# Patient Record
Sex: MALE | Race: WHITE | Employment: FULL TIME | URBAN - METROPOLITAN AREA
[De-identification: names, ages, dates, MRNs, and addresses within clinical notes are randomized per-mention and may not be internally consistent; named-entity substitution may affect disease eponyms.]

---

## 2017-04-28 ENCOUNTER — OFFICE VISIT (OUTPATIENT)
Dept: URGENT CARE | Facility: URGENT CARE | Age: 57
End: 2017-04-28
Payer: COMMERCIAL

## 2017-04-28 VITALS
SYSTOLIC BLOOD PRESSURE: 123 MMHG | DIASTOLIC BLOOD PRESSURE: 77 MMHG | WEIGHT: 159.8 LBS | TEMPERATURE: 97.2 F | HEART RATE: 77 BPM | OXYGEN SATURATION: 98 %

## 2017-04-28 DIAGNOSIS — R10.9 RIGHT FLANK PAIN: Primary | ICD-10-CM

## 2017-04-28 DIAGNOSIS — S39.012A STRAIN OF LUMBAR REGION, INITIAL ENCOUNTER: ICD-10-CM

## 2017-04-28 LAB
ALBUMIN UR-MCNC: NEGATIVE MG/DL
APPEARANCE UR: CLEAR
BILIRUB UR QL STRIP: ABNORMAL
COLOR UR AUTO: YELLOW
GLUCOSE UR STRIP-MCNC: NEGATIVE MG/DL
HGB UR QL STRIP: NEGATIVE
KETONES UR STRIP-MCNC: NEGATIVE MG/DL
LEUKOCYTE ESTERASE UR QL STRIP: NEGATIVE
NITRATE UR QL: NEGATIVE
PH UR STRIP: 5.5 PH (ref 5–7)
SP GR UR STRIP: 1.02 (ref 1–1.03)
URN SPEC COLLECT METH UR: ABNORMAL
UROBILINOGEN UR STRIP-ACNC: 1 EU/DL (ref 0.2–1)

## 2017-04-28 PROCEDURE — 81003 URINALYSIS AUTO W/O SCOPE: CPT | Performed by: FAMILY MEDICINE

## 2017-04-28 PROCEDURE — 99213 OFFICE O/P EST LOW 20 MIN: CPT | Performed by: FAMILY MEDICINE

## 2017-04-28 RX ORDER — CYCLOBENZAPRINE HCL 10 MG
10 TABLET ORAL AT BEDTIME
Qty: 10 TABLET | Refills: 1 | Status: SHIPPED | OUTPATIENT
Start: 2017-04-28 | End: 2017-05-09

## 2017-04-28 ASSESSMENT — PAIN SCALES - GENERAL: PAINLEVEL: WORST PAIN (10)

## 2017-04-28 NOTE — NURSING NOTE
RN Triage:     Chief Complaint   Patient presents with     Abdominal Pain     Patient report lower abdominal pain that has been going on for about a month.  Patient states that cramping happens almost every Thursday or Friday.  Patient reports intermittent pain similar to cramping four about 4-5 hours.  Patient reports diarrhea today.  No nausea or weakness.  Patient has Hepatitis C.  Also family history of Kidney stones.  Patient has taken Gas pills improved symptoms.  Tums worsened symptoms.  Patient also took epsom salt       Initial /77 (BP Location: Left arm, Patient Position: Chair, Cuff Size: Adult Regular)  Pulse 77  Temp 97.2  F (36.2  C)  Wt 159 lb 12.8 oz (72.5 kg)  SpO2 98% There is no height or weight on file to calculate BMI.  BP completed using cuff size: regular        Triage Dispo: Non-Urgent: Patient advised to wait in room. Advised patient to notify staff for any worsening or new concerning symptoms.    Intervention: CORINNE Maguire RN

## 2017-04-28 NOTE — PROGRESS NOTES
SUBJECTIVE:                                                    Rian Malik is a 56 year old male who presents to clinic today for the following health issues:      Abdominal Pain      Duration: x 1 month    Description (location/character/radiation): lower abdomen cramping and lower midline       Associated flank pain: None    Intensity:  10/10    Accompanying signs and symptoms:        Fever/Chills: no        Gas/Bloating: YES       Nausea/vomitting: no        Diarrhea: YES, today x 1. Denies fever.   Appetite has been good.        Dysuria or Hematuria: no     History (previous similar pain/trauma/previous testing): none    Precipitating or alleviating factors:       Pain worse with eating/BM/urination: eating       Pain relieved by BM: YES    Therapies tried and outcome: Advil, tums gas x    LMP:  not applicable     Left Hernia repair in 80s      Problem list and histories reviewed & adjusted, as indicated.  Additional history: as documented    There is no problem list on file for this patient.    No past surgical history on file.    Social History   Substance Use Topics     Smoking status: Current Every Day Smoker     Smokeless tobacco: Never Used     Alcohol use Yes     No family history on file.      No current outpatient prescriptions on file.     No Known Allergies    Reviewed and updated as needed this visit by clinical staff  Tobacco  Allergies  Meds       Reviewed and updated as needed this visit by Provider         ROS:  Constitutional, HEENT, cardiovascular, pulmonary, gi and gu systems are negative, except as otherwise noted.    OBJECTIVE:                                                    /77 (BP Location: Left arm, Patient Position: Chair, Cuff Size: Adult Regular)  Pulse 77  Temp 97.2  F (36.2  C)  Wt 159 lb 12.8 oz (72.5 kg)  SpO2 98%  There is no height or weight on file to calculate BMI.  GENERAL: healthy, alert and no distress  NECK: no adenopathy, no asymmetry, masses, or scars  and thyroid normal to palpation  RESP: lungs clear to auscultation - no rales, rhonchi or wheezes  CV: regular rate and rhythm, normal S1 S2, no S3 or S4, no murmur, click or rub, no peripheral edema and peripheral pulses strong  ABDOMEN: soft, nontender, no hepatosplenomegaly, no masses and bowel sounds normal  MS: no gross musculoskeletal defects noted, no edema  Paraspinal lumbar area tenderness.     Results for orders placed or performed in visit on 04/28/17   *UA reflex to Microscopic   Result Value Ref Range    Color Urine Yellow     Appearance Urine Clear     Glucose Urine Negative NEG mg/dL    Bilirubin Urine (A) NEG     Small  This is an unconfirmed screening test result. A positive result may be false.      Ketones Urine Negative NEG mg/dL    Specific Gravity Urine 1.025 1.003 - 1.035    Blood Urine Negative NEG    pH Urine 5.5 5.0 - 7.0 pH    Protein Albumin Urine Negative NEG mg/dL    Urobilinogen Urine 1.0 0.2 - 1.0 EU/dL    Nitrite Urine Negative NEG    Leukocyte Esterase Urine Negative NEG    Source Midstream Urine          ASSESSMENT/PLAN:                                                    .    ICD-10-CM    1. Right flank pain R10.9 *UA reflex to Microscopic   2. Strain of lumbar region, initial encounter S39.012A cyclobenzaprine (FLEXERIL) 10 MG tablet      PLAN  Patient educational/instructional material provided including reasons for follow-up   The patient indicates understanding of these issues and agrees with the plan.  Brain Zurita MD        [facility]

## 2017-04-28 NOTE — LETTER
[facility]  [address]  [facility] [address]  Phone: [phone]    April 28, 2017        Rian Malik  [address] [address] [facility] [address] [place] [address]          To whom it may concern:    RE: Rian Malik    Patient was seen and treated today at our clinic and missed work. He was evaluated in th clinic today and noted to have a left lumbar area strain. He may return to work at his next scheduled shift, however, he should avoid all deep bending from the waist and heavy lifting of more than 10 lbs over the next 7-10 day and until pain improves. Follow-up if symptoms persist or worsen.        Sincerely,        Brain Zurita MD

## 2017-04-28 NOTE — MR AVS SNAPSHOT
"              After Visit Summary   4/28/2017    Rian Malik    MRN: [id number]           Patient Information     Date Of Birth          1960        Visit Information        Provider Department      4/28/2017 2:10 PM Brain Zurita MD [facility]        Today's Diagnoses     Right flank pain    -  1       Follow-ups after your visit        Your next 10 appointments already scheduled     May 01, 2017  7:40 AM CDT   [facility] Visit with Moreno Harris MD   [facility]    [address] [place] [id number] [phone]           Bring a current list of meds and any records pertaining to this visit.  For Physicals, please bring immunization records and any forms needing to be filled out.  Please arrive 10 minutes early to complete paperwork.              Who to contact     If you have questions or need follow up information about today's clinic visit or your schedule please contact [facility] directly at [phone].  Normal or non-critical lab and imaging results will be communicated to you by MyChart, letter or phone within 4 business days after the clinic has received the results. If you do not hear from us within 7 days, please contact the clinic through [employer] or phone. If you have a critical or abnormal lab result, we will notify you by phone as soon as possible.  Submit refill requests through [employer] or call your pharmacy and they will forward the refill request to us. Please allow 3 business days for your refill to be completed.          Additional Information About Your Visit        [employer] Information     [employer] lets you send messages to your doctor, view your test results, renew your prescriptions, schedule appointments and more. To sign up, go to [email or web address] . Click on \"Log in\" on the left side of the screen, which will take you to the Welcome page. Then click on " "\"Sign up Now\" on the right side of the page.     You will be asked to enter the access code listed below, as well as some personal information. Please follow the directions to create your username and password.     Your access code is: [email or web address]  Expires: 2017  4:02 PM     Your access code will  in 90 days. If you need help or a new code, please call your [facility] or [phone].        Care EveryWhere ID     This is your Care EveryWhere ID. This could be used by other organizations to access your [place] medical records  [id number]        Your Vitals Were     Pulse Temperature Pulse Oximetry             77 97.2  F (36.2  C) 98%          Blood Pressure from Last 3 Encounters:   17 123/77    Weight from Last 3 Encounters:   17 159 lb 12.8 oz (72.5 kg)              We Performed the Following     *UA reflex to Microscopic        Primary Care Provider    None Specified       No primary provider on file.        Thank you!     Thank you for choosing [facility]  for your care. Our goal is always to provide you with excellent care. Hearing back from our patients is one way we can continue to improve our services. Please take a few minutes to complete the written survey that you may receive in the mail after your visit with us. Thank you!             Your Updated Medication List - Protect others around you: Learn how to safely use, store and throw away your medicines at www.disposemymeds.org.      Notice  As of 2017  4:02 PM    You have not been prescribed any medications.      "

## 2017-05-01 ENCOUNTER — OFFICE VISIT (OUTPATIENT)
Dept: FAMILY MEDICINE | Facility: CLINIC | Age: 57
End: 2017-05-01
Payer: COMMERCIAL

## 2017-05-01 VITALS
HEART RATE: 80 BPM | SYSTOLIC BLOOD PRESSURE: 117 MMHG | TEMPERATURE: 97.5 F | HEIGHT: 68 IN | BODY MASS INDEX: 23.95 KG/M2 | WEIGHT: 158 LBS | DIASTOLIC BLOOD PRESSURE: 74 MMHG | OXYGEN SATURATION: 97 %

## 2017-05-01 DIAGNOSIS — Z71.89 ADVANCED DIRECTIVES, COUNSELING/DISCUSSION: ICD-10-CM

## 2017-05-01 DIAGNOSIS — R10.32 LEFT INGUINAL PAIN: Primary | ICD-10-CM

## 2017-05-01 DIAGNOSIS — B18.2 CHRONIC HEPATITIS C WITHOUT HEPATIC COMA (H): ICD-10-CM

## 2017-05-01 DIAGNOSIS — Z13.6 CARDIOVASCULAR SCREENING; LDL GOAL LESS THAN 160: ICD-10-CM

## 2017-05-01 PROBLEM — D69.6 THROMBOCYTOPENIA (H): Chronic | Status: RESOLVED | Noted: 2017-05-01 | Resolved: 2017-05-01

## 2017-05-01 PROBLEM — D69.6 THROMBOCYTOPENIA (H): Chronic | Status: ACTIVE | Noted: 2017-05-01

## 2017-05-01 LAB
ALBUMIN SERPL-MCNC: 3.6 G/DL (ref 3.4–5)
ALP SERPL-CCNC: 75 U/L (ref 40–150)
ALT SERPL W P-5'-P-CCNC: 70 U/L (ref 0–70)
ANION GAP SERPL CALCULATED.3IONS-SCNC: 10 MMOL/L (ref 3–14)
AST SERPL W P-5'-P-CCNC: 44 U/L (ref 0–45)
BASOPHILS # BLD AUTO: 0.1 10E9/L (ref 0–0.2)
BASOPHILS NFR BLD AUTO: 1.2 %
BILIRUB SERPL-MCNC: 0.7 MG/DL (ref 0.2–1.3)
BUN SERPL-MCNC: 18 MG/DL (ref 7–30)
CALCIUM SERPL-MCNC: 9.3 MG/DL (ref 8.5–10.1)
CHLORIDE SERPL-SCNC: 103 MMOL/L (ref 94–109)
CHOLEST SERPL-MCNC: 103 MG/DL
CO2 SERPL-SCNC: 28 MMOL/L (ref 20–32)
CREAT SERPL-MCNC: 0.95 MG/DL (ref 0.66–1.25)
DIFFERENTIAL METHOD BLD: NORMAL
EOSINOPHIL # BLD AUTO: 0.1 10E9/L (ref 0–0.7)
EOSINOPHIL NFR BLD AUTO: 1.8 %
ERYTHROCYTE [DISTWIDTH] IN BLOOD BY AUTOMATED COUNT: 12.7 % (ref 10–15)
GFR SERPL CREATININE-BSD FRML MDRD: 81 ML/MIN/1.7M2
GLUCOSE SERPL-MCNC: 109 MG/DL (ref 70–99)
HCT VFR BLD AUTO: 45.7 % (ref 40–53)
HDLC SERPL-MCNC: 52 MG/DL
HGB BLD-MCNC: 16.2 G/DL (ref 13.3–17.7)
LDLC SERPL CALC-MCNC: 43 MG/DL
LYMPHOCYTES # BLD AUTO: 2.2 10E9/L (ref 0.8–5.3)
LYMPHOCYTES NFR BLD AUTO: 33.5 %
MCH RBC QN AUTO: 32.7 PG (ref 26.5–33)
MCHC RBC AUTO-ENTMCNC: 35.4 G/DL (ref 31.5–36.5)
MCV RBC AUTO: 92 FL (ref 78–100)
MONOCYTES # BLD AUTO: 0.7 10E9/L (ref 0–1.3)
MONOCYTES NFR BLD AUTO: 10.3 %
NEUTROPHILS # BLD AUTO: 3.5 10E9/L (ref 1.6–8.3)
NEUTROPHILS NFR BLD AUTO: 53.2 %
NONHDLC SERPL-MCNC: 51 MG/DL
PLATELET # BLD AUTO: 164 10E9/L (ref 150–450)
POTASSIUM SERPL-SCNC: 4 MMOL/L (ref 3.4–5.3)
PROT SERPL-MCNC: 7 G/DL (ref 6.8–8.8)
RBC # BLD AUTO: 4.95 10E12/L (ref 4.4–5.9)
SODIUM SERPL-SCNC: 141 MMOL/L (ref 133–144)
TRIGL SERPL-MCNC: 41 MG/DL
WBC # BLD AUTO: 6.5 10E9/L (ref 4–11)

## 2017-05-01 PROCEDURE — 80053 COMPREHEN METABOLIC PANEL: CPT | Performed by: INTERNAL MEDICINE

## 2017-05-01 PROCEDURE — 85025 COMPLETE CBC W/AUTO DIFF WBC: CPT | Performed by: INTERNAL MEDICINE

## 2017-05-01 PROCEDURE — 99214 OFFICE O/P EST MOD 30 MIN: CPT | Performed by: INTERNAL MEDICINE

## 2017-05-01 PROCEDURE — 87522 HEPATITIS C REVRS TRNSCRPJ: CPT | Performed by: INTERNAL MEDICINE

## 2017-05-01 PROCEDURE — 36415 COLL VENOUS BLD VENIPUNCTURE: CPT | Performed by: INTERNAL MEDICINE

## 2017-05-01 PROCEDURE — 80061 LIPID PANEL: CPT | Performed by: INTERNAL MEDICINE

## 2017-05-01 NOTE — PROGRESS NOTES
SUBJECTIVE:                                                    Rian Malik is a 56 year old male who presents to clinic today for the following health issues:    ABDOMINAL PAIN     Onset: 1 month    Description:   Character: Cramping. He first heard a ripping sound 1 month ago after lifting a heavy object.  Location: left lower quadrant  Radiation: None    Intensity: severe    Progression of Symptoms:  Intermittent.    Accompanying Signs & Symptoms:  Fever/Chills?: no   Gas/Bloating: YES  Nausea: YES  Vomitting: no   Diarrhea?: YES  Constipation:no   Dysuria or Hematuria: no    History: YES, left inguinal hernia in his 20s.  Trauma: no   Previous similar pain: no    Previous tests done: none    Precipitating factors:   Does the pain change with:     Food: no      BM: no     Urination: no     Alleviating factors:  none    Therapies Tried and outcome: flexeril, no improvement             Problem list and histories reviewed & adjusted, as indicated.  Additional history: as documented    Patient Active Problem List   Diagnosis     Hepatitis C virus infection     Advanced directives, counseling/discussion     CARDIOVASCULAR SCREENING; LDL GOAL LESS THAN 160     History reviewed. No pertinent surgical history.    Social History   Substance Use Topics     Smoking status: Current Every Day Smoker     Smokeless tobacco: Never Used     Alcohol use Yes     History reviewed. No pertinent family history.      No Known Allergies  No lab results found.   BP Readings from Last 3 Encounters:   05/01/17 117/74   04/28/17 123/77    Wt Readings from Last 3 Encounters:   05/01/17 158 lb (71.7 kg)   04/28/17 159 lb 12.8 oz (72.5 kg)                      ROS:  C: NEGATIVE for fever, chills, change in weight  I: NEGATIVE for worrisome rashes, moles or lesions  E: NEGATIVE for vision changes or irritation  E/M: NEGATIVE for ear, mouth and throat problems  R: NEGATIVE for significant cough or SOB  B: NEGATIVE for masses, tenderness or  "discharge  CV: NEGATIVE for chest pain, palpitations or peripheral edema  GI:   : NEGATIVE for frequency, dysuria, or hematuria  M: NEGATIVE for significant arthralgias or myalgia  N: NEGATIVE for weakness, dizziness or paresthesias  E: NEGATIVE for temperature intolerance, skin/hair changes  HEME/ALLERGY/IMMUNE:   P: NEGATIVE for changes in mood or affect    OBJECTIVE:                                                    /74 (BP Location: Left arm, Patient Position: Chair, Cuff Size: Adult Regular)  Pulse 80  Temp 97.5  F (36.4  C) (Oral)  Ht 5' 8\" (1.727 m)  Wt 158 lb (71.7 kg)  SpO2 97%  BMI 24.02 kg/m2  Body mass index is 24.02 kg/(m^2).  GENERAL: healthy, alert and no distress  EYES: Eyes grossly normal to inspection  RESP: lungs clear to auscultation - no rales, rhonchi or wheezes  CV: regular rate and rhythm, normal S1 S2, no S3 or S4, no murmur, click or rub, no peripheral edema and peripheral pulses strong  ABDOMEN: soft, nontender, no hepatosplenomegaly, no masses and bowel sounds normal. Bulge noted at left inguinal hernia when coughing.  MS: no gross musculoskeletal defects noted, no edema  SKIN: no suspicious lesions or rashes  NEURO: Normal strength and tone, mentation intact and speech normal  PSYCH: mentation appears normal, affect normal/bright    Diagnostic Test Results:  Pending.     ASSESSMENT/PLAN:                                                            (R10.30) Left inguinal pain  (primary encounter diagnosis)  Comment: Suspect indirect inguinal hernia that requires additional imaging test.  Plan: CT Abdomen Pelvis w Contrast, Comprehensive         metabolic panel (BMP + Alb, Alk Phos, ALT, AST,        Total. Bili, TP), CBC with platelets and         differential            (B18.2) Chronic hepatitis C without hepatic coma (H)  Comment: Previously diagnosed at an outside clinic. No previous treatment due to treatment cost, but patient does not have any manifestation of chronic " liver disease (ascites, jaundice).  Plan: Hepatitis C RNA, quantitative            (Z13.6) CARDIOVASCULAR SCREENING; LDL GOAL LESS THAN 160  Comment: Treat accordingly if ACC risk calculator is more than 7.5%.  Plan: Lipid Profile with reflex to direct LDL            (Z71.89) Advanced directives, counseling/discussion  Comment: Necessary due to age and chronic disease (hepatitis C).      Follow-up visit if condition worsens.    Moreno Harris MD  [facility]

## 2017-05-01 NOTE — PATIENT INSTRUCTIONS
This summary includes the important diagnoses, test, medications and other important parts of your medical history.  Below are a few good we sites you can use to learn more about these.     Www.[email or web address].org : Up to date and easily searchable information on multiple topics.  Www.[email or web address] : [facility] $4.99 medications  Www.medlineplus.gov : medication info, interactive tutorials, watch real surgeries online  Www.familydoctor.org : good info from the Academy of Family Physicians  Www.[employer] : good info from the [facility]  Www.cdc.gov : public health info, travel advisories, epidemics (H1N1)  Www.aap.org : children's health info, normal development, vaccinations  Www.health.[facility].[place].us : [place] dept of heat, public health issues in [place], N1N1    Based on your medical history and these are the current health maintenance or preventive care services that you are due for (some may have been done at this visit:)  Health Maintenance Due   Topic Date Due     TETANUS IMMUNIZATION (SYSTEM ASSIGNED)  08/19/1978     ADVANCE DIRECTIVE PLANNING Q5 YRS (NO INBASKET)  08/19/1978     HEPATITIS C SCREENING  08/19/1978     LIPID SCREEN Q5 YR MALE (SYSTEM ASSIGNED)  08/19/1995     COLON CANCER SCREEN (SYSTEM ASSIGNED)  08/19/2010     =================================================================================  Normal Values   Blood pressure  <140/90 for most adults    <130/80 for some chronic diseases (ask your care team about yours)    BMI (body mass index)  18.5-25 kg/m2 (based on height and weight)     Thank you for visiting [facility]    Normal or non-critical lab and imaging results will be communicated to you by MyChart, letter or phone within 7 days.  If you do not hear from us within 10 days, please call the clinic. If you have a critical or abnormal lab result, we will notify you by phone as soon as possible.     If you have any questions regarding your visit  please contact:     Team Comfort:   Clinic Hours Telephone Number   Dr. Darrell Huang   7am-5pm  Monday - Friday [phone]  Jonathon RN   Pharmacy 8am-8pm Monday-Thursday      8am-6pm Friday  9am-5pm Saturday-Sunday [phone]   Urgent Care 11am-8pm Monday-Friday        9am-5pm Saturday-Sunday [phone]     After hours, weekend or if you need to make an appointment with your primary provider please call [phone].   After Hours nurse advise: call [place] [facility]: [phone]    Medication Refills:  Call your pharmacy and they will forward the refill to us. Please allow 3 business days for your refills to be completed.    Use Guestmob (secure email communication and access to your chart) to send your primary care provider a message or make an appointment. Ask someone on your Team how to sign up for Guestmob. To log on to InflowControl or for more information in [employer] please visit the website at [email or web address].  As of October 8, 2013, all password changes, disabled accounts, or ID changes in Guestmob/MyHealth will be done by our Access Services Department.   If you need help with your account or password, call: [phone]. Clinic staff no longer has the ability to change passwords.     Abdominal Pain  Abdominal pain is pain in the stomach or intestinal area. Everyone has this pain from time to time. In many cases it goes away on its own. But abdominal pain can sometimes be due to a serious problem, such as appendicitis. So it s important to know when to seek help.  Causes of abdominal pain  There are many possible causes of abdominal pain. Common causes in adults include:    Constipation, diarrhea, or gas    GERD (gastroesophageal reflux disease) movement of stomach acid into the esophagus, also known as acid reflux or heartburn    Peptic ulcer (a sore in the lining of the stomach or small  intestine)    Inflammation of the gallbladder, liver, or pancreas    Gallstones or kidney stones    Appendicitis     Obstruction of the intestines     Hernia (bulging of an internal organ through a muscle or other tissue)    Urinary tract infections    In women, menstrual cramps, fibroids, or endometriosis of the uterus    Inflammation or infection of the intestines  Diagnosing the cause of abdominal pain  Your health care provider will examine you to help find the cause of your pain. If needed, tests will be ordered. Because abdominal pain has so many possible causes, it can be hard to discover the reason for the pain. Giving details about your pain can help. Be ready to tell your health care provider where and when you feel the pain and what makes it better or worse. Also mention whether you have other symptoms such as fever, tiredness, nausea, vomiting, or changes in bathroom habits.  Treating abdominal pain  Certain causes of pain, such as appendicitis or a bowel obstruction, need emergency treatment. Other problems can be treated with rest, fluids, or medications. Your health care provider can give you specific instructions for treatment or self-care based on the cause of your pain.  If you have vomiting or diarrhea, sip water or other clear fluids. When you are ready to eat solid foods again, start with small amounts of easy-to-digest, low-fat foods, such as applesauce, toast, or crackers.   When to call the doctor  Call 911 or go to the hospital right away if you:    Can t pass stool and are vomiting    Are vomiting blood or have black, tarry diarrhea    Also have chest, neck, or shoulder pain    Feel like you are about to pass out    Have pain in your shoulder blades with nausea    Have sudden, excruciating abdominal pain    Have new, severe pain unlike any you have felt before    Have a belly that is rigid, hard, and tender to touch  Call your doctor if you have:    Pain for more than 5 days    Bloating for  more than 2 days    Diarrhea for more than 5 days    Fever of 101 F (38.3 C) or higher    Pain that continues to worsen    Unexplained weight loss    Continued lack of appetite    Blood in the stool  How to prevent abdominal pain  Here are some tips to help prevent abdominal pain:    Eat smaller amounts of food at one time.    Avoid greasy, fried, or other high-fat foods.    Avoid foods that give you gas.    Exercise regularly.    Drink plenty of fluids.  To help prevent symptoms of gastroesophageal reflux disease (GERD):    Quit smoking.    Reduce alcohol and certain foods that increase stomach acid.     Lose excess weight.    Finish eating at least 2 hours before you go to bed or lie down.    Elevate the head of your bed.    8332-6633 [employer]. [address] [place] [address], [place] [address]. All rights reserved. This information is not intended as a substitute for professional medical care. Always follow your healthcare professional's instructions.        Diverticulitis    Some people get pouches along the wall of the colon as they get older. The pouches, called diverticuli, usually cause no symptoms. If the pouches become blocked, you can get an infection. This infection is called diverticulitis. It causes pain in your lower abdomen and fever. If not treated, it can become a serious condition, causing an abscess to form inside the pouch. The abscess may block the intestinal tract even or rupture, spreading infection throughout the abdomen.  When treatment is started early, oral antibiotics alone may be enough to cure diverticulitis. This method is tried first. But, if you don't improve or if your condition gets worse while you are trying oral antibiotics, you may need to be admitted to the hospital for IV antibiotics. Severe cases may require surgery.  Home care  The following guidelines will help you care for yourself at home:    During the acute illness, rest and follow a low-fiber diet. Include  foods like:    Flake cereal, mashed potatoes, pancakes, waffles, pasta, white bread, rice, applesauce, bananas, eggs, meat, fish, poultry, tofu, and cooked vegetables    Take antibiotics exactly as the doctor says. Don't miss any doses or stop taking the medication, even if you feel better.    Monitor your temperature and report any rising temperatures to your doctor.  Preventing future attacks  Once you have had an episode of diverticulitis, you are at risk for having it again. After you have recovered from this episode, you may be able to reduce your risk by eating a high-fiber diet (20-35 gm/day of fiber). This cleans out the colon pouches that already exist and prevents new ones from forming. Foods high in fiber include fresh fruits and edible peelings, raw or lightly cooked vegetables, whole grain cereals and breads, dried beans and peas, and bran.  Other steps that can help prevent future attacks include:    Take your medications, such as antibiotics, as the doctor says.    Drink 6 to 8 glasses of water every day, unless directed otherwise.    Use a heating pad or hot water bottle to reduce abdominal cramping or pain.    Begin an exercise program. Ask your doctor how to get started. You can benefit from simple activities such as walking or gardening.    Treat diarrhea with a bland diet. Start with liquids only; then slowly add fiber over time.    Watch for changes in your bowel movements (constipation to diarrhea).    Get plenty of rest and sleep.  Follow-up care  Follow up with your doctor as advised or sooner if you are not getting better in the next 2 days.  When to seek medical care  Get prompt medical attention if any of the following occur:    Fever of 100.4 F (38 C) or higher, or as directed by your health care provider    Repeated vomiting or swelling of the abdomen    Weakness, dizziness, light-headedness    Pain in your abdomen that gets worse, severe, or spreads to your back    Pain that moves to  the right lower abdomen    Rectal bleeding (stools that are red, black or maroon color)    Unexpected vaginal bleeding    2498-9766 [employer]. [address] [place] [address], [place] [address]. All rights reserved. This information is not intended as a substitute for professional medical care. Always follow your healthcare professional's instructions.

## 2017-05-01 NOTE — NURSING NOTE
"Chief Complaint   Patient presents with     Abdominal Pain     middle lower abdomen       Initial /74 (BP Location: Left arm, Patient Position: Chair, Cuff Size: Adult Regular)  Pulse 80  Temp 97.5  F (36.4  C) (Oral)  Ht 5' 8\" (1.727 m)  Wt 158 lb (71.7 kg)  SpO2 97%  BMI 24.02 kg/m2 Estimated body mass index is 24.02 kg/(m^2) as calculated from the following:    Height as of this encounter: 5' 8\" (1.727 m).    Weight as of this encounter: 158 lb (71.7 kg).  Medication Reconciliation: complete     Jay Cuadra MA      "

## 2017-05-01 NOTE — MR AVS SNAPSHOT
After Visit Summary   5/1/2017    Rian Malik    MRN: [id number]           Patient Information     Date Of Birth          1960        Visit Information        Provider Department      5/1/2017 7:40 AM Kimberly, Moreno Palomares MD [facility]        Today's Diagnoses     Left inguinal pain    -  1      Care Instructions    This summary includes the important diagnoses, test, medications and other important parts of your medical history.  Below are a few good we sites you can use to learn more about these.     Www.AquaMobile.org : Up to date and easily searchable information on multiple topics.  Www.AquaMobile.org/Pharmacy/c_[email or web address].asp : [facility] $4.99 medications  Www.[employer].gov : medication info, interactive tutorials, watch real surgeries online  Www.familydoctor.org : good info from the Academy of Family Physicians  Www.[employer].Audiotoniq : good info from the [facility]  Www.cdc.gov : public health info, travel advisories, epidemics (H1N1)  Www.aap.org : children's health info, normal development, vaccinations  Www.health.[facility].[place].us : [place] dept of heatlh, public health issues in [place], N1N1    Based on your medical history and these are the current health maintenance or preventive care services that you are due for (some may have been done at this visit:)  Health Maintenance Due   Topic Date Due     TETANUS IMMUNIZATION (SYSTEM ASSIGNED)  08/19/1978     ADVANCE DIRECTIVE PLANNING Q5 YRS (NO INBASKET)  08/19/1978     HEPATITIS C SCREENING  08/19/1978     LIPID SCREEN Q5 YR MALE (SYSTEM ASSIGNED)  08/19/1995     COLON CANCER SCREEN (SYSTEM ASSIGNED)  08/19/2010     =================================================================================  Normal Values   Blood pressure  <140/90 for most adults    <130/80 for some chronic diseases (ask your care team about yours)    BMI (body mass index)  18.5-25 kg/m2 (based on height and weight)     Thank you for visiting [facility]  [facility]    Normal or non-critical lab and imaging results will be communicated to you by MyChart, letter or phone within 7 days.  If you do not hear from us within 10 days, please call the clinic. If you have a critical or abnormal lab result, we will notify you by phone as soon as possible.     If you have any questions regarding your visit please contact:     Team Comfort:   Clinic Hours Telephone Number   Dr. Darrell Huang   7am-5pm  Monday - Friday [phone]  [facility]   Pharmacy 8am-8pm Monday-Thursday      8am-6pm Friday  9am-5pm Saturday-Sunday [phone]   Urgent Care 11am-8pm Monday-Friday        9am-5pm Saturday-Sunday [phone]     After hours, weekend or if you need to make an appointment with your primary provider please call [phone].   After Hours nurse advise: call [facility]: [phone]    Medication Refills:  Call your pharmacy and they will forward the refill to us. Please allow 3 business days for your refills to be completed.    Use Matterportt (secure email communication and access to your chart) to send your primary care provider a message or make an appointment. Ask someone on your Team how to sign up for GruvIt. To log on to [employer] or for more information in [employer] please visit the website at [email or web address].  As of October 8, 2013, all password changes, disabled accounts, or ID changes in GruvIt/MyHealth will be done by our Access Services Department.   If you need help with your account or password, call: [phone]. Clinic staff no longer has the ability to change passwords.     Abdominal Pain  Abdominal pain is pain in the stomach or intestinal area. Everyone has this pain from time to time. In many cases it goes away on its own. But abdominal pain can sometimes be due to a serious problem, such as appendicitis. So it s important to know when to seek  help.  Causes of abdominal pain  There are many possible causes of abdominal pain. Common causes in adults include:    Constipation, diarrhea, or gas    GERD (gastroesophageal reflux disease) movement of stomach acid into the esophagus, also known as acid reflux or heartburn    Peptic ulcer (a sore in the lining of the stomach or small intestine)    Inflammation of the gallbladder, liver, or pancreas    Gallstones or kidney stones    Appendicitis     Obstruction of the intestines     Hernia (bulging of an internal organ through a muscle or other tissue)    Urinary tract infections    In women, menstrual cramps, fibroids, or endometriosis of the uterus    Inflammation or infection of the intestines  Diagnosing the cause of abdominal pain  Your health care provider will examine you to help find the cause of your pain. If needed, tests will be ordered. Because abdominal pain has so many possible causes, it can be hard to discover the reason for the pain. Giving details about your pain can help. Be ready to tell your health care provider where and when you feel the pain and what makes it better or worse. Also mention whether you have other symptoms such as fever, tiredness, nausea, vomiting, or changes in bathroom habits.  Treating abdominal pain  Certain causes of pain, such as appendicitis or a bowel obstruction, need emergency treatment. Other problems can be treated with rest, fluids, or medications. Your health care provider can give you specific instructions for treatment or self-care based on the cause of your pain.  If you have vomiting or diarrhea, sip water or other clear fluids. When you are ready to eat solid foods again, start with small amounts of easy-to-digest, low-fat foods, such as applesauce, toast, or crackers.   When to call the doctor  Call 911 or go to the hospital right away if you:    Can t pass stool and are vomiting    Are vomiting blood or have black, tarry diarrhea    Also have chest, neck,  or shoulder pain    Feel like you are about to pass out    Have pain in your shoulder blades with nausea    Have sudden, excruciating abdominal pain    Have new, severe pain unlike any you have felt before    Have a belly that is rigid, hard, and tender to touch  Call your doctor if you have:    Pain for more than 5 days    Bloating for more than 2 days    Diarrhea for more than 5 days    Fever of 101 F (38.3 C) or higher    Pain that continues to worsen    Unexplained weight loss    Continued lack of appetite    Blood in the stool  How to prevent abdominal pain  Here are some tips to help prevent abdominal pain:    Eat smaller amounts of food at one time.    Avoid greasy, fried, or other high-fat foods.    Avoid foods that give you gas.    Exercise regularly.    Drink plenty of fluids.  To help prevent symptoms of gastroesophageal reflux disease (GERD):    Quit smoking.    Reduce alcohol and certain foods that increase stomach acid.     Lose excess weight.    Finish eating at least 2 hours before you go to bed or lie down.    Elevate the head of your bed.    8801-4655 The [employer]. [address] [place] [address]. All rights reserved. This information is not intended as a substitute for professional medical care. Always follow your healthcare professional's instructions.        Diverticulitis    Some people get pouches along the wall of the colon as they get older. The pouches, called diverticuli, usually cause no symptoms. If the pouches become blocked, you can get an infection. This infection is called diverticulitis. It causes pain in your lower abdomen and fever. If not treated, it can become a serious condition, causing an abscess to form inside the pouch. The abscess may block the intestinal tract even or rupture, spreading infection throughout the abdomen.  When treatment is started early, oral antibiotics alone may be enough to cure diverticulitis. This method is tried first. But, if  you don't improve or if your condition gets worse while you are trying oral antibiotics, you may need to be admitted to the hospital for IV antibiotics. Severe cases may require surgery.  Home care  The following guidelines will help you care for yourself at home:    During the acute illness, rest and follow a low-fiber diet. Include foods like:    Flake cereal, mashed potatoes, pancakes, waffles, pasta, white bread, rice, applesauce, bananas, eggs, meat, fish, poultry, tofu, and cooked vegetables    Take antibiotics exactly as the doctor says. Don't miss any doses or stop taking the medication, even if you feel better.    Monitor your temperature and report any rising temperatures to your doctor.  Preventing future attacks  Once you have had an episode of diverticulitis, you are at risk for having it again. After you have recovered from this episode, you may be able to reduce your risk by eating a high-fiber diet (20-35 gm/day of fiber). This cleans out the colon pouches that already exist and prevents new ones from forming. Foods high in fiber include fresh fruits and edible peelings, raw or lightly cooked vegetables, whole grain cereals and breads, dried beans and peas, and bran.  Other steps that can help prevent future attacks include:    Take your medications, such as antibiotics, as the doctor says.    Drink 6 to 8 glasses of water every day, unless directed otherwise.    Use a heating pad or hot water bottle to reduce abdominal cramping or pain.    Begin an exercise program. Ask your doctor how to get started. You can benefit from simple activities such as walking or gardening.    Treat diarrhea with a bland diet. Start with liquids only; then slowly add fiber over time.    Watch for changes in your bowel movements (constipation to diarrhea).    Get plenty of rest and sleep.  Follow-up care  Follow up with your doctor as advised or sooner if you are not getting better in the next 2 days.  When to seek  "medical care  Get prompt medical attention if any of the following occur:    Fever of 100.4 F (38 C) or higher, or as directed by your health care provider    Repeated vomiting or swelling of the abdomen    Weakness, dizziness, light-headedness    Pain in your abdomen that gets worse, severe, or spreads to your back    Pain that moves to the right lower abdomen    Rectal bleeding (stools that are red, black or maroon color)    Unexpected vaginal bleeding    6404-9199 [employer]. [address] [place] [address]. All rights reserved. This information is not intended as a substitute for professional medical care. Always follow your healthcare professional's instructions.              Follow-ups after your visit        Future tests that were ordered for you today     Open Future Orders        Priority Expected Expires Ordered    CT Abdomen Pelvis w Contrast Routine  5/1/2018 5/1/2017            Who to contact     If you have questions or need follow up information about today's clinic visit or your schedule please contact [facility] directly at [phone].  Normal or non-critical lab and imaging results will be communicated to you by Liquid Gridshart, letter or phone within 4 business days after the clinic has received the results. If you do not hear from us within 7 days, please contact the clinic through [employer] or phone. If you have a critical or abnormal lab result, we will notify you by phone as soon as possible.  Submit refill requests through [employer] or call your pharmacy and they will forward the refill request to us. Please allow 3 business days for your refill to be completed.          Additional Information About Your Visit        Liquid GridsharTradesparq Information     [employer] lets you send messages to your doctor, view your test results, renew your prescriptions, schedule appointments and more. To sign up, go to [email or web address] . Click on \"Log in\" on the left side of the " "screen, which will take you to the Welcome page. Then click on \"Sign up Now\" on the right side of the page.     You will be asked to enter the access code listed below, as well as some personal information. Please follow the directions to create your username and password.     Your access code is: [email or web address]  Expires: 2017  4:02 PM     Your access code will  in 90 days. If you need help or a new code, please call your [facility] or [phone].        Care EveryWhere ID     This is your Care EveryWhere ID. This could be used by other organizations to access your [place] medical records  [id number]        Your Vitals Were     Pulse Temperature Height Pulse Oximetry BMI (Body Mass Index)       80 97.5  F (36.4  C) (Oral) 5' 8\" (1.727 m) 97% 24.02 kg/m2        Blood Pressure from Last 3 Encounters:   17 117/74   17 123/77    Weight from Last 3 Encounters:   17 158 lb (71.7 kg)   17 159 lb 12.8 oz (72.5 kg)              We Performed the Following     CBC with platelets and differential     Comprehensive metabolic panel (BMP + Alb, Alk Phos, ALT, AST, Total. Bili, TP)        Primary Care Provider    None Specified       No primary provider on file.        Thank you!     Thank you for choosing [facility]  for your care. Our goal is always to provide you with excellent care. Hearing back from our patients is one way we can continue to improve our services. Please take a few minutes to complete the written survey that you may receive in the mail after your visit with us. Thank you!             Your Updated Medication List - Protect others around you: Learn how to safely use, store and throw away your medicines at www.disposemymeds.org.          This list is accurate as of: 17  8:10 AM.  Always use your most recent med list.                   Brand Name Dispense Instructions for use    cyclobenzaprine 10 MG tablet    FLEXERIL    10 tablet    Take 1 tablet " (10 mg) by mouth At Bedtime

## 2017-05-02 LAB
HCV RNA SERPL NAA+PROBE-ACNC: ABNORMAL [IU]/ML
HCV RNA SERPL NAA+PROBE-LOG IU: 6.8 LOG IU/ML

## 2017-05-03 PROBLEM — B18.2 CHRONIC HEPATITIS C WITHOUT HEPATIC COMA (H): Status: ACTIVE | Noted: 2017-05-03

## 2017-05-03 PROBLEM — R10.32 LEFT INGUINAL PAIN: Status: ACTIVE | Noted: 2017-05-03

## 2017-05-05 ENCOUNTER — RADIANT APPOINTMENT (OUTPATIENT)
Dept: CT IMAGING | Facility: CLINIC | Age: 57
End: 2017-05-05
Attending: INTERNAL MEDICINE
Payer: COMMERCIAL

## 2017-05-05 ENCOUNTER — RESULT FOLLOW UP (OUTPATIENT)
Dept: FAMILY MEDICINE | Facility: CLINIC | Age: 57
End: 2017-05-05

## 2017-05-05 ENCOUNTER — TELEPHONE (OUTPATIENT)
Dept: FAMILY MEDICINE | Facility: CLINIC | Age: 57
End: 2017-05-05

## 2017-05-05 DIAGNOSIS — K63.0 ABSCESS OF SIGMOID COLON: Primary | ICD-10-CM

## 2017-05-05 DIAGNOSIS — R10.32 LEFT INGUINAL PAIN: ICD-10-CM

## 2017-05-05 LAB — RADIOLOGIST FLAGS: NORMAL

## 2017-05-05 PROCEDURE — 74177 CT ABD & PELVIS W/CONTRAST: CPT | Performed by: RADIOLOGY

## 2017-05-05 RX ORDER — CIPROFLOXACIN 500 MG/1
500 TABLET, FILM COATED ORAL 2 TIMES DAILY
Qty: 28 TABLET | Refills: 1 | Status: SHIPPED | OUTPATIENT
Start: 2017-05-05 | End: 2017-05-09

## 2017-05-05 RX ORDER — METRONIDAZOLE 500 MG/1
500 TABLET ORAL 2 TIMES DAILY
Qty: 28 TABLET | Refills: 1 | Status: SHIPPED | OUTPATIENT
Start: 2017-05-05 | End: 2017-05-09

## 2017-05-05 RX ORDER — METRONIDAZOLE 500 MG/1
500 TABLET ORAL 2 TIMES DAILY
Qty: 28 TABLET | Refills: 1 | Status: SHIPPED | OUTPATIENT
Start: 2017-05-05 | End: 2017-05-05

## 2017-05-05 RX ORDER — CIPROFLOXACIN 500 MG/1
500 TABLET, FILM COATED ORAL 2 TIMES DAILY
Qty: 28 TABLET | Refills: 1 | Status: SHIPPED | OUTPATIENT
Start: 2017-05-05 | End: 2017-05-05

## 2017-05-05 RX ORDER — IOPAMIDOL 755 MG/ML
97 INJECTION, SOLUTION INTRAVASCULAR ONCE
Status: COMPLETED | OUTPATIENT
Start: 2017-05-05 | End: 2017-05-05

## 2017-05-05 RX ADMIN — IOPAMIDOL 97 ML: 755 INJECTION, SOLUTION INTRAVASCULAR at 15:05

## 2017-05-05 NOTE — TELEPHONE ENCOUNTER
"This writer attempted to contact Rian on 05/05/17    Reason for call To give test results and recommendations below and left detailed message to return call and ask to speak with after hours nurse as clinic is closing for the weekend.    When patient calls back, RN to Relay message Below.      Jonathon Hickman RN    Notes Recorded by Vocal, Moreno Palomares MD on 5/5/2017 at 4:45 PM  CT of abdomen/pelvis shows thickening of the sigmoid colon that is probably causing an intramural abscess, leading to his abdominal pain.  Start antibiotics such as Ciprofloxacin and Metronidazole. Rx sent.  Recommend General Surgery consultation. Call [phone] to schedule appointment with Dr. Mcgee.  Colonoscopy is necessary but too risky to perform if there is still intramural abscess.    (Call patient. Not \"active\" user of MyChart despite \"active status\").  "

## 2017-05-08 NOTE — TELEPHONE ENCOUNTER
This writer attempted to contact Rian on 05/08/17    Reason for call results and left message to return call.    When patient calls back, please contact clinic RN team. If no one available, document that pt called and route to care team. routine priority.        Polly Maguire, AKIRA

## 2017-05-09 ENCOUNTER — OFFICE VISIT (OUTPATIENT)
Dept: FAMILY MEDICINE | Facility: CLINIC | Age: 57
End: 2017-05-09
Payer: COMMERCIAL

## 2017-05-09 VITALS
BODY MASS INDEX: 24.55 KG/M2 | DIASTOLIC BLOOD PRESSURE: 72 MMHG | TEMPERATURE: 98.4 F | SYSTOLIC BLOOD PRESSURE: 135 MMHG | WEIGHT: 162 LBS | OXYGEN SATURATION: 99 % | HEART RATE: 69 BPM | HEIGHT: 68 IN

## 2017-05-09 DIAGNOSIS — B18.2 CHRONIC HEPATITIS C WITHOUT HEPATIC COMA (H): ICD-10-CM

## 2017-05-09 DIAGNOSIS — K63.0 ABSCESS OF SIGMOID COLON: Primary | ICD-10-CM

## 2017-05-09 PROCEDURE — 99214 OFFICE O/P EST MOD 30 MIN: CPT | Performed by: INTERNAL MEDICINE

## 2017-05-09 PROCEDURE — 86255 FLUORESCENT ANTIBODY SCREEN: CPT | Mod: 90 | Performed by: INTERNAL MEDICINE

## 2017-05-09 PROCEDURE — 99000 SPECIMEN HANDLING OFFICE-LAB: CPT | Performed by: INTERNAL MEDICINE

## 2017-05-09 PROCEDURE — 36415 COLL VENOUS BLD VENIPUNCTURE: CPT | Performed by: INTERNAL MEDICINE

## 2017-05-09 PROCEDURE — 86038 ANTINUCLEAR ANTIBODIES: CPT | Performed by: INTERNAL MEDICINE

## 2017-05-09 PROCEDURE — 86140 C-REACTIVE PROTEIN: CPT | Performed by: INTERNAL MEDICINE

## 2017-05-09 ASSESSMENT — PAIN SCALES - GENERAL: PAINLEVEL: NO PAIN (0)

## 2017-05-09 NOTE — PROGRESS NOTES
SUBJECTIVE:                                                    Rian Malik is a 56 year old male who presents to clinic today for the following health issues:    Abscess of sigmoid colon      Mr. Malik was first seen by this provider on 5/1/2017 with complaints of left lower quadrant pain for one month. He thought it was an inguinal hernia. No associated constitutional symptoms or change in bowel habits. CT of abdomen/pelvis shows redundant sigmoid colon and focal area of narrowing, wall thickening and enhancement with upstream colonic air distension, focal pericolonic fat stranding and hyperemia. No diverticular disease. Ciprofloxacin and Metronidazole were sent, but he was not able to start either medications. He currently denies any melena, hematochezia or abdominal distention.    Chronic Hepatitis C     He has history of untreated Hepatitis C, by choice, due to lack of insurance coverage for treatment. HCV RNA is 6,806,253 but liver enzymes remain normal. The patient denies any jaundice, right upper quadrant pain or ascites.      Patient Active Problem List   Diagnosis     Hepatitis C virus infection     Advanced directives, counseling/discussion     CARDIOVASCULAR SCREENING; LDL GOAL LESS THAN 160     Chronic hepatitis C without hepatic coma (H)     Left inguinal pain     Abscess of sigmoid colon     History reviewed. No pertinent surgical history.    Social History   Substance Use Topics     Smoking status: Current Every Day Smoker     Smokeless tobacco: Never Used     Alcohol use Yes     History reviewed. No pertinent family history.      No Known Allergies  Recent Labs   Lab Test  05/01/17   0831   LDL  43   HDL  52   TRIG  41   ALT  70   CR  0.95   GFRESTIMATED  81   GFRESTBLACK  >90   GFR Calc     POTASSIUM  4.0      BP Readings from Last 3 Encounters:   05/09/17 135/72   05/01/17 117/74   04/28/17 123/77    Wt Readings from Last 3 Encounters:   05/09/17 162 lb (73.5 kg)   05/01/17  "158 lb (71.7 kg)   04/28/17 159 lb 12.8 oz (72.5 kg)                    ROS:  C: NEGATIVE for fever, chills, change in weight  I: NEGATIVE for worrisome rashes, moles or lesions  E: NEGATIVE for vision changes or irritation  E/M: NEGATIVE for ear, mouth and throat problems  R: NEGATIVE for significant cough or SOB  CV: NEGATIVE for chest pain, palpitations or peripheral edema  GI: NEGATIVE for hematemesis, hematochezia and jaundice  : NEGATIVE for frequency, dysuria, or hematuria  M: NEGATIVE for significant arthralgias or myalgia  N: NEGATIVE for weakness, dizziness or paresthesias  E: NEGATIVE for temperature intolerance, skin/hair changes  H: NEGATIVE for bleeding problems  P: NEGATIVE for changes in mood or affect    OBJECTIVE:                                                    /72 (BP Location: Left arm, Patient Position: Chair, Cuff Size: Adult Regular)  Pulse 69  Temp 98.4  F (36.9  C) (Oral)  Ht 5' 8\" (1.727 m)  Wt 162 lb (73.5 kg)  SpO2 99%  BMI 24.63 kg/m2  Body mass index is 24.63 kg/(m^2).  GENERAL: healthy, alert and no distress  EYES: Eyes grossly normal to inspection  NECK: no adenopathy  RESP: lungs clear to auscultation - no rales, rhonchi or wheezes  CV: regular rate and rhythm, normal S1 S2, no S3 or S4, no murmur, click or rub, no peripheral edema and peripheral pulses strong  ABDOMEN: Flat, soft, tender on palpation of right lower quadrant on deep palpation.  MS: no gross musculoskeletal defects noted, no edema  SKIN: no suspicious lesions or rashes  NEURO: Normal strength and tone, mentation intact and speech normal  PSYCH: mentation appears normal, affect normal/bright    Diagnostic Test Results:  Results for orders placed or performed in visit on 05/05/17   CT Abdomen Pelvis w Contrast   Result Value Ref Range    Radiologist flags Sigmoid colon wall thickening     Narrative    EXAMINATION: CT ABDOMEN PELVIS W CONTRAST, 5/5/2017 3:08 PM    TECHNIQUE:  Helical CT images from the " lung bases through the  symphysis pubis were obtained with IV contrast. Contrast dose: 97 ml  isovue 370    COMPARISON: None    HISTORY: Lower abdominal pain, unspecified    FINDINGS:    Abdomen and pelvis: Redundant sigmoid colon. No significant  diverticular disease. There is a focal area of narrowing, wall  thickening and enhancement with upstream colonic air distention (axial  image 68 and 165 of series 2 and 3, coronal image 42, sagittal image  79). Focal pericolonic fat stranding and hyperemia. There is a  possible intramural fluid collection (series 4 image 53, series 2  image 70). This measures approximately 2.5 x 0.6 x 1.2 cm. Large  colonic stool volume. Small bowel is normal in caliber. Normal  appendix. Small hiatal hernia. Stomach and duodenum appear normal.    No suspicious lesion in the liver, gallbladder, spleen, adrenal  glands, pancreas, or kidneys. No hydronephrosis or nephrolithiasis.  Urinary bladder is decompressed, prostate is mildly enlarged. Seminal  vesicles are symmetric.    No lymphadenopathy. Major vessels are patent and normal in caliber. No  ascites.    Lung bases: No pleural or pericardial effusion. Concerning nodules.    Bones and soft tissues: No suspicious findings.      Impression    IMPRESSION:   1. Short segment circumferential thickening and enhancement of the  redundant sigmoid colon in the right lower quadrant. Infectious or  inflammatory colitis are the most likely differential considerations,  though malignancy is not definitively excluded. Within this, there is  suggestion of a small intramural abscess in the colonic wall. No extra  luminal abscess, evidence for perforation or significant mechanical  obstruction.  2. Large colonic stool volume.  3. Normal appendix.    [Consider Follow Up: Sigmoid colon wall thickening]    This report will be copied to the [facility] to ensure a  provider acknowledges the finding.     I have personally reviewed the examination  and initial interpretation  and I agree with the findings.    ROSSI BYNUM        ASSESSMENT/PLAN:                                                            (K63.0) Abscess of sigmoid colon  (primary encounter diagnosis)  Comment: Treat empirically with Cipro and Flagyl, due to risk of bowel perforation. Patient still complains of right lower quadrant pain. CT of abdomen/pelvis results explained to the patient. Rule out inflammatory bowel disease.   Plan: GASTROENTEROLOGY ADULT REF CONSULT ONLY,         Antineutrophil cytoplasmic Liliana IgG, Antinuclear        antibody screen by EIA, CRP, inflammation,          Calprotectin Fecal    (B18.2) Chronic hepatitis C without hepatic coma (H)  Comment: HCV RNA results explained to the patient. Despite high HCV RNA levels, liver enzymes are still normal.  Plan: Refer to [facility].    Follow-up visit if condition worsens.    Moreno Harris MD  [facility]

## 2017-05-09 NOTE — TELEPHONE ENCOUNTER
Multiple attempts have been made to reach patient.  Requesting permission to send a letter.  Polly Maguire RN

## 2017-05-09 NOTE — MR AVS SNAPSHOT
After Visit Summary   5/9/2017    Rian Malik    MRN: [id number]           Patient Information     Date Of Birth          1960        Visit Information        Provider Department      5/9/2017 6:20 PM Moreno Harris MD [facility]        Today's Diagnoses     Abscess of sigmoid colon    -  1    Chronic hepatitis C without hepatic coma (H)          Care Instructions        ================================================================================  Normal Values   Blood pressure  <140/90 for most adults    <130/80 for some chronic diseases (ask your care team about yours)    BMI (body mass index)  18.5-25 kg/m2 (based on height and weight)     Thank you for visiting [facility]    Normal or non-critical lab and imaging results will be communicated to you by MyChart, letter or phone within 7 days.  If you do not hear from us within 10 days, please call the clinic. If you have a critical or abnormal lab result, we will notify you by phone as soon as possible.     If you have any questions regarding your visit please contact:     Team Comfort:   Clinic Hours Telephone Number   Dr. Darrell Harris 7am-5pm  Monday - Friday [phone]  Fabiana Matias RN   Pharmacy 8:00am-8pm Monday-Friday    9am-5pm Saturday-Sunday [phone]   Urgent Care 11am-9pm Monday-Friday        9am-5pm Saturday-Sunday [phone]     After hours, weekend or if you need to make an appointment with your primary provider please call [phone].   After Hours nurse advise: call [facility]: [phone]    Medication Refills:  Call your pharmacy and they will forward the refill to us. Please allow 3 business days for your refills to be completed.          Ulcerative Colitis  You have been diagnosed with ulcerative colitis. Ulcerative colitis is a chronic condition that causes  "inflammation and ulcers in the rectum and colon. It is a form of inflammatory bowel disease (IBD). The disease is usually diagnosed by a special procedure called a colonoscopy. The symptoms usually develop over time. There is no medicine that can cure ulcerative colitis. The goal of treatment is to reduce the symptoms, and cause a remission.  Symptoms of ulcerative colitis include:    Abdominal cramps and pain    Diarrhea, usually bloody    Rectal bleeding    Rectal pain    Fever    Decreased appetite and weight loss    Low energy    Inflammation outside of the colon can occur and can cause pain or swelling in places like the eyes, skin, and joints  Home care  No one knows what exactly causes IBD. The goal is to control and relieve the symptoms, and prevent complications, so you can lead a full and active life. No medicine can cure the disease, but in some cases, surgery to remove the whole colon can be curative. However, surgery causes other side effects and so medicines are often preferred. Discuss your options with your healthcare provider.  Diet  Your diet did not cause your condition, but it can affect it. Unfortunately, no one diet that works for everyone, so you have to experiment. Below are some recommendations, but what works for you may be different. Keep a food log to figure out what you are sensitive to.    Eat more slowly. Eat smaller amounts at a time, but more often. Remember, you can always eat more, but can't eat less once you've eaten too much.    High-fiber foods are complicated. While they may help constipation, they can make bloating, cramping, gas, and diarrhea worse.    Eat less sugar.    Try avoiding dairy products if you feel you are sensitive to lactose.    Try cutting out foods that are high in fat and fatty meats.    You can control bloating and passing excess gas. Be careful with \"gassy\" vegetables and fruits like beans, cabbage, broccoli, and cauliflower.    Be careful of carbonated " beverages and fruit juices. They can make bloating and diarrhea worse.    Caffeine, alcohol, and stimulants may make symptoms worse.  Lifestyle  Although stress doesn't cause IBD, it is a factor in flare-ups, and how you feel and react to your condition.    Look for things that seem to make your symptoms worse, such as stress and emotions.    Counseling can help you deal with stress. So can self-help measure like exercise, yoga, and meditation.    Depression can be a part of this illness and antidepressant medicine may be prescribed. This may actually help with diarrhea, constipation, and cramping, as well as symptoms of depression.    Smoking can make symptoms worse.    Lack of sleep can make symptoms seem worse.    Alcohol use can make symptoms worse.  Medicines  Your healthcare provider may prescribe medicines. Take them as directed. In most situations, lifelong medicine is necessary. For acute flares, additional prescription medicines can be prescribed. Call your provider if you need these.    Ask your healthcare provider before taking any medicines for diarrhea.    Avoid anti-inflammatory medicines like ibuprofen or naproxen.    Consider nutritional supplements. This is especially true if the diarrhea is prolonged, or you aren't eating or are losing weight.  Follow-up care  Follow up with your healthcare provider, or as advised. Tell your provider if you lose more than 5 pounds over 3 to 6 months, and you aren't trying to lose weight.  If a stool sample was taken, or cultures were done, you will be told if they are positive, or if your treatment needs to be changed. You can call as directed for results.  If X-rays were done, a radiologist will look at them. You will be told if you need a change in treatment  It is very important to tell your doctor if you intend to get pregnant, or find out you are pregnant. You will need to discuss your disease, medicines, and plan as early as possible and preferably before  you conceive.  Call 911  Call 911 if any of these occur:    Trouble breathing    Confusion    Very drowsy or trouble awakening    Fainting or loss of consciousness    Rapid heart rate    Chest pain  When to seek medical advice  Call your healthcare provider right away if any of these occur:    Bleeding from your rectum    Frequent diarrhea or abdominal pain that's not controlled by your medicine    Bloody diarrhea    Fever of 100.4 F (38 C) or higher, or as directed by your health care provider    Persistent nausea or repeated vomiting     2601-1029 The [employer]. [address] [place] [address]. All rights reserved. This information is not intended as a substitute for professional medical care. Always follow your healthcare professional's instructions.        Crohn s Disease    Crohn s disease is inflammation of the intestinal tract that comes and goes in  flare-ups . This is a chronic (long-term) illness. During a flare-up, intense abdominal pain and fever may be felt. Mucus, blood, or pus may appear in the stool. Between flare ups, inflammation lessens and there usually are no symptoms. Crohn s disease is a form of inflammatory bowel disease (IBD).   Symptoms of Crohn's disease may include:    Abdominal cramps and pain    Diarrhea, usually bloody, alternating with constipation    Mucus in stools    Rectal bleeding    Rectal pain    Fever    Low energy    Decreased appetite and weight loss  No one knows what exactly causes Crohn's disease, and there is no cure. The goal of treatment is to control and relieve symptoms and prevent complications, so you can lead a full and active life. No single treatment works for everyone, but many things can be done to help.  Diet  Foods did not cause your Crohn's, but they can affect it. Unfortunately, there is no one diet that works for everyone. Below are some things to try. Keep a food log to figure out what you are sensitive to.    Eat more slowly and  "smaller amounts at a time, but more often. Remember, you can always eat more, but cannot eat less once you've eaten too much.    High fiber foods are complicated. While they may help constipation they can make the bloating, cramping, gas, and diarrhea worse.    Try avoiding dairy products, sometimes this helps    Try cutting out foods that are high in fat and fatty meats    Eat less sugar    Bloating or passing excess gas may be controlled. Be careful with \"gassy\" vegetables and fruits like beans, cabbage, broccoli, and cauliflower.    Be careful of carbonated beverages and fruit juices. They can make the bloating and diarrhea worse.    Caffeine, alcohol, and stimulants may worsen symptoms. These include coffee, tea, sodas, energy drinks, and chocolate.  Lifestyle  Although stress does not cause Crohn's, it is often a factor in flare-ups. It can also affect how you feel about and cope with your condition.    Look for factors that seem to worsen your symptoms such as stress and emotions.    Counseling can often help relieve stress. So can self-help measures such as exercise, yoga, and meditation.    Depression can be a part of this illness and antidepressants may be prescribed. This may actually help with diarrhea, constipation, and cramping, as well as depression.    Smoking doesn't cause Crohn's, but can make the symptoms worse.  Medicines  Your health care provider may prescribe medicines. If so, take them as directed. For acute flare-ups, prescription medicines can be prescribed. Contact your provider if you need this.    Ask your health care provider before taking any antidiarrheal medicines.    Avoid anti-inflammatory medicines like ibuprofen or naproxen.    Consider nutritional supplements. This is especially true if the diarrhea is prolonged, or you aren't eating or are losing weight.  Follow-up care  Follow up with your healthcare provider, or as advised. If a stool sample was taken or cultures were done, " you will be told if your treatment needs to change. Call as directed for the results.  Support  Support groups for persons Crohn s disease can be a source of useful information on how others are coping with this illness. They are available in person, on the phone, or via the Internet. Contact the following resources for more information.    Crohn s and Colitis [employer] 744.936.6499 www.ccfa.org    National Digestive Diseases Information Clearinghouse (NDDIC) 836.959.4051 www.digestive.niddk.nih.gov  When to seek medical advice  Call your health care provider right away if any of these occur:    Fever of 100.4 F (38 C) or higher, or as directed by your health care provider    Abdominal pain that doesn't get better when you take the usual measures    Mucus, pus, or blood in the stool (dark or bright red)    Repeated vomiting    Abdominal swelling and pain that doesn't go away after a few hours  Call 911  Call 911 if any of these occur:    Trouble breathing    Confusion    Extreme sleepiness or trouble waking up    Fainting or loss of consciousness    Rapid heart rate    1710-6328 [employer]. [address] [place] [address]. All rights reserved. This information is not intended as a substitute for professional medical care. Always follow your healthcare professional's instructions.        Colonoscopy  Colonoscopy is used to view the inside of your lower digestive tract (colon and rectum). It can help screen for colon cancer and can help find the source of abdominal pain, bleeding, and changes in bowel habits. The test is usually done in the hospital on an outpatient basis. During the exam, the doctor can remove a small tissue sample ( a biopsy) for testing. Small growths, such as polyps, may also be removed during colonoscopy.     A camera attached to a flexible tube with a viewing lens is used to take video pictures.   Getting ready     Be sure to tell your doctor about any  medications you take. Also tell your doctor about any health conditions you may have.    Discuss the risks of the test with your doctor. These include bleeding and bowel puncture.    Your rectum and colon must be empty for the test. So be sure to follow the diet and bowel prep instructions exactly. If you don t, the test may need to be rescheduled.    Ask your doctor whether you need to have a friend or family member prepared to drive you home after the test.     Colonoscopy provides an inside view of the entire colon.   During the test     You are given sedating (relaxing) medication through an IV line. You may be drowsy or completely asleep.    The procedure takes 30 minutes or longer.    The doctor performs a digital rectal exam to check for anal and rectal problems. The rectum is lubricated and the scope inserted.    If you are awake, you may have a feeling similar to needing to have a bowel movement. You may also feel pressure as air is pumped into the colon. It s OK to pass gas during the procedure.  After the test     You may discuss the results with your doctor right away or at a future visit.    Try to pass all the gas right after the test to help prevent bloating and cramping.    After the test, you can go back to your normal eating and other activities.  Risks and possible complications include:    Bleeding   A puncture or tear in the colon   Risks of anesthesia         4623-9303 [employer]. [address] [place] [address], [place] [address]. All rights reserved. This information is not intended as a substitute for professional medical care. Always follow your healthcare professional's instructions.        Understanding Hepatitis C (HCV)  Hepatitis means inflammation of the liver. There are many kinds of hepatitis. Some can be spread. Others are not. Hepatitis C virus (HCV) does spread. It can lead to lifelong liver disease. This includes chronic hepatitis, cirrhosis, liver failure, and liver  cancer.  Symptoms of Hepatitis C  Most people notice no problems until they develop liver disease years later. Symptoms include the following:    Flulike problems (fatigue, nausea, vomiting, diarrhea, and sore muscles and joints)    Tenderness in the upper right abdomen    Jaundice (yellowing skin)    Swelling in the abdomen    Itching    Dark urine    How HCV Spreads  HCV spreads through exposure to an infected person s blood. This is most likely to occur if:    You used an infected needle (IV drug needles, tattoos, acupuncture needles, and body piercing)    You had a needlestick injury in the hospital    You shared personal care items such as razors    You had sex without a condom with an infected person (a less common cause)    You had a blood transfusion several years ago (blood is now screened for HCV)  Many people do not know how they were exposed to HCV.  Prevent the Spread  No vaccine can prevent the spread of HCV and hepatitis C. It s up to you to keep others safe.  Do    Cover all skin breaks and sores yourself. If you need help, the person treating you should wear latex gloves.    Use condoms during sex, especially with a new partner.  Don t    Don t donate blood, plasma, body organs, other body tissue, or sperm.    Don t share needles.    Don t share razors, toothbrushes, manicure tools, or other personal items.     5001-2895 The [employer]. [address] [place] [address], [place] [id number]. All rights reserved. This information is not intended as a substitute for professional medical care. Always follow your healthcare professional's instructions.        What to Know About Hepatitis C Treatment  Most people with hepatitis C virus (HCV) carry the virus for the rest of their lives. But treatment helps some people get over HCV infection. Ask your health care provider about your options and how likely it is that treatment will work for you.     Your loved ones can help you decide whether now is a good  time for you to try HCV treatment.   Things to consider  Deciding whether or not to get treatment for HCV infection is not easy. Treatment doesn t work for everyone. Your age, gender, and HCV genotype may affect how well treatment works. And because hepatitis C progresses slowly, not everyone needs treatment. Here are some things to think about:    How healthy are you? People who are in good health now may never develop health problems from hepatitis C. Ask your health care provider if treatment is likely to benefit your health.    Are the benefits worth the risks? Treatment for HCV infection can take up to a year and may cause side effects that make you feel sick. But after treatment, your body may be free of HCV. Discussing the pros and cons of treatment with your doctor and loved ones can help you reach a decision.    Is now the right time? Think about how treatment will fit into your daily routine. During treatment, you may be too tired to keep up an active lifestyle. Talk to family members and close friends about how treatment would fit into your life and schedule.    Do you plan to have a baby soon? HCV sometimes passes from mother to baby during birth. You may want to try to prevent this by getting treatment. But medications used to treat HCV infection can cause problems during pregnancy. If you and your partner are planning to have a baby soon, this will likely affect your hepatitis C treatment plans. Talk to your health care provider.    1247-8287 The [employer]. [address] [place] [address], [place] [address]. All rights reserved. This information is not intended as a substitute for professional medical care. Always follow your healthcare professional's instructions.        Treating Hepatitis C: Medications  Hepatitis C is treated using two medications that help your body fight the hepatitis C virus (HCV). Treatment takes time and commitment. In some people, treatment completely rids the body of  HCV.     During treatment, follow your doctor s instructions exactly.   Medications    Interferon is a protein that helps your immune system target HCV. It s injected one or more times a week for up to 12 months. Most people are taught how to do these shots at home. Interferon will not be used by most people by the end of 2014. After that, most people will be treated with sofosbuvir, with or without ribavirin and/or simeprevir.    Ribavirin makes it harder for HCV to reproduce inside the body. These pills are often taken along with interferon to make treatment more effective. The pills are taken twice a day.  Side effects  Hepatitis C treatment with interferon can cause side effects. They may include:    Feeling like you have the flu. Symptoms include fever, upset stomach, headache, feeling tired, and not being hungry.    Depression. This is more likely if you have a history of mental illness or depression.    Anemia (having fewer red blood cells than normal). You may feel weak and get bruises more often. Anemia can be life-threatening for patients with heart or blood vessel disease. This can be caused or worsened with ribavirin.    Birth defects. Hepatitis C treatment should not be given during or right before pregnancy.    Other side effects. These include mood swings, rash, thyroid problems, coughing, and shortness of breath.     Follow up  For best results, follow up with your doctor during and after treatment. This may include:    Having blood tests during treatment to see how your body is responding.    Following your doctor s instructions. This may mean changing your dosage based on how well treatment is working.    Having a blood test about 6 months after treatment ends. This checks for HCV in your body. If HCV is found, try not to be discouraged. Treatment may have slowed the progress of liver damage.    Discussing whether you should have liver cancer surveillance.        0290-1038 [employer],  [employer]. [address] [place] [address]. All rights reserved. This information is not intended as a substitute for professional medical care. Always follow your healthcare professional's instructions.        Hepatitis C: Know the Facts  Many people don t know the facts about hepatitis C. You may be concerned about things you ve heard. Read on to learn what s true about hepatitis C virus (HCV) infection and what s not.    Facts about HCV infection:    You can still have sex. Hepatitis C can be spread through sex, but this is uncommon. Your partner is safest if you use a latex condom correctly every time you have sex. If you re in a committed relationship, you may not need to change your habits. Talk it over with your partner and do what feels right for both of you.    Your family members are safe. Hepatitis C can only be spread through contact with infected blood. Touching, kissing, and sharing food are all safe, as long as there is no blood exposure. But sharing anything that may have blood on it, like a toothbrush, needles, sharps, or razors, is not. Protect yourself by avoiding other people s blood.    Most people with hepatitis C don t die of it. Avoiding alcohol, losing weight, and taking other steps to protect your liver greatly reduces your chances of having life-threatening liver problems.    If you are a woman, you can still breastfeed. If you are being treated for hepatitis C, or if your nipples are cracked or bleeding, you should not breastfeed. Otherwise, breastfeeding with hepatitis C is safe.    You can have hepatitis C and not feel ill. Most people who have hepatitis C don t have obvious symptoms. Severe symptoms are most common in later stages of the disease when cirrhosis develops.    HCV is curable. Discuss treatment options with your health care provider.    There is no vaccine for HCV. People who have been cured can get the disease again.    HCV can affect your whole body. Discuss any signs  of HCV with your provider.    Diabetes and vascular disease are caused or worsened by HCV. Discuss these risks with a specialist.    4127-7239 The [employer]. [address] [place] [address], [place] [address]. All rights reserved. This information is not intended as a substitute for professional medical care. Always follow your healthcare professional's instructions.              Follow-ups after your visit        Additional Services     GASTROENTEROLOGY ADULT REF CONSULT ONLY       [facility]erred Location: [facility]: [phone]      Please be aware that coverage of these services is subject to the terms and limitations of your health insurance plan.  Call member services at your health plan with any benefit or coverage questions.  Any procedures must be performed at a [facility] OR coordinated by your clinic's referral office.    Please bring the following with you to your appointment:    (1) Any X-Rays, CTs or MRIs which have been performed.  Contact the facility where they were done to arrange for  prior to your scheduled appointment.    (2) List of current medications   (3) This referral request   (4) Any documents/labs given to you for this referral                  Who to contact     If you have questions or need follow up information about today's clinic visit or your schedule please contact [facility] directly at [phone].  Normal or non-critical lab and imaging results will be communicated to you by MyChart, letter or phone within 4 business days after the clinic has received the results. If you do not hear from us within 7 days, please contact the clinic through MyChart or phone. If you have a critical or abnormal lab result, we will notify you by phone as soon as possible.  Submit refill requests through [employer] or call your pharmacy and they will forward the refill request to us. Please allow 3 business days for your refill to be completed.           "Additional Information About Your Visit        MyChart Information     [employer] gives you secure access to your electronic health record. If you see a primary care provider, you can also send messages to your care team and make appointments. If you have questions, please call your primary care clinic.  If you do not have a primary care provider, please call [phone] and they will assist you.        Care EveryWhere ID     This is your Care EveryWhere ID. This could be used by other organizations to access your [facility] medical records  [id number]        Your Vitals Were     Pulse Temperature Height Pulse Oximetry BMI (Body Mass Index)       69 98.4  F (36.9  C) (Oral) 5' 8\" (1.727 m) 99% 24.63 kg/m2        Blood Pressure from Last 3 Encounters:   05/09/17 135/72   05/01/17 117/74   04/28/17 123/77    Weight from Last 3 Encounters:   05/09/17 162 lb (73.5 kg)   05/01/17 158 lb (71.7 kg)   04/28/17 159 lb 12.8 oz (72.5 kg)              We Performed the Following     Antineutrophil cytoplasmic Liliana IgG     Antinuclear antibody screen by EIA     Calprotectin Fecal     Calprotectin Fecal     CRP, inflammation     GASTROENTEROLOGY ADULT REF CONSULT ONLY        Primary Care Provider Office Phone # Fax #    Moreno Harris [phone]       [facility] [address]  [facility] [address]        Thank you!     Thank you for choosing [facility]  for your care. Our goal is always to provide you with excellent care. Hearing back from our patients is one way we can continue to improve our services. Please take a few minutes to complete the written survey that you may receive in the mail after your visit with us. Thank you!             Your Updated Medication List - Protect others around you: Learn how to safely use, store and throw away your medicines at www.disposemymeds.org.          This list is accurate as of: 5/9/17  6:47 PM.  Always use your most recent " med list.                   Brand Name Dispense Instructions for use    CIPROFLOXACIN PO      Take 500 mg by mouth 2 times daily       METRONIDAZOLE PO      Take 500 mg by mouth 2 times daily

## 2017-05-09 NOTE — TELEPHONE ENCOUNTER
This writer attempted to contact Rian on 05/09/17    Reason for call results and left message to return call.    When patient calls back, please contact clinic RN team. If no one available, document that pt called and route to care team. routine priority.        Polly Maguire, AKIRA

## 2017-05-10 LAB — CRP SERPL-MCNC: <2.9 MG/L (ref 0–8)

## 2017-05-11 DIAGNOSIS — K63.0 ABSCESS OF SIGMOID COLON: ICD-10-CM

## 2017-05-11 LAB — ANA SER QL IA: NORMAL

## 2017-05-11 PROCEDURE — 83993 ASSAY FOR CALPROTECTIN FECAL: CPT | Mod: 90 | Performed by: INTERNAL MEDICINE

## 2017-05-11 PROCEDURE — 99000 SPECIMEN HANDLING OFFICE-LAB: CPT | Performed by: INTERNAL MEDICINE

## 2017-05-12 LAB — ANCA IGG TITR SER IF: NORMAL {TITER}

## 2017-05-13 LAB — CALPROTECTIN STL-MCNT: 97 UG/G

## 2017-05-19 DIAGNOSIS — B18.2 CHRONIC HEPATITIS C WITHOUT HEPATIC COMA (H): Primary | ICD-10-CM

## 2017-05-19 NOTE — PROGRESS NOTES
"Dr. Harris,   This encounter was opened as a \"result follow up\" encounter. Pap RNs use tracking encounters to follow women with abnormal pap smears. If this encounter was opened in error, please acknowledge and I will close it.     Thank you,  Melba Whyte, ISABELLEN, RN, Pap Tracking Nurse     "

## 2017-05-25 ENCOUNTER — OFFICE VISIT (OUTPATIENT)
Dept: FAMILY MEDICINE | Facility: CLINIC | Age: 57
End: 2017-05-25
Payer: COMMERCIAL

## 2017-05-25 VITALS
SYSTOLIC BLOOD PRESSURE: 131 MMHG | OXYGEN SATURATION: 98 % | WEIGHT: 158 LBS | HEIGHT: 68 IN | DIASTOLIC BLOOD PRESSURE: 69 MMHG | HEART RATE: 78 BPM | BODY MASS INDEX: 23.95 KG/M2 | TEMPERATURE: 98.9 F

## 2017-05-25 DIAGNOSIS — B18.2 CHRONIC HEPATITIS C WITHOUT HEPATIC COMA (H): ICD-10-CM

## 2017-05-25 DIAGNOSIS — K63.0 ABSCESS OF SIGMOID COLON: Primary | ICD-10-CM

## 2017-05-25 LAB
ALBUMIN SERPL-MCNC: 3.7 G/DL (ref 3.4–5)
ALP SERPL-CCNC: 66 U/L (ref 40–150)
ALT SERPL W P-5'-P-CCNC: 93 U/L (ref 0–70)
ANION GAP SERPL CALCULATED.3IONS-SCNC: 6 MMOL/L (ref 3–14)
AST SERPL W P-5'-P-CCNC: 39 U/L (ref 0–45)
BASOPHILS # BLD AUTO: 0.1 10E9/L (ref 0–0.2)
BASOPHILS NFR BLD AUTO: 0.8 %
BILIRUB DIRECT SERPL-MCNC: 0.2 MG/DL (ref 0–0.2)
BILIRUB SERPL-MCNC: 0.8 MG/DL (ref 0.2–1.3)
BUN SERPL-MCNC: 15 MG/DL (ref 7–30)
CALCIUM SERPL-MCNC: 8.5 MG/DL (ref 8.5–10.1)
CHLORIDE SERPL-SCNC: 100 MMOL/L (ref 94–109)
CO2 SERPL-SCNC: 31 MMOL/L (ref 20–32)
CREAT SERPL-MCNC: 0.9 MG/DL (ref 0.66–1.25)
DIFFERENTIAL METHOD BLD: NORMAL
EOSINOPHIL # BLD AUTO: 0.1 10E9/L (ref 0–0.7)
EOSINOPHIL NFR BLD AUTO: 1.5 %
ERYTHROCYTE [DISTWIDTH] IN BLOOD BY AUTOMATED COUNT: 12.8 % (ref 10–15)
GFR SERPL CREATININE-BSD FRML MDRD: 88 ML/MIN/1.7M2
GLUCOSE SERPL-MCNC: 113 MG/DL (ref 70–99)
HCT VFR BLD AUTO: 43.1 % (ref 40–53)
HGB BLD-MCNC: 15.2 G/DL (ref 13.3–17.7)
INR PPP: 1.03 (ref 0.86–1.14)
LYMPHOCYTES # BLD AUTO: 2.3 10E9/L (ref 0.8–5.3)
LYMPHOCYTES NFR BLD AUTO: 30.1 %
MCH RBC QN AUTO: 32.7 PG (ref 26.5–33)
MCHC RBC AUTO-ENTMCNC: 35.3 G/DL (ref 31.5–36.5)
MCV RBC AUTO: 93 FL (ref 78–100)
MONOCYTES # BLD AUTO: 0.5 10E9/L (ref 0–1.3)
MONOCYTES NFR BLD AUTO: 6.8 %
NEUTROPHILS # BLD AUTO: 4.5 10E9/L (ref 1.6–8.3)
NEUTROPHILS NFR BLD AUTO: 60.8 %
PLATELET # BLD AUTO: 151 10E9/L (ref 150–450)
POTASSIUM SERPL-SCNC: 3.8 MMOL/L (ref 3.4–5.3)
PROT SERPL-MCNC: 6.7 G/DL (ref 6.8–8.8)
RBC # BLD AUTO: 4.65 10E12/L (ref 4.4–5.9)
SODIUM SERPL-SCNC: 137 MMOL/L (ref 133–144)
WBC # BLD AUTO: 7.5 10E9/L (ref 4–11)

## 2017-05-25 PROCEDURE — 87902 NFCT AGT GNTYP ALYS HEP C: CPT | Mod: 90 | Performed by: INTERNAL MEDICINE

## 2017-05-25 PROCEDURE — 99214 OFFICE O/P EST MOD 30 MIN: CPT | Performed by: INTERNAL MEDICINE

## 2017-05-25 PROCEDURE — 85025 COMPLETE CBC W/AUTO DIFF WBC: CPT | Performed by: INTERNAL MEDICINE

## 2017-05-25 PROCEDURE — 80076 HEPATIC FUNCTION PANEL: CPT | Performed by: INTERNAL MEDICINE

## 2017-05-25 PROCEDURE — 36415 COLL VENOUS BLD VENIPUNCTURE: CPT | Performed by: INTERNAL MEDICINE

## 2017-05-25 PROCEDURE — 85610 PROTHROMBIN TIME: CPT | Performed by: INTERNAL MEDICINE

## 2017-05-25 PROCEDURE — 99000 SPECIMEN HANDLING OFFICE-LAB: CPT | Performed by: INTERNAL MEDICINE

## 2017-05-25 PROCEDURE — 80048 BASIC METABOLIC PNL TOTAL CA: CPT | Performed by: INTERNAL MEDICINE

## 2017-05-25 RX ORDER — METRONIDAZOLE 250 MG/1
250 TABLET ORAL 2 TIMES DAILY
Qty: 20 TABLET | Refills: 1 | Status: SHIPPED | OUTPATIENT
Start: 2017-05-25 | End: 2017-06-04

## 2017-05-25 RX ORDER — CIPROFLOXACIN 500 MG/1
500 TABLET, FILM COATED ORAL 2 TIMES DAILY
Qty: 20 TABLET | Refills: 1 | Status: SHIPPED | OUTPATIENT
Start: 2017-05-25 | End: 2017-07-03

## 2017-05-25 ASSESSMENT — PAIN SCALES - GENERAL: PAINLEVEL: NO PAIN (0)

## 2017-05-25 NOTE — PATIENT INSTRUCTIONS
================================================================================  Normal Values   Blood pressure  <140/90 for most adults    <130/80 for some chronic diseases (ask your care team about yours)    BMI (body mass index)  18.5-25 kg/m2 (based on height and weight)     Thank you for visiting [facility]    Normal or non-critical lab and imaging results will be communicated to you by MyChart, letter or phone within 7 days.  If you do not hear from us within 10 days, please call the clinic. If you have a critical or abnormal lab result, we will notify you by phone as soon as possible.     If you have any questions regarding your visit please contact:     Team Comfort:   Clinic Hours Telephone Number   Dr. Darrell Harris 7am-5pm  Monday - Friday [phone]  Fabiana Matias RN   Pharmacy 8:00am-8pm Monday-Friday    9am-5pm Saturday-Sunday [phone]   Urgent Care 11am-9pm Monday-Friday        9am-5pm Saturday-Sunday [phone]     After hours, weekend or if you need to make an appointment with your primary provider please call [phone].   After Hours nurse advise: call [facility]: [phone]    Medication Refills:  Call your pharmacy and they will forward the refill to us. Please allow 3 business days for your refills to be completed.

## 2017-05-25 NOTE — PROGRESS NOTES
"  [facility] Internal Medicine Progress Note           Assessment and Plan:   (K63.0) Abscess of sigmoid colon  (primary encounter diagnosis)  Comment: Worse since last clinic visit, but no constitutional symptoms other than diarrhea. Still suspicious for inflammatory bowel disease though fecal calprotectin is not specific. Requires colonoscopy for definite diagnosis. NO evidence of diverticulitis.  Plan: CT Abdomen Pelvis w Contrast, GASTROENTEROLOGY         ADULT REF CONSULT ONLY, Calprotectin Fecal,         ciprofloxacin (CIPRO) 500 MG tablet,         metroNIDAZOLE (FLAGYL) 250 MG tablet            (B18.2) Chronic hepatitis C without hepatic coma (H)  Comment: ALT is worse n comparison to 5/1/17 and is now abnormal.  Plan: Await genotype testing. Defer to Dr. Burroughs.           Interval History:   Reason for visit: Abdominal pain  Onset: approximately two months  Description: Persistent suprapubic abdominal pain, initially thought to be a hernia when patient heard a \"ripping sound\" after lifting a heavy object.  Course: same  Intensity: moderate  Associated symptoms: bloating and diarrhea. Patient denies fever, chills, hematochezia or melena.   History: None prior to this event  Evaluation: First seen at Urgent Care last 4/28/17 and eventually by this provider last 5/1/2017.  Precipitating factor: unknown. Denies any history of inflammatory bowel disease. CT of abdomen/pelvis last 5/5/17 shows redundant sigmoid colon, focal pericolonic fat stranding and hyperemia with circumferential thickening of the sigmoid colon. Negative autoimmune workup and normal CRP.  Alleviating factor: none  Complications: none  Therapies tried and outcome: Cipro and Metronidazole, first given last 5/5/17 with indeterminate results.            Significant Problems:   Patient Active Problem List   Diagnosis     Hepatitis C virus infection     Advanced directives, counseling/discussion     CARDIOVASCULAR SCREENING; LDL GOAL LESS " "THAN 160     Chronic hepatitis C without hepatic coma (H)     Left inguinal pain     Abscess of sigmoid colon              Review of Systems:   C: NEGATIVE for fever, chills, change in weight  I: NEGATIVE for worrisome rashes, moles or lesions  E: NEGATIVE for vision changes or irritation  E/M: NEGATIVE for ear, mouth and throat problems  R: NEGATIVE for significant cough or SOB  CV: NEGATIVE for chest pain, palpitations or peripheral edema  GI: POSITIVE for diarrhea even before starting antibiotics.NEGATIVE for hematemesis, hematochezia, jaundice and melena  : NEGATIVE for frequency, dysuria, or hematuria  M: NEGATIVE for significant arthralgias or myalgia  N: NEGATIVE for weakness, dizziness or paresthesias  E: NEGATIVE for temperature intolerance, skin/hair changes  H: NEGATIVE for bleeding problems  P: NEGATIVE for changes in mood or affect            Medications:     Current Outpatient Prescriptions   Medication Sig     ciprofloxacin (CIPRO) 500 MG tablet Take 1 tablet (500 mg) by mouth 2 times daily     metroNIDAZOLE (FLAGYL) 250 MG tablet Take 1 tablet (250 mg) by mouth 2 times daily for 10 days     No current facility-administered medications for this visit.              Physical Exam:   /69 (BP Location: Left arm, Patient Position: Chair, Cuff Size: Adult Regular)  Pulse 78  Temp 98.9  F (37.2  C) (Oral)  Ht 5' 8\" (1.727 m)  Wt 158 lb (71.7 kg)  SpO2 98%  BMI 24.02 kg/m2  Constitutional: Awake, alert, cooperative, no apparent distress, and appears stated age.  Eyes: normal  ENT: Normocephalic, without obvious abnormality, atramatic, sinuses nontender on palpation, external ears without lesions, oral pharynx with moist mucus membranes, tonsils without erythema or exudates, gums normal and good dentition.  Neck: no lymphadenopathy  Lungs: No increased work of breathing, good air exchange, clear to auscultation bilaterally, no crackles or wheezing.  Cardiovascular: Regular rate and rhythm, normal " S1 and S2, no S3 or S4, and no murmur noted.  Abdomen: Not distended, tender on palpation of suprapubic area.  Musculoskeletal: No redness, warmth, or swelling of the joints.  Full range of motion noted.  Motor strength is 5 out of 5 all extremities bilaterally.  Tone is normal.  Neurologic: Awake, alert, oriented to name, place and time.  Cranial nerves II-XII are grossly intact.  Motor is 5 out of 5 bilaterally. Skin: No rashes, erythema, pallor, petechia or purpura.          Data:   EXAMINATION: CT ABDOMEN PELVIS W CONTRAST, 5/26/2017 2:39 PM     TECHNIQUE:  Helical CT images from the lung bases through the  symphysis pubis were obtained  with IV contrast. Contrast dose: 97 ml  isovue 370     COMPARISON: Abdominal CT 5/5/2017     HISTORY: Abscess of intestine     FINDINGS:  9 lower chest:  No focal airspace consolidation. No pleural effusion.     Abdomen/pelvis:  The liver, spleen, kidneys, adrenal glands, pancreas, and distended  urinary bladder are unremarkable.     Redundant sigmoid colon, appears to have shifted into slightly  different anatomic position since prior exam on 5/5/2017. Again seen  is a moderate short segment of sigmoid colon with demonstrated  hyperenhancement, that is increased in length from prior, as well as a  focal hypoattenuating region in the wall of the colon, that is also  increased since prior. Focal pericolonic fat stranding identified.  There is an adjacent short segment of narrowing.     Large volume colonic stool volume again seen. Normal caliber small  bowel without evidence of obstruction. The stomach and duodenum are  within normal limits. Small hiatal hernia. No significant free fluid  in the pelvis. Appendix is within normal limits. No pathologic  lymphadenopathy.     No suspicious lesion the bones.         IMPRESSION: Interval increase in circumferential thickening and  hyperenhancement within a portion of the sigmoid colon, suspicious for  mural abscess in the setting of  infectious/inflammatory colitis.  Malignancy is less likely but is not excluded. No evidence for  obstruction, perforation or abscess. No significant diverticular  disease. Moderate colonic stool burden.     I have personally reviewed the examination and initial interpretation  and I agree with the findings.     ROSSI BYNUM    WBC 7.5  4.0 - 11.0 10e9/L Final 05/25/2017  4:22 PM BK   RBC Count 4.65  4.4 - 5.9 10e12/L Final 05/25/2017  4:22 PM BK   Hemoglobin 15.2  13.3 - 17.7 g/dL Final 05/25/2017  4:22 PM BK   Hematocrit 43.1  40.0 - 53.0 % Final 05/25/2017  4:22 PM BK   MCV 93  78 - 100 fl Final 05/25/2017  4:22 PM BK   MCH 32.7  26.5 - 33.0 pg Final 05/25/2017  4:22 PM BK   MCHC 35.3  31.5 - 36.5 g/dL Final 05/25/2017  4:22 PM BK   RDW 12.8  10.0 - 15.0 % Final 05/25/2017  4:22 PM BK   Platelet Count 151  150 - 450 10e9/L Final 05/25/2017  4:22 PM BK   Diff Method     Final 05/25/2017  4:22 PM BK   Automated Method   % Neutrophils 60.8   % Final 05/25/2017  4:22 PM BK   % Lymphocytes 30.1   % Final 05/25/2017  4:22 PM BK   % Monocytes 6.8   % Final 05/25/2017  4:22 PM BK   % Eosinophils 1.5   % Final 05/25/2017  4:22 PM BK   % Basophils 0.8   % Final 05/25/2017  4:22 PM BK   Absolute Neutrophil 4.5  1.6 - 8.3 10e9/L Final 05/25/2017  4:22 PM BK   Absolute Lymphocytes 2.3  0.8 - 5.3 10e9/L Final 05/25/2017  4:22 PM BK   Absolute Monocytes 0.5  0.0 - 1.3 10e9/L Final 05/25/2017  4:22 PM BK   Absolute Eosinophils 0.1  0.0 - 0.7 10e9/L Final 05/25/2017  4:22 PM BK   Absolute Basophils 0.1  0.0 - 0.2 10e9/L Final 05/25/2017  4:22 PM      Calprotectin (High) 05/11/2017  3:00 PM BK   97     CRP Inflammation <2.9  0.0 - 8.0 mg/L Final 05/09/2017  6:56 PM     WYATT Screen by EIA   <1.0  Final 05/09/2017  6:56 PM 51     Neutrophil Cytoplasmic IgG Antibody 05/09/2017  6:56 PM BK   <1:20   Reference range: <1:20           Disposition: Follow-up as needed.      Moreno Harris MD  Internal Medicine  [facility]  Team

## 2017-05-25 NOTE — MR AVS SNAPSHOT
After Visit Summary   5/25/2017    Rian Malik    MRN: [id number]           Patient Information     Date Of Birth          1960        Visit Information        Provider Department      5/25/2017 3:40 PM Moreno Harris MD [facility]        Today's Diagnoses     Abscess of sigmoid colon    -  1    Chronic hepatitis C without hepatic coma (H)          Care Instructions        ================================================================================  Normal Values   Blood pressure  <140/90 for most adults    <130/80 for some chronic diseases (ask your care team about yours)    BMI (body mass index)  18.5-25 kg/m2 (based on height and weight)     Thank you for visiting [facility]    Normal or non-critical lab and imaging results will be communicated to you by MyChart, letter or phone within 7 days.  If you do not hear from us within 10 days, please call the clinic. If you have a critical or abnormal lab result, we will notify you by phone as soon as possible.     If you have any questions regarding your visit please contact:     Team Comfort:   Clinic Hours Telephone Number   Dr. Darrell Harris 7am-5pm  Monday - Friday [phone]  Fabiana Matias RN   Pharmacy 8:00am-8pm Monday-Friday    9am-5pm Saturday-Sunday [phone]   Urgent Care 11am-9pm Monday-Friday        9am-5pm Saturday-Sunday [phone]     After hours, weekend or if you need to make an appointment with your primary provider please call [phone].   After Hours nurse advise: call [facility] [facility]: [phone]    Medication Refills:  Call your pharmacy and they will forward the refill to us. Please allow 3 business days for your refills to be completed.                  Follow-ups after your visit        Additional Services     GASTROENTEROLOGY ADULT REF CONSULT ONLY       Preferred  Location: [facility] [phone]      Please be aware that coverage of these services is subject to the terms and limitations of your health insurance plan.  Call member services at your health plan with any benefit or coverage questions.  Any procedures must be performed at a [facility] facility OR coordinated by your clinic's referral office.    Please bring the following with you to your appointment:    (1) Any X-Rays, CTs or MRIs which have been performed.  Contact the facility where they were done to arrange for  prior to your scheduled appointment.    (2) List of current medications   (3) This referral request   (4) Any documents/labs given to you for this referral                  Your next 10 appointments already scheduled     Jul 03, 2017  3:00 PM CDT   Lab with  LAB   [facility] ([facility])    [address] [place] [address] [id number] [phone]            Jul 03, 2017  4:15 PM CDT   (Arrive by 4:00 PM)   New General Liver with Yaakov Burroughs MD   [facility] ([facility])    [address] [place] [address] [id number] [phone]              Future tests that were ordered for you today     Open Future Orders        Priority Expected Expires Ordered    Calprotectin Fecal Routine  5/25/2018 5/25/2017    CT Abdomen Pelvis w Contrast Routine  5/25/2018 5/25/2017            Who to contact     If you have questions or need follow up information about today's clinic visit or your schedule please contact [facility] directly at [phone].  Normal or non-critical lab and imaging results will be communicated to you by MyChart, letter or phone within 4 business days after the clinic has received the results. If you do not hear from us within 7 days, please contact the clinic through MyChart or phone. If you have a critical or abnormal lab result, we will notify you by phone as soon as  "possible.  Submit refill requests through [employer] or call your pharmacy and they will forward the refill request to us. Please allow 3 business days for your refill to be completed.          Additional Information About Your Visit        [employer] Information     [employer] gives you secure access to your electronic health record. If you see a primary care provider, you can also send messages to your care team and make appointments. If you have questions, please call your primary care clinic.  If you do not have a primary care provider, please call [phone] and they will assist you.        Care EveryWhere ID     This is your Care EveryWhere ID. This could be used by other organizations to access your [facility] medical records  [id number]        Your Vitals Were     Pulse Temperature Height Pulse Oximetry BMI (Body Mass Index)       78 98.9  F (37.2  C) (Oral) 5' 8\" (1.727 m) 98% 24.02 kg/m2        Blood Pressure from Last 3 Encounters:   05/25/17 131/69   05/09/17 135/72   05/01/17 117/74    Weight from Last 3 Encounters:   05/25/17 158 lb (71.7 kg)   05/09/17 162 lb (73.5 kg)   05/01/17 158 lb (71.7 kg)              We Performed the Following     Basic metabolic panel     CBC with platelets differential     GASTROENTEROLOGY ADULT REF CONSULT ONLY     Hepatic panel     Hepatitis C High Resolution Genotype     INR          Today's Medication Changes          These changes are accurate as of: 5/25/17  4:20 PM.  If you have any questions, ask your nurse or doctor.               Start taking these medicines.        Dose/Directions    ciprofloxacin 500 MG tablet   Commonly known as:  CIPRO   Used for:  Abscess of sigmoid colon   Started by:  Moreno Harris MD        Dose:  500 mg   Take 1 tablet (500 mg) by mouth 2 times daily   Quantity:  20 tablet   Refills:  1       metroNIDAZOLE 250 MG tablet   Commonly known as:  FLAGYL   Used for:  Abscess of sigmoid colon   Started by:  Moreno Harris MD        Dose: "  250 mg   Take 1 tablet (250 mg) by mouth 2 times daily for 10 days   Quantity:  20 tablet   Refills:  1            Where to get your medicines      These medications [place] [facility] [place] - [address]  [address], [facility] [address]     Phone:  [phone]     ciprofloxacin 500 MG tablet    metroNIDAZOLE 250 MG tablet                Primary Care Provider Office Phone # Fax #    Moreno Harris [phone]       [facility] [address]  [facility] [address]        Thank you!     Thank you for choosing [facility]  for your care. Our goal is always to provide you with excellent care. Hearing back from our patients is one way we can continue to improve our services. Please take a few minutes to complete the written survey that you may receive in the mail after your visit with us. Thank you!             Your Updated Medication List - Protect others around you: Learn how to safely use, store and throw away your medicines at www.disposemymeds.org.          This list is accurate as of: 5/25/17  4:20 PM.  Always use your most recent med list.                   Brand Name Dispense Instructions for use    ciprofloxacin 500 MG tablet    CIPRO    20 tablet    Take 1 tablet (500 mg) by mouth 2 times daily       metroNIDAZOLE 250 MG tablet    FLAGYL    20 tablet    Take 1 tablet (250 mg) by mouth 2 times daily for 10 days

## 2017-05-25 NOTE — NURSING NOTE
"Chief Complaint   Patient presents with     Medication Problem       Initial /69 (BP Location: Left arm, Patient Position: Chair, Cuff Size: Adult Regular)  Pulse 78  Temp 98.9  F (37.2  C) (Oral)  Ht 5' 8\" (1.727 m)  Wt 158 lb (71.7 kg)  SpO2 98%  BMI 24.02 kg/m2 Estimated body mass index is 24.02 kg/(m^2) as calculated from the following:    Height as of this encounter: 5' 8\" (1.727 m).    Weight as of this encounter: 158 lb (71.7 kg).  Medication Reconciliation: complete   MARSHALL Marie MA      "

## 2017-05-26 ENCOUNTER — RADIANT APPOINTMENT (OUTPATIENT)
Dept: CT IMAGING | Facility: CLINIC | Age: 57
End: 2017-05-26
Attending: INTERNAL MEDICINE
Payer: COMMERCIAL

## 2017-05-26 DIAGNOSIS — K63.0 ABSCESS OF SIGMOID COLON: Primary | ICD-10-CM

## 2017-05-26 PROCEDURE — 74177 CT ABD & PELVIS W/CONTRAST: CPT | Performed by: RADIOLOGY

## 2017-05-26 RX ORDER — IOPAMIDOL 755 MG/ML
97 INJECTION, SOLUTION INTRAVASCULAR ONCE
Status: COMPLETED | OUTPATIENT
Start: 2017-05-26 | End: 2017-05-26

## 2017-05-26 RX ADMIN — IOPAMIDOL 97 ML: 755 INJECTION, SOLUTION INTRAVASCULAR at 14:39

## 2017-05-31 RX ORDER — METRONIDAZOLE 500 MG/1
500 TABLET ORAL 3 TIMES DAILY
Qty: 30 TABLET | Refills: 1 | Status: SHIPPED | OUTPATIENT
Start: 2017-05-31 | End: 2017-07-03

## 2017-06-01 ENCOUNTER — OFFICE VISIT (OUTPATIENT)
Dept: SURGERY | Facility: CLINIC | Age: 57
End: 2017-06-01
Payer: COMMERCIAL

## 2017-06-01 VITALS
DIASTOLIC BLOOD PRESSURE: 85 MMHG | WEIGHT: 153 LBS | HEIGHT: 68 IN | SYSTOLIC BLOOD PRESSURE: 124 MMHG | BODY MASS INDEX: 23.19 KG/M2 | HEART RATE: 84 BPM

## 2017-06-01 DIAGNOSIS — K63.0 ABSCESS OF SIGMOID COLON: Primary | ICD-10-CM

## 2017-06-01 LAB — HCV GENTYP SERPL NAA+PROBE: NORMAL

## 2017-06-01 PROCEDURE — 99204 OFFICE O/P NEW MOD 45 MIN: CPT | Performed by: SURGERY

## 2017-06-01 NOTE — NURSING NOTE
"Chief Complaint   Patient presents with     Consult     abcess in colon       Initial /85  Pulse 84  Ht 5' 8\" (1.727 m)  Wt 153 lb (69.4 kg)  BMI 23.26 kg/m2 Estimated body mass index is 23.26 kg/(m^2) as calculated from the following:    Height as of this encounter: 5' 8\" (1.727 m).    Weight as of this encounter: 153 lb (69.4 kg).  Medication Reconciliation: complete   Linette Forte Cma        "

## 2017-06-01 NOTE — Clinical Note
I had the pleasure of seeing Rian Malik in the clinic. I switched his antibiotics and will see him back in a couple weeks to see if there is any imporvement  Thanks for allowing me the chance to participate in his care.  Sincerely,  Darrell Schneider  General Surgery  Head and Neck/Endocrine Surgery

## 2017-06-01 NOTE — MR AVS SNAPSHOT
After Visit Summary   6/1/2017    Rian Malik    MRN: [id number]           Patient Information     Date Of Birth          1960        Visit Information        Provider Department      6/1/2017 10:00 AM Darrell Schneider DO [facility]        Today's Diagnoses     Abscess of sigmoid colon    -  1       Follow-ups after your visit        Your next 10 appointments already scheduled     Jul 03, 2017  3:00 PM CDT   Lab with  LAB   [facility] ([facility])    [address]  1st Floor  [facility] [address] [phone]            Jul 03, 2017  4:15 PM CDT   (Arrive by 4:00 PM)   New General Liver with Yaakov Burroughs MD   [facility] ([facility])    [address]  3rd Floor  [facility] [address] [phone]              Who to contact     If you have questions or need follow up information about today's clinic visit or your schedule please contact [facility] directly at [phone].  Normal or non-critical lab and imaging results will be communicated to you by PASSNFLYhart, letter or phone within 4 business days after the clinic has received the results. If you do not hear from us within 7 days, please contact the clinic through IntelePeert or phone. If you have a critical or abnormal lab result, we will notify you by phone as soon as possible.  Submit refill requests through zhiwo or call your pharmacy and they will forward the refill request to us. Please allow 3 business days for your refill to be completed.          Additional Information About Your Visit        PASSNFLYhart Information     zhiwo gives you secure access to your electronic health record. If you see a primary care provider, you can also send messages to your care team and make appointments. If you have questions, please call your primary care clinic.  If you do not have a primary care provider, please call  "[phone] and they will assist you.        Care EveryWhere ID     This is your Care EveryWhere ID. This could be used by other organizations to access your [facility] medical records  [id number]        Your Vitals Were     Pulse Height BMI (Body Mass Index)             84 5' 8\" (1.727 m) 23.26 kg/m2          Blood Pressure from Last 3 Encounters:   06/01/17 124/85   05/25/17 131/69   05/09/17 135/72    Weight from Last 3 Encounters:   06/01/17 153 lb (69.4 kg)   05/25/17 158 lb (71.7 kg)   05/09/17 162 lb (73.5 kg)              Today, you had the following     No orders found for display         Today's Medication Changes          These changes are accurate as of: 6/1/17 11:59 PM.  If you have any questions, ask your nurse or doctor.               Start taking these medicines.        Dose/Directions    amoxicillin-clavulanate 875-125 MG per tablet   Commonly known as:  AUGMENTIN   Used for:  Abscess of sigmoid colon   Started by:  Darrell Schneider DO        Dose:  1 tablet   Take 1 tablet by mouth 2 times daily   Quantity:  28 tablet   Refills:  0         These medicines have changed or have updated prescriptions.        Dose/Directions    * metroNIDAZOLE 250 MG tablet   Commonly known as:  FLAGYL   This may have changed:  Another medication with the same name was changed. Make sure you understand how and when to take each.   Used for:  Abscess of sigmoid colon   Changed by:  Moreno Harris MD        Dose:  250 mg   Take 1 tablet (250 mg) by mouth 2 times daily for 10 days   Quantity:  20 tablet   Refills:  1       * metroNIDAZOLE 500 MG tablet   Commonly known as:  FLAGYL   This may have changed:  additional instructions   Used for:  Abscess of sigmoid colon   Changed by:  Moreno Harris MD        Dose:  500 mg   Take 1 tablet (500 mg) by mouth 3 times daily   Quantity:  30 tablet   Refills:  1       * Notice:  This list has 2 medication(s) that are the same as other medications prescribed for " you. Read the directions carefully, and ask your doctor or other care provider to review them with you.         Where to get your medicines      These medications [place] [facility] [place] [address]  [address], [facility] [address]     Phone:  [phone]     amoxicillin-clavulanate 875-125 MG per tablet                Primary Care Provider Office Phone # Fax #    Moreno Harris [phone]       [facility] [address]  [facility] [address]        Thank you!     Thank you for choosing [facility]  for your care. Our goal is always to provide you with excellent care. Hearing back from our patients is one way we can continue to improve our services. Please take a few minutes to complete the written survey that you may receive in the mail after your visit with us. Thank you!             Your Updated Medication List - Protect others around you: Learn how to safely use, store and throw away your medicines at www.disposemymeds.org.          This list is accurate as of: 6/1/17 11:59 PM.  Always use your most recent med list.                   Brand Name Dispense Instructions for use    amoxicillin-clavulanate 875-125 MG per tablet    AUGMENTIN    28 tablet    Take 1 tablet by mouth 2 times daily       ciprofloxacin 500 MG tablet    CIPRO    20 tablet    Take 1 tablet (500 mg) by mouth 2 times daily       * metroNIDAZOLE 250 MG tablet    FLAGYL    20 tablet    Take 1 tablet (250 mg) by mouth 2 times daily for 10 days       * metroNIDAZOLE 500 MG tablet    FLAGYL    30 tablet    Take 1 tablet (500 mg) by mouth 3 times daily       * Notice:  This list has 2 medication(s) that are the same as other medications prescribed for you. Read the directions carefully, and ask your doctor or other care provider to review them with you.

## 2017-06-01 NOTE — PROGRESS NOTES
I was asked to see Rian Malik for sigmoid abscess  by Moreno Harris    HPI:  Patient is a 56 year old male with complaints of cramping that started in late February. This was off and on, with each episode lasting a few hours then would go 2 weeks until it would recur. This went on until patient saw Dr Zurita 4/28/17. He subsequently saw Dr Harris who placed him on Cipro, flagyl 5/5/17 for a sigmoid abscess seen on CT. A repeat CT 5/26/17 showed interval worsening of abscess.  Pt reports trying to have a BM seems to make the episodes better; however, he doesn't produce much stool. Cramping is slightly improved. Denies fever or chills. Denies any previous episodes. Pain is a 10/10 at worst. Pt denies any current pain.      Patient has not had a family history of similar problems    Review Of Systems    General: negative for fever or chills  Skin: negative for masses or rashes  Ears/Nose/Throat: negative for, epistaxis, bleeding gums  Respiratory: No shortness of breath, dyspnea on exertion, cough, or hemoptysis  Cardiovascular: negative for and chest pain  Gastrointestinal: negative for, nausea, vomiting and abdominal pain  Genitourinary: negative for and frequency  Musculoskeletal: negative  Neurologic: negative for focal weakness  Hematologic/Lymphatic/Immunologic: negative for, bleeding disorder and frequent infections  Endocrine: negative for, diabetes, polydipsia and polyuria      Hepatitis C    Denies    Social History     Social History     Marital status:      Spouse name: N/A     Number of children: N/A     Years of education: N/A     Occupational History     Not on file.     Social History Main Topics     Smoking status: Current Every Day Smoker     Smokeless tobacco: Never Used     Alcohol use Yes     Drug use: No     Sexual activity: Yes     Partners: Female     Other Topics Concern     Not on file     Social History Narrative       Current Outpatient Prescriptions   Medication Sig  "Dispense Refill     metroNIDAZOLE (FLAGYL) 500 MG tablet Take 1 tablet (500 mg) by mouth 3 times daily (Patient taking differently: Take 500 mg by mouth 3 times daily For use after finising Metronidazole 250 mg tablets.) 30 tablet 1     ciprofloxacin (CIPRO) 500 MG tablet Take 1 tablet (500 mg) by mouth 2 times daily 20 tablet 1     metroNIDAZOLE (FLAGYL) 250 MG tablet Take 1 tablet (250 mg) by mouth 2 times daily for 10 days (Patient not taking: Reported on 6/1/2017) 20 tablet 1       Medications and history reviewed    Physical exam:  Vitals: /85  Pulse 84  Ht 1.727 m (5' 8\")  Wt 69.4 kg (153 lb)  BMI 23.26 kg/m2  BMI= Body mass index is 23.26 kg/(m^2).    Constitutional: healthy, alert and no distress  Eyes: Pupils round and equal, no icterus   ENT: Mucous membranes moist  Respiratory:  Non-labored respiration  Gastrointestinal: Abdomen soft, mild tenderness. BS normal. No masses, organomegaly  Musculoskeletal: No gross deformity  Neurologic: No gross focal deficits  Psychiatric: mentation appears normal and affect normal/bright  Hematologic/Lymphatic/Immunologic: No bruising noted  Skin: No lesions, rashes, erythema or jaundice noted    Assessment: Sigmoid abscess  Plan: Switch to Augmentin  Eat yogurt or kefir with live and active cultures  F/U in a couple weeks  Colonoscopy in 2-3 months depending on how long it takes to resolve inflammation    Darrell Schneider, DO    "

## 2017-06-14 ENCOUNTER — TELEPHONE (OUTPATIENT)
Dept: FAMILY MEDICINE | Facility: CLINIC | Age: 57
End: 2017-06-14

## 2017-06-14 NOTE — LETTER
[address] [address]  [phone]  Dept: [phone]      June 14, 2017      Rian Malik  [address] [place] [address]    Dear Rian Malik,     At [facility] we care about your health and are committed to providing quality patient care.    Which includes staying current on preventive cancer screenings.  You can increase your chances of finding and treating cancers through regular screenings.      Our records indicate you may be due for the following preventive screening(s):    Colonoscopy    Colonoscopy is recommended every ten years for everyone age 50 and older. Please take a moment to read over the enclosed information packet about colon cancer screening. We strongly urge our patient's to consider having a colonoscopy done, which is the best screening test available and only needs to be done every 10 years if normal. If you are unwilling or unable to have a colonoscopy then we recommend the annual stool testing for blood. This test is called a FIT test and it looks for blood in the stool.     To schedule an appointment for your colonoscopy, please see the attached referral.     To schedule an appointment or discuss this screening further, you may contact us by phone at the [facility] at [phone] or online through the patient [email or web address] @ [email or web address]    If you have had any of the screenings listed above at another facility, please call us so that we may update your chart.      Your partners in health,      Quality Committee at [facility]

## 2017-06-14 NOTE — TELEPHONE ENCOUNTER
Panel Management Review          Fail List measure:       Patient is due/failing the following:   COLONOSCOPY    Action needed:   none    Type of outreach:    Sent letter.    Questions for provider review:    None                                                                                                                                    Tanya Marie MA

## 2017-06-22 ENCOUNTER — TELEPHONE (OUTPATIENT)
Dept: GASTROENTEROLOGY | Facility: CLINIC | Age: 57
End: 2017-06-22

## 2017-06-22 NOTE — TELEPHONE ENCOUNTER
Left message for pt reminding them of upcoming appointment.  Instructed pt to bring updated medications list.  Instructed pt to arrive an hour and a half to two hours prior to appt time for labs.  Angel Banerjee, CMA

## 2017-07-03 ENCOUNTER — OFFICE VISIT (OUTPATIENT)
Dept: GASTROENTEROLOGY | Facility: CLINIC | Age: 57
End: 2017-07-03
Attending: INTERNAL MEDICINE
Payer: COMMERCIAL

## 2017-07-03 ENCOUNTER — MYC MEDICAL ADVICE (OUTPATIENT)
Dept: SURGERY | Facility: CLINIC | Age: 57
End: 2017-07-03

## 2017-07-03 VITALS
TEMPERATURE: 97.8 F | HEIGHT: 68 IN | SYSTOLIC BLOOD PRESSURE: 128 MMHG | BODY MASS INDEX: 23.73 KG/M2 | DIASTOLIC BLOOD PRESSURE: 77 MMHG | HEART RATE: 70 BPM | WEIGHT: 156.6 LBS

## 2017-07-03 DIAGNOSIS — K63.0 ABSCESS OF SIGMOID COLON: Primary | ICD-10-CM

## 2017-07-03 DIAGNOSIS — B18.2 CHRONIC HEPATITIS C WITHOUT HEPATIC COMA (H): ICD-10-CM

## 2017-07-03 DIAGNOSIS — K74.60 CIRRHOSIS OF LIVER WITHOUT ASCITES, UNSPECIFIED HEPATIC CIRRHOSIS TYPE (H): Primary | ICD-10-CM

## 2017-07-03 DIAGNOSIS — B18.2 CHRONIC HEPATITIS C WITHOUT HEPATIC COMA (H): Primary | ICD-10-CM

## 2017-07-03 LAB
ALBUMIN SERPL-MCNC: 3.8 G/DL (ref 3.4–5)
ALP SERPL-CCNC: 77 U/L (ref 40–150)
ALT SERPL W P-5'-P-CCNC: 87 U/L (ref 0–70)
ANION GAP SERPL CALCULATED.3IONS-SCNC: 7 MMOL/L (ref 3–14)
AST SERPL W P-5'-P-CCNC: 44 U/L (ref 0–45)
BASOPHILS # BLD AUTO: 0.1 10E9/L (ref 0–0.2)
BASOPHILS NFR BLD AUTO: 1 %
BILIRUB DIRECT SERPL-MCNC: 0.2 MG/DL (ref 0–0.2)
BILIRUB SERPL-MCNC: 0.5 MG/DL (ref 0.2–1.3)
BUN SERPL-MCNC: 17 MG/DL (ref 7–30)
CALCIUM SERPL-MCNC: 8.7 MG/DL (ref 8.5–10.1)
CHLORIDE SERPL-SCNC: 104 MMOL/L (ref 94–109)
CO2 SERPL-SCNC: 28 MMOL/L (ref 20–32)
CREAT SERPL-MCNC: 0.82 MG/DL (ref 0.66–1.25)
DIFFERENTIAL METHOD BLD: NORMAL
EOSINOPHIL # BLD AUTO: 0.2 10E9/L (ref 0–0.7)
EOSINOPHIL NFR BLD AUTO: 2.3 %
ERYTHROCYTE [DISTWIDTH] IN BLOOD BY AUTOMATED COUNT: 12.5 % (ref 10–15)
GFR SERPL CREATININE-BSD FRML MDRD: ABNORMAL ML/MIN/1.7M2
GLUCOSE SERPL-MCNC: 100 MG/DL (ref 70–99)
HCT VFR BLD AUTO: 45.4 % (ref 40–53)
HGB BLD-MCNC: 15.9 G/DL (ref 13.3–17.7)
IMM GRANULOCYTES # BLD: 0 10E9/L (ref 0–0.4)
IMM GRANULOCYTES NFR BLD: 0.5 %
INR PPP: 1.1 (ref 0.86–1.14)
LYMPHOCYTES # BLD AUTO: 3 10E9/L (ref 0.8–5.3)
LYMPHOCYTES NFR BLD AUTO: 36 %
MCH RBC QN AUTO: 32.5 PG (ref 26.5–33)
MCHC RBC AUTO-ENTMCNC: 35 G/DL (ref 31.5–36.5)
MCV RBC AUTO: 93 FL (ref 78–100)
MONOCYTES # BLD AUTO: 0.6 10E9/L (ref 0–1.3)
MONOCYTES NFR BLD AUTO: 6.8 %
NEUTROPHILS # BLD AUTO: 4.5 10E9/L (ref 1.6–8.3)
NEUTROPHILS NFR BLD AUTO: 53.4 %
NRBC # BLD AUTO: 0 10*3/UL
NRBC BLD AUTO-RTO: 0 /100
PLATELET # BLD AUTO: 151 10E9/L (ref 150–450)
POTASSIUM SERPL-SCNC: 4 MMOL/L (ref 3.4–5.3)
PROT SERPL-MCNC: 6.8 G/DL (ref 6.8–8.8)
RBC # BLD AUTO: 4.89 10E12/L (ref 4.4–5.9)
SODIUM SERPL-SCNC: 139 MMOL/L (ref 133–144)
WBC # BLD AUTO: 8.4 10E9/L (ref 4–11)

## 2017-07-03 PROCEDURE — 85025 COMPLETE CBC W/AUTO DIFF WBC: CPT | Performed by: INTERNAL MEDICINE

## 2017-07-03 PROCEDURE — 99212 OFFICE O/P EST SF 10 MIN: CPT | Mod: ZF

## 2017-07-03 PROCEDURE — 85610 PROTHROMBIN TIME: CPT | Performed by: INTERNAL MEDICINE

## 2017-07-03 PROCEDURE — 80076 HEPATIC FUNCTION PANEL: CPT | Performed by: INTERNAL MEDICINE

## 2017-07-03 PROCEDURE — 36415 COLL VENOUS BLD VENIPUNCTURE: CPT | Performed by: INTERNAL MEDICINE

## 2017-07-03 PROCEDURE — 80048 BASIC METABOLIC PNL TOTAL CA: CPT | Performed by: INTERNAL MEDICINE

## 2017-07-03 ASSESSMENT — PAIN SCALES - GENERAL: PAINLEVEL: NO PAIN (0)

## 2017-07-03 NOTE — NURSING NOTE
"Chief Complaint   Patient presents with     New Patient     Chronic hepatitis C without hepatic coma.       Initial /77  Pulse 70  Temp 97.8  F (36.6  C) (Oral)  Ht 1.727 m (5' 8\")  Wt 71 kg (156 lb 9.6 oz)  BMI 23.81 kg/m2 Estimated body mass index is 23.81 kg/(m^2) as calculated from the following:    Height as of this encounter: 1.727 m (5' 8\").    Weight as of this encounter: 71 kg (156 lb 9.6 oz).  Medication Reconciliation: complete  "

## 2017-07-03 NOTE — MR AVS SNAPSHOT
After Visit Summary   7/3/2017    Rian Malik    MRN: [id number]           Patient Information     Date Of Birth          1960        Visit Information        Provider Department      7/3/2017 4:15 PM Yaakov Burroughs MD M [facility] Hepat[facility]        Today's Diagnoses     Cirrhosis of liver without ascites, unspecified hepatic cirrhosis type (H)    -  1       Follow-ups after your visit        Additional Services     GASTROENTEROLOGY ADULT REF PROCEDURE ONLY       Last Lab Result: Creatinine (mg/dL)       Date                     Value                 07/03/2017               0.82             ----------  Body mass index is 23.81 kg/(m^2).     Needed:  No  Language:  English    Patient will be contacted to schedule procedure.     Please be aware that coverage of these services is subject to the terms and limitations of your health insurance plan.  Call member services at your health plan with any benefit or coverage questions.  Any procedures must be performed at a [facility] facility OR coordinated by your clinic's referral office.    Please bring the following with you to your appointment:    (1) Any X-Rays, CTs or MRIs which have been performed.  Contact the facility where they were done to arrange for  prior to your scheduled appointment.    (2) List of current medications   (3) This referral request   (4) Any documents/labs given to you for this referral                  Follow-up notes from your care team     Return in about 4 months (around 11/3/2017).      Your next 10 appointments already scheduled     Nov 06, 2017 12:45 PM CST   Lab with  LAB   [facility] ([facility])    [address] [place] [id number] [phone]            Nov 06, 2017  1:45 PM CST   (Arrive by 1:30 PM)   Return General Liver with MD STEPHEN Reece [facility] ([facility])    [address] [place] [address]  "[facility] [address] [phone]              Who to contact     If you have questions or need follow up information about today's clinic visit or your schedule please contact [facility]PAT[facility] directly at [phone].  Normal or non-critical lab and imaging results will be communicated to you by MyChart, letter or phone within 4 business days after the clinic has received the results. If you do not hear from us within 7 days, please contact the clinic through NGIhart or phone. If you have a critical or abnormal lab result, we will notify you by phone as soon as possible.  Submit refill requests through LaFourchette or call your pharmacy and they will forward the refill request to us. Please allow 3 business days for your refill to be completed.          Additional Information About Your Visit        NGIhart Information     LaFourchette gives you secure access to your electronic health record. If you see a primary care provider, you can also send messages to your care team and make appointments. If you have questions, please call your primary care clinic.  If you do not have a primary care provider, please call [phone] and they will assist you.        Care EveryWhere ID     This is your Care EveryWhere ID. This could be used by other organizations to access your [facility] medical records  [id number]        Your Vitals Were     Pulse Temperature Height BMI (Body Mass Index)          70 97.8  F (36.6  C) (Oral) 1.727 m (5' 8\") 23.81 kg/m2         Blood Pressure from Last 3 Encounters:   07/03/17 128/77   06/01/17 124/85   05/25/17 131/69    Weight from Last 3 Encounters:   07/03/17 71 kg (156 lb 9.6 oz)   06/01/17 69.4 kg (153 lb)   05/25/17 71.7 kg (158 lb)              We Performed the Following     GASTROENTEROLOGY ADULT REF PROCEDURE ONLY        Primary Care Provider Office Phone # Fax #    Moreno Harris [phone]       [facility] [address] " [place] [address]        Equal Access to Services     [facility] HospitalALESSANDRO : Hadii cameron lester kirtcaridad Sokaleyali, waaxda luqadaha, qaybta kalucianhernán garcia, alisa lopezduniacameron mg. So [facility] [phone].    ATENCIÓN: Si habla español, tiene a martino disposición servicios gratuitos de asistencia lingüística. Llame al [phone].    We comply with applicable federal civil rights laws and [place] laws. We do not discriminate on the basis of race, color, national origin, age, disability sex, sexual orientation or gender identity.            Thank you!     Thank you for choosing [facility]  for your care. Our goal is always to provide you with excellent care. Hearing back from our patients is one way we can continue to improve our services. Please take a few minutes to complete the written survey that you may receive in the mail after your visit with us. Thank you!             Your Updated Medication List - Protect others around you: Learn how to safely use, store and throw away your medicines at www.disposemymeds.org.          This list is accurate as of: 7/3/17  4:30 PM.  Always use your most recent med list.                   Brand Name Dispense Instructions for use Diagnosis    amoxicillin-clavulanate 875-125 MG per tablet    AUGMENTIN    28 tablet    Take 1 tablet by mouth 2 times daily    Abscess of sigmoid colon

## 2017-07-03 NOTE — PROGRESS NOTES
I had the pleasure of seeing Rian Malik for consultation in the liver clinic at the [facility] on July 3, 2017. Mr. Malik presents for consultation regarding chronic hepatitis C.    He was first diagnosed with hepatitis C back in . He underwent an evaluation at [facility] which included a liver biopsy that showed that he had stage IV fibrosis. It was recommended he undergo treatment but his insurance company turned it down. He also had an upper GI endoscopy there which showed grade 1 varices and some portal hypertensive gastropathy.    He feels well. He denies any abdominal pain, itching or skin rash, or fatigue. He works full-time as a printer. He denies any increased abdominal girth or lower extremity edema. He denies any fevers or chills, cough or shortness of breath. He denies any nausea or vomiting, diarrhea or constipation. His appetite has been good and his weight has been stable. He has not had any gastrointestinal bleeding or altered signs of encephalopathy. He has not been hospitalized for complications of liver disease.    Past Medical History: He had an inguinal hernia repair at age 39. 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 He is currently on antibiotics as described on CT as a mural abscess secondary to diverticulitis.    Social History: He stopped drinking when he was told that he had hepatitis C. He denies any traditional risk factors for hepatitis C.    Family History: He did have a brother  of chronic liver disease. He was an alcoholic and he does not know if he had hepatitis C as well.    A complete review of systems is negative other than that noted above.    Current Outpatient Prescriptions   Medication     amoxicillin-clavulanate (AUGMENTIN) 875-125 MG per tablet     No current facility-administered medications for this visit.      B/P: 128/77, T: 97.8, P: 70, R: Data Unavailable    PHYSICAL EXAMINATION:   He appears quite healthy.  HEENT examination shows no scleral icterus and no temporal muscle wasting.  His chest is clear.  His cardiac exam show no S3, S4 or murmur. His abdominal examination shows no masses or tenderness to palpation are present.  His liver and spleen are not palpable.  Extremity examination shows no edema.   Skin exam shows some erythema about his chest. Neuro exam show no asterixis    Recent Results (from the past 168 hour(s))   Hepatic panel    Collection Time: 07/03/17  3:41 PM   Result Value Ref Range    Bilirubin Direct 0.2 0.0 - 0.2 mg/dL    Bilirubin Total 0.5 0.2 - 1.3 mg/dL    Albumin 3.8 3.4 - 5.0 g/dL    Protein Total 6.8 6.8 - 8.8 g/dL    Alkaline Phosphatase 77 40 - 150 U/L    ALT 87 (H) 0 - 70 U/L    AST 44 0 - 45 U/L   CBC with platelets differential    Collection Time: 07/03/17  3:41 PM   Result Value Ref Range    WBC 8.4 4.0 - 11.0 10e9/L    RBC Count 4.89 4.4 - 5.9 10e12/L    Hemoglobin 15.9 13.3 - 17.7 g/dL    Hematocrit 45.4 40.0 - 53.0 %    MCV 93 78 - 100 fl    MCH 32.5 26.5 - 33.0 pg    MCHC 35.0 31.5 - 36.5 g/dL    RDW 12.5 10.0 - 15.0 %    Platelet Count 151 150 - 450 10e9/L    Diff Method Automated Method     % Neutrophils 53.4 %    % Lymphocytes 36.0 %    % Monocytes 6.8 %    % Eosinophils 2.3 %    % Basophils 1.0 %    % Immature Granulocytes 0.5 %    Nucleated RBCs 0 0 /100    Absolute Neutrophil 4.5 1.6 - 8.3 10e9/L    Absolute Lymphocytes 3.0 0.8 - 5.3 10e9/L    Absolute Monocytes 0.6 0.0 - 1.3 10e9/L    Absolute Eosinophils 0.2 0.0 - 0.7 10e9/L    Absolute Basophils 0.1 0.0 - 0.2 10e9/L    Abs Immature Granulocytes 0.0 0 - 0.4 10e9/L    Absolute Nucleated RBC 0.0    Basic metabolic panel    Collection Time: 07/03/17  3:41 PM   Result Value Ref Range    Sodium 139 133 - 144 mmol/L    Potassium 4.0 3.4 - 5.3 mmol/L    Chloride 104 94 - 109 mmol/L    Carbon Dioxide 28 20 - 32 mmol/L    Anion Gap 7 3 - 14 mmol/L    Glucose 100 (H) 70 - 99 mg/dL    Urea Nitrogen 17 7 -  30 mg/dL    Creatinine 0.82 0.66 - 1.25 mg/dL    GFR Estimate >90  Non  GFR Calc   >60 mL/min/1.7m2    GFR Estimate If Black >90   GFR Calc   >60 mL/min/1.7m2    Calcium 8.7 8.5 - 10.1 mg/dL   INR    Collection Time: 07/03/17  3:41 PM   Result Value Ref Range    INR 1.10 0.86 - 1.14      I did review his recent CT scan which describes the liver and spleen as normal. No ascites was seen.    My impression is that Mr. Casper has cirrhosis caused by chronic hepatitis C. While his liver biopsy did show cirrhosis, and an endoscopy showed trivial esophageal varices, his liver disease is certainly not decompensated. I do think he is an excellent candidate for treatment and will begin moving in that direction. We will provide him information about our Prioritize study but I will treat him regardless if he participates or not. 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    He will need an upper GI endoscopy to follow-up on his Maldonado's and to screen for esophageal varices. I plan will be to see him back in 4 months which should be the end of his hepatitis C treatment.    Thank you very much for allowing me to participate in the care of this patient.  If you have any questions regarding my recommendations, please do not hesitate to contact me.      Yaakov Burroughs MD      Professor of Medicine  [employer]      Executive Medical Director, Solid Organ Transplant Program  [facility]

## 2017-07-03 NOTE — LETTER
7/3/2017       RE: Rian Malik  [address] [place] [address]     Dear Colleague,    Thank you for referring your patient, Rian Malik, to the [facility] at [facility]. Please see a copy of my visit note below.    I had the pleasure of seeing Rian Malik for consultation in the liver clinic at the [facility] on July 3, 2017. Mr. Malik presents for consultation regarding chronic hepatitis C.    He was first diagnosed with hepatitis C back in . He underwent an evaluation at [facility] which included a liver biopsy that showed that he had stage IV fibrosis. It was recommended he undergo treatment but his insurance company turned it down. He also had an upper GI endoscopy there which showed grade 1 varices and some portal hypertensive gastropathy.    He feels well. He denies any abdominal pain, itching or skin rash, or fatigue. He works full-time as a printer. He denies any increased abdominal girth or lower extremity edema. He denies any fevers or chills, cough or shortness of breath. He denies any nausea or vomiting, diarrhea or constipation. His appetite has been good and his weight has been stable. He has not had any gastrointestinal bleeding or altered signs of encephalopathy. He has not been hospitalized for complications of liver disease.    Past Medical History: He had an inguinal hernia repair at age 39. 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 He is currently on antibiotics as described on CT as a mural abscess secondary to diverticulitis.    Social History: He stopped drinking when he was told that he had hepatitis C. He denies any traditional risk factors for hepatitis C.    Family History: He did have a brother  of chronic liver disease. He was an alcoholic and he does not know if he had hepatitis C as well.    A complete review of systems is negative  other than that noted above.    Current Outpatient Prescriptions   Medication     amoxicillin-clavulanate (AUGMENTIN) 875-125 MG per tablet     No current facility-administered medications for this visit.      B/P: 128/77, T: 97.8, P: 70, R: Data Unavailable    PHYSICAL EXAMINATION:  He appears quite healthy.  HEENT examination shows no scleral icterus and no temporal muscle wasting.  His chest is clear.  His cardiac exam show no S3, S4 or murmur. His abdominal examination shows no masses or tenderness to palpation are present.  His liver and spleen are not palpable.  Extremity examination shows no edema.   Skin exam shows some erythema about his chest. Neuro exam show no asterixis    Recent Results (from the past 168 hour(s))   Hepatic panel    Collection Time: 07/03/17  3:41 PM   Result Value Ref Range    Bilirubin Direct 0.2 0.0 - 0.2 mg/dL    Bilirubin Total 0.5 0.2 - 1.3 mg/dL    Albumin 3.8 3.4 - 5.0 g/dL    Protein Total 6.8 6.8 - 8.8 g/dL    Alkaline Phosphatase 77 40 - 150 U/L    ALT 87 (H) 0 - 70 U/L    AST 44 0 - 45 U/L   CBC with platelets differential    Collection Time: 07/03/17  3:41 PM   Result Value Ref Range    WBC 8.4 4.0 - 11.0 10e9/L    RBC Count 4.89 4.4 - 5.9 10e12/L    Hemoglobin 15.9 13.3 - 17.7 g/dL    Hematocrit 45.4 40.0 - 53.0 %    MCV 93 78 - 100 fl    MCH 32.5 26.5 - 33.0 pg    MCHC 35.0 31.5 - 36.5 g/dL    RDW 12.5 10.0 - 15.0 %    Platelet Count 151 150 - 450 10e9/L    Diff Method Automated Method     % Neutrophils 53.4 %    % Lymphocytes 36.0 %    % Monocytes 6.8 %    % Eosinophils 2.3 %    % Basophils 1.0 %    % Immature Granulocytes 0.5 %    Nucleated RBCs 0 0 /100    Absolute Neutrophil 4.5 1.6 - 8.3 10e9/L    Absolute Lymphocytes 3.0 0.8 - 5.3 10e9/L    Absolute Monocytes 0.6 0.0 - 1.3 10e9/L    Absolute Eosinophils 0.2 0.0 - 0.7 10e9/L    Absolute Basophils 0.1 0.0 - 0.2 10e9/L    Abs Immature Granulocytes 0.0 0 - 0.4 10e9/L    Absolute Nucleated RBC 0.0    Basic metabolic  panel    Collection Time: 07/03/17  3:41 PM   Result Value Ref Range    Sodium 139 133 - 144 mmol/L    Potassium 4.0 3.4 - 5.3 mmol/L    Chloride 104 94 - 109 mmol/L    Carbon Dioxide 28 20 - 32 mmol/L    Anion Gap 7 3 - 14 mmol/L    Glucose 100 (H) 70 - 99 mg/dL    Urea Nitrogen 17 7 - 30 mg/dL    Creatinine 0.82 0.66 - 1.25 mg/dL    GFR Estimate >90  Non  GFR Calc   >60 mL/min/1.7m2    GFR Estimate If Black >90   GFR Calc   >60 mL/min/1.7m2    Calcium 8.7 8.5 - 10.1 mg/dL   INR    Collection Time: 07/03/17  3:41 PM   Result Value Ref Range    INR 1.10 0.86 - 1.14      I did review his recent CT scan which describes the liver and spleen as normal. No ascites was seen.    My impression is that Mr. Casper has cirrhosis caused by chronic hepatitis C. While his liver biopsy did show cirrhosis, and an endoscopy showed trivial esophageal varices, his liver disease is certainly not decompensated. I do think he is an excellent candidate for treatment and will begin moving in that direction. We will provide him information about our Prioritize study but I will treat him regardless if he participates or not. 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    He will need an upper GI endoscopy to follow-up on his Maldonado's and to screen for esophageal varices. I plan will be to see him back in 4 months which should be the end of his hepatitis C treatment.    Thank you very much for allowing me to participate in the care of this patient.  If you have any questions regarding my recommendations, please do not hesitate to contact me.      Yaakov Burroughs MD      Professor of Medicine  [employer]      Executive Medical Director, Solid Organ Transplant Program  [facility]

## 2017-07-06 ENCOUNTER — TELEPHONE (OUTPATIENT)
Dept: GASTROENTEROLOGY | Facility: CLINIC | Age: 57
End: 2017-07-06

## 2017-07-07 ENCOUNTER — TELEPHONE (OUTPATIENT)
Dept: GASTROENTEROLOGY | Facility: CLINIC | Age: 57
End: 2017-07-07

## 2017-07-10 ENCOUNTER — TELEPHONE (OUTPATIENT)
Dept: GASTROENTEROLOGY | Facility: CLINIC | Age: 57
End: 2017-07-10

## 2017-07-19 ENCOUNTER — TELEPHONE (OUTPATIENT)
Dept: FAMILY MEDICINE | Facility: CLINIC | Age: 57
End: 2017-07-19

## 2017-07-19 NOTE — TELEPHONE ENCOUNTER
Reason for Call:  Other     Detailed comments: FYI - Endoscopy Md at  cancelling upper Endo does not want to do at [facility] use Hospital too many esophageal Varicies  They are trying to put in EPIC note for more explanation      Phone Number [facility] can be reached at: [phone]     Best Time: no need for call back    Call taken on 7/19/2017 at 10:12 AM by Heidi Brown

## 2017-07-25 ENCOUNTER — RADIANT APPOINTMENT (OUTPATIENT)
Dept: CT IMAGING | Facility: CLINIC | Age: 57
End: 2017-07-25
Attending: SURGERY
Payer: COMMERCIAL

## 2017-07-25 ENCOUNTER — MYC MEDICAL ADVICE (OUTPATIENT)
Dept: SURGERY | Facility: CLINIC | Age: 57
End: 2017-07-25

## 2017-07-25 ENCOUNTER — TRANSFERRED RECORDS (OUTPATIENT)
Dept: HEALTH INFORMATION MANAGEMENT | Facility: CLINIC | Age: 57
End: 2017-07-25

## 2017-07-25 DIAGNOSIS — K63.0 ABSCESS OF SIGMOID COLON: ICD-10-CM

## 2017-07-25 PROCEDURE — 74177 CT ABD & PELVIS W/CONTRAST: CPT | Performed by: RADIOLOGY

## 2017-07-25 RX ORDER — IOPAMIDOL 755 MG/ML
96 INJECTION, SOLUTION INTRAVASCULAR ONCE
Status: COMPLETED | OUTPATIENT
Start: 2017-07-25 | End: 2017-07-25

## 2017-07-25 RX ADMIN — IOPAMIDOL 94 ML: 755 INJECTION, SOLUTION INTRAVASCULAR at 11:33

## 2017-07-25 NOTE — TELEPHONE ENCOUNTER
Dr. Schneider, please review results from CT done today, and advise next steps.    Thank you,  Dania Butcher CMA.

## 2017-07-27 ENCOUNTER — TRANSFERRED RECORDS (OUTPATIENT)
Dept: HEALTH INFORMATION MANAGEMENT | Facility: CLINIC | Age: 57
End: 2017-07-27

## 2017-07-28 ENCOUNTER — TELEPHONE (OUTPATIENT)
Dept: GASTROENTEROLOGY | Facility: CLINIC | Age: 57
End: 2017-07-28

## 2017-07-28 DIAGNOSIS — Z12.11 ENCOUNTER FOR SCREENING COLONOSCOPY: Primary | ICD-10-CM

## 2017-07-28 DIAGNOSIS — R93.5 ABNORMAL CT OF THE ABDOMEN: Primary | ICD-10-CM

## 2017-07-28 LAB
CREAT SERPL-MCNC: 1.05 MG/DL (ref 0.7–1.3)
GFR SERPL CREATININE-BSD FRML MDRD: >60 ML/MIN/1.73M2
GLUCOSE SERPL-MCNC: 90 MG/DL (ref 70–110)
INR PPP: 1.2 (ref 0.9–1.1)
POTASSIUM SERPL-SCNC: 4.4 MMOL/L (ref 3.5–5.1)

## 2017-07-28 NOTE — TELEPHONE ENCOUNTER
Patient scheduled for EGD and Colonoscopy    Indication for procedure. Abnormal CT of the abdomen, Esophageal varices    Referring Provider. Yaakov Burroughs MD, Moreno Harris MD, Edgar Candelaria MD     ? Not Needed    Arrival time verified? Yes, 1400    Facility location verified? Yes, [address]    Instructions given regarding prep and procedure    Prep Type NPO and Golytely    Are you taking any anticoagulants or blood thinners? No    Instructions given? N/A    Electronic implanted devices? No    Pre procedure teaching completed? Yes    Transportation from procedure? Son or Daughter    H&P / Pre op physical completed? N/A

## 2017-08-04 ENCOUNTER — HOSPITAL ENCOUNTER (OUTPATIENT)
Facility: CLINIC | Age: 57
Discharge: HOME | End: 2017-08-04
Attending: INTERNAL MEDICINE | Admitting: INTERNAL MEDICINE
Payer: COMMERCIAL

## 2017-08-04 ENCOUNTER — OFFICE VISIT (OUTPATIENT)
Dept: FAMILY MEDICINE | Facility: CLINIC | Age: 57
End: 2017-08-04
Payer: COMMERCIAL

## 2017-08-04 ENCOUNTER — SURGERY (OUTPATIENT)
Age: 57
End: 2017-08-04

## 2017-08-04 VITALS
SYSTOLIC BLOOD PRESSURE: 116 MMHG | TEMPERATURE: 98.2 F | OXYGEN SATURATION: 99 % | DIASTOLIC BLOOD PRESSURE: 73 MMHG | HEART RATE: 78 BPM | BODY MASS INDEX: 22.73 KG/M2 | WEIGHT: 150 LBS | HEIGHT: 68 IN

## 2017-08-04 VITALS
RESPIRATION RATE: 8 BRPM | WEIGHT: 150 LBS | OXYGEN SATURATION: 100 % | HEIGHT: 68 IN | DIASTOLIC BLOOD PRESSURE: 75 MMHG | SYSTOLIC BLOOD PRESSURE: 131 MMHG | BODY MASS INDEX: 22.73 KG/M2

## 2017-08-04 DIAGNOSIS — B18.2 CHRONIC HEPATITIS C WITHOUT HEPATIC COMA (H): ICD-10-CM

## 2017-08-04 DIAGNOSIS — K63.0 ABSCESS OF SIGMOID COLON: Primary | ICD-10-CM

## 2017-08-04 DIAGNOSIS — D69.59 THROMBOCYTOPENIA, SECONDARY: ICD-10-CM

## 2017-08-04 DIAGNOSIS — E83.42 HYPOMAGNESEMIA: ICD-10-CM

## 2017-08-04 DIAGNOSIS — Z23 NEED FOR PROPHYLACTIC VACCINATION WITH TETANUS-DIPHTHERIA (TD): ICD-10-CM

## 2017-08-04 PROBLEM — K22.70 BARRETT'S ESOPHAGUS DETERMINED BY ENDOSCOPY: Status: ACTIVE | Noted: 2017-08-04

## 2017-08-04 PROBLEM — K56.699 STRICTURE OF SIGMOID COLON (H): Status: ACTIVE | Noted: 2017-08-04

## 2017-08-04 LAB
ALBUMIN SERPL-MCNC: 4 G/DL (ref 3.4–5)
ALP SERPL-CCNC: 70 U/L (ref 40–150)
ALT SERPL W P-5'-P-CCNC: 73 U/L (ref 0–70)
ANION GAP SERPL CALCULATED.3IONS-SCNC: 13 MMOL/L (ref 3–14)
AST SERPL W P-5'-P-CCNC: 38 U/L (ref 0–45)
BASOPHILS # BLD AUTO: 0.1 10E9/L (ref 0–0.2)
BASOPHILS NFR BLD AUTO: 0.9 %
BILIRUB SERPL-MCNC: 1.7 MG/DL (ref 0.2–1.3)
BUN SERPL-MCNC: 12 MG/DL (ref 7–30)
CALCIUM SERPL-MCNC: 9.2 MG/DL (ref 8.5–10.1)
CHLORIDE SERPL-SCNC: 98 MMOL/L (ref 94–109)
CO2 SERPL-SCNC: 26 MMOL/L (ref 20–32)
COLONOSCOPY: NORMAL
CREAT SERPL-MCNC: 0.81 MG/DL (ref 0.66–1.25)
DIFFERENTIAL METHOD BLD: NORMAL
EOSINOPHIL # BLD AUTO: 0.1 10E9/L (ref 0–0.7)
EOSINOPHIL NFR BLD AUTO: 1 %
ERYTHROCYTE [DISTWIDTH] IN BLOOD BY AUTOMATED COUNT: 12.3 % (ref 10–15)
GFR SERPL CREATININE-BSD FRML MDRD: ABNORMAL ML/MIN/1.7M2
GLUCOSE SERPL-MCNC: 75 MG/DL (ref 70–99)
HCT VFR BLD AUTO: 44.5 % (ref 40–53)
HGB BLD-MCNC: 15.7 G/DL (ref 13.3–17.7)
INR PPP: 0.99 (ref 0.86–1.14)
LYMPHOCYTES # BLD AUTO: 1.7 10E9/L (ref 0.8–5.3)
LYMPHOCYTES NFR BLD AUTO: 24.3 %
MAGNESIUM SERPL-MCNC: 2 MG/DL (ref 1.6–2.3)
MCH RBC QN AUTO: 32.3 PG (ref 26.5–33)
MCHC RBC AUTO-ENTMCNC: 35.3 G/DL (ref 31.5–36.5)
MCV RBC AUTO: 92 FL (ref 78–100)
MONOCYTES # BLD AUTO: 0.5 10E9/L (ref 0–1.3)
MONOCYTES NFR BLD AUTO: 7.3 %
NEUTROPHILS # BLD AUTO: 4.6 10E9/L (ref 1.6–8.3)
NEUTROPHILS NFR BLD AUTO: 66.5 %
PLATELET # BLD AUTO: 172 10E9/L (ref 150–450)
POTASSIUM SERPL-SCNC: 4.3 MMOL/L (ref 3.4–5.3)
PROT SERPL-MCNC: 7.1 G/DL (ref 6.8–8.8)
RBC # BLD AUTO: 4.86 10E12/L (ref 4.4–5.9)
SODIUM SERPL-SCNC: 137 MMOL/L (ref 133–144)
UPPER GI ENDOSCOPY: NORMAL
WBC # BLD AUTO: 7 10E9/L (ref 4–11)

## 2017-08-04 PROCEDURE — 80053 COMPREHEN METABOLIC PANEL: CPT | Performed by: INTERNAL MEDICINE

## 2017-08-04 PROCEDURE — 43239 EGD BIOPSY SINGLE/MULTIPLE: CPT | Performed by: INTERNAL MEDICINE

## 2017-08-04 PROCEDURE — G0500 MOD SEDAT ENDO SERVICE >5YRS: HCPCS

## 2017-08-04 PROCEDURE — 25000128 H RX IP 250 OP 636: Performed by: INTERNAL MEDICINE

## 2017-08-04 PROCEDURE — 90715 TDAP VACCINE 7 YRS/> IM: CPT | Performed by: INTERNAL MEDICINE

## 2017-08-04 PROCEDURE — 83735 ASSAY OF MAGNESIUM: CPT | Performed by: INTERNAL MEDICINE

## 2017-08-04 PROCEDURE — 88305 TISSUE EXAM BY PATHOLOGIST: CPT | Performed by: INTERNAL MEDICINE

## 2017-08-04 PROCEDURE — 36415 COLL VENOUS BLD VENIPUNCTURE: CPT | Performed by: INTERNAL MEDICINE

## 2017-08-04 PROCEDURE — 85610 PROTHROMBIN TIME: CPT | Performed by: INTERNAL MEDICINE

## 2017-08-04 PROCEDURE — 99214 OFFICE O/P EST MOD 30 MIN: CPT | Mod: 25 | Performed by: INTERNAL MEDICINE

## 2017-08-04 PROCEDURE — 45380 COLONOSCOPY AND BIOPSY: CPT | Performed by: INTERNAL MEDICINE

## 2017-08-04 PROCEDURE — 90471 IMMUNIZATION ADMIN: CPT | Performed by: INTERNAL MEDICINE

## 2017-08-04 PROCEDURE — 85025 COMPLETE CBC W/AUTO DIFF WBC: CPT | Performed by: INTERNAL MEDICINE

## 2017-08-04 RX ORDER — ONDANSETRON 2 MG/ML
4 INJECTION INTRAMUSCULAR; INTRAVENOUS
Status: DISCONTINUED | OUTPATIENT
Start: 2017-08-04 | End: 2017-08-04 | Stop reason: HOSPADM

## 2017-08-04 RX ORDER — FENTANYL CITRATE 50 UG/ML
INJECTION, SOLUTION INTRAMUSCULAR; INTRAVENOUS PRN
Status: DISCONTINUED | OUTPATIENT
Start: 2017-08-04 | End: 2017-08-04 | Stop reason: HOSPADM

## 2017-08-04 RX ADMIN — FENTANYL CITRATE 25 MCG: 50 INJECTION, SOLUTION INTRAMUSCULAR; INTRAVENOUS at 15:30

## 2017-08-04 RX ADMIN — MIDAZOLAM HYDROCHLORIDE 2 MG: 1 INJECTION, SOLUTION INTRAMUSCULAR; INTRAVENOUS at 15:10

## 2017-08-04 RX ADMIN — FENTANYL CITRATE 50 MCG: 50 INJECTION, SOLUTION INTRAMUSCULAR; INTRAVENOUS at 15:14

## 2017-08-04 RX ADMIN — MIDAZOLAM HYDROCHLORIDE 1 MG: 1 INJECTION, SOLUTION INTRAMUSCULAR; INTRAVENOUS at 15:14

## 2017-08-04 RX ADMIN — MIDAZOLAM HYDROCHLORIDE 1 MG: 1 INJECTION, SOLUTION INTRAMUSCULAR; INTRAVENOUS at 15:30

## 2017-08-04 RX ADMIN — FENTANYL CITRATE 50 MCG: 50 INJECTION, SOLUTION INTRAMUSCULAR; INTRAVENOUS at 15:10

## 2017-08-04 NOTE — IP AVS SNAPSHOT
MRN:[id number]                      After Visit Summary   8/4/2017    Rian Malik    MRN: [id number]           Thank you!     Thank you for choosing [facility] for your care. Our goal is always to provide you with excellent care. Hearing back from our patients is one way we can continue to improve our services. Please take a few minutes to complete the written survey that you may receive in the mail after you visit with us. Thank you!        Patient Information     Date Of Birth          1960        About your hospital stay     You were admitted on:  August 4, 2017 You last received care in the:  [facility], Endoscopy    You were discharged on:  August 4, 2017       Who to Call     For medical emergencies, please call 911.  For non-urgent questions about your medical care, please call your primary care provider or clinic, [phone]  For questions related to your surgery, please call your surgery clinic        Attending Provider     Provider Specialty    Cristobal Blanco MD Gastroenterology       Primary Care Provider Office Phone # Fax #    Moreno Harris [phone]      Your next 10 appointments already scheduled     Sep 07, 2017  8:00 AM CDT   Return Visit with Darrell Schneider DO   [facility] ([facility])    [address] [place] [id number] [phone]            Nov 06, 2017 12:45 PM CST   Lab with  LAB   [facility] ([facility])    [address] [place] [address] [phone]            Nov 06, 2017  1:45 PM CST   (Arrive by 1:30 PM)   Return General Liver with Yaakov Burroughs MD   [facility] Hepatology ([facility])    [address] [place] [address] [phone]              Further instructions from your care team       Discharge Instructions after Colonoscopy  or Sigmoidoscopy    Today you  "had a _X___ Colonoscopy ____ Sigmoidoscopy    Activity and Diet  You were given medicine for pain. You may be dizzy or sleepy.  For 24 hours:    Do not drive or use heavy equipment.    Do not make important decisions.    Do not drink any alcohol.  You may return to your normal diet and medicines.    Discomfort    Air was placed in your colon during the exam in order to see it. Walking helps to pass the air.    You may take Tylenol (acetaminophen) for pain unless your doctor has told you not to.  Do not take aspirin or ibuprofen (Advil, Motrin, or other anti-inflammatory  drugs) for _____ days.    Follow-up  ____ We took small tissue samples or polyps to study. Your doctor will call you with the results  within two weeks.    When to call:    Call right away if you have:    Unusual pain in belly or chest pain not relieved with passing air.    More than 1 to 2 Tablespoons of bleeding from your rectum.    Fever above 100.6  F (37.5  C).    If you have severe pain, bleeding, or shortness of breath, go to an emergency room.    If you have questions, call:  Monday to Friday, 7 a.m. to 4:30 p.m.  Endoscopy: [phone] (We may have to call you back)    After hours  Hospital: [phone] (Ask for the GI fellow on call)    Pending Results     Date and Time Order Name Status Description    8/4/2017 1540 Surgical pathology exam In process             Admission Information     Date & Time Provider Department Dept. Phone    8/4/2017 Cristobal Blanco MD [facility] [place], Endoscopy [phone]      Your Vitals Were     Blood Pressure Respirations Height Weight Pulse Oximetry BMI (Body Mass Index)    131/75 8 1.727 m (5' 8\") 68 kg (150 lb) 100% 22.81 kg/m2      Reasulthart Information     "[employer] gives you secure access to your electronic health record. If you see a primary care provider, you can also send messages to your care team and make appointments. If you have questions, please call your primary care clinic.  If you do not have a " primary care provider, please call [phone] and they will assist you.        Care EveryWhere ID     This is your Care EveryWhere ID. This could be used by other organizations to access your [facility] medical records  [id number]        Equal Access to Services     KHANH FLANAGAN : Rajesh Meraz, washahnazda luqadaha, juanta kaalmada tamikoayadhernán, alisa lopezduniacameron mg. So [facility] [phone].    ATENCIÓN: Si habla español, tiene a martino disposición servicios gratuitos de asistencia lingüística. Llame al [phone].    We comply with applicable federal civil rights laws and [place] laws. We do not discriminate on the basis of race, color, national origin, age, disability sex, sexual orientation or gender identity.               Review of your medicines      Notice     You have not been prescribed any medications.             Protect others around you: Learn how to safely use, store and throw away your medicines at www.disposemymeds.org.             Medication List: This is a list of all your medications and when to take them. Check marks below indicate your daily home schedule. Keep this list as a reference.      Notice     You have not been prescribed any medications.

## 2017-08-04 NOTE — DISCHARGE INSTRUCTIONS
Discharge Instructions after Colonoscopy  or Sigmoidoscopy    Today you had a _X___ Colonoscopy ____ Sigmoidoscopy    Activity and Diet  You were given medicine for pain. You may be dizzy or sleepy.  For 24 hours:    Do not drive or use heavy equipment.    Do not make important decisions.    Do not drink any alcohol.  You may return to your normal diet and medicines.    Discomfort    Air was placed in your colon during the exam in order to see it. Walking helps to pass the air.    You may take Tylenol (acetaminophen) for pain unless your doctor has told you not to.  Do not take aspirin or ibuprofen (Advil, Motrin, or other anti-inflammatory  drugs) for _____ days.    Follow-up  ____ We took small tissue samples or polyps to study. Your doctor will call you with the results  within two weeks.    When to call:    Call right away if you have:    Unusual pain in belly or chest pain not relieved with passing air.    More than 1 to 2 Tablespoons of bleeding from your rectum.    Fever above 100.6  F (37.5  C).    If you have severe pain, bleeding, or shortness of breath, go to an emergency room.    If you have questions, call:  Monday to Friday, 7 a.m. to 4:30 p.m.  Endoscopy: [phone] (We may have to call you back)    After hours  Hospital: [phone] (Ask for the GI fellow on call)

## 2017-08-04 NOTE — MR AVS SNAPSHOT
After Visit Summary   8/4/2017    Rian Malik    MRN: [id number]           Patient Information     Date Of Birth          1960        Visit Information        Provider Department      8/4/2017 8:40 AM Moreno Harris MD [facility]        Today's Diagnoses     Abscess of sigmoid colon    -  1    Chronic hepatitis C without hepatic coma (H)        Thrombocytopenia, secondary        Hypomagnesemia        Need for prophylactic vaccination with tetanus-diphtheria (TD)          Care Instructions    This summary includes the important diagnoses, test, medications and other important parts of your medical history.  Below are a few good we sites you can use to learn more about these.     Www.[employer] Media : Up to date and easily searchable information on multiple topics.  Www.[employer] Media/Pharmacy/c_539084.asp : [facility] $4.99 medications  Www.Growlife.gov : medication info, interactive tutorials, watch real surgeries online  Www.familydoctor.org : good info from the Academy of Family Physicians  Www.POPVOX.SurePeak : good info from the [facility]  Www.cdc.gov : public health info, travel advisories, epidemics (H1N1)  Www.aap.org : children's health info, normal development, vaccinations  Www.health.[facility].[place].us : [place] dept of heatlh, public health issues in [place], N1N1    Based on your medical history and these are the current health maintenance or preventive care services that you are due for (some may have been done at this visit:)  Health Maintenance Due   Topic Date Due     TETANUS IMMUNIZATION (SYSTEM ASSIGNED)  08/19/1978     COLON CANCER SCREEN (SYSTEM ASSIGNED)  08/19/2010     =================================================================================  Normal Values   Blood pressure  <140/90 for most adults    <130/80 for some chronic diseases (ask your care team about yours)    BMI (body mass index)  18.5-25 kg/m2 (based on height and weight)     Thank  you for visiting [facility]    Normal or non-critical lab and imaging results will be communicated to you by MyChart, letter or phone within 7 days.  If you do not hear from us within 10 days, please call the clinic. If you have a critical or abnormal lab result, we will notify you by phone as soon as possible.     If you have any questions regarding your visit please contact:     Team Comfort:   Clinic Hours Telephone Number   Dr. Darrell Huang   7am-5pm  Monday - Friday [phone]  Jonathon RN   Pharmacy 8am-8pm Monday-Thursday      8am-6pm Friday  9am-5pm Saturday-Sunday [phone]   Urgent Care 11am-8pm Monday-Friday        9am-5pm Saturday-Sunday [phone]     After hours, weekend or if you need to make an appointment with your primary provider please call [phone].   After Hours nurse advise: call [facility]: [phone]    Medication Refills:  Call your pharmacy and they will forward the refill to us. Please allow 3 business days for your refills to be completed.    Use Eye-Qt (secure email communication and access to your chart) to send your primary care provider a message or make an appointment. Ask someone on your Team how to sign up for RxEye. To log on to Bahu or for more information in [email or web address] please visit the website at [email or web address].  As of October 8, 2013, all password changes, disabled accounts, or ID changes in RxEye/MyHealth will be done by our Access Services Department.   If you need help with your account or password, call: [phone]. Clinic staff no longer has the ability to change passwords.             Follow-ups after your visit        Your next 10 appointments already scheduled     Aug 04, 2017   Pro[facility] with Cristobal Blanco MD   [facility] [place]    [address]  [address]   [phone]           The [facility] is located on the corner of [address] and [address] on the [address] [facility]. It is easily accessible from virtually any point in the [place] area, via [address] and [address].            Sep 07, 2017  8:00 AM CDT   Return Visit with Darrell Schneider DO   [facility] ([facility])    [address] [id number] [phone]            Nov 06, 2017 12:45 PM CST   Lab with  LAB   [facility] ([facility])    [address]  1st Floor  [facility] [id number] [phone]            Nov 06, 2017  1:45 PM CST   (Arrive by 1:30 PM)   Return General Liver with Yaakov Burroughs MD   [facility] Hepatology ([facility])    [address]  3rd Floor  [facility] [id number] [phone]              Who to contact     If you have questions or need follow up information about today's clinic visit or your schedule please contact [facility] directly at [phone].  Normal or non-critical lab and imaging results will be communicated to you by [employer], letter or phone within 4 business days after the clinic has received the results. If you do not hear from us within 7 days, please contact the clinic through [employer] or phone. If you have a critical or abnormal lab result, we will notify you by phone as soon as possible.  Submit refill requests through Dibsie or call your pharmacy and they will forward the refill request to us. Please allow 3 business days for your refill to be completed.          Additional Information About Your Visit        Dibsie Information     [employer] gives you secure access to your electronic health record. If you see a primary care provider, you can also send messages to your care team and make appointments. If you have questions, please call your primary care clinic.  If  "you do not have a primary care provider, please call [phone] and they will assist you.        Care EveryWhere ID     This is your Care EveryWhere ID. This could be used by other organizations to access your [facility] medical records  [id number]        Your Vitals Were     Pulse Temperature Height Pulse Oximetry BMI (Body Mass Index)       78 98.2  F (36.8  C) (Oral) 5' 8\" (1.727 m) 99% 22.81 kg/m2        Blood Pressure from Last 3 Encounters:   08/04/17 116/73   07/03/17 128/77   06/01/17 124/85    Weight from Last 3 Encounters:   08/04/17 150 lb (68 kg)   07/03/17 156 lb 9.6 oz (71 kg)   06/01/17 153 lb (69.4 kg)              We Performed the Following     CBC with platelets and differential     Comprehensive metabolic panel (BMP + Alb, Alk Phos, ALT, AST, Total. Bili, TP)     INR     Magnesium     TDAP VACCINE (ADACEL)        Primary Care Provider Office Phone # Fax #    Moreno Harris [phone]       [facility] [address]  [facility] [address]        Equal Access to Services     KHANH FLANAGAN : Hadii aad ku hadasho Soomaali, waaxda luqadaha, qaybta kaalmada adeegyada, alisa currie haysheryln tamiko mg. So [facility] [phone].    ATENCIÓN: Si habla español, tiene a martino disposición servicios gratuitos de asistencia lingüística. Llame al [phone].    We comply with applicable federal civil rights laws and [place] laws. We do not discriminate on the basis of race, color, national origin, age, disability sex, sexual orientation or gender identity.            Thank you!     Thank you for choosing [facility]  for your care. Our goal is always to provide you with excellent care. Hearing back from our patients is one way we can continue to improve our services. Please take a few minutes to complete the written survey that you may receive in the mail after your visit with us. Thank you!             Your Updated Medication List - " Protect others around you: Learn how to safely use, store and throw away your medicines at www.disposemymeds.org.      Notice  As of 8/4/2017  9:37 AM    You have not been prescribed any medications.

## 2017-08-04 NOTE — PATIENT INSTRUCTIONS
This summary includes the important diagnoses, test, medications and other important parts of your medical history.  Below are a few good we sites you can use to learn more about these.     Www.tipple.me.org : Up to date and easily searchable information on multiple topics.  Www.tipple.me.org/Pharmacy/c_539084.asp : [facility] $4.99 medications  Www.medlineplus.gov : medication info, interactive tutorials, watch real surgeries online  Www.familydoctor.org : good info from the Academy of Family Physicians  Www.[facility]Easy[facility].com : good info from the [facility]  Www.cdc.gov : public health info, travel advisories, epidemics (H1N1)  Www.aap.org : children's health info, normal development, vaccinations  Www.health.[facility].[place].us : [place] dept of heat, public health issues in [place], N1N1    Based on your medical history and these are the current health maintenance or preventive care services that you are due for (some may have been done at this visit:)  Health Maintenance Due   Topic Date Due     TETANUS IMMUNIZATION (SYSTEM ASSIGNED)  08/19/1978     COLON CANCER SCREEN (SYSTEM ASSIGNED)  08/19/2010     =================================================================================  Normal Values   Blood pressure  <140/90 for most adults    <130/80 for some chronic diseases (ask your care team about yours)    BMI (body mass index)  18.5-25 kg/m2 (based on height and weight)     Thank you for visiting [facility]    Normal or non-critical lab and imaging results will be communicated to you by MyChart, letter or phone within 7 days.  If you do not hear from us within 10 days, please call the clinic. If you have a critical or abnormal lab result, we will notify you by phone as soon as possible.     If you have any questions regarding your visit please contact:     Team Comfort:   Clinic Hours Telephone Number   Dr. Darrell Huang    7am-5pm  Monday - Friday [phone]  [facility]   Pharmacy 8am-8pm Monday-Thursday      8am-6pm Friday  9am-5pm Saturday-Sunday [phone]   Urgent Care 11am-8pm Monday-Friday        9am-5pm Saturday-Sunday [phone]     After hours, weekend or if you need to make an appointment with your primary provider please call [phone].   After Hours nurse advise: call [facility]: [phone]    Medication Refills:  Call your pharmacy and they will forward the refill to us. Please allow 3 business days for your refills to be completed.    Use [employer] (secure email communication and access to your chart) to send your primary care provider a message or make an appointment. Ask someone on your Team how to sign up for [employer]. To log on to [employer] or for more information in [employer] please visit the website at [email or web address].  As of October 8, 2013, all password changes, disabled accounts, or ID changes in [employer]/MyHealth will be done by our Access Services Department.   If you need help with your account or password, call: [phone]. Clinic staff no longer has the ability to change passwords.

## 2017-08-04 NOTE — NURSING NOTE
"Chief Complaint   Patient presents with     Hospital F/U     [facility]       Initial /73 (BP Location: Right arm, Patient Position: Chair, Cuff Size: Adult Regular)  Pulse 78  Temp 98.2  F (36.8  C) (Oral)  Ht 5' 8\" (1.727 m)  Wt 150 lb (68 kg)  SpO2 99%  BMI 22.81 kg/m2 Estimated body mass index is 22.81 kg/(m^2) as calculated from the following:    Height as of this encounter: 5' 8\" (1.727 m).    Weight as of this encounter: 150 lb (68 kg).  Medication Reconciliation: complete     Jay Cuadra MA      "

## 2017-08-04 NOTE — IP AVS SNAPSHOT
[facility], [place], Endoscopy    [address] [address]    Phone:  [phone]                                       After Visit Summary   8/4/2017    Rian Malik    MRN: [id number]           After Visit Summary Signature Page     I have received my discharge instructions, and my questions have been answered. I have discussed any challenges I see with this plan with the nurse or doctor.    ..........................................................................................................................................  Patient/Patient Representative Signature      ..........................................................................................................................................  Patient Representative Print Name and Relationship to Patient    ..................................................               ................................................  Date                                            Time    ..........................................................................................................................................  Reviewed by Signature/Title    ...................................................              ..............................................  Date                                                            Time

## 2017-08-04 NOTE — NURSING NOTE
Screening Questionnaire for Adult Immunization    Are you sick today?   No   Do you have allergies to medications, food, a vaccine component or latex?   No   Have you ever had a serious reaction after receiving a vaccination?   No   Do you have a long-term health problem with heart disease, lung disease, asthma, kidney disease, metabolic disease (e.g. diabetes), anemia, or other blood disorder?   No   Do you have cancer, leukemia, HIV/AIDS, or any other immune system problem?   No   In the past 3 months, have you taken medications that affect  your immune system, such as prednisone, other steroids, or anticancer drugs; drugs for the treatment of rheumatoid arthritis, Crohn s disease, or psoriasis; or have you had radiation treatments?   No   Have you had a seizure, or a brain or other nervous system problem?   No   During the past year, have you received a transfusion of blood or blood     products, or been given immune (gamma) globulin or antiviral drug?   No   For women: Are you pregnant or is there a chance you could become        pregnant during the next month?   No   Have you received any vaccinations in the past 4 weeks?   No     Immunization questionnaire answers were all negative.      MNVFC doesn't apply on this patient       Screening performed by Jay Cuadra on 8/4/2017 at 9:45 AM.

## 2017-08-04 NOTE — OR NURSING
Upper endoscopy and colonoscopy tolerated well with sedation given.  Upper endoscopy, no varices that needed banding but there was some nodularity at 39 cm in the esophagus that was biopsied.  Colonoscopy, only reached 40 cm in the sigmoid colon.  A stricture was found, possibly inflammatory response from the diverticulitis. This area was biopsied.  MD to discuss with patient and daughter his findings and evaluation.

## 2017-08-04 NOTE — PROGRESS NOTES
SUBJECTIVE:                                                    Rian Malik is a 56 year old male who presents to clinic today for the following health issues:        Hospital Follow-up Visit:    Hospital/Nursing Home/ Rehab Facility: [facility]  Date of Admission: 8-1-17  Date of Discharge: 8-3-17  Reason(s) for Admission: abscess on colon            Problems taking medications regularly:  None       Medication changes since discharge: None       Problems adhering to non-medication therapy:  None    Summary of hospitalization:  CareEverywhere information obtained and reviewed  Diagnostic Tests/Treatments reviewed.  Follow up needed: YES  Other Healthcare Providers Involved in Patient s Care:         None  Update since discharge: fluctuating course.     Post Discharge Medication Reconciliation: discharge medications reconciled, continue medications without change.  Plan of care communicated with patient     Coding guidelines for this visit:  Type of Medical   Decision Making Face-to-Face Visit       within 7 Days of discharge Face-to-Face Visit        within 14 days of discharge   Moderate Complexity 15604 [id number]   High Complexity 21854 50900              Problem list and histories reviewed & adjusted, as indicated.  Additional history: as documented    Patient Active Problem List   Diagnosis     Hepatitis C virus infection     Advanced directives, counseling/discussion     CARDIOVASCULAR SCREENING; LDL GOAL LESS THAN 160     Chronic hepatitis C without hepatic coma (H)     Left inguinal pain     Abscess of sigmoid colon     Maldonado's esophagus determined by endoscopy     Stricture of sigmoid colon (H)     Past Surgical History:   Procedure Laterality Date     COLONOSCOPY N/A 8/4/2017    Procedure: COMBINED COLONOSCOPY, SINGLE OR MULTIPLE BIOPSY/POLYPECTOMY BY BIOPSY;  Colon & EGD;  Surgeon: Cristobal Blanco MD;  Location:  GI     ESOPHAGOSCOPY, GASTROSCOPY, DUODENOSCOPY (EGD), COMBINED N/A 8/4/2017     Procedure: COMBINED ESOPHAGOSCOPY, GASTROSCOPY, DUODENOSCOPY (EGD), BIOPSY SINGLE OR MULTIPLE;;  Surgeon: Cristobal Blanco MD;  Location:  GI     HERNIA REPAIR  1988       Social History   Substance Use Topics     Smoking status: Current Every Day Smoker     Packs/day: 0.24     Types: Cigarettes     Smokeless tobacco: Never Used      Comment: down to 3/ day     Alcohol use No     History reviewed. No pertinent family history.      No Known Allergies  Recent Labs   Lab Test  08/04/17   0953  07/03/17   1541  05/25/17   1622  05/01/17   0831   LDL   --    --    --   43   HDL   --    --    --   52   TRIG   --    --    --   41   ALT  73*  87*  93*  70   CR  0.81  0.82  0.90  0.95   GFRESTIMATED  >90  Non  GFR Calc    >90  Non  GFR Calc    88  81   GFRESTBLACK  >90   GFR Calc    >90   GFR Calc    >90   GFR Calc    >90   GFR Calc     POTASSIUM  4.3  4.0  3.8  4.0      BP Readings from Last 3 Encounters:   08/04/17 131/75   08/04/17 116/73   07/03/17 128/77    Wt Readings from Last 3 Encounters:   08/04/17 150 lb (68 kg)   08/04/17 150 lb (68 kg)   07/03/17 156 lb 9.6 oz (71 kg)               ROS:  C: NEGATIVE for fever, chills, change in weight  I: NEGATIVE for worrisome rashes, moles or lesions  E: NEGATIVE for vision changes or irritation  E/M: NEGATIVE for ear, mouth and throat problems  R: NEGATIVE for significant cough or SOB  B: NEGATIVE for masses, tenderness or discharge  CV: NEGATIVE for chest pain, palpitations or peripheral edema  GI: NEGATIVE for heartburn or reflux, hematemesis, hematochezia and jaundice  : NEGATIVE for frequency, dysuria, or hematuria  M: NEGATIVE for significant arthralgias or myalgia  N: NEGATIVE for weakness, dizziness or paresthesias  E: NEGATIVE for temperature intolerance, skin/hair changes  HEME/ALLERGY/IMMUNE: POSITIVE  for thrombocytopenia, secondary probable splenomegaly due to  "chronic Hepatitis C. NEGATIVE for bleeding disorder, night sweats and swollen nodes  P: NEGATIVE for changes in mood or affect    OBJECTIVE:     /73 (BP Location: Right arm, Patient Position: Chair, Cuff Size: Adult Regular)  Pulse 78  Temp 98.2  F (36.8  C) (Oral)  Ht 5' 8\" (1.727 m)  Wt 150 lb (68 kg)  SpO2 99%  BMI 22.81 kg/m2  Body mass index is 22.81 kg/(m^2).  GENERAL: healthy, fatigued and appears older than stated age  EYES: Eyes grossly normal to inspection and conjunctivae and sclerae normal  RESP: lungs clear to auscultation - no rales, rhonchi or wheezes  CV: regular rate and rhythm, normal S1 S2, no S3 or S4, no murmur, click or rub, no peripheral edema and peripheral pulses strong  ABDOMEN: tenderness RLQ and bowel sounds normal  MS: no gross musculoskeletal defects noted, no edema  SKIN: no suspicious lesions or rashes  NEURO: Normal strength and tone, mentation intact and speech normal  PSYCH: mentation appears normal, affect normal/bright    Diagnostic Test Results:  Results for orders placed or performed in visit on 08/04/17   CBC with platelets and differential   Result Value Ref Range    WBC 7.0 4.0 - 11.0 10e9/L    RBC Count 4.86 4.4 - 5.9 10e12/L    Hemoglobin 15.7 13.3 - 17.7 g/dL    Hematocrit 44.5 40.0 - 53.0 %    MCV 92 78 - 100 fl    MCH 32.3 26.5 - 33.0 pg    MCHC 35.3 31.5 - 36.5 g/dL    RDW 12.3 10.0 - 15.0 %    Platelet Count 172 150 - 450 10e9/L    Diff Method Automated Method     % Neutrophils 66.5 %    % Lymphocytes 24.3 %    % Monocytes 7.3 %    % Eosinophils 1.0 %    % Basophils 0.9 %    Absolute Neutrophil 4.6 1.6 - 8.3 10e9/L    Absolute Lymphocytes 1.7 0.8 - 5.3 10e9/L    Absolute Monocytes 0.5 0.0 - 1.3 10e9/L    Absolute Eosinophils 0.1 0.0 - 0.7 10e9/L    Absolute Basophils 0.1 0.0 - 0.2 10e9/L   Comprehensive metabolic panel (BMP + Alb, Alk Phos, ALT, AST, Total. Bili, TP)   Result Value Ref Range    Sodium 137 133 - 144 mmol/L    Potassium 4.3 3.4 - 5.3 mmol/L "    Chloride 98 94 - 109 mmol/L    Carbon Dioxide 26 20 - 32 mmol/L    Anion Gap 13 3 - 14 mmol/L    Glucose 75 70 - 99 mg/dL    Urea Nitrogen 12 7 - 30 mg/dL    Creatinine 0.81 0.66 - 1.25 mg/dL    GFR Estimate >90  Non  GFR Calc   >60 mL/min/1.7m2    GFR Estimate If Black >90   GFR Calc   >60 mL/min/1.7m2    Calcium 9.2 8.5 - 10.1 mg/dL    Bilirubin Total 1.7 (H) 0.2 - 1.3 mg/dL    Albumin 4.0 3.4 - 5.0 g/dL    Protein Total 7.1 6.8 - 8.8 g/dL    Alkaline Phosphatase 70 40 - 150 U/L    ALT 73 (H) 0 - 70 U/L    AST 38 0 - 45 U/L   INR   Result Value Ref Range    INR 0.99 0.86 - 1.14   Magnesium   Result Value Ref Range    Magnesium 2.0 1.6 - 2.3 mg/dL       ASSESSMENT/PLAN:         (K63.0) Abscess of sigmoid colon  (primary encounter diagnosis)  Comment: Suspected mainly from CT of abdomen/pelvis. Will undergo colonoscopy at the [facility].  Plan: CBC with platelets and differential,         Comprehensive metabolic panel (BMP + Alb, Alk         Phos, ALT, AST, Total. Bili, TP)            (B18.2) Chronic hepatitis C without hepatic coma (H)  Comment: Followed closely by Gastroenterology. Also due for EGD today.  Plan: Comprehensive metabolic panel (BMP + Alb, Alk         Phos, ALT, AST, Total. Bili, TP), INR            (D69.59) Thrombocytopenia, secondary  Comment: No reports of epistaxis or gum bleeding.  Plan: CBC with platelets and differential            (E83.42) Hypomagnesemia  Comment: One-time only when checked during [facility] ER visit. Due to diarrhea when previously taking antibiotics for condition #1.  Plan: Magnesium            (Z23) Need for prophylactic vaccination with tetanus-diphtheria (TD)  Comment: Updated today.  Plan: TDAP VACCINE (ADACEL)          Follow-up visit if condition worsens.      Moreno Harris MD  [facility]

## 2017-08-06 DIAGNOSIS — K56.699 SIGMOID STRICTURE (H): Primary | ICD-10-CM

## 2017-08-09 LAB — COPATH REPORT: NORMAL

## 2017-08-10 ENCOUNTER — APPOINTMENT (OUTPATIENT)
Dept: GASTROENTEROLOGY | Facility: CLINIC | Age: 57
End: 2017-08-10
Attending: INTERNAL MEDICINE
Payer: COMMERCIAL

## 2017-08-14 ENCOUNTER — MEDICAL CORRESPONDENCE (OUTPATIENT)
Dept: HEALTH INFORMATION MANAGEMENT | Facility: CLINIC | Age: 57
End: 2017-08-14

## 2017-08-22 ENCOUNTER — CARE COORDINATION (OUTPATIENT)
Dept: GASTROENTEROLOGY | Facility: CLINIC | Age: 57
End: 2017-08-22

## 2017-08-22 NOTE — PROGRESS NOTES
8/22/2017  4:30 PM      Hep C Care Coordination Call   Patient was prescreened for the prioritized study for Hepatitis C but did not qualify. Patient updated that he will still receive Hep C treatment but through the clinic and will not be on the study. Patient comfortable with POC. I Epic messaged Dr. Burroughs to update that patient will need a prescription for Hepatitis C. I will update patient on the prescription once ordered by the provider. Patient is aware he can contact me directly at [phone].         Beena Deng RN, BSN, PHN  [facility]   RN Care Coordinator [facility]ty [facility]/Program

## 2017-09-05 ENCOUNTER — OFFICE VISIT (OUTPATIENT)
Dept: SURGERY | Facility: CLINIC | Age: 57
End: 2017-09-05
Payer: COMMERCIAL

## 2017-09-05 VITALS — OXYGEN SATURATION: 97 % | HEART RATE: 64 BPM | SYSTOLIC BLOOD PRESSURE: 114 MMHG | DIASTOLIC BLOOD PRESSURE: 71 MMHG

## 2017-09-05 DIAGNOSIS — Z12.11 SPECIAL SCREENING FOR MALIGNANT NEOPLASMS, COLON: Primary | ICD-10-CM

## 2017-09-05 PROCEDURE — 99214 OFFICE O/P EST MOD 30 MIN: CPT | Performed by: SURGERY

## 2017-09-05 RX ORDER — OXYCODONE HYDROCHLORIDE 5 MG/1
5 TABLET ORAL EVERY 4 HOURS PRN
Qty: 40 TABLET | Refills: 0 | Status: SHIPPED | OUTPATIENT
Start: 2017-09-05 | End: 2017-10-02

## 2017-09-05 RX ORDER — ASPIRIN 81 MG
100 TABLET, DELAYED RELEASE (ENTERIC COATED) ORAL DAILY
Qty: 60 TABLET | Refills: 1 | Status: ON HOLD | OUTPATIENT
Start: 2017-09-05 | End: 2018-09-17

## 2017-09-05 ASSESSMENT — PAIN SCALES - GENERAL: PAINLEVEL: EXTREME PAIN (8)

## 2017-09-05 NOTE — NURSING NOTE
"Chief Complaint   Patient presents with     RECHECK     Pain in abdomen. Limiting daily functions       Initial /71 (BP Location: Right arm, Patient Position: Chair, Cuff Size: Adult Regular)  Pulse 64  SpO2 97% Estimated body mass index is 22.81 kg/(m^2) as calculated from the following:    Height as of 8/4/17: 5' 8\" (1.727 m).    Weight as of 8/4/17: 150 lb (68 kg)..  BP completed using cuff size: regular    Dania Butcher CMA    "

## 2017-09-05 NOTE — PROGRESS NOTES
HPI:  Patient is a 56 year old male with complaints of cramping that started in late February. He was initially seen by me in June and was subsequently hospitalized since he wasn't getting better. He was placed on zosyn and since he wasn't improving he was taken off the zosyn without any change. He was colonoscoped but they were unable to get the scope past the area of inflammation. Biopsy just showed inflammation and some healing tissue as well. No malignancy noted.     Pt reports trying to have a BM seems to make the episodes better; however, he doesn't produce much stool. Cramping is slightly improved. Denies fever or chills. Denies any episodes prior to February. Pain is a 10/10 at worst.     Patient has not had a family history of similar problems.    No major change since last visit except as noted above.      Pt able to walk can easily walk more than a couple flights of stairs.     Oxycodone helped with pain     Review Of Systems     General: negative for fever or chills  Skin: negative for masses or rashes  Ears/Nose/Throat: negative for, epistaxis, bleeding gums  Respiratory: No shortness of breath, dyspnea on exertion, cough, or hemoptysis  Cardiovascular: negative for and chest pain  Gastrointestinal: negative for, nausea, vomiting and abdominal pain  Genitourinary: negative for and frequency  Musculoskeletal: negative  Neurologic: negative for focal weakness  Hematologic/Lymphatic/Immunologic: negative for, bleeding disorder and frequent infections  Endocrine: negative for, diabetes, polydipsia and polyuria     Hepatitis C     Denies      Social History    Social History            Social History     Marital status:        Spouse name: N/A     Number of children: N/A     Years of education: N/A          Occupational History     Not on file.             Social History Main Topics     Smoking status: Current Every Day Smoker     Smokeless tobacco: Never Used     Alcohol use Yes      Drug use: No      "Sexual activity: Yes       Partners: Female           Other Topics Concern     Not on file      Social History Narrative             Current Outpatient Prescriptions           Current Outpatient Prescriptions   Medication Sig Dispense Refill     metroNIDAZOLE (FLAGYL) 500 MG tablet Take 1 tablet (500 mg) by mouth 3 times daily (Patient taking differently: Take 500 mg by mouth 3 times daily For use after finising Metronidazole 250 mg tablets.) 30 tablet 1     ciprofloxacin (CIPRO) 500 MG tablet Take 1 tablet (500 mg) by mouth 2 times daily 20 tablet 1     metroNIDAZOLE (FLAGYL) 250 MG tablet Take 1 tablet (250 mg) by mouth 2 times daily for 10 days (Patient not taking: Reported on 6/1/2017) 20 tablet 1        Medications and history reviewed    Physical exam:  Vitals: /85  Pulse 84  Ht 1.727 m (5' 8\")  Wt 69.4 kg (153 lb)  BMI 23.26 kg/m2  BMI= Body mass index is 23.26 kg/(m^2).     Constitutional: healthy, alert and no distress  Eyes: Pupils round and equal, no icterus   ENT: Mucous membranes moist  Respiratory:  Non-labored respiration  Gastrointestinal: Abdomen soft, mild tenderness. BS normal. No masses, organomegaly  Musculoskeletal: No gross deformity  Neurologic: No gross focal deficits  Psychiatric: mentation appears normal and affect normal/bright  Hematologic/Lymphatic/Immunologic: No bruising noted  Skin: No lesions, rashes, erythema or jaundice noted     Assessment: Sigmoid abscess  Plan: Will get CEA   Will check with Dr. Burroughs to assure patient is healthy enough for elective surgery at [place].     MELD score was 8    Colon surgery, still in pain, given cirrhosis may need surgery down at [place]. No varices at time of EGD. INR within normal limits    The risks benefits and alternatives of surgery were discussed with the patient including but not limited to pain, bleeding, infection, risks of general anesthesia (i.e. MI, CVA, PE, and death), and cosmetic deformity. Additionally we " discussed the risk of colostomy, injury to bowel and major vessels, injury to ureter with potential loss of kidney    Darrell Schneider DO

## 2017-09-05 NOTE — PATIENT INSTRUCTIONS
General Surgery - [facility].    If you have any questions regarding to your visit please contact your care team:     Nicolasa Freeman [facility] [place] [address]   Dr. Edgar Freeman, [place].  Vera Freeman.  Vera Freeman.   Linette Forte [facility]  Roshan Salinas [facility]  Lori Butcher [facility]   Appointment line: [phone]  [facility] Desk: [phone]  Specialty Scheduling (for surgeries only) [phone]     If you need a medication refill please contact your pharmacy.  Please allow 3 business days for your refill to be completed.    As always, Thank you for trusting us with your health care needs!  _____________________________________________________________________

## 2017-09-07 DIAGNOSIS — Z12.11 SPECIAL SCREENING FOR MALIGNANT NEOPLASMS, COLON: ICD-10-CM

## 2017-09-07 LAB — CEA SERPL-MCNC: 2.2 UG/L (ref 0–2.5)

## 2017-09-07 PROCEDURE — 36415 COLL VENOUS BLD VENIPUNCTURE: CPT | Performed by: SURGERY

## 2017-09-07 PROCEDURE — 82378 CARCINOEMBRYONIC ANTIGEN: CPT | Performed by: SURGERY

## 2017-09-25 DIAGNOSIS — B18.2 CHRONIC HEPATITIS C WITHOUT HEPATIC COMA (H): Primary | ICD-10-CM

## 2017-09-26 ENCOUNTER — TELEPHONE (OUTPATIENT)
Dept: SURGERY | Facility: CLINIC | Age: 57
End: 2017-09-26

## 2017-09-26 NOTE — TELEPHONE ENCOUNTER
Reason for Call:  Other returning call    Detailed comments: patient returning the call, please call to discuss further.     Phone Number Patient can be reached at: Home number on file [phone] (home)    Best Time: today,     Can we leave a detailed message on this number? YES    Call taken on 9/26/2017 at 2:59 PM by Latisha Mccullough

## 2017-10-02 ENCOUNTER — CARE COORDINATION (OUTPATIENT)
Dept: GASTROENTEROLOGY | Facility: CLINIC | Age: 57
End: 2017-10-02

## 2017-10-02 ENCOUNTER — OFFICE VISIT (OUTPATIENT)
Dept: FAMILY MEDICINE | Facility: CLINIC | Age: 57
End: 2017-10-02
Payer: COMMERCIAL

## 2017-10-02 VITALS
TEMPERATURE: 97.3 F | OXYGEN SATURATION: 100 % | BODY MASS INDEX: 22.73 KG/M2 | HEIGHT: 68 IN | HEART RATE: 68 BPM | DIASTOLIC BLOOD PRESSURE: 74 MMHG | SYSTOLIC BLOOD PRESSURE: 114 MMHG | WEIGHT: 150 LBS

## 2017-10-02 DIAGNOSIS — Z23 NEED FOR PROPHYLACTIC VACCINATION AND INOCULATION AGAINST INFLUENZA: ICD-10-CM

## 2017-10-02 DIAGNOSIS — B18.2 CHRONIC HEPATITIS C WITHOUT HEPATIC COMA (H): Primary | ICD-10-CM

## 2017-10-02 DIAGNOSIS — B18.2 CHRONIC HEPATITIS C WITHOUT HEPATIC COMA (H): ICD-10-CM

## 2017-10-02 DIAGNOSIS — Z01.818 PREOP GENERAL PHYSICAL EXAM: Primary | ICD-10-CM

## 2017-10-02 DIAGNOSIS — L98.9 FACIAL LESION: ICD-10-CM

## 2017-10-02 LAB
ALBUMIN SERPL-MCNC: 3.5 G/DL (ref 3.4–5)
ALP SERPL-CCNC: 60 U/L (ref 40–150)
ALT SERPL W P-5'-P-CCNC: 43 U/L (ref 0–70)
ANION GAP SERPL CALCULATED.3IONS-SCNC: 7 MMOL/L (ref 3–14)
AST SERPL W P-5'-P-CCNC: 22 U/L (ref 0–45)
BASOPHILS # BLD AUTO: 0.1 10E9/L (ref 0–0.2)
BASOPHILS NFR BLD AUTO: 0.9 %
BILIRUB SERPL-MCNC: 0.8 MG/DL (ref 0.2–1.3)
BUN SERPL-MCNC: 19 MG/DL (ref 7–30)
CALCIUM SERPL-MCNC: 8.7 MG/DL (ref 8.5–10.1)
CHLORIDE SERPL-SCNC: 103 MMOL/L (ref 94–109)
CO2 SERPL-SCNC: 32 MMOL/L (ref 20–32)
CREAT SERPL-MCNC: 0.88 MG/DL (ref 0.66–1.25)
DIFFERENTIAL METHOD BLD: NORMAL
EOSINOPHIL # BLD AUTO: 0.2 10E9/L (ref 0–0.7)
EOSINOPHIL NFR BLD AUTO: 2.3 %
ERYTHROCYTE [DISTWIDTH] IN BLOOD BY AUTOMATED COUNT: 12.5 % (ref 10–15)
GFR SERPL CREATININE-BSD FRML MDRD: 89 ML/MIN/1.7M2
GLUCOSE SERPL-MCNC: 69 MG/DL (ref 70–99)
HCT VFR BLD AUTO: 43.7 % (ref 40–53)
HGB BLD-MCNC: 15.2 G/DL (ref 13.3–17.7)
INR PPP: 1.08 (ref 0.86–1.14)
IRON SATN MFR SERPL: 30 % (ref 15–46)
IRON SERPL-MCNC: 85 UG/DL (ref 35–180)
LYMPHOCYTES # BLD AUTO: 2.6 10E9/L (ref 0.8–5.3)
LYMPHOCYTES NFR BLD AUTO: 33.1 %
MCH RBC QN AUTO: 32.1 PG (ref 26.5–33)
MCHC RBC AUTO-ENTMCNC: 34.8 G/DL (ref 31.5–36.5)
MCV RBC AUTO: 92 FL (ref 78–100)
MONOCYTES # BLD AUTO: 0.7 10E9/L (ref 0–1.3)
MONOCYTES NFR BLD AUTO: 8.6 %
NEUTROPHILS # BLD AUTO: 4.2 10E9/L (ref 1.6–8.3)
NEUTROPHILS NFR BLD AUTO: 55.1 %
PLATELET # BLD AUTO: 159 10E9/L (ref 150–450)
POTASSIUM SERPL-SCNC: 3.9 MMOL/L (ref 3.4–5.3)
PROT SERPL-MCNC: 6.6 G/DL (ref 6.8–8.8)
RBC # BLD AUTO: 4.73 10E12/L (ref 4.4–5.9)
SODIUM SERPL-SCNC: 142 MMOL/L (ref 133–144)
TIBC SERPL-MCNC: 285 UG/DL (ref 240–430)
WBC # BLD AUTO: 7.7 10E9/L (ref 4–11)

## 2017-10-02 PROCEDURE — 87902 NFCT AGT GNTYP ALYS HEP C: CPT | Mod: 90 | Performed by: INTERNAL MEDICINE

## 2017-10-02 PROCEDURE — 83540 ASSAY OF IRON: CPT | Performed by: INTERNAL MEDICINE

## 2017-10-02 PROCEDURE — 86708 HEPATITIS A ANTIBODY: CPT | Performed by: INTERNAL MEDICINE

## 2017-10-02 PROCEDURE — 86704 HEP B CORE ANTIBODY TOTAL: CPT | Performed by: INTERNAL MEDICINE

## 2017-10-02 PROCEDURE — 87900 PHENOTYPE INFECT AGENT DRUG: CPT | Mod: 90 | Performed by: INTERNAL MEDICINE

## 2017-10-02 PROCEDURE — 36415 COLL VENOUS BLD VENIPUNCTURE: CPT | Performed by: INTERNAL MEDICINE

## 2017-10-02 PROCEDURE — 93000 ELECTROCARDIOGRAM COMPLETE: CPT | Performed by: INTERNAL MEDICINE

## 2017-10-02 PROCEDURE — 87340 HEPATITIS B SURFACE AG IA: CPT | Performed by: INTERNAL MEDICINE

## 2017-10-02 PROCEDURE — 99214 OFFICE O/P EST MOD 30 MIN: CPT | Mod: 25 | Performed by: INTERNAL MEDICINE

## 2017-10-02 PROCEDURE — 80053 COMPREHEN METABOLIC PANEL: CPT | Performed by: INTERNAL MEDICINE

## 2017-10-02 PROCEDURE — 99000 SPECIMEN HANDLING OFFICE-LAB: CPT | Performed by: INTERNAL MEDICINE

## 2017-10-02 PROCEDURE — 85025 COMPLETE CBC W/AUTO DIFF WBC: CPT | Performed by: INTERNAL MEDICINE

## 2017-10-02 PROCEDURE — 85610 PROTHROMBIN TIME: CPT | Performed by: INTERNAL MEDICINE

## 2017-10-02 PROCEDURE — 87522 HEPATITIS C REVRS TRNSCRPJ: CPT | Performed by: INTERNAL MEDICINE

## 2017-10-02 PROCEDURE — 90686 IIV4 VACC NO PRSV 0.5 ML IM: CPT | Performed by: INTERNAL MEDICINE

## 2017-10-02 PROCEDURE — 90471 IMMUNIZATION ADMIN: CPT | Performed by: INTERNAL MEDICINE

## 2017-10-02 PROCEDURE — 83550 IRON BINDING TEST: CPT | Performed by: INTERNAL MEDICINE

## 2017-10-02 PROCEDURE — 86706 HEP B SURFACE ANTIBODY: CPT | Performed by: INTERNAL MEDICINE

## 2017-10-02 NOTE — PROGRESS NOTES
[address] [address]  [phone]  Dept: [phone]    PRE-OP EVALUATION:  Today's date: 10/2/2017    Rian Malik (: 1960) presents for pre-operative evaluation assessment as requested by Dr. Schneider.  He requires evaluation and anesthesia risk assessment prior to undergoing surgery/procedure for treatment of colon .  Proposed procedure: laparoscopic      Date of Surgery/ Procedure: 10-11-17  Time of Surgery/ Procedure: 12PM  Hospital/Surgical Facility: [facility]  Fax number for surgical facility:   Primary Physician: Moreno Harris  Type of Anesthesia Anticipated: to be determined    Patient has a Health Care Directive or Living Will:  NO    Preop Questions 2017   1.  Do you have a history of heart attack, stroke, stent, bypass or surgery on an artery in the head, neck, heart or legs? No   2.  Do you ever have any pain or discomfort in your chest? No   3.  Do you have a history of  Heart Failure? No   4.   Are you troubled by shortness of breath when:  walking on a level surface, or up a slight hill, or at night? No   5.  Do you currently have a cold, bronchitis or other respiratory infection? No   6.  Do you have a cough, shortness of breath, or wheezing? No   7.  Do you sometimes get pains in the calves of your legs when you walk? No   8. Do you or anyone in your family have previous history of blood clots? No   9.  Do you or does anyone in your family have a serious bleeding problem such as prolonged bleeding following surgeries or cuts? No   10. Have you ever had problems with anemia or been told to take iron pills? No   11. Have you had any abnormal blood loss such as black, tarry or bloody stools? No   12. Have you ever had a blood transfusion? No   13. Have you or any of your relatives ever had problems with anesthesia? No   14. Do you have sleep apnea, excessive snoring or daytime drowsiness? YES -    15. Do  you have any prosthetic heart valves? No   16. Do you have prosthetic joints? No           HPI:                                                      Brief HPI related to upcoming procedure:           This is a 57 year old male who comes in today for a preoperative evaluation. He has recurring abdominal at right lower quadrant, previously treated with antibiotics for a presumed colitis due to finding of mural thickening and irregularitu of the sigmoid colon mucosa. Colonoscopy last 8/4/17 shows a stricture 40 cm proximal to the anus. Biopsy shows mild chronic inflammation with hyperplasia of the muscularis mucosa and reactive epithelial changes. Autoimmune workup is negative. Due to persistent lower quadrant pain and possible pseudopolyp of the sigmoid colon, the patient is scheduled for laparoscopic resection of the sigmoid colon.          MEDICAL HISTORY:                                                    Patient Active Problem List    Diagnosis Date Noted     Maldonado's esophagus determined by endoscopy 08/04/2017     Priority: Medium     Stricture of sigmoid colon 08/04/2017     Priority: Medium     Abscess of sigmoid colon 05/05/2017     Priority: Medium     Chronic hepatitis C without hepatic coma (H) 05/03/2017     Priority: Medium     Left inguinal pain 05/03/2017     Priority: Medium     Advanced directives, counseling/discussion 05/01/2017     Priority: Medium     Discussed advance care planning with patient; information given to patient to review. May 1, 2017    Jay Cuadra MA           CARDIOVASCULAR SCREENING; LDL GOAL LESS THAN 160 05/01/2017     Priority: Medium     Hepatitis C virus infection 12/19/2014     Priority: Medium      No past medical history on file.  Past Surgical History:   Procedure Laterality Date     COLONOSCOPY N/A 8/4/2017    Procedure: COMBINED COLONOSCOPY, SINGLE OR MULTIPLE BIOPSY/POLYPECTOMY BY BIOPSY;  Colon & EGD;  Surgeon: Cristobal Blanco MD;  Location:  GI     ESOPHAGOSCOPY,  GASTROSCOPY, DUODENOSCOPY (EGD), COMBINED N/A 8/4/2017    Procedure: COMBINED ESOPHAGOSCOPY, GASTROSCOPY, DUODENOSCOPY (EGD), BIOPSY SINGLE OR MULTIPLE;;  Surgeon: Cristobal Blanco MD;  Location:  GI     HERNIA REPAIR  1988     Current Outpatient Prescriptions   Medication Sig Dispense Refill     oxyCODONE (ROXICODONE) 5 MG IR tablet Take 1 tablet (5 mg) by mouth every 4 hours as needed for pain maximum 8 tablet(s) per day 20 tablet 0     oxyCODONE (ROXICODONE) 5 MG IR tablet Take 1 tablet (5 mg) by mouth every 4 hours as needed for moderate to severe pain maximum 8 tablet(s) per day 40 tablet 0     docusate sodium (COLACE) 100 MG tablet Take 100 mg by mouth daily 60 tablet 1     OTC products: NSAIDS    No Known Allergies   Latex Allergy: NO    Social History   Substance Use Topics     Smoking status: Current Every Day Smoker     Packs/day: 0.24     Types: Cigarettes     Smokeless tobacco: Never Used      Comment: down to 3/ day     Alcohol use No     History   Drug Use No       REVIEW OF SYSTEMS:                                                    C: NEGATIVE for fever, chills, change in weight  I: NEGATIVE for worrisome rashes, moles or lesions  E: NEGATIVE for vision changes or irritation  E/M: NEGATIVE for ear, mouth and throat problems  R: NEGATIVE for significant cough or SOB  CV: NEGATIVE for chest pain, palpitations or peripheral edema  GI: NEGATIVE for heartburn or reflux, hematemesis and hematochezia  : NEGATIVE for frequency, dysuria, or hematuria  M: NEGATIVE for significant arthralgias or myalgia  N: NEGATIVE for weakness, dizziness or paresthesias  E: NEGATIVE for temperature intolerance, skin/hair changes  H: NEGATIVE for bleeding problems  P: NEGATIVE for changes in mood or affect    EXAM:                                                    There were no vitals taken for this visit.    GENERAL APPEARANCE: healthy, alert and no distress     EYES: Eyes grossly normal to inspection and conjunctivae and  sclerae normal     HENT: ear canals and TM's normal and nose and mouth without ulcers or lesions     NECK: no adenopathy     RESP: lungs clear to auscultation - no rales, rhonchi or wheezes     CV: regular rates and rhythm, normal S1 S2, no S3 or S4 and no murmur, click or rub     MS: extremities normal- no gross deformities noted, no evidence of inflammation in joints, FROM in all extremities.     SKIN: no suspicious lesions or rashes     NEURO: Normal strength and tone, sensory exam grossly normal, mentation intact and speech normal     PSYCH: mentation appears normal. and affect normal/bright    DIAGNOSTICS:                                                    EKG: appears normal, NSR, unchanged from previous tracings    Recent Labs   Lab Test  08/04/17   0953 07/28/17 07/03/17   1541   HGB  15.7   --   15.9   PLT  172   --   151   INR  0.99  1.2*  1.10   NA  137   --   139   POTASSIUM  4.3  4.4  4.0   CR  0.81  1.05  0.82        IMPRESSION:                                                    Reason for surgery/procedure: Laparoscopic resection of the sigmoid colon.  Diagnosis/reason for consult: Preoperative evaluation.    The proposed surgical procedure is considered INTERMEDIATE risk.    REVISED CARDIAC RISK INDEX  The patient has the following serious cardiovascular risks for perioperative complications such as (MI, PE, VFib and 3  AV Block):  No serious cardiac risks  INTERPRETATION: 0 risks: Class I (very low risk - 0.4% complication rate)    The patient has the following additional risks for perioperative complications:  No identified additional risks      RECOMMENDATIONS:                                                      --Consult hospital rounder / IM to assist post-op medical management    --Patient is to take all scheduled medications on the day of surgery EXCEPT for NSAIDs.    APPROVAL GIVEN to proceed with proposed procedure, without further diagnostic evaluation       Signed Electronically by:  Moreno Harris MD    Copy of this evaluation report is provided to requesting physician.    [place] Preop Guidelines

## 2017-10-02 NOTE — NURSING NOTE
"Chief Complaint   Patient presents with     Pre-Op Exam       Initial /74 (BP Location: Left arm, Patient Position: Chair, Cuff Size: Adult Regular)  Pulse 68  Temp 97.3  F (36.3  C) (Oral)  Ht 5' 8\" (1.727 m)  Wt 150 lb (68 kg)  SpO2 100%  BMI 22.81 kg/m2 Estimated body mass index is 22.81 kg/(m^2) as calculated from the following:    Height as of this encounter: 5' 8\" (1.727 m).    Weight as of this encounter: 150 lb (68 kg).  Medication Reconciliation: complete     Jay Cuadra MA      "

## 2017-10-02 NOTE — MR AVS SNAPSHOT
After Visit Summary   10/2/2017    Rian Malik    MRN: [id number]           Patient Information     Date Of Birth          1960        Visit Information        Provider Department      10/2/2017 7:00 AM Moreno Harris MD [facility]        Today's Diagnoses     Preop general physical exam    -  1    Need for prophylactic vaccination and inoculation against influenza        Facial lesion          Care Instructions      Before Your Surgery      Call your surgeon if there is any change in your health. This includes signs of a cold or flu (such as a sore throat, runny nose, cough, rash or fever).    Do not smoke, drink alcohol or take over the counter medicine (unless your surgeon or primary care doctor tells you to) for the 24 hours before and after surgery.    If you take prescribed drugs: Follow your doctor s orders about which medicines to take and which to stop until after surgery.    Eating and drinking prior to surgery: follow the instructions from your surgeon    Take a shower or bath the night before surgery. Use the soap your surgeon gave you to gently clean your skin. If you do not have soap from your surgeon, use your regular soap. Do not shave or scrub the surgery site.  Wear clean pajamas and have clean sheets on your bed.           Follow-ups after your visit        Additional Services     DERMATOLOGY REFERRAL       Your provider has referred you to: [facility]: [facility] [phone]   [email or web address]    Please be aware that coverage of these services is subject to the terms and limitations of your health insurance plan.  Call member services at your health plan with any benefit or coverage questions.      Please bring the following with you to your appointment:    (1) Any X-Rays, CTs or MRIs which have been performed.  Contact the facility where they were done to arrange for   prior to your scheduled appointment.    (2) List of current medications  (3) This referral request   (4) Any documents/labs given to you for this referral                  Your next 10 appointments already scheduled     Oct 26, 2017 12:30 PM CDT   Post Op with Darrell Schneider DO   [facility] ([facility])    [address]  [place] [address]   [phone]            Nov 06, 2017 12:45 PM CST   Lab with  LAB   [facility] ([facility])    [address]  1st Floor  [place] [address] [phone]            Nov 06, 2017  1:45 PM CST   (Arrive by 1:30 PM)   Return General Liver with Yaakov Burroughs MD   [facility] Hepatology ([facility])    [address]  3rd Floor  [place] [id number] [phone]              Future tests that were ordered for you today     Open Future Orders        Priority Expected Expires Ordered    DERMATOLOGY REFERRAL Routine  10/2/2018 10/2/2017            Who to contact     If you have questions or need follow up information about today's clinic visit or your schedule please contact [facility] directly at [phone].  Normal or non-critical lab and imaging results will be communicated to you by [employer], letter or phone within 4 business days after the clinic has received the results. If you do not hear from us within 7 days, please contact the clinic through [employer] or phone. If you have a critical or abnormal lab result, we will notify you by phone as soon as possible.  Submit refill requests through [employer] or call your pharmacy and they will forward the refill request to us. Please allow 3 business days for your refill to be completed.          Additional Information About Your Visit        [employer] Information     [employer] gives you secure access to your electronic health record. If you see a primary care provider, you can also send messages to  "your care team and make appointments. If you have questions, please call your primary care clinic.  If you do not have a primary care provider, please call [phone] and they will assist you.        Care EveryWhere ID     This is your Care EveryWhere ID. This could be used by other organizations to access your [facility] medical records  [id number]        Your Vitals Were     Pulse Temperature Height Pulse Oximetry BMI (Body Mass Index)       68 97.3  F (36.3  C) (Oral) 5' 8\" (1.727 m) 100% 22.81 kg/m2        Blood Pressure from Last 3 Encounters:   10/02/17 114/74   09/05/17 114/71   08/04/17 131/75    Weight from Last 3 Encounters:   10/02/17 150 lb (68 kg)   08/04/17 150 lb (68 kg)   08/04/17 150 lb (68 kg)              We Performed the Following     CBC with platelets and differential     Comprehensive metabolic panel (BMP + Alb, Alk Phos, ALT, AST, Total. Bili, TP)     EKG 12-lead complete w/read - Clinics     HC FLU VAC PRESRV FREE QUAD SPLIT VIR 3+YRS IM     INR        Primary Care Provider Office Phone # Fax #    Moreno Harris [phone]       [address]  [facility] [address]        Equal Access to Services     KHANH FLANAGAN : Hadii aad ku hadasho Soomaali, waaxda luqadaha, qaybta kaalmada adeegyada, waxay idiin haysheryln tamiko leon . So [facility] [phone].    ATENCIÓN: Si habla español, tiene a martino disposición servicios gratuitos de asistencia lingüística. Llame al [phone].    We comply with applicable federal civil rights laws and [place] laws. We do not discriminate on the basis of race, color, national origin, age, disability, sex, sexual orientation, or gender identity.            Thank you!     Thank you for choosing [facility]  for your care. Our goal is always to provide you with excellent care. Hearing back from our patients is one way we can continue to improve our services. Please take a few minutes to complete the written survey " that you may receive in the mail after your visit with us. Thank you!             Your Updated Medication List - Protect others around you: Learn how to safely use, store and throw away your medicines at www.disposemymeds.org.          This list is accurate as of: 10/2/17  7:26 AM.  Always use your most recent med list.                   Brand Name Dispense Instructions for use Diagnosis    docusate sodium 100 MG tablet    COLACE    60 tablet    Take 100 mg by mouth daily    Special screening for malignant neoplasms, colon       oxyCODONE 5 MG IR tablet    ROXICODONE    20 tablet    Take 1 tablet (5 mg) by mouth every 4 hours as needed for pain maximum 8 tablet(s) per day    Pseudopolyp of sigmoid colon with abscess (H)

## 2017-10-02 NOTE — NURSING NOTE
Injectable Influenza Immunization Documentation    1.  Are you sick today? (Fever of 100.5 or higher on the day of the clinic)   No    2.  Have you ever had Guillain-Henderson Syndrome within 6 weeks of an influenza vaccionation?  No    3. Do you have a life-threatening allergy to eggs?  No    4. Do you have a life-threatening allergy to a component of the vaccine? May include antibiotics, gelatin or latex.  No     5. Have you ever had a reaction to a dose of flu vaccine that needed immediate medical attention?  No     Form completed by Jay Cuadra MA

## 2017-10-02 NOTE — PROGRESS NOTES
10/2/2017  3:35 PM      Hep C Care Coordination Call   Connected with patient for follow up on Hep C treatment questions. Research coordinator Koko states that patient did not qualify for the Hep C prioritized study and will need to be treated via the clinic. I updated patient and provider Dr. Burroughs. Provider would like to prescribe Zepatier x12 weeks which is on formulary for patient insurance. Patient will need an NS5A testing done prior to prescription order. Lab orders entered per protocol. Patient does have a follow up appt 11/6 with Dr. Burroughs. Patient would like to have the prescription sent soon as he has met his deductible for the year and would like to be approved before the start of next year. Patient is aware insurance approval can take up to 30 business days; sometimes longer. Patient has no further questions or concerns and is aware he can contact this RN CC directly at [phone].         Beena Deng RN, BSN, PHN  [facility]   RN Care Coordinator Hepatology Specialty Clinic/Program

## 2017-10-03 LAB
HAV IGG SER QL IA: REACTIVE
HBV CORE AB SERPL QL IA: NONREACTIVE
HBV SURFACE AB SERPL IA-ACNC: 0.46 M[IU]/ML
HBV SURFACE AG SERPL QL IA: NONREACTIVE

## 2017-10-05 LAB
HCV RNA SERPL NAA+PROBE-ACNC: ABNORMAL [IU]/ML
HCV RNA SERPL NAA+PROBE-LOG IU: 6.5 LOG IU/ML

## 2017-10-11 ENCOUNTER — TRANSFERRED RECORDS (OUTPATIENT)
Dept: HEALTH INFORMATION MANAGEMENT | Facility: CLINIC | Age: 57
End: 2017-10-11

## 2017-10-13 ENCOUNTER — NURSE TRIAGE (OUTPATIENT)
Dept: NURSING | Facility: CLINIC | Age: 57
End: 2017-10-13

## 2017-10-13 NOTE — TELEPHONE ENCOUNTER
"  Additional Information    Negative: [1] Caller is not with the adult (patient) AND [2] reporting urgent symptoms    Negative: Lab result questions    Negative: Medication questions    Negative: Caller cannot be reached by phone    Negative: Caller has already spoken to PCP or another triager    Negative: RN needs further essential information from caller in order to complete triage    Negative: Requesting regular office appointment    Negative: [1] Caller requesting NON-URGENT health information AND [2] PCP's office is the best resource    Negative: Health Information question, no triage required and triager able to answer question    General information question, no triage required and triager able to answer question     Caller is from the [facility]. I have another MD calling here to speak with Dr. Schneider(surgeon). I do not know how to reach him.\" I gave caller number for [facility] .    Protocols used: INFORMATION ONLY CALL-ADULT-    "

## 2017-10-16 ENCOUNTER — TRANSFERRED RECORDS (OUTPATIENT)
Dept: HEALTH INFORMATION MANAGEMENT | Facility: CLINIC | Age: 57
End: 2017-10-16

## 2017-10-16 DIAGNOSIS — B18.2 CHRONIC HEPATITIS C WITHOUT HEPATIC COMA (H): Primary | ICD-10-CM

## 2017-10-16 NOTE — PROGRESS NOTES
10/16/2017  10:25 AM      Hep C Care Coordination Call   NS5A results available. Prescription for Zepatier x12 weeks ordered per Dr. Burroughs recommendations. Patient is aware that insurance approval can take 30-60 days. Patient is aware he can contact this RN CC directly at [phone] with any further questions or concerns.         Beena Deng RN, BSN, PHN  [facility]   RN Care Coordinator Hepatology Specialty Clinic/Program

## 2017-10-17 ENCOUNTER — TRANSFERRED RECORDS (OUTPATIENT)
Dept: HEALTH INFORMATION MANAGEMENT | Facility: CLINIC | Age: 57
End: 2017-10-17

## 2017-10-18 ENCOUNTER — TRANSFERRED RECORDS (OUTPATIENT)
Dept: HEALTH INFORMATION MANAGEMENT | Facility: CLINIC | Age: 57
End: 2017-10-18

## 2017-10-18 DIAGNOSIS — C18.7 MALIGNANT NEOPLASM OF SIGMOID COLON (H): Primary | ICD-10-CM

## 2017-10-19 ENCOUNTER — TELEPHONE (OUTPATIENT)
Dept: GASTROENTEROLOGY | Facility: CLINIC | Age: 57
End: 2017-10-19

## 2017-10-20 ENCOUNTER — DOCUMENTATION ONLY (OUTPATIENT)
Dept: LAB | Facility: CLINIC | Age: 57
End: 2017-10-20

## 2017-10-20 DIAGNOSIS — E87.8 ELECTROLYTE ABNORMALITY: ICD-10-CM

## 2017-10-20 DIAGNOSIS — D64.9 POSTOPERATIVE ANEMIA: Primary | ICD-10-CM

## 2017-10-20 NOTE — PROGRESS NOTES
Patient has lab appointment Today 10.20.17 at 07:15 am.  Need orders ASAP for hospital follow up lab.  Thank you!

## 2017-10-25 ENCOUNTER — OFFICE VISIT (OUTPATIENT)
Dept: FAMILY MEDICINE | Facility: CLINIC | Age: 57
End: 2017-10-25
Payer: COMMERCIAL

## 2017-10-25 VITALS
DIASTOLIC BLOOD PRESSURE: 70 MMHG | TEMPERATURE: 96.5 F | SYSTOLIC BLOOD PRESSURE: 113 MMHG | BODY MASS INDEX: 21.22 KG/M2 | WEIGHT: 140 LBS | HEART RATE: 79 BPM | OXYGEN SATURATION: 100 % | HEIGHT: 68 IN

## 2017-10-25 DIAGNOSIS — C18.7 ADENOCARCINOMA OF SIGMOID COLON (H): Primary | ICD-10-CM

## 2017-10-25 DIAGNOSIS — B18.2 CHRONIC HEPATITIS C WITHOUT HEPATIC COMA (H): ICD-10-CM

## 2017-10-25 DIAGNOSIS — D64.9 POSTOPERATIVE ANEMIA: ICD-10-CM

## 2017-10-25 PROBLEM — K56.699 STRICTURE OF SIGMOID COLON (H): Status: RESOLVED | Noted: 2017-08-04 | Resolved: 2017-10-25

## 2017-10-25 LAB
ALBUMIN SERPL-MCNC: 3.2 G/DL (ref 3.4–5)
ALP SERPL-CCNC: 87 U/L (ref 40–150)
ALT SERPL W P-5'-P-CCNC: 68 U/L (ref 0–70)
ANION GAP SERPL CALCULATED.3IONS-SCNC: 4 MMOL/L (ref 3–14)
AST SERPL W P-5'-P-CCNC: 43 U/L (ref 0–45)
BASOPHILS # BLD AUTO: 0.1 10E9/L (ref 0–0.2)
BASOPHILS NFR BLD AUTO: 1.3 %
BILIRUB SERPL-MCNC: 0.8 MG/DL (ref 0.2–1.3)
BUN SERPL-MCNC: 20 MG/DL (ref 7–30)
CALCIUM SERPL-MCNC: 8.7 MG/DL (ref 8.5–10.1)
CHLORIDE SERPL-SCNC: 100 MMOL/L (ref 94–109)
CO2 SERPL-SCNC: 32 MMOL/L (ref 20–32)
CREAT SERPL-MCNC: 0.9 MG/DL (ref 0.66–1.25)
DIFFERENTIAL METHOD BLD: ABNORMAL
EOSINOPHIL # BLD AUTO: 0.2 10E9/L (ref 0–0.7)
EOSINOPHIL NFR BLD AUTO: 2.2 %
ERYTHROCYTE [DISTWIDTH] IN BLOOD BY AUTOMATED COUNT: 14.6 % (ref 10–15)
GFR SERPL CREATININE-BSD FRML MDRD: 87 ML/MIN/1.7M2
GLUCOSE SERPL-MCNC: 116 MG/DL (ref 70–99)
HCT VFR BLD AUTO: 31.5 % (ref 40–53)
HGB BLD-MCNC: 10.4 G/DL (ref 13.3–17.7)
LYMPHOCYTES # BLD AUTO: 1.6 10E9/L (ref 0.8–5.3)
LYMPHOCYTES NFR BLD AUTO: 19.4 %
MCH RBC QN AUTO: 32.3 PG (ref 26.5–33)
MCHC RBC AUTO-ENTMCNC: 33 G/DL (ref 31.5–36.5)
MCV RBC AUTO: 98 FL (ref 78–100)
MONOCYTES # BLD AUTO: 0.7 10E9/L (ref 0–1.3)
MONOCYTES NFR BLD AUTO: 7.9 %
NEUTROPHILS # BLD AUTO: 5.8 10E9/L (ref 1.6–8.3)
NEUTROPHILS NFR BLD AUTO: 69.2 %
PLATELET # BLD AUTO: 435 10E9/L (ref 150–450)
POTASSIUM SERPL-SCNC: 4.4 MMOL/L (ref 3.4–5.3)
PROT SERPL-MCNC: 6.8 G/DL (ref 6.8–8.8)
RBC # BLD AUTO: 3.22 10E12/L (ref 4.4–5.9)
SODIUM SERPL-SCNC: 136 MMOL/L (ref 133–144)
WBC # BLD AUTO: 8.4 10E9/L (ref 4–11)

## 2017-10-25 PROCEDURE — 80053 COMPREHEN METABOLIC PANEL: CPT | Performed by: INTERNAL MEDICINE

## 2017-10-25 PROCEDURE — 36415 COLL VENOUS BLD VENIPUNCTURE: CPT | Performed by: INTERNAL MEDICINE

## 2017-10-25 PROCEDURE — 85025 COMPLETE CBC W/AUTO DIFF WBC: CPT | Performed by: INTERNAL MEDICINE

## 2017-10-25 PROCEDURE — 99214 OFFICE O/P EST MOD 30 MIN: CPT | Performed by: INTERNAL MEDICINE

## 2017-10-25 NOTE — PROGRESS NOTES
SUBJECTIVE:   Rian Malik is a 57 year old male who presents to clinic today for the following health issues:        Hospital Follow-up Visit:    Hospital/Nursing Home/ Rehab Facility: [facility]  Date of Admission: 10-11-17  Date of Discharge: 10-18-17  Reason(s) for Admission: Adenocarcinoma of sigmoid colon, S/P open sigmoidectomy with end colostomy (converted from laparoscopy), S/P left ureteral stent placement, postoperative pelvic hematoma, moderate anemia.        -patient denies any abdominal pain, abdominal distention or loose stools       Secondary problems: Chronic Hepatitis C without coma                                          -worsening liver enzymes.(ALT 4x above limit, AST 5x above limit).                                               Problems taking medications regularly:  None       Medication changes since discharge: None       Problems adhering to non-medication therapy:  None    Summary of hospitalization:  CareEverywhere information obtained and reviewed  Diagnostic Tests/Treatments reviewed.  Follow up needed: YES  Other Healthcare Providers Involved in Patient s Care:         Care Coordination  Update since discharge: improved.     Post Discharge Medication Reconciliation: discharge medications reconciled, continue medications without change.  Plan of care communicated with patient     Coding guidelines for this visit:  Type of Medical   Decision Making Face-to-Face Visit       within 7 Days of discharge Face-to-Face Visit        within 14 days of discharge   Moderate Complexity [id number]   High Complexity [id number]          Problem list and histories reviewed & adjusted, as indicated.  Additional history: as documented    Patient Active Problem List   Diagnosis     Hepatitis C virus infection     Advanced directives, counseling/discussion     CARDIOVASCULAR SCREENING; LDL GOAL LESS THAN 160     Chronic hepatitis C without hepatic coma (H)     Left inguinal pain      Abscess of sigmoid colon     Maldonado's esophagus determined by endoscopy     Adenocarcinoma of sigmoid colon (H)     Past Surgical History:   Procedure Laterality Date     COLONOSCOPY N/A 8/4/2017    Procedure: COMBINED COLONOSCOPY, SINGLE OR MULTIPLE BIOPSY/POLYPECTOMY BY BIOPSY;  Colon & EGD;  Surgeon: Cristobal Blanco MD;  Location:  GI     ESOPHAGOSCOPY, GASTROSCOPY, DUODENOSCOPY (EGD), COMBINED N/A 8/4/2017    Procedure: COMBINED ESOPHAGOSCOPY, GASTROSCOPY, DUODENOSCOPY (EGD), BIOPSY SINGLE OR MULTIPLE;;  Surgeon: Cristobal Blanco MD;  Location:  GI     HERNIA REPAIR  1988       Social History   Substance Use Topics     Smoking status: Current Every Day Smoker     Packs/day: 0.24     Types: Cigarettes     Smokeless tobacco: Never Used      Comment: down to 3/ day     Alcohol use No     History reviewed. No pertinent family history.      Current Outpatient Prescriptions   Medication Sig Dispense Refill     elbasvir-grazoprevir (ZEPATIER)  MG TABS per tablet Take 1 tablet by mouth daily 28 tablet 2     oxyCODONE (ROXICODONE) 5 MG IR tablet Take 1 tablet (5 mg) by mouth every 4 hours as needed for pain maximum 8 tablet(s) per day 20 tablet 0     docusate sodium (COLACE) 100 MG tablet Take 100 mg by mouth daily 60 tablet 1     No Known Allergies  Recent Labs   Lab Test  10/02/17   0737  08/04/17   0953   07/03/17   1541   05/01/17   0831   LDL   --    --    --    --    --   43   HDL   --    --    --    --    --   52   TRIG   --    --    --    --    --   41   ALT  43  73*   --   87*   < >  70   CR  0.88  0.81   < >  0.82   < >  0.95   GFRESTIMATED  89  >90  Non  GFR Calc     < >  >90  Non  GFR Calc     < >  81   GFRESTBLACK  >90  >90  African American GFR Calc     < >  >90   GFR Calc     < >  >90   GFR Calc     POTASSIUM  3.9  4.3   < >  4.0   < >  4.0    < > = values in this interval not displayed.      BP Readings from Last 3 Encounters:  "  10/25/17 113/70   10/02/17 114/74   09/05/17 114/71    Wt Readings from Last 3 Encounters:   10/25/17 140 lb (63.5 kg)   10/02/17 150 lb (68 kg)   08/04/17 150 lb (68 kg)            ROS:  C: NEGATIVE for fever, chills, change in weight  I: NEGATIVE for worrisome rashes, moles or lesions  E: NEGATIVE for vision changes or irritation  E/M: NEGATIVE for ear, mouth and throat problems  R: NEGATIVE for significant cough or SOB  CV: NEGATIVE for chest pain, palpitations or peripheral edema  GI: NEGATIVE for hematemesis, hematochezia and melena  : NEGATIVE for frequency, dysuria, or hematuria  M: NEGATIVE for significant arthralgias or myalgia  N: NEGATIVE for weakness, dizziness or paresthesias  E: NEGATIVE for temperature intolerance, skin/hair changes  H: NEGATIVE for bleeding problems  P: NEGATIVE for changes in mood or affect    OBJECTIVE:     /70 (BP Location: Left arm, Patient Position: Chair, Cuff Size: Adult Regular)  Pulse 79  Temp 96.5  F (35.8  C) (Oral)  Ht 5' 8\" (1.727 m)  Wt 140 lb (63.5 kg)  SpO2 100%  BMI 21.29 kg/m2  Body mass index is 21.29 kg/(m^2).  GENERAL: alert, fatigued and appears older than stated age  EYES: Eyes grossly normal to inspection and conjunctiva/corneas- icteric sclerae.  HENT: normal cephalic/atraumatic and oral mucous membranes moist  NECK: no adenopathy  RESP: lungs clear to auscultation - no rales, rhonchi or wheezes  CV: regular rate and rhythm, normal S1 S2, no S3 or S4, no murmur, click or rub, no peripheral edema and peripheral pulses strong  ABDOMEN: soft, nontender, colostomy at left lower quadrant and bowel sounds normal  MS: no gross musculoskeletal defects noted, no edema  SKIN: no suspicious lesions or rashes  NEURO: Normal strength and tone, mentation intact and speech normal  PSYCH: mentation appears normal, affect normal/bright    Diagnostic Test Results:  Results for orders placed or performed in visit on 10/25/17 (from the past 24 hour(s)) "   Comprehensive metabolic panel (BMP + Alb, Alk Phos, ALT, AST, Total. Bili, TP)   Result Value Ref Range    Sodium 136 133 - 144 mmol/L    Potassium 4.4 3.4 - 5.3 mmol/L    Chloride 100 94 - 109 mmol/L    Carbon Dioxide 32 20 - 32 mmol/L    Anion Gap 4 3 - 14 mmol/L    Glucose 116 (H) 70 - 99 mg/dL    Urea Nitrogen 20 7 - 30 mg/dL    Creatinine 0.90 0.66 - 1.25 mg/dL    GFR Estimate 87 >60 mL/min/1.7m2    GFR Estimate If Black >90 >60 mL/min/1.7m2    Calcium 8.7 8.5 - 10.1 mg/dL    Bilirubin Total 0.8 0.2 - 1.3 mg/dL    Albumin 3.2 (L) 3.4 - 5.0 g/dL    Protein Total 6.8 6.8 - 8.8 g/dL    Alkaline Phosphatase 87 40 - 150 U/L    ALT 68 0 - 70 U/L    AST 43 0 - 45 U/L   CBC with platelets and differential   Result Value Ref Range    WBC 8.4 4.0 - 11.0 10e9/L    RBC Count 3.22 (L) 4.4 - 5.9 10e12/L    Hemoglobin 10.4 (L) 13.3 - 17.7 g/dL    Hematocrit 31.5 (L) 40.0 - 53.0 %    MCV 98 78 - 100 fl    MCH 32.3 26.5 - 33.0 pg    MCHC 33.0 31.5 - 36.5 g/dL    RDW 14.6 10.0 - 15.0 %    Platelet Count 435 150 - 450 10e9/L    Diff Method Automated Method     % Neutrophils 69.2 %    % Lymphocytes 19.4 %    % Monocytes 7.9 %    % Eosinophils 2.2 %    % Basophils 1.3 %    Absolute Neutrophil 5.8 1.6 - 8.3 10e9/L    Absolute Lymphocytes 1.6 0.8 - 5.3 10e9/L    Absolute Monocytes 0.7 0.0 - 1.3 10e9/L    Absolute Eosinophils 0.2 0.0 - 0.7 10e9/L    Absolute Basophils 0.1 0.0 - 0.2 10e9/L       ASSESSMENT/PLAN:         (C18.7) Adenocarcinoma of sigmoid colon (H)  (primary encounter diagnosis)  Comment: S/P sigmoid colon resection with colostomy.  Plan: Comprehensive metabolic panel (BMP + Alb, Alk         Phos, ALT, AST, Total. Bili, TP), CBC with         platelets and differential            (D64.9) Postoperative anemia  Comment: Patient developed hematoma postoperatively. Hemoglobin improved; no additional tests necessary.  Plan: CBC with platelets and differential            (B18.2) Chronic hepatitis C without hepatic coma  (H)  Comment: Liver panel improved. His mildly elevated liver enzymes after his sigmoid colon resection  may be attributed to lower blood pressures postoperatively.  Plan: Comprehensive metabolic panel (BMP + Alb, Alk         Phos, ALT, AST, Total. Bili, TP)            Follow-up visit if condition worsens.      Moreno Harris MD  [facility]

## 2017-10-25 NOTE — MR AVS SNAPSHOT
After Visit Summary   10/25/2017    Rian Malik    MRN: [id number]           Patient Information     Date Of Birth          1960        Visit Information        Provider Department      10/25/2017 7:00 AM Moreno Harris MD [facility]        Today's Diagnoses     Adenocarcinoma of sigmoid colon (H)    -  1    Postoperative anemia        Chronic hepatitis C without hepatic coma (H)          Care Instructions    At [facility], we strive to deliver an exceptional experience to you, every time we see you.  If you receive a survey in the mail, please send us back your thoughts. We really do value your feedback.    Based on your medical history, these are the current health maintenance/preventive care services that you are due for (some may have been done at this visit.)  There are no preventive care reminders to display for this patient.      Suggested websites for health information:  Www.[email or web address].[address] : Up to date and easily searchable information on multiple topics.  Www.[employer] [facility] [place].gov : medication info, interactive tutorials, watch real surgeries online  Www.familydoctor.org : good info from the Academy of Family Physicians  Www.cdc.gov : public health info, travel advisories, epidemics (H1N1)  Www.aap.org : children's health info, normal development, vaccinations  Www.health.[facility].[place].us : [place] dept of health, public health issues in [place], N1N1    Your care team:                            Family Medicine Internal Medicine   MD Moreno Liu MD Shantel Branch-Fleming, MD Katya Georgiev PA-C Nam Ho, MD Pediatrics   ALTHEA De La Rosa, JAN CORRALES CNP   MD Rosa Randolph MD Deborah Mielke, MD Kim Thein, APRN CNP      Clinic hours: Monday - Thursday 7 am-7 pm; Fridays 7 am-5 pm.   Urgent care: Monday - Friday 11 am-9 pm; Saturday and Sunday 9 am-5 pm.  Pharmacy : Monday -Thursday 8  am-8 pm; Friday 8 am-6 pm; Saturday and Sunday 9 am-5 pm.     Clinic: [phone]   Pharmacy: [phone]            Follow-ups after your visit        Your next 10 appointments already scheduled     Oct 26, 2017 12:30 PM CDT   Post Op with Darrell Schneider DO   [facility] ([facility]    [address] [address] [phone]            Nov 06, 2017 12:45 PM CST   Lab with  LAB   [facility] ([facility])    [address]  1st Floor  [facility] [address] [phone]            Nov 06, 2017  1:45 PM CST   (Arrive by 1:30 PM)   Return General Liver with Yaakov Burroughs MD   [facility]at[facility] ([facility])    [address]  3rd Floor  [facility] [address] [phone]              Who to contact     If you have questions or need follow up information about today's clinic visit or your schedule please contact [facility] directly at [phone].  Normal or non-critical lab and imaging results will be communicated to you by MyChart, letter or phone within 4 business days after the clinic has received the results. If you do not hear from us within 7 days, please contact the clinic through [employer] or phone. If you have a critical or abnormal lab result, we will notify you by phone as soon as possible.  Submit refill requests through [employer] or call your pharmacy and they will forward the refill request to us. Please allow 3 business days for your refill to be completed.          Additional Information About Your Visit        [employer] Information     [employer] gives you secure access to your electronic health record. If you see a primary care provider, you can also send messages to your care team and make appointments. If you have questions, please call your primary care clinic.  If you do not have a primary care provider, please call [phone] and they will assist  "you.        Care EveryWhere ID     This is your Care EveryWhere ID. This could be used by other organizations to access your [place] medical records  [id number]        Your Vitals Were     Pulse Temperature Height Pulse Oximetry BMI (Body Mass Index)       79 96.5  F (35.8  C) (Oral) 5' 8\" (1.727 m) 100% 21.29 kg/m2        Blood Pressure from Last 3 Encounters:   10/25/17 113/70   10/02/17 114/74   09/05/17 114/71    Weight from Last 3 Encounters:   10/25/17 140 lb (63.5 kg)   10/02/17 150 lb (68 kg)   08/04/17 150 lb (68 kg)              We Performed the Following     CBC with platelets and differential     Comprehensive metabolic panel (BMP + Alb, Alk Phos, ALT, AST, Total. Bili, TP)        Primary Care Provider Office Phone # Fax #    Moreno Harris [phone]       [address]  [facility] [address]        Equal Access to Services     [facility]: Hadii aad ku hadasho Soomaali, waaxda luqadaha, qaybta kaalmada adeegyada, waxay rosey leon . So [facility] [phone].    ATENCIÓN: Si habla español, tiene a martino disposición servicios gratuitos de asistencia lingüística. [facility] [phone].    We comply with applicable federal civil rights laws and [place] laws. We do not discriminate on the basis of race, color, national origin, age, disability, sex, sexual orientation, or gender identity.            Thank you!     Thank you for choosing [facility]  for your care. Our goal is always to provide you with excellent care. Hearing back from our patients is one way we can continue to improve our services. Please take a few minutes to complete the written survey that you may receive in the mail after your visit with us. Thank you!             Your Updated Medication List - Protect others around you: Learn how to safely use, store and throw away your medicines at www.disposemymeds.org.          This list is accurate as of: 10/25/17  8:02 AM.  " Always use your most recent med list.                   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

## 2017-10-25 NOTE — NURSING NOTE
"Chief Complaint   Patient presents with     Surgical Followup     [facility]       Initial /70 (BP Location: Left arm, Patient Position: Chair, Cuff Size: Adult Regular)  Pulse 79  Temp 96.5  F (35.8  C) (Oral)  Ht 5' 8\" (1.727 m)  Wt 140 lb (63.5 kg)  SpO2 100%  BMI 21.29 kg/m2 Estimated body mass index is 21.29 kg/(m^2) as calculated from the following:    Height as of this encounter: 5' 8\" (1.727 m).    Weight as of this encounter: 140 lb (63.5 kg).  Medication Reconciliation: complete     Jay Cuadra MA      "

## 2017-10-25 NOTE — PATIENT INSTRUCTIONS
At [facility], we strive to deliver an exceptional experience to you, every time we see you.  If you receive a survey in the mail, please send us back your thoughts. We really do value your feedback.    Based on your medical history, these are the current health maintenance/preventive care services that you are due for (some may have been done at this visit.)  There are no preventive care reminders to display for this patient.      Suggested websites for health information:  Www.[place].org : Up to date and easily searchable information on multiple topics.  Www.medlineplus.gov : medication info, interactive tutorials, watch real surgeries online  Www.familydoctor.org : good info from the Academy of Family Physicians  Www.cdc.gov : public health info, travel advisories, epidemics (H1N1)  Www.aap.org : children's health info, normal development, vaccinations  Www.health.[facility].us : [place] dept of health, public health issues in [place], N1N1    Your care team:                            Family Medicine Internal Medicine   MD Moreno Liu MD Shantel Branch-Fleming, MD Katya Georgiev PA-C Nam Ho, MD Pediatrics   ALTHEA De La Rosa, JAN Hawkins APRKAYLEY CNP   MD Rosa Randolph MD Deborah Mielke, MD Kim Thein, APRN CNP      Clinic hours: Monday - Thursday 7 am-7 pm; Fridays 7 am-5 pm.   Urgent care: Monday - Friday 11 am-9 pm; Saturday and Sunday 9 am-5 pm.  Pharmacy : Monday -Thursday 8 am-8 pm; Friday 8 am-6 pm; Saturday and Sunday 9 am-5 pm.     Clinic: [phone]   Pharmacy: [phone]

## 2017-10-26 ENCOUNTER — OFFICE VISIT (OUTPATIENT)
Dept: SURGERY | Facility: CLINIC | Age: 57
End: 2017-10-26
Payer: COMMERCIAL

## 2017-10-26 ENCOUNTER — TELEPHONE (OUTPATIENT)
Dept: SURGERY | Facility: CLINIC | Age: 57
End: 2017-10-26

## 2017-10-26 VITALS
WEIGHT: 143 LBS | SYSTOLIC BLOOD PRESSURE: 120 MMHG | BODY MASS INDEX: 21.67 KG/M2 | DIASTOLIC BLOOD PRESSURE: 77 MMHG | HEART RATE: 80 BPM | HEIGHT: 68 IN

## 2017-10-26 DIAGNOSIS — C18.5 MALIGNANT NEOPLASM OF SPLENIC FLEXURE (H): Primary | ICD-10-CM

## 2017-10-26 PROCEDURE — 99024 POSTOP FOLLOW-UP VISIT: CPT | Performed by: SURGERY

## 2017-10-26 NOTE — MR AVS SNAPSHOT
After Visit Summary   10/26/2017    Rian Malik    MRN: [id number]           Patient Information     Date Of Birth          1960        Visit Information        Provider Department      10/26/2017 12:30 PM Darrell Schneider DO [facility]        Today's Diagnoses     Malignant neoplasm of splenic flexure (H)    -  1       Follow-ups after your visit        Additional Services     ONC/HEME ADULT REFERRAL       Your provider has referred you to: [facility]: Cancer Care/Hematology (All Cancer Related Services) - [place] [phone]   [email or web address]    Please be aware that coverage of these services is subject to the terms and limitations of your health insurance plan.  Call member services at your health plan with any benefit or coverage questions.      Please bring the following with you to your appointment:    (1) Any X-Rays, CTs or MRIs which have been performed.  Contact the facility where they were done to arrange for  prior to your scheduled appointment.   (2) List of current medications  (3) This referral request   (4) Any documents/labs given to you for this referral                  Your next 10 appointments already scheduled     Nov 06, 2017 12:45 PM CST   Lab with  LAB   [facility] ([facility])    [address]  1st Floor  [facility] [address] [phone]            Nov 06, 2017  1:45 PM CST   (Arrive by 1:30 PM)   Return General Liver with Yaakov Burroughs MD   [facility]at[facility] ([facility])    [address] [place] [address]  3rd Floor  [facility] [id number] [phone]              Who to contact     If you have questions or need follow up information about today's clinic visit or your schedule please contact [facility] directly at [phone].  Normal or non-critical lab and imaging results will be communicated to you by Zuri  "letter or phone within 4 business days after the clinic has received the results. If you do not hear from us within 7 days, please contact the clinic through [employer] or phone. If you have a critical or abnormal lab result, we will notify you by phone as soon as possible.  Submit refill requests through [employer] or call your pharmacy and they will forward the refill request to us. Please allow 3 business days for your refill to be completed.          Additional Information About Your Visit        [employer] Information     [employer] gives you secure access to your electronic health record. If you see a primary care provider, you can also send messages to your care team and make appointments. If you have questions, please call your primary care clinic.  If you do not have a primary care provider, please call [phone] and they will assist you.        Care EveryWhere ID     This is your Care EveryWhere ID. This could be used by other organizations to access your [place] medical records  [id number]        Your Vitals Were     Pulse Height BMI (Body Mass Index)             80 1.727 m (5' 8\") 21.74 kg/m2          Blood Pressure from Last 3 Encounters:   10/26/17 120/77   10/25/17 113/70   10/02/17 114/74    Weight from Last 3 Encounters:   10/26/17 64.9 kg (143 lb)   10/25/17 63.5 kg (140 lb)   10/02/17 68 kg (150 lb)              We Performed the Following     ONC/HEME ADULT REFERRAL        Primary Care Provider Office Phone # Fax #    Moreno Harris [phone]       [address]  [facility] [address]        Equal Access to Services     [facility]: Hadii aad ku hadasho Soomaali, waaxda luqadaha, qaybta kaalmada alisa garcia . So [facility] [phone].    ATENCIÓN: Si habla español, tiene a martino disposición servicios gratuitos de asistencia lingüística. Llame al [phone].    We comply with applicable federal civil rights laws and [place] laws. We do " not discriminate on the basis of race, color, national origin, age, disability, sex, sexual orientation, or gender identity.            Thank you!     Thank you for choosing [facility]  for your care. Our goal is always to provide you with excellent care. Hearing back from our patients is one way we can continue to improve our services. Please take a few minutes to complete the written survey that you may receive in the mail after your visit with us. Thank you!             Your Updated Medication List - Protect others around you: Learn how to safely use, store and throw away your medicines at www.disposemymeds.org.          This list is accurate as of: 10/26/17  1:01 PM.  Always use your most recent med list.                   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

## 2017-10-26 NOTE — LETTER
"    10/26/2017         RE: Rian Malik  [address] [place] [address]        Dear Colleague,    Thank you for referring your patient, Rian Malik, to the [facility]. Please see a copy of my visit note below.    General Surgery Post Op    Pt returns for follow up visit s/p sigmoid resection.    Patient has been doing well, tolerating diet. Bowels moving well. Pain controlled. No issues with wound healing/redness/drainage. No fevers.     Physical exam: Vitals: /77  Pulse 80  Ht 1.727 m (5' 8\")  Wt 64.9 kg (143 lb)  BMI 21.74 kg/m2  BMI= Body mass index is 21.74 kg/(m^2).    Exam:  Constitutional: healthy, alert and no distress  Respiratory: Non-labored  Gastrointestinal: Abdomen soft, non-tender. BS normal. No masses, organomegaly  Incisions are healing well with no erythema or exudate  Ostomy is functioning and appears well healed.    Assessment: Pt is doing well s/p sigmoid resection    - We discussed the pathology results and all questions were answered to the best of my ability.     Plan: Pt doing well and can follow up as needed. Will refer to oncology      Darrell Schneider DO        Again, thank you for allowing me to participate in the care of your patient.        Sincerely,        Darrell Schneider, DO    "

## 2017-10-26 NOTE — NURSING NOTE
"Chief Complaint   Patient presents with     Surgical Followup       Initial /77  Pulse 80  Ht 5' 8\" (1.727 m)  Wt 143 lb (64.9 kg)  BMI 21.74 kg/m2 Estimated body mass index is 21.74 kg/(m^2) as calculated from the following:    Height as of this encounter: 5' 8\" (1.727 m).    Weight as of this encounter: 143 lb (64.9 kg).  Medication Reconciliation: gaurang Forte Cma      "

## 2017-10-26 NOTE — TELEPHONE ENCOUNTER
Reason for Call:  Other call back    Detailed comments: [facility] called to request the following: Discharge summary, and also an coding Adam response is need,     Phone Number Patient can be reached at: Other phone number:  [phone]    Best Time: today,     Can we leave a detailed message on this number? NO    Call taken on 10/26/2017 at 7:14 AM by Latisha Mccullough

## 2017-10-27 NOTE — TELEPHONE ENCOUNTER
PA Initiation    Medication: Zepatier  Insurance Company: [facility] - Phone [phone] Fax [phone]  Pharmacy Filling the Rx: [facility] - [place], [place] - [facility] [address]  Filling Pharmacy Phone: [phone]  Filling Pharmacy Fax: [phone]  Start Date: 10/27/2017

## 2017-11-03 ENCOUNTER — TELEPHONE (OUTPATIENT)
Dept: GASTROENTEROLOGY | Facility: CLINIC | Age: 57
End: 2017-11-03

## 2017-11-03 DIAGNOSIS — B18.2 CHRONIC HEPATITIS C WITHOUT HEPATIC COMA (H): Primary | ICD-10-CM

## 2017-11-06 ENCOUNTER — DOCUMENTATION ONLY (OUTPATIENT)
Dept: GASTROENTEROLOGY | Facility: CLINIC | Age: 57
End: 2017-11-06

## 2017-11-06 ENCOUNTER — CARE COORDINATION (OUTPATIENT)
Dept: ONCOLOGY | Facility: CLINIC | Age: 57
End: 2017-11-06

## 2017-11-06 ENCOUNTER — ONCOLOGY VISIT (OUTPATIENT)
Dept: ONCOLOGY | Facility: CLINIC | Age: 57
End: 2017-11-06
Payer: COMMERCIAL

## 2017-11-06 ENCOUNTER — OFFICE VISIT (OUTPATIENT)
Dept: GASTROENTEROLOGY | Facility: CLINIC | Age: 57
End: 2017-11-06
Attending: INTERNAL MEDICINE
Payer: COMMERCIAL

## 2017-11-06 VITALS
HEIGHT: 68 IN | TEMPERATURE: 97.7 F | WEIGHT: 144.6 LBS | SYSTOLIC BLOOD PRESSURE: 117 MMHG | HEART RATE: 80 BPM | BODY MASS INDEX: 21.92 KG/M2 | DIASTOLIC BLOOD PRESSURE: 73 MMHG

## 2017-11-06 VITALS
DIASTOLIC BLOOD PRESSURE: 74 MMHG | WEIGHT: 144.9 LBS | HEART RATE: 70 BPM | BODY MASS INDEX: 22.03 KG/M2 | RESPIRATION RATE: 16 BRPM | SYSTOLIC BLOOD PRESSURE: 123 MMHG | TEMPERATURE: 97.9 F | OXYGEN SATURATION: 98 %

## 2017-11-06 DIAGNOSIS — C18.6 MALIGNANT NEOPLASM OF DESCENDING COLON (H): Primary | ICD-10-CM

## 2017-11-06 DIAGNOSIS — K74.60 CIRRHOSIS OF LIVER WITHOUT ASCITES, UNSPECIFIED HEPATIC CIRRHOSIS TYPE (H): ICD-10-CM

## 2017-11-06 DIAGNOSIS — B18.2 CHRONIC HEPATITIS C WITHOUT HEPATIC COMA (H): Primary | ICD-10-CM

## 2017-11-06 DIAGNOSIS — B18.2 CHRONIC HEPATITIS C WITHOUT HEPATIC COMA (H): ICD-10-CM

## 2017-11-06 LAB
ALBUMIN SERPL-MCNC: 3.5 G/DL (ref 3.4–5)
ALBUMIN SERPL-MCNC: 3.5 G/DL (ref 3.4–5)
ALP SERPL-CCNC: 87 U/L (ref 40–150)
ALP SERPL-CCNC: 87 U/L (ref 40–150)
ALT SERPL W P-5'-P-CCNC: 52 U/L (ref 0–70)
ALT SERPL W P-5'-P-CCNC: 56 U/L (ref 0–70)
ANION GAP SERPL CALCULATED.3IONS-SCNC: 6 MMOL/L (ref 3–14)
APTT PPP: 28 SEC (ref 22–37)
AST SERPL W P-5'-P-CCNC: 25 U/L (ref 0–45)
AST SERPL W P-5'-P-CCNC: 31 U/L (ref 0–45)
BASOPHILS # BLD AUTO: 0 10E9/L (ref 0–0.2)
BASOPHILS NFR BLD AUTO: 0.6 %
BILIRUB DIRECT SERPL-MCNC: 0.2 MG/DL (ref 0–0.2)
BILIRUB SERPL-MCNC: 0.4 MG/DL (ref 0.2–1.3)
BILIRUB SERPL-MCNC: 0.5 MG/DL (ref 0.2–1.3)
BUN SERPL-MCNC: 17 MG/DL (ref 7–30)
CALCIUM SERPL-MCNC: 9 MG/DL (ref 8.5–10.1)
CEA SERPL-MCNC: 0.9 UG/L (ref 0–2.5)
CHLORIDE SERPL-SCNC: 103 MMOL/L (ref 94–109)
CO2 SERPL-SCNC: 32 MMOL/L (ref 20–32)
CREAT SERPL-MCNC: 0.76 MG/DL (ref 0.66–1.25)
DIFFERENTIAL METHOD BLD: ABNORMAL
EOSINOPHIL # BLD AUTO: 0.1 10E9/L (ref 0–0.7)
EOSINOPHIL NFR BLD AUTO: 1.5 %
ERYTHROCYTE [DISTWIDTH] IN BLOOD BY AUTOMATED COUNT: 14.5 % (ref 10–15)
GFR SERPL CREATININE-BSD FRML MDRD: >90 ML/MIN/1.7M2
GLUCOSE SERPL-MCNC: 110 MG/DL (ref 70–99)
HCT VFR BLD AUTO: 38 % (ref 40–53)
HGB BLD-MCNC: 12.8 G/DL (ref 13.3–17.7)
INR PPP: 0.96 (ref 0.86–1.14)
LYMPHOCYTES # BLD AUTO: 1.6 10E9/L (ref 0.8–5.3)
LYMPHOCYTES NFR BLD AUTO: 25.2 %
MCH RBC QN AUTO: 32.3 PG (ref 26.5–33)
MCHC RBC AUTO-ENTMCNC: 33.7 G/DL (ref 31.5–36.5)
MCV RBC AUTO: 96 FL (ref 78–100)
MONOCYTES # BLD AUTO: 0.5 10E9/L (ref 0–1.3)
MONOCYTES NFR BLD AUTO: 7.3 %
NEUTROPHILS # BLD AUTO: 4 10E9/L (ref 1.6–8.3)
NEUTROPHILS NFR BLD AUTO: 65.4 %
PLATELET # BLD AUTO: 199 10E9/L (ref 150–450)
POTASSIUM SERPL-SCNC: 4 MMOL/L (ref 3.4–5.3)
PROT SERPL-MCNC: 6.7 G/DL (ref 6.8–8.8)
PROT SERPL-MCNC: 7.2 G/DL (ref 6.8–8.8)
RBC # BLD AUTO: 3.96 10E12/L (ref 4.4–5.9)
SODIUM SERPL-SCNC: 141 MMOL/L (ref 133–144)
WBC # BLD AUTO: 6.2 10E9/L (ref 4–11)

## 2017-11-06 PROCEDURE — 36415 COLL VENOUS BLD VENIPUNCTURE: CPT | Performed by: INTERNAL MEDICINE

## 2017-11-06 PROCEDURE — 82378 CARCINOEMBRYONIC ANTIGEN: CPT | Performed by: INTERNAL MEDICINE

## 2017-11-06 PROCEDURE — 85610 PROTHROMBIN TIME: CPT | Performed by: INTERNAL MEDICINE

## 2017-11-06 PROCEDURE — 85730 THROMBOPLASTIN TIME PARTIAL: CPT | Performed by: INTERNAL MEDICINE

## 2017-11-06 PROCEDURE — 99205 OFFICE O/P NEW HI 60 MIN: CPT | Performed by: INTERNAL MEDICINE

## 2017-11-06 PROCEDURE — 80076 HEPATIC FUNCTION PANEL: CPT | Performed by: INTERNAL MEDICINE

## 2017-11-06 PROCEDURE — 99213 OFFICE O/P EST LOW 20 MIN: CPT | Mod: ZF

## 2017-11-06 PROCEDURE — 00000346 ZZHCL STATISTIC REVIEW OUTSIDE SLIDES TC 88321: Performed by: INTERNAL MEDICINE

## 2017-11-06 PROCEDURE — 80053 COMPREHEN METABOLIC PANEL: CPT | Performed by: INTERNAL MEDICINE

## 2017-11-06 PROCEDURE — 85025 COMPLETE CBC W/AUTO DIFF WBC: CPT | Performed by: INTERNAL MEDICINE

## 2017-11-06 ASSESSMENT — PAIN SCALES - GENERAL
PAINLEVEL: NO PAIN (0)
PAINLEVEL: NO PAIN (0)

## 2017-11-06 NOTE — MR AVS SNAPSHOT
After Visit Summary   11/6/2017    Rian Malik    MRN: [id number]           Patient Information     Date Of Birth          1960        Visit Information        Provider Department      11/6/2017 7:45 AM Padilla Last MD [facility]        Today's Diagnoses     Malignant neoplasm of descending colon (H)    -  1       Follow-ups after your visit        Follow-up notes from your care team     Return in about 2 weeks (around 11/20/2017) for Lab Work, Routine Visit.      Your next 10 appointments already scheduled     Nov 06, 2017 12:45 PM CST   Lab with  LAB   [facility] ([facility])    [address]  1st Floor  [facility] [id number] [phone]            Nov 06, 2017  1:45 PM CST   (Arrive by 1:30 PM)   Return General Liver with Yaakov Burroughs MD   [facility] ([facility])    [address]  3rd Floor  [facility] [id number] [phone]            Nov 15, 2017  2:15 PM CST   Nurse Only with MG IMAGING NURSE   [facility] ([facility])    [id number] [place] [id number] [phone]            Nov 15, 2017  2:30 PM CST   CT CHEST W CONTRAST with MGCT1   [facility])    [address] [place] [id number] [phone]           Please bring any scans or X-rays taken at other hospitals, if similar tests were done. Also bring a list of your medicines, including vitamins, minerals and over-the-counter drugs. It is safest to leave personal items at home.  Be sure to tell your doctor:   If you have any allergies.   If there s any chance you are pregnant.   If you are breastfeeding.   If you have any special needs.  You will have contrast for this exam. To prepare:   Do not eat or drink for 2 hours before your exam. If you need to take medicine, you may take it with small sips of water. (We  may ask you to take liquid medicine as well.)   The day before your exam, drink extra fluids at least six 8-ounce glasses (unless your doctor tells you to restrict your fluids).  Patients over 70 or patients with diabetes or kidney problems:   If you haven t had a blood test (creatinine test) within the last 30 days, go to your clinic or Diagnostic Imaging Department for this test.  If you have diabetes:   If your kidney function is normal, continue taking your metformin (Avandamet, Glucophage, Glucovance, Metaglip) on the day of your exam.   If your kidney function is abnormal, wait 48 hours before restarting this medicine.  Please wear loose clothing, such as a sweat suit or jogging clothes. Avoid snaps, zippers and other metal. We may ask you to undress and put on a hospital gown.  If you have any questions, please call the Imaging Department where you will have your exam.            Nov 15, 2017  3:00 PM CST   CT ABDOMEN PELVIS W CONTRAST with MGCT1   [facility] ([facility])    [address] [place] [id number] [phone]           Please bring any scans or X-rays taken at other hospitals, if similar tests were done. Also bring a list of your medicines, including vitamins, minerals and over-the-counter drugs. It is safest to leave personal items at home.  Be sure to tell your doctor:   If you have any allergies.   If there s any chance you are pregnant.   If you are breastfeeding.   If you have any special needs.  You may have contrast for this exam. To prepare:   Do not eat or drink for 2 hours before your exam. If you need to take medicine, you may take it with small sips of water. (We may ask you to take liquid medicine as well.)   The day before your exam, drink extra fluids at least six 8-ounce glasses (unless your doctor tells you to restrict your fluids).  Patients over 70 or patients with diabetes or kidney problems:   If you haven t had a blood test  (creatinine test) within the last 30 days, go to your clinic or Diagnostic Imaging Department for this test.  If you have diabetes:   If your kidney function is normal, continue taking your metformin (Avandamet, Glucophage, Glucovance, Metaglip) on the day of your exam.   If your kidney function is abnormal, wait 48 hours before restarting this medicine.  You will have oral contrast for this exam:   You will drink the contrast at home. Get this from your clinic or Diagnostic Imaging Department. Please follow the directions given.  Please wear loose clothing, such as a sweat suit or jogging clothes. Avoid snaps, zippers and other metal. We may ask you to undress and put on a hospital gown.  If you have any questions, please call the Imaging Department where you will have your exam.            Nov 20, 2017  3:00 PM CST   Return Visit with NURSE ONLY CANCER CENTER   [facility] ([facility])    [address] [place] [id number] [phone]            Nov 20, 2017  3:45 PM CST   Return Visit with Padilla Last MD   [facility] ([facility])    [address] [place] [id number] [phone]            Nov 21, 2017 11:00 AM CST   Flofox with BAY 10 INFUSION   [facility] ([facility])    [address] [place] [id number] [phone]              Future tests that were ordered for you today     Open Future Orders        Priority Expected Expires Ordered    IR Chest Port Placement > 5 Yrs of Age Routine  11/6/2018 11/6/2017    CT Chest w Contrast Routine  11/6/2018 11/6/2017            Who to contact     If you have questions or need follow up information about today's clinic visit or your schedule please contact [facility] directly at [phone].  Normal or non-critical lab and imaging results will be communicated to you by MyChart, letter or phone within 4  business days after the clinic has received the results. If you do not hear from us within 7 days, please contact the clinic through [employer] or phone. If you have a critical or abnormal lab result, we will notify you by phone as soon as possible.  Submit refill requests through [employer] or call your pharmacy and they will forward the refill request to us. Please allow 3 business days for your refill to be completed.          Additional Information About Your Visit        [employer] Information     [employer] gives you secure access to your electronic health record. If you see a primary care provider, you can also send messages to your care team and make appointments. If you have questions, please call your primary care clinic.  If you do not have a primary care provider, please call [phone] and they will assist you.      [employer] is an electronic gateway that provides easy, online access to your medical records. With [employer], you can request a clinic appointment, read your test results, renew a prescription or communicate with your care team.     To access your existing account, please contact your [facility] or call [phone] for assistance.        Care EveryWhere ID     This is your Care EveryWhere ID. This could be used by other organizations to access your [place] medical records  [id number]        Your Vitals Were     Pulse Temperature Respirations Pulse Oximetry BMI (Body Mass Index)       70 97.9  F (36.6  C) (Oral) 16 98% 22.03 kg/m2        Blood Pressure from Last 3 Encounters:   11/06/17 123/74   10/26/17 120/77   10/25/17 113/70    Weight from Last 3 Encounters:   11/06/17 65.7 kg (144 lb 14.4 oz)   10/26/17 64.9 kg (143 lb)   10/25/17 63.5 kg (140 lb)              We Performed the Following     CBC with platelets differential     CEA     Comprehensive metabolic panel     INR     Partial thromboplastin time        Primary Care Provider Office Phone # Fax #    [place]  Jelani Harris [phone]       [address]  [facility] [address]        Equal Access to Services     KHANH FLANAGAN : Hadii aad ku hadmartycaridad Carlasherri, washahnazhernán donohuesalvadorha, qatiffta kalucianda jose, alisa gabriellain hayaan enochbria ramirez carolyn mg. So [facility] [phone].    ATENCIÓN: Si habla español, tiene a martino disposición servicios gratuitos de asistencia lingüística. Llame al [phone].    We comply with applicable federal civil rights laws and [place] laws. We do not discriminate on the basis of race, color, national origin, age, disability, sex, sexual orientation, or gender identity.            Thank you!     Thank you for choosing [facility]  for your care. Our goal is always to provide you with excellent care. Hearing back from our patients is one way we can continue to improve our services. Please take a few minutes to complete the written survey that you may receive in the mail after your visit with us. Thank you!             Your Updated Medication List - Protect others around you: Learn how to safely use, store and throw away your medicines at www.disposemymeds.org.          This list is accurate as of: 11/6/17  9:47 AM.  Always use your most recent med list.                   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

## 2017-11-06 NOTE — LETTER
11/6/2017       RE: Rian Malik  [address] [place] [address]     Dear Colleague,    Thank you for referring your patient, Rian Malik, to the [facility]PAT[facility] at [facility]. Please see a copy of my visit note below.    I had the pleasure of seeing Rian Malik for followup in the Liver Clinic at the [facility] on 11/06/2017.  Mr. Malik returns for followup of chronic hepatitis C and a biopsy diagnosis of cirrhosis.      The major new event since I saw him last is that he was diagnosed with colorectal cancer.  When I saw him last, we had ultimately obtained a CT scan that suggested that there was an area of focal colitis versus diverticulitis.  He had a colonoscopy, and they could not traverse the area.  He had an endoscopic ultrasound as well.  Ultimately, he came to exploratory laparotomy which showed that he had what appeared to be large diverticular abscess that was resected, and he came with a colostomy.  The pathology on it unfortunately showed it was poorly differentiated adenocarcinoma.  He is now preparing to undergo chemotherapy up at the [facility].      He is actually feeling quite well.  He denies any abdominal pain, itching or skin rash or fatigue.  He denies any increased abdominal girth or lower extremity edema.  He denies any fevers or chills, cough or shortness of breath.  He denies any nausea, vomiting, diarrhea or constipation.  His appetite has been improving, and his weight has stabilized.      In spite of his diagnosis of cirrhosis, nothing was mentioned in the operative report about a cirrhotic appearing liver.  He has never had description of coarsened architecture or splenomegaly, and his platelet count remains normal.      Prior to all of this, we were in the process of getting him set up for hepatitis C treatment.  He was going to be in the Prioritize study; but unfortunately, that has  turned out not to be possible.     Current Outpatient Prescriptions   Medication     docusate sodium (COLACE) 100 MG tablet     elbasvir-grazoprevir (ZEPATIER)  MG TABS per tablet     oxyCODONE (ROXICODONE) 5 MG IR tablet     No current facility-administered medications for this visit.      B/P: 117/73, T: 97.7, P: 80, R: Data Unavailable    HEENT exam shows no scleral icterus and no temporal muscle wasting.  His chest is clear.  His abdominal exam shows a healthy-appearing colostomy in the left lower quadrant.  His liver is 10 cm in span without left lobe enlargement.  No spleen tip is palpable, and extremity exam shows no edema.  Skin exam shows no stigmata of chronic liver disease.  Neurologic exam shows no asterixis.     Recent Results (from the past 168 hour(s))   CBC with platelets differential    Collection Time: 11/06/17  9:26 AM   Result Value Ref Range    WBC 6.2 4.0 - 11.0 10e9/L    RBC Count 3.96 (L) 4.4 - 5.9 10e12/L    Hemoglobin 12.8 (L) 13.3 - 17.7 g/dL    Hematocrit 38.0 (L) 40.0 - 53.0 %    MCV 96 78 - 100 fl    MCH 32.3 26.5 - 33.0 pg    MCHC 33.7 31.5 - 36.5 g/dL    RDW 14.5 10.0 - 15.0 %    Platelet Count 199 150 - 450 10e9/L    Diff Method Automated Method     % Neutrophils 65.4 %    % Lymphocytes 25.2 %    % Monocytes 7.3 %    % Eosinophils 1.5 %    % Basophils 0.6 %    Absolute Neutrophil 4.0 1.6 - 8.3 10e9/L    Absolute Lymphocytes 1.6 0.8 - 5.3 10e9/L    Absolute Monocytes 0.5 0.0 - 1.3 10e9/L    Absolute Eosinophils 0.1 0.0 - 0.7 10e9/L    Absolute Basophils 0.0 0.0 - 0.2 10e9/L   Comprehensive metabolic panel    Collection Time: 11/06/17  9:26 AM   Result Value Ref Range    Sodium 141 133 - 144 mmol/L    Potassium 4.0 3.4 - 5.3 mmol/L    Chloride 103 94 - 109 mmol/L    Carbon Dioxide 32 20 - 32 mmol/L    Anion Gap 6 3 - 14 mmol/L    Glucose 110 (H) 70 - 99 mg/dL    Urea Nitrogen 17 7 - 30 mg/dL    Creatinine 0.76 0.66 - 1.25 mg/dL    GFR Estimate >90 >60 mL/min/1.7m2    GFR Estimate  If Black >90 >60 mL/min/1.7m2    Calcium 9.0 8.5 - 10.1 mg/dL    Bilirubin Total 0.5 0.2 - 1.3 mg/dL    Albumin 3.5 3.4 - 5.0 g/dL    Protein Total 7.2 6.8 - 8.8 g/dL    Alkaline Phosphatase 87 40 - 150 U/L    ALT 56 0 - 70 U/L    AST 31 0 - 45 U/L   INR    Collection Time: 11/06/17  9:26 AM   Result Value Ref Range    INR 0.96 0.86 - 1.14   Partial thromboplastin time    Collection Time: 11/06/17  9:26 AM   Result Value Ref Range    PTT 28 22 - 37 sec   Hepatic panel    Collection Time: 11/06/17 12:58 PM   Result Value Ref Range    Bilirubin Direct 0.2 0.0 - 0.2 mg/dL    Bilirubin Total 0.4 0.2 - 1.3 mg/dL    Albumin 3.5 3.4 - 5.0 g/dL    Protein Total 6.7 (L) 6.8 - 8.8 g/dL    Alkaline Phosphatase 87 40 - 150 U/L    ALT 52 0 - 70 U/L    AST 25 0 - 45 U/L      My impression is that Mr. Malik has chronic hepatitis C with cirrhosis and now he is status post resection of an adenocarcinoma of the colon.      I am still planning on going ahead and treating him with Zepatier for which he is approved and will get deliver on Thursday.  I have spoken with [place], our hepatitis C nurse, who will set him up with the followup that will be necessary.  He is getting a port in anticipation of chemotherapy.  They are going to give him FOLFOX for followup to the surgical resection.  I do not anticipate that there should be any drug-drug interaction with FOLFOX or the Zepatier.  My plan will be to see him back in the clinic in 3 months which should be at the end of treatment.      Thank you very much for allowing me to participate in the care of this patient.  If you have any questions regarding my recommendations, please do not hesitate to contact me.       Yaakov Burroughs MD      Professor of Medicine  [employer]      Executive Medical Director, Solid Organ Transplant Program  [facility]

## 2017-11-06 NOTE — PROGRESS NOTES
DATE OF VISIT: Nov 6, 2017    REASON FOR REFERRAL:   Colon cancer    CHIEF COMPLAINT:   Chief Complaint   Patient presents with     Oncology Clinic Visit     New colon cancer       HISTORY OF PRESENT ILLNESS:     57-year-old male referred to oncology clinic today for further evaluation and management of newly diagnosed adenocarcinoma colon.    Stated that around February of this year he developed intermittent cramping lower abdominal pain. Was evaluated by PCP and clinical impression was colitis. He was put on p.o. antibiotics. However symptoms did not improve. He was eventually referred to surgery and underwent imaging with CT findings suspicious of sigmoid abscess. He was admitted to the hospital for resection of sigmoid abscess 10/11/17. However intraoperatively was noted to have a large firmly adherent sigmoid colon mass. Laparoscopic surgery was converted to open sigmoidectomy with end colostomy. Surgical pathology from outside hospital showed poorly differentiated adenocarcinoma with signet ring features.   -Tumor was 5 cm in size.   -Perforates serosal surface.   -Tumor deposits were present.   -Extensive serosal involvement of one margin by tumor, opposite margin (undesignated) shows no evidence of malignancy  -Lymphovascular invasion was positive.   -3 lymph nodes found were not involved by metastatic tumor.    Immunohistochemistry for mismatched repair proteins showed intact MLH1, MSH 2, MSH 6, PMS2.    Postop, developed anemia. CT abdomen and pelvis with contrast was performed showed pelvic hematoma. Otherwise was negative for any evidence of metastatic disease in the abdomen.    He was discharged from the hospital on 10/18/2017. Since discharge has been recovering well denies any abdominal pain, nausea vomiting, bleeding bruising.The colostomy is functioning well without any complaints. He stated that he did lose 15 pounds during the hospital course but has regained some back. He had good appetite and  energy levels.  No personal or family history of colon cancer or any malignancy.    Also has history of hepatitis C infection and is about to start treatment at [facility].    REVIEW OF SYSTEMS:      ROS: 14 point ROS neg other than the symptoms noted above in the HPI.    PAST MEDICAL HISTORY:   No past medical history on file.    PAST SURGICAL HISTORY:   Past Surgical History:   Procedure Laterality Date     COLONOSCOPY N/A 8/4/2017    Procedure: COMBINED COLONOSCOPY, SINGLE OR MULTIPLE BIOPSY/POLYPECTOMY BY BIOPSY;  Colon & EGD;  Surgeon: Cristobal Blanco MD;  Location: [facility]     COLOSTOMY Left 10/11/2017     ESOPHAGOSCOPY, GASTROSCOPY, DUODENOSCOPY (EGD), COMBINED N/A 8/4/2017    Procedure: COMBINED ESOPHAGOSCOPY, GASTROSCOPY, DUODENOSCOPY (EGD), BIOPSY SINGLE OR MULTIPLE;;  Surgeon: Cristobal Blanco MD;  Location: [facility]     HERNIA REPAIR  1988       ALLERGIES:   Allergies as of 11/06/2017     (No Known Allergies)       MEDICATIONS:   Current Outpatient Prescriptions   Medication Sig Dispense Refill     elbasvir-grazoprevir (ZEPATIER)  MG TABS per tablet Take 1 tablet by mouth daily 28 tablet 2     oxyCODONE (ROXICODONE) 5 MG IR tablet Take 1 tablet (5 mg) by mouth every 4 hours as needed for pain maximum 8 tablet(s) per day 20 tablet 0     docusate sodium (COLACE) 100 MG tablet Take 100 mg by mouth daily 60 tablet 1        FAMILY HISTORY:   No family history on file.    SOCIAL HISTORY:   Social History     Social History     Marital status:      Spouse name: N/A     Number of children: N/A     Years of education: N/A     Social History Main Topics     Smoking status: Former Smoker     Packs/day: 0.24     Years: 5.00     Types: Cigarettes     Start date: 11/6/2012     Quit date: 10/6/2017     Smokeless tobacco: Never Used      Comment:       Alcohol use No     Drug use: No     Sexual activity: Yes     Partners: Female     Other Topics Concern     Not on file     Social History Narrative        PHYSICAL EXAMINATION:   /74  Pulse 70  Temp 97.9  F (36.6  C) (Oral)  Resp 16  Wt 65.7 kg (144 lb 14.4 oz)  SpO2 98%  BMI 22.03 kg/m2  Wt Readings from Last 10 Encounters:   11/06/17 65.7 kg (144 lb 14.4 oz)   10/26/17 64.9 kg (143 lb)   10/25/17 63.5 kg (140 lb)   10/02/17 68 kg (150 lb)   08/04/17 68 kg (150 lb)   08/04/17 68 kg (150 lb)   07/03/17 71 kg (156 lb 9.6 oz)   06/01/17 69.4 kg (153 lb)   05/25/17 71.7 kg (158 lb)   05/09/17 73.5 kg (162 lb)      ECOG performance status: 1  Exam:  Constitutional: healthy, alert and no distress  Head: Normocephalic. No masses, lesions, tenderness or abnormalities  Neck: Neck supple. No adenopathy. Thyroid symmetric, normal size,, Carotids without bruits.  ENT: ENT exam normal, no neck nodes or sinus tenderness  Cardiovascular: negative, PMI normal. No lifts, heaves, or thrills. RRR. No murmurs, clicks gallops or rub  Respiratory: negative, Percussion normal. Good diaphragmatic excursion. Lungs clear  Gastrointestinal: Abdomen soft, non-tender. BS normal. Ostomy bag in place  Musculoskeletal: extremities normal- no gross deformities noted, gait normal and normal muscle tone  Skin: no suspicious lesions or rashes  Neurologic: Gait normal. Reflexes normal and symmetric. Sensation grossly WNL.  Psychiatric: mentation appears normal and affect normal/bright  Hematologic/Lymphatic/Immunologic: Normal cervical lymph nodes        LABORATORY RESULTS:    Recent Labs   Lab Test  11/06/17   0926  10/25/17   0730   08/04/17   0953   NA  141  136   < >  137   POTASSIUM  4.0  4.4   < >  4.3   CHLORIDE  103  100   < >  98   BUN  17  20   < >  12   CR  0.76  0.90   < >  0.81   GLC  110*  116*   < >  75   MAICOL  9.0  8.7   < >  9.2   MAG   --    --    --   2.0    < > = values in this interval not displayed.     Recent Labs   Lab Test  11/06/17   0926  10/25/17   0730  10/02/17   0737   WBC  6.2  8.4  7.7   HGB  12.8*  10.4*  15.2   PLT  199  435  159   MCV  96  98  92    NEUTROPHIL  65.4  69.2  55.1     Recent Labs   Lab Test  11/06/17   0926  10/25/17   0730  10/02/17   0737   BILITOTAL  0.5  0.8  0.8   ALKPHOS  87  87  60   ALT  56  68  43   AST  31  43  22   ALBUMIN  3.5  3.2*  3.5      Imaging  Outside hospital CT abdomen and pelvis with contrast revealed     Pathology  Outside Hospital surgical pathology report reviewed     ASSESSMENT AND PLAN:     57-year-old male with a newly diagnosed    - Poorly differentiated adenocarcinoma colon- pT4N1c(tumor deposits) Stage III (based on OS pathology report)  Surgical pathology review at [facility] currently pending.  We spoke in detail about prognosis of  colon cancer as well as the role of adjuvant systemic chemotherapy in improving the disease-free survival in stage III colon cancer. I informed that we will likely be proceeding of modified FOLFOX q2 week regimen for 6 months duration. Side effect profile explained including but not limited to lowering of blood counts, infections, neuropathy, diarrhea, allergic reaction.   Regarding the possibility of positive margin, we'll wait on report of surgical pathology review. If margin positive, will refer to radiation oncology for consideration of adjuvant RT.  We'll order CT chest to complete staging as well as baseline labs including CBC, chemistry panel, CEA levels, Port placement.    - Hepatitis C  To be started on treatment this week by gastroenterology    Return to clinic in 2 weeks for office visit and initiate chemotherapy    The patient is ready to learn, no apparent learning barriers were identified, Diagnosis and treatment plans were explained to the patient. The patient expressed understanding of the content. The patient questions were answered to his satisfaction.    Padilla Last MD  Attending Physician   Hematology/Medical Oncology

## 2017-11-06 NOTE — PROGRESS NOTES
I had the pleasure of seeing Rian Malik for followup in the Liver Clinic at the [facility] on 11/06/2017.  Mr. Malik returns for followup of chronic hepatitis C and a biopsy diagnosis of cirrhosis.      The major new event since I saw him last is that he was diagnosed with colorectal cancer.  When I saw him last, we had ultimately obtained a CT scan that suggested that there was an area of focal colitis versus diverticulitis.  He had a colonoscopy, and they could not traverse the area.  He had an endoscopic ultrasound as well.  Ultimately, he came to exploratory laparotomy which showed that he had what appeared to be large diverticular abscess that was resected, and he came with a colostomy.  The pathology on it unfortunately showed it was poorly differentiated adenocarcinoma.  He is now preparing to undergo chemotherapy up at the [facility].      He is actually feeling quite well.  He denies any abdominal pain, itching or skin rash or fatigue.  He denies any increased abdominal girth or lower extremity edema.  He denies any fevers or chills, cough or shortness of breath.  He denies any nausea, vomiting, diarrhea or constipation.  His appetite has been improving, and his weight has stabilized.      In spite of his diagnosis of cirrhosis, nothing was mentioned in the operative report about a cirrhotic appearing liver.  He has never had description of coarsened architecture or splenomegaly, and his platelet count remains normal.      Prior to all of this, we were in the process of getting him set up for hepatitis C treatment.  He was going to be in the Prioritize study; but unfortunately, that has turned out not to be possible.     Current Outpatient Prescriptions   Medication     docusate sodium (COLACE) 100 MG tablet     elbasvir-grazoprevir (ZEPATIER)  MG TABS per tablet     oxyCODONE (ROXICODONE) 5 MG IR tablet     No current facility-administered medications for  this visit.      B/P: 117/73, T: 97.7, P: 80, R: Data Unavailable    HEENT exam shows no scleral icterus and no temporal muscle wasting.  His chest is clear.  His abdominal exam shows a healthy-appearing colostomy in the left lower quadrant.  His liver is 10 cm in span without left lobe enlargement.  No spleen tip is palpable, and extremity exam shows no edema.  Skin exam shows no stigmata of chronic liver disease.  Neurologic exam shows no asterixis.     Recent Results (from the past 168 hour(s))   CBC with platelets differential    Collection Time: 11/06/17  9:26 AM   Result Value Ref Range    WBC 6.2 4.0 - 11.0 10e9/L    RBC Count 3.96 (L) 4.4 - 5.9 10e12/L    Hemoglobin 12.8 (L) 13.3 - 17.7 g/dL    Hematocrit 38.0 (L) 40.0 - 53.0 %    MCV 96 78 - 100 fl    MCH 32.3 26.5 - 33.0 pg    MCHC 33.7 31.5 - 36.5 g/dL    RDW 14.5 10.0 - 15.0 %    Platelet Count 199 150 - 450 10e9/L    Diff Method Automated Method     % Neutrophils 65.4 %    % Lymphocytes 25.2 %    % Monocytes 7.3 %    % Eosinophils 1.5 %    % Basophils 0.6 %    Absolute Neutrophil 4.0 1.6 - 8.3 10e9/L    Absolute Lymphocytes 1.6 0.8 - 5.3 10e9/L    Absolute Monocytes 0.5 0.0 - 1.3 10e9/L    Absolute Eosinophils 0.1 0.0 - 0.7 10e9/L    Absolute Basophils 0.0 0.0 - 0.2 10e9/L   Comprehensive metabolic panel    Collection Time: 11/06/17  9:26 AM   Result Value Ref Range    Sodium 141 133 - 144 mmol/L    Potassium 4.0 3.4 - 5.3 mmol/L    Chloride 103 94 - 109 mmol/L    Carbon Dioxide 32 20 - 32 mmol/L    Anion Gap 6 3 - 14 mmol/L    Glucose 110 (H) 70 - 99 mg/dL    Urea Nitrogen 17 7 - 30 mg/dL    Creatinine 0.76 0.66 - 1.25 mg/dL    GFR Estimate >90 >60 mL/min/1.7m2    GFR Estimate If Black >90 >60 mL/min/1.7m2    Calcium 9.0 8.5 - 10.1 mg/dL    Bilirubin Total 0.5 0.2 - 1.3 mg/dL    Albumin 3.5 3.4 - 5.0 g/dL    Protein Total 7.2 6.8 - 8.8 g/dL    Alkaline Phosphatase 87 40 - 150 U/L    ALT 56 0 - 70 U/L    AST 31 0 - 45 U/L   INR    Collection Time:  11/06/17  9:26 AM   Result Value Ref Range    INR 0.96 0.86 - 1.14   Partial thromboplastin time    Collection Time: 11/06/17  9:26 AM   Result Value Ref Range    PTT 28 22 - 37 sec   Hepatic panel    Collection Time: 11/06/17 12:58 PM   Result Value Ref Range    Bilirubin Direct 0.2 0.0 - 0.2 mg/dL    Bilirubin Total 0.4 0.2 - 1.3 mg/dL    Albumin 3.5 3.4 - 5.0 g/dL    Protein Total 6.7 (L) 6.8 - 8.8 g/dL    Alkaline Phosphatase 87 40 - 150 U/L    ALT 52 0 - 70 U/L    AST 25 0 - 45 U/L      My impression is that Mr. Malik has chronic hepatitis C with cirrhosis and now he is status post resection of an adenocarcinoma of the colon.      I am still planning on going ahead and treating him with Zepatier for which he is approved and will get deliver on Thursday.  I have spoken with Kolton, our hepatitis C nurse, who will set him up with the followup that will be necessary.  He is getting a port in anticipation of chemotherapy.  They are going to give him FOLFOX for followup to the surgical resection.  I do not anticipate that there should be any drug-drug interaction with FOLFOX or the Zepatier.  My plan will be to see him back in the clinic in 3 months which should be at the end of treatment.      Thank you very much for allowing me to participate in the care of this patient.  If you have any questions regarding my recommendations, please do not hesitate to contact me.       Yaakov Burroughs MD      Professor of Medicine  [employer]      Executive Medical Director, Solid Organ Transplant Program  [facility]

## 2017-11-06 NOTE — PROGRESS NOTES
Home Infusion Coverage per [facility]:   Therapy: 5-FU  Insurance: [employer]                 Ded: $6,950  Met: $6,867 met so far  Co-Insurance: 80% (patient responsible for 20%)  Max Out of Pocket: $7,150  Met: $7,150 (patient has met all OOP)    Call placed to patient with above insurance coverage information.  Patient wishes to have 5FU pump disconnects done at home through [facility].     Keenan Clarke, RN, BSN, OCN  Oncology Care Coordinator  [facility]

## 2017-11-06 NOTE — NURSING NOTE
"Chief Complaint   Patient presents with     RECHECK     follow up with Hep C, tzimmer cma       Initial /73  Pulse 80  Temp 97.7  F (36.5  C) (Oral)  Ht 1.727 m (5' 8\")  Wt 65.6 kg (144 lb 9.6 oz)  BMI 21.99 kg/m2 Estimated body mass index is 21.99 kg/(m^2) as calculated from the following:    Height as of this encounter: 1.727 m (5' 8\").    Weight as of this encounter: 65.6 kg (144 lb 9.6 oz).  Medication Reconciliation: complete    "

## 2017-11-06 NOTE — PROGRESS NOTES
Received call from [facility] stating patient did not qualify for prioritized hep C study-screen failure.

## 2017-11-06 NOTE — LETTER
11/6/2017         RE: [place]ian Malik  [address] [place] [address]        Dear Colleague,    Thank you for referring your patient, Rian Malik, to the [facility]. Please see a copy of my visit note below.    DATE OF VISIT: Nov 6, 2017    REASON FOR REFERRAL:   Colon cancer    CHIEF COMPLAINT:   Chief Complaint   Patient presents with     Oncology Clinic Visit     New colon cancer       HISTORY OF PRESENT ILLNESS:     57-year-old male referred to oncology clinic today for further evaluation and management of newly diagnosed adenocarcinoma colon.    Stated that around February of this year he developed intermittent cramping lower abdominal pain. Was evaluated by PCP and clinical impression was colitis. He was put on p.o. antibiotics. However symptoms did not improve. He was eventually referred to surgery and underwent imaging with CT findings suspicious of sigmoid abscess. He was admitted to the hospital for resection of sigmoid abscess 10/11/17. However intraoperatively was noted to have a large firmly adherent sigmoid colon mass. Laparoscopic surgery was converted to open sigmoidectomy with end colostomy. Surgical pathology from outside hospital showed poorly differentiated adenocarcinoma with signet ring features.   -Tumor was 5 cm in size.   -Perforates serosal surface.   -Tumor deposits were present.   -Extensive serosal involvement of one margin by tumor, opposite margin (undesignated) shows no evidence of malignancy  -Lymphovascular invasion was positive.   -3 lymph nodes found were not involved by metastatic tumor.    Immunohistochemistry for mismatched repair proteins showed intact MLH1, MSH 2, MSH 6, PMS2.    Postop, developed anemia. CT abdomen and pelvis with contrast was performed showed pelvic hematoma. Otherwise was negative for any evidence of metastatic disease in the abdomen.    He was discharged from the hospital on 10/18/2017. Since discharge has been  recovering well denies any abdominal pain, nausea vomiting, bleeding bruising.The colostomy is functioning well without any complaints. He stated that he did lose 15 pounds during the hospital course but has regained some back. He had good appetite and energy levels.  No personal or family history of colon cancer or any malignancy.    Also has history of hepatitis C infection and is about to start treatment at [facility].    REVIEW OF SYSTEMS:      ROS: 14 point ROS neg other than the symptoms noted above in the HPI.    PAST MEDICAL HISTORY:   No past medical history on file.    PAST SURGICAL HISTORY:   Past Surgical History:   Procedure Laterality Date     COLONOSCOPY N/A 8/4/2017    Procedure: COMBINED COLONOSCOPY, SINGLE OR MULTIPLE BIOPSY/POLYPECTOMY BY BIOPSY;  Colon & EGD;  Surgeon: Cristobal Blanco MD;  Location: [facility]     COLOSTOMY Left 10/11/2017     ESOPHAGOSCOPY, GASTROSCOPY, DUODENOSCOPY (EGD), COMBINED N/A 8/4/2017    Procedure: COMBINED ESOPHAGOSCOPY, GASTROSCOPY, DUODENOSCOPY (EGD), BIOPSY SINGLE OR MULTIPLE;;  Surgeon: Cristobal Blanco MD;  Location:  GI     HERNIA REPAIR  1988       ALLERGIES:   Allergies as of 11/06/2017     (No Known Allergies)       MEDICATIONS:   Current Outpatient Prescriptions   Medication Sig Dispense Refill     elbasvir-grazoprevir (ZEPATIER)  MG TABS per tablet Take 1 tablet by mouth daily 28 tablet 2     oxyCODONE (ROXICODONE) 5 MG IR tablet Take 1 tablet (5 mg) by mouth every 4 hours as needed for pain maximum 8 tablet(s) per day 20 tablet 0     docusate sodium (COLACE) 100 MG tablet Take 100 mg by mouth daily 60 tablet 1        FAMILY HISTORY:   No family history on file.    SOCIAL HISTORY:   Social History     Social History     Marital status:      Spouse name: N/A     Number of children: N/A     Years of education: N/A     Social History Main Topics     Smoking status: Former Smoker     Packs/day: 0.24     Years: 5.00     Types: Cigarettes      Start date: 11/6/2012     Quit date: 10/6/2017     Smokeless tobacco: Never Used      Comment:       Alcohol use No     Drug use: No     Sexual activity: Yes     Partners: Female     Other Topics Concern     Not on file     Social History Narrative       PHYSICAL EXAMINATION:   /74  Pulse 70  Temp 97.9  F (36.6  C) (Oral)  Resp 16  Wt 65.7 kg (144 lb 14.4 oz)  SpO2 98%  BMI 22.03 kg/m2  Wt Readings from Last 10 Encounters:   11/06/17 65.7 kg (144 lb 14.4 oz)   10/26/17 64.9 kg (143 lb)   10/25/17 63.5 kg (140 lb)   10/02/17 68 kg (150 lb)   08/04/17 68 kg (150 lb)   08/04/17 68 kg (150 lb)   07/03/17 71 kg (156 lb 9.6 oz)   06/01/17 69.4 kg (153 lb)   05/25/17 71.7 kg (158 lb)   05/09/17 73.5 kg (162 lb)      ECOG performance status: 1  Exam:  Constitutional: healthy, alert and no distress  Head: Normocephalic. No masses, lesions, tenderness or abnormalities  Neck: Neck supple. No adenopathy. Thyroid symmetric, normal size,, Carotids without bruits.  ENT: ENT exam normal, no neck nodes or sinus tenderness  Cardiovascular: negative, PMI normal. No lifts, heaves, or thrills. RRR. No murmurs, clicks gallops or rub  Respiratory: negative, Percussion normal. Good diaphragmatic excursion. Lungs clear  Gastrointestinal: Abdomen soft, non-tender. BS normal. Ostomy bag in place  Musculoskeletal: extremities normal- no gross deformities noted, gait normal and normal muscle tone  Skin: no suspicious lesions or rashes  Neurologic: Gait normal. Reflexes normal and symmetric. Sensation grossly WNL.  Psychiatric: mentation appears normal and affect normal/bright  Hematologic/Lymphatic/Immunologic: Normal cervical lymph nodes        LABORATORY RESULTS:    Recent Labs   Lab Test  11/06/17   0926  10/25/17   0730   08/04/17   0953   NA  141  136   < >  137   POTASSIUM  4.0  4.4   < >  4.3   CHLORIDE  103  100   < >  98   BUN  17  20   < >  12   CR  0.76  0.90   < >  0.81   GLC  110*  116*   < >  75   MAICOL  9.0  8.7   < >   9.2   MAG   --    --    --   2.0    < > = values in this interval not displayed.     Recent Labs   Lab Test  11/06/17   0926  10/25/17   0730  10/02/17   0737   WBC  6.2  8.4  7.7   HGB  12.8*  10.4*  15.2   PLT  199  435  159   MCV  96  98  92   NEUTROPHIL  65.4  69.2  55.1     Recent Labs   Lab Test  11/06/17   0926  10/25/17   0730  10/02/17   0737   BILITOTAL  0.5  0.8  0.8   ALKPHOS  87  87  60   ALT  56  68  43   AST  31  43  22   ALBUMIN  3.5  3.2*  3.5      Imaging  Outside hospital CT abdomen and pelvis with contrast revealed     Pathology  Outside Hospital surgical pathology report reviewed     ASSESSMENT AND PLAN:     57-year-old male with a newly diagnosed    - Poorly differentiated adenocarcinoma colon- pT4N1c(tumor deposits) Stage III (based on [facility] pathology report)  Surgical pathology review at [facility] currently pending.  We spoke in detail about prognosis of  colon cancer as well as the role of adjuvant systemic chemotherapy in improving the disease-free survival in stage III colon cancer. I informed that we will likely be proceeding of modified FOLFOX q2 week regimen for 6 months duration. Side effect profile explained including but not limited to lowering of blood counts, infections, neuropathy, diarrhea, allergic reaction.   Regarding the possibility of positive margin, we'll wait on report of surgical pathology review. If margin positive, will refer to radiation oncology for consideration of adjuvant RT.  We'll order CT chest to complete staging as well as baseline labs including CBC, chemistry panel, CEA levels, Port placement.    - Hepatitis C  To be started on treatment this week by gastroenterology    Return to clinic in 2 weeks for office visit and initiate chemotherapy    The patient is ready to learn, no apparent learning barriers were identified, Diagnosis and treatment plans were explained to the patient. The patient expressed understanding of the content. The patient  questions were answered to his satisfaction.    Padilla Last MD  Attending Physician   Hematology/Medical Oncology        Again, thank you for allowing me to participate in the care of your patient.        Sincerely,        Padilla Last MD

## 2017-11-06 NOTE — MR AVS SNAPSHOT
After Visit Summary   11/6/2017    Rian Malik    MRN: [id number]           Patient Information     Date Of Birth          1960        Visit Information        Provider Department      11/6/2017 1:45 PM Yaakov Burroughs MD [facility] Hepatology        Today's Diagnoses     Chronic hepatitis C without hepatic coma (H)    -  1    Cirrhosis of liver without ascites, unspecified hepatic cirrhosis type (H)           Follow-ups after your visit        Follow-up notes from your care team     Return in about 3 months (around 2/6/2018).      Your next 10 appointments already scheduled     Nov 15, 2017  2:15 PM CST   Nurse Only with MG IMAGING NURSE   [facility]    [address] [place] [id number] [phone]            Nov 15, 2017  2:30 PM CST   CT CHEST W CONTRAST with MGCT1   [facility]    [id number] [place] [id number] [phone]           Please bring any scans or X-rays taken at other hospitals, if similar tests were done. Also bring a list of your medicines, including vitamins, minerals and over-the-counter drugs. It is safest to leave personal items at home.  Be sure to tell your doctor:   If you have any allergies.   If there s any chance you are pregnant.   If you are breastfeeding.   If you have any special needs.  You will have contrast for this exam. To prepare:   Do not eat or drink for 2 hours before your exam. If you need to take medicine, you may take it with small sips of water. (We may ask you to take liquid medicine as well.)   The day before your exam, drink extra fluids at least six 8-ounce glasses (unless your doctor tells you to restrict your fluids).  Patients over 70 or patients with diabetes or kidney problems:   If you haven t had a blood test (creatinine test) within the last 30 days, go to your clinic or Diagnostic Imaging Department for this  test.  If you have diabetes:   If your kidney function is normal, continue taking your metformin (Avandamet, Glucophage, Glucovance, Metaglip) on the day of your exam.   If your kidney function is abnormal, wait 48 hours before restarting this medicine.  Please wear loose clothing, such as a sweat suit or jogging clothes. Avoid snaps, zippers and other metal. We may ask you to undress and put on a hospital gown.  If you have any questions, please call the Imaging Department where you will have your exam.            Nov 15, 2017  3:00 PM CST   CT ABDOMEN PELVIS W CONTRAST with MGCT1   [facility]    [address] [place] [address] [phone]           Please bring any scans or X-rays taken at other hospitals, if similar tests were done. Also bring a list of your medicines, including vitamins, minerals and over-the-counter drugs. It is safest to leave personal items at home.  Be sure to tell your doctor:   If you have any allergies.   If there s any chance you are pregnant.   If you are breastfeeding.   If you have any special needs.  You may have contrast for this exam. To prepare:   Do not eat or drink for 2 hours before your exam. If you need to take medicine, you may take it with small sips of water. (We may ask you to take liquid medicine as well.)   The day before your exam, drink extra fluids at least six 8-ounce glasses (unless your doctor tells you to restrict your fluids).  Patients over 70 or patients with diabetes or kidney problems:   If you haven t had a blood test (creatinine test) within the last 30 days, go to your clinic or Diagnostic Imaging Department for this test.  If you have diabetes:   If your kidney function is normal, continue taking your metformin (Avandamet, Glucophage, Glucovance, Metaglip) on the day of your exam.   If your kidney function is abnormal, wait 48 hours before restarting this medicine.  You will have oral  contrast for this exam:   You will drink the contrast at home. Get this from your clinic or Diagnostic Imaging Department. Please follow the directions given.  Please wear loose clothing, such as a sweat suit or jogging clothes. Avoid snaps, zippers and other metal. We may ask you to undress and put on a hospital gown.  If you have any questions, please call the Imaging Department where you will have your exam.            Nov 20, 2017  3:00 PM CST   Return Visit with NURSE ONLY CANCER CENTER   [facility] ([facility])    [address] [place] [address]   [phone]            Nov 20, 2017  3:45 PM CST   Return Visit with Padilla Last MD   [facility]    [address] [place] [address]   [phone]            Nov 21, 2017 11:00 AM CST   Fl[place] with BAY 10 INFUSION   [facility])    [address] [place] [id number] [phone]              Future tests that were ordered for you today     Open Future Orders        Priority Expected Expires Ordered    IR Chest Port Placement > 5 Yrs of Age Routine  11/6/2018 11/6/2017    CT Chest w Contrast Routine  11/6/2018 11/6/2017            Who to contact     If you have questions or need follow up information about today's clinic visit or your schedule please contact [facility] HEPATOLOGY directly at [phone].  Normal or non-critical lab and imaging results will be communicated to you by MyChart, letter or phone within 4 business days after the clinic has received the results. If you do not hear from us within 7 days, please contact the clinic through [employer] or phone. If you have a critical or abnormal lab result, we will notify you by phone as soon as possible.  Submit refill requests through [employer] or call your pharmacy and they will forward the refill request to us. Please allow 3 business days  "for your refill to be completed.          Additional Information About Your Visit        MyChart Information     [employer] gives you secure access to your electronic health record. If you see a primary care provider, you can also send messages to your care team and make appointments. If you have questions, please call your primary care clinic.  If you do not have a primary care provider, please call [phone] and they will assist you.        Care EveryWhere ID     This is your Care EveryWhere ID. This could be used by other organizations to access your [facility] medical records  [id number]        Your Vitals Were     Pulse Temperature Height BMI (Body Mass Index)          80 97.7  F (36.5  C) (Oral) 1.727 m (5' 8\") 21.99 kg/m2         Blood Pressure from Last 3 Encounters:   11/06/17 117/73   11/06/17 123/74   10/26/17 120/77    Weight from Last 3 Encounters:   11/06/17 65.6 kg (144 lb 9.6 oz)   11/06/17 65.7 kg (144 lb 14.4 oz)   10/26/17 64.9 kg (143 lb)              Today, you had the following     No orders found for display       Primary Care Provider Office Phone # Fax #    Moreno Harris [phone]       [address] [facility] [address]        Equal Access to Services     [facility]: Hadii aad ku hadasho Soomaali, waaxda luqadaha, qaybta kaalmada adeegyada, alisa leon . So [facility] [phone].    ATENCIÓN: Si habla español, tiene a martino disposición servicios gratuitos de asistencia lingüística. Llame al [phone].    We comply with applicable federal civil rights laws and [place] laws. We do not discriminate on the basis of race, color, national origin, age, disability, sex, sexual orientation, or gender identity.            Thank you!     Thank you for choosing [facility] HEPATOLOGY  for your care. Our goal is always to provide you with excellent care. Hearing back from our patients is one way we can continue to improve our services. Please " take a few minutes to complete the written survey that you may receive in the mail after your visit with us. Thank you!             Your Updated Medication List - Protect others around you: Learn how to safely use, store and throw away your medicines at www.disposemymeds.org.          This list is accurate as of: 11/6/17  4:36 PM.  Always use your most recent med list.                   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

## 2017-11-06 NOTE — NURSING NOTE
"Oncology Rooming Note    November 6, 2017 7:58 AM   Rian Malik is a 57 year old male who presents for:    No chief complaint on file.    Initial Vitals: /74  Pulse 70  Temp 97.9  F (36.6  C) (Oral)  Resp 16  Wt 65.7 kg (144 lb 14.4 oz)  SpO2 98%  BMI 22.03 kg/m2 Estimated body mass index is 22.03 kg/(m^2) as calculated from the following:    Height as of 10/26/17: 1.727 m (5' 8\").    Weight as of this encounter: 65.7 kg (144 lb 14.4 oz). Body surface area is 1.78 meters squared.  No Pain (0) Comment: Data Unavailable   No LMP for male patient.  Allergies reviewed: Yes  Medications reviewed: Yes    Medications: Medication refills not needed today.  Pharmacy name entered into [facility]:    [facility] - [place], [place] - [address]  [facility] - [place], [place] - [address]         12 minutes for nursing intake (face to face time)     DAREK JETER RN                "

## 2017-11-06 NOTE — PROGRESS NOTES
"Chemotherapy Education Notes    Met with patient in clinic for chemotherapy education on FOLFOX regimen. [facility] Oncology handouts on 5-FU, Leucovorin, and Oxaliplatin (dated 11/6/17) were discussed and given to patient to take home.  Reviewed the following with the patient:     Treatment Goal/Regimen/Duration: rationale for strict adherence, specific medication names including pre-treatment medications, and at home scheduled or as-needed medications, medication delivery methods; chemotherapy side effects and management of side effects, including: skin changes/nail changes, anemia, neutropenia, thrombocytopenia, diarrhea/constipation, nausea/vomiting, hair loss, memory changes, mouth sores, taste changes, appetite changes, peripheral neuropathy, fatigue, cold sensitivity, and myelosuppression.  Infection prevention, and monitoring of lab values, what lab tests and what changes of these values meant, along with the possibility of IV hydration or blood product transfusion, or the need to defer or hold treatment.  Also reviewed signs/symptoms that should be reported to care team or on-call provider immediately, including: temperature of 100.4 degrees or higher, shortness of breath, chest pain, unusual bruising, bleeding symptoms, extreme fatigue, >4-6 episodes of diarrhea in 24 hours, uncontrolled nausea/vomiting, symptoms of dehydration (severe dry mouth/severe thirst, rapid heart beat, dizziness, dark or decreased urination, and lethargy), or symptoms of DVT (sudden onset redness, swelling, tenderness, and warmth of extremity).      General Chemotherapy Information, including: what to do if needing to miss a treatment, and when to call the provider.  Importance of Central line care (port-a-cath) or IV site care.  Reviewed Port book, miguel a Rubin handout \"Vascular Access Port Implantation,\" discussed port placement procedure and rationale for port placement.     Written Information: Patient was provided with the [facility] " "[facility] \"My Cancer Guidebook\" binder, which includes information on the following: \"Getting Ready for Chemotherapy: What to Expect, Before, During, and After your Treatment,\" printouts from [employer] on specific chemotherapy drugs, \"Eating Hints: Before, During, and After Cancer Treatment,\" printouts on possible chemotherapy side effects/ways to cope with side effects, \"When to Call the Doctor,\" and \"Self-Care Tips During Cancer Treatment.\"  Also, information regarding various programs offered at [facility], and our business card with contact information given for the following: Oncology Clinic/scheduling, RN Care Coordinator, and the after-hours Nurse Advice Line.    No barriers to learning identified. Patient verbalized understanding of all written and verbal information. All questions answered patient s satisfaction.  Learning barriers and method preference are documented in the patient education flowsheet.  Patient encouraged to bring the \"My Cancer Guidebook\" binder with to each appointment, and encouraged to utilize the binder as a resource/reference guide during chemotherapy treatment and beyond.    General Orientation to the Medical Oncology department, Infusion Services department, scheduling, bathrooms, and usual flow of the treatment day provided.     Patient instructed to call with further questions or concerns.  Patient states understanding and is in agreement with this plan.  Copy of future appointments, and after visit summary (AVS) given to patient. Patient discharged ambulatory.     Face to Face Time with Patient: 45 minutes    Keenan Clarke RN, BSN, OCN  Oncology Care Coordinator  [facility]  "

## 2017-11-07 ENCOUNTER — TRANSFERRED RECORDS (OUTPATIENT)
Dept: HEALTH INFORMATION MANAGEMENT | Facility: CLINIC | Age: 57
End: 2017-11-07

## 2017-11-07 ENCOUNTER — HOME INFUSION (PRE-WILLOW HOME INFUSION) (OUTPATIENT)
Dept: PHARMACY | Facility: CLINIC | Age: 57
End: 2017-11-07

## 2017-11-07 NOTE — PROGRESS NOTES
Therapy: 5FU  Insurance: [employer] (pt has home disconnect coverage)   Ded: $6950   Met: $6867    Co-Insurance: 80%  Max Out of Pocket: $7150  Met: $7150    Please contact Intake with any questions, [phone] or In Basket pool, [facility] ([phone]).  In reference to referral made on 11/6/17 to check chemo therapy

## 2017-11-08 ENCOUNTER — CARE COORDINATION (OUTPATIENT)
Dept: ONCOLOGY | Facility: CLINIC | Age: 57
End: 2017-11-08

## 2017-11-08 NOTE — PROGRESS NOTES
Received faxed report from [facility] with details regarding patient's recent port placement.  Doc flowsheets updated with new port information.  Report labeled for EMR scanning.    Keenan Clarke RN, BSN, OCN  Oncology Care Coordinator  [facility]

## 2017-11-09 ENCOUNTER — CARE COORDINATION (OUTPATIENT)
Dept: ONCOLOGY | Facility: CLINIC | Age: 57
End: 2017-11-09

## 2017-11-09 LAB — COPATH REPORT: NORMAL

## 2017-11-09 NOTE — PROGRESS NOTES
Dr. Last has reviewed results of pathology consult - patient has positive surgical margins.  Dr. Last is requesting Radiation Oncology consult to evaluate for role of adjuvant radiation therapy, and would like patient to meet with Radiation Oncology prior to his 11/20 visit.  Dr. Last has placed the referral order for this consult.  Attempted to reach patient via telephone to discuss recommendations and plan.  Voicemail message was left, asking patient to return call to discuss.  Message was sent to Radiation Oncology scheduling team with new consult request.    Update 11/13/17 12:43 pm - Noted patient still has not returned call to discuss above recommendations.  Another voicemail message was left for patient this afternoon with above information and appointment details for Rad Onc consult on 11/14.  Direct phone number for this RN was left in message.    Keenan Clarke, RN, BSN, OCN  Oncology Care Coordinator  [facility]

## 2017-11-13 ENCOUNTER — TELEPHONE (OUTPATIENT)
Dept: RADIATION ONCOLOGY | Facility: CLINIC | Age: 57
End: 2017-11-13

## 2017-11-13 ENCOUNTER — ALLIED HEALTH/NURSE VISIT (OUTPATIENT)
Dept: PHARMACY | Facility: CLINIC | Age: 57
End: 2017-11-13
Payer: COMMERCIAL

## 2017-11-13 DIAGNOSIS — B18.2 CHRONIC HEPATITIS C WITHOUT HEPATIC COMA (H): Primary | ICD-10-CM

## 2017-11-13 PROCEDURE — 99207 ZZC NO CHARGE LOS: CPT | Performed by: PHARMACIST

## 2017-11-13 NOTE — PATIENT INSTRUCTIONS
Recommendations from today's MTM visit:                                                      Concerning Zepatier Therapy:   -You will be taking Zepatier, 1 tablet daily for 12 weeks with or without food    -If you start any new medications, please call to discuss drug interactions with me before starting   -If you miss a dose, and it has been less than 12 hours, you may take the dose. If it has been more, skip the dose and take your next dose as normal. Do not take 2 doses at the same time.    -Most common side effects are Fatigue, Headache, and Nausea.   -Set an alarm in your kitchen, to remind you to to take the Zepatier daily.    -You will have to set up your 1 month, 3 month, and 6 month labs.     Next MTM visit: as needed    To schedule another MTM appointment, please call the clinic directly or you may call the MTM scheduling line at [phone] or toll-free at [phone].     My Clinical Pharmacist's contact information:                                                      It was a pleasure seeing you today!  Please feel free to contact me with any questions or concerns you have.      Edgar Cortes, PharmBRUNA  MTM Pharmacist    Phone: [phone]     You may receive a survey about the MTM services you received.  I would appreciate your feedback to help me serve you better in the future. Please fill it out and return it when you can. Your comments will be anonymous.

## 2017-11-13 NOTE — MR AVS SNAPSHOT
After Visit Summary   11/13/2017    Rian Malik    MRN: [id number]           Patient Information     Date Of Birth          1960        Visit Information        Provider Department      11/13/2017 3:00 [facility]        Today's Diagnoses     Chronic hepatitis C without hepatic coma (H)    -  1      Care Instructions    Recommendations from today's MTM visit:                                                      Concerning Zepatier Therapy:   -You will be taking Zepatier, 1 tablet daily for 12 weeks with or without food    -If you start any new medications, please call to discuss drug interactions with me before starting   -If you miss a dose, and it has been less than 12 hours, you may take the dose. If it has been more, skip the dose and take your next dose as normal. Do not take 2 doses at the same time.    -Most common side effects are Fatigue, Headache, and Nausea.   -Set an alarm in your kitchen, to remind you to to take the Zepatier daily.    -You will have to set up your 1 month, 3 month, and 6 month labs.     Next MTM visit: as needed    To schedule another MTM appointment, please call the clinic directly or you may call the MTM scheduling line at [phone] or toll-free at [phone].     My Clinical Pharmacist's contact information:                                                      It was a pleasure seeing you today!  Please feel free to contact me with any questions or concerns you have.      Edgar Cortes, PharmD  MTM Pharmacist    Phone: [phone]     You may receive a survey about the MTM services you received.  I would appreciate your feedback to help me serve you better in the future. Please fill it out and return it when you can. Your comments will be anonymous.              Follow-ups after your visit        Your next 10 appointments already scheduled     Nov 14, 2017  8:00 AM CST   New Visit with Abby  Deniz Daniels MD   [facility] ([facility])    [address] [facility] [address]   [phone]            Nov 14, 2017  9:30 AM CST   Ct Sim with Abby Daniels MD, MG RT SIMULATION   [facility] ([facility])    [address] [facility] [address]   [phone]            Nov 15, 2017  2:15 PM CST   Nurse Only with MG IMAGING NURSE   [facility] ([facility])    [address] [facility] [address]   [phone]            Nov 15, 2017  2:30 PM CST   CT CHEST W CONTRAST with MGCT1   [facility])    [address] [facility] [address]   [phone]           Please bring any scans or X-rays taken at other hospitals, if similar tests were done. Also bring a list of your medicines, including vitamins, minerals and over-the-counter drugs. It is safest to leave personal items at home.  Be sure to tell your doctor:   If you have any allergies.   If there s any chance you are pregnant.   If you are breastfeeding.   If you have any special needs.  You will have contrast for this exam. To prepare:   Do not eat or drink for 2 hours before your exam. If you need to take medicine, you may take it with small sips of water. (We may ask you to take liquid medicine as well.)   The day before your exam, drink extra fluids at least six 8-ounce glasses (unless your doctor tells you to restrict your fluids).  Patients over 70 or patients with diabetes or kidney problems:   If you haven t had a blood test (creatinine test) within the last 30 days, go to your clinic or Diagnostic Imaging Department for this test.  If you have diabetes:   If your kidney function is normal, continue taking your metformin (Avandamet, Glucophage, Glucovance, Metaglip) on the day of your exam.   If your kidney function is abnormal, wait 48 hours before restarting  this medicine.  Please wear loose clothing, such as a sweat suit or jogging clothes. Avoid snaps, zippers and other metal. We may ask you to undress and put on a hospital gown.  If you have any questions, please call the Imaging Department where you will have your exam.            Nov 15, 2017  3:00 PM CST   CT ABDOMEN PELVIS W CONTRAST with MGCT1   [facility] ([facility])    [address] [place] [address] [phone]           Please bring any scans or X-rays taken at other hospitals, if similar tests were done. Also bring a list of your medicines, including vitamins, minerals and over-the-counter drugs. It is safest to leave personal items at home.  Be sure to tell your doctor:   If you have any allergies.   If there s any chance you are pregnant.   If you are breastfeeding.   If you have any special needs.  You may have contrast for this exam. To prepare:   Do not eat or drink for 2 hours before your exam. If you need to take medicine, you may take it with small sips of water. (We may ask you to take liquid medicine as well.)   The day before your exam, drink extra fluids at least six 8-ounce glasses (unless your doctor tells you to restrict your fluids).  Patients over 70 or patients with diabetes or kidney problems:   If you haven t had a blood test (creatinine test) within the last 30 days, go to your clinic or Diagnostic Imaging Department for this test.  If you have diabetes:   If your kidney function is normal, continue taking your metformin (Avandamet, Glucophage, Glucovance, Metaglip) on the day of your exam.   If your kidney function is abnormal, wait 48 hours before restarting this medicine.  You will have oral contrast for this exam:   You will drink the contrast at home. Get this from your clinic or Diagnostic Imaging Department. Please follow the directions given.  Please wear loose clothing, such as a sweat suit or jogging clothes. Avoid snaps,  zippers and other metal. We may ask you to undress and put on a hospital gown.  If you have any questions, please call the Imaging Department where you will have your exam.            Nov 20, 2017  3:00 PM CST   Return Visit with NURSE ONLY CANCER CENTER   [facility]    [address] [place] [address]   [phone]            Nov 20, 2017  3:45 PM CST   Return Visit with Padilla Last MD   [facility] ([facility])    [address] [place] [address]   [phone]            Nov 21, 2017 11:00 AM CST   Flofox with BAY 10 INFUSION   [facility])    [address] [place] [id number] [phone]              Who to contact     If you have questions or need follow up information about today's clinic visit or your schedule please contact [facility] MEDICATION THERAPY MANAGEMENT directly at [phone].  Normal or non-critical lab and imaging results will be communicated to you by [employer], letter or phone within 4 business days after the clinic has received the results. If you do not hear from us within 7 days, please contact the clinic through [employer] or phone. If you have a critical or abnormal lab result, we will notify you by phone as soon as possible.  Submit refill requests through [employer] or call your pharmacy and they will forward the refill request to us. Please allow 3 business days for your refill to be completed.          Additional Information About Your Visit        [employer] Information     [employer] gives you secure access to your electronic health record. If you see a primary care provider, you can also send messages to your care team and make appointments. If you have questions, please call your primary care clinic.  If you do not have a primary care provider, please call [phone] and they will assist you.        Care EveryWhere ID      This is your Care EveryWhere ID. This could be used by other organizations to access your [facility] medical records  [id number]         Blood Pressure from Last 3 Encounters:   11/06/17 117/73   11/06/17 123/74   10/26/17 120/77    Weight from Last 3 Encounters:   11/06/17 144 lb 9.6 oz (65.6 kg)   11/06/17 144 lb 14.4 oz (65.7 kg)   10/26/17 143 lb (64.9 kg)              Today, you had the following     No orders found for display       Primary Care Provider Office Phone # Fax #    Moreno Harris [phone]       [address] [facility] [address]        Equal Access to Services     [facility] CampusALESSANDRO : Hadii cameron ku hadasho Soomaali, waaxda luqadaha, qaybta kaalmada adeegyada, waxay rosey leon . So [facility] [phone].    ATENCIÓN: Si habla español, tiene a martino disposición servicios gratuitos de asistencia lingüística. Ll[facility] [phone].    We comply with applicable federal civil rights laws and [place] laws. We do not discriminate on the basis of race, color, national origin, age, disability, sex, sexual orientation, or gender identity.            Thank you!     Thank you for choosing [facility] MEDICATION THERAPY MANAGEMENT  for your care. Our goal is always to provide you with excellent care. Hearing back from our patients is one way we can continue to improve our services. Please take a few minutes to complete the written survey that you may receive in the mail after your visit with us. Thank you!             Your Updated Medication List - Protect others around you: Learn how to safely use, store and throw away your medicines at www.disposemymeds.org.          This list is accurate as of: 11/13/17  3:15 PM.  Always use your most recent med list.                   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

## 2017-11-13 NOTE — TELEPHONE ENCOUNTER
Left voicemail with patient about consult date,time, and location with Dr Daniels and to call clinic back with any questions

## 2017-11-13 NOTE — PROGRESS NOTES
HCV Consult: Pt started on Zepatier x 12 weeks on the 11/10/17. Taking in the evening at 4 PM.     Medication Adherence/Access: Does not really take any chronic medications, no issues thus far. He takes it right when he gets home from work.     -[facility] Use Only -   Genotype: 1a   Viral Load and date drawn: 3.5 million in 10/2017   Previous treatment: Treatment naive   Liver staging: F4   Child Clement score: A   HIV positive: unknown   Hep B Hx: susceptible   Renal impairment CrCl <30mL/min: No   Decompensated Cirrhosis: No   Recurrent HCV post-liver transplant: No   Hepatocellular Carcinoma: No   Initial Regimen: Zepatier (elbasvir/grazoprevir) (NS5A resistance not detected 10/17)   Length of Therapy Ordered: 12 weeks   DDIx with HCV therapy: none   MTM Evaluation: HCV therapy appropriate and Length of therapy appropriate   Recommended Interventions: none     Discussed with patient...   -Adherence- pt to set alarm in the kitchen to remind him of his Zepatier dose. No issues thus far, he takes it right after work.   -lab follow up- 1, 3, 6 month   -Missed dose protocol   -Common side effects   -DDIx- pt will be starting Chemo soon (FOLFOX: Fluorouracil and Oxaliplatin), no issues with these medications. Pt to call if he has concerns about others.     Beena Deng RN...  1. Pt started Zepatier therapy on 11/10/17, need labs entered.    Edgar Cortes, PharmD  MTM Pharmacist    Phone: [phone]

## 2017-11-14 ENCOUNTER — OFFICE VISIT (OUTPATIENT)
Dept: RADIATION ONCOLOGY | Facility: CLINIC | Age: 57
End: 2017-11-14
Payer: COMMERCIAL

## 2017-11-14 VITALS
OXYGEN SATURATION: 99 % | DIASTOLIC BLOOD PRESSURE: 68 MMHG | RESPIRATION RATE: 18 BRPM | BODY MASS INDEX: 22.85 KG/M2 | HEART RATE: 71 BPM | TEMPERATURE: 97.6 F | HEIGHT: 68 IN | SYSTOLIC BLOOD PRESSURE: 114 MMHG | WEIGHT: 150.8 LBS

## 2017-11-14 DIAGNOSIS — C18.7 ADENOCARCINOMA OF SIGMOID COLON (H): Primary | ICD-10-CM

## 2017-11-14 DIAGNOSIS — C18.7 MALIGNANT NEOPLASM OF SIGMOID COLON (H): Primary | ICD-10-CM

## 2017-11-14 PROCEDURE — 99205 OFFICE O/P NEW HI 60 MIN: CPT | Performed by: RADIOLOGY

## 2017-11-14 ASSESSMENT — ENCOUNTER SYMPTOMS
TREMORS: 0
FOCAL WEAKNESS: 0
WEAKNESS: 0
SEIZURES: 0
HEADACHES: 1
MUSCULOSKELETAL NEGATIVE: 1
WEIGHT LOSS: 1
EYES NEGATIVE: 1
SPEECH CHANGE: 0
VOMITING: 0
DIARRHEA: 0
TINGLING: 0
HEARTBURN: 0
FEVER: 0
CONSTIPATION: 0
CHILLS: 0
DIZZINESS: 0
CARDIOVASCULAR NEGATIVE: 1
DIAPHORESIS: 0
ABDOMINAL PAIN: 1
RESPIRATORY NEGATIVE: 1
BLOOD IN STOOL: 0
LOSS OF CONSCIOUSNESS: 0
SENSORY CHANGE: 0
PSYCHIATRIC NEGATIVE: 1
NAUSEA: 0

## 2017-11-14 ASSESSMENT — PAIN SCALES - GENERAL: PAINLEVEL: NO PAIN (0)

## 2017-11-14 NOTE — LETTER
11/14/2017        RE: Rian Malik  [address] [place] [place] [address]        Dear Colleagues,  Today I had the pleasure of seeing this patient in consultation.     IDENTIFICATION: Mr. Malik is a 57-year-old gentleman with history of Hep C recently diagnosed with sigmoid colon cancer s/p resection on 10/11/17 referred for radiation therapy for a positive margin.  HISTORY OF PRESENT ILLNESS: Mr. Malik is a 57-year-old gentleman with history of Hep C.  He was in his otherwise usual state of health until about 7 months ago when he started experiencing intermittent severe abdominal pain.  He denies any n/v/f/c/d/c or hematochezia or melena.  He was initially started on a course of antibiotics without any resolution.    He then was referred to Dr. Darrell Schneider for further management.  The antibiotic regimen was changed but he again failed to improve.  Scans were ordered.  On 5/5/17 AP CT  Showed short segment circumferential thickening and enhancement of the redundant sigmoid colon in the right lower quadrant. Infectious or inflammatory colitis are the most likely differential considerations, though malignancy is not definitively excluded. Within this, there is suggestion of a small intramural abscess in the colonic wall. No extra luminal abscess, evidence for perforation or significant mechanical obstruction.  CT was repeated on 5/26/17 and showed interval increase in circumferential thickening and hyperenhancement within a portion of the sigmoid colon.  His symptoms persisted.  In July 2017 he was admitted to [facility] for several days for presumed colitis and given IV abx.   On 7/25/17 AP CT showed interval slight increase in segmental mural thickening and irregular mucosal hyperenhancement of the sigmoid colon. Findings suggest colitis, and underlying mass remains possibility. Increased mild free fluid in the pelvis without extracolonic abscess.  On 8/4/17 he had a colonoscopy that  found a stricture at 40 cm proximal to the anus. Inflammatory appearing and unable to be traversed.  The distal rectum and anal verge were normal on retroflexion view.  An upper endoscopy was also completed showing normal duodenum and stomach. Esophageal mucosal changes classified as Maldonado's stage C0-M2 per [place] criteria. Nodule at 39 cm was biopsied.  Sigmoid colon and esophageal biopsies were both negative for intestinal metaplasia/dysplasia or malignancy.  On 10/11/17 he was taken to the OR by Dr. Schneider for a presumed abcess of the sigmoid colon.  He had a laparoscopic converted to open sigmoidectomy with end colostomy.  The operative report details that an inflammatory mass was noted in the RLQ that was fixed. The bowel was very firm and had small firm nodules on the wall, one which was sent for frozen.  The BOGADN was dissected. Transection was completed 12-15cm from the mass at the proximal bowel.  The rectum was thickened on its anterior surface as far as could be palpated so an end colostomy was completed with a Burnett's pouch.  The sigmoid was carefully feed and the specimen was sent to pathology.  During the surgery he also had a cystoscopy and left stent placement.  Pathology revealed a 5cm poorly differentiated adenocarcinoma within the sigmoid colon with histologic features suggestive of microsatellite instability.  Tumor was present on the serosal surface and perforated the serosal surface.  The margins were undesignated. There was a 10cm mesenteric margin.  +LVSI. -PNI. 0/3 lymph nodes.  His postopererative course was complicated by the development a ~11cm hematoma. Postoperative scans completed on 10/16/17 also detail free air in the abdomen.    After he recovered he was referred to Dr. Last in Med Onc and FOLFOX was recommended.  A port was place.  He was referred to Rad Onc given the positive margin.  Currently the patient has some residual minimal abdominal pain for which he takes one oxycodone  "a day.  He also recalls that prior to surgery he abruptly lost about 10lbs.  He has some longstanding lower back pain that is unchanged.  He otherwise has no complaints.  He specifically denies any n/v/f/c/d/c, sob, cp, bony pain or new neurologic symptoms. He also denies any hematochezia or melena.  His colostomy is functioning well. He is being seen here today in consultation for consideration for adjuvant radiation therapy.    REVIEW OF SYSTEMS: As per HPI, a 14-point review of systems is otherwise negative.     PAST RADIATION THERAPY:  Denies.    PAST CTD/PACEMAKER:  Denies      Past Medical History:   Diagnosis Date     Cirrhosis (H)      Colon cancer (H) 10/11/2017    Poorly differentiated adenocarcinoma     Hepatitis C        Past Surgical History:   Procedure Laterality Date     COLONOSCOPY N/A 8/4/2017    Procedure: COMBINED COLONOSCOPY, SINGLE OR MULTIPLE BIOPSY/POLYPECTOMY BY BIOPSY;  Colon & EGD;  Surgeon: Cristobal Blanco MD;  Location: [facility]     COLOSTOMY Left 10/11/2017     CYSTOSCOPY AND LEFT STENT PLACEMENT  10/11/2017    Dr. Reyna     ESOPHAGOSCOPY, GASTROSCOPY, DUODENOSCOPY (EGD), COMBINED N/A 8/4/2017    Procedure: COMBINED ESOPHAGOSCOPY, GASTROSCOPY, DUODENOSCOPY (EGD), BIOPSY SINGLE OR MULTIPLE;;  Surgeon: Cristobal Blanco MD;  Location: [facility]     HERNIA REPAIR Left 1988    Left inguinal     LAPAROSCOPIC CONVERTED TO OPEN SIGMOIDECTOMY WITH END COLOSTOMY  10/11/2017    Dr. Schneider       Family History   Problem Relation Age of Onset     Skin Cancer Sister        Social History   Substance Use Topics     Smoking status: Former Smoker     Packs/day: 0.10     Years: 3.00     Types: Cigarettes     Start date: 11/6/2014     Quit date: 10/6/2017     Smokeless tobacco: Never Used      Comment:       Alcohol use No       PHYSICAL EXAMINATION:  /68 (BP Location: Left arm, Patient Position: Chair, Cuff Size: Adult Regular)  Pulse 71  Temp 97.6  F (36.4  C) (Oral)  Resp 18  Ht 5' 8\"  Wt 150 lb 12.8 oz "  SpO2 99%  BMI 22.93 kg/m2   GENERAL                         Well-appearing middle aged gentleman in no acute distress.  HEENT                               Normocephalic, atraumatic.  Sclerae anicteric.  CARDIOVASCULAR       Regular rate and rhythm.  No murmurs, rubs, or gallops.  LUNGS                               Clear to auscultation bilaterally.  ABDOMEN                       Soft.  Midline incision inferior to umbilicus is well healed.  Colostomy intact with good output, no blood. No hepatosplenomegaly.  Positive bowel sounds.  EXTREMITIES                   No clubbing or cyanosis.    NEUROLOGICAL              Cranial nerves grosslyI intact.  5/5 strength in bilateral upper and lower proximal and distal extremities. Sensation intact in all areas tested.  MUSCULOSKELETAL       Good ROM, no spinal tenderness.  SKIN                                     Firm subcutaneous nodule in upper right chest consisent with port placement.  Scar well healed. Skin is otherwise warm and well perfused.   longitudinal scar of the abdomen is well healed. CDI  PSYCHIATRIC     Alert and oriented x 3    IMPRESSION/PLAN: Mr. Malik is a 57-year-old gentleman with history of Hep C recently diagnosed with sigmoid colon cancer s/p resection on 10/11/17 referred for radiation therapy for a positive margin.  In reviewing the op note it details that the tumor/inflammatory mass was noted in the RLQ, fixed, and that there were firm nodules on the wall.  Pathology shows a 5cm poorly differentiated adenocarcinoma with tumor present on the serosal surface and perforating the serosal surface.  3 nodes were removed at the time of surgery.  Ideally for a cancer of this type the patient would have a more extensive surgery that might include BOGDAN and superior rectal dissection to achieve at least 12 nodes removed.  Also given the positive serosal margin he is at increased risk for perotineal caricnomatosis.  I recommend that he be seen by  Surgical Oncology for consideration for additional surgery i.e. completion LAR, full sigmoidectomy +/- peritonectomy with resection of additional nodes and/or cytoreductive surgery.  He should also be considered for HIPAC.    If repeat scans and CEA level done now show much disease he might benefit from neoadjuvant chemotherapy prior to surgery.  However I will leave the timing of these scans to be determined by Med Onc/Surg Onc.  In regards to radiation therapy, I would not recommend radiation therapy for this patient at this time.  All of this information was relayed to the patient and he agreed with the plan.   There was ample time for questions and all were answered to the patient's satisfaction. Thank you for allowing me to participate in the care of this pleasant patient. If you have any questions, please do not hesitate to contact my office.      Sincerely,  Deniz Daniels MD

## 2017-11-14 NOTE — PROGRESS NOTES
Dear Colleagues,  Today I had the pleasure of seeing this patient in consultation.     IDENTIFICATION: Mr. Malik is a 57-year-old gentleman with history of Hep C recently diagnosed with sigmoid colon cancer s/p resection on 10/11/17 referred for radiation therapy for a positive margin.  HISTORY OF PRESENT ILLNESS: Mr. Malik is a 57-year-old gentleman with history of Hep C.  He was in his otherwise usual state of health until about 7 months ago when he started experiencing intermittent severe abdominal pain.  He denies any n/v/f/c/d/c or hematochezia or melena.  He was initially started on a course of antibiotics without any resolution.    He then was referred to Dr. Darrell Schneider for further management.  The antibiotic regimen was changed but he again failed to improve.  Scans were ordered.  On 5/5/17 AP CT  Showed short segment circumferential thickening and enhancement of the redundant sigmoid colon in the right lower quadrant. Infectious or inflammatory colitis are the most likely differential considerations, though malignancy is not definitively excluded. Within this, there is suggestion of a small intramural abscess in the colonic wall. No extra luminal abscess, evidence for perforation or significant mechanical obstruction.  CT was repeated on 5/26/17 and showed interval increase in circumferential thickening and hyperenhancement within a portion of the sigmoid colon.  His symptoms persisted.  In July 2017 he was admitted to [facility] for several days for presumed colitis and given IV abx.   On 7/25/17 AP CT showed interval slight increase in segmental mural thickening and irregular mucosal hyperenhancement of the sigmoid colon. Findings suggest colitis, and underlying mass remains possibility. Increased mild free fluid in the pelvis without extracolonic abscess.  On 8/4/17 he had a colonoscopy that found a stricture at 40 cm proximal to the anus. Inflammatory appearing and unable to be  traversed.  The distal rectum and anal verge were normal on retroflexion view.  An upper endoscopy was also completed showing normal duodenum and stomach. Esophageal mucosal changes classified as Maldonado's stage C0-M2 per McQueeney criteria. Nodule at 39 cm was biopsied.  Sigmoid colon and esophageal biopsies were both negative for intestinal metaplasia/dysplasia or malignancy.  On 10/11/17 he was taken to the OR by Dr. Schneider for a presumed abcess of the sigmoid colon.  He had a laparoscopic converted to open sigmoidectomy with end colostomy.  The operative report details that an inflammatory mass was noted in the RLQ that was fixed. The bowel was very firm and had small firm nodules on the wall, one which was sent for frozen.  The BOGDAN was dissected. Transection was completed 12-15cm from the mass at the proximal bowel.  The rectum was thickened on its anterior surface as far as could be palpated so an end colostomy was completed with a Burnett's pouch.  The sigmoid was carefully feed and the specimen was sent to pathology.  During the surgery he also had a cystoscopy and left stent placement.  Pathology revealed a 5cm poorly differentiated adenocarcinoma within the sigmoid colon with histologic features suggestive of microsatellite instability.  Tumor was present on the serosal surface and perforated the serosal surface.  The margins were undesignated. There was a 10cm mesenteric margin.  +LVSI. -PNI. 0/3 lymph nodes.  His postopererative course was complicated by the development a ~11cm hematoma. Postoperative scans completed on 10/16/17 also detail free air in the abdomen.    After he recovered he was referred to Dr. Last in Med Onc and FOLFOX was recommended.  A port was place.  He was referred to Rad Onc given the positive margin.  Currently the patient has some residual minimal abdominal pain for which he takes one oxycodone a day.  He also recalls that prior to surgery he abruptly lost about 10lbs.  He has some  "longstanding lower back pain that is unchanged.  He otherwise has no complaints.  He specifically denies any n/v/f/c/d/c, sob, cp, bony pain or new neurologic symptoms. He also denies any hematochezia or melena.  His colostomy is functioning well. He is being seen here today in consultation for consideration for adjuvant radiation therapy.    REVIEW OF SYSTEMS: As per HPI, a 14-point review of systems is otherwise negative.     PAST RADIATION THERAPY:  Denies.    PAST CTD/PACEMAKER:  Denies      Past Medical History:   Diagnosis Date     Cirrhosis (H)      Colon cancer (H) 10/11/2017    Poorly differentiated adenocarcinoma     Hepatitis C        Past Surgical History:   Procedure Laterality Date     COLONOSCOPY N/A 8/4/2017    Procedure: COMBINED COLONOSCOPY, SINGLE OR MULTIPLE BIOPSY/POLYPECTOMY BY BIOPSY;  Colon & EGD;  Surgeon: Cristobal Blanco MD;  Location: [facility]     COLOSTOMY Left 10/11/2017     CYSTOSCOPY AND LEFT STENT PLACEMENT  10/11/2017    Dr. Reyna     ESOPHAGOSCOPY, GASTROSCOPY, DUODENOSCOPY (EGD), COMBINED N/A 8/4/2017    Procedure: COMBINED ESOPHAGOSCOPY, GASTROSCOPY, DUODENOSCOPY (EGD), BIOPSY SINGLE OR MULTIPLE;;  Surgeon: Cristobal Blanco MD;  Location: [facility]     HERNIA REPAIR Left 1988    Left inguinal     LAPAROSCOPIC CONVERTED TO OPEN SIGMOIDECTOMY WITH END COLOSTOMY  10/11/2017    Dr. Schneider       Family History   Problem Relation Age of Onset     Skin Cancer Sister        Social History   Substance Use Topics     Smoking status: Former Smoker     Packs/day: 0.10     Years: 3.00     Types: Cigarettes     Start date: 11/6/2014     Quit date: 10/6/2017     Smokeless tobacco: Never Used      Comment:       Alcohol use No       PHYSICAL EXAMINATION:  /68 (BP Location: Left arm, Patient Position: Chair, Cuff Size: Adult Regular)  Pulse 71  Temp 97.6  F (36.4  C) (Oral)  Resp 18  Ht 5' 8\"  Wt 150 lb 12.8 oz  SpO2 99%  BMI 22.93 kg/m2   GENERAL                         Well-appearing middle aged " gentleman in no acute distress.  HEENT                               Normocephalic, atraumatic.  Sclerae anicteric.  CARDIOVASCULAR       Regular rate and rhythm.  No murmurs, rubs, or gallops.  LUNGS                               Clear to auscultation bilaterally.  ABDOMEN                       Soft.  Midline incision inferior to umbilicus is well healed.  Colostomy intact with good output, no blood. No hepatosplenomegaly.  Positive bowel sounds.  EXTREMITIES                   No clubbing or cyanosis.    NEUROLOGICAL              Cranial nerves grosslyI intact.  5/5 strength in bilateral upper and lower proximal and distal extremities. Sensation intact in all areas tested.  MUSCULOSKELETAL       Good ROM, no spinal tenderness.  SKIN                                     Firm subcutaneous nodule in upper right chest consisent with port placement.  Scar well healed. Skin is otherwise warm and well perfused.   longitudinal scar of the abdomen is well healed. CDI  PSYCHIATRIC     Alert and oriented x 3    IMPRESSION/PLAN: Mr. Malik is a 57-year-old gentleman with history of Hep C recently diagnosed with sigmoid colon cancer s/p resection on 10/11/17 referred for radiation therapy for a positive margin.  In reviewing the op note it details that the tumor/inflammatory mass was noted in the RLQ, fixed, and that there were firm nodules on the wall.  Pathology shows a 5cm poorly differentiated adenocarcinoma with tumor present on the serosal surface and perforating the serosal surface.  3 nodes were removed at the time of surgery.  Ideally for a cancer of this type the patient would have a more extensive surgery that might include BOGDAN and superior rectal dissection to achieve at least 12 nodes removed.  Also given the positive serosal margin he is at increased risk for perotineal caricnomatosis.  I recommend that he be seen by Surgical Oncology for consideration for additional surgery i.e. completion LAR, full  sigmoidectomy +/- peritonectomy with resection of additional nodes and/or cytoreductive surgery.  He should also be considered for HIPAC.    If repeat scans and CEA level done now show much disease he might benefit from neoadjuvant chemotherapy prior to surgery.  However I will leave the timing of these scans to be determined by Med Onc/Surg Onc.  In regards to radiation therapy, I would not recommend radiation therapy for this patient at this time.  All of this information was relayed to the patient and he agreed with the plan.   There was ample time for questions and all were answered to the patient's satisfaction. Thank you for allowing me to participate in the care of this pleasant patient. If you have any questions, please do not hesitate to contact my office.      Sincerely,  Deniz Daniels MD

## 2017-11-14 NOTE — MR AVS SNAPSHOT
After Visit Summary   11/14/2017    Rian Malik    MRN: [id number]           Patient Information     Date Of Birth          1960        Visit Information        Provider Department      11/14/2017 8:00 AM Abby Daniels MD [facility]        Today's Diagnoses     Adenocarcinoma of sigmoid colon (H)    -  1      Care Instructions    Please contact [facility] Oncology RN with questions or concerns following today's appointment: [phone].    Thank you!            Follow-ups after your visit        Your next 10 appointments already scheduled     Nov 15, 2017  2:15 PM CST   Nurse Only with MG IMAGING NURSE   [facility] ([facility])    [address] [facility] [address] [phone]            Nov 15, 2017  2:30 PM CST   CT CHEST W CONTRAST with MGCT1   [facility] ([facility])    [address] [facility] [address] [phone]           Please bring any scans or X-rays taken at other hospitals, if similar tests were done. Also bring a list of your medicines, including vitamins, minerals and over-the-counter drugs. It is safest to leave personal items at home.  Be sure to tell your doctor:   If you have any allergies.   If there s any chance you are pregnant.   If you are breastfeeding.   If you have any special needs.  You will have contrast for this exam. To prepare:   Do not eat or drink for 2 hours before your exam. If you need to take medicine, you may take it with small sips of water. (We may ask you to take liquid medicine as well.)   The day before your exam, drink extra fluids at least six 8-ounce glasses (unless your doctor tells you to restrict your fluids).  Patients over 70 or patients with diabetes or kidney problems:   If you haven t had a blood test (creatinine test) within the last 30 days, go to your clinic or Diagnostic Imaging Department for this  test.  If you have diabetes:   If your kidney function is normal, continue taking your metformin (Avandamet, Glucophage, Glucovance, Metaglip) on the day of your exam.   If your kidney function is abnormal, wait 48 hours before restarting this medicine.  Please wear loose clothing, such as a sweat suit or jogging clothes. Avoid snaps, zippers and other metal. We may ask you to undress and put on a hospital gown.  If you have any questions, please call the Imaging Department where you will have your exam.            Nov 15, 2017  3:00 PM CST   CT ABDOMEN PELVIS W CONTRAST with MGCT1   [facility] ([facility])    [address] [address] [place] [address] [phone]           Please bring any scans or X-rays taken at other hospitals, if similar tests were done. Also bring a list of your medicines, including vitamins, minerals and over-the-counter drugs. It is safest to leave personal items at home.  Be sure to tell your doctor:   If you have any allergies.   If there s any chance you are pregnant.   If you are breastfeeding.   If you have any special needs.  You may have contrast for this exam. To prepare:   Do not eat or drink for 2 hours before your exam. If you need to take medicine, you may take it with small sips of water. (We may ask you to take liquid medicine as well.)   The day before your exam, drink extra fluids at least six 8-ounce glasses (unless your doctor tells you to restrict your fluids).  Patients over 70 or patients with diabetes or kidney problems:   If you haven t had a blood test (creatinine test) within the last 30 days, go to your clinic or Diagnostic Imaging Department for this test.  If you have diabetes:   If your kidney function is normal, continue taking your metformin (Avandamet, Glucophage, Glucovance, Metaglip) on the day of your exam.   If your kidney function is abnormal, wait 48 hours before restarting this medicine.  You will have oral  contrast for this exam:   You will drink the contrast at home. Get this from your clinic or Diagnostic Imaging Department. Please follow the directions given.  Please wear loose clothing, such as a sweat suit or jogging clothes. Avoid snaps, zippers and other metal. We may ask you to undress and put on a hospital gown.  If you have any questions, please call the Imaging Department where you will have your exam.            Nov 20, 2017  3:00 PM CST   Return Visit with NURSE ONLY CANCER CENTER   [facility])    [address] [place] [address] [phone]            Nov 20, 2017  3:45 PM CST   Return Visit with Padilla Last MD   [facility])    [address] [place] [address] [phone]            Nov 21, 2017 11:00 AM CST   Flofox with BAY 10 INFUSION   [facility])    [address] [place] [address] [phone]              Who to contact     If you have questions or need follow up information about today's clinic visit or your schedule please contact [facility] directly at [phone].  Normal or non-critical lab and imaging results will be communicated to you by MyChart, letter or phone within 4 business days after the clinic has received the results. If you do not hear from us within 7 days, please contact the clinic through MyChart or phone. If you have a critical or abnormal lab result, we will notify you by phone as soon as possible.  Submit refill requests through [employer] or call your pharmacy and they will forward the refill request to us. Please allow 3 business days for your refill to be completed.          Additional Information About Your Visit        Brammo Information     B[employer]mo gives you secure access to your electronic health record. If you see a primary care provider, you can also send  "messages to your care team and make appointments. If you have questions, please call your primary care clinic.  If you do not have a primary care provider, please call [phone] and they will assist you.      [employer]Mat is an electronic gateway that provides easy, online access to your medical records. With [employer], you can request a clinic appointment, read your test results, renew a prescription or communicate with your care team.     To access your existing account, please contact your [facility] or call [phone] for assistance.        Care EveryWhere ID     This is your Care EveryWhere ID. This could be used by other organizations to access your [facility] medical records  [id number]        Your Vitals Were     Pulse Temperature Respirations Height Pulse Oximetry BMI (Body Mass Index)    71 97.6  F (36.4  C) (Oral) 18 5' 8\" 99% 22.93 kg/m2       Blood Pressure from Last 3 Encounters:   11/14/17 114/68   11/06/17 117/73   11/06/17 123/74    Weight from Last 3 Encounters:   11/14/17 150 lb 12.8 oz   11/06/17 144 lb 9.6 oz   11/06/17 144 lb 14.4 oz              Today, you had the following     No orders found for display       Primary Care Provider Office Phone # Fax #    Moreno Harris [phone]       [address]  [facility] [id number]        Equal Access to Services     [facility]: Hadii aad ku hadasho Soomaali, waaxda luqadaha, qaybta kaalmada adeegyada, alisa leon . So [facility] [phone].    ATENCIÓN: Si habla español, tiene a martino disposición servicios gratuitos de asistencia lingüística. Llame al [phone].    We comply with applicable federal civil rights laws and [place] laws. We do not discriminate on the basis of race, color, national origin, age, disability, sex, sexual orientation, or gender identity.            Thank you!     Thank you for choosing [facility]  for your care. Our goal " is always to provide you with excellent care. Hearing back from our patients is one way we can continue to improve our services. Please take a few minutes to complete the written survey that you may receive in the mail after your visit with us. Thank you!             Your Updated Medication List - Protect others around you: Learn how to safely use, store and throw away your medicines at www.disposemymeds.org.          This list is accurate as of: 11/14/17 10:41 AM.  Always use your most recent med list.                   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

## 2017-11-14 NOTE — PROGRESS NOTES
HPI      Review of Systems   Constitutional: Positive for weight loss. Negative for chills, diaphoresis, fever and malaise/fatigue.        Patient reports average weight of 158 pounds, reports weight drop to 140 pounds with surgery, current weight 150.8 lb.  Patient reports focusing on high calorie, high protein diet and drinking three nutrition supplements daily.   HENT: Negative.    Eyes: Negative.    Respiratory: Negative.    Cardiovascular: Negative.    Gastrointestinal: Positive for abdominal pain. Negative for blood in stool, constipation, diarrhea, heartburn, melena, nausea and vomiting.        Intermittent abdominal pain related to surgical healing, Oxycodone one time daily as needed.  Patient has colostomy, denies concerns.   Genitourinary: Negative.    Musculoskeletal: Negative.    Skin: Negative.    Neurological: Positive for headaches. Negative for dizziness, tingling, tremors, sensory change, speech change, focal weakness, seizures, loss of consciousness and weakness.        Patient reports intermittent headaches related to Zepatier, relief of headaches without medication intervention.   Endo/Heme/Allergies: Negative.    Psychiatric/Behavioral: Negative.

## 2017-11-14 NOTE — NURSING NOTE
INITIAL PATIENT ASSESSMENT    Diagnosis: Colon cancer    Prior radiation therapy: None    Prior chemotherapy: None    Prior hormonal therapy:No    Pain Eval:  Denies at current visit, reports intermittent abdominal pain related to recent surgery, reports taking Oxycodone one time per day as needed.    Psychosocial  Living arrangements: Lives at home in [place] with son and daughter, works as a printer in [place]  Fall Risk: independent   referral needs: Not needed    Advanced Directive: No  Implantable Cardiac Device? No      Reproductive note: Patient has one son and one daughter.    Review of Systems   Constitutional: Positive for weight loss. Negative for chills, diaphoresis, fever and malaise/fatigue.        Patient reports average weight of 158 pounds, reports weight drop to 140 pounds with surgery, current weight 150.8 lb.  Patient reports focusing on high calorie, high protein diet and drinking three nutrition supplements daily.   HENT: Negative.   Eyes: Negative.    Respiratory: Negative.    Cardiovascular: Negative.    Gastrointestinal: Positive for abdominal pain. Negative for blood in stool, constipation, diarrhea, heartburn, melena, nausea and vomiting.        Intermittent abdominal pain related to surgical healing, Oxycodone one time daily as needed.  Patient has colostomy, denies concerns.   Genitourinary: Negative.    Musculoskeletal: Negative.    Skin: Negative.    Neurological: Positive for headaches. Negative for dizziness, tingling, tremors, sensory change, speech change, focal weakness, seizures, loss of consciousness and weakness.        Patient reports intermittent headaches related to Zepatier, relief of headaches without medication intervention.   Endo/Heme/Allergies: Negative.    Psychiatric/Behavioral: Negative.      Nurse face-to-face time: Level 4:  15 min face to face time.

## 2017-11-15 ENCOUNTER — RADIANT APPOINTMENT (OUTPATIENT)
Dept: CT IMAGING | Facility: CLINIC | Age: 57
End: 2017-11-15
Attending: SURGERY
Payer: COMMERCIAL

## 2017-11-15 ENCOUNTER — ANCILLARY PROCEDURE (OUTPATIENT)
Dept: GENERAL RADIOLOGY | Facility: CLINIC | Age: 57
End: 2017-11-15
Payer: COMMERCIAL

## 2017-11-15 DIAGNOSIS — C18.7 MALIGNANT NEOPLASM OF SIGMOID COLON (H): ICD-10-CM

## 2017-11-15 DIAGNOSIS — B18.2 CHRONIC HEPATITIS C WITHOUT HEPATIC COMA (H): ICD-10-CM

## 2017-11-15 DIAGNOSIS — D64.9 POSTOPERATIVE ANEMIA: ICD-10-CM

## 2017-11-15 DIAGNOSIS — E87.8 ELECTROLYTE ABNORMALITY: ICD-10-CM

## 2017-11-15 LAB
ALBUMIN SERPL-MCNC: 3.7 G/DL (ref 3.4–5)
ALP SERPL-CCNC: 79 U/L (ref 40–150)
ALT SERPL W P-5'-P-CCNC: 37 U/L (ref 0–70)
ANION GAP SERPL CALCULATED.3IONS-SCNC: 5 MMOL/L (ref 3–14)
AST SERPL W P-5'-P-CCNC: 18 U/L (ref 0–45)
BILIRUB DIRECT SERPL-MCNC: 0.1 MG/DL (ref 0–0.2)
BILIRUB SERPL-MCNC: 0.4 MG/DL (ref 0.2–1.3)
BUN SERPL-MCNC: 19 MG/DL (ref 7–30)
CALCIUM SERPL-MCNC: 9 MG/DL (ref 8.5–10.1)
CHLORIDE SERPL-SCNC: 104 MMOL/L (ref 94–109)
CO2 SERPL-SCNC: 29 MMOL/L (ref 20–32)
CREAT SERPL-MCNC: 0.93 MG/DL (ref 0.66–1.25)
ERYTHROCYTE [DISTWIDTH] IN BLOOD BY AUTOMATED COUNT: 14.2 % (ref 10–15)
GFR SERPL CREATININE-BSD FRML MDRD: 84 ML/MIN/1.7M2
GLUCOSE SERPL-MCNC: 92 MG/DL (ref 70–99)
HCT VFR BLD AUTO: 39.1 % (ref 40–53)
HGB BLD-MCNC: 13.3 G/DL (ref 13.3–17.7)
MCH RBC QN AUTO: 32.8 PG (ref 26.5–33)
MCHC RBC AUTO-ENTMCNC: 34 G/DL (ref 31.5–36.5)
MCV RBC AUTO: 97 FL (ref 78–100)
PLATELET # BLD AUTO: 156 10E9/L (ref 150–450)
POTASSIUM SERPL-SCNC: 4.2 MMOL/L (ref 3.4–5.3)
PROT SERPL-MCNC: 6.9 G/DL (ref 6.8–8.8)
RBC # BLD AUTO: 4.05 10E12/L (ref 4.4–5.9)
SODIUM SERPL-SCNC: 138 MMOL/L (ref 133–144)
WBC # BLD AUTO: 9.1 10E9/L (ref 4–11)

## 2017-11-15 PROCEDURE — 80053 COMPREHEN METABOLIC PANEL: CPT | Performed by: INTERNAL MEDICINE

## 2017-11-15 PROCEDURE — 74177 CT ABD & PELVIS W/CONTRAST: CPT | Performed by: RADIOLOGY

## 2017-11-15 PROCEDURE — 71260 CT THORAX DX C+: CPT | Performed by: RADIOLOGY

## 2017-11-15 PROCEDURE — 85027 COMPLETE CBC AUTOMATED: CPT | Performed by: INTERNAL MEDICINE

## 2017-11-15 PROCEDURE — 82248 BILIRUBIN DIRECT: CPT | Performed by: INTERNAL MEDICINE

## 2017-11-15 RX ORDER — IOPAMIDOL 755 MG/ML
92 INJECTION, SOLUTION INTRAVASCULAR ONCE
Status: COMPLETED | OUTPATIENT
Start: 2017-11-15 | End: 2017-11-15

## 2017-11-15 RX ORDER — HEPARIN SODIUM (PORCINE) LOCK FLUSH IV SOLN 100 UNIT/ML 100 UNIT/ML
5 SOLUTION INTRAVENOUS ONCE
Status: DISCONTINUED | OUTPATIENT
Start: 2017-11-15 | End: 2017-11-15 | Stop reason: HOSPADM

## 2017-11-15 RX ADMIN — IOPAMIDOL 92 ML: 755 INJECTION, SOLUTION INTRAVASCULAR at 14:47

## 2017-11-15 NOTE — MR AVS SNAPSHOT
MRN:[id number]                      After Visit Summary   11/15/2017    Rian Malik    MRN: [id number]           Visit Information        Provider Department      11/15/2017 2:15 PM MG IMAGING NURSE [facility]        Your next 10 appointments already scheduled     Nov 15, 2017  3:00 PM CST   CT ABDOMEN PELVIS W CONTRAST with MGCT1   [facility] ([facility])    [address] [place] [address] [phone]           Please bring any scans or X-rays taken at other hospitals, if similar tests were done. Also bring a list of your medicines, including vitamins, minerals and over-the-counter drugs. It is safest to leave personal items at home.  Be sure to tell your doctor:   If you have any allergies.   If there s any chance you are pregnant.   If you are breastfeeding.   If you have any special needs.  You may have contrast for this exam. To prepare:   Do not eat or drink for 2 hours before your exam. If you need to take medicine, you may take it with small sips of water. (We may ask you to take liquid medicine as well.)   The day before your exam, drink extra fluids at least six 8-ounce glasses (unless your doctor tells you to restrict your fluids).  Patients over 70 or patients with diabetes or kidney problems:   If you haven t had a blood test (creatinine test) within the last 30 days, go to your clinic or Diagnostic Imaging Department for this test.  If you have diabetes:   If your kidney function is normal, continue taking your metformin (Avandamet, Glucophage, Glucovance, Metaglip) on the day of your exam.   If your kidney function is abnormal, wait 48 hours before restarting this medicine.  You will have oral contrast for this exam:   You will drink the contrast at home. Get this from your clinic or Diagnostic Imaging Department. Please follow the directions given.  Please wear loose clothing, such as a sweat suit or jogging  clothes. Avoid snaps, zippers and other metal. We may ask you to undress and put on a hospital gown.  If you have any questions, please call the Imaging Department where you will have your exam.            Nov 20, 2017  3:00 PM CST   Return Visit with NURSE ONLY CANCER CENTER   [facility]    [address] [place] [address]   [phone]            Nov 20, 2017  3:45 PM CST   Return Visit with Padilla Last MD   [facility] ([facility])    [address] [place] [address]   [phone]            Nov 21, 2017 11:00 AM CST   Flofox with BAY 10 INFUSION   [facility])    [address] [place] [address]   [phone]            Dec 10, 2017  9:00 AM CST   Lab visit with BK LAB   [facility] ([facility])    [address] [place] [id number] [phone]           Please do not eat 10-12 hours before your appointment if you are coming in fasting for labs on lipids, cholesterol, or glucose (sugar). Does not apply to pregnant women.  Water with medications is okay. Do not drink coffee or other fluids.  If you have concerns about taking your medications, please send a message by clicking on Secure Messaging, Message Your Care Team.              [employer] Information     [employer] gives you secure access to your electronic health record. If you see a primary care provider, you can also send messages to your care team and make appointments. If you have questions, please call your primary care clinic.  If you do not have a primary care provider, please call [phone] and they will assist you.      [employer] is an electronic gateway that provides easy, online access to your medical records. With [employer], you can request a clinic appointment, read your test results, renew a prescription or  communicate with your care team.     To access your existing account, please contact your [facility] or call [phone] for assistance.        Care EveryWhere ID     This is your Care EveryWhere ID. This could be used by other organizations to access your [facility] medical records  [id number]        Equal Access to Services     KHANH FLANAGAN : Rajesh Meraz, suad peck, iliana laytonalalisa queen. So [facility] [phone].    ATENCIÓN: Si habla español, tiene a martino disposición servicios gratuitos de asistencia lingüística. Llame al [phone].    We comply with applicable federal civil rights laws and [place] laws. We do not discriminate on the basis of race, color, national origin, age, disability, sex, sexual orientation, or gender identity.

## 2017-11-15 NOTE — NURSING NOTE
Power port identified by 3 raised dots and patient  IVAD accessed with 20 gauge 3/4 inch lares gripper plus needle.   Flushed with 10 ml Sterile 0.9% NaCl (NDC [address]-10) and dressed with sterile Tegaderm.  Flushed with 10 cc NS (NDC [address]-10)and 5 cc 100 unit/ml Heparin (NDC [address]-60)  Needle: d/c'd intact  Comments: blood drawn for labs

## 2017-11-17 DIAGNOSIS — C18.7 ADENOCARCINOMA OF SIGMOID COLON (H): Primary | ICD-10-CM

## 2017-11-20 ENCOUNTER — ONCOLOGY VISIT (OUTPATIENT)
Dept: ONCOLOGY | Facility: CLINIC | Age: 57
End: 2017-11-20
Payer: COMMERCIAL

## 2017-11-20 ENCOUNTER — MYC MEDICAL ADVICE (OUTPATIENT)
Dept: SURGERY | Facility: CLINIC | Age: 57
End: 2017-11-20

## 2017-11-20 VITALS
OXYGEN SATURATION: 99 % | RESPIRATION RATE: 18 BRPM | SYSTOLIC BLOOD PRESSURE: 138 MMHG | HEART RATE: 64 BPM | BODY MASS INDEX: 23.19 KG/M2 | DIASTOLIC BLOOD PRESSURE: 78 MMHG | WEIGHT: 153 LBS | HEIGHT: 68 IN | TEMPERATURE: 98.1 F

## 2017-11-20 DIAGNOSIS — C18.7 ADENOCARCINOMA OF SIGMOID COLON (H): Primary | ICD-10-CM

## 2017-11-20 LAB
ALBUMIN SERPL-MCNC: 3.9 G/DL (ref 3.4–5)
ALP SERPL-CCNC: 83 U/L (ref 40–150)
ALT SERPL W P-5'-P-CCNC: 25 U/L (ref 0–70)
ANION GAP SERPL CALCULATED.3IONS-SCNC: 8 MMOL/L (ref 3–14)
AST SERPL W P-5'-P-CCNC: 16 U/L (ref 0–45)
BASOPHILS # BLD AUTO: 0 10E9/L (ref 0–0.2)
BASOPHILS NFR BLD AUTO: 0.4 %
BILIRUB SERPL-MCNC: 0.6 MG/DL (ref 0.2–1.3)
BUN SERPL-MCNC: 14 MG/DL (ref 7–30)
CALCIUM SERPL-MCNC: 9.1 MG/DL (ref 8.5–10.1)
CHLORIDE SERPL-SCNC: 101 MMOL/L (ref 94–109)
CO2 SERPL-SCNC: 29 MMOL/L (ref 20–32)
CREAT SERPL-MCNC: 0.81 MG/DL (ref 0.66–1.25)
DIFFERENTIAL METHOD BLD: ABNORMAL
EOSINOPHIL # BLD AUTO: 0 10E9/L (ref 0–0.7)
EOSINOPHIL NFR BLD AUTO: 0.3 %
ERYTHROCYTE [DISTWIDTH] IN BLOOD BY AUTOMATED COUNT: 13.5 % (ref 10–15)
GFR SERPL CREATININE-BSD FRML MDRD: >90 ML/MIN/1.7M2
GLUCOSE SERPL-MCNC: 95 MG/DL (ref 70–99)
HCT VFR BLD AUTO: 38.6 % (ref 40–53)
HGB BLD-MCNC: 13.2 G/DL (ref 13.3–17.7)
LYMPHOCYTES # BLD AUTO: 2 10E9/L (ref 0.8–5.3)
LYMPHOCYTES NFR BLD AUTO: 22.1 %
MCH RBC QN AUTO: 32.4 PG (ref 26.5–33)
MCHC RBC AUTO-ENTMCNC: 34.2 G/DL (ref 31.5–36.5)
MCV RBC AUTO: 95 FL (ref 78–100)
MONOCYTES # BLD AUTO: 0.4 10E9/L (ref 0–1.3)
MONOCYTES NFR BLD AUTO: 4.6 %
NEUTROPHILS # BLD AUTO: 6.5 10E9/L (ref 1.6–8.3)
NEUTROPHILS NFR BLD AUTO: 72.6 %
PLATELET # BLD AUTO: 154 10E9/L (ref 150–450)
POTASSIUM SERPL-SCNC: 3.8 MMOL/L (ref 3.4–5.3)
PROT SERPL-MCNC: 7.2 G/DL (ref 6.8–8.8)
RBC # BLD AUTO: 4.08 10E12/L (ref 4.4–5.9)
SODIUM SERPL-SCNC: 138 MMOL/L (ref 133–144)
WBC # BLD AUTO: 9 10E9/L (ref 4–11)

## 2017-11-20 PROCEDURE — 99215 OFFICE O/P EST HI 40 MIN: CPT | Performed by: INTERNAL MEDICINE

## 2017-11-20 PROCEDURE — 99207 ZZC NO CHARGE NURSE ONLY: CPT

## 2017-11-20 PROCEDURE — 80053 COMPREHEN METABOLIC PANEL: CPT | Performed by: INTERNAL MEDICINE

## 2017-11-20 PROCEDURE — 85025 COMPLETE CBC W/AUTO DIFF WBC: CPT | Performed by: INTERNAL MEDICINE

## 2017-11-20 RX ORDER — HEPARIN SODIUM (PORCINE) LOCK FLUSH IV SOLN 100 UNIT/ML 100 UNIT/ML
5 SOLUTION INTRAVENOUS
Status: DISCONTINUED | OUTPATIENT
Start: 2017-11-20 | End: 2017-11-27 | Stop reason: HOSPADM

## 2017-11-20 RX ORDER — EPINEPHRINE 0.3 MG/.3ML
0.3 INJECTION SUBCUTANEOUS EVERY 5 MIN PRN
Status: CANCELLED | OUTPATIENT
Start: 2017-11-21

## 2017-11-20 RX ORDER — FLUOROURACIL 50 MG/ML
400 INJECTION, SOLUTION INTRAVENOUS ONCE
Status: CANCELLED | OUTPATIENT
Start: 2017-11-21

## 2017-11-20 RX ORDER — MEPERIDINE HYDROCHLORIDE 50 MG/ML
25 INJECTION INTRAMUSCULAR; INTRAVENOUS; SUBCUTANEOUS EVERY 30 MIN PRN
Status: CANCELLED | OUTPATIENT
Start: 2017-11-21

## 2017-11-20 RX ORDER — LORAZEPAM 2 MG/ML
0.5 INJECTION INTRAMUSCULAR EVERY 4 HOURS PRN
Status: CANCELLED
Start: 2017-11-21

## 2017-11-20 RX ORDER — EPINEPHRINE 1 MG/ML
0.3 INJECTION, SOLUTION INTRAMUSCULAR; SUBCUTANEOUS EVERY 5 MIN PRN
Status: CANCELLED | OUTPATIENT
Start: 2017-11-21

## 2017-11-20 RX ORDER — ALBUTEROL SULFATE 0.83 MG/ML
2.5 SOLUTION RESPIRATORY (INHALATION)
Status: CANCELLED | OUTPATIENT
Start: 2017-11-21

## 2017-11-20 RX ORDER — ALBUTEROL SULFATE 90 UG/1
1-2 AEROSOL, METERED RESPIRATORY (INHALATION)
Status: CANCELLED
Start: 2017-11-21

## 2017-11-20 RX ORDER — SODIUM CHLORIDE 9 MG/ML
1000 INJECTION, SOLUTION INTRAVENOUS CONTINUOUS PRN
Status: CANCELLED
Start: 2017-11-21

## 2017-11-20 RX ORDER — METHYLPREDNISOLONE SODIUM SUCCINATE 125 MG/2ML
125 INJECTION, POWDER, LYOPHILIZED, FOR SOLUTION INTRAMUSCULAR; INTRAVENOUS
Status: CANCELLED
Start: 2017-11-21

## 2017-11-20 RX ORDER — DIPHENHYDRAMINE HYDROCHLORIDE 50 MG/ML
50 INJECTION INTRAMUSCULAR; INTRAVENOUS
Status: CANCELLED
Start: 2017-11-21

## 2017-11-20 RX ADMIN — HEPARIN SODIUM (PORCINE) LOCK FLUSH IV SOLN 100 UNIT/ML 5 ML: 100 SOLUTION at 15:15

## 2017-11-20 ASSESSMENT — PAIN SCALES - GENERAL: PAINLEVEL: MILD PAIN (3)

## 2017-11-20 NOTE — PROGRESS NOTES
"Patient's name and  were verified.  See Doc Flowsheet - IV assess for details.  IVAD accessed with 20G 3/4\" lares gripper plus needle  blood return positive: YES  Site without redness, tenderness or swelling: YES  flushed with 20cc NS and 5cc 100u/ml heparin  Needle: deaccessed  Comments: Labs drawn.  Patient tolerated procedure without incident.    Kolton Ag  RN, BSN      "

## 2017-11-20 NOTE — MR AVS SNAPSHOT
After Visit Summary   11/20/2017    Rian Malik    MRN: [id number]           Patient Information     Date Of Birth          1960        Visit Information        Provider Department      11/20/2017 3:45 PM Padilla Last MD [facility]        Today's Diagnoses     Adenocarcinoma of sigmoid colon (H)    -  1       Follow-ups after your visit        Your next 10 appointments already scheduled     Nov 23, 2017  1:00 PM CST   Level 1 with  INFUSION CHAIR 13   [facility] ([facility])    [facility]  [address]  [facility] [address]   [phone]            Dec 04, 2017 10:30 AM CST   Return Visit with NURSE ONLY CANCER CENTER   [facility] ([facility])    [address] [facility] [address] [phone]            Dec 04, 2017 11:15 AM CST   Return Visit with Padilla Last MD   [facility])    [address] [facility] [address]   [phone]            Dec 04, 2017 12:00 PM CST   Flofox with BAY 8 INFUSION   [facility] ([facility])    [address] [facility] [address]   [phone]            Dec 18, 2017 10:00 AM CST   Return Visit with NURSE ONLY CANCER CENTER   [facility] ([facility])    [address] [facility] [address]   [phone]            Dec 18, 2017 10:45 AM CST   Return Visit with Padilla Last MD   [facility] ([facility])    [address] [facility] [address] [phone]            Dec 18, 2017 11:30 AM CST   Flofox with BAY 4 INFUSION   [facility] ([facility])    [address] [facility] [address] [phone]              Who to contact     If you have questions or need follow up  "information about today's clinic visit or your schedule please contact [facility] directly at [phone].  Normal or non-critical lab and imaging results will be communicated to you by [employer], letter or phone within 4 business days after the clinic has received the results. If you do not hear from us within 7 days, please contact the clinic through [employer] or phone. If you have a critical or abnormal lab result, we will notify you by phone as soon as possible.  Submit refill requests through [employer] or call your pharmacy and they will forward the refill request to us. Please allow 3 business days for your refill to be completed.          Additional Information About Your Visit        [employer] Information     [employer] gives you secure access to your electronic health record. If you see a primary care provider, you can also send messages to your care team and make appointments. If you have questions, please call your primary care clinic.  If you do not have a primary care provider, please call [phone] and they will assist you.      [employer] is an electronic gateway that provides easy, online access to your medical records. With [employer], you can request a clinic appointment, read your test results, renew a prescription or communicate with your care team.     To access your existing account, please contact your [facility] or call [phone] for assistance.        Care EveryWhere ID     This is your Care EveryWhere ID. This could be used by other organizations to access your [place] medical records  [id number]        Your Vitals Were     Pulse Temperature Respirations Height Pulse Oximetry BMI (Body Mass Index)    64 98.1  F (36.7  C) (Oral) 18 1.727 m (5' 7.99\") 99% 23.27 kg/m2       Blood Pressure from Last 3 Encounters:   11/21/17 131/77   11/20/17 138/78   11/14/17 114/68    Weight from Last 3 Encounters:   11/21/17 70 kg (154 lb 4.8 oz)   11/20/17 69.4 kg " (153 lb)   11/14/17 68.4 kg (150 lb 12.8 oz)              We Performed the Following     CBC with platelets differential     Comprehensive metabolic panel        Primary Care Provider Office Phone # Fax #    Moreno Harris [phone]       [address]  [facility] [address]        Equal Access to Services     [facility]: Hadii aad ku hadasho Soomaali, waaxda luqadaha, qaybta kaalmada adeegyada, waxay idiin hayaan adeeg kharash lajaviern . So [facility] [phone].    ATENCIÓN: Si habla español, tiene a martino disposición servicios gratuitos de asistencia lingüística. Llame al [phone].    We comply with applicable federal civil rights laws and [place] laws. We do not discriminate on the basis of race, color, national origin, age, disability, sex, sexual orientation, or gender identity.            Thank you!     Thank you for choosing [facility]  for your care. Our goal is always to provide you with excellent care. Hearing back from our patients is one way we can continue to improve our services. Please take a few minutes to complete the written survey that you may receive in the mail after your visit with us. Thank you!             Your Updated Medication List - Protect others around you: Learn how to safely use, store and throw away your medicines at www.disposemymeds.org.          This list is accurate as of: 11/20/17 11:59 PM.  Always use your most recent med list.                   Brand Name Dispense Instructions for use Diagnosis    docusate sodium 100 MG tablet    COLACE    60 tablet    Take 100 mg by mouth daily    Special screening for malignant neoplasms, colon       elbasvir-grazoprevir  MG Tabs per tablet    ZEPATIER    28 tablet    Take 1 tablet by mouth daily    Chronic hepatitis C without hepatic coma (H)       LORazepam 0.5 MG tablet    ATIVAN    30 tablet    Take 1 tablet (0.5 mg) by mouth every 4 hours as needed (Anxiety, Nausea/Vomiting or  Sleep)    Adenocarcinoma of sigmoid colon (H)       ondansetron 8 MG tablet    ZOFRAN    10 tablet    Take 1 tablet (8 mg) by mouth every 8 hours as needed (Nausea/Vomiting)    Adenocarcinoma of sigmoid colon (H)       oxyCODONE IR 5 MG tablet    ROXICODONE    20 tablet    Take 1 tablet (5 mg) by mouth every 4 hours as needed for pain maximum 8 tablet(s) per day    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

## 2017-11-20 NOTE — NURSING NOTE
"Oncology Rooming Note    November 20, 2017 3:24 PM   Rian Malik is a 57 year old male who presents for:    Chief Complaint   Patient presents with     Oncology Clinic Visit     prior to tx     Initial Vitals: There were no vitals taken for this visit. Estimated body mass index is 22.93 kg/(m^2) as calculated from the following:    Height as of 11/14/17: 1.727 m (5' 8\").    Weight as of 11/14/17: 68.4 kg (150 lb 12.8 oz). There is no height or weight on file to calculate BSA.  Data Unavailable Comment: Data Unavailable   No LMP for male patient.  Allergies reviewed: Yes  Medications reviewed: Yes    Medications: Medication refills not needed today.  Pharmacy name entered into [employer]:    [facility] - [place], [place] - [address]  [facility] - [place], [place] - [address]    Clinical concerns:     8 minutes for nursing intake (face to face time)     MYLA POLLACK LPN             "

## 2017-11-20 NOTE — MR AVS SNAPSHOT
After Visit Summary   11/20/2017    Rian Malik    MRN: [id number]           Patient Information     Date Of Birth          1960        Visit Information        Provider Department      11/20/2017 3:00 PM NURSE ONLY CANCER CENTER [facility]        Today's Diagnoses     Adenocarcinoma of sigmoid colon (H)    -  1       Follow-ups after your visit        Your next 10 appointments already scheduled     Nov 20, 2017  3:45 PM CST   Return Visit with Padilla Last MD   [facility] ([facility])    [address] [place] [address] [phone]            Nov 21, 2017 11:00 AM CST   Flofox with BAY 10 INFUSION   [facility])    [address] [place] [address] [phone]              Who to contact     If you have questions or need follow up information about today's clinic visit or your schedule please contact [facility] directly at [phone].  Normal or non-critical lab and imaging results will be communicated to you by [employer], letter or phone within 4 business days after the clinic has received the results. If you do not hear from us within 7 days, please contact the clinic through [employer] or phone. If you have a critical or abnormal lab result, we will notify you by phone as soon as possible.  Submit refill requests through [employer] or call your pharmacy and they will forward the refill request to us. Please allow 3 business days for your refill to be completed.          Additional Information About Your Visit        [employer] Information     [employer] gives you secure access to your electronic health record. If you see a primary care provider, you can also send messages to your care team and make appointments. If you have questions, please call your primary care clinic.  If you do not have a primary care provider, please call [phone] and they  will assist you.      [employer] is an electronic gateway that provides easy, online access to your medical records. With [employer], you can request a clinic appointment, read your test results, renew a prescription or communicate with your care team.     To access your existing account, please contact your [facility] or call [phone] for assistance.        Care EveryWhere ID     This is your Care EveryWhere ID. This could be used by other organizations to access your [place] medical records  [id number]         Blood Pressure from Last 3 Encounters:   11/14/17 114/68   11/06/17 117/73   11/06/17 123/74    Weight from Last 3 Encounters:   11/14/17 68.4 kg (150 lb 12.8 oz)   11/06/17 65.6 kg (144 lb 9.6 oz)   11/06/17 65.7 kg (144 lb 14.4 oz)              Today, you had the following     No orders found for display       Primary Care Provider Office Phone # Fax #    Moreno Harris [phone]       [address] [facility] [address]        Equal Access to Services     [facility]: Hadii aad ku hadasho Soomaali, waaxda luqadaha, qaybta kaalmada jose, alisa leon . So [facility] [phone].    ATENCIÓN: Si habla español, tiene a martino disposición servicios gratvalentinos de asistencia lingüística. LlSt. [facility] [phone].    We comply with applicable federal civil rights laws and [place] laws. We do not discriminate on the basis of race, color, national origin, age, disability, sex, sexual orientation, or gender identity.            Thank you!     Thank you for choosing [facility]  for your care. Our goal is always to provide you with excellent care. Hearing back from our patients is one way we can continue to improve our services. Please take a few minutes to complete the written survey that you may receive in the mail after your visit with us. Thank you!             Your Updated Medication List - Protect others  around you: Learn how to safely use, store and throw away your medicines at www.disposemymeds.org.          This list is accurate as of: 11/20/17  3:17 PM.  Always use your most recent med list.                   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

## 2017-11-20 NOTE — PROGRESS NOTES
FOLLOW-UP VISIT NOTE    PATIENT NAME: Rian Malik MRN # [id number]  DATE OF VISIT: Nov 20, 2017 YOB: 1960    REFERRING PROVIDER: No referring provider defined for this encounter.    CANCER TYPE: Adenocarcinoma colon  STAGE: III pT4N1c  ECOG PS: 0    ONCOLOGY HISTORY:  57-year-old male who around February 2017r, developed intermittent cramping lower abdominal pain. Was evaluated by PCP and clinical impression was colitis. He was put on p.o. antibiotics. However symptoms did not improve. He was eventually referred to surgery and underwent imaging with CT findings suspicious of sigmoid abscess. He was admitted to the hospital for resection of sigmoid abscess 10/11/17. However intraoperatively was noted to have a large firmly adherent sigmoid colon mass. Laparoscopic surgery was converted to open sigmoidectomy with end colostomy.     Surgical pathology   B.  COLON, SIGMOID, HEMICOLECTOMY   - Poorly differentiated adenocarcinoma, with signet ring cell features   - Tumor size: 5 cm   - Tumor is present on serosal surface and microscopically perforates   serosal surface   - One surgical margin shows extensive serosal involvement by tumor. The   opposite, undesignated margin shows no evidence of malignancy   - Lymphovascular invasion is present   - Perineural invasion is not identified   - Tumor deposits are present   - Immunohistochemistry for mismatch repair proteins shows intact nuclear   expression for MLH1, MSH2, MSH6, and PMS2   - Three reactive lymph nodes, negative for malignancy (0/3)   - Pathologic staging: pT4N1c      Patient's case was discussed at the tumor board. Recommendation was to obtain new staging scans as well as to proceed with systemic chemotherapy at this point with plans for further surgery in future.    SUBJECTIVE     Patient return to clinic today prior to start of therapy. No active complaints. Ostomy is working well. Denies abdominal pain, nausea/vomiting diarrhea, fevers  chills or any other complaints      PAST MEDICAL HISTORY     Past Medical History:   Diagnosis Date     Cirrhosis (H)      Colon cancer (H) 10/11/2017    Poorly differentiated adenocarcinoma     Hepatitis C          CURRENT OUTPATIENT MEDICATIONS     Current Outpatient Prescriptions   Medication Sig Dispense Refill     elbasvir-grazoprevir (ZEPATIER)  MG TABS per tablet Take 1 tablet by mouth daily 28 tablet 2     oxyCODONE (ROXICODONE) 5 MG IR tablet Take 1 tablet (5 mg) by mouth every 4 hours as needed for pain maximum 8 tablet(s) per day 20 tablet 0     docusate sodium (COLACE) 100 MG tablet Take 100 mg by mouth daily 60 tablet 1        ALLERGIES     Allergies   Allergen Reactions     Acetaminophen      Naproxen         REVIEW OF SYSTEMS   As above in the HPI, o/w complete 12-point ROS was negative.     PHYSICAL EXAM   B/P: 138/78, T: 98.1, P: 64, R: 18  SpO2 Readings from Last 4 Encounters:   11/20/17 99%   11/14/17 99%   11/06/17 98%   10/25/17 100%     Wt Readings from Last 3 Encounters:   11/20/17 69.4 kg (153 lb)   11/14/17 68.4 kg (150 lb 12.8 oz)   11/06/17 65.6 kg (144 lb 9.6 oz)     GEN: NAD  EYES:PERRLA  Mouth/ENT: Oropharynx is clear.  NECK: no cervical or supraclavicular lymphadenopathy  LUNGS: clear bilaterally  CV: regular, no murmurs, rubs, or gallops  ABDOMEN: soft, non-tender, non-distended, normal bowel sounds, no hepatosplenomegaly by percussion or palpation  EXT: warm, well perfused, no edema  NEURO: alert  SKIN: no rashes     LABORATORY AND IMAGING STUDIES     Recent Labs   Lab Test  11/20/17   1513  11/15/17   1430   08/04/17   0953   NA  138  138   < >  137   POTASSIUM  3.8  4.2   < >  4.3   CHLORIDE  101  104   < >  98   CO2  29  29   < >  26   ANIONGAP  8  5   < >  13   BUN  14  19   < >  12   CR  0.81  0.93   < >  0.81   GLC  95  92   < >  75   MAICOL  9.1  9.0   < >  9.2   MAG   --    --    --   2.0    < > = values in this interval not displayed.     Recent Labs   Lab Test   11/20/17   1513  11/15/17   1430  11/06/17   0926  10/25/17   0730   WBC  9.0  9.1  6.2  8.4   HGB  13.2*  13.3  12.8*  10.4*   PLT  154  156  199  435   MCV  95  97  96  98   NEUTROPHIL  72.6   --   65.4  69.2     Recent Labs   Lab Test  11/20/17   1513  11/15/17   1430  11/06/17   1258   BILITOTAL  0.6  0.4  0.4   ALKPHOS  83  79  87   ALT  25  37  52   AST  16  18  25   ALBUMIN  3.9  3.7  3.5     No results found for: TSH]    All laboratory data reviewed    Results for orders placed or performed in visit on 11/15/17   CT Chest/Abdomen/Pelvis w Contrast    Narrative    EXAMINATION: CT CHEST/ABDOMEN/PELVIS W CONTRAST, 11/15/2017 3:02 PM    TECHNIQUE:  Helical CT images from the lung apices through the  symphysis pubis were obtained with contrast.  Coronal reformatted  images were generated at a workstation for further assessment.    CONTRAST:  92 cc Isovue 370 IV.    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 Multiple  prominent lymph nodes, though none enlarged by size criteria. For  example, 9 mm upper paratracheal lymph node (series 3 image 26). 9 mm  precarinal lymph node (series 3 image 62). No thyroid nodules. Central  tracheobronchial tree is patent. Heart size is normal. No pericardial  effusion.  Normal thoracic vasculature.    Lungs: No consolidation. No pleural effusion or pneumothorax.    Abdomen and pelvis:   Liver: Nodular liver with prominent caudate lobe particularly with  respect to the left lobe. No suspicious liver lesions. Portal veins  appear patent.  Gallbladder: No gallstones. No evidence of acute cholecystitis.  Spleen: Mildly prominent spleen but with a concave hilum.  Pancreas: No suspicious pancreatic lesions. The pancreatic duct is not  dilated.  Adrenal glands: No adrenal nodules.  Kidneys: No kidney masses. No hydronephrosis or obstructing  renal  stones.  Bladder / Pelvic organs: Unremarkable.  Bowel: Postsurgical changes of sigmoidectomy with left lower quadrant  colostomy. Short segment of circumferential thickening and narrowing  in the distal cecum (series 3 image [id number]). Hazy increased density  in the anterior mesenteric fat, likely omental. This has been present  going back to the initial study dated May 5 of this year although the  prominence has increased over the interim.  Lymph nodes: No retroperitoneal, mesenteric, or pelvic  lymphadenopathy.  Fluid: No free fluid within the abdomen.  Vessels: No infrarenal aortic aneurysm.     Bones and soft tissues: No suspicious osseous lesions.      Impression    IMPRESSION:   In this patient with adenocarcinoma of the sigmoid rectum status post  sigmoidectomy:  1. Interval sigmoidectomy. No evidence of local recurrence or active  metastatic disease.  2. Mild concentric bowel wall thickening in the cecum, possibly  reactive.   3. Nodular contour to the liver with enlargement of the caudate lobe  although there does not appear to be significant capsular retraction  about the jack hepatis. Please correlate with clinical findings as  this raises concern for cirrhosis. The portal vein is prominent and  the spleen is borderline enlarged which can be seen in portal  hypertension.  4. Increasing hazy density about the anterior abdominal fat. This may  be postoperative or related to venous congestion. Malignant  infiltration can have this appearance although this is unlikely given  the recent sigmoidectomy and presumptive evaluation of the anterior  abdominal fat.    I have personally reviewed the examination and initial interpretation  and I agree with the findings.    IVONE SANDOVAL MD         ASSESSMENT AND PLAN     57-year-old male with a newly diagnosed     - Poorly differentiated adenocarcinoma colon  pT4N1c(tumor deposits) with margin involvement -  Stage III   CT scans negative for evidence of  metastatic disease  Patient's case was discussed at the tumor board 11/13 and recommendation was to proceed with systemic chemotherapy at this point with plans for additional surgery in future.  He will be started on FOLFOX q2 week regimen tomorrow for 6 months duration. Side effect profile explained including but not limited to lowering of blood counts, infections, neuropathy, diarrhea, allergic reaction. He understands and is willing to proceed at this point.     - Hepatitis C  On antiviral treatment per GI     Return to clinic in 2 weeks for office visit prior to next cycle of chemotherapy     The patient is ready to learn, no apparent learning barriers were identified, Diagnosis and treatment plans were explained to the patient. The patient expressed understanding of the content. The patient questions were answered to his satisfaction.    Chart documentation with Dragon Voice recognition Software. Although reviewed after completion, some words and grammatical errors may remain.  Padilla Last MD  Attending Physician   Hematology/Medical Oncology

## 2017-11-20 NOTE — LETTER
11/20/2017         RE: Rian Malik  [address] [place] [address]        Dear Colleague,    Thank you for referring your patient, Rian Malik, to the [facility]. Please see a copy of my visit note below.      FOLLOW-UP VISIT NOTE    PATIENT NAME: Rian Malik MRN # [id number]  DATE OF VISIT: Nov 20, 2017 YOB: 1960    REFERRING PROVIDER: No referring provider defined for this encounter.    CANCER TYPE: Adenocarcinoma colon  STAGE: III pT4N1c  ECOG PS: 0    ONCOLOGY HISTORY:  57-year-old male who around February 2017r, developed intermittent cramping lower abdominal pain. Was evaluated by PCP and clinical impression was colitis. He was put on p.o. antibiotics. However symptoms did not improve. He was eventually referred to surgery and underwent imaging with CT findings suspicious of sigmoid abscess. He was admitted to the hospital for resection of sigmoid abscess 10/11/17. However intraoperatively was noted to have a large firmly adherent sigmoid colon mass. Laparoscopic surgery was converted to open sigmoidectomy with end colostomy.     Surgical pathology   B.  COLON, SIGMOID, HEMICOLECTOMY   - Poorly differentiated adenocarcinoma, with signet ring cell features   - Tumor size: 5 cm   - Tumor is present on serosal surface and microscopically perforates   serosal surface   - One surgical margin shows extensive serosal involvement by tumor. The   opposite, undesignated margin shows no evidence of malignancy   - Lymphovascular invasion is present   - Perineural invasion is not identified   - Tumor deposits are present   - Immunohistochemistry for mismatch repair proteins shows intact nuclear   expression for MLH1, MSH2, MSH6, and PMS2   - Three reactive lymph nodes, negative for malignancy (0/3)   - Pathologic staging: pT4N1c      Patient's case was discussed at the tumor board. Recommendation was to obtain new staging scans as well as to proceed with systemic  chemotherapy at this point with plans for further surgery in future.    SUBJECTIVE     Patient return to clinic today prior to start of therapy. No active complaints. Ostomy is working well. Denies abdominal pain, nausea/vomiting diarrhea, fevers chills or any other complaints      PAST MEDICAL HISTORY     Past Medical History:   Diagnosis Date     Cirrhosis (H)      Colon cancer (H) 10/11/2017    Poorly differentiated adenocarcinoma     Hepatitis C          CURRENT OUTPATIENT MEDICATIONS     Current Outpatient Prescriptions   Medication Sig Dispense Refill     elbasvir-grazoprevir (ZEPATIER)  MG TABS per tablet Take 1 tablet by mouth daily 28 tablet 2     oxyCODONE (ROXICODONE) 5 MG IR tablet Take 1 tablet (5 mg) by mouth every 4 hours as needed for pain maximum 8 tablet(s) per day 20 tablet 0     docusate sodium (COLACE) 100 MG tablet Take 100 mg by mouth daily 60 tablet 1        ALLERGIES     Allergies   Allergen Reactions     Acetaminophen      Naproxen         REVIEW OF SYSTEMS   As above in the HPI, o/w complete 12-point ROS was negative.     PHYSICAL EXAM   B/P: 138/78, T: 98.1, P: 64, R: 18  SpO2 Readings from Last 4 Encounters:   11/20/17 99%   11/14/17 99%   11/06/17 98%   10/25/17 100%     Wt Readings from Last 3 Encounters:   11/20/17 69.4 kg (153 lb)   11/14/17 68.4 kg (150 lb 12.8 oz)   11/06/17 65.6 kg (144 lb 9.6 oz)     GEN: NAD  EYES:PERRLA  Mouth/ENT: Oropharynx is clear.  NECK: no cervical or supraclavicular lymphadenopathy  LUNGS: clear bilaterally  CV: regular, no murmurs, rubs, or gallops  ABDOMEN: soft, non-tender, non-distended, normal bowel sounds, no hepatosplenomegaly by percussion or palpation  EXT: warm, well perfused, no edema  NEURO: alert  SKIN: no rashes     LABORATORY AND IMAGING STUDIES     Recent Labs   Lab Test  11/20/17   1513  11/15/17   1430   08/04/17   0953   NA  138  138   < >  137   POTASSIUM  3.8  4.2   < >  4.3   CHLORIDE  101  104   < >  98   CO2  29  29   < >   26   ANIONGAP  8  5   < >  13   BUN  14  19   < >  12   CR  0.81  0.93   < >  0.81   GLC  95  92   < >  75   MAICOL  9.1  9.0   < >  9.2   MAG   --    --    --   2.0    < > = values in this interval not displayed.     Recent Labs   Lab Test  11/20/17   1513  11/15/17   1430  11/06/17   0926  10/25/17   0730   WBC  9.0  9.1  6.2  8.4   HGB  13.2*  13.3  12.8*  10.4*   PLT  154  156  199  435   MCV  95  97  96  98   NEUTROPHIL  72.6   --   65.4  69.2     Recent Labs   Lab Test  11/20/17   1513  11/15/17   1430  11/06/17   1258   BILITOTAL  0.6  0.4  0.4   ALKPHOS  83  79  87   ALT  25  37  52   AST  16  18  25   ALBUMIN  3.9  3.7  3.5     No results found for: TSH]    All laboratory data reviewed    Results for orders placed or performed in visit on 11/15/17   CT Chest/Abdomen/Pelvis w Contrast    Narrative    EXAMINATION: CT CHEST/ABDOMEN/PELVIS W CONTRAST, 11/15/2017 3:02 PM    TECHNIQUE:  Helical CT images from the lung apices through the  symphysis pubis were obtained with contrast.  Coronal reformatted  images were generated at a workstation for further assessment.    CONTRAST:  92 cc Isovue 370 IV.    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 Multiple  prominent lymph nodes, though none enlarged by size criteria. For  example, 9 mm upper paratracheal lymph node (series 3 image 26). 9 mm  precarinal lymph node (series 3 image 62). No thyroid nodules. Central  tracheobronchial tree is patent. Heart size is normal. No pericardial  effusion.  Normal thoracic vasculature.    Lungs: No consolidation. No pleural effusion or pneumothorax.    Abdomen and pelvis:   Liver: Nodular liver with prominent caudate lobe particularly with  respect to the left lobe. No suspicious liver lesions. Portal veins  appear patent.  Gallbladder: No gallstones. No evidence of acute  cholecystitis.  Spleen: Mildly prominent spleen but with a concave hilum.  Pancreas: No suspicious pancreatic lesions. The pancreatic duct is not  dilated.  Adrenal glands: No adrenal nodules.  Kidneys: No kidney masses. No hydronephrosis or obstructing renal  stones.  Bladder / Pelvic organs: Unremarkable.  Bowel: Postsurgical changes of sigmoidectomy with left lower quadrant  colostomy. Short segment of circumferential thickening and narrowing  in the distal cecum (series 3 image [id number]). Hazy increased density  in the anterior mesenteric fat, likely omental. This has been present  going back to the initial study dated May 5 of this year although the  prominence has increased over the interim.  Lymph nodes: No retroperitoneal, mesenteric, or pelvic  lymphadenopathy.  Fluid: No free fluid within the abdomen.  Vessels: No infrarenal aortic aneurysm.     Bones and soft tissues: No suspicious osseous lesions.      Impression    IMPRESSION:   In this patient with adenocarcinoma of the sigmoid rectum status post  sigmoidectomy:  1. Interval sigmoidectomy. No evidence of local recurrence or active  metastatic disease.  2. Mild concentric bowel wall thickening in the cecum, possibly  reactive.   3. Nodular contour to the liver with enlargement of the caudate lobe  although there does not appear to be significant capsular retraction  about the jack hepatis. Please correlate with clinical findings as  this raises concern for cirrhosis. The portal vein is prominent and  the spleen is borderline enlarged which can be seen in portal  hypertension.  4. Increasing hazy density about the anterior abdominal fat. This may  be postoperative or related to venous congestion. Malignant  infiltration can have this appearance although this is unlikely given  the recent sigmoidectomy and presumptive evaluation of the anterior  abdominal fat.    I have personally reviewed the examination and initial interpretation  and I agree with the  findings.    IVONE SANDOVAL MD         ASSESSMENT AND PLAN     57-year-old male with a newly diagnosed     - Poorly differentiated adenocarcinoma colon  pT4N1c(tumor deposits) with margin involvement -  Stage III   CT scans negative for evidence of metastatic disease  Patient's case was discussed at the tumor board 11/13 and recommendation was to proceed with systemic chemotherapy at this point with plans for additional surgery in future.  He will be started on FOLFOX q2 week regimen tomorrow for 6 months duration. Side effect profile explained including but not limited to lowering of blood counts, infections, neuropathy, diarrhea, allergic reaction. He understands and is willing to proceed at this point.     - Hepatitis C  On antiviral treatment per GI     Return to clinic in 2 weeks for office visit prior to next cycle of chemotherapy     The patient is ready to learn, no apparent learning barriers were identified, Diagnosis and treatment plans were explained to the patient. The patient expressed understanding of the content. The patient questions were answered to his satisfaction.    Chart documentation with Dragon Voice recognition Software. Although reviewed after completion, some words and grammatical errors may remain.  Padilla Last MD  Attending Physician   Hematology/Medical Oncology    Again, thank you for allowing me to participate in the care of your patient.        Sincerely,        Padilla Last MD

## 2017-11-21 ENCOUNTER — INFUSION THERAPY VISIT (OUTPATIENT)
Dept: INFUSION THERAPY | Facility: CLINIC | Age: 57
End: 2017-11-21
Payer: COMMERCIAL

## 2017-11-21 ENCOUNTER — ONCOLOGY VISIT (OUTPATIENT)
Dept: ONCOLOGY | Facility: CLINIC | Age: 57
End: 2017-11-21
Payer: COMMERCIAL

## 2017-11-21 ENCOUNTER — MEDICAL CORRESPONDENCE (OUTPATIENT)
Dept: HEALTH INFORMATION MANAGEMENT | Facility: CLINIC | Age: 57
End: 2017-11-21

## 2017-11-21 ENCOUNTER — HOME INFUSION (PRE-WILLOW HOME INFUSION) (OUTPATIENT)
Dept: PHARMACY | Facility: CLINIC | Age: 57
End: 2017-11-21

## 2017-11-21 VITALS
SYSTOLIC BLOOD PRESSURE: 131 MMHG | DIASTOLIC BLOOD PRESSURE: 77 MMHG | RESPIRATION RATE: 18 BRPM | TEMPERATURE: 97.5 F | OXYGEN SATURATION: 99 % | HEART RATE: 73 BPM | WEIGHT: 154.3 LBS | BODY MASS INDEX: 23.47 KG/M2

## 2017-11-21 DIAGNOSIS — C18.7 ADENOCARCINOMA OF SIGMOID COLON (H): Primary | ICD-10-CM

## 2017-11-21 PROCEDURE — 96413 CHEMO IV INFUSION 1 HR: CPT | Performed by: INTERNAL MEDICINE

## 2017-11-21 PROCEDURE — 96367 TX/PROPH/DG ADDL SEQ IV INF: CPT | Performed by: INTERNAL MEDICINE

## 2017-11-21 PROCEDURE — 96411 CHEMO IV PUSH ADDL DRUG: CPT | Performed by: INTERNAL MEDICINE

## 2017-11-21 PROCEDURE — 96415 CHEMO IV INFUSION ADDL HR: CPT | Performed by: INTERNAL MEDICINE

## 2017-11-21 PROCEDURE — 97802 MEDICAL NUTRITION INDIV IN: CPT | Performed by: DIETITIAN, REGISTERED

## 2017-11-21 PROCEDURE — 96368 THER/DIAG CONCURRENT INF: CPT | Performed by: INTERNAL MEDICINE

## 2017-11-21 PROCEDURE — 99207 ZZC NO CHARGE LOS: CPT

## 2017-11-21 PROCEDURE — 96416 CHEMO PROLONG INFUSE W/PUMP: CPT | Performed by: INTERNAL MEDICINE

## 2017-11-21 RX ORDER — PROCHLORPERAZINE MALEATE 10 MG
10 TABLET ORAL EVERY 6 HOURS PRN
Qty: 30 TABLET | Refills: 2 | Status: SHIPPED | OUTPATIENT
Start: 2017-11-21 | End: 2018-08-27

## 2017-11-21 RX ORDER — FLUOROURACIL 50 MG/ML
400 INJECTION, SOLUTION INTRAVENOUS ONCE
Status: COMPLETED | OUTPATIENT
Start: 2017-11-21 | End: 2017-11-21

## 2017-11-21 RX ORDER — ONDANSETRON 8 MG/1
8 TABLET, FILM COATED ORAL EVERY 8 HOURS PRN
Qty: 10 TABLET | Refills: 2 | Status: SHIPPED | OUTPATIENT
Start: 2017-11-21 | End: 2018-02-05

## 2017-11-21 RX ORDER — LORAZEPAM 0.5 MG/1
0.5 TABLET ORAL EVERY 4 HOURS PRN
Qty: 30 TABLET | Refills: 2 | Status: SHIPPED | OUTPATIENT
Start: 2017-11-21 | End: 2018-02-05

## 2017-11-21 RX ADMIN — FLUOROURACIL 710 MG: 50 INJECTION, SOLUTION INTRAVENOUS at 14:45

## 2017-11-21 ASSESSMENT — PAIN SCALES - GENERAL: PAINLEVEL: NO PAIN (0)

## 2017-11-21 NOTE — MR AVS SNAPSHOT
After Visit Summary   11/21/2017    Rian Malik    MRN: [id number]           Patient Information     Date Of Birth          1960        Visit Information        Provider Department      11/21/2017 1:00 PM Sabine Chau, Z[facility]        Today's Diagnoses     Adenocarcinoma of sigmoid colon (H)    -  1       Follow-ups after your visit        Your next 10 appointments already scheduled     Dec 04, 2017 10:30 AM CST   Return Visit with NURSE ONLY CANCER CENTER   [facility]    [address] [place] [address]   [phone]            Dec 04, 2017 11:15 AM CST   Return Visit with Padilla Last MD   [facility] ([facility])    [address] [place] [address] [phone]            Dec 04, 2017 12:00 PM CST   Flofox with BAY 8 INFUSION   [facility] ([facility])    [address] [place] [address] [phone]            Dec 18, 2017 10:00 AM CST   Return Visit with NURSE ONLY CANCER CENTER   [facility] ([facility])    [address] [place] [address]   [phone]            Dec 18, 2017 10:45 AM CST   Return Visit with Padilla Last MD   [facility])    [address] [place] [address] [phone]            Dec 18, 2017 11:30 AM CST   Flofox with BAY 4 INFUSION   [facility] ([facility])    [address] [place] [address] [phone]              Who to contact     If you have questions or need follow up information about today's clinic visit or your schedule please contact [facility] directly at [phone].  Normal or non-critical lab and imaging results will be communicated to you by MyChart, letter or phone  within 4 business days after the clinic has received the results. If you do not hear from us within 7 days, please contact the clinic through MysteryD or phone. If you have a critical or abnormal lab result, we will notify you by phone as soon as possible.  Submit refill requests through MysteryD or call your pharmacy and they will forward the refill request to us. Please allow 3 business days for your refill to be completed.          Additional Information About Your Visit        [employer] Information     MysteryD gives you secure access to your electronic health record. If you see a primary care provider, you can also send messages to your care team and make appointments. If you have questions, please call your primary care clinic.  If you do not have a primary care provider, please call [phone] and they will assist you.      MysteryD is an electronic gateway that provides easy, online access to your medical records. With MysteryD, you can request a clinic appointment, read your test results, renew a prescription or communicate with your care team.     To access your existing account, please contact your [facility] or call [phone] for assistance.        Care EveryWhere ID     This is your Care EveryWhere ID. This could be used by other organizations to access your [place] medical records  [id number]         Blood Pressure from Last 3 Encounters:   11/21/17 131/77   11/20/17 138/78   11/14/17 114/68    Weight from Last 3 Encounters:   11/21/17 70 kg (154 lb 4.8 oz)   11/20/17 69.4 kg (153 lb)   11/14/17 68.4 kg (150 lb 12.8 oz)              We Performed the Following     MNT INDIVIDUAL INITIAL EA 15 MIN        Primary Care Provider Office Phone # Fax #    Moreno Harris [phone]       [address]  [facility] [address]        Equal Access to Services     KHANH FLANAGAN : Rajesh Meraz, suad peck, iliana garcia,  alisa kendallbria palma'aan ah. So [facility] [phone].    ATENCIÓN: Si habla lacey, tiene a martino disposición servicios gratuitos de asistencia lingüística. Alfredo rocha [phone].    We comply with applicable federal civil rights laws and [place] laws. We do not discriminate on the basis of race, color, national origin, age, disability, sex, sexual orientation, or gender identity.            Thank you!     Thank you for choosing [facility]  for your care. Our goal is always to provide you with excellent care. Hearing back from our patients is one way we can continue to improve our services. Please take a few minutes to complete the written survey that you may receive in the mail after your visit with us. Thank you!             Your Updated Medication List - Protect others around you: Learn how to safely use, store and throw away your medicines at www.disposemymeds.org.          This list is accurate as of: 11/21/17  1:50 PM.  Always use your most recent med list.                   Brand Name Dispense Instructions for use Diagnosis    docusate sodium 100 MG tablet    COLACE    60 tablet    Take 100 mg by mouth daily    Special screening for malignant neoplasms, colon       elbasvir-grazoprevir  MG Tabs per tablet    ZEPATIER    28 tablet    Take 1 tablet by mouth daily    Chronic hepatitis C without hepatic coma (H)       LORazepam 0.5 MG tablet    ATIVAN    30 tablet    Take 1 tablet (0.5 mg) by mouth every 4 hours as needed (Anxiety, Nausea/Vomiting or Sleep)    Adenocarcinoma of sigmoid colon (H)       ondansetron 8 MG tablet    ZOFRAN    10 tablet    Take 1 tablet (8 mg) by mouth every 8 hours as needed (Nausea/Vomiting)    Adenocarcinoma of sigmoid colon (H)       oxyCODONE IR 5 MG tablet    ROXICODONE    20 tablet    Take 1 tablet (5 mg) by mouth every 4 hours as needed for pain maximum 8 tablet(s) per day    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

## 2017-11-21 NOTE — TELEPHONE ENCOUNTER
Forms received via fax. Will have Darrell Schneider,  sign and then will fax to [employer].    Dania Butcher CMA.

## 2017-11-21 NOTE — PROGRESS NOTES
Infusion Nursing Note:  Rian Malik presents today for C1,D1 of Folfox.  Patient seen by provider today: Yes: 11/20/17   present during visit today: Not Applicable.    Note: Pt instructed regarding treatment plan, encouraged oral hydration instructed on home oral medications of Ativan, Zofran, Compazine. Peripheral neuropathy with Oxaliplatin and 5FU home infusion routine.    Intravenous Access:  Implanted Port.    Treatment Conditions:  Lab Results   Component Value Date    HGB 13.2 11/20/2017     Lab Results   Component Value Date    WBC 9.0 11/20/2017      Lab Results   Component Value Date    ANEU 6.5 11/20/2017     Lab Results   Component Value Date     11/20/2017      Lab Results   Component Value Date     11/20/2017                   Lab Results   Component Value Date    POTASSIUM 3.8 11/20/2017           Lab Results   Component Value Date    MAG 2.0 08/04/2017            Lab Results   Component Value Date    CR 0.81 11/20/2017                   Lab Results   Component Value Date    MAICOL 9.1 11/20/2017                Lab Results   Component Value Date    BILITOTAL 0.6 11/20/2017           Lab Results   Component Value Date    ALBUMIN 3.9 11/20/2017                    Lab Results   Component Value Date    ALT 25 11/20/2017           Lab Results   Component Value Date    AST 16 11/20/2017     Results reviewed, labs MET treatment parameters, ok to proceed with treatment.          Post Infusion Assessment:  Patient tolerated infusion without incident.  Blood return noted pre and post infusion.  Site patent and intact, free from redness, edema or discomfort.  No evidence of extravasations.    Discharge Plan:   Pt will go to [facility] on 11/23/17 at 1300 for pump dc and port flush.  Discharged with home spill kit, information for [facility] and home meds.  Return 12/04/17 and 12/18/17 for treatment.  Prescription refills given for Ativan, Zofran,Compazine.  Patient  and/or family verbalized understanding of discharge instructions and all questions answered.  Patient discharged in stable condition accompanied by: self.  Departure Mode: Ambulatory.    Stephanie Conner RN

## 2017-11-21 NOTE — MR AVS SNAPSHOT
After Visit Summary   11/21/2017    Rian Malik    MRN: [id number]           Patient Information     Date Of Birth          1960        Visit Information        Provider Department      11/21/2017 11:00 AM BAY 10 INFUSION [facility]        Today's Diagnoses     Adenocarcinoma of sigmoid colon (H)    -  1       Follow-ups after your visit        Your next 10 appointments already scheduled     Nov 23, 2017  1:00 PM CST   Level 1 with  INFUSION CHAIR 13   [facility] ([facility])    [facility]  [address]  [facility] [address]   [phone]            Dec 04, 2017 10:30 AM CST   Return Visit with NURSE ONLY CANCER CENTER   [facility] ([facility])    [address] [place] [address]   [phone]            Dec 04, 2017 11:15 AM CST   Return Visit with Padilla Last MD   [facility])    [address] [place] [address]   [phone]            Dec 04, 2017 12:00 PM CST   Flofox with BAY 8 INFUSION   [facility] ([facility])    [address] [facility] [address]   [phone]            Dec 18, 2017 10:00 AM CST   Return Visit with NURSE ONLY CANCER CENTER   [facility] ([facility])    [address] [facility] [address]   [phone]            Dec 18, 2017 10:45 AM CST   Return Visit with Padilla Last MD   [facility])    [address] [facility] [address]   [phone]            Dec 18, 2017 11:30 AM CST   Flofox with BAY 4 INFUSION   [facility] ([facility])    [address] [facility] [address] [phone]              Who to contact     If you have questions or need follow up  information about today's clinic visit or your schedule please contact [facility] directly at [phone].  Normal or non-critical lab and imaging results will be communicated to you by AirPlughart, letter or phone within 4 business days after the clinic has received the results. If you do not hear from us within 7 days, please contact the clinic through AirPlughart or phone. If you have a critical or abnormal lab result, we will notify you by phone as soon as possible.  Submit refill requests through textmetix or call your pharmacy and they will forward the refill request to us. Please allow 3 business days for your refill to be completed.          Additional Information About Your Visit        AirPlughart Information     textmetix gives you secure access to your electronic health record. If you see a primary care provider, you can also send messages to your care team and make appointments. If you have questions, please call your primary care clinic.  If you do not have a primary care provider, please call [phone] and they will assist you.      textmetix is an electronic gateway that provides easy, online access to your medical records. With textmetix, you can request a clinic appointment, read your test results, renew a prescription or communicate with your care team.     To access your existing account, please contact your [facility] or call [phone] for assistance.        Care EveryWhere ID     This is your Care EveryWhere ID. This could be used by other organizations to access your [place] medical records  [id number]        Your Vitals Were     Pulse Temperature Respirations Pulse Oximetry BMI (Body Mass Index)       73 97.5  F (36.4  C) (Oral) 18 99% 23.47 kg/m2        Blood Pressure from Last 3 Encounters:   11/21/17 131/77   11/20/17 138/78   11/14/17 114/68    Weight from Last 3 Encounters:   11/21/17 70 kg (154 lb 4.8 oz)   11/20/17 69.4 kg (153 lb)   11/14/17 68.4  kg (150 lb 12.8 oz)              Today, you had the following     No orders found for display       Primary Care Provider Office Phone # Fax #    Moreno Harris [phone] [address]  [place] [facility] [address]        Equal Access to Services     [facility] AreaALESS[facility] : Hadii aad ku hadasho Soomaali, waaxda luqadaha, qaybta kaalmada adeegyada, waxay rosey hayharis lopezduniacameron leon . So [facility] [phone].    ATENCIÓN: Si habla español, tiene a martino disposición servicios gratuitos de asistencia lingüística. Llame al [phone].    We comply with applicable federal civil rights laws and [place] laws. We do not discriminate on the basis of race, color, national origin, age, disability, sex, sexual orientation, or gender identity.            Thank you!     Thank you for choosing [facility]  for your care. Our goal is always to provide you with excellent care. Hearing back from our patients is one way we can continue to improve our services. Please take a few minutes to complete the written survey that you may receive in the mail after your visit with us. Thank you!             Your Updated Medication List - Protect others around you: Learn how to safely use, store and throw away your medicines at www.disposemymeds.org.          This list is accurate as of: 11/21/17  3:43 PM.  Always use your most recent med list.                   Brand Name Dispense Instructions for use Diagnosis    docusate sodium 100 MG tablet    COLACE    60 tablet    Take 100 mg by mouth daily    Special screening for malignant neoplasms, colon       elbasvir-grazoprevir  MG Tabs per tablet    ZEPATIER    28 tablet    Take 1 tablet by mouth daily    Chronic hepatitis C without hepatic coma (H)       LORazepam 0.5 MG tablet    ATIVAN    30 tablet    Take 1 tablet (0.5 mg) by mouth every 4 hours as needed (Anxiety, Nausea/Vomiting or Sleep)    Adenocarcinoma of sigmoid colon (H)       ondansetron 8  MG tablet    ZOFRAN    10 tablet    Take 1 tablet (8 mg) by mouth every 8 hours as needed (Nausea/Vomiting)    Adenocarcinoma of sigmoid colon (H)       oxyCODONE IR 5 MG tablet    ROXICODONE    20 tablet    Take 1 tablet (5 mg) by mouth every 4 hours as needed for pain maximum 8 tablet(s) per day    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

## 2017-11-21 NOTE — LETTER
"    11/21/2017         RE: Rian Malik  [address] [place] [address]        Dear Colleague,    Thank you for referring your patient, Rian Malik, to the [facility]. Please see a copy of my visit note below.    CLINICAL NUTRITION SERVICES - ASSESSMENT NOTE    Rian Malik referred for MNT related to colon cancer    Time Spent: 30 minutes  Visit Type:  Initial   Referring Physician:  Berhane  Pt accompanied by:  self - seen during infusion    NUTRITION HISTORY  Factors affecting nutrition intake include: no barriers at this time  Current diet: low fiber/low residue  Current appetite/intake: Good  Chemotherapy: FOLFOX - C1D1 today   Surgery: Laparoscopic surgery was converted to open sigmoidectomy with end colostomy.     Diet Recall  Breakfast Eggs, pancakes, sausage from  [place]   Lunch Ensure original   Dinner Chicken, rice, green beans or corn (small portions of vegetables)   Snacks Ensure, donuts   Beverages 4 cups water, 3 cups coffee, 2 cups gatorade    Alcohol None   Dining out 2-3 times/week      Rian is familiar with low fiber diet.    He is able to verbalize understanding of diet guidelines - Living with Colostomy.     He has been drinking 3-4 ONS (Ensure original) daily in between meals to help replete his weight.  This has been working well for him.  He expresses his concern regarding weight loss with initiation of chemotherapy and barriers he may encounter with eating.   He reports that he has read through his Cancer Care binder and skimmed 'Coping With Diarrhea.  He reports that his output is currently good.      LABS  Labs reviewed    ANTHROPOMETRICS  Height: 70\"  Weight: 154 lbs/70kg  BMI: 22  Weight Status:  Normal BMI  IBW: 166 lbs  % IBW: 92%  Weight History: noted 10 lb wt gain since surgery   Wt Readings from Last 6 Encounters:   11/21/17 70 kg (154 lb 4.8 oz)   11/20/17 69.4 kg (153 lb)   11/14/17 68.4 kg (150 lb 12.8 oz)   11/06/17 65.6 kg (144 lb 9.6 " oz)   11/06/17 65.7 kg (144 lb 14.4 oz)   10/26/17 64.9 kg (143 lb)       ASSESSED NUTRITION NEEDS:  Estimated Energy Needs: 1244-5870 kcals (30-35 Kcal/Kg)  Justification: repletion  Estimated Protein Needs:  grams protein (1.2-1.5 g pro/Kg)  Justification: Repletion  Estimated Fluid Needs: 2500  mL   Justification: maintenance with chemotherapy     MALNUTRITION:  % Weight Loss:  None noted  % Intake:  No decreased intake noted  Subcutaneous Fat Loss:  None observed  Muscle Loss:  Dorsal hand region mild depletion and Anterior thigh region mild depletion  Fluid Retention:  None noted    Malnutrition Diagnosis: Patient does not meet two of the above criteria necessary for diagnosing malnutrition but is at risk.     NUTRITION DIAGNOSIS:  Predicted suboptimal nutrient intake related to CRT for colon cancer, has colostomy as     INTERVENTIONS  Recommendations / Nutrition Prescription  1. 2100-2400kcal, >100g protein via small frequent meals  2. >2100mL water/day (~68 oz/day)  Nutrition Education    Provided written & verbal education on:   - Ways to maximize kcal and protein intake. Discussed calorie and protein needs for maintenance of weight and nutrition status.  Advised pt to aim for at least 2100kcal and 100g protein via 5-6 small frequent meals.  Reviewed low fiber diet . Reviewed ways to increase thickness or decrease thickness of output.    - Coping with barriers - as chemotherapy/radiation progresses, eating may become more difficult and discussed ways to cope with this.  - ONS (Ensure Plus, Enlive/Boost Plus etc). Suggested ways to incorporate these supplements to avoid flavor fatigue. Encouraged pt to continue to consume 3-4 ONS daily as able .     Provided pt with corresponding education materials on:  High Calorie, High Protein Recipe booklet, Tips to Increase the Calories in Your Diet.     Pt verbalize understanding of materials provided during consult.   Patient Understanding: Excellent  Expected  Compliance: Excellent     Goals  1.  Aim for 5-6 small frequent meals  2.  Aim for at least 2100kcal and 100g protein/day  3. Weight maintenance through cancer treatment      Follow-Up Plans: Pt has RD contact information for questions.      Time spent with patient: 30 minutes.  Sabine Serrano RD, LD        Again, thank you for allowing me to participate in the care of your patient.        Sincerely,        Sabine Serrano RD

## 2017-11-21 NOTE — TELEPHONE ENCOUNTER
L/m for for to call us back regarding [employer] form that needs to be filled out. My Chart message sent as well.  Dania Butcher CMA.

## 2017-11-21 NOTE — PROGRESS NOTES
"CLINICAL NUTRITION SERVICES - ASSESSMENT NOTE    Rian Malik referred for MNT related to colon cancer    Time Spent: 30 minutes  Visit Type: Initial   Referring Physician: Berhane  Pt accompanied by: self - seen during infusion    NUTRITION HISTORY  Factors affecting nutrition intake include: no barriers at this time  Current diet: low fiber/low residue  Current appetite/intake: Good  Chemotherapy: FOLFOX - C1D1 today   Surgery: Laparoscopic surgery was converted to open sigmoidectomy with end colostomy.     Diet Recall  Breakfast Eggs, pancakes, sausage from [facility]   Lunch Ensure original   Dinner Chicken, rice, green beans or corn (small portions of vegetables)   Snacks Ensure, donuts   Beverages 4 cups water, 3 cups coffee, 2 cups gatorade    Alcohol None   Dining out 2-3 times/week      Rian is familiar with low fiber diet.    He is able to verbalize understanding of diet guidelines - Living with Colostomy.     He has been drinking 3-4 ONS (Ensure original) daily in between meals to help replete his weight.  This has been working well for him.  He expresses his concern regarding weight loss with initiation of chemotherapy and barriers he may encounter with eating.   He reports that he has read through his Cancer Care binder and skimmed 'Coping With Diarrhea.  He reports that his output is currently good.      LABS  Labs reviewed    ANTHROPOMETRICS  Height: 70\"  Weight: 154 lbs/70kg  BMI: 22  Weight Status:  Normal BMI  IBW: 166 lbs  % IBW: 92%  Weight History: noted 10 lb wt gain since surgery   Wt Readings from Last 6 Encounters:   11/21/17 70 kg (154 lb 4.8 oz)   11/20/17 69.4 kg (153 lb)   11/14/17 68.4 kg (150 lb 12.8 oz)   11/06/17 65.6 kg (144 lb 9.6 oz)   11/06/17 65.7 kg (144 lb 14.4 oz)   10/26/17 64.9 kg (143 lb)       ASSESSED NUTRITION NEEDS:  Estimated Energy Needs: 6665-5453 kcals (30-35 Kcal/Kg)  Justification: repletion  Estimated Protein Needs:  grams protein (1.2-1.5 g " pro/Kg)  Justification: Repletion  Estimated Fluid Needs: 2500  mL   Justification: maintenance with chemotherapy     MALNUTRITION:  % Weight Loss:  None noted  % Intake:  No decreased intake noted  Subcutaneous Fat Loss:  None observed  Muscle Loss:  Dorsal hand region mild depletion and Anterior thigh region mild depletion  Fluid Retention:  None noted    Malnutrition Diagnosis: Patient does not meet two of the above criteria necessary for diagnosing malnutrition but is at risk.     NUTRITION DIAGNOSIS:  Predicted suboptimal nutrient intake related to CRT for colon cancer, has colostomy as     INTERVENTIONS  Recommendations / Nutrition Prescription  1. 2100-2400kcal, >100g protein via small frequent meals  2. >2100mL water/day (~68 oz/day)  Nutrition Education    Provided written & verbal education on:   - Ways to maximize kcal and protein intake. Discussed calorie and protein needs for maintenance of weight and nutrition status.  Advised pt to aim for at least 2100kcal and 100g protein via 5-6 small frequent meals.  Reviewed low fiber diet . Reviewed ways to increase thickness or decrease thickness of output.    - Coping with barriers - as chemotherapy/radiation progresses, eating may become more difficult and discussed ways to cope with this.  - ONS (Ensure Plus, Enlive/Boost Plus etc). Suggested ways to incorporate these supplements to avoid flavor fatigue. Encouraged pt to continue to consume 3-4 ONS daily as able .     Provided pt with corresponding education materials on:  High Calorie, High Protein Recipe booklet, Tips to Increase the Calories in Your Diet.     Pt verbalize understanding of materials provided during consult.   Patient Understanding: Excellent  Expected Compliance: Excellent     Goals  1.  Aim for 5-6 small frequent meals  2.  Aim for at least 2100kcal and 100g protein/day  3. Weight maintenance through cancer treatment      Follow-Up Plans: Pt has RD contact information for questions.       Time spent with patient: 30 minutes.  Sabine Serrano RD, LD

## 2017-11-22 NOTE — PROGRESS NOTES
This is a recent snapshot of the patient's [facility] medical record.  For current drug dose and complete information and questions, call [phone] or In Basket pool, [facility] home infusion ([id number])  CSN Number:  [id number]

## 2017-11-23 ENCOUNTER — INFUSION THERAPY VISIT (OUTPATIENT)
Dept: INFUSION THERAPY | Facility: CLINIC | Age: 57
End: 2017-11-23
Attending: DIETITIAN, REGISTERED
Payer: COMMERCIAL

## 2017-11-23 VITALS
DIASTOLIC BLOOD PRESSURE: 79 MMHG | TEMPERATURE: 97.6 F | HEART RATE: 70 BPM | RESPIRATION RATE: 16 BRPM | SYSTOLIC BLOOD PRESSURE: 130 MMHG

## 2017-11-23 DIAGNOSIS — C18.7 ADENOCARCINOMA OF SIGMOID COLON (H): Primary | ICD-10-CM

## 2017-11-23 PROCEDURE — 25000128 H RX IP 250 OP 636: Performed by: INTERNAL MEDICINE

## 2017-11-23 RX ORDER — HEPARIN SODIUM (PORCINE) LOCK FLUSH IV SOLN 100 UNIT/ML 100 UNIT/ML
5 SOLUTION INTRAVENOUS EVERY 8 HOURS
Status: DISCONTINUED | OUTPATIENT
Start: 2017-11-23 | End: 2017-11-23 | Stop reason: HOSPADM

## 2017-11-23 RX ADMIN — SODIUM CHLORIDE, PRESERVATIVE FREE 5 ML: 5 INJECTION INTRAVENOUS at 13:05

## 2017-11-23 NOTE — PROGRESS NOTES
Infusion Nursing Note:  Rian Malik presents today for pump disconnect.    Patient seen by provider today: No   present during visit today: Not Applicable.    Note: N/A.    Intravenous Access:  Implanted Port.    Treatment Conditions:  Not Applicable.      Post Infusion Assessment:  Patient tolerated infusion without incident.  Blood return noted pre and post infusion.  Site patent and intact, free from redness, edema or discomfort.  No evidence of extravasations.  Access discontinued per protocol.    Discharge Plan:   Discharge instructions reviewed with: Patient.  Patient and/or family verbalized understanding of discharge instructions and all questions answered.  AVS to patient via XipLinkT.  Patient will return 12/4/17 for next appointment.   Patient discharged in stable condition accompanied by: self.  Departure Mode: Ambulatory.    Darrin Valdovinos RN

## 2017-11-23 NOTE — MR AVS SNAPSHOT
After Visit Summary   11/23/2017    Rian Malik    MRN: [id number]           Patient Information     Date Of Birth          1960        Visit Information        Provider Department      11/23/2017 1:00 PM  INFUSION CHAIR 13 [facility] [place]        Today's Diagnoses     Adenocarcinoma of sigmoid colon (H)    -  1       Follow-ups after your visit        Your next 10 appointments already scheduled     Dec 04, 2017 10:30 AM CST   Return Visit with NURSE ONLY CANCER CENTER   [facility]    [address] [place] [address]   [phone]            Dec 04, 2017 11:15 AM CST   Return Visit with Padilla Last MD   [facility])    [address] [place] [address] [phone]            Dec 04, 2017 12:00 PM CST   [place] with BAY 8 INFUSION   [facility]    [address] [facility] [address] [phone]            Dec 18, 2017 10:00 AM CST   Return Visit with NURSE ONLY CANCER CENTER   [facility]    [address] [facility] [address]   [phone]            Dec 18, 2017 10:45 AM CST   Return Visit with Padilla Last MD   [facility])    [address] [facility] [address] [phone]            Dec 18, 2017 11:30 AM CST   [place] with BAY 4 INFUSION   [facility])    [address] [place] [address]   [phone]              Who to contact     If you have questions or need follow up information about today's clinic visit or your schedule please contact [facility] CANCER [facility] AND [facility] directly at [phone].  Normal or non-critical lab and imaging results will be communicated to you by Zuri  letter or phone within 4 business days after the clinic has received the results. If you do not hear from us within 7 days, please contact the clinic through [facility] or phone. If you have a critical or abnormal lab result, we will notify you by phone as soon as possible.  Submit refill requests through [facility] or call your pharmacy and they will forward the refill request to us. Please allow 3 business days for your refill to be completed.          Additional Information About Your Visit        [employer] Information     [facility] gives you secure access to your electronic health record. If you see a primary care provider, you can also send messages to your care team and make appointments. If you have questions, please call your primary care clinic.  If you do not have a primary care provider, please call [phone] and they will assist you.        Care EveryWhere ID     This is your Care EveryWhere ID. This could be used by other organizations to access your [place] medical records  [id number]        Your Vitals Were     Pulse Temperature Respirations             70 97.6  F (36.4  C) (Oral) 16          Blood Pressure from Last 3 Encounters:   11/23/17 130/79   11/21/17 131/77   11/20/17 138/78    Weight from Last 3 Encounters:   11/21/17 70 kg (154 lb 4.8 oz)   11/20/17 69.4 kg (153 lb)   11/14/17 68.4 kg (150 lb 12.8 oz)              Today, you had the following     No orders found for display       Primary Care Provider Office Phone # Fax #    Moreno Harris [phone]       [address]  [facility] [address]        Equal Access to Services     [facility]: Hadii aad ku hadasho Soomaali, waaxda luqadaha, qaybta kaalmada jose, alisa leon . So [facility] [phone].    ATENCIÓN: Si habla español, tiene a martino disposición servicios gratuitos de asistencia lingüística. Llame al [phone].    We comply with applicable federal civil rights laws and  [place] laws. We do not discriminate on the basis of race, color, national origin, age, disability, sex, sexual orientation, or gender identity.            Thank you!     Thank you for choosing [facility] CANCER [facility] AND [facility] CENTER  for your care. Our goal is always to provide you with excellent care. Hearing back from our patients is one way we can continue to improve our services. Please take a few minutes to complete the written survey that you may receive in the mail after your visit with us. Thank you!             Your Updated Medication List - Protect others around you: Learn how to safely use, store and throw away your medicines at www.disposemymeds.org.          This list is accurate as of: 11/23/17  1:08 PM.  Always use your most recent med list.                   Brand Name Dispense Instructions for use Diagnosis    docusate sodium 100 MG tablet    COLACE    60 tablet    Take 100 mg by mouth daily    Special screening for malignant neoplasms, colon       elbasvir-grazoprevir  MG Tabs per tablet    ZEPATIER    28 tablet    Take 1 tablet by mouth daily    Chronic hepatitis C without hepatic coma (H)       LORazepam 0.5 MG tablet    ATIVAN    30 tablet    Take 1 tablet (0.5 mg) by mouth every 4 hours as needed (Anxiety, Nausea/Vomiting or Sleep)    Adenocarcinoma of sigmoid colon (H)       ondansetron 8 MG tablet    ZOFRAN    10 tablet    Take 1 tablet (8 mg) by mouth every 8 hours as needed (Nausea/Vomiting)    Adenocarcinoma of sigmoid colon (H)       oxyCODONE IR 5 MG tablet    ROXICODONE    20 tablet    Take 1 tablet (5 mg) by mouth every 4 hours as needed for pain maximum 8 tablet(s) per day    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

## 2017-11-24 ENCOUNTER — MYC MEDICAL ADVICE (OUTPATIENT)
Dept: SURGERY | Facility: CLINIC | Age: 57
End: 2017-11-24

## 2017-12-01 ENCOUNTER — CARE COORDINATION (OUTPATIENT)
Dept: GASTROENTEROLOGY | Facility: CLINIC | Age: 57
End: 2017-12-01

## 2017-12-01 DIAGNOSIS — B18.2 CHRONIC HEPATITIS C WITHOUT HEPATIC COMA (H): Primary | ICD-10-CM

## 2017-12-01 NOTE — LETTER
December 1, 2017       TO: Rian Malik  [address] [place] [address]       Dear Mr. Malik,    Below is a summary of your treatment schedule. Please follow the schedule as closely as possible. Labs should be drawn as close to the date indicated at the [facility] or [facility].    Hepatitis C Treatment  Treatment: Zepatier x12 weeks     Start Date: 11/10/17    Week 4  Hepatic panel, BMP Lab Due: 12/8/2017    Week 12 - End of Treatment  HCV RNA Quant Lab Due: 2/2/2018  Please have this lab drawn on or within a week after this date 2/2/2018. You will need to repeat this lab 3 months after completing treatment to ensure you have cleared the virus. Please see date for the final lab below. You do not need to fast for this lab.       Office Visit Date:  Please set up an appointment for follow up in the hepatology clinic with Dr. Burroughs by calling [phone].     3 Months Post Treatment  HCV RNA Quant Lab Due: 5/3/2018 Please have this lab drawn on the specified date of 5/3/2018 or within a week after. This final lab will determine if you have cleared the Hepatitis C virus with Zepatier treatment. Without his final lab, we will be unable to determine if treatment was successful. You do not need to fast for this lab.           Educational information to patient on Hep C treatment;     -Contact the [facility] Hepatology clinic and speak with RN Care Coordinator prior to starting any new prescribed or OTC medications.   -Take medications exactly as prescribed, do not change dose or stop taking without consulting your provider.   -Take Medication one time each day with or without food  -If you miss a dose of medication, then take it as soon as you remember on the same day. If not remembered on the same day, then skip the dose and take the next dose at the usual time. Do not take more than the recommended dose. Contact the clinic if you miss a dose.    Please contact the pharmacy 1-2  weeks prior to needing a medication refill.      Side Effects  The most common side effects of Hep C medication treatment can include:  -tiredness  -headache  Notify the clinic of any side effects that bother you or that do not go away.   Possible side effects have been discussed.   Patient has been instructed to clinic for rash, itching or unmanageable nausea.    How to store Hep C Treatment Medications  -Store Medication at room temperature below 86 degrees F  -Keep Medication in it's original container  -Do not use Medication if the seal is broken or missing    General information  It is not known if treatment will prevent you from infecting another person or reinfecting yourself with the hepatitis C virus during treatment. It is best that as soon as you start treatment to buy a new toothbrush, disposable razors (if you use a rotating shaver you do not need to buy a new one) and nail clippers. If you check your blood sugar at home, please dispose of the fingerstick needle after each use and DO NOT REUSE the insulin needles. These items should not be shared with anyone.        If you have any questions, please contact the main clinic at [phone] or your RN Care Coordinator at [phone]. We appreciate you choosing the [facility] clinic for your treatment.            Beena Deng RN, BSN, PHN  [facility]  Care Coordinator for Hepatology Clinic/Specialty Program

## 2017-12-01 NOTE — PROGRESS NOTES
12/1/2017  12:00 PM      Hep C Care Coordination   Patient connected with Edgar WARD PharmD and reviewed Hep C therapy medication interactions along with side effects. Patient reported starting on 11/10/17 and I will base the treatment POC on this start date;     Below is a summary of your treatment schedule. Please follow the schedule as closely as possible. Labs should be drawn as close to the date indicated at the [facility] or [facility].    Hepatitis C Treatment  Treatment: Zepatier x12 weeks   Genotype: 1a  Stage Fibrosis: F4  Treatment naive    Start Date: 11/10/17    Week 4  Hepatic panel, BMP Lab Due: 12/8/2017. Please have this lab drawn on this date or within a week after. You do not need to fast for this lab.     Week 12 - End of Treatment  HCV RNA Quant Lab Due: 2/2/2018  Please have this lab drawn on or within a week after this date 2/2/2018. You will need to repeat this lab 3 months after completing treatment to ensure you have cleared the virus. Please see date for the final lab below. You do not need to fast for this lab.       Office Visit Date:  Please set up an appointment for follow up in the hepatology clinic with Dr. Burroughs by calling [phone].     3 Months Post Treatment  HCV RNA Quant Lab Due: 5/3/2018 Please have this lab drawn on the specified date of 5/3/2018 or within a week after. This final lab will determine if you have cleared the Hepatitis C virus with Zepatier treatment. Without his final lab, we will be unable to determine if treatment was successful. You do not need to fast for this lab.       Educational information to patient on Hep C treatment;     -Contact the [facility] Hepatology clinic and speak with RN Care Coordinator prior to starting any new prescribed or OTC medications.   -Take medications exactly as prescribed, do not change dose or stop taking without consulting your provider.   -Take Medication one time each day with or without food  -If you miss  a dose of medication, then take it as soon as you remember on the same day. If not remembered on the same day, then skip the dose and take the next dose at the usual time. Do not take more than the recommended dose. Contact the clinic if you miss a dose.    Please contact the pharmacy 1-2 weeks prior to needing a medication refill.      Side Effects  The most common side effects of Hep C medication treatment can include:  -tiredness  -headache  Notify the clinic of any side effects that bother you or that do not go away.   Possible side effects have been discussed.   Patient has been instructed to clinic for rash, itching or unmanageable nausea.    How to store Hep C Treatment Medications  -Store Medication at room temperature below 86 degrees F  -Keep Medication in it's original container  -Do not use Medication if the seal is broken or missing    General information  It is not known if treatment will prevent you from infecting another person or reinfecting yourself with the hepatitis C virus during treatment. It is best that as soon as you start treatment to buy a new toothbrush, disposable razors (if you use a rotating shaver you do not need to buy a new one) and nail clippers. If you check your blood sugar at home, please dispose of the fingerstick needle after each use and DO NOT REUSE the insulin needles. These items should not be shared with anyone.        If you have any questions, please contact the main clinic at [phone] or your RN Care Coordinator at [phone]. We appreciate you choosing the [facility] clinic for your treatment.  Patient will receive a copy of treatment plan in the mail and via [employer]. Hep C care team updated on patient status.          Beena Deng RN, BSN, PHN  [facility]  Care Coordinator for Hepatology Clinic/Specialty Program

## 2017-12-04 ENCOUNTER — ONCOLOGY VISIT (OUTPATIENT)
Dept: ONCOLOGY | Facility: CLINIC | Age: 57
End: 2017-12-04
Payer: COMMERCIAL

## 2017-12-04 ENCOUNTER — INFUSION THERAPY VISIT (OUTPATIENT)
Dept: INFUSION THERAPY | Facility: CLINIC | Age: 57
End: 2017-12-04
Payer: COMMERCIAL

## 2017-12-04 VITALS
SYSTOLIC BLOOD PRESSURE: 120 MMHG | OXYGEN SATURATION: 96 % | BODY MASS INDEX: 23.73 KG/M2 | TEMPERATURE: 97.4 F | WEIGHT: 156 LBS | HEART RATE: 72 BPM | DIASTOLIC BLOOD PRESSURE: 70 MMHG

## 2017-12-04 DIAGNOSIS — C18.7 ADENOCARCINOMA OF SIGMOID COLON (H): Primary | ICD-10-CM

## 2017-12-04 LAB
ALBUMIN SERPL-MCNC: 3.6 G/DL (ref 3.4–5)
ALP SERPL-CCNC: 81 U/L (ref 40–150)
ALT SERPL W P-5'-P-CCNC: 29 U/L (ref 0–70)
ANION GAP SERPL CALCULATED.3IONS-SCNC: 5 MMOL/L (ref 3–14)
AST SERPL W P-5'-P-CCNC: 15 U/L (ref 0–45)
BASOPHILS # BLD AUTO: 0.1 10E9/L (ref 0–0.2)
BASOPHILS NFR BLD AUTO: 0.8 %
BILIRUB SERPL-MCNC: 0.5 MG/DL (ref 0.2–1.3)
BUN SERPL-MCNC: 16 MG/DL (ref 7–30)
CALCIUM SERPL-MCNC: 8.6 MG/DL (ref 8.5–10.1)
CHLORIDE SERPL-SCNC: 105 MMOL/L (ref 94–109)
CO2 SERPL-SCNC: 30 MMOL/L (ref 20–32)
CREAT SERPL-MCNC: 0.75 MG/DL (ref 0.66–1.25)
DIFFERENTIAL METHOD BLD: ABNORMAL
EOSINOPHIL # BLD AUTO: 0.2 10E9/L (ref 0–0.7)
EOSINOPHIL NFR BLD AUTO: 2.9 %
ERYTHROCYTE [DISTWIDTH] IN BLOOD BY AUTOMATED COUNT: 12.8 % (ref 10–15)
GFR SERPL CREATININE-BSD FRML MDRD: >90 ML/MIN/1.7M2
GLUCOSE SERPL-MCNC: 102 MG/DL (ref 70–99)
HCT VFR BLD AUTO: 38.6 % (ref 40–53)
HGB BLD-MCNC: 13.3 G/DL (ref 13.3–17.7)
IMM GRANULOCYTES # BLD: 0 10E9/L (ref 0–0.4)
IMM GRANULOCYTES NFR BLD: 0.2 %
LYMPHOCYTES # BLD AUTO: 2.1 10E9/L (ref 0.8–5.3)
LYMPHOCYTES NFR BLD AUTO: 34.4 %
MCH RBC QN AUTO: 32.1 PG (ref 26.5–33)
MCHC RBC AUTO-ENTMCNC: 34.5 G/DL (ref 31.5–36.5)
MCV RBC AUTO: 93 FL (ref 78–100)
MONOCYTES # BLD AUTO: 0.5 10E9/L (ref 0–1.3)
MONOCYTES NFR BLD AUTO: 7.9 %
NEUTROPHILS # BLD AUTO: 3.3 10E9/L (ref 1.6–8.3)
NEUTROPHILS NFR BLD AUTO: 53.8 %
PLATELET # BLD AUTO: 142 10E9/L (ref 150–450)
POTASSIUM SERPL-SCNC: 4 MMOL/L (ref 3.4–5.3)
PROT SERPL-MCNC: 6.8 G/DL (ref 6.8–8.8)
RBC # BLD AUTO: 4.14 10E12/L (ref 4.4–5.9)
SODIUM SERPL-SCNC: 140 MMOL/L (ref 133–144)
WBC # BLD AUTO: 6.1 10E9/L (ref 4–11)

## 2017-12-04 PROCEDURE — 99207 ZZC NO CHARGE LOS: CPT

## 2017-12-04 PROCEDURE — 80053 COMPREHEN METABOLIC PANEL: CPT | Performed by: INTERNAL MEDICINE

## 2017-12-04 PROCEDURE — 85025 COMPLETE CBC W/AUTO DIFF WBC: CPT | Performed by: INTERNAL MEDICINE

## 2017-12-04 PROCEDURE — 96368 THER/DIAG CONCURRENT INF: CPT | Performed by: INTERNAL MEDICINE

## 2017-12-04 PROCEDURE — 96413 CHEMO IV INFUSION 1 HR: CPT | Performed by: INTERNAL MEDICINE

## 2017-12-04 PROCEDURE — 96416 CHEMO PROLONG INFUSE W/PUMP: CPT | Performed by: INTERNAL MEDICINE

## 2017-12-04 PROCEDURE — 96367 TX/PROPH/DG ADDL SEQ IV INF: CPT | Performed by: INTERNAL MEDICINE

## 2017-12-04 PROCEDURE — 96411 CHEMO IV PUSH ADDL DRUG: CPT | Performed by: INTERNAL MEDICINE

## 2017-12-04 PROCEDURE — 96415 CHEMO IV INFUSION ADDL HR: CPT | Performed by: INTERNAL MEDICINE

## 2017-12-04 PROCEDURE — 99207 ZZC NO CHARGE NURSE ONLY: CPT

## 2017-12-04 PROCEDURE — 99214 OFFICE O/P EST MOD 30 MIN: CPT | Mod: 25 | Performed by: INTERNAL MEDICINE

## 2017-12-04 RX ORDER — FLUOROURACIL 50 MG/ML
400 INJECTION, SOLUTION INTRAVENOUS ONCE
Status: CANCELLED | OUTPATIENT
Start: 2017-12-04

## 2017-12-04 RX ORDER — ALBUTEROL SULFATE 0.83 MG/ML
2.5 SOLUTION RESPIRATORY (INHALATION)
Status: CANCELLED | OUTPATIENT
Start: 2017-12-04

## 2017-12-04 RX ORDER — HEPARIN SODIUM (PORCINE) LOCK FLUSH IV SOLN 100 UNIT/ML 100 UNIT/ML
5 SOLUTION INTRAVENOUS
Status: DISCONTINUED | OUTPATIENT
Start: 2017-12-04 | End: 2017-12-04 | Stop reason: HOSPADM

## 2017-12-04 RX ORDER — EPINEPHRINE 0.3 MG/.3ML
0.3 INJECTION SUBCUTANEOUS EVERY 5 MIN PRN
Status: CANCELLED | OUTPATIENT
Start: 2017-12-04

## 2017-12-04 RX ORDER — METHYLPREDNISOLONE SODIUM SUCCINATE 125 MG/2ML
125 INJECTION, POWDER, LYOPHILIZED, FOR SOLUTION INTRAMUSCULAR; INTRAVENOUS
Status: CANCELLED
Start: 2017-12-04

## 2017-12-04 RX ORDER — EPINEPHRINE 1 MG/ML
0.3 INJECTION, SOLUTION INTRAMUSCULAR; SUBCUTANEOUS EVERY 5 MIN PRN
Status: CANCELLED | OUTPATIENT
Start: 2017-12-04

## 2017-12-04 RX ORDER — ALBUTEROL SULFATE 90 UG/1
1-2 AEROSOL, METERED RESPIRATORY (INHALATION)
Status: CANCELLED
Start: 2017-12-04

## 2017-12-04 RX ORDER — SODIUM CHLORIDE 9 MG/ML
1000 INJECTION, SOLUTION INTRAVENOUS CONTINUOUS PRN
Status: CANCELLED
Start: 2017-12-04

## 2017-12-04 RX ORDER — MEPERIDINE HYDROCHLORIDE 50 MG/ML
25 INJECTION INTRAMUSCULAR; INTRAVENOUS; SUBCUTANEOUS EVERY 30 MIN PRN
Status: CANCELLED | OUTPATIENT
Start: 2017-12-04

## 2017-12-04 RX ORDER — FLUOROURACIL 50 MG/ML
400 INJECTION, SOLUTION INTRAVENOUS ONCE
Status: COMPLETED | OUTPATIENT
Start: 2017-12-04 | End: 2017-12-04

## 2017-12-04 RX ORDER — DIPHENHYDRAMINE HYDROCHLORIDE 50 MG/ML
50 INJECTION INTRAMUSCULAR; INTRAVENOUS
Status: CANCELLED
Start: 2017-12-04

## 2017-12-04 RX ORDER — LORAZEPAM 2 MG/ML
0.5 INJECTION INTRAMUSCULAR EVERY 4 HOURS PRN
Status: CANCELLED
Start: 2017-12-04

## 2017-12-04 RX ADMIN — FLUOROURACIL 710 MG: 50 INJECTION, SOLUTION INTRAVENOUS at 14:44

## 2017-12-04 RX ADMIN — HEPARIN SODIUM (PORCINE) LOCK FLUSH IV SOLN 100 UNIT/ML 5 ML: 100 SOLUTION at 10:39

## 2017-12-04 ASSESSMENT — PAIN SCALES - GENERAL: PAINLEVEL: NO PAIN (0)

## 2017-12-04 NOTE — NURSING NOTE
"Oncology Rooming Note    December 4, 2017 11:01 AM   Rian Malik is a 57 year old male who presents for:    Chief Complaint   Patient presents with     Oncology Clinic Visit     Initial Vitals: /70 (BP Location: Left arm, Cuff Size: Adult Regular)  Pulse 72  Temp 97.4  F (36.3  C) (Oral)  Wt 70.8 kg (156 lb)  SpO2 96%  BMI 23.73 kg/m2 Estimated body mass index is 23.73 kg/(m^2) as calculated from the following:    Height as of 11/20/17: 1.727 m (5' 7.99\").    Weight as of this encounter: 70.8 kg (156 lb). Body surface area is 1.84 meters squared.  No Pain (0) Comment: Data Unavailable   No LMP for male patient.  Allergies reviewed: Yes  Medications reviewed: Yes    Medications: MEDICATION REFILLS NEEDED TODAY. Provider was notified.  Pharmacy name entered into [employer]:    [facility] - [place], [place] - [address]  [facility] - [place], [place] - [address]    Clinical concerns: more gas, bowel changes Dr. Last was notified.    5 minutes for nursing intake (face to face time)     Kolton Ag RN              "

## 2017-12-04 NOTE — PROGRESS NOTES
"Infusion Nursing Note:  Rian Malik presents today for C2 D1 Oxaliplatin/Leucovorin/Fluorouracil bolus and pump connect.    Patient seen by provider today: Yes: Dr Last   present during visit today: Not Applicable.    Note: N/A.    Intravenous Access:  Implanted Port.    Treatment Conditions:  Lab Results   Component Value Date    HGB 13.3 12/04/2017     Lab Results   Component Value Date    WBC 6.1 12/04/2017      Lab Results   Component Value Date    ANEU 3.3 12/04/2017     Lab Results   Component Value Date     12/04/2017      Results reviewed, labs MET treatment parameters, ok to proceed with treatment.        Post Infusion Assessment:  Patient tolerated infusion without incident.  Prior to discharge: Port is secured in place with tegaderm and flushed with 10cc NS with positive blood return noted.  Continuous home infusion Dosi-Fuser pump connected.    All connectors secured in place and clamps taped open.    Pump started, \"running\" noted on display (CADD): Not Applicable.  Patient instructed to call our clinic or [facility] with any questions or concerns at home.  Patient verbalized understanding.    Patient set up for pump disconnect at home with [facility] on 12/6/17.          Discharge Plan:   Copy of AVS reviewed with patient and/or family.  Patient will return 12/18/17 for next appointment.  Patient discharged in stable condition accompanied by: self.  Departure Mode: Ambulatory.    Blanche Villanueva RN                      "

## 2017-12-04 NOTE — MR AVS SNAPSHOT
After Visit Summary   12/4/2017    Rian Malik    MRN: [id number]           Patient Information     Date Of Birth          1960        Visit Information        Provider Department      12/4/2017 11:15 AM Padilla Last MD [facility]        Today's Diagnoses     Adenocarcinoma of sigmoid colon (H)    -  1       Follow-ups after your visit        Your next 10 appointments already scheduled     Dec 18, 2017 10:00 AM CST   Return Visit with NURSE ONLY CANCER CENTER   [facility] ([facility])    [address] [place] [address] [phone]            Dec 18, 2017 10:45 AM CST   Return Visit with Padilla Last MD   [facility])    [address] [place] [address] [phone]            Dec 18, 2017 11:30 AM CST   [place] with BAY 4 INFUSION   [facility])    [address] [place] [address] [phone]            Jan 02, 2018  8:45 AM CST   Return Visit with NURSE ONLY CANCER CENTER   [facility] ([facility])    [address] [facility] [address]   [phone]            Jan 02, 2018  9:15 AM CST   Return Visit with SAVANNA High CNP   [facility])    [address] [place] [address] [phone]            Jan 02, 2018 10:15 AM CST   [place] with BAY 7 INFUSION   [facility])    [address] [place] [address] [phone]              Who to contact     If you have questions or need follow up information about today's clinic visit or your schedule please contact [facility] directly at [phone].  Normal or non-critical lab and imaging results will be communicated to you by MyChart, letter or phone  within 4 business days after the clinic has received the results. If you do not hear from us within 7 days, please contact the clinic through [employer] or phone. If you have a critical or abnormal lab result, we will notify you by phone as soon as possible.  Submit refill requests through [employer] or call your pharmacy and they will forward the refill request to us. Please allow 3 business days for your refill to be completed.          Additional Information About Your Visit        [employer] Information     [employer] gives you secure access to your electronic health record. If you see a primary care provider, you can also send messages to your care team and make appointments. If you have questions, please call your primary care clinic.  If you do not have a primary care provider, please call [phone] and they will assist you.      [employer] is an electronic gateway that provides easy, online access to your medical records. With [employer], you can request a clinic appointment, read your test results, renew a prescription or communicate with your care team.     To access your existing account, please contact your [facility] or call [phone] for assistance.        Care EveryWhere ID     This is your Care EveryWhere ID. This could be used by other organizations to access your [place] medical records  [id number]        Your Vitals Were     Pulse Temperature Pulse Oximetry BMI (Body Mass Index)          72 97.4  F (36.3  C) (Oral) 96% 23.73 kg/m2         Blood Pressure from Last 3 Encounters:   12/04/17 120/70   11/23/17 130/79   11/21/17 131/77    Weight from Last 3 Encounters:   12/04/17 70.8 kg (156 lb)   11/21/17 70 kg (154 lb 4.8 oz)   11/20/17 69.4 kg (153 lb)              Today, you had the following     No orders found for display       Primary Care Provider Office Phone # Fax #    Moreno Harris [phone]       [address]  [facility] [address]         Equal Access to Services     [facility] : Hadii aad ku hadmartycaridad Samanthaali, waaxda luqadaha, qaybta kalucianalisa gupta. So [facility] [phone].    ATENCIÓN: Si habla español, tiene a martino disposición servicios gratuitos de asistencia lingüística. Ngaame al [phone].    We comply with applicable federal civil rights laws and [place] laws. We do not discriminate on the basis of race, color, national origin, age, disability, sex, sexual orientation, or gender identity.            Thank you!     Thank you for choosing [facility]  for your care. Our goal is always to provide you with excellent care. Hearing back from our patients is one way we can continue to improve our services. Please take a few minutes to complete the written survey that you may receive in the mail after your visit with us. Thank you!             Your Updated Medication List - Protect others around you: Learn how to safely use, store and throw away your medicines at www.disposemymeds.org.          This list is accurate as of: 12/4/17  2:58 PM.  Always use your most recent med list.                   Brand Name Dispense Instructions for use Diagnosis    docusate sodium 100 MG tablet    COLACE    60 tablet    Take 100 mg by mouth daily    Special screening for malignant neoplasms, colon       elbasvir-grazoprevir  MG Tabs per tablet    ZEPATIER    28 tablet    Take 1 tablet by mouth daily    Chronic hepatitis C without hepatic coma (H)       LORazepam 0.5 MG tablet    ATIVAN    30 tablet    Take 1 tablet (0.5 mg) by mouth every 4 hours as needed (Anxiety, Nausea/Vomiting or Sleep)    Adenocarcinoma of sigmoid colon (H)       ondansetron 8 MG tablet    ZOFRAN    10 tablet    Take 1 tablet (8 mg) by mouth every 8 hours as needed (Nausea/Vomiting)    Adenocarcinoma of sigmoid colon (H)       oxyCODONE IR 5 MG tablet    ROXICODONE    20 tablet    Take 1 tablet (5 mg) by mouth every 4  hours as needed for pain maximum 8 tablet(s) per day    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

## 2017-12-04 NOTE — Clinical Note
12/4/2017         RE: Rian Malik  [address] [place] [address]        Dear Colleague,    Thank you for referring your patient, Rian Malik, to the [facility]. Please see a copy of my visit note below.      FOLLOW-UP VISIT NOTE    PATIENT NAME: Rian Malik MRN # [id number]  DATE OF VISIT: Dec 4, 2017 YOB: 1960    REFERRING PROVIDER: No referring provider defined for this encounter.    CANCER TYPE: Adenocarcinoma colon  STAGE: III pT4N1c  ECOG PS: 0    ONCOLOGY HISTORY:  57-year-old male who around February 2017r, developed intermittent cramping lower abdominal pain. Was evaluated by PCP and clinical impression was colitis. He was put on p.o. antibiotics. However symptoms did not improve. He was eventually referred to surgery and underwent imaging with CT findings suspicious of sigmoid abscess. He was admitted to the hospital for resection of sigmoid abscess 10/11/17. However intraoperatively was noted to have a large firmly adherent sigmoid colon mass. Laparoscopic surgery was converted to open sigmoidectomy with end colostomy.     Surgical pathology   B.  COLON, SIGMOID, HEMICOLECTOMY   - Poorly differentiated adenocarcinoma, with signet ring cell features   - Tumor size: 5 cm   - Tumor is present on serosal surface and microscopically perforates   serosal surface   - One surgical margin shows extensive serosal involvement by tumor. The   opposite, undesignated margin shows no evidence of malignancy   - Lymphovascular invasion is present   - Perineural invasion is not identified   - Tumor deposits are present   - Immunohistochemistry for mismatch repair proteins shows intact nuclear   expression for MLH1, MSH2, MSH6, and PMS2   - Three reactive lymph nodes, negative for malignancy (0/3)   - Pathologic staging: pT4N1c      Patient's case was discussed at the tumor board. Recommendation was to obtain new staging scans as well as to proceed with systemic  chemotherapy at this point with plans for further surgery in future.    - Started on Folfox 11/21/17    SUBJECTIVE     Patient is here today for his second cycle of chemotherapy. No active complaints he did develop mild diarrhea after chemotherapy which resolved with Imodium. He denies abdominal pain, nausea/vomiting, neuropathy or any other issues.      PAST MEDICAL HISTORY     Past Medical History:   Diagnosis Date     Cirrhosis (H)      Colon cancer (H) 10/11/2017    Poorly differentiated adenocarcinoma     Hepatitis C          CURRENT OUTPATIENT MEDICATIONS     Current Outpatient Prescriptions   Medication Sig Dispense Refill     LORazepam (ATIVAN) 0.5 MG tablet Take 1 tablet (0.5 mg) by mouth every 4 hours as needed (Anxiety, Nausea/Vomiting or Sleep) 30 tablet 2     prochlorperazine (COMPAZINE) 10 MG tablet Take 1 tablet (10 mg) by mouth every 6 hours as needed (Nausea/Vomiting) 30 tablet 2     ondansetron (ZOFRAN) 8 MG tablet Take 1 tablet (8 mg) by mouth every 8 hours as needed (Nausea/Vomiting) 10 tablet 2     elbasvir-grazoprevir (ZEPATIER)  MG TABS per tablet Take 1 tablet by mouth daily 28 tablet 2     oxyCODONE (ROXICODONE) 5 MG IR tablet Take 1 tablet (5 mg) by mouth every 4 hours as needed for pain maximum 8 tablet(s) per day 20 tablet 0     docusate sodium (COLACE) 100 MG tablet Take 100 mg by mouth daily 60 tablet 1        ALLERGIES     Allergies   Allergen Reactions     Acetaminophen      Naproxen         REVIEW OF SYSTEMS   As above in the HPI, o/w complete 14-point ROS was negative.     PHYSICAL EXAM   B/P: 138/78, T: 98.1, P: 64, R: 18  SpO2 Readings from Last 4 Encounters:   12/04/17 96%   11/21/17 99%   11/20/17 99%   11/14/17 99%     Wt Readings from Last 3 Encounters:   12/04/17 70.8 kg (156 lb)   11/21/17 70 kg (154 lb 4.8 oz)   11/20/17 69.4 kg (153 lb)     GEN: NAD  EYES:PERRLA  Mouth/ENT: Oropharynx is clear.  NECK: no cervical or supraclavicular lymphadenopathy  LUNGS: clear  bilaterally  CV: regular, no murmurs, rubs, or gallops  ABDOMEN: soft, non-tender, non-distended, normal bowel sounds, no hepatosplenomegaly by percussion or palpation  EXT: warm, well perfused, no edema  NEURO: alert  SKIN: no rashes     LABORATORY AND IMAGING STUDIES     Lab Results   Component Value Date    WBC 6.1 12/04/2017     Lab Results   Component Value Date    RBC 4.14 12/04/2017     Lab Results   Component Value Date    HGB 13.3 12/04/2017     Lab Results   Component Value Date    HCT 38.6 12/04/2017     No components found for: MCT  Lab Results   Component Value Date    MCV 93 12/04/2017     Lab Results   Component Value Date    MCH 32.1 12/04/2017     Lab Results   Component Value Date    MCHC 34.5 12/04/2017     Lab Results   Component Value Date    RDW 12.8 12/04/2017     Lab Results   Component Value Date     12/04/2017     Recent Labs   Lab Test  12/04/17   1037  11/20/17   1513   NA  140  138   POTASSIUM  4.0  3.8   CHLORIDE  105  101   CO2  30  29   ANIONGAP  5  8   GLC  102*  95   BUN  16  14   CR  0.75  0.81   MAICOL  8.6  9.1         All laboratory data reviewed    Results for orders placed or performed in visit on 11/15/17   CT Chest/Abdomen/Pelvis w Contrast    Narrative    EXAMINATION: CT CHEST/ABDOMEN/PELVIS W CONTRAST, 11/15/2017 3:02 PM    TECHNIQUE:  Helical CT images from the lung apices through the  symphysis pubis were obtained with contrast.  Coronal reformatted  images were generated at a workstation for further assessment.    CONTRAST:  92 cc Isovue 370 IV.    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 Multiple  prominent lymph nodes, though none enlarged by size criteria. For  example, 9 mm upper paratracheal lymph node (series 3 image 26). 9 mm  precarinal lymph node (series 3 image 62). No thyroid nodules.  Central  tracheobronchial tree is patent. Heart size is normal. No pericardial  effusion.  Normal thoracic vasculature.    Lungs: No consolidation. No pleural effusion or pneumothorax.    Abdomen and pelvis:   Liver: Nodular liver with prominent caudate lobe particularly with  respect to the left lobe. No suspicious liver lesions. Portal veins  appear patent.  Gallbladder: No gallstones. No evidence of acute cholecystitis.  Spleen: Mildly prominent spleen but with a concave hilum.  Pancreas: No suspicious pancreatic lesions. The pancreatic duct is not  dilated.  Adrenal glands: No adrenal nodules.  Kidneys: No kidney masses. No hydronephrosis or obstructing renal  stones.  Bladder / Pelvic organs: Unremarkable.  Bowel: Postsurgical changes of sigmoidectomy with left lower quadrant  colostomy. Short segment of circumferential thickening and narrowing  in the distal cecum (series 3 image [id number]). Hazy increased density  in the anterior mesenteric fat, likely omental. This has been present  going back to the initial study dated May 5 of this year although the  prominence has increased over the interim.  Lymph nodes: No retroperitoneal, mesenteric, or pelvic  lymphadenopathy.  Fluid: No free fluid within the abdomen.  Vessels: No infrarenal aortic aneurysm.     Bones and soft tissues: No suspicious osseous lesions.      Impression    IMPRESSION:   In this patient with adenocarcinoma of the sigmoid rectum status post  sigmoidectomy:  1. Interval sigmoidectomy. No evidence of local recurrence or active  metastatic disease.  2. Mild concentric bowel wall thickening in the cecum, possibly  reactive.   3. Nodular contour to the liver with enlargement of the caudate lobe  although there does not appear to be significant capsular retraction  about the jack hepatis. Please correlate with clinical findings as  this raises concern for cirrhosis. The portal vein is prominent and  the spleen is borderline enlarged which can be  seen in portal  hypertension.  4. Increasing hazy density about the anterior abdominal fat. This may  be postoperative or related to venous congestion. Malignant  infiltration can have this appearance although this is unlikely given  the recent sigmoidectomy and presumptive evaluation of the anterior  abdominal fat.    I have personally reviewed the examination and initial interpretation  and I agree with the findings.    IVONE SANDOVAL MD         ASSESSMENT AND PLAN     57-year-old male with a newly diagnosed     - Poorly differentiated adenocarcinoma colon  pT4N1c(tumor deposits) with margin involvement -  Stage III   CT scans negative for evidence of metastatic disease  Patient's case was discussed at the tumor board 11/13 and recommendation was to proceed with systemic chemotherapy at this point with plans for additional surgery in future.  Started on FOLFOX q2 week regimen for 6 months duration.   Second cycle today     - Hepatitis C  On antiviral treatment per GI     Return to clinic in 2 weeks for office visit prior to next cycle of chemotherapy     The patient is ready to learn, no apparent learning barriers were identified, Diagnosis and treatment plans were explained to the patient. The patient expressed understanding of the content. The patient questions were answered to his satisfaction.    Chart documentation with Dragon Voice recognition Software. Although reviewed after completion, some words and grammatical errors may remain.  Padilla Last MD  Attending Physician   Hematology/Medical Oncology    Again, thank you for allowing me to participate in the care of your patient.        Sincerely,        Padilla Last MD

## 2017-12-04 NOTE — PROGRESS NOTES
Needle left accessed for treatment. Tolerated lab draw without complaint. [place] drawn-Red/Green/Purple tubes. See documentation flowsheet. Nuha De Los Santos, RN, BSN, OCN

## 2017-12-04 NOTE — MR AVS SNAPSHOT
After Visit Summary   12/4/2017    Rian Malik    MRN: [id number]           Patient Information     Date Of Birth          1960        Visit Information        Provider Department      12/4/2017 10:30 AM [facility] ONLY [facility]ER C[facility]        Today's Diagnoses     Adenocarcinoma of sigmoid colon (H)    -  1       Follow-ups after your visit        Your next 10 appointments already scheduled     Dec 04, 2017 12:00 PM CST   Flofox with BAY 7 INFUSION   [facility] ([facility])    [address] [address] [place] [id number] [phone]            Dec 18, 2017 10:00 AM CST   Return Visit with [facility]URSE ONLY CANCER CENTER   [facility] ([facility])    [id number] [place] [id number] [phone]            Dec 18, 2017 10:45 AM CST   Return Visit with Padilla Last MD   [facility])    [id number] [place] [id number] [phone]            Dec 18, 2017 11:30 AM CST   Flofox with BAY 4 INFUSION   [facility] ([facility])    [address] [place] [id number] [phone]              Who to contact     If you have questions or need follow up information about today's clinic visit or your schedule please contact [facility] directly at [phone].  Normal or non-critical lab and imaging results will be communicated to you by MyChart, letter or phone within 4 business days after the clinic has received the results. If you do not hear from us within 7 days, please contact the clinic through MyChart or phone. If you have a critical or abnormal lab result, we will notify you by phone as soon as possible.  Submit refill requests through [employer] or call your pharmacy and they will forward the refill request to us. Please allow 3 business days for your refill to be  completed.          Additional Information About Your Visit        [employer] Information     [employer] gives you secure access to your electronic health record. If you see a primary care provider, you can also send messages to your care team and make appointments. If you have questions, please call your primary care clinic.  If you do not have a primary care provider, please call [phone] and they will assist you.      [employer] is an electronic gateway that provides easy, online access to your medical records. With [employer], you can request a clinic appointment, read your test results, renew a prescription or communicate with your care team.     To access your existing account, please contact your [facility] or call [phone] for assistance.        Care EveryWhere ID     This is your Care EveryWhere ID. This could be used by other organizations to access your [place] medical records  [id number]         Blood Pressure from Last 3 Encounters:   12/04/17 120/70   11/23/17 130/79   11/21/17 131/77    Weight from Last 3 Encounters:   12/04/17 70.8 kg (156 lb)   11/21/17 70 kg (154 lb 4.8 oz)   11/20/17 69.4 kg (153 lb)              We Performed the Following     CBC with platelets differential     Comprehensive metabolic panel        Primary Care Provider Office Phone # Fax #    Moreno Harris [phone]       [address] [place]  [facility] [address]        Equal Access to Services     [facility]: Hadii aad ku hadasho Soomaali, waaxda luqadaha, qaybta kaalmada adeegyada, alisa leon . So [facility] [phone].    ATENCIÓN: Si habla español, tiene a martino disposición servicios gratuitos de asistencia lingüística. Llame al [phone].    We comply with applicable federal civil rights laws and [place] laws. We do not discriminate on the basis of race, color, national origin, age, disability, sex, sexual orientation, or gender  identity.            Thank you!     Thank you for choosing [facility]  for your care. Our goal is always to provide you with excellent care. Hearing back from our patients is one way we can continue to improve our services. Please take a few minutes to complete the written survey that you may receive in the mail after your visit with us. Thank you!             Your Updated Medication List - Protect others around you: Learn how to safely use, store and throw away your medicines at www.disposemymeds.org.          This list is accurate as of: 12/4/17 11:16 AM.  Always use your most recent med list.                   Brand Name Dispense Instructions for use Diagnosis    docusate sodium 100 MG tablet    COLACE    60 tablet    Take 100 mg by mouth daily    Special screening for malignant neoplasms, colon       elbasvir-grazoprevir  MG Tabs per tablet    ZEPATIER    28 tablet    Take 1 tablet by mouth daily    Chronic hepatitis C without hepatic coma (H)       LORazepam 0.5 MG tablet    ATIVAN    30 tablet    Take 1 tablet (0.5 mg) by mouth every 4 hours as needed (Anxiety, Nausea/Vomiting or Sleep)    Adenocarcinoma of sigmoid colon (H)       ondansetron 8 MG tablet    ZOFRAN    10 tablet    Take 1 tablet (8 mg) by mouth every 8 hours as needed (Nausea/Vomiting)    Adenocarcinoma of sigmoid colon (H)       oxyCODONE IR 5 MG tablet    ROXICODONE    20 tablet    Take 1 tablet (5 mg) by mouth every 4 hours as needed for pain maximum 8 tablet(s) per day    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

## 2017-12-04 NOTE — PROGRESS NOTES
FOLLOW-UP VISIT NOTE    PATIENT NAME: Rian Malik MRN # [id number]  DATE OF VISIT: Dec 4, 2017 YOB: 1960    REFERRING PROVIDER: No referring provider defined for this encounter.    CANCER TYPE: Adenocarcinoma colon  STAGE: III pT4N1c  ECOG PS: 0    ONCOLOGY HISTORY:  57-year-old male who around February 2017r, developed intermittent cramping lower abdominal pain. Was evaluated by PCP and clinical impression was colitis. He was put on p.o. antibiotics. However symptoms did not improve. He was eventually referred to surgery and underwent imaging with CT findings suspicious of sigmoid abscess. He was admitted to the hospital for resection of sigmoid abscess 10/11/17. However intraoperatively was noted to have a large firmly adherent sigmoid colon mass. Laparoscopic surgery was converted to open sigmoidectomy with end colostomy.     Surgical pathology   B.  COLON, SIGMOID, HEMICOLECTOMY   - Poorly differentiated adenocarcinoma, with signet ring cell features   - Tumor size: 5 cm   - Tumor is present on serosal surface and microscopically perforates   serosal surface   - One surgical margin shows extensive serosal involvement by tumor. The   opposite, undesignated margin shows no evidence of malignancy   - Lymphovascular invasion is present   - Perineural invasion is not identified   - Tumor deposits are present   - Immunohistochemistry for mismatch repair proteins shows intact nuclear   expression for MLH1, MSH2, MSH6, and PMS2   - Three reactive lymph nodes, negative for malignancy (0/3)   - Pathologic staging: pT4N1c      Patient's case was discussed at the tumor board. Recommendation was to obtain new staging scans as well as to proceed with systemic chemotherapy at this point with plans for further surgery in future.    - Started on Folfox 11/21/17    SUBJECTIVE     Patient is here today for his second cycle of chemotherapy. No active complaints he did develop mild diarrhea after chemotherapy  which resolved with Imodium. He denies abdominal pain, nausea/vomiting, neuropathy or any other issues.      PAST MEDICAL HISTORY     Past Medical History:   Diagnosis Date     Cirrhosis (H)      Colon cancer (H) 10/11/2017    Poorly differentiated adenocarcinoma     Hepatitis C          CURRENT OUTPATIENT MEDICATIONS     Current Outpatient Prescriptions   Medication Sig Dispense Refill     LORazepam (ATIVAN) 0.5 MG tablet Take 1 tablet (0.5 mg) by mouth every 4 hours as needed (Anxiety, Nausea/Vomiting or Sleep) 30 tablet 2     prochlorperazine (COMPAZINE) 10 MG tablet Take 1 tablet (10 mg) by mouth every 6 hours as needed (Nausea/Vomiting) 30 tablet 2     ondansetron (ZOFRAN) 8 MG tablet Take 1 tablet (8 mg) by mouth every 8 hours as needed (Nausea/Vomiting) 10 tablet 2     elbasvir-grazoprevir (ZEPATIER)  MG TABS per tablet Take 1 tablet by mouth daily 28 tablet 2     oxyCODONE (ROXICODONE) 5 MG IR tablet Take 1 tablet (5 mg) by mouth every 4 hours as needed for pain maximum 8 tablet(s) per day 20 tablet 0     docusate sodium (COLACE) 100 MG tablet Take 100 mg by mouth daily 60 tablet 1        ALLERGIES     Allergies   Allergen Reactions     Acetaminophen      Naproxen         REVIEW OF SYSTEMS   As above in the HPI, o/w complete 14-point ROS was negative.     PHYSICAL EXAM   B/P: 138/78, T: 98.1, P: 64, R: 18  SpO2 Readings from Last 4 Encounters:   12/04/17 96%   11/21/17 99%   11/20/17 99%   11/14/17 99%     Wt Readings from Last 3 Encounters:   12/04/17 70.8 kg (156 lb)   11/21/17 70 kg (154 lb 4.8 oz)   11/20/17 69.4 kg (153 lb)     GEN: NAD  EYES:PERRLA  Mouth/ENT: Oropharynx is clear.  NECK: no cervical or supraclavicular lymphadenopathy  LUNGS: clear bilaterally  CV: regular, no murmurs, rubs, or gallops  ABDOMEN: soft, non-tender, non-distended, normal bowel sounds, no hepatosplenomegaly by percussion or palpation  EXT: warm, well perfused, no edema  NEURO: alert  SKIN: no rashes     LABORATORY AND  IMAGING STUDIES     Lab Results   Component Value Date    WBC 6.1 12/04/2017     Lab Results   Component Value Date    RBC 4.14 12/04/2017     Lab Results   Component Value Date    HGB 13.3 12/04/2017     Lab Results   Component Value Date    HCT 38.6 12/04/2017     No components found for: MCT  Lab Results   Component Value Date    MCV 93 12/04/2017     Lab Results   Component Value Date    MCH 32.1 12/04/2017     Lab Results   Component Value Date    MCHC 34.5 12/04/2017     Lab Results   Component Value Date    RDW 12.8 12/04/2017     Lab Results   Component Value Date     12/04/2017     Recent Labs   Lab Test  12/04/17   1037  11/20/17   1513   NA  140  138   POTASSIUM  4.0  3.8   CHLORIDE  105  101   CO2  30  29   ANIONGAP  5  8   GLC  102*  95   BUN  16  14   CR  0.75  0.81   MAICOL  8.6  9.1         All laboratory data reviewed    Results for orders placed or performed in visit on 11/15/17   CT Chest/Abdomen/Pelvis w Contrast    Narrative    EXAMINATION: CT CHEST/ABDOMEN/PELVIS W CONTRAST, 11/15/2017 3:02 PM    TECHNIQUE:  Helical CT images from the lung apices through the  symphysis pubis were obtained with contrast.  Coronal reformatted  images were generated at a workstation for further assessment.    CONTRAST:  92 cc Isovue 370 IV.    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 Multiple  prominent lymph nodes, though none enlarged by size criteria. For  example, 9 mm upper paratracheal lymph node (series 3 image 26). 9 mm  precarinal lymph node (series 3 image 62). No thyroid nodules. Central  tracheobronchial tree is patent. Heart size is normal. No pericardial  effusion.  Normal thoracic vasculature.    Lungs: No consolidation. No pleural effusion or pneumothorax.    Abdomen and pelvis:   Liver: Nodular liver with prominent caudate lobe particularly  with  respect to the left lobe. No suspicious liver lesions. Portal veins  appear patent.  Gallbladder: No gallstones. No evidence of acute cholecystitis.  Spleen: Mildly prominent spleen but with a concave hilum.  Pancreas: No suspicious pancreatic lesions. The pancreatic duct is not  dilated.  Adrenal glands: No adrenal nodules.  Kidneys: No kidney masses. No hydronephrosis or obstructing renal  stones.  Bladder / Pelvic organs: Unremarkable.  Bowel: Postsurgical changes of sigmoidectomy with left lower quadrant  colostomy. Short segment of circumferential thickening and narrowing  in the distal cecum (series 3 image 239-243). Hazy increased density  in the anterior mesenteric fat, likely omental. This has been present  going back to the initial study dated May 5 of this year although the  prominence has increased over the interim.  Lymph nodes: No retroperitoneal, mesenteric, or pelvic  lymphadenopathy.  Fluid: No free fluid within the abdomen.  Vessels: No infrarenal aortic aneurysm.     Bones and soft tissues: No suspicious osseous lesions.      Impression    IMPRESSION:   In this patient with adenocarcinoma of the sigmoid rectum status post  sigmoidectomy:  1. Interval sigmoidectomy. No evidence of local recurrence or active  metastatic disease.  2. Mild concentric bowel wall thickening in the cecum, possibly  reactive.   3. Nodular contour to the liver with enlargement of the caudate lobe  although there does not appear to be significant capsular retraction  about the jack hepatis. Please correlate with clinical findings as  this raises concern for cirrhosis. The portal vein is prominent and  the spleen is borderline enlarged which can be seen in portal  hypertension.  4. Increasing hazy density about the anterior abdominal fat. This may  be postoperative or related to venous congestion. Malignant  infiltration can have this appearance although this is unlikely given  the recent sigmoidectomy and presumptive  evaluation of the anterior  abdominal fat.    I have personally reviewed the examination and initial interpretation  and I agree with the findings.    IVONE SANDOVAL MD         ASSESSMENT AND PLAN     57-year-old male with a newly diagnosed     - Poorly differentiated adenocarcinoma colon  pT4N1c(tumor deposits) with margin involvement -  Stage III   CT scans negative for evidence of metastatic disease  Patient's case was discussed at the tumor board 11/13 and recommendation was to proceed with systemic chemotherapy at this point with plans for additional surgery in future.  Started on FOLFOX q2 week regimen for 6 months duration.   Second cycle today     - Hepatitis C  On antiviral treatment per GI     Return to clinic in 2 weeks for office visit prior to next cycle of chemotherapy     The patient is ready to learn, no apparent learning barriers were identified, Diagnosis and treatment plans were explained to the patient. The patient expressed understanding of the content. The patient questions were answered to his satisfaction.    Chart documentation with Dragon Voice recognition Software. Although reviewed after completion, some words and grammatical errors may remain.  Padilla Last MD  Attending Physician   Hematology/Medical Oncology

## 2017-12-04 NOTE — MR AVS SNAPSHOT
After Visit Summary   12/4/2017    Rian Malik    MRN: [id number]           Patient Information     Date Of Birth          1960        Visit Information        Provider Department      12/4/2017 12:00 PM BAY 7 INFUSION [facility]        Today's Diagnoses     Adenocarcinoma of sigmoid colon (H)    -  1       Follow-ups after your visit        Your next 10 appointments already scheduled     Dec 18, 2017 10:00 AM CST   Return Visit with [facility]   [facility]    [address] [place] [address] [phone]            Dec 18, 2017 10:45 AM CST   Return Visit with Padilla Last MD   [facility] ([facility]    [address] [place] [address] [phone]            Dec 18, 2017 11:30 AM CST   Flofox with BAY 4 INFUSION   [facility] ([facility])    [address] [facility] [address] [phone]            Jan 02, 2018  8:45 AM CST   Return Visit with [facility]   [facility] ([facility])    [address] [place] [address] [phone]            Jan 02, 2018  9:15 AM CST   Return Visit with SAVANNA High CNP   [facility])    [address] [facility] [address] [phone]            Jan 02, 2018 10:15 AM CST   Flofox with [facility] ([facility])    [address] [place] [address] [phone]              Who to contact     If you have questions or need follow up information about today's clinic visit or your schedule please contact [facility] directly at [phone].  Normal or non-critical lab and imaging results will be communicated to you by MyChart, letter or phone within 4  business days after the clinic has received the results. If you do not hear from us within 7 days, please contact the clinic through [employer] or phone. If you have a critical or abnormal lab result, we will notify you by phone as soon as possible.  Submit refill requests through [employer] or call your pharmacy and they will forward the refill request to us. Please allow 3 business days for your refill to be completed.          Additional Information About Your Visit        [employer] Information     [employer] gives you secure access to your electronic health record. If you see a primary care provider, you can also send messages to your care team and make appointments. If you have questions, please call your primary care clinic.  If you do not have a primary care provider, please call [phone] and they will assist you.      [employer] is an electronic gateway that provides easy, online access to your medical records. With [employer], you can request a clinic appointment, read your test results, renew a prescription or communicate with your care team.     To access your existing account, please contact your [facility] or call [phone] for assistance.        Care EveryWhere ID     This is your Care EveryWhere ID. This could be used by other organizations to access your [place] medical records  [id number]         Blood Pressure from Last 3 Encounters:   12/04/17 120/70   11/23/17 130/79   11/21/17 131/77    Weight from Last 3 Encounters:   12/04/17 70.8 kg (156 lb)   11/21/17 70 kg (154 lb 4.8 oz)   11/20/17 69.4 kg (153 lb)              Today, you had the following     No orders found for display       Primary Care Provider Office Phone # Fax #    Moreno Harris [phone]       [address]  [facility] [address]        Equal Access to Services     KHANH FLANAGAN AH: Rajesh Meraz, suad peck, alisa albarran  ashley leon ah. So [facility] [phone].    ATENCIÓN: Si aleksanderla lacey, tiene a martino disposición servicios gratuitos de asistencia lingüística. Alfredo rocha [phone].    We comply with applicable federal civil rights laws and [place] laws. We do not discriminate on the basis of race, color, national origin, age, disability, sex, sexual orientation, or gender identity.            Thank you!     Thank you for choosing [facility]  for your care. Our goal is always to provide you with excellent care. Hearing back from our patients is one way we can continue to improve our services. Please take a few minutes to complete the written survey that you may receive in the mail after your visit with us. Thank you!             Your Updated Medication List - Protect others around you: Learn how to safely use, store and throw away your medicines at www.disposemymeds.org.          This list is accurate as of: 12/4/17  4:47 PM.  Always use your most recent med list.                   Brand Name Dispense Instructions for use Diagnosis    docusate sodium 100 MG tablet    COLACE    60 tablet    Take 100 mg by mouth daily    Special screening for malignant neoplasms, colon       elbasvir-grazoprevir  MG Tabs per tablet    ZEPATIER    28 tablet    Take 1 tablet by mouth daily    Chronic hepatitis C without hepatic coma (H)       LORazepam 0.5 MG tablet    ATIVAN    30 tablet    Take 1 tablet (0.5 mg) by mouth every 4 hours as needed (Anxiety, Nausea/Vomiting or Sleep)    Adenocarcinoma of sigmoid colon (H)       ondansetron 8 MG tablet    ZOFRAN    10 tablet    Take 1 tablet (8 mg) by mouth every 8 hours as needed (Nausea/Vomiting)    Adenocarcinoma of sigmoid colon (H)       oxyCODONE IR 5 MG tablet    ROXICODONE    20 tablet    Take 1 tablet (5 mg) by mouth every 4 hours as needed for pain maximum 8 tablet(s) per day    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

## 2017-12-05 ENCOUNTER — HOME INFUSION (PRE-WILLOW HOME INFUSION) (OUTPATIENT)
Dept: PHARMACY | Facility: CLINIC | Age: 57
End: 2017-12-05

## 2017-12-06 ENCOUNTER — HOME INFUSION (PRE-WILLOW HOME INFUSION) (OUTPATIENT)
Dept: PHARMACY | Facility: CLINIC | Age: 57
End: 2017-12-06

## 2017-12-06 NOTE — PROGRESS NOTES
This is a recent snapshot of the patient's [facility] medical record.  For current drug dose and complete information and questions, call [phone] or In Basket pool, [facility] home infusion ([id number])  CSN Number:  [id number]

## 2017-12-07 NOTE — PROGRESS NOTES
This is a recent snapshot of the patient's [facility] medical record.  For current drug dose and complete information and questions, call [phone] or In Basket pool, [facility]v home infusion ([id number])  CSN Number:  [id number]

## 2017-12-07 NOTE — PROGRESS NOTES
This is a recent snapshot of the patient's [facility] medical record.  For current drug dose and complete information and questions, call [phone] or In Basket pool, fv home infusion ([id number])  CSN Number:  [id number]

## 2017-12-09 DIAGNOSIS — B18.2 CHRONIC HEPATITIS C WITHOUT HEPATIC COMA (H): ICD-10-CM

## 2017-12-09 PROCEDURE — 80076 HEPATIC FUNCTION PANEL: CPT | Performed by: INTERNAL MEDICINE

## 2017-12-09 PROCEDURE — 36415 COLL VENOUS BLD VENIPUNCTURE: CPT | Performed by: INTERNAL MEDICINE

## 2017-12-11 LAB
ALBUMIN SERPL-MCNC: 3.9 G/DL (ref 3.4–5)
ALP SERPL-CCNC: 85 U/L (ref 40–150)
ALT SERPL W P-5'-P-CCNC: 34 U/L (ref 0–70)
AST SERPL W P-5'-P-CCNC: 20 U/L (ref 0–45)
BILIRUB DIRECT SERPL-MCNC: 0.1 MG/DL (ref 0–0.2)
BILIRUB SERPL-MCNC: 0.5 MG/DL (ref 0.2–1.3)
PROT SERPL-MCNC: 6.9 G/DL (ref 6.8–8.8)

## 2017-12-13 NOTE — PROGRESS NOTES
"General Surgery Post Op    Pt returns for follow up visit s/p sigmoid resection.    Patient has been doing well, tolerating diet. Bowels moving well. Pain controlled. No issues with wound healing/redness/drainage. No fevers.     Physical exam: Vitals: /77  Pulse 80  Ht 1.727 m (5' 8\")  Wt 64.9 kg (143 lb)  BMI 21.74 kg/m2  BMI= Body mass index is 21.74 kg/(m^2).    Exam:  Constitutional: healthy, alert and no distress  Respiratory: Non-labored  Gastrointestinal: Abdomen soft, non-tender. BS normal. No masses, organomegaly  Incisions are healing well with no erythema or exudate  Ostomy is functioning and appears well healed.    Assessment: Pt is doing well s/p sigmoid resection    - We discussed the pathology results and all questions were answered to the best of my ability.     Plan: Pt doing well and can follow up as needed. Will refer to oncology      Darrell Schneider, DO      "

## 2017-12-18 ENCOUNTER — ONCOLOGY VISIT (OUTPATIENT)
Dept: ONCOLOGY | Facility: CLINIC | Age: 57
End: 2017-12-18
Payer: COMMERCIAL

## 2017-12-18 ENCOUNTER — HOME INFUSION (PRE-WILLOW HOME INFUSION) (OUTPATIENT)
Dept: PHARMACY | Facility: CLINIC | Age: 57
End: 2017-12-18

## 2017-12-18 ENCOUNTER — INFUSION THERAPY VISIT (OUTPATIENT)
Dept: INFUSION THERAPY | Facility: CLINIC | Age: 57
End: 2017-12-18
Payer: COMMERCIAL

## 2017-12-18 VITALS
OXYGEN SATURATION: 99 % | DIASTOLIC BLOOD PRESSURE: 71 MMHG | TEMPERATURE: 97.6 F | RESPIRATION RATE: 18 BRPM | SYSTOLIC BLOOD PRESSURE: 127 MMHG | HEART RATE: 64 BPM | BODY MASS INDEX: 24.25 KG/M2 | HEIGHT: 68 IN | WEIGHT: 160 LBS

## 2017-12-18 DIAGNOSIS — C18.7 ADENOCARCINOMA OF SIGMOID COLON (H): Primary | ICD-10-CM

## 2017-12-18 LAB
ALBUMIN SERPL-MCNC: 3.6 G/DL (ref 3.4–5)
ALP SERPL-CCNC: 87 U/L (ref 40–150)
ALT SERPL W P-5'-P-CCNC: 52 U/L (ref 0–70)
ANION GAP SERPL CALCULATED.3IONS-SCNC: 6 MMOL/L (ref 3–14)
AST SERPL W P-5'-P-CCNC: 28 U/L (ref 0–45)
BASOPHILS # BLD AUTO: 0 10E9/L (ref 0–0.2)
BASOPHILS NFR BLD AUTO: 0.6 %
BILIRUB SERPL-MCNC: 0.5 MG/DL (ref 0.2–1.3)
BUN SERPL-MCNC: 21 MG/DL (ref 7–30)
CALCIUM SERPL-MCNC: 8.6 MG/DL (ref 8.5–10.1)
CHLORIDE SERPL-SCNC: 104 MMOL/L (ref 94–109)
CO2 SERPL-SCNC: 29 MMOL/L (ref 20–32)
CREAT SERPL-MCNC: 0.68 MG/DL (ref 0.66–1.25)
DIFFERENTIAL METHOD BLD: ABNORMAL
EOSINOPHIL # BLD AUTO: 0.1 10E9/L (ref 0–0.7)
EOSINOPHIL NFR BLD AUTO: 2.1 %
ERYTHROCYTE [DISTWIDTH] IN BLOOD BY AUTOMATED COUNT: 13.4 % (ref 10–15)
GFR SERPL CREATININE-BSD FRML MDRD: >90 ML/MIN/1.7M2
GLUCOSE SERPL-MCNC: 121 MG/DL (ref 70–99)
HCT VFR BLD AUTO: 40.2 % (ref 40–53)
HGB BLD-MCNC: 13.9 G/DL (ref 13.3–17.7)
IMM GRANULOCYTES # BLD: 0 10E9/L (ref 0–0.4)
IMM GRANULOCYTES NFR BLD: 0.2 %
LYMPHOCYTES # BLD AUTO: 2 10E9/L (ref 0.8–5.3)
LYMPHOCYTES NFR BLD AUTO: 39.6 %
MCH RBC QN AUTO: 32.3 PG (ref 26.5–33)
MCHC RBC AUTO-ENTMCNC: 34.6 G/DL (ref 31.5–36.5)
MCV RBC AUTO: 93 FL (ref 78–100)
MONOCYTES # BLD AUTO: 0.6 10E9/L (ref 0–1.3)
MONOCYTES NFR BLD AUTO: 10.9 %
NEUTROPHILS # BLD AUTO: 2.4 10E9/L (ref 1.6–8.3)
NEUTROPHILS NFR BLD AUTO: 46.6 %
PLATELET # BLD AUTO: 90 10E9/L (ref 150–450)
POTASSIUM SERPL-SCNC: 3.9 MMOL/L (ref 3.4–5.3)
PROT SERPL-MCNC: 6.8 G/DL (ref 6.8–8.8)
RBC # BLD AUTO: 4.31 10E12/L (ref 4.4–5.9)
SODIUM SERPL-SCNC: 139 MMOL/L (ref 133–144)
WBC # BLD AUTO: 5.1 10E9/L (ref 4–11)

## 2017-12-18 PROCEDURE — 96415 CHEMO IV INFUSION ADDL HR: CPT | Performed by: INTERNAL MEDICINE

## 2017-12-18 PROCEDURE — 96413 CHEMO IV INFUSION 1 HR: CPT | Performed by: INTERNAL MEDICINE

## 2017-12-18 PROCEDURE — 85025 COMPLETE CBC W/AUTO DIFF WBC: CPT | Performed by: INTERNAL MEDICINE

## 2017-12-18 PROCEDURE — 96411 CHEMO IV PUSH ADDL DRUG: CPT | Performed by: INTERNAL MEDICINE

## 2017-12-18 PROCEDURE — 99207 ZZC NO CHARGE LOS: CPT

## 2017-12-18 PROCEDURE — 99207 ZZC NO CHARGE NURSE ONLY: CPT

## 2017-12-18 PROCEDURE — 99214 OFFICE O/P EST MOD 30 MIN: CPT | Mod: 25 | Performed by: INTERNAL MEDICINE

## 2017-12-18 PROCEDURE — 96416 CHEMO PROLONG INFUSE W/PUMP: CPT | Performed by: INTERNAL MEDICINE

## 2017-12-18 PROCEDURE — 80053 COMPREHEN METABOLIC PANEL: CPT | Performed by: INTERNAL MEDICINE

## 2017-12-18 PROCEDURE — 96367 TX/PROPH/DG ADDL SEQ IV INF: CPT | Performed by: INTERNAL MEDICINE

## 2017-12-18 RX ORDER — SODIUM CHLORIDE 9 MG/ML
1000 INJECTION, SOLUTION INTRAVENOUS CONTINUOUS PRN
Status: CANCELLED
Start: 2017-12-18

## 2017-12-18 RX ORDER — DIPHENHYDRAMINE HYDROCHLORIDE 50 MG/ML
50 INJECTION INTRAMUSCULAR; INTRAVENOUS
Status: CANCELLED
Start: 2017-12-18

## 2017-12-18 RX ORDER — LORAZEPAM 2 MG/ML
0.5 INJECTION INTRAMUSCULAR EVERY 4 HOURS PRN
Status: CANCELLED
Start: 2017-12-18

## 2017-12-18 RX ORDER — EPINEPHRINE 0.3 MG/.3ML
0.3 INJECTION SUBCUTANEOUS EVERY 5 MIN PRN
Status: CANCELLED | OUTPATIENT
Start: 2017-12-18

## 2017-12-18 RX ORDER — MEPERIDINE HYDROCHLORIDE 50 MG/ML
25 INJECTION INTRAMUSCULAR; INTRAVENOUS; SUBCUTANEOUS EVERY 30 MIN PRN
Status: CANCELLED | OUTPATIENT
Start: 2017-12-18

## 2017-12-18 RX ORDER — METHYLPREDNISOLONE SODIUM SUCCINATE 125 MG/2ML
125 INJECTION, POWDER, LYOPHILIZED, FOR SOLUTION INTRAMUSCULAR; INTRAVENOUS
Status: CANCELLED
Start: 2017-12-18

## 2017-12-18 RX ORDER — ALBUTEROL SULFATE 0.83 MG/ML
2.5 SOLUTION RESPIRATORY (INHALATION)
Status: CANCELLED | OUTPATIENT
Start: 2017-12-18

## 2017-12-18 RX ORDER — FLUOROURACIL 50 MG/ML
400 INJECTION, SOLUTION INTRAVENOUS ONCE
Status: COMPLETED | OUTPATIENT
Start: 2017-12-18 | End: 2017-12-18

## 2017-12-18 RX ORDER — EPINEPHRINE 1 MG/ML
0.3 INJECTION, SOLUTION INTRAMUSCULAR; SUBCUTANEOUS EVERY 5 MIN PRN
Status: CANCELLED | OUTPATIENT
Start: 2017-12-18

## 2017-12-18 RX ORDER — FLUOROURACIL 50 MG/ML
400 INJECTION, SOLUTION INTRAVENOUS ONCE
Status: CANCELLED | OUTPATIENT
Start: 2017-12-18

## 2017-12-18 RX ORDER — HEPARIN SODIUM (PORCINE) LOCK FLUSH IV SOLN 100 UNIT/ML 100 UNIT/ML
5 SOLUTION INTRAVENOUS
Status: DISCONTINUED | OUTPATIENT
Start: 2017-12-18 | End: 2017-12-18 | Stop reason: HOSPADM

## 2017-12-18 RX ORDER — ALBUTEROL SULFATE 90 UG/1
1-2 AEROSOL, METERED RESPIRATORY (INHALATION)
Status: CANCELLED
Start: 2017-12-18

## 2017-12-18 RX ADMIN — FLUOROURACIL 710 MG: 50 INJECTION, SOLUTION INTRAVENOUS at 14:10

## 2017-12-18 RX ADMIN — HEPARIN SODIUM (PORCINE) LOCK FLUSH IV SOLN 100 UNIT/ML 5 ML: 100 SOLUTION at 10:10

## 2017-12-18 ASSESSMENT — PAIN SCALES - GENERAL: PAINLEVEL: NO PAIN (0)

## 2017-12-18 NOTE — MR AVS SNAPSHOT
After Visit Summary   12/18/2017    Rian Malik    MRN: [id number]           Patient Information     Date Of Birth          1960        Visit Information        Provider Department      12/18/2017 11:30 AM BAY 4 INFUSION [facility]        Today's Diagnoses     Adenocarcinoma of sigmoid colon (H)    -  1       Follow-ups after your visit        Your next 10 appointments already scheduled     Jan 02, 2018  8:45 AM CST   Return Visit with [facility]   [facility] ([facility])    [address] [place] [address] [phone]            Jan 02, 2018  9:15 AM CST   Return Visit with SAVANNA High CNP   [facility] ([facility])    [address] [place] [address] [phone]            Jan 02, 2018 10:15 AM CST   Flofox with BAY 7 INFUSION   [facility] ([facility])    [address] [place] [address] [phone]            Angel 15, 2018  8:30 AM CST   Return Visit with [facility] ONLY [facility]   [facility] ([facility])    [address] [place] [address] [phone]            Angel 15, 2018  9:15 AM CST   Return Visit with Padilla Last MD   [facility])    [address] [place] [address] [phone]            Angel 15, 2018 10:00 AM CST   Flofox with BAY 2 INFUSION   [facility] ([facility])    [address] [place] [address] [phone]              Who to contact     If you have questions or need follow up information about today's clinic visit or your schedule please contact [facility] directly at [phone].  Normal or non-critical lab and imaging results will be communicated to you by MyChart, letter or phone within  4 business days after the clinic has received the results. If you do not hear from us within 7 days, please contact the clinic through [employer] or phone. If you have a critical or abnormal lab result, we will notify you by phone as soon as possible.  Submit refill requests through [employer] or call your pharmacy and they will forward the refill request to us. Please allow 3 business days for your refill to be completed.          Additional Information About Your Visit        [employer] Information     [employer] gives you secure access to your electronic health record. If you see a primary care provider, you can also send messages to your care team and make appointments. If you have questions, please call your primary care clinic.  If you do not have a primary care provider, please call [phone] and they will assist you.      [employer] is an electronic gateway that provides easy, online access to your medical records. With [employer], you can request a clinic appointment, read your test results, renew a prescription or communicate with your care team.     To access your existing account, please contact your [facility] or call [phone] for assistance.        Care EveryWhere ID     This is your Care EveryWhere ID. This could be used by other organizations to access your [place] medical records  [id number]         Blood Pressure from Last 3 Encounters:   12/18/17 127/71   12/04/17 120/70   11/23/17 130/79    Weight from Last 3 Encounters:   12/18/17 72.6 kg (160 lb)   12/04/17 70.8 kg (156 lb)   11/21/17 70 kg (154 lb 4.8 oz)              Today, you had the following     No orders found for display       Primary Care Provider Office Phone # Fax #    Moreno Harris [phone]       [address]  [facility] [address]        Equal Access to Services     KHANH FLANAGAN : suad Briseno, alisa albarran  tamiko lopezduniacameorn palma'aatheodore ah. So [facility] [phone].    ATENCIÓN: Si aleksanderla lacey, tiene a martino disposición servicios gratuitos de asistencia lingüística. Alfredo al [phone].    We comply with applicable federal civil rights laws and [place] laws. We do not discriminate on the basis of race, color, national origin, age, disability, sex, sexual orientation, or gender identity.            Thank you!     Thank you for choosing [facility]  for your care. Our goal is always to provide you with excellent care. Hearing back from our patients is one way we can continue to improve our services. Please take a few minutes to complete the written survey that you may receive in the mail after your visit with us. Thank you!             Your Updated Medication List - Protect others around you: Learn how to safely use, store and throw away your medicines at www.disposemymeds.org.          This list is accurate as of: 12/18/17  3:08 PM.  Always use your most recent med list.                   Brand Name Dispense Instructions for use Diagnosis    docusate sodium 100 MG tablet    COLACE    60 tablet    Take 100 mg by mouth daily    Special screening for malignant neoplasms, colon       elbasvir-grazoprevir  MG Tabs per tablet    ZEPATIER    28 tablet    Take 1 tablet by mouth daily    Chronic hepatitis C without hepatic coma (H)       LORazepam 0.5 MG tablet    ATIVAN    30 tablet    Take 1 tablet (0.5 mg) by mouth every 4 hours as needed (Anxiety, Nausea/Vomiting or Sleep)    Adenocarcinoma of sigmoid colon (H)       ondansetron 8 MG tablet    ZOFRAN    10 tablet    Take 1 tablet (8 mg) by mouth every 8 hours as needed (Nausea/Vomiting)    Adenocarcinoma of sigmoid colon (H)       oxyCODONE IR 5 MG tablet    ROXICODONE    20 tablet    Take 1 tablet (5 mg) by mouth every 4 hours as needed for pain maximum 8 tablet(s) per day    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

## 2017-12-18 NOTE — MR AVS SNAPSHOT
After Visit Summary   12/18/2017    Rian Malik    MRN: [id number]           Patient Information     Date Of Birth          1960        Visit Information        Provider Department      12/18/2017 10:45 AM Padilla Last MD [facility]        Today's Diagnoses     Adenocarcinoma of sigmoid colon (H)    -  1       Follow-ups after your visit        Your next 10 appointments already scheduled     Jan 02, 2018  8:45 AM CST   Return Visit with [facility]   [facility] ([facility])    [address] [place] [address] [phone]            Jan 02, 2018  9:15 AM CST   Return Visit with SAVANNA High CNP   [facility] ([facility])    [address] [place] [address]   [phone]            Jan 02, 2018 10:15 AM CST   Flofox with BAY 7 INFUSION   [facility])    [address] [place] [address]   [phone]            Angel 15, 2018  8:30 AM CST   Return Visit with [facility] ONLY CANCER CENTER   [facility] ([facility])    [address] [place] [address]   [phone]            Angel 15, 2018  9:15 AM CST   Return Visit with Padilla Last MD   [facility])    [address] [place] [address]   [phone]            Angel 15, 2018 10:00 AM CST   Flofox with BAY 2 INFUSION   [facility])    [address] [place] [address] [phone]              Who to contact     If you have questions or need follow up information about today's clinic visit or your schedule please contact [facility] directly at [phone].  Normal or non-critical lab and imaging results will be communicated to you by MyChart, letter or phone  "within 4 business days after the clinic has received the results. If you do not hear from us within 7 days, please contact the clinic through RocketOn or phone. If you have a critical or abnormal lab result, we will notify you by phone as soon as possible.  Submit refill requests through RocketOn or call your pharmacy and they will forward the refill request to us. Please allow 3 business days for your refill to be completed.          Additional Information About Your Visit        Ziklag SystemsharEventfinda Information     RocketOn gives you secure access to your electronic health record. If you see a primary care provider, you can also send messages to your care team and make appointments. If you have questions, please call your primary care clinic.  If you do not have a primary care provider, please call [phone] and they will assist you.      RocketOn is an electronic gateway that provides easy, online access to your medical records. With RocketOn, you can request a clinic appointment, read your test results, renew a prescription or communicate with your care team.     To access your existing account, please contact your [facility] or call [phone] for assistance.        Care EveryWhere ID     This is your Care EveryWhere ID. This could be used by other organizations to access your [place] medical records  [id number]        Your Vitals Were     Pulse Temperature Respirations Height Pulse Oximetry BMI (Body Mass Index)    64 97.6  F (36.4  C) (Oral) 18 1.727 m (5' 7.99\") 99% 24.33 kg/m2       Blood Pressure from Last 3 Encounters:   12/18/17 127/71   12/04/17 120/70   11/23/17 130/79    Weight from Last 3 Encounters:   12/18/17 72.6 kg (160 lb)   12/04/17 70.8 kg (156 lb)   11/21/17 70 kg (154 lb 4.8 oz)              Today, you had the following     No orders found for display       Primary Care Provider Office Phone # Fax #    Moreno Harris [phone]       [address]  [place] [facility] [address]        Equal Access to Services     JESUSITAGUSTAVO PANCHITO : Hadii cameron lester hadcas Sosherri, waaxda luqadaha, qaybta kamagnolia tamikoayadhernán, alisa lopezduniacameron leon . So [facility] [phone].    ATENCIÓN: Si habla español, tiene a martino disposición servicios gratuitos de asistencia lingüística. [facility] [phone].    We comply with applicable federal civil rights laws and [place] laws. We do not discriminate on the basis of race, color, national origin, age, disability, sex, sexual orientation, or gender identity.            Thank you!     Thank you for choosing [facility]  for your care. Our goal is always to provide you with excellent care. Hearing back from our patients is one way we can continue to improve our services. Please take a few minutes to complete the written survey that you may receive in the mail after your visit with us. Thank you!             Your Updated Medication List - Protect others around you: Learn how to safely use, store and throw away your medicines at www.disposemymeds.org.          This list is accurate as of: 12/18/17  2:30 PM.  Always use your most recent med list.                   Brand Name Dispense Instructions for use Diagnosis    docusate sodium 100 MG tablet    COLACE    60 tablet    Take 100 mg by mouth daily    Special screening for malignant neoplasms, colon       elbasvir-grazoprevir  MG Tabs per tablet    ZEPATIER    28 tablet    Take 1 tablet by mouth daily    Chronic hepatitis C without hepatic coma (H)       LORazepam 0.5 MG tablet    ATIVAN    30 tablet    Take 1 tablet (0.5 mg) by mouth every 4 hours as needed (Anxiety, Nausea/Vomiting or Sleep)    Adenocarcinoma of sigmoid colon (H)       ondansetron 8 MG tablet    ZOFRAN    10 tablet    Take 1 tablet (8 mg) by mouth every 8 hours as needed (Nausea/Vomiting)    Adenocarcinoma of sigmoid colon (H)       oxyCODONE IR 5 MG tablet    ROXICODONE    20 tablet    Take 1  tablet (5 mg) by mouth every 4 hours as needed for pain maximum 8 tablet(s) per day    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

## 2017-12-18 NOTE — Clinical Note
12/18/2017         RE: Rian Malik  [address] [place] [address]        Dear Colleague,    Thank you for referring your patient, Rian Malik, to the [facility]. Please see a copy of my visit note below.      FOLLOW-UP VISIT NOTE    PATIENT NAME: Rian Malik MRN # [id number]  DATE OF VISIT: Dec 18, 2017 YOB: 1960    REFERRING PROVIDER: No referring provider defined for this encounter.    CANCER TYPE: Adenocarcinoma colon  STAGE: III pT4N1c  ECOG PS: 0    ONCOLOGY HISTORY:  57-year-old male who around February 2017r, developed intermittent cramping lower abdominal pain. Was evaluated by PCP and clinical impression was colitis. He was put on p.o. antibiotics. However symptoms did not improve. He was eventually referred to surgery and underwent imaging with CT findings suspicious of sigmoid abscess. He was admitted to the hospital for resection of sigmoid abscess 10/11/17. However intraoperatively was noted to have a large firmly adherent sigmoid colon mass. Laparoscopic surgery was converted to open sigmoidectomy with end colostomy.     Surgical pathology   B.  COLON, SIGMOID, HEMICOLECTOMY   - Poorly differentiated adenocarcinoma, with signet ring cell features   - Tumor size: 5 cm   - Tumor is present on serosal surface and microscopically perforates   serosal surface   - One surgical margin shows extensive serosal involvement by tumor. The   opposite, undesignated margin shows no evidence of malignancy   - Lymphovascular invasion is present   - Perineural invasion is not identified   - Tumor deposits are present   - Immunohistochemistry for mismatch repair proteins shows intact nuclear   expression for MLH1, MSH2, MSH6, and PMS2   - Three reactive lymph nodes, negative for malignancy (0/3)   - Pathologic staging: pT4N1c      Patient's case was discussed at the tumor board. Recommendation was to obtain new staging scans as well as to proceed with systemic  chemotherapy at this point with plans for further surgery in future.    - Started on Folfox 11/21/17    SUBJECTIVE     Patient is here today for his 3rd cycle of chemotherapy. Doing well overall and denies any active complaints. Denies nausea/vomiting, or fever/chills, neuropathy. Good appetite and energy levels      PAST MEDICAL HISTORY     Past Medical History:   Diagnosis Date     Cirrhosis (H)      Colon cancer (H) 10/11/2017    Poorly differentiated adenocarcinoma     Hepatitis C          CURRENT OUTPATIENT MEDICATIONS     Current Outpatient Prescriptions   Medication Sig Dispense Refill     LORazepam (ATIVAN) 0.5 MG tablet Take 1 tablet (0.5 mg) by mouth every 4 hours as needed (Anxiety, Nausea/Vomiting or Sleep) 30 tablet 2     prochlorperazine (COMPAZINE) 10 MG tablet Take 1 tablet (10 mg) by mouth every 6 hours as needed (Nausea/Vomiting) 30 tablet 2     ondansetron (ZOFRAN) 8 MG tablet Take 1 tablet (8 mg) by mouth every 8 hours as needed (Nausea/Vomiting) 10 tablet 2     elbasvir-grazoprevir (ZEPATIER)  MG TABS per tablet Take 1 tablet by mouth daily 28 tablet 2     oxyCODONE (ROXICODONE) 5 MG IR tablet Take 1 tablet (5 mg) by mouth every 4 hours as needed for pain maximum 8 tablet(s) per day 20 tablet 0     docusate sodium (COLACE) 100 MG tablet Take 100 mg by mouth daily 60 tablet 1        ALLERGIES     Allergies   Allergen Reactions     Acetaminophen      Naproxen         REVIEW OF SYSTEMS   As above in the HPI, o/w complete 14-point ROS was negative.     PHYSICAL EXAM   B/P: 138/78, T: 98.1, P: 64, R: 18  SpO2 Readings from Last 4 Encounters:   12/18/17 99%   12/04/17 96%   11/21/17 99%   11/20/17 99%     Wt Readings from Last 3 Encounters:   12/18/17 72.6 kg (160 lb)   12/04/17 70.8 kg (156 lb)   11/21/17 70 kg (154 lb 4.8 oz)     GEN: NAD  EYES:PERRLA  Mouth/ENT: Oropharynx is clear.  NECK: no cervical or supraclavicular lymphadenopathy  LUNGS: clear bilaterally  CV: regular, no murmurs, rubs,  or gallops  ABDOMEN: soft, non-tender, non-distended, normal bowel sounds, no hepatosplenomegaly by percussion or palpation  EXT: warm, well perfused, no edema  NEURO: alert  SKIN: no rashes     LABORATORY AND IMAGING STUDIES     Lab Results   Component Value Date    WBC 5.1 12/18/2017     Lab Results   Component Value Date    RBC 4.31 12/18/2017     Lab Results   Component Value Date    HGB 13.9 12/18/2017     Lab Results   Component Value Date    HCT 40.2 12/18/2017     No components found for: MCT  Lab Results   Component Value Date    MCV 93 12/18/2017     Lab Results   Component Value Date    MCH 32.3 12/18/2017     Lab Results   Component Value Date    MCHC 34.6 12/18/2017     Lab Results   Component Value Date    RDW 13.4 12/18/2017     Lab Results   Component Value Date    PLT 90 12/18/2017     Recent Labs   Lab Test  12/18/17   1009  12/04/17   1037   NA  139  140   POTASSIUM  3.9  4.0   CHLORIDE  104  105   CO2  29  30   ANIONGAP  6  5   GLC  121*  102*   BUN  21  16   CR  0.68  0.75   MAICOL  8.6  8.6          ASSESSMENT AND PLAN     57-year-old male with a newly diagnosed     - Poorly differentiated adenocarcinoma colon  pT4N1c(tumor deposits) with margin involvement -  Stage III   CT scans negative for evidence of metastatic disease  Patient's case was discussed at the tumor board 11/13 and recommendation was to proceed with systemic chemotherapy at this point with plans for additional surgery in future after completion of chemotherapy.  Started on FOLFOX q2 week regimen for 6 months duration.   3rd cycle today     - Hepatitis C  On antiviral treatment per GI     Return to clinic in 2 weeks for office visit prior to next cycle of chemotherapy     The patient is ready to learn, no apparent learning barriers were identified, Diagnosis and treatment plans were explained to the patient. The patient expressed understanding of the content. The patient questions were answered to his satisfaction.    Chart  documentation with Dragon Voice recognition Software. Although reviewed after completion, some words and grammatical errors may remain.  Padilla Last MD  Attending Physician   Hematology/Medical Oncology    Again, thank you for allowing me to participate in the care of your patient.        Sincerely,        Padilla Last MD

## 2017-12-18 NOTE — PROGRESS NOTES
"Infusion Nursing Note:  Rian Malik presents today for C3 D1 Oxaliplatin/Leucovorin/Fluorouracil bolus and pump connect .    Patient seen by provider today: Yes: Dr Last   present during visit today: Not Applicable.    Note: N/A.    Intravenous Access:  Implanted Port.    Treatment Conditions:  Lab Results   Component Value Date    HGB 13.9 12/18/2017     Lab Results   Component Value Date    WBC 5.1 12/18/2017      Lab Results   Component Value Date    ANEU 2.4 12/18/2017     Lab Results   Component Value Date    PLT 90 12/18/2017      Lab Results   Component Value Date     12/18/2017                   Lab Results   Component Value Date    POTASSIUM 3.9 12/18/2017           Lab Results   Component Value Date    MAG 2.0 08/04/2017            Lab Results   Component Value Date    CR 0.68 12/18/2017                   Lab Results   Component Value Date    MAICOL 8.6 12/18/2017                Lab Results   Component Value Date    BILITOTAL 0.5 12/18/2017           Lab Results   Component Value Date    ALBUMIN 3.6 12/18/2017                    Lab Results   Component Value Date    ALT 52 12/18/2017           Lab Results   Component Value Date    AST 28 12/18/2017     Results reviewed, labs MET treatment parameters, ok to proceed with treatment.    Prior to discharge: Port is secured in place with tegaderm and flushed with 10cc NS with positive blood return noted.  Continuous home infusion Dosi-Fuser pump connected.    All connectors secured in place and clamps taped open.    Pump started, \"running\" noted on display (CADD): NA  Patient instructed to call our clinic or [facility] with any questions or concerns at home.  Patient verbalized understanding.    Patient set up for pump disconnect at home with [facility] on 12/20/17.              Post Infusion Assessment:  Patient tolerated infusion without incident.  Site patent and intact, free from redness, edema or discomfort.  Access " discontinued per protocol.    Discharge Plan:    Patient will return 1/2/18 for next appointment.   Patient discharged in stable condition accompanied by: self.  Departure Mode: Ambulatory.    Blanche Villanueva RN

## 2017-12-18 NOTE — MR AVS SNAPSHOT
After Visit Summary   12/18/2017    Rian Malik    MRN: [id number]           Patient Information     Date Of Birth          1960        Visit Information        Provider Department      12/18/2017 10:00 AM [facility]        Today's Diagnoses     Adenocarcinoma of sigmoid colon (H)    -  1       Follow-ups after your visit        Your next 10 appointments already scheduled     Dec 18, 2017 10:45 AM CST   Return Visit with Padilla Last MD   [facility] ([facility])    [address] [place] [address] [phone]            Dec 18, 2017 11:30 AM CST   Flofox with BAY 4 INFUSION   [facility] ([facility])    [address] [address] [address]   [phone]            Jan 02, 2018  8:45 AM CST   Return Visit with NURSE ONLY CANCER CENTER   [facility] ([facility])    [address] [place] [address]   [phone]            Jan 02, 2018  9:15 AM CST   Return Visit with SAVANNA High CNP   [facility] ([facility])    [address] [place] [address]   [phone]            Jan 02, 2018 10:15 AM CST   Flofox with BAY 7 INFUSION   [facility] ([facility])    [address] [place] [address]   [phone]              Who to contact     If you have questions or need follow up information about today's clinic visit or your schedule please contact [facility] directly at [phone].  Normal or non-critical lab and imaging results will be communicated to you by MyChart, letter or phone within 4 business days after the clinic has received the results. If you do not hear from us within 7 days, please contact the clinic through MyChart or phone. If you have a critical or abnormal lab  result, we will notify you by phone as soon as possible.  Submit refill requests through Cityzenith or call your pharmacy and they will forward the refill request to us. Please allow 3 business days for your refill to be completed.          Additional Information About Your Visit        Moblicationhart Information     Cityzenith gives you secure access to your electronic health record. If you see a primary care provider, you can also send messages to your care team and make appointments. If you have questions, please call your primary care clinic.  If you do not have a primary care provider, please call [phone] and they will assist you.      Cityzenith is an electronic gateway that provides easy, online access to your medical records. With Cityzenith, you can request a clinic appointment, read your test results, renew a prescription or communicate with your care team.     To access your existing account, please contact your [facility] or call [phone] for assistance.        Care EveryWhere ID     This is your Care EveryWhere ID. This could be used by other organizations to access your [place] medical records  [id number]         Blood Pressure from Last 3 Encounters:   12/04/17 120/70   11/23/17 130/79   11/21/17 131/77    Weight from Last 3 Encounters:   12/04/17 70.8 kg (156 lb)   11/21/17 70 kg (154 lb 4.8 oz)   11/20/17 69.4 kg (153 lb)              We Performed the Following     CBC with platelets differential     Comprehensive metabolic panel        Primary Care Provider Office Phone # Fax #    Moreno Harris [phone]       [address]  [facility] [address]        Equal Access to Services     [facility]: Hadii aad ku hadasho Soomaali, waaxda luqadaha, qaybta kaalmada adeegyada, alisa leon . So [facility] [phone].    ATENCIÓN: Si habla español, tiene a martino disposición servicios gratuitos de asistencia lingüística. Llame al  [phone].    We comply with applicable federal civil rights laws and [place] laws. We do not discriminate on the basis of race, color, national origin, age, disability, sex, sexual orientation, or gender identity.            Thank you!     Thank you for choosing [facility]  for your care. Our goal is always to provide you with excellent care. Hearing back from our patients is one way we can continue to improve our services. Please take a few minutes to complete the written survey that you may receive in the mail after your visit with us. Thank you!             Your Updated Medication List - Protect others around you: Learn how to safely use, store and throw away your medicines at www.disposemymeds.org.          This list is accurate as of: 12/18/17 10:28 AM.  Always use your most recent med list.                   Brand Name Dispense Instructions for use Diagnosis    docusate sodium 100 MG tablet    COLACE    60 tablet    Take 100 mg by mouth daily    Special screening for malignant neoplasms, colon       elbasvir-grazoprevir  MG Tabs per tablet    ZEPATIER    28 tablet    Take 1 tablet by mouth daily    Chronic hepatitis C without hepatic coma (H)       LORazepam 0.5 MG tablet    ATIVAN    30 tablet    Take 1 tablet (0.5 mg) by mouth every 4 hours as needed (Anxiety, Nausea/Vomiting or Sleep)    Adenocarcinoma of sigmoid colon (H)       ondansetron 8 MG tablet    ZOFRAN    10 tablet    Take 1 tablet (8 mg) by mouth every 8 hours as needed (Nausea/Vomiting)    Adenocarcinoma of sigmoid colon (H)       oxyCODONE IR 5 MG tablet    ROXICODONE    20 tablet    Take 1 tablet (5 mg) by mouth every 4 hours as needed for pain maximum 8 tablet(s) per day    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

## 2017-12-18 NOTE — NURSING NOTE
"Oncology Rooming Note    December 18, 2017 10:50 AM   Rian Malik is a 57 year old male who presents for:    Chief Complaint   Patient presents with     Oncology Clinic Visit     prior to tx     Initial Vitals: There were no vitals taken for this visit. Estimated body mass index is 23.73 kg/(m^2) as calculated from the following:    Height as of 11/20/17: 1.727 m (5' 7.99\").    Weight as of 12/4/17: 70.8 kg (156 lb). There is no height or weight on file to calculate BSA.  Data Unavailable Comment: Data Unavailable   No LMP for male patient.  Allergies reviewed: Yes  Medications reviewed: Yes    Medications: Medication refills not needed today.  Pharmacy name entered into Behalf:    [facility] [place], [place] - [address]  [facility] - [place], [place] - [address]    Clinical concerns:     8 minutes for nursing intake (face to face time)     MYLA POLLACK LPN              "

## 2017-12-18 NOTE — PROGRESS NOTES
FOLLOW-UP VISIT NOTE    PATIENT NAME: Rian Malik MRN # [id number]  DATE OF VISIT: Dec 18, 2017 YOB: 1960    REFERRING PROVIDER: No referring provider defined for this encounter.    CANCER TYPE: Adenocarcinoma colon  STAGE: III pT4N1c  ECOG PS: 0    ONCOLOGY HISTORY:  57-year-old male who around February 2017r, developed intermittent cramping lower abdominal pain. Was evaluated by PCP and clinical impression was colitis. He was put on p.o. antibiotics. However symptoms did not improve. He was eventually referred to surgery and underwent imaging with CT findings suspicious of sigmoid abscess. He was admitted to the hospital for resection of sigmoid abscess 10/11/17. However intraoperatively was noted to have a large firmly adherent sigmoid colon mass. Laparoscopic surgery was converted to open sigmoidectomy with end colostomy.     Surgical pathology   B.  COLON, SIGMOID, HEMICOLECTOMY   - Poorly differentiated adenocarcinoma, with signet ring cell features   - Tumor size: 5 cm   - Tumor is present on serosal surface and microscopically perforates   serosal surface   - One surgical margin shows extensive serosal involvement by tumor. The   opposite, undesignated margin shows no evidence of malignancy   - Lymphovascular invasion is present   - Perineural invasion is not identified   - Tumor deposits are present   - Immunohistochemistry for mismatch repair proteins shows intact nuclear   expression for MLH1, MSH2, MSH6, and PMS2   - Three reactive lymph nodes, negative for malignancy (0/3)   - Pathologic staging: pT4N1c      Patient's case was discussed at the tumor board. Recommendation was to obtain new staging scans as well as to proceed with systemic chemotherapy at this point with plans for further surgery in future.    - Started on Folfox 11/21/17    SUBJECTIVE     Patient is here today for his 3rd cycle of chemotherapy. Doing well overall and denies any active complaints. Denies  nausea/vomiting, or fever/chills, neuropathy. Good appetite and energy levels      PAST MEDICAL HISTORY     Past Medical History:   Diagnosis Date     Cirrhosis (H)      Colon cancer (H) 10/11/2017    Poorly differentiated adenocarcinoma     Hepatitis C          CURRENT OUTPATIENT MEDICATIONS     Current Outpatient Prescriptions   Medication Sig Dispense Refill     LORazepam (ATIVAN) 0.5 MG tablet Take 1 tablet (0.5 mg) by mouth every 4 hours as needed (Anxiety, Nausea/Vomiting or Sleep) 30 tablet 2     prochlorperazine (COMPAZINE) 10 MG tablet Take 1 tablet (10 mg) by mouth every 6 hours as needed (Nausea/Vomiting) 30 tablet 2     ondansetron (ZOFRAN) 8 MG tablet Take 1 tablet (8 mg) by mouth every 8 hours as needed (Nausea/Vomiting) 10 tablet 2     elbasvir-grazoprevir (ZEPATIER)  MG TABS per tablet Take 1 tablet by mouth daily 28 tablet 2     oxyCODONE (ROXICODONE) 5 MG IR tablet Take 1 tablet (5 mg) by mouth every 4 hours as needed for pain maximum 8 tablet(s) per day 20 tablet 0     docusate sodium (COLACE) 100 MG tablet Take 100 mg by mouth daily 60 tablet 1        ALLERGIES     Allergies   Allergen Reactions     Acetaminophen      Naproxen         REVIEW OF SYSTEMS   As above in the HPI, o/w complete 14-point ROS was negative.     PHYSICAL EXAM   B/P: 138/78, T: 98.1, P: 64, R: 18  SpO2 Readings from Last 4 Encounters:   12/18/17 99%   12/04/17 96%   11/21/17 99%   11/20/17 99%     Wt Readings from Last 3 Encounters:   12/18/17 72.6 kg (160 lb)   12/04/17 70.8 kg (156 lb)   11/21/17 70 kg (154 lb 4.8 oz)     GEN: NAD  EYES:PERRLA  Mouth/ENT: Oropharynx is clear.  NECK: no cervical or supraclavicular lymphadenopathy  LUNGS: clear bilaterally  CV: regular, no murmurs, rubs, or gallops  ABDOMEN: soft, non-tender, non-distended, normal bowel sounds, no hepatosplenomegaly by percussion or palpation  EXT: warm, well perfused, no edema  NEURO: alert  SKIN: no rashes     LABORATORY AND IMAGING STUDIES     Lab  Results   Component Value Date    WBC 5.1 12/18/2017     Lab Results   Component Value Date    RBC 4.31 12/18/2017     Lab Results   Component Value Date    HGB 13.9 12/18/2017     Lab Results   Component Value Date    HCT 40.2 12/18/2017     No components found for: MCT  Lab Results   Component Value Date    MCV 93 12/18/2017     Lab Results   Component Value Date    MCH 32.3 12/18/2017     Lab Results   Component Value Date    MCHC 34.6 12/18/2017     Lab Results   Component Value Date    RDW 13.4 12/18/2017     Lab Results   Component Value Date    PLT 90 12/18/2017     Recent Labs   Lab Test  12/18/17   1009  12/04/17   1037   NA  139  140   POTASSIUM  3.9  4.0   CHLORIDE  104  105   CO2  29  30   ANIONGAP  6  5   GLC  121*  102*   BUN  21  16   CR  0.68  0.75   MAICOL  8.6  8.6          ASSESSMENT AND PLAN     57-year-old male with a newly diagnosed     - Poorly differentiated adenocarcinoma colon  pT4N1c(tumor deposits) with margin involvement -  Stage III   CT scans negative for evidence of metastatic disease  Patient's case was discussed at the tumor board 11/13 and recommendation was to proceed with systemic chemotherapy at this point with plans for additional surgery in future after completion of chemotherapy.  Started on FOLFOX q2 week regimen for 6 months duration.   3rd cycle today     - Hepatitis C  On antiviral treatment per GI     Return to clinic in 2 weeks for office visit prior to next cycle of chemotherapy     The patient is ready to learn, no apparent learning barriers were identified, Diagnosis and treatment plans were explained to the patient. The patient expressed understanding of the content. The patient questions were answered to his satisfaction.    Chart documentation with Dragon Voice recognition Software. Although reviewed after completion, some words and grammatical errors may remain.  Padilla Last MD  Attending Physician   Hematology/Medical Oncology

## 2017-12-18 NOTE — PROGRESS NOTES
Needle left accessed for treatment. Tolerated lab draw without complaint. Bishop drawn-Red/Green/Purple tubes. See documentation flowsheet. Nuha De Los Santos, RN, BSN, OCN

## 2017-12-19 NOTE — PROGRESS NOTES
This is a recent snapshot of the patient's [facility] medical record.  For current drug dose and complete information and questions, call [phone] or In Basket pool, fv home infusion ([id number])  CSN Number:  [id number]

## 2017-12-20 ENCOUNTER — HOME INFUSION (PRE-WILLOW HOME INFUSION) (OUTPATIENT)
Dept: PHARMACY | Facility: CLINIC | Age: 57
End: 2017-12-20

## 2017-12-21 NOTE — PROGRESS NOTES
This is a recent snapshot of the patient's [facility] medical record.  For current drug dose and complete information and questions, call [phone] or In Basket pool, fv home infusion ([id number])  CSN Number:  [id number]

## 2018-01-02 ENCOUNTER — ONCOLOGY VISIT (OUTPATIENT)
Dept: ONCOLOGY | Facility: CLINIC | Age: 58
End: 2018-01-02
Payer: COMMERCIAL

## 2018-01-02 VITALS
RESPIRATION RATE: 20 BRPM | TEMPERATURE: 96.8 F | HEART RATE: 78 BPM | SYSTOLIC BLOOD PRESSURE: 122 MMHG | OXYGEN SATURATION: 98 % | DIASTOLIC BLOOD PRESSURE: 82 MMHG | HEIGHT: 68 IN | BODY MASS INDEX: 23.95 KG/M2 | WEIGHT: 158 LBS

## 2018-01-02 DIAGNOSIS — D69.6 THROMBOCYTOPENIA (H): ICD-10-CM

## 2018-01-02 DIAGNOSIS — K59.03 DRUG-INDUCED CONSTIPATION: ICD-10-CM

## 2018-01-02 DIAGNOSIS — C18.7 ADENOCARCINOMA OF SIGMOID COLON (H): Primary | ICD-10-CM

## 2018-01-02 DIAGNOSIS — D70.1 CHEMOTHERAPY-INDUCED NEUTROPENIA (H): ICD-10-CM

## 2018-01-02 DIAGNOSIS — T45.1X5A CHEMOTHERAPY-INDUCED NEUTROPENIA (H): ICD-10-CM

## 2018-01-02 LAB
ALBUMIN SERPL-MCNC: 3.4 G/DL (ref 3.4–5)
ALP SERPL-CCNC: 102 U/L (ref 40–150)
ALT SERPL W P-5'-P-CCNC: 58 U/L (ref 0–70)
ANION GAP SERPL CALCULATED.3IONS-SCNC: 5 MMOL/L (ref 3–14)
AST SERPL W P-5'-P-CCNC: 42 U/L (ref 0–45)
BASOPHILS # BLD AUTO: 0 10E9/L (ref 0–0.2)
BASOPHILS NFR BLD AUTO: 1.3 %
BILIRUB SERPL-MCNC: 0.7 MG/DL (ref 0.2–1.3)
BUN SERPL-MCNC: 13 MG/DL (ref 7–30)
CALCIUM SERPL-MCNC: 8.7 MG/DL (ref 8.5–10.1)
CHLORIDE SERPL-SCNC: 106 MMOL/L (ref 94–109)
CO2 SERPL-SCNC: 29 MMOL/L (ref 20–32)
CREAT SERPL-MCNC: 0.75 MG/DL (ref 0.66–1.25)
DIFFERENTIAL METHOD BLD: ABNORMAL
EOSINOPHIL # BLD AUTO: 0.1 10E9/L (ref 0–0.7)
EOSINOPHIL NFR BLD AUTO: 1.6 %
ERYTHROCYTE [DISTWIDTH] IN BLOOD BY AUTOMATED COUNT: 14.5 % (ref 10–15)
GFR SERPL CREATININE-BSD FRML MDRD: >90 ML/MIN/1.7M2
GLUCOSE SERPL-MCNC: 137 MG/DL (ref 70–99)
HCT VFR BLD AUTO: 42.6 % (ref 40–53)
HGB BLD-MCNC: 14.5 G/DL (ref 13.3–17.7)
IMM GRANULOCYTES # BLD: 0 10E9/L (ref 0–0.4)
IMM GRANULOCYTES NFR BLD: 0.3 %
LYMPHOCYTES # BLD AUTO: 1.6 10E9/L (ref 0.8–5.3)
LYMPHOCYTES NFR BLD AUTO: 49.7 %
MCH RBC QN AUTO: 32.1 PG (ref 26.5–33)
MCHC RBC AUTO-ENTMCNC: 34 G/DL (ref 31.5–36.5)
MCV RBC AUTO: 94 FL (ref 78–100)
MONOCYTES # BLD AUTO: 0.4 10E9/L (ref 0–1.3)
MONOCYTES NFR BLD AUTO: 13.3 %
NEUTROPHILS # BLD AUTO: 1.1 10E9/L (ref 1.6–8.3)
NEUTROPHILS NFR BLD AUTO: 33.8 %
PLATELET # BLD AUTO: 75 10E9/L (ref 150–450)
POTASSIUM SERPL-SCNC: 3.9 MMOL/L (ref 3.4–5.3)
PROT SERPL-MCNC: 6.9 G/DL (ref 6.8–8.8)
RBC # BLD AUTO: 4.52 10E12/L (ref 4.4–5.9)
SODIUM SERPL-SCNC: 140 MMOL/L (ref 133–144)
WBC # BLD AUTO: 3.2 10E9/L (ref 4–11)

## 2018-01-02 PROCEDURE — 99214 OFFICE O/P EST MOD 30 MIN: CPT | Performed by: NURSE PRACTITIONER

## 2018-01-02 PROCEDURE — 85025 COMPLETE CBC W/AUTO DIFF WBC: CPT | Performed by: INTERNAL MEDICINE

## 2018-01-02 PROCEDURE — 99207 ZZC NO CHARGE NURSE ONLY: CPT

## 2018-01-02 PROCEDURE — 80053 COMPREHEN METABOLIC PANEL: CPT | Performed by: INTERNAL MEDICINE

## 2018-01-02 RX ORDER — HEPARIN SODIUM (PORCINE) LOCK FLUSH IV SOLN 100 UNIT/ML 100 UNIT/ML
5 SOLUTION INTRAVENOUS
Status: DISCONTINUED | OUTPATIENT
Start: 2018-01-02 | End: 2018-01-02 | Stop reason: HOSPADM

## 2018-01-02 RX ADMIN — HEPARIN SODIUM (PORCINE) LOCK FLUSH IV SOLN 100 UNIT/ML 5 ML: 100 SOLUTION at 09:01

## 2018-01-02 ASSESSMENT — PAIN SCALES - GENERAL: PAINLEVEL: NO PAIN (0)

## 2018-01-02 NOTE — LETTER
1/2/2018         RE: Rian Malik  [address] [place] [address]        Dear Colleague,    Thank you for referring your patient, Rian Malik, to the [facility] [place]. Please see a copy of my visit note below.    Oncology Follow Up Visit: January 2, 2018    Oncologist: Dr Real Xiao  PCP: Moreno Harris    Diagnosis: Stage III Colon Cancer  Rian Malik is a 58 yo  male that was c/o lower abdominal cramping. CT showed sigmoid abscess but at resection was found to have large 5 cm firmly adherent sigmoid colon mass.Lymphovascular invasion present but no perineural invasion.  0/3 Reactive Lymph Nodes were positive. (pT4N1c)  He has intact mismatch repair.  Treatment:   10/11/2017 sigmoidectomy with end colostomy  11/21/2017 began modified FOLFOX-6    Interval History: Mr. Malik comes to clinic for symptom review prior to Cycle 4 of FOLFOX. Pt reports He is feeing well now but did have mild nausea on first 2-3 days so was using the antiemetics. Bowels were variable- mostly constipation- but are back to normal at this time.He is eating well and weight has actually increased since being on chemotherapy. He is remaining active in the home but limited with the cold and is sleeping well. Denies pain- has oxycodone but has not been needing to use this in some time  Denies SOB, chest pain, fevers, illness, weakness or depression.     Rest of comprehensive and complete ROS is reviewed and is negative.   Past Medical History:   Diagnosis Date     Cirrhosis (H)      Colon cancer (H) 10/11/2017    Poorly differentiated adenocarcinoma     Hepatitis C      Current Outpatient Prescriptions   Medication     LORazepam (ATIVAN) 0.5 MG tablet     prochlorperazine (COMPAZINE) 10 MG tablet     ondansetron (ZOFRAN) 8 MG tablet     elbasvir-grazoprevir (ZEPATIER)  MG TABS per tablet     oxyCODONE (ROXICODONE) 5 MG IR tablet     docusate sodium (COLACE) 100 MG tablet     No current  "facility-administered medications for this visit.      Allergies   Allergen Reactions     Acetaminophen      Naproxen        Physical Exam:/82  Pulse 78  Temp 96.8  F (36  C) (Oral)  Resp 20  Ht 1.727 m (5' 7.99\")  Wt 71.7 kg (158 lb)  SpO2 98%  BMI 24.03 kg/m2   ECOG PS- 0  Constitutional: Alert and in no distress.   ENT: Eyes bright, No mouth sores  Neck: Supple, No adenopathy.Thyroid symmetric  Cardiac: Heart rate and rhythm is regular and strong without murmur  Respiratory: Breathing easy. Lung sounds clear to auscultation  GI: Abdomen is soft, non-tender, BS normal. No masses or organomegaly  MS: Muscle tone normal- moving well on own, extremities normal with no edema.   Skin: No suspicious lesions or rashes  Neuro: Sensory grossly WNL, gait normal.   Lymph: Normal ant/post cervical, axillary, supraclavicular nodes  Psych: Mentation appears normal and affect normal/bright and smiling    Laboratory Results:   Results for orders placed or performed in visit on 01/02/18   CBC with platelets differential   Result Value Ref Range    WBC 3.2 (L) 4.0 - 11.0 10e9/L    RBC Count 4.52 4.4 - 5.9 10e12/L    Hemoglobin 14.5 13.3 - 17.7 g/dL    Hematocrit 42.6 40.0 - 53.0 %    MCV 94 78 - 100 fl    MCH 32.1 26.5 - 33.0 pg    MCHC 34.0 31.5 - 36.5 g/dL    RDW 14.5 10.0 - 15.0 %    Platelet Count 75 (L) 150 - 450 10e9/L    Diff Method Automated Method     % Neutrophils 33.8 %    % Lymphocytes 49.7 %    % Monocytes 13.3 %    % Eosinophils 1.6 %    % Basophils 1.3 %    % Immature Granulocytes 0.3 %    Absolute Neutrophil 1.1 (L) 1.6 - 8.3 10e9/L    Absolute Lymphocytes 1.6 0.8 - 5.3 10e9/L    Absolute Monocytes 0.4 0.0 - 1.3 10e9/L    Absolute Eosinophils 0.1 0.0 - 0.7 10e9/L    Absolute Basophils 0.0 0.0 - 0.2 10e9/L    Abs Immature Granulocytes 0.0 0 - 0.4 10e9/L   Comprehensive metabolic panel   Result Value Ref Range    Sodium 140 133 - 144 mmol/L    Potassium 3.9 3.4 - 5.3 mmol/L    Chloride 106 94 - 109 mmol/L "    Carbon Dioxide 29 20 - 32 mmol/L    Anion Gap 5 3 - 14 mmol/L    Glucose 137 (H) 70 - 99 mg/dL    Urea Nitrogen 13 7 - 30 mg/dL    Creatinine 0.75 0.66 - 1.25 mg/dL    GFR Estimate >90 >60 mL/min/1.7m2    GFR Estimate If Black >90 >60 mL/min/1.7m2    Calcium 8.7 8.5 - 10.1 mg/dL    Bilirubin Total 0.7 0.2 - 1.3 mg/dL    Albumin 3.4 3.4 - 5.0 g/dL    Protein Total 6.9 6.8 - 8.8 g/dL    Alkaline Phosphatase 102 40 - 150 U/L    ALT 58 0 - 70 U/L    AST 42 0 - 45 U/L     Assessment and Plan:   Stage III Colon Cancer-Though pt seems to be tolerating chemotherapy well, FOLFOX cycle 4 deferred due to neutropenia(1.1) /thrombocytopenia(75,000) x 1 week. He will have review prior to cycle 5 with treatment labs. Last CT CAP was 11/15/2017. Planned for 6 months of chemotherapy  Constipation- discussed need for use of stool softeners early in cycle to prevent constipation. Reminded of need for adequate fluids.   This was a 25 min visit with > 50% in counseling and coordinating care including education and management of concerns.    Jessica Marsh CNP      Pt will not receive chemo per Jessica Stone NP's note. Port de-accessed without complaint. Nuha De Los Santos, AKIRA      Again, thank you for allowing me to participate in the care of your patient.        Sincerely,        Jessica Marsh NP, APRN CNP

## 2018-01-02 NOTE — PROGRESS NOTES
"Oncology Follow Up Visit: January 2, 2018    Oncologist: Dr Real Xiao  PCP: Moreno Harris    Diagnosis: Stage III Colon Cancer  Rian Malik is a 56 yo  male that was c/o lower abdominal cramping. CT showed sigmoid abscess but at resection was found to have large 5 cm firmly adherent sigmoid colon mass.Lymphovascular invasion present but no perineural invasion.  0/3 Reactive Lymph Nodes were positive. (pT4N1c)  He has intact mismatch repair.  Treatment:   10/11/2017 sigmoidectomy with end colostomy  11/21/2017 began modified FOLFOX-6    Interval History: Mr. Malik comes to clinic for symptom review prior to Cycle 4 of FOLFOX. Pt reports He is feeing well now but did have mild nausea on first 2-3 days so was using the antiemetics. Bowels were variable- mostly constipation- but are back to normal at this time.He is eating well and weight has actually increased since being on chemotherapy. He is remaining active in the home but limited with the cold and is sleeping well. Denies pain- has oxycodone but has not been needing to use this in some time  Denies SOB, chest pain, fevers, illness, weakness or depression.     Rest of comprehensive and complete ROS is reviewed and is negative.   Past Medical History:   Diagnosis Date     Cirrhosis (H)      Colon cancer (H) 10/11/2017    Poorly differentiated adenocarcinoma     Hepatitis C      Current Outpatient Prescriptions   Medication     LORazepam (ATIVAN) 0.5 MG tablet     prochlorperazine (COMPAZINE) 10 MG tablet     ondansetron (ZOFRAN) 8 MG tablet     elbasvir-grazoprevir (ZEPATIER)  MG TABS per tablet     oxyCODONE (ROXICODONE) 5 MG IR tablet     docusate sodium (COLACE) 100 MG tablet     No current facility-administered medications for this visit.      Allergies   Allergen Reactions     Acetaminophen      Naproxen        Physical Exam:/82  Pulse 78  Temp 96.8  F (36  C) (Oral)  Resp 20  Ht 1.727 m (5' 7.99\")  Wt 71.7 kg (158 lb)  " SpO2 98%  BMI 24.03 kg/m2   ECOG PS- 0  Constitutional: Alert and in no distress.   ENT: Eyes bright, No mouth sores  Neck: Supple, No adenopathy.Thyroid symmetric  Cardiac: Heart rate and rhythm is regular and strong without murmur  Respiratory: Breathing easy. Lung sounds clear to auscultation  GI: Abdomen is soft, non-tender, BS normal. No masses or organomegaly  MS: Muscle tone normal- moving well on own, extremities normal with no edema.   Skin: No suspicious lesions or rashes  Neuro: Sensory grossly WNL, gait normal.   Lymph: Normal ant/post cervical, axillary, supraclavicular nodes  Psych: Mentation appears normal and affect normal/bright and smiling    Laboratory Results:   Results for orders placed or performed in visit on 01/02/18   CBC with platelets differential   Result Value Ref Range    WBC 3.2 (L) 4.0 - 11.0 10e9/L    RBC Count 4.52 4.4 - 5.9 10e12/L    Hemoglobin 14.5 13.3 - 17.7 g/dL    Hematocrit 42.6 40.0 - 53.0 %    MCV 94 78 - 100 fl    MCH 32.1 26.5 - 33.0 pg    MCHC 34.0 31.5 - 36.5 g/dL    RDW 14.5 10.0 - 15.0 %    Platelet Count 75 (L) 150 - 450 10e9/L    Diff Method Automated Method     % Neutrophils 33.8 %    % Lymphocytes 49.7 %    % Monocytes 13.3 %    % Eosinophils 1.6 %    % Basophils 1.3 %    % Immature Granulocytes 0.3 %    Absolute Neutrophil 1.1 (L) 1.6 - 8.3 10e9/L    Absolute Lymphocytes 1.6 0.8 - 5.3 10e9/L    Absolute Monocytes 0.4 0.0 - 1.3 10e9/L    Absolute Eosinophils 0.1 0.0 - 0.7 10e9/L    Absolute Basophils 0.0 0.0 - 0.2 10e9/L    Abs Immature Granulocytes 0.0 0 - 0.4 10e9/L   Comprehensive metabolic panel   Result Value Ref Range    Sodium 140 133 - 144 mmol/L    Potassium 3.9 3.4 - 5.3 mmol/L    Chloride 106 94 - 109 mmol/L    Carbon Dioxide 29 20 - 32 mmol/L    Anion Gap 5 3 - 14 mmol/L    Glucose 137 (H) 70 - 99 mg/dL    Urea Nitrogen 13 7 - 30 mg/dL    Creatinine 0.75 0.66 - 1.25 mg/dL    GFR Estimate >90 >60 mL/min/1.7m2    GFR Estimate If Black >90 >60  mL/min/1.7m2    Calcium 8.7 8.5 - 10.1 mg/dL    Bilirubin Total 0.7 0.2 - 1.3 mg/dL    Albumin 3.4 3.4 - 5.0 g/dL    Protein Total 6.9 6.8 - 8.8 g/dL    Alkaline Phosphatase 102 40 - 150 U/L    ALT 58 0 - 70 U/L    AST 42 0 - 45 U/L     Assessment and Plan:   Stage III Colon Cancer-Though pt seems to be tolerating chemotherapy well, FOLFOX cycle 4 deferred due to neutropenia(1.1) /thrombocytopenia(75,000) x 1 week. He will have review prior to cycle 5 with treatment labs. Last CT CAP was 11/15/2017. Planned for 6 months of chemotherapy  Constipation- discussed need for use of stool softeners early in cycle to prevent constipation. Reminded of need for adequate fluids.   This was a 25 min visit with > 50% in counseling and coordinating care including education and management of concerns.    Jessica Marsh,CNP

## 2018-01-02 NOTE — PROGRESS NOTES
"Patient's name and  were verified.  See Doc Flowsheet - IV assess for details.  IVAD accessed with 20G 3/4\" lares gripper plus needle  blood return positive: YES  Site without redness, tenderness or swelling: YES  flushed with 30cc NS and 5cc 100u/ml heparin  Needle: Left accessed for infusion    Comments: Labs drawn.  Patient tolerated procedure without incident.    Joanna Mclaughlin  RN, BSN, OCN        "

## 2018-01-02 NOTE — NURSING NOTE
"Oncology Rooming Note    January 2, 2018 9:17 AM   Rian Malik is a 57 year old male who presents for:    Chief Complaint   Patient presents with     Oncology Clinic Visit     prior to tx     Initial Vitals: There were no vitals taken for this visit. Estimated body mass index is 24.33 kg/(m^2) as calculated from the following:    Height as of 12/18/17: 1.727 m (5' 7.99\").    Weight as of 12/18/17: 72.6 kg (160 lb). There is no height or weight on file to calculate BSA.  Data Unavailable Comment: Data Unavailable   No LMP for male patient.  Allergies reviewed: Yes  Medications reviewed: Yes    Medications: Medication refills not needed today.  Pharmacy name entered into [email or web address]:    [facility] - [place], [place] - [address]  [facility] - [place], [place] - [address]    Clinical concerns:     8 minutes for nursing intake (face to face time)     MYLA POLLACK LPN              "

## 2018-01-02 NOTE — PROGRESS NOTES
Pt will not receive chemo per Jessica Stone NP's note. Port de-accessed without complaint. Nuha De Los Santos RN

## 2018-01-02 NOTE — MR AVS SNAPSHOT
After Visit Summary   1/2/2018    Rian Malik    MRN: [id number]           Patient Information     Date Of Birth          1960        Visit Information        Provider Department      1/2/2018 8:45 AM [facility]URSE ONLY CANCER CENTER [facility]        Today's Diagnoses     Adenocarcinoma of sigmoid colon (H)    -  1       Follow-ups after your visit        Your next 10 appointments already scheduled     Jan 02, 2018  9:15 AM CST   [facility]    [address] [place] [address]   [phone]            Jan 02, 2018 10:15 AM CST   Flofox with BAY 7 INFUSION   [facility]    [address] [place] [address]   [phone]            Angel 15, 2018  8:30 AM CST   Return Visit with [facility]   [facility]    [address] [place] [address]   [phone]            Angel 15, 2018  9:15 AM CST   [facility] with Padilla Last MD   [facility]    [address] [place] [address]   [phone]            Angel 15, 2018 10:00 AM CST   Flofox with BAY 2 INFUSION   [facility]    [address] [place] [address]   [phone]              Who to contact     If you have questions or need follow up information about today's clinic visit or your schedule please contact [facility] directly at [phone].  Normal or non-critical lab and imaging results will be communicated to you by MyChart, letter or phone within 4 business days after the clinic has received the results. If you do not hear from us within 7 days, please contact the clinic through MyChart or phone. If you have a critical or abnormal lab result, we  will notify you by phone as soon as possible.  Submit refill requests through [employer] or call your pharmacy and they will forward the refill request to us. Please allow 3 business days for your refill to be completed.          Additional Information About Your Visit        [employer] Information     [employer] gives you secure access to your electronic health record. If you see a primary care provider, you can also send messages to your care team and make appointments. If you have questions, please call your primary care clinic.  If you do not have a primary care provider, please call [phone] and they will assist you.      [employer] is an electronic gateway that provides easy, online access to your medical records. With [employer], you can request a clinic appointment, read your test results, renew a prescription or communicate with your care team.     To access your existing account, please contact your [facility] or call [phone] for assistance.        Care EveryWhere ID     This is your Care EveryWhere ID. This could be used by other organizations to access your [place] medical records  [id number]         Blood Pressure from Last 3 Encounters:   12/18/17 127/71   12/04/17 120/70   11/23/17 130/79    Weight from Last 3 Encounters:   12/18/17 72.6 kg (160 lb)   12/04/17 70.8 kg (156 lb)   11/21/17 70 kg (154 lb 4.8 oz)              We Performed the Following     CBC with platelets differential     Comprehensive metabolic panel        Primary Care Provider Office Phone # Fax #    Moreno Harris [phone]       [address]  [facility] [address]        Equal Access to Services     [facility]: Hadii aad ku hadasho Soomaali, waaxda luqadaha, qaybta kaalmada adeegyahernán, alisa leon . So [facility] [phone].    ATENCIÓN: Si habla español, tiene a martino disposición servicios gratuitos de asistencia lingüística. Llame al  [phone].    We comply with applicable federal civil rights laws and [place] laws. We do not discriminate on the basis of race, color, national origin, age, disability, sex, sexual orientation, or gender identity.            Thank you!     Thank you for choosing [facility]  for your care. Our goal is always to provide you with excellent care. Hearing back from our patients is one way we can continue to improve our services. Please take a few minutes to complete the written survey that you may receive in the mail after your visit with us. Thank you!             Your Updated Medication List - Protect others around you: Learn how to safely use, store and throw away your medicines at www.disposemymeds.org.          This list is accurate as of: 1/2/18  9:14 AM.  Always use your most recent med list.                   Brand Name Dispense Instructions for use Diagnosis    docusate sodium 100 MG tablet    COLACE    60 tablet    Take 100 mg by mouth daily    Special screening for malignant neoplasms, colon       elbasvir-grazoprevir  MG Tabs per tablet    ZEPATIER    28 tablet    Take 1 tablet by mouth daily    Chronic hepatitis C without hepatic coma (H)       LORazepam 0.5 MG tablet    ATIVAN    30 tablet    Take 1 tablet (0.5 mg) by mouth every 4 hours as needed (Anxiety, Nausea/Vomiting or Sleep)    Adenocarcinoma of sigmoid colon (H)       ondansetron 8 MG tablet    ZOFRAN    10 tablet    Take 1 tablet (8 mg) by mouth every 8 hours as needed (Nausea/Vomiting)    Adenocarcinoma of sigmoid colon (H)       oxyCODONE IR 5 MG tablet    ROXICODONE    20 tablet    Take 1 tablet (5 mg) by mouth every 4 hours as needed for pain maximum 8 tablet(s) per day    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

## 2018-01-04 RX ORDER — METHYLPREDNISOLONE SODIUM SUCCINATE 125 MG/2ML
125 INJECTION, POWDER, LYOPHILIZED, FOR SOLUTION INTRAMUSCULAR; INTRAVENOUS
Status: CANCELLED
Start: 2018-01-08

## 2018-01-04 RX ORDER — DIPHENHYDRAMINE HYDROCHLORIDE 50 MG/ML
50 INJECTION INTRAMUSCULAR; INTRAVENOUS
Status: CANCELLED
Start: 2018-01-08

## 2018-01-04 RX ORDER — MEPERIDINE HYDROCHLORIDE 50 MG/ML
25 INJECTION INTRAMUSCULAR; INTRAVENOUS; SUBCUTANEOUS EVERY 30 MIN PRN
Status: CANCELLED | OUTPATIENT
Start: 2018-01-08

## 2018-01-04 RX ORDER — SODIUM CHLORIDE 9 MG/ML
1000 INJECTION, SOLUTION INTRAVENOUS CONTINUOUS PRN
Status: CANCELLED
Start: 2018-01-08

## 2018-01-04 RX ORDER — ALBUTEROL SULFATE 0.83 MG/ML
2.5 SOLUTION RESPIRATORY (INHALATION)
Status: CANCELLED | OUTPATIENT
Start: 2018-01-08

## 2018-01-04 RX ORDER — EPINEPHRINE 0.3 MG/.3ML
0.3 INJECTION SUBCUTANEOUS EVERY 5 MIN PRN
Status: CANCELLED | OUTPATIENT
Start: 2018-01-08

## 2018-01-04 RX ORDER — EPINEPHRINE 1 MG/ML
0.3 INJECTION, SOLUTION INTRAMUSCULAR; SUBCUTANEOUS EVERY 5 MIN PRN
Status: CANCELLED | OUTPATIENT
Start: 2018-01-08

## 2018-01-04 RX ORDER — ALBUTEROL SULFATE 90 UG/1
1-2 AEROSOL, METERED RESPIRATORY (INHALATION)
Status: CANCELLED
Start: 2018-01-08

## 2018-01-04 RX ORDER — LORAZEPAM 2 MG/ML
0.5 INJECTION INTRAMUSCULAR EVERY 4 HOURS PRN
Status: CANCELLED
Start: 2018-01-08

## 2018-01-04 RX ORDER — FLUOROURACIL 50 MG/ML
400 INJECTION, SOLUTION INTRAVENOUS ONCE
Status: CANCELLED | OUTPATIENT
Start: 2018-01-08

## 2018-01-08 ENCOUNTER — HOME INFUSION (PRE-WILLOW HOME INFUSION) (OUTPATIENT)
Dept: PHARMACY | Facility: CLINIC | Age: 58
End: 2018-01-08

## 2018-01-08 ENCOUNTER — ONCOLOGY VISIT (OUTPATIENT)
Dept: ONCOLOGY | Facility: CLINIC | Age: 58
End: 2018-01-08
Payer: COMMERCIAL

## 2018-01-08 ENCOUNTER — INFUSION THERAPY VISIT (OUTPATIENT)
Dept: INFUSION THERAPY | Facility: CLINIC | Age: 58
End: 2018-01-08
Payer: COMMERCIAL

## 2018-01-08 VITALS
HEART RATE: 73 BPM | RESPIRATION RATE: 18 BRPM | WEIGHT: 164.5 LBS | TEMPERATURE: 97.3 F | SYSTOLIC BLOOD PRESSURE: 129 MMHG | OXYGEN SATURATION: 97 % | BODY MASS INDEX: 25.02 KG/M2 | DIASTOLIC BLOOD PRESSURE: 65 MMHG

## 2018-01-08 DIAGNOSIS — C18.7 ADENOCARCINOMA OF SIGMOID COLON (H): Primary | ICD-10-CM

## 2018-01-08 LAB
ALBUMIN SERPL-MCNC: 3.6 G/DL (ref 3.4–5)
ALP SERPL-CCNC: 112 U/L (ref 40–150)
ALT SERPL W P-5'-P-CCNC: 58 U/L (ref 0–70)
ANION GAP SERPL CALCULATED.3IONS-SCNC: 6 MMOL/L (ref 3–14)
AST SERPL W P-5'-P-CCNC: 42 U/L (ref 0–45)
BASOPHILS # BLD AUTO: 0.1 10E9/L (ref 0–0.2)
BASOPHILS NFR BLD AUTO: 1.3 %
BILIRUB SERPL-MCNC: 0.6 MG/DL (ref 0.2–1.3)
BUN SERPL-MCNC: 12 MG/DL (ref 7–30)
CALCIUM SERPL-MCNC: 8.6 MG/DL (ref 8.5–10.1)
CHLORIDE SERPL-SCNC: 103 MMOL/L (ref 94–109)
CO2 SERPL-SCNC: 29 MMOL/L (ref 20–32)
CREAT SERPL-MCNC: 0.8 MG/DL (ref 0.66–1.25)
DIFFERENTIAL METHOD BLD: ABNORMAL
EOSINOPHIL # BLD AUTO: 0.1 10E9/L (ref 0–0.7)
EOSINOPHIL NFR BLD AUTO: 1.9 %
ERYTHROCYTE [DISTWIDTH] IN BLOOD BY AUTOMATED COUNT: 14.9 % (ref 10–15)
GFR SERPL CREATININE-BSD FRML MDRD: >90 ML/MIN/1.7M2
GLUCOSE SERPL-MCNC: 157 MG/DL (ref 70–99)
HCT VFR BLD AUTO: 41.6 % (ref 40–53)
HGB BLD-MCNC: 14.3 G/DL (ref 13.3–17.7)
IMM GRANULOCYTES # BLD: 0 10E9/L (ref 0–0.4)
IMM GRANULOCYTES NFR BLD: 0.5 %
LYMPHOCYTES # BLD AUTO: 1.4 10E9/L (ref 0.8–5.3)
LYMPHOCYTES NFR BLD AUTO: 38.3 %
MCH RBC QN AUTO: 32.2 PG (ref 26.5–33)
MCHC RBC AUTO-ENTMCNC: 34.4 G/DL (ref 31.5–36.5)
MCV RBC AUTO: 94 FL (ref 78–100)
MONOCYTES # BLD AUTO: 0.5 10E9/L (ref 0–1.3)
MONOCYTES NFR BLD AUTO: 14 %
NEUTROPHILS # BLD AUTO: 1.6 10E9/L (ref 1.6–8.3)
NEUTROPHILS NFR BLD AUTO: 44 %
PLATELET # BLD AUTO: 120 10E9/L (ref 150–450)
POTASSIUM SERPL-SCNC: 3.8 MMOL/L (ref 3.4–5.3)
PROT SERPL-MCNC: 7 G/DL (ref 6.8–8.8)
RBC # BLD AUTO: 4.44 10E12/L (ref 4.4–5.9)
SODIUM SERPL-SCNC: 138 MMOL/L (ref 133–144)
WBC # BLD AUTO: 3.7 10E9/L (ref 4–11)

## 2018-01-08 PROCEDURE — 96416 CHEMO PROLONG INFUSE W/PUMP: CPT | Performed by: INTERNAL MEDICINE

## 2018-01-08 PROCEDURE — 99207 ZZC NO CHARGE NURSE ONLY: CPT

## 2018-01-08 PROCEDURE — 96368 THER/DIAG CONCURRENT INF: CPT | Performed by: INTERNAL MEDICINE

## 2018-01-08 PROCEDURE — 99207 ZZC NO CHARGE LOS: CPT

## 2018-01-08 PROCEDURE — 96415 CHEMO IV INFUSION ADDL HR: CPT | Performed by: INTERNAL MEDICINE

## 2018-01-08 PROCEDURE — 85025 COMPLETE CBC W/AUTO DIFF WBC: CPT | Performed by: NURSE PRACTITIONER

## 2018-01-08 PROCEDURE — 96411 CHEMO IV PUSH ADDL DRUG: CPT | Performed by: INTERNAL MEDICINE

## 2018-01-08 PROCEDURE — 96413 CHEMO IV INFUSION 1 HR: CPT | Performed by: INTERNAL MEDICINE

## 2018-01-08 PROCEDURE — 80053 COMPREHEN METABOLIC PANEL: CPT | Performed by: NURSE PRACTITIONER

## 2018-01-08 PROCEDURE — 96367 TX/PROPH/DG ADDL SEQ IV INF: CPT | Performed by: INTERNAL MEDICINE

## 2018-01-08 RX ORDER — FLUOROURACIL 50 MG/ML
400 INJECTION, SOLUTION INTRAVENOUS ONCE
Status: COMPLETED | OUTPATIENT
Start: 2018-01-08 | End: 2018-01-08

## 2018-01-08 RX ORDER — HEPARIN SODIUM (PORCINE) LOCK FLUSH IV SOLN 100 UNIT/ML 100 UNIT/ML
5 SOLUTION INTRAVENOUS
Status: DISCONTINUED | OUTPATIENT
Start: 2018-01-08 | End: 2018-01-08 | Stop reason: HOSPADM

## 2018-01-08 RX ADMIN — HEPARIN SODIUM (PORCINE) LOCK FLUSH IV SOLN 100 UNIT/ML 5 ML: 100 SOLUTION at 11:45

## 2018-01-08 RX ADMIN — FLUOROURACIL 710 MG: 50 INJECTION, SOLUTION INTRAVENOUS at 15:00

## 2018-01-08 ASSESSMENT — PAIN SCALES - GENERAL: PAINLEVEL: NO PAIN (0)

## 2018-01-08 NOTE — PROGRESS NOTES
"Patient's name and  were verified.  See Doc Flowsheet - IV assess for details.  IVAD accessed with 20G 3/4\" lares gripper plus needle  blood return positive: YES  Site without redness, tenderness or swelling: YES  flushed with 20cc NS and 5cc 100u/ml heparin  Needle: left accessed  Comments: Labs drawn.  Patient tolerated procedure without incident.    Kolton Ag  RN, BSN      "

## 2018-01-08 NOTE — MR AVS SNAPSHOT
After Visit Summary   1/8/2018    Rian Malik    MRN: [id number]           Patient Information     Date Of Birth          1960        Visit Information        Provider Department      1/8/2018 12:00 PM BAY 2 INFUSION [facility]        Today's Diagnoses     Adenocarcinoma of sigmoid colon (H)    -  1       Follow-ups after your visit        Your next 10 appointments already scheduled     Jan 22, 2018  8:00 AM CST   Return Visit with NURSE ONLY CANCER CENTER   [facility])    [address] [place] [id number] [phone]            Jan 22, 2018  8:45 AM CST   Return Visit with Padilla Last MD   [facility])    [address] [place] [id number] [phone]            Jan 22, 2018  9:30 AM CST   Flofox with BAY 6 INFUSION   [facility])    [address] [place] [id number] [phone]              Who to contact     If you have questions or need follow up information about today's clinic visit or your schedule please contact [facility] directly at [phone].  Normal or non-critical lab and imaging results will be communicated to you by zuuka!hart, letter or phone within 4 business days after the clinic has received the results. If you do not hear from us within 7 days, please contact the clinic through zuuka!hart or phone. If you have a critical or abnormal lab result, we will notify you by phone as soon as possible.  Submit refill requests through Qinti or call your pharmacy and they will forward the refill request to us. Please allow 3 business days for your refill to be completed.          Additional Information About Your Visit        zuuka![email or web address] Information     [employer] gives you secure access to your electronic health record. If you see a primary care provider, you can also  send messages to your care team and make appointments. If you have questions, please call your primary care clinic.  If you do not have a primary care provider, please call [phone] and they will assist you.      [employer] is an electronic gateway that provides easy, online access to your medical records. With [employer], you can request a clinic appointment, read your test results, renew a prescription or communicate with your care team.     To access your existing account, please contact your [facility] or call [phone] for assistance.        Care EveryWhere ID     This is your Care EveryWhere ID. This could be used by other organizations to access your [facility] medical records  [id number]        Your Vitals Were     Pulse Temperature Respirations Pulse Oximetry BMI (Body Mass Index)       73 97.3  F (36.3  C) (Oral) 18 97% 25.02 kg/m2        Blood Pressure from Last 3 Encounters:   01/08/18 129/65   01/02/18 122/82   12/18/17 127/71    Weight from Last 3 Encounters:   01/08/18 74.6 kg (164 lb 8 oz)   01/02/18 71.7 kg (158 lb)   12/18/17 72.6 kg (160 lb)              Today, you had the following     No orders found for display       Primary Care Provider Office Phone # Fax #    Moreno Harris [phone]       [address]  [facility] [address]        Equal Access to Services     [facility]: Hadii aad ku hadasho Soomaali, waaxda luqadaha, qaybta kaalmada adeegyada, alisa leon . So [facility] [phone].    ATENCIÓN: Si habla español, tiene a martino disposición servicios gratuitos de asistencia lingüística. Llame al [phone].    We comply with applicable federal civil rights laws and [place] laws. We do not discriminate on the basis of race, color, national origin, age, disability, sex, sexual orientation, or gender identity.            Thank you!     Thank you for choosing [facility]  for your care.  Our goal is always to provide you with excellent care. Hearing back from our patients is one way we can continue to improve our services. Please take a few minutes to complete the written survey that you may receive in the mail after your visit with us. Thank you!             Your Updated Medication List - Protect others around you: Learn how to safely use, store and throw away your medicines at www.disposemymeds.org.          This list is accurate as of: 1/8/18  3:54 PM.  Always use your most recent med list.                   Brand Name Dispense Instructions for use Diagnosis    docusate sodium 100 MG tablet    COLACE    60 tablet    Take 100 mg by mouth daily    Special screening for malignant neoplasms, colon       elbasvir-grazoprevir  MG Tabs per tablet    ZEPATIER    28 tablet    Take 1 tablet by mouth daily    Chronic hepatitis C without hepatic coma (H)       LORazepam 0.5 MG tablet    ATIVAN    30 tablet    Take 1 tablet (0.5 mg) by mouth every 4 hours as needed (Anxiety, Nausea/Vomiting or Sleep)    Adenocarcinoma of sigmoid colon (H)       ondansetron 8 MG tablet    ZOFRAN    10 tablet    Take 1 tablet (8 mg) by mouth every 8 hours as needed (Nausea/Vomiting)    Adenocarcinoma of sigmoid colon (H)       prochlorperazine 10 MG tablet    COMPAZINE    30 tablet    Take 1 tablet (10 mg) by mouth every 6 hours as needed (Nausea/Vomiting)    Adenocarcinoma of sigmoid colon (H)

## 2018-01-08 NOTE — MR AVS SNAPSHOT
After Visit Summary   1/8/2018    Rian Malik    MRN: [id number]           Patient Information     Date Of Birth          1960        Visit Information        Provider Department      1/8/2018 11:30 AM NURSE ONLY CANCER CENTER [facility]        Today's Diagnoses     Adenocarcinoma of sigmoid colon (H)    -  1       Follow-ups after your visit        Your next 10 appointments already scheduled     Jan 08, 2018 12:00 PM CST   [facility]ox with BAY 2 INFUSION   [facility]    [id number] [place] [id number] [phone]            Jan 22, 2018  8:00 AM CST   Return Visit with NURSE ONLY CANCER CENTER   [facility]    [id number] [place] [phone] [phone]            Jan 22, 2018  8:45 AM CST   Return Visit with Padilla Last MD   [facility])    [id number] [place] [id number] [phone]            Jan 22, 2018  9:30 AM CST   Flofox with BAY 6 INFUSION   [facility]    [address] [place] [address] [phone]              Who to contact     If you have questions or need follow up information about today's clinic visit or your schedule please contact [facility] directly at [phone].  Normal or non-critical lab and imaging results will be communicated to you by MyChart, letter or phone within 4 business days after the clinic has received the results. If you do not hear from us within 7 days, please contact the clinic through MyChart or phone. If you have a critical or abnormal lab result, we will notify you by phone as soon as possible.  Submit refill requests through Backlift or call your pharmacy and they will forward the refill request to us. Please allow 3 business days for your refill to be  completed.          Additional Information About Your Visit        [employer] Information     [employer] gives you secure access to your electronic health record. If you see a primary care provider, you can also send messages to your care team and make appointments. If you have questions, please call your primary care clinic.  If you do not have a primary care provider, please call [phone] and they will assist you.      [employer] is an electronic gateway that provides easy, online access to your medical records. With [employer], you can request a clinic appointment, read your test results, renew a prescription or communicate with your care team.     To access your existing account, please contact your [facility] or call [phone] for assistance.        Care EveryWhere ID     This is your Care EveryWhere ID. This could be used by other organizations to access your [place] medical records  [id number]         Blood Pressure from Last 3 Encounters:   01/02/18 122/82   12/18/17 127/71   12/04/17 120/70    Weight from Last 3 Encounters:   01/02/18 71.7 kg (158 lb)   12/18/17 72.6 kg (160 lb)   12/04/17 70.8 kg (156 lb)              We Performed the Following     CBC with platelets differential     Comprehensive metabolic panel        Primary Care Provider Office Phone # Fax #    Moreno Harris [phone]       [address]  [facility] [address]        Equal Access to Services     [facility] : Hadii aad ku hadasho Sosherri, waaxda luqadaha, qaybta kaalmada adeegyada, alisa leon . So [facility] [phone].    ATENCIÓN: Si habla español, tiene a martino disposición servicios gratuitos de asistencia lingüística. Llame al [phone].    We comply with applicable federal civil rights laws and [place] laws. We do not discriminate on the basis of race, color, national origin, age, disability, sex, sexual orientation, or gender identity.             Thank you!     Thank you for choosing [facility]  for your care. Our goal is always to provide you with excellent care. Hearing back from our patients is one way we can continue to improve our services. Please take a few minutes to complete the written survey that you may receive in the mail after your visit with us. Thank you!             Your Updated Medication List - Protect others around you: Learn how to safely use, store and throw away your medicines at www.disposemymeds.org.          This list is accurate as of: 1/8/18 11:49 AM.  Always use your most recent med list.                   Brand Name Dispense Instructions for use Diagnosis    docusate sodium 100 MG tablet    COLACE    60 tablet    Take 100 mg by mouth daily    Special screening for malignant neoplasms, colon       elbasvir-grazoprevir  MG Tabs per tablet    ZEPATIER    28 tablet    Take 1 tablet by mouth daily    Chronic hepatitis C without hepatic coma (H)       LORazepam 0.5 MG tablet    ATIVAN    30 tablet    Take 1 tablet (0.5 mg) by mouth every 4 hours as needed (Anxiety, Nausea/Vomiting or Sleep)    Adenocarcinoma of sigmoid colon (H)       ondansetron 8 MG tablet    ZOFRAN    10 tablet    Take 1 tablet (8 mg) by mouth every 8 hours as needed (Nausea/Vomiting)    Adenocarcinoma of sigmoid colon (H)       oxyCODONE IR 5 MG tablet    ROXICODONE    20 tablet    Take 1 tablet (5 mg) by mouth every 4 hours as needed for pain maximum 8 tablet(s) per day    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

## 2018-01-08 NOTE — PROGRESS NOTES
"Infusion Nursing Note:  Rian Malik presents today for C4 Oxaliplatin/ leucovorin/ fluorouacil push and pump connect.    Patient seen by provider today: No   present during visit today: Not Applicable.    Note: N/A.    Intravenous Access:  Implanted Port.    Treatment Conditions:  Lab Results   Component Value Date    HGB 14.3 01/08/2018     Lab Results   Component Value Date    WBC 3.7 01/08/2018      Lab Results   Component Value Date    ANEU 1.6 01/08/2018     Lab Results   Component Value Date     01/08/2018      Lab Results   Component Value Date     01/08/2018                   Lab Results   Component Value Date    POTASSIUM 3.8 01/08/2018           Lab Results   Component Value Date    MAG 2.0 08/04/2017            Lab Results   Component Value Date    CR 0.80 01/08/2018                   Lab Results   Component Value Date    MAICOL 8.6 01/08/2018                Lab Results   Component Value Date    BILITOTAL 0.6 01/08/2018           Lab Results   Component Value Date    ALBUMIN 3.6 01/08/2018                    Lab Results   Component Value Date    ALT 58 01/08/2018           Lab Results   Component Value Date    AST 42 01/08/2018     Results reviewed, labs MET treatment parameters, ok to proceed with treatment.          Post Infusion Assessment:  Patient tolerated infusion without incident.  Blood return noted pre and post infusion.  Prior to discharge: Port is secured in place with tegaderm and flushed with 10cc NS with positive blood return noted.  Continuous home infusion Dosi-Fuser pump connected.    All connectors secured in place and clamps taped open.    Pump started, \"running\" noted on display (CADD): Not Applicable.  Patient instructed to call our clinic or [facility] with any questions or concerns at home.  Patient verbalized understanding.    Patient set up for pump disconnect at home with [facility] on 1/10/18 at 1pm.            Discharge Plan: "   Patient will return 1/22/18 for next appointment.   Patient discharged in stable condition accompanied by: self.  Departure Mode: Ambulatory.    Blanka Mcmahan RN

## 2018-01-09 NOTE — PROGRESS NOTES
This is a recent snapshot of the patient's [facility] medical record.  For current drug dose and complete information and questions, call [phone] or In Basket pool, fv home infusion ([id number])  CSN Number:  [id number]

## 2018-01-10 ENCOUNTER — HOME INFUSION (PRE-WILLOW HOME INFUSION) (OUTPATIENT)
Dept: PHARMACY | Facility: CLINIC | Age: 58
End: 2018-01-10

## 2018-01-11 NOTE — PROGRESS NOTES
This is a recent snapshot of the patient's [facility] medical record.  For current drug dose and complete information and questions, call [phone] or In Basket pool, fv home infusion ([id number])  CSN Number:  [id number]

## 2018-01-22 ENCOUNTER — ONCOLOGY VISIT (OUTPATIENT)
Dept: ONCOLOGY | Facility: CLINIC | Age: 58
End: 2018-01-22
Payer: COMMERCIAL

## 2018-01-22 ENCOUNTER — INFUSION THERAPY VISIT (OUTPATIENT)
Dept: INFUSION THERAPY | Facility: CLINIC | Age: 58
End: 2018-01-22
Payer: COMMERCIAL

## 2018-01-22 ENCOUNTER — HOME INFUSION (PRE-WILLOW HOME INFUSION) (OUTPATIENT)
Dept: PHARMACY | Facility: CLINIC | Age: 58
End: 2018-01-22

## 2018-01-22 VITALS
OXYGEN SATURATION: 97 % | HEART RATE: 69 BPM | BODY MASS INDEX: 24.52 KG/M2 | TEMPERATURE: 97.7 F | DIASTOLIC BLOOD PRESSURE: 75 MMHG | WEIGHT: 161.2 LBS | SYSTOLIC BLOOD PRESSURE: 150 MMHG

## 2018-01-22 DIAGNOSIS — C18.7 ADENOCARCINOMA OF SIGMOID COLON (H): Primary | ICD-10-CM

## 2018-01-22 LAB
ALBUMIN SERPL-MCNC: 3.3 G/DL (ref 3.4–5)
ALP SERPL-CCNC: 118 U/L (ref 40–150)
ALT SERPL W P-5'-P-CCNC: 68 U/L (ref 0–70)
ANION GAP SERPL CALCULATED.3IONS-SCNC: 7 MMOL/L (ref 3–14)
AST SERPL W P-5'-P-CCNC: 56 U/L (ref 0–45)
BASOPHILS # BLD AUTO: 0 10E9/L (ref 0–0.2)
BASOPHILS NFR BLD AUTO: 0.6 %
BILIRUB SERPL-MCNC: 0.7 MG/DL (ref 0.2–1.3)
BUN SERPL-MCNC: 11 MG/DL (ref 7–30)
CALCIUM SERPL-MCNC: 8.4 MG/DL (ref 8.5–10.1)
CHLORIDE SERPL-SCNC: 107 MMOL/L (ref 94–109)
CO2 SERPL-SCNC: 27 MMOL/L (ref 20–32)
CREAT SERPL-MCNC: 0.71 MG/DL (ref 0.66–1.25)
DIFFERENTIAL METHOD BLD: ABNORMAL
EOSINOPHIL # BLD AUTO: 0.1 10E9/L (ref 0–0.7)
EOSINOPHIL NFR BLD AUTO: 2.8 %
ERYTHROCYTE [DISTWIDTH] IN BLOOD BY AUTOMATED COUNT: 14.6 % (ref 10–15)
GFR SERPL CREATININE-BSD FRML MDRD: >90 ML/MIN/1.7M2
GLUCOSE SERPL-MCNC: 136 MG/DL (ref 70–99)
HCT VFR BLD AUTO: 40.9 % (ref 40–53)
HGB BLD-MCNC: 14 G/DL (ref 13.3–17.7)
IMM GRANULOCYTES # BLD: 0 10E9/L (ref 0–0.4)
IMM GRANULOCYTES NFR BLD: 0 %
LYMPHOCYTES # BLD AUTO: 1.4 10E9/L (ref 0.8–5.3)
LYMPHOCYTES NFR BLD AUTO: 45.1 %
MCH RBC QN AUTO: 31.7 PG (ref 26.5–33)
MCHC RBC AUTO-ENTMCNC: 34.2 G/DL (ref 31.5–36.5)
MCV RBC AUTO: 93 FL (ref 78–100)
MONOCYTES # BLD AUTO: 0.3 10E9/L (ref 0–1.3)
MONOCYTES NFR BLD AUTO: 10.4 %
NEUTROPHILS # BLD AUTO: 1.3 10E9/L (ref 1.6–8.3)
NEUTROPHILS NFR BLD AUTO: 41.1 %
PLATELET # BLD AUTO: 75 10E9/L (ref 150–450)
POTASSIUM SERPL-SCNC: 3.8 MMOL/L (ref 3.4–5.3)
PROT SERPL-MCNC: 6.8 G/DL (ref 6.8–8.8)
RBC # BLD AUTO: 4.41 10E12/L (ref 4.4–5.9)
SODIUM SERPL-SCNC: 141 MMOL/L (ref 133–144)
WBC # BLD AUTO: 3.2 10E9/L (ref 4–11)

## 2018-01-22 PROCEDURE — 96368 THER/DIAG CONCURRENT INF: CPT | Performed by: INTERNAL MEDICINE

## 2018-01-22 PROCEDURE — 99214 OFFICE O/P EST MOD 30 MIN: CPT | Mod: 25 | Performed by: INTERNAL MEDICINE

## 2018-01-22 PROCEDURE — 96416 CHEMO PROLONG INFUSE W/PUMP: CPT | Performed by: INTERNAL MEDICINE

## 2018-01-22 PROCEDURE — 96367 TX/PROPH/DG ADDL SEQ IV INF: CPT | Performed by: INTERNAL MEDICINE

## 2018-01-22 PROCEDURE — 99207 ZZC NO CHARGE LOS: CPT

## 2018-01-22 PROCEDURE — 99207 ZZC NO CHARGE NURSE ONLY: CPT

## 2018-01-22 PROCEDURE — 96411 CHEMO IV PUSH ADDL DRUG: CPT | Performed by: INTERNAL MEDICINE

## 2018-01-22 PROCEDURE — 96413 CHEMO IV INFUSION 1 HR: CPT | Performed by: INTERNAL MEDICINE

## 2018-01-22 PROCEDURE — 85025 COMPLETE CBC W/AUTO DIFF WBC: CPT | Performed by: INTERNAL MEDICINE

## 2018-01-22 PROCEDURE — 80053 COMPREHEN METABOLIC PANEL: CPT | Performed by: INTERNAL MEDICINE

## 2018-01-22 PROCEDURE — 96415 CHEMO IV INFUSION ADDL HR: CPT | Performed by: INTERNAL MEDICINE

## 2018-01-22 RX ORDER — FLUOROURACIL 50 MG/ML
400 INJECTION, SOLUTION INTRAVENOUS ONCE
Status: COMPLETED | OUTPATIENT
Start: 2018-01-22 | End: 2018-01-22

## 2018-01-22 RX ORDER — HEPARIN SODIUM (PORCINE) LOCK FLUSH IV SOLN 100 UNIT/ML 100 UNIT/ML
5 SOLUTION INTRAVENOUS
Status: DISCONTINUED | OUTPATIENT
Start: 2018-01-22 | End: 2018-01-22 | Stop reason: HOSPADM

## 2018-01-22 RX ORDER — FLUOROURACIL 50 MG/ML
400 INJECTION, SOLUTION INTRAVENOUS ONCE
Status: CANCELLED | OUTPATIENT
Start: 2018-01-22

## 2018-01-22 RX ORDER — ALBUTEROL SULFATE 90 UG/1
1-2 AEROSOL, METERED RESPIRATORY (INHALATION)
Status: CANCELLED
Start: 2018-01-22

## 2018-01-22 RX ORDER — SODIUM CHLORIDE 9 MG/ML
1000 INJECTION, SOLUTION INTRAVENOUS CONTINUOUS PRN
Status: CANCELLED
Start: 2018-01-22

## 2018-01-22 RX ORDER — EPINEPHRINE 0.3 MG/.3ML
0.3 INJECTION SUBCUTANEOUS EVERY 5 MIN PRN
Status: CANCELLED | OUTPATIENT
Start: 2018-01-22

## 2018-01-22 RX ORDER — LORAZEPAM 2 MG/ML
0.5 INJECTION INTRAMUSCULAR EVERY 4 HOURS PRN
Status: CANCELLED
Start: 2018-01-22

## 2018-01-22 RX ORDER — METHYLPREDNISOLONE SODIUM SUCCINATE 125 MG/2ML
125 INJECTION, POWDER, LYOPHILIZED, FOR SOLUTION INTRAMUSCULAR; INTRAVENOUS
Status: CANCELLED
Start: 2018-01-22

## 2018-01-22 RX ORDER — DIPHENHYDRAMINE HYDROCHLORIDE 50 MG/ML
50 INJECTION INTRAMUSCULAR; INTRAVENOUS
Status: CANCELLED
Start: 2018-01-22

## 2018-01-22 RX ORDER — ALBUTEROL SULFATE 0.83 MG/ML
2.5 SOLUTION RESPIRATORY (INHALATION)
Status: CANCELLED | OUTPATIENT
Start: 2018-01-22

## 2018-01-22 RX ORDER — EPINEPHRINE 1 MG/ML
0.3 INJECTION, SOLUTION INTRAMUSCULAR; SUBCUTANEOUS EVERY 5 MIN PRN
Status: CANCELLED | OUTPATIENT
Start: 2018-01-22

## 2018-01-22 RX ORDER — MEPERIDINE HYDROCHLORIDE 50 MG/ML
25 INJECTION INTRAMUSCULAR; INTRAVENOUS; SUBCUTANEOUS EVERY 30 MIN PRN
Status: CANCELLED | OUTPATIENT
Start: 2018-01-22

## 2018-01-22 RX ADMIN — HEPARIN SODIUM (PORCINE) LOCK FLUSH IV SOLN 100 UNIT/ML 5 ML: 100 SOLUTION at 08:04

## 2018-01-22 RX ADMIN — FLUOROURACIL 710 MG: 50 INJECTION, SOLUTION INTRAVENOUS at 12:11

## 2018-01-22 ASSESSMENT — PAIN SCALES - GENERAL: PAINLEVEL: NO PAIN (0)

## 2018-01-22 NOTE — MR AVS SNAPSHOT
After Visit Summary   1/22/2018    Rian Malik    MRN: [id number]           Patient Information     Date Of Birth          1960        Visit Information        Provider Department      1/22/2018 8:00 AM NURSE ONLY CANCER CENTER [facility]        Today's Diagnoses     Adenocarcinoma of sigmoid colon (H)    -  1       Follow-ups after your visit        Your next 10 appointments already scheduled     Jan 22, 2018  8:45 AM CST   Return Visit with Padilla Last MD   [facility]    [address] [place] [id number] [phone]            Jan 22, 2018  9:30 AM CST   Flofox with BAY 6 INFUSION   [facility])    [address] [place] [address] [phone]              Who to contact     If you have questions or need follow up information about today's clinic visit or your schedule please contact [facility] directly at [phone].  Normal or non-critical lab and imaging results will be communicated to you by MixGeniushart, letter or phone within 4 business days after the clinic has received the results. If you do not hear from us within 7 days, please contact the clinic through MixGeniushart or phone. If you have a critical or abnormal lab result, we will notify you by phone as soon as possible.  Submit refill requests through [employer]Media or call your pharmacy and they will forward the refill request to us. Please allow 3 business days for your refill to be completed.          Additional Information About Your Visit        Mix[employer]iushart Information     [employer] gives you secure access to your electronic health record. If you see a primary care provider, you can also send messages to your care team and make appointments. If you have questions, please call your primary care clinic.  If you do not have a primary care provider, please call [phone] and they will  assist you.      [employer] is an electronic gateway that provides easy, online access to your medical records. With [employer], you can request a clinic appointment, read your test results, renew a prescription or communicate with your care team.     To access your existing account, please contact your [facility] or call [phone] for assistance.        Care EveryWhere ID     This is your Care EveryWhere ID. This could be used by other organizations to access your [place] medical records  [id number]         Blood Pressure from Last 3 Encounters:   01/08/18 129/65   01/02/18 122/82   12/18/17 127/71    Weight from Last 3 Encounters:   01/08/18 74.6 kg (164 lb 8 oz)   01/02/18 71.7 kg (158 lb)   12/18/17 72.6 kg (160 lb)              We Performed the Following     CBC with platelets differential     Comprehensive metabolic panel        Primary Care Provider Office Phone # Fax #    Moreno Harris [phone]       [address] [facility] [address]        Equal Access to Services     [facility]: Hadii aad ku hadasho Soomaali, waaxda luqadaha, qaybta kaalmada adeegyada, waxtamie leon . So [facility] [phone].    ATENCIÓN: Si habla español, tiene a martino disposición servicios gratuitos de asistencia lingüística. Llame al [phone].    We comply with applicable federal civil rights laws and [place] laws. We do not discriminate on the basis of race, color, national origin, age, disability, sex, sexual orientation, or gender identity.            Thank you!     Thank you for choosing [facility]  for your care. Our goal is always to provide you with excellent care. Hearing back from our patients is one way we can continue to improve our services. Please take a few minutes to complete the written survey that you may receive in the mail after your visit with us. Thank you!             Your Updated Medication List -  Protect others around you: Learn how to safely use, store and throw away your medicines at www.disposemymeds.org.          This list is accurate as of: 1/22/18  8:15 AM.  Always use your most recent med list.                   Brand Name Dispense Instructions for use Diagnosis    docusate sodium 100 MG tablet    COLACE    60 tablet    Take 100 mg by mouth daily    Special screening for malignant neoplasms, colon       elbasvir-grazoprevir  MG Tabs per tablet    ZEPATIER    28 tablet    Take 1 tablet by mouth daily    Chronic hepatitis C without hepatic coma (H)       LORazepam 0.5 MG tablet    ATIVAN    30 tablet    Take 1 tablet (0.5 mg) by mouth every 4 hours as needed (Anxiety, Nausea/Vomiting or Sleep)    Adenocarcinoma of sigmoid colon (H)       ondansetron 8 MG tablet    ZOFRAN    10 tablet    Take 1 tablet (8 mg) by mouth every 8 hours as needed (Nausea/Vomiting)    Adenocarcinoma of sigmoid colon (H)       prochlorperazine 10 MG tablet    COMPAZINE    30 tablet    Take 1 tablet (10 mg) by mouth every 6 hours as needed (Nausea/Vomiting)    Adenocarcinoma of sigmoid colon (H)

## 2018-01-22 NOTE — PROGRESS NOTES
FOLLOW-UP VISIT NOTE    PATIENT NAME: Rian Malik MRN # [id number]  DATE OF VISIT: Jan 22, 2018 YOB: 1960    REFERRING PROVIDER: No referring provider defined for this encounter.    CANCER TYPE: Adenocarcinoma colon  STAGE: III pT4N1c  ECOG PS: 0    ONCOLOGY HISTORY:  57-year-old male who around February 2017r, developed intermittent cramping lower abdominal pain. Was evaluated by PCP and clinical impression was colitis. He was put on p.o. antibiotics. However symptoms did not improve. He was eventually referred to surgery and underwent imaging with CT findings suspicious of sigmoid abscess. He was admitted to the hospital for resection of sigmoid abscess 10/11/17. However intraoperatively was noted to have a large firmly adherent sigmoid colon mass. Laparoscopic surgery was converted to open sigmoidectomy with end colostomy.     Surgical pathology   B.  COLON, SIGMOID, HEMICOLECTOMY   - Poorly differentiated adenocarcinoma, with signet ring cell features   - Tumor size: 5 cm   - Tumor is present on serosal surface and microscopically perforates   serosal surface   - One surgical margin shows extensive serosal involvement by tumor. The   opposite, undesignated margin shows no evidence of malignancy   - Lymphovascular invasion is present   - Perineural invasion is not identified   - Tumor deposits are present   - Immunohistochemistry for mismatch repair proteins shows intact nuclear   expression for MLH1, MSH2, MSH6, and PMS2   - Three reactive lymph nodes, negative for malignancy (0/3)   - Pathologic staging: pT4N1c      Patient's case was discussed at the tumor board. Recommendation was to obtain new staging scans as well as to proceed with systemic chemotherapy at this point with plans for further surgery in future.    - Started on Folfox 11/21/17    SUBJECTIVE     Patient is here today for his 5th cycle of chemotherapy. No active complaints. He denies abdominal pain, nausea/vomiting,  neuropathy, fever/chills, bleeding/bruising. Appetite and energy levels. Weight has been stable. Ostomy working well.      PAST MEDICAL HISTORY     Past Medical History:   Diagnosis Date     Cirrhosis (H)      Colon cancer (H) 10/11/2017    Poorly differentiated adenocarcinoma     Hepatitis C          CURRENT OUTPATIENT MEDICATIONS     Current Outpatient Prescriptions   Medication Sig Dispense Refill     LORazepam (ATIVAN) 0.5 MG tablet Take 1 tablet (0.5 mg) by mouth every 4 hours as needed (Anxiety, Nausea/Vomiting or Sleep) 30 tablet 2     prochlorperazine (COMPAZINE) 10 MG tablet Take 1 tablet (10 mg) by mouth every 6 hours as needed (Nausea/Vomiting) 30 tablet 2     ondansetron (ZOFRAN) 8 MG tablet Take 1 tablet (8 mg) by mouth every 8 hours as needed (Nausea/Vomiting) 10 tablet 2     elbasvir-grazoprevir (ZEPATIER)  MG TABS per tablet Take 1 tablet by mouth daily 28 tablet 2     docusate sodium (COLACE) 100 MG tablet Take 100 mg by mouth daily 60 tablet 1        ALLERGIES     Allergies   Allergen Reactions     Acetaminophen      Naproxen         REVIEW OF SYSTEMS   As above in the HPI, o/w complete 14-point ROS was negative.     PHYSICAL EXAM   B/P: 138/78, T: 98.1, P: 64, R: 18  SpO2 Readings from Last 4 Encounters:   01/22/18 97%   01/08/18 97%   01/02/18 98%   12/18/17 99%     Wt Readings from Last 3 Encounters:   01/22/18 73.1 kg (161 lb 3.2 oz)   01/08/18 74.6 kg (164 lb 8 oz)   01/02/18 71.7 kg (158 lb)     GEN: NAD  EYES:PERRLA  Mouth/ENT: Oropharynx is clear.  NECK: no cervical or supraclavicular lymphadenopathy  LUNGS: clear bilaterally  CV: regular, no murmurs, rubs, or gallops  ABDOMEN: soft, non-tender, non-distended, normal bowel sounds, no hepatosplenomegaly. Ostomy in place  EXT: warm, well perfused, no edema  NEURO: alert  SKIN: no rashes     LABORATORY AND IMAGING STUDIES     Lab Results   Component Value Date    WBC 3.2 01/22/2018     Lab Results   Component Value Date    RBC 4.41  01/22/2018     Lab Results   Component Value Date    HGB 14.0 01/22/2018     Lab Results   Component Value Date    HCT 40.9 01/22/2018     No components found for: MCT  Lab Results   Component Value Date    MCV 93 01/22/2018     Lab Results   Component Value Date    MCH 31.7 01/22/2018     Lab Results   Component Value Date    MCHC 34.2 01/22/2018     Lab Results   Component Value Date    RDW 14.6 01/22/2018     Lab Results   Component Value Date    PLT 75 01/22/2018     Recent Labs   Lab Test  01/22/18   0803  01/08/18   1145   NA  141  138   POTASSIUM  3.8  3.8   CHLORIDE  107  103   CO2  27  29   ANIONGAP  7  6   GLC  136*  157*   BUN  11  12   CR  0.71  0.80   MAICOL  8.4*  8.6     ,       ASSESSMENT AND PLAN     57-year-old male with a newly diagnosed     - Poorly differentiated adenocarcinoma colon  pT4N1c(tumor deposits) with margin involvement -  Stage III   CT scans negative for evidence of metastatic disease  Patient's case was discussed at the tumor board 11/13 and recommendation was to proceed with systemic chemotherapy at this point with plans for additional surgery in future after completion of chemotherapy.  Started on FOLFOX q2 week regimen for 6 months duration.   5th  cycle today     - Hepatitis C  On antiviral treatment per GI    - Leucopenia/Thrombocytopenia  Grade 1  Secondary to chemo  Monitor     Return to clinic in 2 weeks for office visit prior to next cycle of chemotherapy     The patient is ready to learn, no apparent learning barriers were identified, Diagnosis and treatment plans were explained to the patient. The patient expressed understanding of the content. The patient questions were answered to his satisfaction.    Chart documentation with Dragon Voice recognition Software. Although reviewed after completion, some words and grammatical errors may remain.  Padilla Last MD  Attending Physician   Hematology/Medical Oncology

## 2018-01-22 NOTE — Clinical Note
1/22/2018         RE: Rian Malik  [address] [place] [address]        Dear Colleague,    Thank you for referring your patient, Rian Malik, to the [facility]. Please see a copy of my visit note below.      FOLLOW-UP VISIT NOTE    PATIENT NAME: Rian Malik MRN # [id number]  DATE OF VISIT: Jan 22, 2018 YOB: 1960    REFERRING PROVIDER: No referring provider defined for this encounter.    CANCER TYPE: Adenocarcinoma colon  STAGE: III pT4N1c  ECOG PS: 0    ONCOLOGY HISTORY:  57-year-old male who around February 2017r, developed intermittent cramping lower abdominal pain. Was evaluated by PCP and clinical impression was colitis. He was put on p.o. antibiotics. However symptoms did not improve. He was eventually referred to surgery and underwent imaging with CT findings suspicious of sigmoid abscess. He was admitted to the hospital for resection of sigmoid abscess 10/11/17. However intraoperatively was noted to have a large firmly adherent sigmoid colon mass. Laparoscopic surgery was converted to open sigmoidectomy with end colostomy.     Surgical pathology   B.  COLON, SIGMOID, HEMICOLECTOMY   - Poorly differentiated adenocarcinoma, with signet ring cell features   - Tumor size: 5 cm   - Tumor is present on serosal surface and microscopically perforates   serosal surface   - One surgical margin shows extensive serosal involvement by tumor. The   opposite, undesignated margin shows no evidence of malignancy   - Lymphovascular invasion is present   - Perineural invasion is not identified   - Tumor deposits are present   - Immunohistochemistry for mismatch repair proteins shows intact nuclear   expression for MLH1, MSH2, MSH6, and PMS2   - Three reactive lymph nodes, negative for malignancy (0/3)   - Pathologic staging: pT4N1c      Patient's case was discussed at the tumor board. Recommendation was to obtain new staging scans as well as to proceed with systemic  chemotherapy at this point with plans for further surgery in future.    - Started on Folfox 11/21/17    SUBJECTIVE     Patient is here today for his 5th cycle of chemotherapy. No active complaints. He denies abdominal pain, nausea/vomiting, neuropathy, fever/chills, bleeding/bruising. Appetite and energy levels. Weight has been stable. Ostomy working well.      PAST MEDICAL HISTORY     Past Medical History:   Diagnosis Date     Cirrhosis (H)      Colon cancer (H) 10/11/2017    Poorly differentiated adenocarcinoma     Hepatitis C          CURRENT OUTPATIENT MEDICATIONS     Current Outpatient Prescriptions   Medication Sig Dispense Refill     LORazepam (ATIVAN) 0.5 MG tablet Take 1 tablet (0.5 mg) by mouth every 4 hours as needed (Anxiety, Nausea/Vomiting or Sleep) 30 tablet 2     prochlorperazine (COMPAZINE) 10 MG tablet Take 1 tablet (10 mg) by mouth every 6 hours as needed (Nausea/Vomiting) 30 tablet 2     ondansetron (ZOFRAN) 8 MG tablet Take 1 tablet (8 mg) by mouth every 8 hours as needed (Nausea/Vomiting) 10 tablet 2     elbasvir-grazoprevir (ZEPATIER)  MG TABS per tablet Take 1 tablet by mouth daily 28 tablet 2     docusate sodium (COLACE) 100 MG tablet Take 100 mg by mouth daily 60 tablet 1        ALLERGIES     Allergies   Allergen Reactions     Acetaminophen      Naproxen         REVIEW OF SYSTEMS   As above in the HPI, o/w complete 14-point ROS was negative.     PHYSICAL EXAM   B/P: 138/78, T: 98.1, P: 64, R: 18  SpO2 Readings from Last 4 Encounters:   01/22/18 97%   01/08/18 97%   01/02/18 98%   12/18/17 99%     Wt Readings from Last 3 Encounters:   01/22/18 73.1 kg (161 lb 3.2 oz)   01/08/18 74.6 kg (164 lb 8 oz)   01/02/18 71.7 kg (158 lb)     GEN: NAD  EYES:PERRLA  Mouth/ENT: Oropharynx is clear.  NECK: no cervical or supraclavicular lymphadenopathy  LUNGS: clear bilaterally  CV: regular, no murmurs, rubs, or gallops  ABDOMEN: soft, non-tender, non-distended, normal bowel sounds, no  hepatosplenomegaly. Ostomy in place  EXT: warm, well perfused, no edema  NEURO: alert  SKIN: no rashes     LABORATORY AND IMAGING STUDIES     Lab Results   Component Value Date    WBC 3.2 01/22/2018     Lab Results   Component Value Date    RBC 4.41 01/22/2018     Lab Results   Component Value Date    HGB 14.0 01/22/2018     Lab Results   Component Value Date    HCT 40.9 01/22/2018     No components found for: MCT  Lab Results   Component Value Date    MCV 93 01/22/2018     Lab Results   Component Value Date    MCH 31.7 01/22/2018     Lab Results   Component Value Date    MCHC 34.2 01/22/2018     Lab Results   Component Value Date    RDW 14.6 01/22/2018     Lab Results   Component Value Date    PLT 75 01/22/2018     Recent Labs   Lab Test  01/22/18   0803  01/08/18   1145   NA  141  138   POTASSIUM  3.8  3.8   CHLORIDE  107  103   CO2  27  29   ANIONGAP  7  6   GLC  136*  157*   BUN  11  12   CR  0.71  0.80   MAICOL  8.4*  8.6     ,       ASSESSMENT AND PLAN     57-year-old male with a newly diagnosed     - Poorly differentiated adenocarcinoma colon  pT4N1c(tumor deposits) with margin involvement -  Stage III   CT scans negative for evidence of metastatic disease  Patient's case was discussed at the tumor board 11/13 and recommendation was to proceed with systemic chemotherapy at this point with plans for additional surgery in future after completion of chemotherapy.  Started on FOLFOX q2 week regimen for 6 months duration.   5th  cycle today     - Hepatitis C  On antiviral treatment per GI    - Leucopenia/Thrombocytopenia  Grade 1  Secondary to chemo  Monitor     Return to clinic in 2 weeks for office visit prior to next cycle of chemotherapy     The patient is ready to learn, no apparent learning barriers were identified, Diagnosis and treatment plans were explained to the patient. The patient expressed understanding of the content. The patient questions were answered to his satisfaction.    Chart documentation with  Vide[employer] Voice recognition Software. Although reviewed after completion, some words and grammatical errors may remain.  Padilla Last MD  Attending Physician   Hematology/Medical Oncology    Again, thank you for allowing me to participate in the care of your patient.        Sincerely,        Padilla Last MD

## 2018-01-22 NOTE — NURSING NOTE
"Oncology Rooming Note    January 22, 2018 8:29 AM   Rian Malik is a 57 year old male who presents for:    Chief Complaint   Patient presents with     Oncology Clinic Visit     Initial Vitals: /75 (BP Location: Left arm, Cuff Size: Adult Regular)  Pulse 69  Temp 97.7  F (36.5  C) (Oral)  Wt 73.1 kg (161 lb 3.2 oz)  SpO2 97%  BMI 24.52 kg/m2 Estimated body mass index is 24.52 kg/(m^2) as calculated from the following:    Height as of 1/2/18: 1.727 m (5' 7.99\").    Weight as of this encounter: 73.1 kg (161 lb 3.2 oz). Body surface area is 1.87 meters squared.  No Pain (0) Comment: Data Unavailable   No LMP for male patient.  Allergies reviewed: Yes  Medications reviewed: Yes    Medications: Medication refills not needed today.  Pharmacy name entered into [employer]:    [facility] - [place], [place] - [address]  [facility] - [place], [place] - [address]    Clinical concerns: none Dr. Last was NOT notified.    5 minutes for nursing intake (face to face time)     Kolton Ag RN              "

## 2018-01-22 NOTE — MR AVS SNAPSHOT
After Visit Summary   1/22/2018    Rian Malik    MRN: [id number]           Patient Information     Date Of Birth          1960        Visit Information        Provider Department      1/22/2018 9:30 AM [facility] [address] [facility] [facility]        Today's Diagnoses     Adenocarcinoma of sigmoid colon (H)    -  1       Follow-ups after your visit        Your next 10 appointments already scheduled     Feb 05, 2018 10:00 AM CST   Return Visit with [facility]   [facility])    [address] [place] [id number]   [phone]            Feb 05, 2018 10:45 AM CST   Return Visit with Padilla Last MD   [facility])    [address] [place] [address]   [phone]            Feb 05, 2018 11:30 AM CST   Flofox with BAY 3 INFUSION   [facility])    [address] [place] [id number]   [phone]              Who to contact     If you have questions or need follow up information about today's clinic visit or your schedule please contact [facility] directly at [phone].  Normal or non-critical lab and imaging results will be communicated to you by MyChart, letter or phone within 4 business days after the clinic has received the results. If you do not hear from us within 7 days, please contact the clinic through [employer] or phone. If you have a critical or abnormal lab result, we will notify you by phone as soon as possible.  Submit refill requests through [employer] or call your pharmacy and they will forward the refill request to us. Please allow 3 business days for your refill to be completed.          Additional Information About Your Visit        [employer] Information     [employer] gives you secure access to your electronic health record. If you see a primary care provider, you can also  send messages to your care team and make appointments. If you have questions, please call your primary care clinic.  If you do not have a primary care provider, please call [phone] and they will assist you.      [employer] is an electronic gateway that provides easy, online access to your medical records. With [employer], you can request a clinic appointment, read your test results, renew a prescription or communicate with your care team.     To access your existing account, please contact your [facility] or call [phone] for assistance.        Care EveryWhere ID     This is your Care EveryWhere ID. This could be used by other organizations to access your [facility] medical records  [id number]         Blood Pressure from Last 3 Encounters:   01/22/18 150/75   01/08/18 129/65   01/02/18 122/82    Weight from Last 3 Encounters:   01/22/18 73.1 kg (161 lb 3.2 oz)   01/08/18 74.6 kg (164 lb 8 oz)   01/02/18 71.7 kg (158 lb)              Today, you had the following     No orders found for display       Primary Care Provider Office Phone # Fax #    Moreno Harris [phone]       [address]  [facility] [address]        Equal Access to Services     KHANH FLANAGAN : Hadii aad ku hadasho Soomaali, waaxda luqadaha, qaybta kaalmada adeegyada, waxay idiin hayharis morrison khaicha mg. So [facility] [phone].    ATENCIÓN: Si habla español, tiene a martino disposición servicios gratuitos de asistencia lingüística. Llame al [phone].    We comply with applicable federal civil rights laws and [place] laws. We do not discriminate on the basis of race, color, national origin, age, disability, sex, sexual orientation, or gender identity.            Thank you!     Thank you for choosing [facility]  for your care. Our goal is always to provide you with excellent care. Hearing back from our patients is one way we can continue to improve our services.  Please take a few minutes to complete the written survey that you may receive in the mail after your visit with us. Thank you!             Your Updated Medication List - Protect others around you: Learn how to safely use, store and throw away your medicines at www.disposemymeds.org.          This list is accurate as of: 1/22/18  2:32 PM.  Always use your most recent med list.                   Brand Name Dispense Instructions for use Diagnosis    docusate sodium 100 MG tablet    COLACE    60 tablet    Take 100 mg by mouth daily    Special screening for malignant neoplasms, colon       elbasvir-grazoprevir  MG Tabs per tablet    ZEPATIER    28 tablet    Take 1 tablet by mouth daily    Chronic hepatitis C without hepatic coma (H)       LORazepam 0.5 MG tablet    ATIVAN    30 tablet    Take 1 tablet (0.5 mg) by mouth every 4 hours as needed (Anxiety, Nausea/Vomiting or Sleep)    Adenocarcinoma of sigmoid colon (H)       ondansetron 8 MG tablet    ZOFRAN    10 tablet    Take 1 tablet (8 mg) by mouth every 8 hours as needed (Nausea/Vomiting)    Adenocarcinoma of sigmoid colon (H)       prochlorperazine 10 MG tablet    COMPAZINE    30 tablet    Take 1 tablet (10 mg) by mouth every 6 hours as needed (Nausea/Vomiting)    Adenocarcinoma of sigmoid colon (H)

## 2018-01-22 NOTE — PROGRESS NOTES
Power port needle left accessed for treatment. Tolerated lab draw without complaint. [place] drawn-Red/Green/Purple tubes. Double signed by patient and RN. See documentation flowsheet. Nuha De Los Santos, RN, BSN, OCN

## 2018-01-22 NOTE — MR AVS SNAPSHOT
After Visit Summary   1/22/2018    Rian Malik    MRN: [id number]           Patient Information     Date Of Birth          1960        Visit Information        Provider Department      1/22/2018 8:45 AM Padilla Last MD [facility]        Today's Diagnoses     Adenocarcinoma of sigmoid colon (H)    -  1       Follow-ups after your visit        Follow-up notes from your care team     Return in about 2 weeks (around 2/5/2018).      Your next 10 appointments already scheduled     Feb 05, 2018 10:00 AM CST   Return Visit with NURSE ONLY CANCER CENTER   [facility] ([facility])    [address] [place] [id number] [phone]            Feb 05, 2018 10:45 AM CST   Return Visit with Padilla Last MD   [facility])    [address] [place] [id number] [phone]            Feb 05, 2018 11:30 AM CST   Flofox with BAY 3 INFUSION   [facility])    [address] [place] [address]   [phone]              Who to contact     If you have questions or need follow up information about today's clinic visit or your schedule please contact [facility] directly at [phone].  Normal or non-critical lab and imaging results will be communicated to you by MyChart, letter or phone within 4 business days after the clinic has received the results. If you do not hear from us within 7 days, please contact the clinic through MyChart or phone. If you have a critical or abnormal lab result, we will notify you by phone as soon as possible.  Submit refill requests through [employer] or call your pharmacy and they will forward the refill request to us. Please allow 3 business days for your refill to be completed.          Additional Information About Your Visit        MyChart Information     [employer] gives you secure  access to your electronic health record. If you see a primary care provider, you can also send messages to your care team and make appointments. If you have questions, please call your primary care clinic.  If you do not have a primary care provider, please call [phone] and they will assist you.      [employer] is an electronic gateway that provides easy, online access to your medical records. With [employer], you can request a clinic appointment, read your test results, renew a prescription or communicate with your care team.     To access your existing account, please contact your [facility] or call [phone] for assistance.        Care EveryWhere ID     This is your Care EveryWhere ID. This could be used by other organizations to access your [place] medical records  [id number]        Your Vitals Were     Pulse Temperature Pulse Oximetry BMI (Body Mass Index)          69 97.7  F (36.5  C) (Oral) 97% 24.52 kg/m2         Blood Pressure from Last 3 Encounters:   01/22/18 150/75   01/08/18 129/65   01/02/18 122/82    Weight from Last 3 Encounters:   01/22/18 73.1 kg (161 lb 3.2 oz)   01/08/18 74.6 kg (164 lb 8 oz)   01/02/18 71.7 kg (158 lb)              Today, you had the following     No orders found for display       Primary Care Provider Office Phone # Fax #    Moreno Harris [phone]       [address] [facility] [address]        Equal Access to Services     : Hadii aad ku hadasho Soomaali, waaxda luqadaha, qaybta kaalmada adeegyada, alisa leon . So [facility] [phone].    ATENCIÓN: Si habla español, tiene a martino disposición servicios gratuitos de asistencia lingüística. Llame al [phone].    We comply with applicable federal civil rights laws and [place] laws. We do not discriminate on the basis of race, color, national origin, age, disability, sex, sexual orientation, or gender identity.             Thank you!     Thank you for choosing [facility]  for your care. Our goal is always to provide you with excellent care. Hearing back from our patients is one way we can continue to improve our services. Please take a few minutes to complete the written survey that you may receive in the mail after your visit with us. Thank you!             Your Updated Medication List - Protect others around you: Learn how to safely use, store and throw away your medicines at www.disposemymeds.org.          This list is accurate as of: 1/22/18 12:32 PM.  Always use your most recent med list.                   Brand Name Dispense Instructions for use Diagnosis    docusate sodium 100 MG tablet    COLACE    60 tablet    Take 100 mg by mouth daily    Special screening for malignant neoplasms, colon       elbasvir-grazoprevir  MG Tabs per tablet    ZEPATIER    28 tablet    Take 1 tablet by mouth daily    Chronic hepatitis C without hepatic coma (H)       LORazepam 0.5 MG tablet    ATIVAN    30 tablet    Take 1 tablet (0.5 mg) by mouth every 4 hours as needed (Anxiety, Nausea/Vomiting or Sleep)    Adenocarcinoma of sigmoid colon (H)       ondansetron 8 MG tablet    ZOFRAN    10 tablet    Take 1 tablet (8 mg) by mouth every 8 hours as needed (Nausea/Vomiting)    Adenocarcinoma of sigmoid colon (H)       prochlorperazine 10 MG tablet    COMPAZINE    30 tablet    Take 1 tablet (10 mg) by mouth every 6 hours as needed (Nausea/Vomiting)    Adenocarcinoma of sigmoid colon (H)

## 2018-01-22 NOTE — PROGRESS NOTES
"Infusion Nursing Note:  Rian Malik presents today for C5 Oxaliplatin/ leucovorin/ fluorouracil push and pump connect.    Patient seen by provider today: Yes: Dr. Last   present during visit today: Not Applicable.    Note: N/A.    Intravenous Access:  Implanted Port.    Treatment Conditions:  Lab Results   Component Value Date    HGB 14.0 01/22/2018     Lab Results   Component Value Date    WBC 3.2 01/22/2018      Lab Results   Component Value Date    ANEU 1.3 01/22/2018     Lab Results   Component Value Date    PLT 75 01/22/2018      Lab Results   Component Value Date     01/22/2018                   Lab Results   Component Value Date    POTASSIUM 3.8 01/22/2018           Lab Results   Component Value Date    MAG 2.0 08/04/2017            Lab Results   Component Value Date    CR 0.71 01/22/2018                   Lab Results   Component Value Date    MAICOL 8.4 01/22/2018                Lab Results   Component Value Date    BILITOTAL 0.7 01/22/2018           Lab Results   Component Value Date    ALBUMIN 3.3 01/22/2018                    Lab Results   Component Value Date    ALT 68 01/22/2018           Lab Results   Component Value Date    AST 56 01/22/2018     Results reviewed, labs MET treatment parameters, ok to proceed with treatment.          Post Infusion Assessment:  Patient tolerated infusion without incident.  Blood return noted pre and post infusion.  Prior to discharge: Port is secured in place with tegaderm and flushed with 10cc NS with positive blood return noted.  Continuous home infusion Dosi-Fuser pump connected.    All connectors secured in place and clamps taped open.    Pump started, \"running\" noted on display (CADD): Not Applicable.  Patient instructed to call our clinic or [facility] with any questions or concerns at home.  Patient verbalized understanding.    Patient set up for pump disconnect at home with [facility] on 1/24/18 at 1100.  "           Discharge Plan:   Patient will return 2/5/18 for next appointment.   Patient discharged in stable condition accompanied by: self.  Departure Mode: Ambulatory.    Blanka Mcmahan RN

## 2018-01-23 NOTE — PROGRESS NOTES
This is a recent snapshot of the patient's [facility] medical record.  For current drug dose and complete information and questions, call [phone] or In Basket pool, fv home infusion ([id number])  CSN Number:  [id number]

## 2018-01-24 ENCOUNTER — HOME INFUSION (PRE-WILLOW HOME INFUSION) (OUTPATIENT)
Dept: PHARMACY | Facility: CLINIC | Age: 58
End: 2018-01-24

## 2018-01-25 NOTE — PROGRESS NOTES
This is a recent snapshot of the patient's [facility] medical record.  For current drug dose and complete information and questions, call [phone] or In Basket pool, fv home infusion ([id number])  CSN Number:  [id number]

## 2018-02-02 ENCOUNTER — TRANSFERRED RECORDS (OUTPATIENT)
Dept: HEALTH INFORMATION MANAGEMENT | Facility: CLINIC | Age: 58
End: 2018-02-02

## 2018-02-02 DIAGNOSIS — B18.2 CHRONIC HEPATITIS C WITHOUT HEPATIC COMA (H): ICD-10-CM

## 2018-02-02 PROCEDURE — 87522 HEPATITIS C REVRS TRNSCRPJ: CPT | Performed by: INTERNAL MEDICINE

## 2018-02-02 PROCEDURE — 36415 COLL VENOUS BLD VENIPUNCTURE: CPT | Performed by: INTERNAL MEDICINE

## 2018-02-05 ENCOUNTER — ONCOLOGY VISIT (OUTPATIENT)
Dept: ONCOLOGY | Facility: CLINIC | Age: 58
End: 2018-02-05
Payer: COMMERCIAL

## 2018-02-05 VITALS
BODY MASS INDEX: 24.86 KG/M2 | WEIGHT: 164 LBS | TEMPERATURE: 98.1 F | HEART RATE: 65 BPM | DIASTOLIC BLOOD PRESSURE: 80 MMHG | OXYGEN SATURATION: 99 % | RESPIRATION RATE: 15 BRPM | HEIGHT: 68 IN | SYSTOLIC BLOOD PRESSURE: 128 MMHG

## 2018-02-05 DIAGNOSIS — D69.6 THROMBOCYTOPENIA (H): Primary | ICD-10-CM

## 2018-02-05 DIAGNOSIS — C18.7 ADENOCARCINOMA OF SIGMOID COLON (H): Primary | ICD-10-CM

## 2018-02-05 DIAGNOSIS — C18.7 ADENOCARCINOMA OF SIGMOID COLON (H): ICD-10-CM

## 2018-02-05 LAB
ALBUMIN SERPL-MCNC: 3.3 G/DL (ref 3.4–5)
ALP SERPL-CCNC: 146 U/L (ref 40–150)
ALT SERPL W P-5'-P-CCNC: 74 U/L (ref 0–70)
ANION GAP SERPL CALCULATED.3IONS-SCNC: 5 MMOL/L (ref 3–14)
AST SERPL W P-5'-P-CCNC: 62 U/L (ref 0–45)
BILIRUB SERPL-MCNC: 0.7 MG/DL (ref 0.2–1.3)
BUN SERPL-MCNC: 14 MG/DL (ref 7–30)
CALCIUM SERPL-MCNC: 8.5 MG/DL (ref 8.5–10.1)
CHLORIDE SERPL-SCNC: 105 MMOL/L (ref 94–109)
CO2 SERPL-SCNC: 28 MMOL/L (ref 20–32)
CREAT SERPL-MCNC: 0.7 MG/DL (ref 0.66–1.25)
DIFFERENTIAL METHOD BLD: ABNORMAL
EOSINOPHIL # BLD AUTO: 0.1 10E9/L (ref 0–0.7)
EOSINOPHIL NFR BLD AUTO: 2 %
ERYTHROCYTE [DISTWIDTH] IN BLOOD BY AUTOMATED COUNT: 15.7 % (ref 10–15)
GFR SERPL CREATININE-BSD FRML MDRD: >90 ML/MIN/1.7M2
GLUCOSE SERPL-MCNC: 124 MG/DL (ref 70–99)
HCT VFR BLD AUTO: 39.3 % (ref 40–53)
HCV RNA SERPL NAA+PROBE-ACNC: NORMAL [IU]/ML
HCV RNA SERPL NAA+PROBE-LOG IU: NORMAL LOG IU/ML
HGB BLD-MCNC: 13.4 G/DL (ref 13.3–17.7)
LYMPHOCYTES # BLD AUTO: 2 10E9/L (ref 0.8–5.3)
LYMPHOCYTES NFR BLD AUTO: 69 %
MCH RBC QN AUTO: 32 PG (ref 26.5–33)
MCHC RBC AUTO-ENTMCNC: 34.1 G/DL (ref 31.5–36.5)
MCV RBC AUTO: 94 FL (ref 78–100)
MONOCYTES # BLD AUTO: 0.1 10E9/L (ref 0–1.3)
MONOCYTES NFR BLD AUTO: 3 %
NEUTROPHILS # BLD AUTO: 0.8 10E9/L (ref 1.6–8.3)
NEUTROPHILS NFR BLD AUTO: 26 %
PLATELET # BLD AUTO: 61 10E9/L (ref 150–450)
PLATELET # BLD EST: ABNORMAL 10*3/UL
POTASSIUM SERPL-SCNC: 4 MMOL/L (ref 3.4–5.3)
PROT SERPL-MCNC: 6.9 G/DL (ref 6.8–8.8)
RBC # BLD AUTO: 4.19 10E12/L (ref 4.4–5.9)
RBC MORPH BLD: NORMAL
SODIUM SERPL-SCNC: 138 MMOL/L (ref 133–144)
WBC # BLD AUTO: 3 10E9/L (ref 4–11)

## 2018-02-05 PROCEDURE — 99207 ZZC NO CHARGE NURSE ONLY: CPT

## 2018-02-05 PROCEDURE — 99215 OFFICE O/P EST HI 40 MIN: CPT | Performed by: INTERNAL MEDICINE

## 2018-02-05 PROCEDURE — 80053 COMPREHEN METABOLIC PANEL: CPT | Performed by: INTERNAL MEDICINE

## 2018-02-05 PROCEDURE — 85025 COMPLETE CBC W/AUTO DIFF WBC: CPT | Performed by: INTERNAL MEDICINE

## 2018-02-05 RX ORDER — HEPARIN SODIUM (PORCINE) LOCK FLUSH IV SOLN 100 UNIT/ML 100 UNIT/ML
5 SOLUTION INTRAVENOUS
Status: DISCONTINUED | OUTPATIENT
Start: 2018-02-05 | End: 2018-02-05 | Stop reason: HOSPADM

## 2018-02-05 RX ADMIN — HEPARIN SODIUM (PORCINE) LOCK FLUSH IV SOLN 100 UNIT/ML 5 ML: 100 SOLUTION at 10:05

## 2018-02-05 ASSESSMENT — PAIN SCALES - GENERAL: PAINLEVEL: NO PAIN (0)

## 2018-02-05 NOTE — MR AVS SNAPSHOT
After Visit Summary   2/5/2018    Rian Malik    MRN: [id number]           Patient Information     Date Of Birth          1960        Visit Information        Provider Department      2/5/2018 10:00 AM NURSE ONLY CANCER CENTER [facility]        Today's Diagnoses     Adenocarcinoma of sigmoid colon (H)    -  1       Follow-ups after your visit        Your next 10 appointments already scheduled     Feb 05, 2018 10:45 AM CST   Return Visit with Padilla Last MD   [facility] ([facility])    [address] [place] [address] [phone]            Feb 05, 2018 11:30 AM CST   Flofox with BAY 3 INFUSION   [facility])    [address] [place] [address] [phone]              Who to contact     If you have questions or need follow up information about today's clinic visit or your schedule please contact [facility] directly at [phone].  Normal or non-critical lab and imaging results will be communicated to you by [employer], letter or phone within 4 business days after the clinic has received the results. If you do not hear from us within 7 days, please contact the clinic through [employer] or phone. If you have a critical or abnormal lab result, we will notify you by phone as soon as possible.  Submit refill requests through [employer] or call your pharmacy and they will forward the refill request to us. Please allow 3 business days for your refill to be completed.          Additional Information About Your Visit        [employer] Information     [employer] gives you secure access to your electronic health record. If you see a primary care provider, you can also send messages to your care team and make appointments. If you have questions, please call your primary care clinic.  If you do not have a primary care provider, please call [phone] and they will  assist you.      [employer] is an electronic gateway that provides easy, online access to your medical records. With [employer], you can request a clinic appointment, read your test results, renew a prescription or communicate with your care team.     To access your existing account, please contact your [facility] or call [phone] for assistance.        Care EveryWhere ID     This is your Care EveryWhere ID. This could be used by other organizations to access your [place] medical records  [id number]         Blood Pressure from Last 3 Encounters:   01/22/18 150/75   01/08/18 129/65   01/02/18 122/82    Weight from Last 3 Encounters:   01/22/18 73.1 kg (161 lb 3.2 oz)   01/08/18 74.6 kg (164 lb 8 oz)   01/02/18 71.7 kg (158 lb)              We Performed the Following     CBC with platelets differential     Comprehensive metabolic panel        Primary Care Provider Office Phone # Fax #    Moreno Harris [phone]       [address]  [facility] [address]        Equal Access to Services     [facility]: Hadii aad ku hadasho Soomaali, waaxda luqadaha, qaybta kaalmada adeegyada, alisa leon . So [facility] [phone].    ATENCIÓN: Si habla español, tiene a martino disposición servicios gratvalentinos de asistencia lingüística. [facility] [phone].    We comply with applicable federal civil rights laws and [place] laws. We do not discriminate on the basis of race, color, national origin, age, disability, sex, sexual orientation, or gender identity.            Thank you!     Thank you for choosing [facility]  for your care. Our goal is always to provide you with excellent care. Hearing back from our patients is one way we can continue to improve our services. Please take a few minutes to complete the written survey that you may receive in the mail after your visit with us. Thank you!             Your Updated Medication List  - Protect others around you: Learn how to safely use, store and throw away your medicines at www.disposemymeds.org.          This list is accurate as of 2/5/18 10:42 AM.  Always use your most recent med list.                   Brand Name Dispense Instructions for use Diagnosis    docusate sodium 100 MG tablet    COLACE    60 tablet    Take 100 mg by mouth daily    Special screening for malignant neoplasms, colon       elbasvir-grazoprevir  MG Tabs per tablet    ZEPATIER    28 tablet    Take 1 tablet by mouth daily    Chronic hepatitis C without hepatic coma (H)       LORazepam 0.5 MG tablet    ATIVAN    30 tablet    Take 1 tablet (0.5 mg) by mouth every 4 hours as needed (Anxiety, Nausea/Vomiting or Sleep)    Adenocarcinoma of sigmoid colon (H)       ondansetron 8 MG tablet    ZOFRAN    10 tablet    Take 1 tablet (8 mg) by mouth every 8 hours as needed (Nausea/Vomiting)    Adenocarcinoma of sigmoid colon (H)       prochlorperazine 10 MG tablet    COMPAZINE    30 tablet    Take 1 tablet (10 mg) by mouth every 6 hours as needed (Nausea/Vomiting)    Adenocarcinoma of sigmoid colon (H)

## 2018-02-05 NOTE — PROGRESS NOTES
Power port needle left accessed for treatment. Tolerated lab draw without complaint. [place] drawn-Red/Green/Purple tubes. Double signed by patient and RN. See documentation flowsheet. Nuha De Los Santos, RN, BSN, OCN

## 2018-02-05 NOTE — MR AVS SNAPSHOT
After Visit Summary   2/5/2018    Rian Malik    MRN: [id number]           Patient Information     Date Of Birth          1960        Visit Information        Provider Department      2/5/2018 10:45 AM Padilla Last MD [facility]        Today's Diagnoses     Thrombocytopenia (H)    -  1    Adenocarcinoma of sigmoid colon (H)           Follow-ups after your visit        Your next 10 appointments already scheduled     Feb 05, 2018 11:30 AM CST   Flofox with BAY 3 INFUSION   [facility] ([facility])    [address] [facility] [address]   [phone]            Feb 12, 2018  8:30 AM CST   Return Visit with NURSE ONLY CANCER CENTER   [facility] ([facility])    [address] [facility] [address]   [phone]            Feb 12, 2018  9:00 AM CST   Flofox with BAY 8 INFUSION   [facility] ([facility])    [address] [facility] [address]   [phone]            Feb 26, 2018  8:30 AM CST   Return Visit with NURSE ONLY CANCER CENTER   [facility] ([facility])    [address] [facility] [address]   [phone]            Feb 26, 2018  9:15 AM CST   Return Visit with Padilla Last MD   [facility] ([facility])    [address] [facility] [address]   [phone]            Feb 26, 2018 10:00 AM CST   Flofox with BAY 10 INFUSION   [facility] ([facility])    [address] [facility] [address]   [phone]              Who to contact     If you have questions or need follow up information about today's clinic visit or your schedule please contact [facility] directly at [phone].  Normal or non-critical lab and imaging results will be communicated to you by MyChart, letter or  "phone within 4 business days after the clinic has received the results. If you do not hear from us within 7 days, please contact the clinic through [employer] or phone. If you have a critical or abnormal lab result, we will notify you by phone as soon as possible.  Submit refill requests through [employer] or call your pharmacy and they will forward the refill request to us. Please allow 3 business days for your refill to be completed.          Additional Information About Your Visit        [employer] Information     [employer] gives you secure access to your electronic health record. If you see a primary care provider, you can also send messages to your care team and make appointments. If you have questions, please call your primary care clinic.  If you do not have a primary care provider, please call [phone] and they will assist you.      [employer] is an electronic gateway that provides easy, online access to your medical records. With [employer], you can request a clinic appointment, read your test results, renew a prescription or communicate with your care team.     To access your existing account, please contact your [facility] or call [phone] for assistance.        Care EveryWhere ID     This is your Care EveryWhere ID. This could be used by other organizations to access your [place] medical records  [id number]        Your Vitals Were     Pulse Temperature Respirations Height Pulse Oximetry BMI (Body Mass Index)    65 98.1  F (36.7  C) 15 1.727 m (5' 7.99\") 99% 24.94 kg/m2       Blood Pressure from Last 3 Encounters:   02/05/18 128/80   01/22/18 150/75   01/08/18 129/65    Weight from Last 3 Encounters:   02/05/18 74.4 kg (164 lb)   01/22/18 73.1 kg (161 lb 3.2 oz)   01/08/18 74.6 kg (164 lb 8 oz)              Today, you had the following     No orders found for display       Primary Care Provider Office Phone # Fax #    Moreno Harris [phone]       [address]  [place] [facility] [address]        Equal Access to Services     JESUSITAGUSTAVO PANCHITO : Hadii aad ku hadmartyo Soomaali, waaxda luqadaha, qaybta kaalmada adesatish, waxay idiin haysheryltheodore lopezduniacameron mg. So [facility] [phone].    ATENCIÓN: Si habla español, tiene a martino disposición servicios gratuitos de asistencia lingüística. [facility] [phone].    We comply with applicable federal civil rights laws and [place] laws. We do not discriminate on the basis of race, color, national origin, age, disability, sex, sexual orientation, or gender identity.            Thank you!     Thank you for choosing [facility]  for your care. Our goal is always to provide you with excellent care. Hearing back from our patients is one way we can continue to improve our services. Please take a few minutes to complete the written survey that you may receive in the mail after your visit with us. Thank you!             Your Updated Medication List - Protect others around you: Learn how to safely use, store and throw away your medicines at www.disposemymeds.org.          This list is accurate as of 2/5/18 10:58 AM.  Always use your most recent med list.                   Brand Name Dispense Instructions for use Diagnosis    docusate sodium 100 MG tablet    COLACE    60 tablet    Take 100 mg by mouth daily    Special screening for malignant neoplasms, colon       elbasvir-grazoprevir  MG Tabs per tablet    ZEPATIER    28 tablet    Take 1 tablet by mouth daily    Chronic hepatitis C without hepatic coma (H)       LORazepam 0.5 MG tablet    ATIVAN    30 tablet    Take 1 tablet (0.5 mg) by mouth every 4 hours as needed (Anxiety, Nausea/Vomiting or Sleep)    Adenocarcinoma of sigmoid colon (H)       ondansetron 8 MG tablet    ZOFRAN    10 tablet    Take 1 tablet (8 mg) by mouth every 8 hours as needed (Nausea/Vomiting)    Adenocarcinoma of sigmoid colon (H)       prochlorperazine 10 MG tablet    COMPAZINE    30 tablet     Take 1 tablet (10 mg) by mouth every 6 hours as needed (Nausea/Vomiting)    Adenocarcinoma of sigmoid colon (H)

## 2018-02-05 NOTE — NURSING NOTE
"Oncology Rooming Note    February 5, 2018 10:37 AM   Rian Malik is a 57 year old male who presents for:    Chief Complaint   Patient presents with     Oncology Clinic Visit     prior to tx     Initial Vitals: /80  Pulse 65  Temp 98.1  F (36.7  C)  Resp 15  Ht 1.727 m (5' 7.99\")  Wt 74.4 kg (164 lb)  SpO2 99%  BMI 24.94 kg/m2 Estimated body mass index is 24.94 kg/(m^2) as calculated from the following:    Height as of this encounter: 1.727 m (5' 7.99\").    Weight as of this encounter: 74.4 kg (164 lb). Body surface area is 1.89 meters squared.  No Pain (0) Comment: Data Unavailable   No LMP for male patient.  Allergies reviewed: Yes  Medications reviewed: Yes    Medications: Medication refills not needed today.  Pharmacy name entered into [employer]:    [facility] - [place], [place] - [address]  [facility] [place], [place] - [address]        5 minutes for nursing intake (face to face time)     Agnieszka Ziegler LPN              "

## 2018-02-05 NOTE — LETTER
2/5/2018         RE: Rian Malik  [address] [place] [address]        Dear Colleague,    Thank you for referring your patient, Rian Malik, to the [facility]. Please see a copy of my visit note below.      FOLLOW-UP VISIT NOTE    PATIENT NAME: Rian Malik MRN # [id number]  DATE OF VISIT: Feb 5, 2018 YOB: 1960    REFERRING PROVIDER: No referring provider defined for this encounter.    CANCER TYPE: Adenocarcinoma colon  STAGE: III pT4N1c  ECOG PS: 0    ONCOLOGY HISTORY:  57-year-old male who around February 2017r, developed intermittent cramping lower abdominal pain. Was evaluated by PCP and clinical impression was colitis. He was put on p.o. antibiotics. However symptoms did not improve. He was eventually referred to surgery and underwent imaging with CT findings suspicious of sigmoid abscess. He was admitted to the hospital for resection of sigmoid abscess 10/11/17. However intraoperatively was noted to have a large firmly adherent sigmoid colon mass. Laparoscopic surgery was converted to open sigmoidectomy with end colostomy.     Surgical pathology   B.  COLON, SIGMOID, HEMICOLECTOMY   - Poorly differentiated adenocarcinoma, with signet ring cell features   - Tumor size: 5 cm   - Tumor is present on serosal surface and microscopically perforates   serosal surface   - One surgical margin shows extensive serosal involvement by tumor. The   opposite, undesignated margin shows no evidence of malignancy   - Lymphovascular invasion is present   - Perineural invasion is not identified   - Tumor deposits are present   - Immunohistochemistry for mismatch repair proteins shows intact nuclear   expression for MLH1, MSH2, MSH6, and PMS2   - Three reactive lymph nodes, negative for malignancy (0/3)   - Pathologic staging: pT4N1c      Patient's case was discussed at the tumor board. Recommendation was to obtain new staging scans as well as to proceed with systemic  chemotherapy at this point with plans for further surgery in future.    - Started on Folfox 11/21/17    SUBJECTIVE     Patient is here today for his 6th cycle of chemotherapy. Tolerating the treatment well so far with no active complaints. He denies neuropathy, fever/chills, bleeding/prudent, abdominal pain. Good appetite and energy levels. Weight is maintaining.      PAST MEDICAL HISTORY     Past Medical History:   Diagnosis Date     Cirrhosis (H)      Colon cancer (H) 10/11/2017    Poorly differentiated adenocarcinoma     Hepatitis C          CURRENT OUTPATIENT MEDICATIONS     Current Outpatient Prescriptions   Medication Sig Dispense Refill     LORazepam (ATIVAN) 0.5 MG tablet Take 1 tablet (0.5 mg) by mouth every 4 hours as needed (Anxiety, Nausea/Vomiting or Sleep) 30 tablet 2     ondansetron (ZOFRAN) 8 MG tablet Take 1 tablet (8 mg) by mouth every 8 hours as needed (Nausea/Vomiting) 10 tablet 2     elbasvir-grazoprevir (ZEPATIER)  MG TABS per tablet Take 1 tablet by mouth daily 28 tablet 2     docusate sodium (COLACE) 100 MG tablet Take 100 mg by mouth daily 60 tablet 1     prochlorperazine (COMPAZINE) 10 MG tablet Take 1 tablet (10 mg) by mouth every 6 hours as needed (Nausea/Vomiting) (Patient not taking: Reported on 2/5/2018) 30 tablet 2        ALLERGIES     Allergies   Allergen Reactions     Acetaminophen      Naproxen         REVIEW OF SYSTEMS   As above in the HPI, o/w complete 14-point ROS was negative.     PHYSICAL EXAM   B/P: 138/78, T: 98.1, P: 64, R: 18  SpO2 Readings from Last 4 Encounters:   02/05/18 99%   01/22/18 97%   01/08/18 97%   01/02/18 98%     Wt Readings from Last 3 Encounters:   02/05/18 74.4 kg (164 lb)   01/22/18 73.1 kg (161 lb 3.2 oz)   01/08/18 74.6 kg (164 lb 8 oz)     GEN: NAD  EYES:PERRLA  Mouth/ENT: Oropharynx is clear.  NECK: no cervical or supraclavicular lymphadenopathy  LUNGS: clear bilaterally  CV: regular, no murmurs, rubs, or gallops  ABDOMEN: soft, non-tender,  non-distended, normal bowel sounds, no hepatosplenomegaly. Ostomy in place  EXT: warm, well perfused, no edema  NEURO: alert  SKIN: no rashes     LABORATORY AND IMAGING STUDIES     Lab Results   Component Value Date    WBC 3.0 02/05/2018     Lab Results   Component Value Date    RBC 4.19 02/05/2018     Lab Results   Component Value Date    HGB 13.4 02/05/2018     Lab Results   Component Value Date    HCT 39.3 02/05/2018     No components found for: MCT  Lab Results   Component Value Date    MCV 94 02/05/2018     Lab Results   Component Value Date    MCH 32.0 02/05/2018     Lab Results   Component Value Date    MCHC 34.1 02/05/2018     Lab Results   Component Value Date    RDW 15.7 02/05/2018     Lab Results   Component Value Date    PLT 61 02/05/2018     Recent Labs   Lab Test  02/05/18   1001  01/22/18   0803   NA  138  141   POTASSIUM  4.0  3.8   CHLORIDE  105  107   CO2  28  27   ANIONGAP  5  7   GLC  124*  136*   BUN  14  11   CR  0.70  0.71   MAICOL  8.5  8.4*            ASSESSMENT AND PLAN     57-year-old male with a newly diagnosed     - Poorly differentiated adenocarcinoma colon  [id number](tumor deposits) with margin involvement -  Stage III   CT scans negative for evidence of metastatic disease  Patient's case was discussed at the tumor board 11/13 and recommendation was to proceed with systemic chemotherapy at this point with plans for additional surgery in future after completion of chemotherapy.  Started on FOLFOX q2 week regimen for 6 months duration s/p 5 cycles   Hold off on cycle 6 today given cytopenias  Return to infusion  next week for chemo. We'll dose reduce oxaliplatin to 65 mg/m  for future cycles.     - Hepatitis C  On antiviral treatment per GI    - Leucopenia/Thrombocytopenia  Secondary to chemotherapy  Will reduce oxaliplatin dose    The patient is ready to learn, no apparent learning barriers were identified, Diagnosis and treatment plans were explained to the patient. The patient expressed  understanding of the content. The patient questions were answered to his satisfaction.    Chart documentation with Dragon Voice recognition Software. Although reviewed after completion, some words and grammatical errors may remain.  Padilla Last MD  Attending Physician   Hematology/Medical Oncology    No chemo today per Dr. Last. Port needle removed without complaint. Nuha De Los Santos RN      Again, thank you for allowing me to participate in the care of your patient.        Sincerely,        Padilla Last MD

## 2018-02-05 NOTE — PROGRESS NOTES
FOLLOW-UP VISIT NOTE    PATIENT NAME: Rian Malik MRN # [id number]  DATE OF VISIT: Feb 5, 2018 YOB: 1960    REFERRING PROVIDER: No referring provider defined for this encounter.    CANCER TYPE: Adenocarcinoma colon  STAGE: III pT4N1c  ECOG PS: 0    ONCOLOGY HISTORY:  57-year-old male who around February 2017r, developed intermittent cramping lower abdominal pain. Was evaluated by PCP and clinical impression was colitis. He was put on p.o. antibiotics. However symptoms did not improve. He was eventually referred to surgery and underwent imaging with CT findings suspicious of sigmoid abscess. He was admitted to the hospital for resection of sigmoid abscess 10/11/17. However intraoperatively was noted to have a large firmly adherent sigmoid colon mass. Laparoscopic surgery was converted to open sigmoidectomy with end colostomy.     Surgical pathology   B.  COLON, SIGMOID, HEMICOLECTOMY   - Poorly differentiated adenocarcinoma, with signet ring cell features   - Tumor size: 5 cm   - Tumor is present on serosal surface and microscopically perforates   serosal surface   - One surgical margin shows extensive serosal involvement by tumor. The   opposite, undesignated margin shows no evidence of malignancy   - Lymphovascular invasion is present   - Perineural invasion is not identified   - Tumor deposits are present   - Immunohistochemistry for mismatch repair proteins shows intact nuclear   expression for MLH1, MSH2, MSH6, and PMS2   - Three reactive lymph nodes, negative for malignancy (0/3)   - Pathologic staging: pT4N1c      Patient's case was discussed at the tumor board. Recommendation was to obtain new staging scans as well as to proceed with systemic chemotherapy at this point with plans for further surgery in future.    - Started on Folfox 11/21/17    SUBJECTIVE     Patient is here today for his 6th cycle of chemotherapy. Tolerating the treatment well so far with no active complaints. He denies  neuropathy, fever/chills, bleeding/prudent, abdominal pain. Good appetite and energy levels. Weight is maintaining.      PAST MEDICAL HISTORY     Past Medical History:   Diagnosis Date     Cirrhosis (H)      Colon cancer (H) 10/11/2017    Poorly differentiated adenocarcinoma     Hepatitis C          CURRENT OUTPATIENT MEDICATIONS     Current Outpatient Prescriptions   Medication Sig Dispense Refill     LORazepam (ATIVAN) 0.5 MG tablet Take 1 tablet (0.5 mg) by mouth every 4 hours as needed (Anxiety, Nausea/Vomiting or Sleep) 30 tablet 2     ondansetron (ZOFRAN) 8 MG tablet Take 1 tablet (8 mg) by mouth every 8 hours as needed (Nausea/Vomiting) 10 tablet 2     elbasvir-grazoprevir (ZEPATIER)  MG TABS per tablet Take 1 tablet by mouth daily 28 tablet 2     docusate sodium (COLACE) 100 MG tablet Take 100 mg by mouth daily 60 tablet 1     prochlorperazine (COMPAZINE) 10 MG tablet Take 1 tablet (10 mg) by mouth every 6 hours as needed (Nausea/Vomiting) (Patient not taking: Reported on 2/5/2018) 30 tablet 2        ALLERGIES     Allergies   Allergen Reactions     Acetaminophen      Naproxen         REVIEW OF SYSTEMS   As above in the HPI, o/w complete 14-point ROS was negative.     PHYSICAL EXAM   B/P: 138/78, T: 98.1, P: 64, R: 18  SpO2 Readings from Last 4 Encounters:   02/05/18 99%   01/22/18 97%   01/08/18 97%   01/02/18 98%     Wt Readings from Last 3 Encounters:   02/05/18 74.4 kg (164 lb)   01/22/18 73.1 kg (161 lb 3.2 oz)   01/08/18 74.6 kg (164 lb 8 oz)     GEN: NAD  EYES:PERRLA  Mouth/ENT: Oropharynx is clear.  NECK: no cervical or supraclavicular lymphadenopathy  LUNGS: clear bilaterally  CV: regular, no murmurs, rubs, or gallops  ABDOMEN: soft, non-tender, non-distended, normal bowel sounds, no hepatosplenomegaly. Ostomy in place  EXT: warm, well perfused, no edema  NEURO: alert  SKIN: no rashes     LABORATORY AND IMAGING STUDIES     Lab Results   Component Value Date    WBC 3.0 02/05/2018     Lab Results    Component Value Date    RBC 4.19 02/05/2018     Lab Results   Component Value Date    HGB 13.4 02/05/2018     Lab Results   Component Value Date    HCT 39.3 02/05/2018     No components found for: MCT  Lab Results   Component Value Date    MCV 94 02/05/2018     Lab Results   Component Value Date    MCH 32.0 02/05/2018     Lab Results   Component Value Date    MCHC 34.1 02/05/2018     Lab Results   Component Value Date    RDW 15.7 02/05/2018     Lab Results   Component Value Date    PLT 61 02/05/2018     Recent Labs   Lab Test  02/05/18   1001  01/22/18   0803   NA  138  141   POTASSIUM  4.0  3.8   CHLORIDE  105  107   CO2  28  27   ANIONGAP  5  7   GLC  124*  136*   BUN  14  11   CR  0.70  0.71   MAICOL  8.5  8.4*            ASSESSMENT AND PLAN     57-year-old male with a newly diagnosed     - Poorly differentiated adenocarcinoma colon  [id number](tumor deposits) with margin involvement -  Stage III   CT scans negative for evidence of metastatic disease  Patient's case was discussed at the tumor board 11/13 and recommendation was to proceed with systemic chemotherapy at this point with plans for additional surgery in future after completion of chemotherapy.  Started on FOLFOX q2 week regimen for 6 months duration s/p 5 cycles   Hold off on cycle 6 today given cytopenias  Return to infusion  next week for chemo. We'll dose reduce oxaliplatin to 65 mg/m  for future cycles.     - Hepatitis C  On antiviral treatment per GI    - Leucopenia/Thrombocytopenia  Secondary to chemotherapy  Will reduce oxaliplatin dose    The patient is ready to learn, no apparent learning barriers were identified, Diagnosis and treatment plans were explained to the patient. The patient expressed understanding of the content. The patient questions were answered to his satisfaction.    Chart documentation with Dragon Voice recognition Software. Although reviewed after completion, some words and grammatical errors may remain.  Padilla Last,  MD  Attending Physician   Hematology/Medical Oncology

## 2018-02-12 ENCOUNTER — HOME INFUSION (PRE-WILLOW HOME INFUSION) (OUTPATIENT)
Dept: PHARMACY | Facility: CLINIC | Age: 58
End: 2018-02-12

## 2018-02-12 ENCOUNTER — ONCOLOGY VISIT (OUTPATIENT)
Dept: ONCOLOGY | Facility: CLINIC | Age: 58
End: 2018-02-12
Payer: COMMERCIAL

## 2018-02-12 ENCOUNTER — INFUSION THERAPY VISIT (OUTPATIENT)
Dept: INFUSION THERAPY | Facility: CLINIC | Age: 58
End: 2018-02-12
Payer: COMMERCIAL

## 2018-02-12 VITALS
TEMPERATURE: 97.3 F | OXYGEN SATURATION: 99 % | RESPIRATION RATE: 18 BRPM | SYSTOLIC BLOOD PRESSURE: 129 MMHG | WEIGHT: 164.8 LBS | BODY MASS INDEX: 25.06 KG/M2 | HEART RATE: 65 BPM | DIASTOLIC BLOOD PRESSURE: 76 MMHG

## 2018-02-12 DIAGNOSIS — C18.7 ADENOCARCINOMA OF SIGMOID COLON (H): Primary | ICD-10-CM

## 2018-02-12 LAB
ALBUMIN SERPL-MCNC: 3.3 G/DL (ref 3.4–5)
ALP SERPL-CCNC: 133 U/L (ref 40–150)
ALT SERPL W P-5'-P-CCNC: 66 U/L (ref 0–70)
ANION GAP SERPL CALCULATED.3IONS-SCNC: 5 MMOL/L (ref 3–14)
AST SERPL W P-5'-P-CCNC: 58 U/L (ref 0–45)
BASOPHILS # BLD AUTO: 0 10E9/L (ref 0–0.2)
BASOPHILS NFR BLD AUTO: 1.3 %
BILIRUB SERPL-MCNC: 0.7 MG/DL (ref 0.2–1.3)
BUN SERPL-MCNC: 17 MG/DL (ref 7–30)
CALCIUM SERPL-MCNC: 8.6 MG/DL (ref 8.5–10.1)
CHLORIDE SERPL-SCNC: 108 MMOL/L (ref 94–109)
CO2 SERPL-SCNC: 26 MMOL/L (ref 20–32)
CREAT SERPL-MCNC: 0.83 MG/DL (ref 0.66–1.25)
DIFFERENTIAL METHOD BLD: ABNORMAL
EOSINOPHIL # BLD AUTO: 0.1 10E9/L (ref 0–0.7)
EOSINOPHIL NFR BLD AUTO: 2.2 %
ERYTHROCYTE [DISTWIDTH] IN BLOOD BY AUTOMATED COUNT: 16.4 % (ref 10–15)
GFR SERPL CREATININE-BSD FRML MDRD: >90 ML/MIN/1.7M2
GLUCOSE SERPL-MCNC: 98 MG/DL (ref 70–99)
HCT VFR BLD AUTO: 41.2 % (ref 40–53)
HGB BLD-MCNC: 13.9 G/DL (ref 13.3–17.7)
IMM GRANULOCYTES # BLD: 0 10E9/L (ref 0–0.4)
IMM GRANULOCYTES NFR BLD: 0.9 %
LYMPHOCYTES # BLD AUTO: 1.4 10E9/L (ref 0.8–5.3)
LYMPHOCYTES NFR BLD AUTO: 43.9 %
MCH RBC QN AUTO: 32.2 PG (ref 26.5–33)
MCHC RBC AUTO-ENTMCNC: 33.7 G/DL (ref 31.5–36.5)
MCV RBC AUTO: 95 FL (ref 78–100)
MONOCYTES # BLD AUTO: 0.6 10E9/L (ref 0–1.3)
MONOCYTES NFR BLD AUTO: 17.2 %
NEUTROPHILS # BLD AUTO: 1.1 10E9/L (ref 1.6–8.3)
NEUTROPHILS NFR BLD AUTO: 34.5 %
PLATELET # BLD AUTO: 98 10E9/L (ref 150–450)
POTASSIUM SERPL-SCNC: 3.8 MMOL/L (ref 3.4–5.3)
PROT SERPL-MCNC: 7.1 G/DL (ref 6.8–8.8)
RBC # BLD AUTO: 4.32 10E12/L (ref 4.4–5.9)
SODIUM SERPL-SCNC: 139 MMOL/L (ref 133–144)
WBC # BLD AUTO: 3.2 10E9/L (ref 4–11)

## 2018-02-12 PROCEDURE — 99207 ZZC NO CHARGE NURSE ONLY: CPT

## 2018-02-12 PROCEDURE — 96413 CHEMO IV INFUSION 1 HR: CPT | Performed by: INTERNAL MEDICINE

## 2018-02-12 PROCEDURE — 80053 COMPREHEN METABOLIC PANEL: CPT | Performed by: INTERNAL MEDICINE

## 2018-02-12 PROCEDURE — 96411 CHEMO IV PUSH ADDL DRUG: CPT | Performed by: INTERNAL MEDICINE

## 2018-02-12 PROCEDURE — 85025 COMPLETE CBC W/AUTO DIFF WBC: CPT | Performed by: INTERNAL MEDICINE

## 2018-02-12 PROCEDURE — 96416 CHEMO PROLONG INFUSE W/PUMP: CPT | Performed by: INTERNAL MEDICINE

## 2018-02-12 PROCEDURE — 96375 TX/PRO/DX INJ NEW DRUG ADDON: CPT | Performed by: INTERNAL MEDICINE

## 2018-02-12 PROCEDURE — 96415 CHEMO IV INFUSION ADDL HR: CPT | Performed by: INTERNAL MEDICINE

## 2018-02-12 PROCEDURE — 96368 THER/DIAG CONCURRENT INF: CPT | Performed by: INTERNAL MEDICINE

## 2018-02-12 RX ORDER — HEPARIN SODIUM (PORCINE) LOCK FLUSH IV SOLN 100 UNIT/ML 100 UNIT/ML
5 SOLUTION INTRAVENOUS
Status: DISCONTINUED | OUTPATIENT
Start: 2018-02-12 | End: 2018-02-12 | Stop reason: HOSPADM

## 2018-02-12 RX ORDER — LORAZEPAM 0.5 MG/1
0.5 TABLET ORAL EVERY 4 HOURS PRN
Qty: 30 TABLET | Refills: 2 | Status: SHIPPED | OUTPATIENT
Start: 2018-02-12 | End: 2018-02-26

## 2018-02-12 RX ORDER — MEPERIDINE HYDROCHLORIDE 50 MG/ML
25 INJECTION INTRAMUSCULAR; INTRAVENOUS; SUBCUTANEOUS EVERY 30 MIN PRN
Status: CANCELLED | OUTPATIENT
Start: 2018-02-12

## 2018-02-12 RX ORDER — EPINEPHRINE 0.3 MG/.3ML
0.3 INJECTION SUBCUTANEOUS EVERY 5 MIN PRN
Status: CANCELLED | OUTPATIENT
Start: 2018-02-12

## 2018-02-12 RX ORDER — FLUOROURACIL 50 MG/ML
400 INJECTION, SOLUTION INTRAVENOUS ONCE
Status: COMPLETED | OUTPATIENT
Start: 2018-02-12 | End: 2018-02-12

## 2018-02-12 RX ORDER — SODIUM CHLORIDE 9 MG/ML
1000 INJECTION, SOLUTION INTRAVENOUS CONTINUOUS PRN
Status: CANCELLED
Start: 2018-02-12

## 2018-02-12 RX ORDER — EPINEPHRINE 1 MG/ML
0.3 INJECTION, SOLUTION INTRAMUSCULAR; SUBCUTANEOUS EVERY 5 MIN PRN
Status: CANCELLED | OUTPATIENT
Start: 2018-02-12

## 2018-02-12 RX ORDER — LORAZEPAM 2 MG/ML
0.5 INJECTION INTRAMUSCULAR EVERY 4 HOURS PRN
Status: CANCELLED
Start: 2018-02-12

## 2018-02-12 RX ORDER — METHYLPREDNISOLONE SODIUM SUCCINATE 125 MG/2ML
125 INJECTION, POWDER, LYOPHILIZED, FOR SOLUTION INTRAMUSCULAR; INTRAVENOUS
Status: CANCELLED
Start: 2018-02-12

## 2018-02-12 RX ORDER — DIPHENHYDRAMINE HYDROCHLORIDE 50 MG/ML
50 INJECTION INTRAMUSCULAR; INTRAVENOUS
Status: CANCELLED
Start: 2018-02-12

## 2018-02-12 RX ORDER — ONDANSETRON 8 MG/1
8 TABLET, FILM COATED ORAL EVERY 8 HOURS PRN
Qty: 10 TABLET | Refills: 2 | Status: SHIPPED | OUTPATIENT
Start: 2018-02-12 | End: 2018-03-26

## 2018-02-12 RX ORDER — ALBUTEROL SULFATE 0.83 MG/ML
2.5 SOLUTION RESPIRATORY (INHALATION)
Status: CANCELLED | OUTPATIENT
Start: 2018-02-12

## 2018-02-12 RX ORDER — ALBUTEROL SULFATE 90 UG/1
1-2 AEROSOL, METERED RESPIRATORY (INHALATION)
Status: CANCELLED
Start: 2018-02-12

## 2018-02-12 RX ADMIN — FLUOROURACIL 710 MG: 50 INJECTION, SOLUTION INTRAVENOUS at 12:15

## 2018-02-12 RX ADMIN — HEPARIN SODIUM (PORCINE) LOCK FLUSH IV SOLN 100 UNIT/ML 5 ML: 100 SOLUTION at 08:39

## 2018-02-12 NOTE — MR AVS SNAPSHOT
After Visit Summary   2/12/2018    Rian Malik    MRN: [id number]           Patient Information     Date Of Birth          1960        Visit Information        Provider Department      2/12/2018 8:30 AM NURSE ONLY CANCER CENTER [facility]        Today's Diagnoses     Adenocarcinoma of sigmoid colon (H)    -  1       Follow-ups after your visit        Your next 10 appointments already scheduled     Feb 12, 2018  9:00 AM CST   Flofox with BAY 8 INFUSION   [facility] ([facility])    [address] [address] [address] [phone]            Feb 26, 2018  8:30 AM CST   Return Visit with [facility]URSE ONLY CANCER CENTER   [facility] ([facility])    [address] [facility] [address] [phone]            Feb 26, 2018  9:15 AM CST   Return Visit with Padilla Last MD   [facility] ([facility])    [address] [address] [address] [phone]            Feb 26, 2018 10:00 AM CST   Flofox with BAY 10 INFUSION   [facility] ([facility])    [address] [address] [address] [phone]            May 03, 2018  9:15 AM CDT   Lab with  LAB   [facility] ([facility])    [address]  [address]  [place] [address] [phone]            May 03, 2018 10:15 AM CDT   (Arrive by 10:00 AM)   Return General Liver with Yaakov Burroughs MD   [facility] ([facility])    [address]  [address]  [place] [address] [phone]              Who to contact     If you have questions or need follow up information about today's clinic visit or your schedule please contact [facility] directly at [phone].  Normal or non-critical lab and imaging results will be communicated to you by MyChart, letter or  phone within 4 business days after the clinic has received the results. If you do not hear from us within 7 days, please contact the clinic through [employer] or phone. If you have a critical or abnormal lab result, we will notify you by phone as soon as possible.  Submit refill requests through [employer] or call your pharmacy and they will forward the refill request to us. Please allow 3 business days for your refill to be completed.          Additional Information About Your Visit        [employer] Information     [employer] gives you secure access to your electronic health record. If you see a primary care provider, you can also send messages to your care team and make appointments. If you have questions, please call your primary care clinic.  If you do not have a primary care provider, please call [phone] and they will assist you.      [employer] is an electronic gateway that provides easy, online access to your medical records. With [employer], you can request a clinic appointment, read your test results, renew a prescription or communicate with your care team.     To access your existing account, please contact your [facility] or call [phone] for assistance.        Care EveryWhere ID     This is your Care EveryWhere ID. This could be used by other organizations to access your [facility] medical records  [id number]         Blood Pressure from Last 3 Encounters:   02/05/18 128/80   01/22/18 150/75   01/08/18 129/65    Weight from Last 3 Encounters:   02/05/18 74.4 kg (164 lb)   01/22/18 73.1 kg (161 lb 3.2 oz)   01/08/18 74.6 kg (164 lb 8 oz)              We Performed the Following     CBC with platelets differential     Comprehensive metabolic panel        Primary Care Provider Office Phone # Fax #    Moreno Harris [phone]       [address]  [facility] [address]        Equal Access to Services     KHANH FLANAGAN AH: suad Briseno  cisco carmenarabella laytonalisa hidalgo ah. [facility] [phone].    ATENCIÓN: Si anival rahman, tiene a martino disposición servicios gratuitos de asistencia lingüística. Alfredo al [phone].    We comply with applicable federal civil rights laws and [place] laws. We do not discriminate on the basis of race, color, national origin, age, disability, sex, sexual orientation, or gender identity.            Thank you!     Thank you for choosing [facility]  for your care. Our goal is always to provide you with excellent care. Hearing back from our patients is one way we can continue to improve our services. Please take a few minutes to complete the written survey that you may receive in the mail after your visit with us. Thank you!             Your Updated Medication List - Protect others around you: Learn how to safely use, store and throw away your medicines at www.disposemymeds.org.          This list is accurate as of 2/12/18  8:53 AM.  Always use your most recent med list.                   Brand Name Dispense Instructions for use Diagnosis    docusate sodium 100 MG tablet    COLACE    60 tablet    Take 100 mg by mouth daily    Special screening for malignant neoplasms, colon       elbasvir-grazoprevir  MG Tabs per tablet    ZEPATIER    28 tablet    Take 1 tablet by mouth daily    Chronic hepatitis C without hepatic coma (H)       LORazepam 0.5 MG tablet    ATIVAN    30 tablet    Take 1 tablet (0.5 mg) by mouth every 4 hours as needed (Anxiety, Nausea/Vomiting or Sleep)    Adenocarcinoma of sigmoid colon (H)       ondansetron 8 MG tablet    ZOFRAN    10 tablet    Take 1 tablet (8 mg) by mouth every 8 hours as needed (Nausea/Vomiting)    Adenocarcinoma of sigmoid colon (H)       prochlorperazine 10 MG tablet    COMPAZINE    30 tablet    Take 1 tablet (10 mg) by mouth every 6 hours as needed (Nausea/Vomiting)    Adenocarcinoma of sigmoid colon (H)

## 2018-02-12 NOTE — PROGRESS NOTES
"Infusion Nursing Note:  Rian Malik presents today for C6 D1 Oxaliplatin/Leucovorin/Fluorouracil bolus and pump connect .    Patient seen by provider today: No   present during visit today: Not Applicable.    Note: ANC is 1.1 today, Parameter changed to 1.1 per Dr Last.     Intravenous Access:  Implanted Port.    Treatment Conditions:  Lab Results   Component Value Date    HGB 13.9 02/12/2018     Lab Results   Component Value Date    WBC 3.2 02/12/2018      Lab Results   Component Value Date    ANEU 1.1 02/12/2018     Lab Results   Component Value Date    PLT 98 02/12/2018      Lab Results   Component Value Date     02/12/2018                   Lab Results   Component Value Date    POTASSIUM 3.8 02/12/2018           Lab Results   Component Value Date    MAG 2.0 08/04/2017            Lab Results   Component Value Date    CR 0.83 02/12/2018                   Lab Results   Component Value Date    MAICOL 8.6 02/12/2018                Lab Results   Component Value Date    BILITOTAL 0.7 02/12/2018           Lab Results   Component Value Date    ALBUMIN 3.3 02/12/2018                    Lab Results   Component Value Date    ALT 66 02/12/2018           Lab Results   Component Value Date    AST 58 02/12/2018       Results reviewed, labs MET treatment parameters, ok to proceed with treatment.      Prior to discharge: Port is secured in place with tegaderm and flushed with 10cc NS with positive blood return noted.  Continuous home infusion Dosi-Fuser pump connected.    All connectors secured in place and clamps taped open.    Pump started, \"running\" noted on display (CADD): YES.  Patient instructed to call our clinic or [facility] with any questions or concerns at home.  Patient verbalized understanding.    Patient set up for pump disconnect at home with [facility] on 2/14/15 at 1030.            Discharge Plan:   Patient will return 2/26/18 for next appointment.   Patient discharged in " stable condition accompanied by: self.  Departure Mode: Ambulatory.    Blanche Villanueva RN

## 2018-02-12 NOTE — MR AVS SNAPSHOT
After Visit Summary   2/12/2018    Rian Malik    MRN: [id number]           Patient Information     Date Of Birth          1960        Visit Information        Provider Department      2/12/2018 9:00 AM [address]        Today's Diagnoses     Adenocarcinoma of sigmoid colon (H)    -  1       Follow-ups after your visit        Your next 10 appointments already scheduled     Feb 26, 2018  8:30 AM CST   Return Visit with NURSE ONLY CANCER CENTER   [facility] ([facility]    [address] [place] [address] [phone]            Feb 26, 2018  9:15 AM CST   Return Visit with Padilla Last MD   [facility] ([facility])    [address] [place] [id number] [phone]            Feb 26, 2018 10:00 AM CST   Flofox with [address] ([facility])    [address] [place] [id number] [phone]            May 03, 2018  9:15 AM CDT   Lab with  LAB   [facility] ([facility])    [address]  1st Floor  [facility] [address] [phone]            May 03, 2018 10:15 AM CDT   (Arrive by 10:00 AM)   Return General Liver with Yaakov Burroughs MD   [facility] ([facility])    [address]  [address]  [facility] [address] [phone]              Who to contact     If you have questions or need follow up information about today's clinic visit or your schedule please contact [facility] directly at [phone].  Normal or non-critical lab and imaging results will be communicated to you by MyChart, letter or phone within 4 business days after the clinic has received the results. If you do not hear from us within 7 days, please contact the clinic through MyChart or phone. If you have a critical or abnormal  lab result, we will notify you by phone as soon as possible.  Submit refill requests through [employer] or call your pharmacy and they will forward the refill request to us. Please allow 3 business days for your refill to be completed.          Additional Information About Your Visit        Gem[employer] Information     [employer] gives you secure access to your electronic health record. If you see a primary care provider, you can also send messages to your care team and make appointments. If you have questions, please call your primary care clinic.  If you do not have a primary care provider, please call [phone] and they will assist you.      [employer] is an electronic gateway that provides easy, online access to your medical records. With [employer], you can request a clinic appointment, read your test results, renew a prescription or communicate with your care team.     To access your existing account, please contact your [facility] or call [phone] for assistance.        Care EveryWhere ID     This is your Care EveryWhere ID. This could be used by other organizations to access your [place] medical records  [id number]        Your Vitals Were     Pulse Temperature Respirations Pulse Oximetry BMI (Body Mass Index)       65 97.3  F (36.3  C) (Oral) 18 99% 25.06 kg/m2        Blood Pressure from Last 3 Encounters:   02/12/18 129/76   02/05/18 128/80   01/22/18 150/75    Weight from Last 3 Encounters:   02/12/18 74.8 kg (164 lb 12.8 oz)   02/05/18 74.4 kg (164 lb)   01/22/18 73.1 kg (161 lb 3.2 oz)              Today, you had the following     No orders found for display       Primary Care Provider Office Phone # Fax #    Moreno Harris [phone]       [address]  [facility] [address]        Equal Access to Services     KHANH FLANAGAN : Hadii cameron moralezo Kt, waaxda luqadaha, qaybta kaalmada tamikoyahernán, alisa leon . So [employer]  [phone].    ATENCIÓN: Si anival rahman, tiene a martino disposición servicios gratuitos de asistencia lingüística. Alfredo rocha [phone].    We comply with applicable federal civil rights laws and [place] laws. We do not discriminate on the basis of race, color, national origin, age, disability, sex, sexual orientation, or gender identity.            Thank you!     Thank you for choosing [facility]  for your care. Our goal is always to provide you with excellent care. Hearing back from our patients is one way we can continue to improve our services. Please take a few minutes to complete the written survey that you may receive in the mail after your visit with us. Thank you!             Your Updated Medication List - Protect others around you: Learn how to safely use, store and throw away your medicines at www.disposemymeds.org.          This list is accurate as of 2/12/18  2:01 PM.  Always use your most recent med list.                   Brand Name Dispense Instructions for use Diagnosis    docusate sodium 100 MG tablet    COLACE    60 tablet    Take 100 mg by mouth daily    Special screening for malignant neoplasms, colon       elbasvir-grazoprevir  MG Tabs per tablet    ZEPATIER    28 tablet    Take 1 tablet by mouth daily    Chronic hepatitis C without hepatic coma (H)       LORazepam 0.5 MG tablet    ATIVAN    30 tablet    Take 1 tablet (0.5 mg) by mouth every 4 hours as needed (Anxiety, Nausea/Vomiting or Sleep)    Adenocarcinoma of sigmoid colon (H)       ondansetron 8 MG tablet    ZOFRAN    10 tablet    Take 1 tablet (8 mg) by mouth every 8 hours as needed (Nausea/Vomiting)    Adenocarcinoma of sigmoid colon (H)       prochlorperazine 10 MG tablet    COMPAZINE    30 tablet    Take 1 tablet (10 mg) by mouth every 6 hours as needed (Nausea/Vomiting)    Adenocarcinoma of sigmoid colon (H)

## 2018-02-13 NOTE — PROGRESS NOTES
This is a recent snapshot of the patient's [facility] medical record.  For current drug dose and complete information and questions, call [phone] or In Basket pool, [facility]v home infusion ([id number])  CSN Number:  [id number]

## 2018-02-14 ENCOUNTER — HOME INFUSION (PRE-WILLOW HOME INFUSION) (OUTPATIENT)
Dept: PHARMACY | Facility: CLINIC | Age: 58
End: 2018-02-14

## 2018-02-15 NOTE — PROGRESS NOTES
This is a recent snapshot of the patient's [facility] medical record.  For current drug dose and complete information and questions, call [phone] or In Basket pool, fv home infusion ([id number])  CSN Number:  [id number]

## 2018-02-26 ENCOUNTER — ONCOLOGY VISIT (OUTPATIENT)
Dept: ONCOLOGY | Facility: CLINIC | Age: 58
End: 2018-02-26
Payer: COMMERCIAL

## 2018-02-26 VITALS
SYSTOLIC BLOOD PRESSURE: 131 MMHG | DIASTOLIC BLOOD PRESSURE: 80 MMHG | BODY MASS INDEX: 25.03 KG/M2 | HEART RATE: 73 BPM | OXYGEN SATURATION: 97 % | RESPIRATION RATE: 16 BRPM | TEMPERATURE: 98 F | WEIGHT: 164.6 LBS

## 2018-02-26 DIAGNOSIS — D69.6 THROMBOCYTOPENIA (H): Primary | ICD-10-CM

## 2018-02-26 DIAGNOSIS — C18.7 ADENOCARCINOMA OF SIGMOID COLON (H): ICD-10-CM

## 2018-02-26 DIAGNOSIS — C18.7 ADENOCARCINOMA OF SIGMOID COLON (H): Primary | ICD-10-CM

## 2018-02-26 DIAGNOSIS — T82.868A THROMBOSIS COMPLICATING VENOUS ACCESS DEVICE (H): ICD-10-CM

## 2018-02-26 LAB
ALBUMIN SERPL-MCNC: 3.2 G/DL (ref 3.4–5)
ALP SERPL-CCNC: 125 U/L (ref 40–150)
ALT SERPL W P-5'-P-CCNC: 65 U/L (ref 0–70)
ANION GAP SERPL CALCULATED.3IONS-SCNC: 6 MMOL/L (ref 3–14)
AST SERPL W P-5'-P-CCNC: 59 U/L (ref 0–45)
BASOPHILS # BLD AUTO: 0 10E9/L (ref 0–0.2)
BASOPHILS NFR BLD AUTO: 0.9 %
BILIRUB SERPL-MCNC: 1.1 MG/DL (ref 0.2–1.3)
BUN SERPL-MCNC: 15 MG/DL (ref 7–30)
CALCIUM SERPL-MCNC: 8.5 MG/DL (ref 8.5–10.1)
CHLORIDE SERPL-SCNC: 105 MMOL/L (ref 94–109)
CO2 SERPL-SCNC: 29 MMOL/L (ref 20–32)
CREAT SERPL-MCNC: 0.78 MG/DL (ref 0.66–1.25)
DIFFERENTIAL METHOD BLD: ABNORMAL
EOSINOPHIL # BLD AUTO: 0.1 10E9/L (ref 0–0.7)
EOSINOPHIL NFR BLD AUTO: 2.8 %
ERYTHROCYTE [DISTWIDTH] IN BLOOD BY AUTOMATED COUNT: 16.3 % (ref 10–15)
GFR SERPL CREATININE-BSD FRML MDRD: >90 ML/MIN/1.7M2
GLUCOSE SERPL-MCNC: 119 MG/DL (ref 70–99)
HCT VFR BLD AUTO: 40.1 % (ref 40–53)
HGB BLD-MCNC: 13.6 G/DL (ref 13.3–17.7)
IMM GRANULOCYTES # BLD: 0 10E9/L (ref 0–0.4)
IMM GRANULOCYTES NFR BLD: 0 %
LYMPHOCYTES # BLD AUTO: 1.3 10E9/L (ref 0.8–5.3)
LYMPHOCYTES NFR BLD AUTO: 39.6 %
MCH RBC QN AUTO: 32.2 PG (ref 26.5–33)
MCHC RBC AUTO-ENTMCNC: 33.9 G/DL (ref 31.5–36.5)
MCV RBC AUTO: 95 FL (ref 78–100)
MONOCYTES # BLD AUTO: 0.4 10E9/L (ref 0–1.3)
MONOCYTES NFR BLD AUTO: 11 %
NEUTROPHILS # BLD AUTO: 1.5 10E9/L (ref 1.6–8.3)
NEUTROPHILS NFR BLD AUTO: 45.7 %
PLATELET # BLD AUTO: 65 10E9/L (ref 150–450)
POTASSIUM SERPL-SCNC: 4 MMOL/L (ref 3.4–5.3)
PROT SERPL-MCNC: 7.1 G/DL (ref 6.8–8.8)
RBC # BLD AUTO: 4.22 10E12/L (ref 4.4–5.9)
SODIUM SERPL-SCNC: 140 MMOL/L (ref 133–144)
WBC # BLD AUTO: 3.2 10E9/L (ref 4–11)

## 2018-02-26 PROCEDURE — 80053 COMPREHEN METABOLIC PANEL: CPT | Performed by: INTERNAL MEDICINE

## 2018-02-26 PROCEDURE — 99215 OFFICE O/P EST HI 40 MIN: CPT | Performed by: INTERNAL MEDICINE

## 2018-02-26 PROCEDURE — 96523 IRRIG DRUG DELIVERY DEVICE: CPT

## 2018-02-26 PROCEDURE — 85025 COMPLETE CBC W/AUTO DIFF WBC: CPT | Performed by: INTERNAL MEDICINE

## 2018-02-26 RX ORDER — HEPARIN SODIUM (PORCINE) LOCK FLUSH IV SOLN 100 UNIT/ML 100 UNIT/ML
5 SOLUTION INTRAVENOUS
Status: DISCONTINUED | OUTPATIENT
Start: 2018-02-26 | End: 2018-02-26 | Stop reason: HOSPADM

## 2018-02-26 RX ORDER — LORAZEPAM 0.5 MG/1
0.5 TABLET ORAL EVERY 4 HOURS PRN
Qty: 30 TABLET | Refills: 2 | Status: SHIPPED | OUTPATIENT
Start: 2018-02-26 | End: 2018-05-14

## 2018-02-26 RX ADMIN — HEPARIN SODIUM (PORCINE) LOCK FLUSH IV SOLN 100 UNIT/ML 5 ML: 100 SOLUTION at 08:40

## 2018-02-26 RX ADMIN — Medication 2 MG: at 09:17

## 2018-02-26 NOTE — PROGRESS NOTES
No blood return obtained from port. Tpa instilled into port. Pt sent for an arm draw for today's labs. Tolerated port needle insertion without complaint. See documentation flowsheet. Nuha De Los Santos RN  09:55 Brisk blood return obtained from port after 30 minutes of dwell time. Pt will not have treatment today per Dr. Last. TPA removed and flushed with saline and heparin. Port de-accessed without complaint. Nuha De Los Santos RN

## 2018-02-26 NOTE — MR AVS SNAPSHOT
After Visit Summary   2/26/2018    Rian Malik    MRN: [id number]           Patient Information     Date Of Birth          1960        Visit Information        Provider Department      2/26/2018 9:15 AM Padilla Last MD [facility]        Today's Diagnoses     Thrombocytopenia (H)    -  1    Adenocarcinoma of sigmoid colon (H)           Follow-ups after your visit        Your next 10 appointments already scheduled     Mar 12, 2018 11:00 AM CDT   Return Visit with NURSE ONLY CANCER CENTER   [facility] ([facility]    [address] [facility] [address]   [phone]            Mar 12, 2018 11:45 AM CDT   Return Visit with Padilla Last MD   [facility] ([facility])    [address] [facility] [address]   [phone]            Mar 12, 2018 12:30 PM CDT   Flofox with BAY 4 INFUSION   [facility])    [address] [facility] [address]   [phone]            Mar 26, 2018  8:45 AM CDT   Return Visit with NURSE ONLY CANCER CENTER   [facility] ([facility])    [address] [facility] [address]   [phone]            Mar 26, 2018  9:15 AM CDT   Return Visit with SAVANNA High CNP   [facility] ([facility])    [address] [facility] [address]   [phone]            Mar 26, 2018 10:00 AM CDT   Flofox with BAY 8 INFUSION   [facility] ([facility])    [address] [facility] [address]   [phone]            May 03, 2018  9:15 AM CDT   Lab with UC LAB   [facility] ([facility])    [address] [place] [id number] [phone]            May 03, 2018 10:15 AM CDT   (Arrive by 10:00 AM)   Return General Liver with Yaakov FRANCOIS  MD Manjeet   [facility] ([facility])    [address] [facility]  [address]  [facility] [address] [phone]              Who to contact     If you have questions or need follow up information about today's clinic visit or your schedule please contact [facility] directly at [phone].  Normal or non-critical lab and imaging results will be communicated to you by MyChart, letter or phone within 4 business days after the clinic has received the results. If you do not hear from us within 7 days, please contact the clinic through [employer] or phone. If you have a critical or abnormal lab result, we will notify you by phone as soon as possible.  Submit refill requests through [employer] or call your pharmacy and they will forward the refill request to us. Please allow 3 business days for your refill to be completed.          Additional Information About Your Visit        [employer] Information     [employer] gives you secure access to your electronic health record. If you see a primary care provider, you can also send messages to your care team and make appointments. If you have questions, please call your primary care clinic.  If you do not have a primary care provider, please call [phone] and they will assist you.      [employer] is an electronic gateway that provides easy, online access to your medical records. With [employer], you can request a clinic appointment, read your test results, renew a prescription or communicate with your care team.     To access your existing account, please contact your [facility] or call [phone] for assistance.        Care EveryWhere ID     This is your Care EveryWhere ID. This could be used by other organizations to access your [place] medical records  [id number]        Your Vitals Were     Pulse Temperature Respirations Pulse Oximetry BMI (Body Mass Index)       73 98  F (36.7  C) (Oral) 16 97% 25.03  kg/m2        Blood Pressure from Last 3 Encounters:   02/26/18 131/80   02/12/18 129/76   02/05/18 128/80    Weight from Last 3 Encounters:   02/26/18 74.7 kg (164 lb 9.6 oz)   02/12/18 74.8 kg (164 lb 12.8 oz)   02/05/18 74.4 kg (164 lb)              Today, you had the following     No orders found for display         Where to get your medicines      Some of these will need a paper prescription and others can be bought over the counter.  Ask your nurse if you have questions.     Bring a paper prescription for each of these medications     LORazepam 0.5 MG tablet          Primary Care Provider Office Phone # Fax #    Moreno Harris [phone]       [address]  [facility] [address]        Equal Access to Services     [facility] HospitalALESSANDRO : Hadii cameron sapp Sosherri, waaxda luqadaha, qaybta kaalmada jose, alisa leon . So [facility] [phone].    ATENCIÓN: Si habla español, tiene a martino disposición servicios gratuitos de asistencia lingüística. [facility] [phone].    We comply with applicable federal civil rights laws and [place] laws. We do not discriminate on the basis of race, color, national origin, age, disability, sex, sexual orientation, or gender identity.            Thank you!     Thank you for choosing [facility]  for your care. Our goal is always to provide you with excellent care. Hearing back from our patients is one way we can continue to improve our services. Please take a few minutes to complete the written survey that you may receive in the mail after your visit with us. Thank you!             Your Updated Medication List - Protect others around you: Learn how to safely use, store and throw away your medicines at www.disposemymeds.org.          This list is accurate as of 2/26/18 12:36 PM.  Always use your most recent med list.                   Brand Name Dispense Instructions for use Diagnosis    docusate sodium 100 MG  tablet    COLACE    60 tablet    Take 100 mg by mouth daily    Special screening for malignant neoplasms, colon       LORazepam 0.5 MG tablet    ATIVAN    30 tablet    Take 1 tablet (0.5 mg) by mouth every 4 hours as needed (Anxiety, Nausea/Vomiting or Sleep)    Adenocarcinoma of sigmoid colon (H)       ondansetron 8 MG tablet    ZOFRAN    10 tablet    Take 1 tablet (8 mg) by mouth every 8 hours as needed (Nausea/Vomiting)    Adenocarcinoma of sigmoid colon (H)       prochlorperazine 10 MG tablet    COMPAZINE    30 tablet    Take 1 tablet (10 mg) by mouth every 6 hours as needed (Nausea/Vomiting)    Adenocarcinoma of sigmoid colon (H)

## 2018-02-26 NOTE — NURSING NOTE
"Oncology Rooming Note    February 26, 2018 9:18 AM   Rian Malik is a 57 year old male who presents for:    Chief Complaint   Patient presents with     Oncology Clinic Visit     f/u prior to treatment     Initial Vitals: /80  Pulse 73  Temp 98  F (36.7  C) (Oral)  Resp 16  Wt 74.7 kg (164 lb 9.6 oz)  SpO2 97%  BMI 25.03 kg/m2 Estimated body mass index is 25.03 kg/(m^2) as calculated from the following:    Height as of 2/5/18: 1.727 m (5' 7.99\").    Weight as of this encounter: 74.7 kg (164 lb 9.6 oz). Body surface area is 1.89 meters squared.  Data Unavailable Comment: Data Unavailable   No LMP for male patient.  Allergies reviewed: Yes  Medications reviewed: Yes    Medications: MEDICATION REFILLS NEEDED TODAY. Provider was notified.  Pharmacy name entered into Wikisway:    [facility] - [place], [place] - [address]  [facility] - [place], [place] - [address]         10 minutes for nursing intake (face to face time)     DAREK JETER RN              "

## 2018-02-26 NOTE — PROGRESS NOTES
FOLLOW-UP VISIT NOTE    PATIENT NAME: Rian Malik MRN # [id number]  DATE OF VISIT: Feb 26, 2018 YOB: 1960    REFERRING PROVIDER: No referring provider defined for this encounter.    CANCER TYPE: Adenocarcinoma colon  STAGE: III pT4N1c  ECOG PS: 0    ONCOLOGY HISTORY:  57-year-old male who around February 2017r, developed intermittent cramping lower abdominal pain. Was evaluated by PCP and clinical impression was colitis. He was put on p.o. antibiotics. However symptoms did not improve. He was eventually referred to surgery and underwent imaging with CT findings suspicious of sigmoid abscess. He was admitted to the hospital for resection of sigmoid abscess 10/11/17. However intraoperatively was noted to have a large firmly adherent sigmoid colon mass. Laparoscopic surgery was converted to open sigmoidectomy with end colostomy.     Surgical pathology   B.  COLON, SIGMOID, HEMICOLECTOMY   - Poorly differentiated adenocarcinoma, with signet ring cell features   - Tumor size: 5 cm   - Tumor is present on serosal surface and microscopically perforates   serosal surface   - One surgical margin shows extensive serosal involvement by tumor. The   opposite, undesignated margin shows no evidence of malignancy   - Lymphovascular invasion is present   - Perineural invasion is not identified   - Tumor deposits are present   - Immunohistochemistry for mismatch repair proteins shows intact nuclear   expression for MLH1, MSH2, MSH6, and PMS2   - Three reactive lymph nodes, negative for malignancy (0/3)   - Pathologic staging: pT4N1c      Patient's case was discussed at the tumor board. Recommendation was to obtain new staging scans as well as to proceed with systemic chemotherapy at this point with plans for further surgery in future. Staging scans negative for distant disease.     - Started on Folfox 11/21/17    SUBJECTIVE     Patient is here today for his 7th cycle of chemotherapy. Denies any active complaints.  Denies your chills, nausea/vomiting, neuropathy, bleeding/excess bruising. Good appetite and energy levels. Continues to work without any issues      PAST MEDICAL HISTORY     Past Medical History:   Diagnosis Date     Cirrhosis (H)      Colon cancer (H) 10/11/2017    Poorly differentiated adenocarcinoma     Hepatitis C          CURRENT OUTPATIENT MEDICATIONS     Current Outpatient Prescriptions   Medication Sig Dispense Refill     LORazepam (ATIVAN) 0.5 MG tablet Take 1 tablet (0.5 mg) by mouth every 4 hours as needed (Anxiety, Nausea/Vomiting or Sleep) 30 tablet 2     ondansetron (ZOFRAN) 8 MG tablet Take 1 tablet (8 mg) by mouth every 8 hours as needed (Nausea/Vomiting) 10 tablet 2     prochlorperazine (COMPAZINE) 10 MG tablet Take 1 tablet (10 mg) by mouth every 6 hours as needed (Nausea/Vomiting) (Patient not taking: Reported on 2/5/2018) 30 tablet 2     docusate sodium (COLACE) 100 MG tablet Take 100 mg by mouth daily 60 tablet 1        ALLERGIES     Allergies   Allergen Reactions     Acetaminophen      Naproxen         REVIEW OF SYSTEMS   As above in the HPI, o/w complete 14-point ROS was negative.     PHYSICAL EXAM   B/P: 138/78, T: 98.1, P: 64, R: 18  SpO2 Readings from Last 4 Encounters:   02/26/18 97%   02/12/18 99%   02/05/18 99%   01/22/18 97%     Wt Readings from Last 3 Encounters:   02/26/18 74.7 kg (164 lb 9.6 oz)   02/12/18 74.8 kg (164 lb 12.8 oz)   02/05/18 74.4 kg (164 lb)     GEN: NAD  EYES:PERRLA  Mouth/ENT: Oropharynx is clear.  NECK: no cervical lymphadenopathy  LUNGS: clear bilaterally  CV: regular, no murmurs, rubs, or gallops  ABDOMEN: soft, non-tender, non-distended, normal bowel sounds Ostomy in place  EXT: warm, well perfused, no edema  NEURO: alert  SKIN: no rashes     LABORATORY AND IMAGING STUDIES     Lab Results   Component Value Date    WBC 3.2 02/26/2018     Lab Results   Component Value Date    RBC 4.22 02/26/2018     Lab Results   Component Value Date    HGB 13.6 02/26/2018      Lab Results   Component Value Date    HCT 40.1 02/26/2018     No components found for: MCT  Lab Results   Component Value Date    MCV 95 02/26/2018     Lab Results   Component Value Date    MCH 32.2 02/26/2018     Lab Results   Component Value Date    MCHC 33.9 02/26/2018     Lab Results   Component Value Date    RDW 16.3 02/26/2018     Lab Results   Component Value Date    PLT 65 02/26/2018     Recent Labs   Lab Test  02/26/18   0849  02/12/18   0840   NA  140  139   POTASSIUM  4.0  3.8   CHLORIDE  105  108   CO2  29  26   ANIONGAP  6  5   GLC  119*  98   BUN  15  17   CR  0.78  0.83   MAICOL  8.5  8.6          ASSESSMENT AND PLAN     57-year-old male with newly diagnosed     - Poorly differentiated adenocarcinoma colon  [id number](tumor deposits) with margin involvement -  Stage III   CT scans negative for evidence of metastatic disease  Patient's case was discussed at the tumor board 11/13 and recommendation was to proceed with systemic chemotherapy at this point with plans for additional surgery in future after completion of chemotherapy.  Started on FOLFOX q2 week regimen for 6 months duration - s/p 6 cycles so far   Hold off on cycle 7 today given thrombocytopenia  Return to infusion  next week for chemo.      - Hepatitis C  Completed antiviral treatment per GI    - Thrombocytopenia  Secondary to chemotherapy  Oxaliplatin dose reduced to 65 mg/m2 with cycle 6    The patient is ready to learn, no apparent learning barriers were identified, Diagnosis and treatment plans were explained to the patient. The patient expressed understanding of the content. The patient questions were answered to his satisfaction.    Chart documentation with Dragon Voice recognition Software. Although reviewed after completion, some words and grammatical errors may remain.  Padilla Last MD  Attending Physician   Hematology/Medical Oncology

## 2018-02-26 NOTE — LETTER
2/26/2018         RE: Rian Malik  [address] [place] [address]        Dear Colleague,    Thank you for referring your patient, Rian Malik, to the [facility]. Please see a copy of my visit note below.      FOLLOW-UP VISIT NOTE    PATIENT NAME: Rian Malik MRN # [id number]  DATE OF VISIT: Feb 26, 2018 YOB: 1960    REFERRING PROVIDER: No referring provider defined for this encounter.    CANCER TYPE: Adenocarcinoma colon  STAGE: III pT4N1c  ECOG PS: 0    ONCOLOGY HISTORY:  57-year-old male who around February 2017r, developed intermittent cramping lower abdominal pain. Was evaluated by PCP and clinical impression was colitis. He was put on p.o. antibiotics. However symptoms did not improve. He was eventually referred to surgery and underwent imaging with CT findings suspicious of sigmoid abscess. He was admitted to the hospital for resection of sigmoid abscess 10/11/17. However intraoperatively was noted to have a large firmly adherent sigmoid colon mass. Laparoscopic surgery was converted to open sigmoidectomy with end colostomy.     Surgical pathology   B.  COLON, SIGMOID, HEMICOLECTOMY   - Poorly differentiated adenocarcinoma, with signet ring cell features   - Tumor size: 5 cm   - Tumor is present on serosal surface and microscopically perforates   serosal surface   - One surgical margin shows extensive serosal involvement by tumor. The   opposite, undesignated margin shows no evidence of malignancy   - Lymphovascular invasion is present   - Perineural invasion is not identified   - Tumor deposits are present   - Immunohistochemistry for mismatch repair proteins shows intact nuclear   expression for MLH1, MSH2, MSH6, and PMS2   - Three reactive lymph nodes, negative for malignancy (0/3)   - Pathologic staging: pT4N1c      Patient's case was discussed at the tumor board. Recommendation was to obtain new staging scans as well as to proceed with systemic  chemotherapy at this point with plans for further surgery in future. Staging scans negative for distant disease.     - Started on Folfox 11/21/17    SUBJECTIVE     Patient is here today for his 7th cycle of chemotherapy. Denies any active complaints. Denies your chills, nausea/vomiting, neuropathy, bleeding/excess bruising. Good appetite and energy levels. Continues to work without any issues      PAST MEDICAL HISTORY     Past Medical History:   Diagnosis Date     Cirrhosis (H)      Colon cancer (H) 10/11/2017    Poorly differentiated adenocarcinoma     Hepatitis C          CURRENT OUTPATIENT MEDICATIONS     Current Outpatient Prescriptions   Medication Sig Dispense Refill     LORazepam (ATIVAN) 0.5 MG tablet Take 1 tablet (0.5 mg) by mouth every 4 hours as needed (Anxiety, Nausea/Vomiting or Sleep) 30 tablet 2     ondansetron (ZOFRAN) 8 MG tablet Take 1 tablet (8 mg) by mouth every 8 hours as needed (Nausea/Vomiting) 10 tablet 2     prochlorperazine (COMPAZINE) 10 MG tablet Take 1 tablet (10 mg) by mouth every 6 hours as needed (Nausea/Vomiting) (Patient not taking: Reported on 2/5/2018) 30 tablet 2     docusate sodium (COLACE) 100 MG tablet Take 100 mg by mouth daily 60 tablet 1        ALLERGIES     Allergies   Allergen Reactions     Acetaminophen      Naproxen         REVIEW OF SYSTEMS   As above in the HPI, o/w complete 14-point ROS was negative.     PHYSICAL EXAM   B/P: 138/78, T: 98.1, P: 64, R: 18  SpO2 Readings from Last 4 Encounters:   02/26/18 97%   02/12/18 99%   02/05/18 99%   01/22/18 97%     Wt Readings from Last 3 Encounters:   02/26/18 74.7 kg (164 lb 9.6 oz)   02/12/18 74.8 kg (164 lb 12.8 oz)   02/05/18 74.4 kg (164 lb)     GEN: NAD  EYES:PERRLA  Mouth/ENT: Oropharynx is clear.  NECK: no cervical lymphadenopathy  LUNGS: clear bilaterally  CV: regular, no murmurs, rubs, or gallops  ABDOMEN: soft, non-tender, non-distended, normal bowel sounds Ostomy in place  EXT: warm, well perfused, no  edema  NEURO: alert  SKIN: no rashes     LABORATORY AND IMAGING STUDIES     Lab Results   Component Value Date    WBC 3.2 02/26/2018     Lab Results   Component Value Date    RBC 4.22 02/26/2018     Lab Results   Component Value Date    HGB 13.6 02/26/2018     Lab Results   Component Value Date    HCT 40.1 02/26/2018     No components found for: MCT  Lab Results   Component Value Date    MCV 95 02/26/2018     Lab Results   Component Value Date    MCH 32.2 02/26/2018     Lab Results   Component Value Date    MCHC 33.9 02/26/2018     Lab Results   Component Value Date    RDW 16.3 02/26/2018     Lab Results   Component Value Date    PLT 65 02/26/2018     Recent Labs   Lab Test  02/26/18   0849  02/12/18   0840   NA  140  139   POTASSIUM  4.0  3.8   CHLORIDE  105  108   CO2  29  26   ANIONGAP  6  5   GLC  119*  98   BUN  15  17   CR  0.78  0.83   MAICOL  8.5  8.6          ASSESSMENT AND PLAN     57-year-old male with newly diagnosed     - Poorly differentiated adenocarcinoma colon  [id number](tumor deposits) with margin involvement -  Stage III   CT scans negative for evidence of metastatic disease  Patient's case was discussed at the tumor board 11/13 and recommendation was to proceed with systemic chemotherapy at this point with plans for additional surgery in future after completion of chemotherapy.  Started on FOLFOX q2 week regimen for 6 months duration - s/p 6 cycles so far   Hold off on cycle 7 today given thrombocytopenia  Return to infusion  next week for chemo.      - Hepatitis C  Completed antiviral treatment per GI    - Thrombocytopenia  Secondary to chemotherapy  Oxaliplatin dose reduced to 65 mg/m2 with cycle 6    The patient is ready to learn, no apparent learning barriers were identified, Diagnosis and treatment plans were explained to the patient. The patient expressed understanding of the content. The patient questions were answered to his satisfaction.    Chart documentation with Dragon Voice recognition  Software. Although reviewed after completion, some words and grammatical errors may remain.  Padilla Last MD  Attending Physician   Hematology/Medical Oncology    Again, thank you for allowing me to participate in the care of your patient.        Sincerely,        Padilla Last MD

## 2018-02-26 NOTE — MR AVS SNAPSHOT
After Visit Summary   2/26/2018    Rian Malik    MRN: [id number]           Patient Information     Date Of Birth          1960        Visit Information        Provider Department      2/26/2018 8:30 AM NURSE ONLY CANCER CENTER [facility]        Today's Diagnoses     Adenocarcinoma of sigmoid colon (H)    -  1    Thrombosis complicating venous access device (H)           Follow-ups after your visit        Your next 10 appointments already scheduled     Mar 08, 2018 11:30 AM CST   Return Visit with [facility] CANCER CENTER   [facility]    [address] [place] [address] [phone]            Mar 08, 2018 12:00 PM CST   Flofox with BAY 10 INFUSION   [facility]    [address] [place] [address] [phone]            May 03, 2018  9:15 AM CDT   Lab with UC LAB   [facility] ([facility])    [address]  1st Floor  [facility] [id number] [phone]            May 03, 2018 10:15 AM CDT   (Arrive by 10:00 AM)   Return General Liver with Yaakov Burroughs MD   [facility] ([facility])    [address]  [address]  [facility] [id number] [phone]              Who to contact     If you have questions or need follow up information about today's clinic visit or your schedule please contact [facility] directly at [phone].  Normal or non-critical lab and imaging results will be communicated to you by MyChart, letter or phone within 4 business days after the clinic has received the results. If you do not hear from us within 7 days, please contact the clinic through MyChart or phone. If you have a critical or abnormal lab result, we will notify you by phone as soon as possible.  Submit refill requests through [employer] or call your pharmacy and they will  forward the refill request to us. Please allow 3 business days for your refill to be completed.          Additional Information About Your Visit        InfotopharSRCH2 Information     [employer] gives you secure access to your electronic health record. If you see a primary care provider, you can also send messages to your care team and make appointments. If you have questions, please call your primary care clinic.  If you do not have a primary care provider, please call [phone] and they will assist you.      [employer] is an electronic gateway that provides easy, online access to your medical records. With [employer], you can request a clinic appointment, read your test results, renew a prescription or communicate with your care team.     To access your existing account, please contact your [facility] or call [phone] for assistance.        Care EveryWhere ID     This is your Care EveryWhere ID. This could be used by other organizations to access your [facility] medical records  [id number]         Blood Pressure from Last 3 Encounters:   02/26/18 131/80   02/12/18 129/76   02/05/18 128/80    Weight from Last 3 Encounters:   02/26/18 74.7 kg (164 lb 9.6 oz)   02/12/18 74.8 kg (164 lb 12.8 oz)   02/05/18 74.4 kg (164 lb)              We Performed the Following     CBC with platelets differential     Comprehensive metabolic panel          Where to get your medicines      Some of these will need a paper prescription and others can be bought over the counter.  Ask your nurse if you have questions.     Bring a paper prescription for each of these medications     LORazepam 0.5 MG tablet          Primary Care Provider Office Phone # Fax #    Moreno Harris [phone]       [address]  [facility] [address]        Equal Access to Services     KHANH FLANAGAN AH: Rajesh Meraz, suad peck, alisa albarran  ah. So [facility] [phone].    ATENCIÓN: Si habla lacey, tiene a martino disposición servicios gratuitos de asistencia lingüística. Alfredo al [phone].    We comply with applicable federal civil rights laws and [place] laws. We do not discriminate on the basis of race, color, national origin, age, disability, sex, sexual orientation, or gender identity.            Thank you!     Thank you for choosing [facility] [[email or web address]]  for your care. Our goal is always to provide you with excellent care. Hearing back from our patients is one way we can continue to improve our services. Please take a few minutes to complete the written survey that you may receive in the mail after your visit with us. Thank you!             Your Updated Medication List - Protect others around you: Learn how to safely use, store and throw away your medicines at www.disposemymeds.org.          This list is accurate as of 2/26/18 11:36 AM.  Always use your most recent med list.                   Brand Name Dispense Instructions for use Diagnosis    docusate sodium 100 MG tablet    COLACE    60 tablet    Take 100 mg by mouth daily    Special screening for malignant neoplasms, colon       LORazepam 0.5 MG tablet    ATIVAN    30 tablet    Take 1 tablet (0.5 mg) by mouth every 4 hours as needed (Anxiety, Nausea/Vomiting or Sleep)    Adenocarcinoma of sigmoid colon (H)       ondansetron 8 MG tablet    ZOFRAN    10 tablet    Take 1 tablet (8 mg) by mouth every 8 hours as needed (Nausea/Vomiting)    Adenocarcinoma of sigmoid colon (H)       prochlorperazine 10 MG tablet    COMPAZINE    30 tablet    Take 1 tablet (10 mg) by mouth every 6 hours as needed (Nausea/Vomiting)    Adenocarcinoma of sigmoid colon (H)

## 2018-02-27 ENCOUNTER — TELEPHONE (OUTPATIENT)
Dept: GASTROENTEROLOGY | Facility: CLINIC | Age: 58
End: 2018-02-27

## 2018-02-27 NOTE — TELEPHONE ENCOUNTER
Jennifer care mangr for [facility] calls to question if Hep C treatment has been completed and any updated labs. Please call Jennifer @ [phone].

## 2018-02-28 NOTE — TELEPHONE ENCOUNTER
Connected with care manager, Jennifer, who was following up on end date of treatment (Hep C), labs still needed and if patient has follow-up scheduled with Dr. Burroughs.  All questions answered.

## 2018-03-12 ENCOUNTER — ONCOLOGY VISIT (OUTPATIENT)
Dept: ONCOLOGY | Facility: CLINIC | Age: 58
End: 2018-03-12
Payer: COMMERCIAL

## 2018-03-12 ENCOUNTER — INFUSION THERAPY VISIT (OUTPATIENT)
Dept: INFUSION THERAPY | Facility: CLINIC | Age: 58
End: 2018-03-12
Payer: COMMERCIAL

## 2018-03-12 ENCOUNTER — HOME INFUSION (PRE-WILLOW HOME INFUSION) (OUTPATIENT)
Dept: PHARMACY | Facility: CLINIC | Age: 58
End: 2018-03-12

## 2018-03-12 VITALS
OXYGEN SATURATION: 96 % | TEMPERATURE: 97.7 F | HEART RATE: 68 BPM | RESPIRATION RATE: 16 BRPM | WEIGHT: 166.9 LBS | DIASTOLIC BLOOD PRESSURE: 73 MMHG | SYSTOLIC BLOOD PRESSURE: 123 MMHG | BODY MASS INDEX: 25.38 KG/M2

## 2018-03-12 DIAGNOSIS — C18.7 ADENOCARCINOMA OF SIGMOID COLON (H): Primary | ICD-10-CM

## 2018-03-12 LAB
ALBUMIN SERPL-MCNC: 3.5 G/DL (ref 3.4–5)
ALP SERPL-CCNC: 121 U/L (ref 40–150)
ALT SERPL W P-5'-P-CCNC: 67 U/L (ref 0–70)
ANION GAP SERPL CALCULATED.3IONS-SCNC: 5 MMOL/L (ref 3–14)
AST SERPL W P-5'-P-CCNC: 67 U/L (ref 0–45)
BASOPHILS # BLD AUTO: 0 10E9/L (ref 0–0.2)
BASOPHILS NFR BLD AUTO: 0.8 %
BILIRUB SERPL-MCNC: 1.2 MG/DL (ref 0.2–1.3)
BUN SERPL-MCNC: 15 MG/DL (ref 7–30)
CALCIUM SERPL-MCNC: 8.8 MG/DL (ref 8.5–10.1)
CHLORIDE SERPL-SCNC: 105 MMOL/L (ref 94–109)
CO2 SERPL-SCNC: 28 MMOL/L (ref 20–32)
CREAT SERPL-MCNC: 0.82 MG/DL (ref 0.66–1.25)
DIFFERENTIAL METHOD BLD: ABNORMAL
EOSINOPHIL # BLD AUTO: 0.1 10E9/L (ref 0–0.7)
EOSINOPHIL NFR BLD AUTO: 1.2 %
ERYTHROCYTE [DISTWIDTH] IN BLOOD BY AUTOMATED COUNT: 15.1 % (ref 10–15)
GFR SERPL CREATININE-BSD FRML MDRD: >90 ML/MIN/1.7M2
GLUCOSE SERPL-MCNC: 99 MG/DL (ref 70–99)
HCT VFR BLD AUTO: 42 % (ref 40–53)
HGB BLD-MCNC: 14.3 G/DL (ref 13.3–17.7)
IMM GRANULOCYTES # BLD: 0 10E9/L (ref 0–0.4)
IMM GRANULOCYTES NFR BLD: 0.6 %
LYMPHOCYTES # BLD AUTO: 1.4 10E9/L (ref 0.8–5.3)
LYMPHOCYTES NFR BLD AUTO: 28.1 %
MCH RBC QN AUTO: 32.4 PG (ref 26.5–33)
MCHC RBC AUTO-ENTMCNC: 34 G/DL (ref 31.5–36.5)
MCV RBC AUTO: 95 FL (ref 78–100)
MONOCYTES # BLD AUTO: 0.5 10E9/L (ref 0–1.3)
MONOCYTES NFR BLD AUTO: 8.8 %
NEUTROPHILS # BLD AUTO: 3.1 10E9/L (ref 1.6–8.3)
NEUTROPHILS NFR BLD AUTO: 60.5 %
PLATELET # BLD AUTO: 80 10E9/L (ref 150–450)
POTASSIUM SERPL-SCNC: 3.9 MMOL/L (ref 3.4–5.3)
PROT SERPL-MCNC: 7.3 G/DL (ref 6.8–8.8)
RBC # BLD AUTO: 4.42 10E12/L (ref 4.4–5.9)
SODIUM SERPL-SCNC: 138 MMOL/L (ref 133–144)
WBC # BLD AUTO: 5.1 10E9/L (ref 4–11)

## 2018-03-12 PROCEDURE — 96413 CHEMO IV INFUSION 1 HR: CPT | Performed by: INTERNAL MEDICINE

## 2018-03-12 PROCEDURE — 96375 TX/PRO/DX INJ NEW DRUG ADDON: CPT | Performed by: INTERNAL MEDICINE

## 2018-03-12 PROCEDURE — 96415 CHEMO IV INFUSION ADDL HR: CPT | Performed by: INTERNAL MEDICINE

## 2018-03-12 PROCEDURE — 96368 THER/DIAG CONCURRENT INF: CPT | Performed by: INTERNAL MEDICINE

## 2018-03-12 PROCEDURE — 99214 OFFICE O/P EST MOD 30 MIN: CPT | Mod: 25 | Performed by: INTERNAL MEDICINE

## 2018-03-12 PROCEDURE — 96411 CHEMO IV PUSH ADDL DRUG: CPT | Performed by: INTERNAL MEDICINE

## 2018-03-12 PROCEDURE — 99207 ZZC NO CHARGE LOS: CPT

## 2018-03-12 PROCEDURE — 96416 CHEMO PROLONG INFUSE W/PUMP: CPT | Performed by: INTERNAL MEDICINE

## 2018-03-12 PROCEDURE — 85025 COMPLETE CBC W/AUTO DIFF WBC: CPT | Performed by: INTERNAL MEDICINE

## 2018-03-12 PROCEDURE — 80053 COMPREHEN METABOLIC PANEL: CPT | Performed by: INTERNAL MEDICINE

## 2018-03-12 PROCEDURE — 99207 ZZC NO CHARGE NURSE ONLY: CPT

## 2018-03-12 RX ORDER — FLUOROURACIL 50 MG/ML
400 INJECTION, SOLUTION INTRAVENOUS ONCE
Status: COMPLETED | OUTPATIENT
Start: 2018-03-12 | End: 2018-03-12

## 2018-03-12 RX ORDER — ALBUTEROL SULFATE 90 UG/1
1-2 AEROSOL, METERED RESPIRATORY (INHALATION)
Status: CANCELLED
Start: 2018-03-12

## 2018-03-12 RX ORDER — DIPHENHYDRAMINE HYDROCHLORIDE 50 MG/ML
50 INJECTION INTRAMUSCULAR; INTRAVENOUS
Status: CANCELLED
Start: 2018-03-12

## 2018-03-12 RX ORDER — EPINEPHRINE 1 MG/ML
0.3 INJECTION, SOLUTION INTRAMUSCULAR; SUBCUTANEOUS EVERY 5 MIN PRN
Status: CANCELLED | OUTPATIENT
Start: 2018-03-12

## 2018-03-12 RX ORDER — SODIUM CHLORIDE 9 MG/ML
1000 INJECTION, SOLUTION INTRAVENOUS CONTINUOUS PRN
Status: CANCELLED
Start: 2018-03-12

## 2018-03-12 RX ORDER — MEPERIDINE HYDROCHLORIDE 50 MG/ML
25 INJECTION INTRAMUSCULAR; INTRAVENOUS; SUBCUTANEOUS EVERY 30 MIN PRN
Status: CANCELLED | OUTPATIENT
Start: 2018-03-12

## 2018-03-12 RX ORDER — EPINEPHRINE 0.3 MG/.3ML
0.3 INJECTION SUBCUTANEOUS EVERY 5 MIN PRN
Status: CANCELLED | OUTPATIENT
Start: 2018-03-12

## 2018-03-12 RX ORDER — LORAZEPAM 2 MG/ML
0.5 INJECTION INTRAMUSCULAR EVERY 4 HOURS PRN
Status: CANCELLED
Start: 2018-03-12

## 2018-03-12 RX ORDER — FLUOROURACIL 50 MG/ML
400 INJECTION, SOLUTION INTRAVENOUS ONCE
Status: CANCELLED | OUTPATIENT
Start: 2018-03-12

## 2018-03-12 RX ORDER — ALBUTEROL SULFATE 0.83 MG/ML
2.5 SOLUTION RESPIRATORY (INHALATION)
Status: CANCELLED | OUTPATIENT
Start: 2018-03-12

## 2018-03-12 RX ORDER — METHYLPREDNISOLONE SODIUM SUCCINATE 125 MG/2ML
125 INJECTION, POWDER, LYOPHILIZED, FOR SOLUTION INTRAMUSCULAR; INTRAVENOUS
Status: CANCELLED
Start: 2018-03-12

## 2018-03-12 RX ORDER — HEPARIN SODIUM (PORCINE) LOCK FLUSH IV SOLN 100 UNIT/ML 100 UNIT/ML
5 SOLUTION INTRAVENOUS
Status: DISCONTINUED | OUTPATIENT
Start: 2018-03-12 | End: 2018-03-12 | Stop reason: HOSPADM

## 2018-03-12 RX ADMIN — HEPARIN SODIUM (PORCINE) LOCK FLUSH IV SOLN 100 UNIT/ML 5 ML: 100 SOLUTION at 11:10

## 2018-03-12 RX ADMIN — FLUOROURACIL 710 MG: 50 INJECTION, SOLUTION INTRAVENOUS at 14:52

## 2018-03-12 ASSESSMENT — PAIN SCALES - GENERAL: PAINLEVEL: NO PAIN (0)

## 2018-03-12 NOTE — PROGRESS NOTES
Power port needle left accessed for treatment. Tolerated lab draw without complaint. Ada drawn-Red/Green/Purple tubes. Double signed by patient and RN. See documentation flowsheet. Nuha De Los Santos, RN, BSN, OCN

## 2018-03-12 NOTE — Clinical Note
3/12/2018         RE: Rian Malik  [address] [place] [address]        Dear Colleague,    Thank you for referring your patient, Rian Malik, to the [facility]. Please see a copy of my visit note below.    No notes on file    Again, thank you for allowing me to participate in the care of your patient.        Sincerely,        Padilla Last MD

## 2018-03-12 NOTE — NURSING NOTE
"Oncology Rooming Note    March 12, 2018 11:57 AM   Rian Malik is a 57 year old male who presents for:    Chief Complaint   Patient presents with     Oncology Clinic Visit     f/u prior to treatment     Initial Vitals: /73  Pulse 68  Temp 97.7  F (36.5  C) (Oral)  Resp 16  Wt 75.7 kg (166 lb 14.4 oz)  SpO2 96%  BMI 25.38 kg/m2 Estimated body mass index is 25.38 kg/(m^2) as calculated from the following:    Height as of 2/5/18: 1.727 m (5' 7.99\").    Weight as of this encounter: 75.7 kg (166 lb 14.4 oz). Body surface area is 1.91 meters squared.  No Pain (0) Comment: Data Unavailable   No LMP for male patient.  Allergies reviewed: Yes  Medications reviewed: Yes    Medications: Medication refills not needed today.  Pharmacy name entered into [employer]:    [facility] - [place], [place] - [address]  [facility] [place], [place] - [address]         8 minutes for nursing intake (face to face time)     DAREK JETER RN              "

## 2018-03-12 NOTE — MR AVS SNAPSHOT
After Visit Summary   3/12/2018    Rian Malik    MRN: [id number]           Patient Information     Date Of Birth          1960        Visit Information        Provider Department      3/12/2018 12:30 PM BAY 4 INFUSION [facility]        Today's Diagnoses     Adenocarcinoma of sigmoid colon (H)    -  1       Follow-ups after your visit        Your next 10 appointments already scheduled     Mar 26, 2018  8:45 AM CDT   Return Visit with NURSE ONLY CANCER CENTER   [facility]    [address] [place] [address]   [phone]            Mar 26, 2018  9:15 AM CDT   Return Visit with SAVANNA High CNP   [facility] ([facility])    [address] [place] [address]   [phone]            Mar 26, 2018 10:00 AM CDT   Flofox with BAY 8 INFUSION   [facility] ([facility])    [address] [facility] [address]   [phone]            Apr 09, 2018  8:00 AM CDT   Return Visit with NURSE ONLY CANCER CENTER   [facility] ([facility])    [address] [address]   [phone]            Apr 09, 2018  8:45 AM CDT   Return Visit with Padilla Last MD   [facility] ([facility])    [address] [place] [address]   [phone]            Apr 09, 2018  9:30 AM CDT   Flofox with BAY 6 INFUSION   [facility] ([facility])    [address] [address]   [phone]            May 03, 2018  9:15 AM CDT   Lab with UC LAB   [facility] ([facility])    [address] [place] [address] [phone]            May 03, 2018 10:15 AM CDT   (Arrive by 10:00 AM)   Return General Liver with Yaakov Burroughs MD   [facility]  ([facility])    [address]  [address]  [facility] [address] [phone]              Who to contact     If you have questions or need follow up information about today's clinic visit or your schedule please contact [facility] directly at [phone].  Normal or non-critical lab and imaging results will be communicated to you by MyChart, letter or phone within 4 business days after the clinic has received the results. If you do not hear from us within 7 days, please contact the clinic through [employer] or phone. If you have a critical or abnormal lab result, we will notify you by phone as soon as possible.  Submit refill requests through [employer] or call your pharmacy and they will forward the refill request to us. Please allow 3 business days for your refill to be completed.          Additional Information About Your Visit        [employer] Information     [employer] gives you secure access to your electronic health record. If you see a primary care provider, you can also send messages to your care team and make appointments. If you have questions, please call your primary care clinic.  If you do not have a primary care provider, please call [phone] and they will assist you.      [employer] is an electronic gateway that provides easy, online access to your medical records. With [employer], you can request a clinic appointment, read your test results, renew a prescription or communicate with your care team.     To access your existing account, please contact your [facility] or call [phone] for assistance.        Care EveryWhere ID     This is your Care EveryWhere ID. This could be used by other organizations to access your [facility] medical records  [id number]         Blood Pressure from Last 3 Encounters:   No data found for BP    Weight from Last 3 Encounters:   No data found for Wt              Today, you had the following      No orders found for display       Primary Care Provider Office Phone # Fax #    Moreno Harris [phone]       [address]  [facility] [address]        Equal Access to Services     KHANH FLANAGAN : Rajesh cameron ku kirto Sokaleyali, waaxda luqadaha, qaybta kaalmada adeegyada, alisa ramirez carolyn mg. So [facility] [phone].    ATENCIÓN: Si habla español, tiene a martino disposición servicios gratuitos de asistencia lingüística. Llame al [phone].    We comply with applicable federal civil rights laws and [place] laws. We do not discriminate on the basis of race, color, national origin, age, disability, sex, sexual orientation, or gender identity.            Thank you!     Thank you for choosing [facility]  for your care. Our goal is always to provide you with excellent care. Hearing back from our patients is one way we can continue to improve our services. Please take a few minutes to complete the written survey that you may receive in the mail after your visit with us. Thank you!             Your Updated Medication List - Protect others around you: Learn how to safely use, store and throw away your medicines at www.disposemymeds.org.          This list is accurate as of 3/12/18 11:59 PM.  Always use your most recent med list.                   Brand Name Dispense Instructions for use Diagnosis    docusate sodium 100 MG tablet    COLACE    60 tablet    Take 100 mg by mouth daily    Special screening for malignant neoplasms, colon       LORazepam 0.5 MG tablet    ATIVAN    30 tablet    Take 1 tablet (0.5 mg) by mouth every 4 hours as needed (Anxiety, Nausea/Vomiting or Sleep)    Adenocarcinoma of sigmoid colon (H)       ondansetron 8 MG tablet    ZOFRAN    10 tablet    Take 1 tablet (8 mg) by mouth every 8 hours as needed (Nausea/Vomiting)    Adenocarcinoma of sigmoid colon (H)       prochlorperazine 10 MG tablet    COMPAZINE    30 tablet    Take 1 tablet  (10 mg) by mouth every 6 hours as needed (Nausea/Vomiting)    Adenocarcinoma of sigmoid colon (H)

## 2018-03-12 NOTE — PROGRESS NOTES
"Infusion Nursing Note:  Rian Malik presents today for Folfox.    Patient seen by provider today: Yes: Dr. Last   present during visit today: Not Applicable.    Note: N/A.    Intravenous Access:  Implanted Port.    Treatment Conditions:  Lab Results   Component Value Date    HGB 14.3 03/12/2018     Lab Results   Component Value Date    WBC 5.1 03/12/2018      Lab Results   Component Value Date    ANEU 3.1 03/12/2018     Lab Results   Component Value Date    PLT 80 03/12/2018      Lab Results   Component Value Date     03/12/2018                   Lab Results   Component Value Date    POTASSIUM 3.9 03/12/2018           Lab Results   Component Value Date    MAG 2.0 08/04/2017            Lab Results   Component Value Date    CR 0.82 03/12/2018                   Lab Results   Component Value Date    MAICOL 8.8 03/12/2018                Lab Results   Component Value Date    BILITOTAL 1.2 03/12/2018           Lab Results   Component Value Date    ALBUMIN 3.5 03/12/2018                    Lab Results   Component Value Date    ALT 67 03/12/2018           Lab Results   Component Value Date    AST 67 03/12/2018       Results reviewed, labs MET treatment parameters, ok to proceed with treatment.      Post Infusion Assessment:  Patient tolerated infusion without incident.  Site patent and intact, free from redness, edema or discomfort.  No evidence of extravasations.    Discharge Plan:   Discharge instructions reviewed with: Patient.  Patient and/or family verbalized understanding of discharge instructions and all questions answered.  Patient discharged in stable condition accompanied by: self.  Departure Mode: Ambulatory.    Blanka Tate RN    Prior to discharge: Port is secured in place with tegaderm and flushed with 10cc NS with positive blood return noted.  Continuous home infusion Dosi-Fuser pump connected.    All connectors secured in place and clamps taped open.    Pump started, \"running\" noted " on display (CADD): Not Applicable.  Patient instructed to call our clinic or [facility] with any questions or concerns at home.  Patient verbalized understanding.    Patient set up for pump disconnect at home with [facility] on Wednesday @ 1pm.

## 2018-03-12 NOTE — PROGRESS NOTES
FOLLOW-UP VISIT NOTE    PATIENT NAME: Rian Malik MRN # [id number]  DATE OF VISIT: Mar 12, 2018 YOB: 1960    REFERRING PROVIDER: No referring provider defined for this encounter.    CANCER TYPE: Adenocarcinoma colon  STAGE: III pT4N1c  ECOG PS: 0    ONCOLOGY HISTORY:  57-year-old male who around February 2017r, developed intermittent cramping lower abdominal pain. Was evaluated by PCP and clinical impression was colitis. He was put on p.o. antibiotics. However symptoms did not improve. He was eventually referred to surgery and underwent imaging with CT findings suspicious of sigmoid abscess. He was admitted to the hospital for resection of sigmoid abscess 10/11/17. However intraoperatively was noted to have a large firmly adherent sigmoid colon mass. Laparoscopic surgery was converted to open sigmoidectomy with end colostomy.     Surgical pathology   B.  COLON, SIGMOID, HEMICOLECTOMY   - Poorly differentiated adenocarcinoma, with signet ring cell features   - Tumor size: 5 cm   - Tumor is present on serosal surface and microscopically perforates   serosal surface   - One surgical margin shows extensive serosal involvement by tumor. The   opposite, undesignated margin shows no evidence of malignancy   - Lymphovascular invasion is present   - Perineural invasion is not identified   - Tumor deposits are present   - Immunohistochemistry for mismatch repair proteins shows intact nuclear   expression for MLH1, MSH2, MSH6, and PMS2   - Three reactive lymph nodes, negative for malignancy (0/3)   - Pathologic staging: pT4N1c      Patient's case was discussed at the tumor board. Recommendation was to obtain new staging scans as well as to proceed with systemic chemotherapy at this point with plans for further surgery in future. Staging scans negative for distant disease.     - Started on Folfox 11/21/17    SUBJECTIVE     Patient is here today for his 7th cycle of chemotherapy. Clinically doing well and the  lack of compliance. Denies fever/chills, bleeding/bruising, nausea/vomiting, neuropathy or any other issues. Continues to work. Good appetite and energy levels.       PAST MEDICAL HISTORY     Past Medical History:   Diagnosis Date     Cirrhosis (H)      Colon cancer (H) 10/11/2017    Poorly differentiated adenocarcinoma     Hepatitis C          CURRENT OUTPATIENT MEDICATIONS     Current Outpatient Prescriptions   Medication Sig Dispense Refill     LORazepam (ATIVAN) 0.5 MG tablet Take 1 tablet (0.5 mg) by mouth every 4 hours as needed (Anxiety, Nausea/Vomiting or Sleep) 30 tablet 2     ondansetron (ZOFRAN) 8 MG tablet Take 1 tablet (8 mg) by mouth every 8 hours as needed (Nausea/Vomiting) 10 tablet 2     prochlorperazine (COMPAZINE) 10 MG tablet Take 1 tablet (10 mg) by mouth every 6 hours as needed (Nausea/Vomiting) (Patient not taking: Reported on 2/5/2018) 30 tablet 2     docusate sodium (COLACE) 100 MG tablet Take 100 mg by mouth daily 60 tablet 1        ALLERGIES     Allergies   Allergen Reactions     Acetaminophen      Naproxen         REVIEW OF SYSTEMS   As above in the HPI, o/w complete 14-point ROS was negative.     PHYSICAL EXAM   B/P: 138/78, T: 98.1, P: 64, R: 18  SpO2 Readings from Last 4 Encounters:   03/12/18 96%   02/26/18 97%   02/12/18 99%   02/05/18 99%     Wt Readings from Last 3 Encounters:   03/12/18 75.7 kg (166 lb 14.4 oz)   02/26/18 74.7 kg (164 lb 9.6 oz)   02/12/18 74.8 kg (164 lb 12.8 oz)     GEN: NAD  EYES:PERRLA  Mouth/ENT: Oropharynx is clear.  NECK: no  lymphadenopathy  LUNGS: clear bilaterally  CV: regular, no murmurs, rubs, or gallops  ABDOMEN: soft, non-tender, non-distended,Ostomy in place  EXT: warm, well perfused, no edema  NEURO: alert  SKIN: no rashes     LABORATORY AND IMAGING STUDIES     Lab Results   Component Value Date    WBC 5.1 03/12/2018     Lab Results   Component Value Date    RBC 4.42 03/12/2018     Lab Results   Component Value Date    HGB 14.3 03/12/2018     Lab  Results   Component Value Date    HCT 42.0 03/12/2018     No components found for: MCT  Lab Results   Component Value Date    MCV 95 03/12/2018     Lab Results   Component Value Date    MCH 32.4 03/12/2018     Lab Results   Component Value Date    MCHC 34.0 03/12/2018     Lab Results   Component Value Date    RDW 15.1 03/12/2018     Lab Results   Component Value Date    PLT 80 03/12/2018     Recent Labs   Lab Test  03/12/18   1108  02/26/18   0849   NA  138  140   POTASSIUM  3.9  4.0   CHLORIDE  105  105   CO2  28  29   ANIONGAP  5  6   GLC  99  119*   BUN  15  15   CR  0.82  0.78   MAICOL  8.8  8.5          ASSESSMENT AND PLAN     57-year-old male with newly diagnosed     - Poorly differentiated adenocarcinoma colon  [id number](tumor deposits) with margin involvement -  Stage III   CT scans negative for evidence of metastatic disease  Patient's case was discussed at the tumor board 11/13 and recommendation was to proceed with systemic chemotherapy at this point with plans for additional surgery in future after completion of chemotherapy.  Started on FOLFOX q2 week regimen for 6 months duration - s/p 6 cycles so far   Will proceed with cycle 7 today     - Hepatitis C  Completed antiviral treatment per GI    - Thrombocytopenia  Secondary to chemotherapy Vs Hep C   Oxaliplatin dose reduced to 65 mg/m2 with cycle 6    RTC in 2 weeks for cycle 8.    The patient is ready to learn, no apparent learning barriers were identified, Diagnosis and treatment plans were explained to the patient. The patient expressed understanding of the content. The patient questions were answered to his satisfaction.    Chart documentation with Dragon Voice recognition Software. Although reviewed after completion, some words and grammatical errors may remain.  Padilla Last MD  Attending Physician   Hematology/Medical Oncology

## 2018-03-12 NOTE — MR AVS SNAPSHOT
After Visit Summary   3/12/2018    Rian Malik    MRN: [id number]           Patient Information     Date Of Birth          1960        Visit Information        Provider Department      3/12/2018 11:00 AM NURSE ONLY CANCER CENTER [facility]        Today's Diagnoses     Adenocarcinoma of sigmoid colon (H)    -  1       Follow-ups after your visit        Your next 10 appointments already scheduled     Mar 12, 2018 11:45 AM CDT   Return Visit with Padilla Last MD   [facility] ([facility])    [address] [address] [place] [address]   [phone]            Mar 12, 2018 12:30 PM CDT   Flofox with BAY 4 INFUSION   [facility] ([facility])    [address] [address] [address] [phone]            Mar 26, 2018  8:45 AM CDT   Return Visit with NURSE ONLY CANCER CENTER   [facility] ([facility])    [address] [place] [address] [phone]            Mar 26, 2018  9:15 AM CDT   Return Visit with SAVANNA High CNP   [facility] ([facility])    [address] [facility] [address] [phone]            Mar 26, 2018 10:00 AM CDT   Flofox with BAY 8 INFUSION   [facility] ([facility])    [address] [place] [address] [phone]            May 03, 2018  9:15 AM CDT   Lab with UC LAB   [facility] ([facility])    [address] [facility]  1st Floor  [facility] [address] [phone]            May 03, 2018 10:15 AM CDT   (Arrive by 10:00 AM)   Return General Liver with Yaakov Burroughs MD   [facility] Hepatology ([facility])    [address] [place] [address]  [address]  [facility] [address] [phone]              Who to contact     If you have questions or need follow up information  about today's clinic visit or your schedule please contact [facility] directly at [phone].  Normal or non-critical lab and imaging results will be communicated to you by [employer]im[employer], letter or phone within 4 business days after the clinic has received the results. If you do not hear from us within 7 days, please contact the clinic through [employer] or phone. If you have a critical or abnormal lab result, we will notify you by phone as soon as possible.  Submit refill requests through [facility] or call your pharmacy and they will forward the refill request to us. Please allow 3 business days for your refill to be completed.          Additional Information About Your Visit        [employer] Information     [facility] gives you secure access to your electronic health record. If you see a primary care provider, you can also send messages to your care team and make appointments. If you have questions, please call your primary care clinic.  If you do not have a primary care provider, please call [phone] and they will assist you.      [facility] [employer] is an electronic gateway that provides easy, online access to your medical records. With [facility] [employer], you can request a clinic appointment, read your test results, renew a prescription or communicate with your care team.     To access your existing account, please contact your [facility] or call [phone] for assistance.        Care EveryWhere ID     This is your Care EveryWhere ID. This could be used by other organizations to access your [place] medical records  [id number]         Blood Pressure from Last 3 Encounters:   02/26/18 131/80   02/12/18 129/76   02/05/18 128/80    Weight from Last 3 Encounters:   02/26/18 74.7 kg (164 lb 9.6 oz)   02/12/18 74.8 kg (164 lb 12.8 oz)   02/05/18 74.4 kg (164 lb)              We Performed the Following     CBC with platelets differential     Comprehensive metabolic panel        Primary Care  Provider Office Phone # Fax #    Moreno Harris [phone]       [address]  [facility] [address]        Equal Access to Services     KHANH FLANAGAN : Hadcassius cameron ku kirto Sokaleyali, waaxda luqadaha, qaybta kaalmada adeegyada, alisa banegas enochbria ramirez laNonaharis mg. So [facility] [phone].    ATENCIÓN: Si habla español, tiene a martino disposición servicios gratuitos de asistencia lingüística. Llame al [phone].    We comply with applicable federal civil rights laws and [place] laws. We do not discriminate on the basis of race, color, national origin, age, disability, sex, sexual orientation, or gender identity.            Thank you!     Thank you for choosing [facility]  for your care. Our goal is always to provide you with excellent care. Hearing back from our patients is one way we can continue to improve our services. Please take a few minutes to complete the written survey that you may receive in the mail after your visit with us. Thank you!             Your Updated Medication List - Protect others around you: Learn how to safely use, store and throw away your medicines at www.disposemymeds.org.          This list is accurate as of 3/12/18 11:21 AM.  Always use your most recent med list.                   Brand Name Dispense Instructions for use Diagnosis    docusate sodium 100 MG tablet    COLACE    60 tablet    Take 100 mg by mouth daily    Special screening for malignant neoplasms, colon       LORazepam 0.5 MG tablet    ATIVAN    30 tablet    Take 1 tablet (0.5 mg) by mouth every 4 hours as needed (Anxiety, Nausea/Vomiting or Sleep)    Adenocarcinoma of sigmoid colon (H)       ondansetron 8 MG tablet    ZOFRAN    10 tablet    Take 1 tablet (8 mg) by mouth every 8 hours as needed (Nausea/Vomiting)    Adenocarcinoma of sigmoid colon (H)       prochlorperazine 10 MG tablet    COMPAZINE    30 tablet    Take 1 tablet (10 mg) by mouth every 6 hours as needed  (Nausea/Vomiting)    Adenocarcinoma of sigmoid colon (H)

## 2018-03-12 NOTE — MR AVS SNAPSHOT
After Visit Summary   3/12/2018    Rian Malik    MRN: [id number]           Patient Information     Date Of Birth          1960        Visit Information        Provider Department      3/12/2018 11:45 AM Padilla Last MD [facility]        Today's Diagnoses     Adenocarcinoma of sigmoid colon (H)    -  1       Follow-ups after your visit        Your next 10 appointments already scheduled     Mar 26, 2018  8:45 AM CDT   Return Visit with NURSE ONLY CANCER CENTER   [facility] ([facility])    [address] [facility] [address]   [phone]            Mar 26, 2018  9:15 AM CDT   Return Visit with SAVANNA High CNP   [facility] ([facility])    [address] [address] [address]   [phone]            Mar 26, 2018 10:00 AM CDT   Flofox with BAY 8 INFUSION   [facility] ([facility])    [address] [place] [address]   [phone]            Apr 09, 2018  8:00 AM CDT   Return Visit with NURSE ONLY CANCER CENTER   [facility] ([facility])    [address] [facility] [address]   [phone]            Apr 09, 2018  8:45 AM CDT   Return Visit with Padilla Last MD   [facility])    [address] [facility] [address]   [phone]            Apr 09, 2018  9:30 AM CDT   Flofox with BAY 6 INFUSION   [facility] ([facility])    [address] [facility] [address]   [phone]            May 03, 2018  9:15 AM CDT   Lab with  LAB   [facility] Lab ([facility])    [address] [place] [address] [phone]            May 03, 2018 10:15 AM CDT   (Arrive by 10:00 AM)   Return General Liver with Yaakov Burroughs MD   [facility]  Hepatology ([facility])    [address] [facility]  [address]  [facility] [address] [phone]              Who to contact     If you have questions or need follow up information about today's clinic visit or your schedule please contact [facility] directly at [phone].  Normal or non-critical lab and imaging results will be communicated to you by MyChart, letter or phone within 4 business days after the clinic has received the results. If you do not hear from us within 7 days, please contact the clinic through Ap[employer]yishart or phone. If you have a critical or abnormal lab result, we will notify you by phone as soon as possible.  Submit refill requests through [employer] or call your pharmacy and they will forward the refill request to us. Please allow 3 business days for your refill to be completed.          Additional Information About Your Visit        [employer] Information     [employer] gives you secure access to your electronic health record. If you see a primary care provider, you can also send messages to your care team and make appointments. If you have questions, please call your primary care clinic.  If you do not have a primary care provider, please call [phone] and they will assist you.      [employer] is an electronic gateway that provides easy, online access to your medical records. With [employer], you can request a clinic appointment, read your test results, renew a prescription or communicate with your care team.     To access your existing account, please contact your [facility] or call [phone] for assistance.        Care EveryWhere ID     This is your Care EveryWhere ID. This could be used by other organizations to access your [place] medical records  [id number]        Your Vitals Were     Pulse Temperature Respirations Pulse Oximetry BMI (Body Mass Index)       68 97.7  F (36.5  C) (Oral) 16 96% 25.38 kg/m2        Blood  Pressure from Last 3 Encounters:   03/12/18 123/73   02/26/18 131/80   02/12/18 129/76    Weight from Last 3 Encounters:   03/12/18 75.7 kg (166 lb 14.4 oz)   02/26/18 74.7 kg (164 lb 9.6 oz)   02/12/18 74.8 kg (164 lb 12.8 oz)              Today, you had the following     No orders found for display       Primary Care Provider Office Phone # Fax #    Moreno Harris [phone]       [address]  [facility] [address]        Equal Access to Services     [facility]: Hadii cameron lester hadcas Sosherri, waaxda ludanaadaha, qaybta kaalmada jose, alisa leon . So [facility] [phone].    ATENCIÓN: Si habla español, tiene a martino disposición servicios gratuitos de asistencia lingüística. Llame al [phone].    We comply with applicable federal civil rights laws and [place] laws. We do not discriminate on the basis of race, color, national origin, age, disability, sex, sexual orientation, or gender identity.            Thank you!     Thank you for choosing [facility]  for your care. Our goal is always to provide you with excellent care. Hearing back from our patients is one way we can continue to improve our services. Please take a few minutes to complete the written survey that you may receive in the mail after your visit with us. Thank you!             Your Updated Medication List - Protect others around you: Learn how to safely use, store and throw away your medicines at www.disposemymeds.org.          This list is accurate as of 3/12/18  3:10 PM.  Always use your most recent med list.                   Brand Name Dispense Instructions for use Diagnosis    docusate sodium 100 MG tablet    COLACE    60 tablet    Take 100 mg by mouth daily    Special screening for malignant neoplasms, colon       LORazepam 0.5 MG tablet    ATIVAN    30 tablet    Take 1 tablet (0.5 mg) by mouth every 4 hours as needed (Anxiety, Nausea/Vomiting or Sleep)     Adenocarcinoma of sigmoid colon (H)       ondansetron 8 MG tablet    ZOFRAN    10 tablet    Take 1 tablet (8 mg) by mouth every 8 hours as needed (Nausea/Vomiting)    Adenocarcinoma of sigmoid colon (H)       prochlorperazine 10 MG tablet    COMPAZINE    30 tablet    Take 1 tablet (10 mg) by mouth every 6 hours as needed (Nausea/Vomiting)    Adenocarcinoma of sigmoid colon (H)

## 2018-03-14 ENCOUNTER — HOME INFUSION (PRE-WILLOW HOME INFUSION) (OUTPATIENT)
Dept: PHARMACY | Facility: CLINIC | Age: 58
End: 2018-03-14

## 2018-03-19 NOTE — PROGRESS NOTES
This is a recent snapshot of the patient's [facility] medical record.  For current drug dose and complete information and questions, call [phone] or In Basket pool, fv home infusion ([id number])  CSN Number:  [id number]

## 2018-03-20 NOTE — PROGRESS NOTES
This is a recent snapshot of the patient's [facility] medical record.  For current drug dose and complete information and questions, call [phone] or In [facility] pool, fv home infusion ([id number])  CSN Number:  [id number]

## 2018-03-26 ENCOUNTER — ONCOLOGY VISIT (OUTPATIENT)
Dept: ONCOLOGY | Facility: CLINIC | Age: 58
End: 2018-03-26
Payer: COMMERCIAL

## 2018-03-26 VITALS
WEIGHT: 162.44 LBS | SYSTOLIC BLOOD PRESSURE: 120 MMHG | HEART RATE: 65 BPM | OXYGEN SATURATION: 98 % | TEMPERATURE: 97.6 F | DIASTOLIC BLOOD PRESSURE: 75 MMHG | HEIGHT: 68 IN | BODY MASS INDEX: 24.62 KG/M2

## 2018-03-26 DIAGNOSIS — C18.7 ADENOCARCINOMA OF SIGMOID COLON (H): ICD-10-CM

## 2018-03-26 DIAGNOSIS — C18.7 ADENOCARCINOMA OF SIGMOID COLON (H): Primary | ICD-10-CM

## 2018-03-26 DIAGNOSIS — R19.5 LOOSE STOOLS: Primary | ICD-10-CM

## 2018-03-26 LAB
ALBUMIN SERPL-MCNC: 3.2 G/DL (ref 3.4–5)
ALP SERPL-CCNC: 125 U/L (ref 40–150)
ALT SERPL W P-5'-P-CCNC: 75 U/L (ref 0–70)
ANION GAP SERPL CALCULATED.3IONS-SCNC: 5 MMOL/L (ref 3–14)
AST SERPL W P-5'-P-CCNC: 69 U/L (ref 0–45)
BASOPHILS # BLD AUTO: 0 10E9/L (ref 0–0.2)
BASOPHILS NFR BLD AUTO: 0.6 %
BILIRUB SERPL-MCNC: 1 MG/DL (ref 0.2–1.3)
BUN SERPL-MCNC: 11 MG/DL (ref 7–30)
CALCIUM SERPL-MCNC: 8.6 MG/DL (ref 8.5–10.1)
CHLORIDE SERPL-SCNC: 105 MMOL/L (ref 94–109)
CO2 SERPL-SCNC: 29 MMOL/L (ref 20–32)
CREAT SERPL-MCNC: 0.73 MG/DL (ref 0.66–1.25)
DIFFERENTIAL METHOD BLD: ABNORMAL
EOSINOPHIL # BLD AUTO: 0.1 10E9/L (ref 0–0.7)
EOSINOPHIL NFR BLD AUTO: 3.8 %
ERYTHROCYTE [DISTWIDTH] IN BLOOD BY AUTOMATED COUNT: 14.2 % (ref 10–15)
GFR SERPL CREATININE-BSD FRML MDRD: >90 ML/MIN/1.7M2
GLUCOSE SERPL-MCNC: 99 MG/DL (ref 70–99)
HCT VFR BLD AUTO: 37.1 % (ref 40–53)
HGB BLD-MCNC: 12.8 G/DL (ref 13.3–17.7)
IMM GRANULOCYTES # BLD: 0 10E9/L (ref 0–0.4)
IMM GRANULOCYTES NFR BLD: 0.3 %
LYMPHOCYTES # BLD AUTO: 1.1 10E9/L (ref 0.8–5.3)
LYMPHOCYTES NFR BLD AUTO: 35.2 %
MCH RBC QN AUTO: 32.7 PG (ref 26.5–33)
MCHC RBC AUTO-ENTMCNC: 34.5 G/DL (ref 31.5–36.5)
MCV RBC AUTO: 95 FL (ref 78–100)
MONOCYTES # BLD AUTO: 0.4 10E9/L (ref 0–1.3)
MONOCYTES NFR BLD AUTO: 13.8 %
NEUTROPHILS # BLD AUTO: 1.5 10E9/L (ref 1.6–8.3)
NEUTROPHILS NFR BLD AUTO: 46.3 %
PLATELET # BLD AUTO: 57 10E9/L (ref 150–450)
POTASSIUM SERPL-SCNC: 4 MMOL/L (ref 3.4–5.3)
PROT SERPL-MCNC: 6.9 G/DL (ref 6.8–8.8)
RBC # BLD AUTO: 3.91 10E12/L (ref 4.4–5.9)
SODIUM SERPL-SCNC: 139 MMOL/L (ref 133–144)
WBC # BLD AUTO: 3.2 10E9/L (ref 4–11)

## 2018-03-26 PROCEDURE — 80053 COMPREHEN METABOLIC PANEL: CPT | Performed by: NURSE PRACTITIONER

## 2018-03-26 PROCEDURE — 85025 COMPLETE CBC W/AUTO DIFF WBC: CPT | Performed by: NURSE PRACTITIONER

## 2018-03-26 PROCEDURE — 99207 ZZC NO CHARGE NURSE ONLY: CPT

## 2018-03-26 PROCEDURE — 99215 OFFICE O/P EST HI 40 MIN: CPT | Performed by: NURSE PRACTITIONER

## 2018-03-26 RX ORDER — ONDANSETRON 8 MG/1
8 TABLET, FILM COATED ORAL EVERY 8 HOURS PRN
Qty: 30 TABLET | Refills: 1 | Status: SHIPPED | OUTPATIENT
Start: 2018-03-26 | End: 2018-08-27

## 2018-03-26 RX ORDER — EPINEPHRINE 0.3 MG/.3ML
0.3 INJECTION SUBCUTANEOUS EVERY 5 MIN PRN
Status: CANCELLED | OUTPATIENT
Start: 2018-03-30

## 2018-03-26 RX ORDER — LORAZEPAM 2 MG/ML
0.5 INJECTION INTRAMUSCULAR EVERY 4 HOURS PRN
Status: CANCELLED
Start: 2018-03-30

## 2018-03-26 RX ORDER — ALBUTEROL SULFATE 0.83 MG/ML
2.5 SOLUTION RESPIRATORY (INHALATION)
Status: CANCELLED | OUTPATIENT
Start: 2018-03-30

## 2018-03-26 RX ORDER — SODIUM CHLORIDE 9 MG/ML
1000 INJECTION, SOLUTION INTRAVENOUS CONTINUOUS PRN
Status: CANCELLED
Start: 2018-03-30

## 2018-03-26 RX ORDER — MEPERIDINE HYDROCHLORIDE 50 MG/ML
25 INJECTION INTRAMUSCULAR; INTRAVENOUS; SUBCUTANEOUS EVERY 30 MIN PRN
Status: CANCELLED | OUTPATIENT
Start: 2018-03-30

## 2018-03-26 RX ORDER — DIPHENHYDRAMINE HYDROCHLORIDE 50 MG/ML
50 INJECTION INTRAMUSCULAR; INTRAVENOUS
Status: CANCELLED
Start: 2018-03-30

## 2018-03-26 RX ORDER — EPINEPHRINE 1 MG/ML
0.3 INJECTION, SOLUTION INTRAMUSCULAR; SUBCUTANEOUS EVERY 5 MIN PRN
Status: CANCELLED | OUTPATIENT
Start: 2018-03-30

## 2018-03-26 RX ORDER — METHYLPREDNISOLONE SODIUM SUCCINATE 125 MG/2ML
125 INJECTION, POWDER, LYOPHILIZED, FOR SOLUTION INTRAMUSCULAR; INTRAVENOUS
Status: CANCELLED
Start: 2018-03-30

## 2018-03-26 RX ORDER — HEPARIN SODIUM (PORCINE) LOCK FLUSH IV SOLN 100 UNIT/ML 100 UNIT/ML
5 SOLUTION INTRAVENOUS
Status: DISCONTINUED | OUTPATIENT
Start: 2018-03-26 | End: 2018-03-26 | Stop reason: HOSPADM

## 2018-03-26 RX ORDER — ONDANSETRON 8 MG/1
8 TABLET, FILM COATED ORAL EVERY 8 HOURS PRN
Qty: 10 TABLET | Refills: 2 | Status: SHIPPED | OUTPATIENT
Start: 2018-03-26 | End: 2018-03-26

## 2018-03-26 RX ORDER — ALBUTEROL SULFATE 90 UG/1
1-2 AEROSOL, METERED RESPIRATORY (INHALATION)
Status: CANCELLED
Start: 2018-03-30

## 2018-03-26 RX ADMIN — HEPARIN SODIUM (PORCINE) LOCK FLUSH IV SOLN 100 UNIT/ML 5 ML: 100 SOLUTION at 08:53

## 2018-03-26 ASSESSMENT — PAIN SCALES - GENERAL: PAINLEVEL: NO PAIN (0)

## 2018-03-26 NOTE — MR AVS SNAPSHOT
After Visit Summary   3/26/2018    Rian Malik    MRN: [id number]           Patient Information     Date Of Birth          1960        Visit Information        Provider Department      3/26/2018 8:45 AM NURSE ONLY CANCER CENTER [facility]        Today's Diagnoses     Adenocarcinoma of sigmoid colon (H)    -  1       Follow-ups after your visit        Your next 10 appointments already scheduled     Mar 26, 2018  9:15 AM CDT   Return Visit with SAVANNA High CNP   [facility] ([facility])    [address] [place] [address]   [phone]            Mar 26, 2018 10:00 AM CDT   Flofox with BAY 8 INFUSION   [facility] ([facility])    [address] [address]   [phone]            Apr 09, 2018  8:00 AM CDT   Return Visit with NURSE ONLY CANCER CENTER   [facility] ([facility])    [address] [facility] [address]   [phone]            Apr 09, 2018  8:45 AM CDT   Return Visit with Padilla Last MD   [facility] ([facility])    [address] [facility] [address]   [phone]            Apr 09, 2018  9:30 AM CDT   Flofox with BAY 6 INFUSION   [facility] ([facility])    [address] [place] [address]   [phone]            May 03, 2018  9:15 AM CDT   Lab with UC LAB   [facility] ([facility])    [address] [place] [address]  1st Floor  [facility] [address] [phone]            May 03, 2018 10:15 AM CDT   (Arrive by 10:00 AM)   Return General Liver with Yaakov Burroughs MD   [facility]at[facility] ([facility])    [address] [place] [address]  [address]  [facility] [address] [phone]              Who to contact     If you have questions or need follow up information  about today's clinic visit or your schedule please contact [facility] directly at [phone].  Normal or non-critical lab and imaging results will be communicated to you by MailWriterhart, letter or phone within 4 business days after the clinic has received the results. If you do not hear from us within 7 days, please contact the clinic through [employer] or phone. If you have a critical or abnormal lab result, we will notify you by phone as soon as possible.  Submit refill requests through [employer] or call your pharmacy and they will forward the refill request to us. Please allow 3 business days for your refill to be completed.          Additional Information About Your Visit        [employer] Information     [employer] gives you secure access to your electronic health record. If you see a primary care provider, you can also send messages to your care team and make appointments. If you have questions, please call your primary care clinic.  If you do not have a primary care provider, please call [phone] and they will assist you.      [employer] is an electronic gateway that provides easy, online access to your medical records. With [employer], you can request a clinic appointment, read your test results, renew a prescription or communicate with your care team.     To access your existing account, please contact your [facility] or call [phone] for assistance.        Care EveryWhere ID     This is your Care EveryWhere ID. This could be used by other organizations to access your [place] medical records  [id number]         Blood Pressure from Last 3 Encounters:   03/12/18 123/73   02/26/18 131/80   02/12/18 129/76    Weight from Last 3 Encounters:   03/12/18 75.7 kg (166 lb 14.4 oz)   02/26/18 74.7 kg (164 lb 9.6 oz)   02/12/18 74.8 kg (164 lb 12.8 oz)              We Performed the Following     CBC with platelets differential     Comprehensive metabolic panel        Primary Care  Provider Office Phone # Fax #    Moreno Harris [phone]       [address]  [facility] [address]        Equal Access to Services     KHANH FLANAGAN : Hadcassius cameron ku kirto Sokaleyali, waaxda luqadaha, qaybta kaalmada adeegyada, alisa banegas enochbria ramirez laNonaharis mg. So [facility] [phone].    ATENCIÓN: Si habla español, tiene a martino disposición servicios gratuitos de asistencia lingüística. Llame al [phone].    We comply with applicable federal civil rights laws and [place] laws. We do not discriminate on the basis of race, color, national origin, age, disability, sex, sexual orientation, or gender identity.            Thank you!     Thank you for choosing [facility]  for your care. Our goal is always to provide you with excellent care. Hearing back from our patients is one way we can continue to improve our services. Please take a few minutes to complete the written survey that you may receive in the mail after your visit with us. Thank you!             Your Updated Medication List - Protect others around you: Learn how to safely use, store and throw away your medicines at www.disposemymeds.org.          This list is accurate as of 3/26/18  9:04 AM.  Always use your most recent med list.                   Brand Name Dispense Instructions for use Diagnosis    docusate sodium 100 MG tablet    COLACE    60 tablet    Take 100 mg by mouth daily    Special screening for malignant neoplasms, colon       LORazepam 0.5 MG tablet    ATIVAN    30 tablet    Take 1 tablet (0.5 mg) by mouth every 4 hours as needed (Anxiety, Nausea/Vomiting or Sleep)    Adenocarcinoma of sigmoid colon (H)       ondansetron 8 MG tablet    ZOFRAN    10 tablet    Take 1 tablet (8 mg) by mouth every 8 hours as needed (Nausea/Vomiting)    Adenocarcinoma of sigmoid colon (H)       prochlorperazine 10 MG tablet    COMPAZINE    30 tablet    Take 1 tablet (10 mg) by mouth every 6 hours as needed  (Nausea/Vomiting)    Adenocarcinoma of sigmoid colon (H)

## 2018-03-26 NOTE — MR AVS SNAPSHOT
After Visit Summary   3/26/2018    Rian Malik    MRN: [id number]           Patient Information     Date Of Birth          1960        Visit Information        Provider Department      3/26/2018 9:15 AM Jessica Marsh, SAVANNA CNP [facility]        Today's Diagnoses     Adenocarcinoma of sigmoid colon (H)           Follow-ups after your visit        Your next 10 appointments already scheduled     Mar 26, 2018 10:00 AM CDT   Flofox with BAY 8 INFUSION   [facility])    [address] [facility] [place] [address]   [phone]            Apr 02, 2018  7:30 AM CDT   Return Visit with NURSE ONLY CANCER CENTER   [facility])    [address] [facility] [place] [address]   [phone]            Apr 02, 2018  8:00 AM CDT   Flofox with BAY 2 INFUSION   [facility])    [address] [facility] [place] [address]   [phone]            Apr 16, 2018 10:00 AM CDT   Return Visit with NURSE ONLY CANCER CENTER   [facility])    [address] [facility] [place] [address]   [phone]            Apr 16, 2018 10:45 AM CDT   Return Visit with Padilla Last MD   [facility])    [address] [facility] [place] [address]   [phone]            Apr 16, 2018 11:30 AM CDT   Flofox with BAY 5 INFUSION   [facility])    [address] [facility] [place] [address]   [phone]            May 03, 2018  9:15 AM CDT   Lab with UC LAB   [facility] ([facility])    [address] [place] [id number] [phone]            May 03, 2018 10:15 AM CDT   (Arrive by 10:00 AM)   Return General Liver with Yaakov Burroughs MD   [facility] ([facility]    [address] [facility]  [address]  [facility] [address] [phone]              Who to contact     If you have questions or need follow up information about today's clinic visit or your schedule please contact [facility] directly at [phone].  Normal or non-critical lab and imaging results will be communicated to you by MyChart, letter or phone within 4 business days after the clinic has received the results. If you do not hear from us within 7 days, please contact the clinic through [employer] or phone. If you have a critical or abnormal lab result, we will notify you by phone as soon as possible.  Submit refill requests through [employer] or call your pharmacy and they will forward the refill request to us. Please allow 3 business days for your refill to be completed.          Additional Information About Your Visit        [employer] Information     [employer] gives you secure access to your electronic health record. If you see a primary care provider, you can also send messages to your care team and make appointments. If you have questions, please call your primary care clinic.  If you do not have a primary care provider, please call [phone] and they will assist you.      [employer] is an electronic gateway that provides easy, online access to your medical records. With [employer], you can request a clinic appointment, read your test results, renew a prescription or communicate with your care team.     To access your existing account, please contact your [facility] or call [phone] for assistance.        Care EveryWhere ID     This is your Care EveryWhere ID. This could be used by other organizations to access your [place] medical records  [id number]        Your Vitals Were     Pulse Temperature Height Pulse Oximetry BMI (Body Mass Index)       65 97.6  F (36.4  C) 1.727 m (5' 8\") 98% 24.7 kg/m2        Blood Pressure from Last 3 " Encounters:   03/26/18 120/75   03/12/18 123/73   02/26/18 131/80    Weight from Last 3 Encounters:   03/26/18 73.7 kg (162 lb 7 oz)   03/12/18 75.7 kg (166 lb 14.4 oz)   02/26/18 74.7 kg (164 lb 9.6 oz)              Today, you had the following     No orders found for display         Today's Medication Changes          These changes are accurate as of 3/26/18  9:47 AM.  If you have any questions, ask your nurse or doctor.               Start taking these medicines.        Dose/Directions    ondansetron 8 MG tablet   Commonly known as:  ZOFRAN   Used for:  Adenocarcinoma of sigmoid colon (H)   Started by:  Jessica Marsh APRN CNP        Dose:  8 mg   Take 1 tablet (8 mg) by mouth every 8 hours as needed (Nausea/Vomiting)   Quantity:  30 tablet   Refills:  1            Where to get your medicines      These medications were sent to [facility] - [place], [place] - [address]  [address], [facility] [address]     Phone:  [phone]     ondansetron 8 MG tablet                Primary Care Provider Office Phone # Fax #    Moreno Harris [phone]       [address]  [facility] [address]        Equal Access to Services     [facility] HospitalALESSANDRO : Hadii cameron lester hadasho Soomaali, waaxda luqadaha, qaybta kaalmada adebriayada, alisa mg. So [facility] [phone].    ATENCIÓN: Si habla español, tiene a martino disposición servicios gratuitos de asistencia lingüística. Llame al [phone].    We comply with applicable federal civil rights laws and [place] laws. We do not discriminate on the basis of race, color, national origin, age, disability, sex, sexual orientation, or gender identity.            Thank you!     Thank you for choosing [facility]  for your care. Our goal is always to provide you with excellent care. Hearing back from our patients is one way we can continue to improve our services. Please take a few  minutes to complete the written survey that you may receive in the mail after your visit with us. Thank you!             Your Updated Medication List - Protect others around you: Learn how to safely use, store and throw away your medicines at www.disposemymeds.org.          This list is accurate as of 3/26/18  9:47 AM.  Always use your most recent med list.                   Brand Name Dispense Instructions for use Diagnosis    docusate sodium 100 MG tablet    COLACE    60 tablet    Take 100 mg by mouth daily    Special screening for malignant neoplasms, colon       LORazepam 0.5 MG tablet    ATIVAN    30 tablet    Take 1 tablet (0.5 mg) by mouth every 4 hours as needed (Anxiety, Nausea/Vomiting or Sleep)    Adenocarcinoma of sigmoid colon (H)       ondansetron 8 MG tablet    ZOFRAN    30 tablet    Take 1 tablet (8 mg) by mouth every 8 hours as needed (Nausea/Vomiting)    Adenocarcinoma of sigmoid colon (H)       prochlorperazine 10 MG tablet    COMPAZINE    30 tablet    Take 1 tablet (10 mg) by mouth every 6 hours as needed (Nausea/Vomiting)    Adenocarcinoma of sigmoid colon (H)

## 2018-03-26 NOTE — LETTER
3/26/2018         RE: R[facility] Malik  [address] [place] [place] [address]        Dear Colleague,    Thank you for referring your patient, Rian Malik, to the [facility] [place]. Please see a copy of my visit note below.    Oncology Follow Up Visit: March 26, 2018     Oncologist: Dr Real Xiao  PCP: Moreno Harris    Diagnosis: Stage III Colon Cancer  Rian Malik is a 58 yo  male that was c/o lower abdominal cramping. CT showed sigmoid abscess but at resection was found to have large 5 cm firmly adherent sigmoid colon mass.Lymphovascular invasion present but no perineural invasion.  0/3 Reactive Lymph Nodes were positive. (pT4N1c)  He has intact mismatch repair.  Treatment:   10/11/2017 sigmoidectomy with end colostomy  11/21/2017 began modified FOLFOX-6    Interval History: Mr. Malik comes to clinic for symptom review prior to Cycle 8 of FOLFOX. Pt shares he is feeling well with  Few issues. He does have loose stools noted throughout plan- using imodium with good response if needed. Has not changed diet and has good appetite with no weight loss. He denies nausea, pain, SOB chest pain, weakness or rashes.He does admits to some tongue sensitivity and is using the salt/soda mouth rinses up to 4 x daily.   Rest of comprehensive and complete ROS is reviewed and is negative.   Past Medical History:   Diagnosis Date     Cirrhosis (H)      Colon cancer (H) 10/11/2017    Poorly differentiated adenocarcinoma     Hepatitis C    - previously completed treatment for Hepatitis C.   Current Outpatient Prescriptions   Medication     LORazepam (ATIVAN) 0.5 MG tablet     ondansetron (ZOFRAN) 8 MG tablet     prochlorperazine (COMPAZINE) 10 MG tablet     docusate sodium (COLACE) 100 MG tablet     No current facility-administered medications for this visit.      Allergies   Allergen Reactions     Acetaminophen      Naproxen        Physical Exam:/75  Pulse 65  Temp 97.6  F (36.4  C)  " Ht 1.727 m (5' 8\")  Wt 73.7 kg (162 lb 7 oz)  SpO2 98%  BMI 24.7 kg/m2   ECOG PS- 0  Constitutional: Alert and in no distress.   ENT: Eyes bright, No mouth sores- tongue appears normal without sores  Neck: Supple, No adenopathy.Thyroid symmetric  Cardiac: Heart rate and rhythm is regular and strong without murmur  Respiratory: Breathing easy. Lung sounds clear to auscultation  GI: Abdomen is soft, non-tender, BS normal and colostomy with normal stool content is good consistency. No masses or organomegaly  MS: Muscle tone normal- moving well on own, extremities normal with no edema.   Skin: No suspicious lesions or rashes  Neuro: Sensory grossly WNL, gait normal.   Lymph: Normal ant/post cervical, axillary, supraclavicular nodes  Psych: Mentation appears normal and affect normal/bright and smiling    Laboratory Results:   Results for orders placed or performed in visit on 03/26/18   CBC with platelets differential   Result Value Ref Range    WBC 3.2 (L) 4.0 - 11.0 10e9/L    RBC Count 3.91 (L) 4.4 - 5.9 10e12/L    Hemoglobin 12.8 (L) 13.3 - 17.7 g/dL    Hematocrit 37.1 (L) 40.0 - 53.0 %    MCV 95 78 - 100 fl    MCH 32.7 26.5 - 33.0 pg    MCHC 34.5 31.5 - 36.5 g/dL    RDW 14.2 10.0 - 15.0 %    Platelet Count 57 (L) 150 - 450 10e9/L    Diff Method Automated Method     % Neutrophils 46.3 %    % Lymphocytes 35.2 %    % Monocytes 13.8 %    % Eosinophils 3.8 %    % Basophils 0.6 %    % Immature Granulocytes 0.3 %    Absolute Neutrophil 1.5 (L) 1.6 - 8.3 10e9/L    Absolute Lymphocytes 1.1 0.8 - 5.3 10e9/L    Absolute Monocytes 0.4 0.0 - 1.3 10e9/L    Absolute Eosinophils 0.1 0.0 - 0.7 10e9/L    Absolute Basophils 0.0 0.0 - 0.2 10e9/L    Abs Immature Granulocytes 0.0 0 - 0.4 10e9/L     Assessment and Plan:   Stage III Colon Cancer-Pt has been tolerating the FOLFOX treatment well with few side effects. His blood counts are however, showing need to hold treatment with thrombocytopenia with Platelets=57,000 though " neutropenia does meet goals for treatment.   He has had initial dose decrease of oxaliplatin with cycle 6 to 88% and cycle 7 went to 76.5%.   We will hold x 1 week and continue with plan- we will discontinuing bolus 5FU since this is the 3nd hold due to thrombocytopenia with previous dose decreases with the Oxaliplatin.   Loose stools- Though he has seen variability in stools with more firmness early in plan and has more loose stools as cycle continues, he has used imodium properly. Reminded to have adequate fluid intake each day.   This was a 25 min visit with > 50% in counseling and coordinating care including education and management of concerns.    Jessica Marsh CNP      Again, thank you for allowing me to participate in the care of your patient.        Sincerely,        Jessica Marsh NP, APRN CNP

## 2018-03-26 NOTE — NURSING NOTE
"Oncology Rooming Note    March 26, 2018 9:15 AM   Rian Malik is a 57 year old male who presents for:    Chief Complaint   Patient presents with     Oncology Clinic Visit     prior to treatment      Initial Vitals: /75  Pulse 65  Temp 97.6  F (36.4  C)  Ht 1.727 m (5' 8\")  Wt 73.7 kg (162 lb 7 oz)  SpO2 98%  BMI 24.7 kg/m2 Estimated body mass index is 24.7 kg/(m^2) as calculated from the following:    Height as of this encounter: 1.727 m (5' 8\").    Weight as of this encounter: 73.7 kg (162 lb 7 oz). Body surface area is 1.88 meters squared.  No Pain (0) Comment: Data Unavailable   No LMP for male patient.  Allergies reviewed: Yes  Medications reviewed: Yes    Medications: MEDICATION REFILLS NEEDED TODAY. Provider was notified.  Pharmacy name entered into Tiansheng:    [facility] - [place], [place] - [address]  [facility] [place], [place] - [address]        5 minutes for nursing intake (face to face time)     Rika Boo LPN              "

## 2018-03-26 NOTE — PROGRESS NOTES
Power port needle left accessed for treatment. Tolerated lab draw without complaint. [place] drawn-Red/Green/Purple tubes. Double signed by patient and RN. See documentation flowsheet. Nuha De Los Santos RN, BSN, OCN  09:50 Pt will not receive tx today d/t low platelets per Jessica VEGA. Port needle de-accessed without complaint. Nuha De Los Santos, RN

## 2018-03-26 NOTE — PROGRESS NOTES
"Oncology Follow Up Visit: March 26, 2018     Oncologist: Dr Real Xiao  PCP: Moreno Harris    Diagnosis: Stage III Colon Cancer  Rian Malik is a 56 yo  male that was c/o lower abdominal cramping. CT showed sigmoid abscess but at resection was found to have large 5 cm firmly adherent sigmoid colon mass.Lymphovascular invasion present but no perineural invasion.  0/3 Reactive Lymph Nodes were positive. (pT4N1c)  He has intact mismatch repair.  Treatment:   10/11/2017 sigmoidectomy with end colostomy  11/21/2017 began modified FOLFOX-6    Interval History: Mr. Malik comes to clinic for symptom review prior to Cycle 8 of FOLFOX. Pt shares he is feeling well with  Few issues. He does have loose stools noted throughout plan- using imodium with good response if needed. Has not changed diet and has good appetite with no weight loss. He denies nausea, pain, SOB chest pain, weakness or rashes.He does admits to some tongue sensitivity and is using the salt/soda mouth rinses up to 4 x daily.   Rest of comprehensive and complete ROS is reviewed and is negative.   Past Medical History:   Diagnosis Date     Cirrhosis (H)      Colon cancer (H) 10/11/2017    Poorly differentiated adenocarcinoma     Hepatitis C    - previously completed treatment for Hepatitis C.   Current Outpatient Prescriptions   Medication     LORazepam (ATIVAN) 0.5 MG tablet     ondansetron (ZOFRAN) 8 MG tablet     prochlorperazine (COMPAZINE) 10 MG tablet     docusate sodium (COLACE) 100 MG tablet     No current facility-administered medications for this visit.      Allergies   Allergen Reactions     Acetaminophen      Naproxen        Physical Exam:/75  Pulse 65  Temp 97.6  F (36.4  C)  Ht 1.727 m (5' 8\")  Wt 73.7 kg (162 lb 7 oz)  SpO2 98%  BMI 24.7 kg/m2   ECOG PS- 0  Constitutional: Alert and in no distress.   ENT: Eyes bright, No mouth sores- tongue appears normal without sores  Neck: Supple, No adenopathy.Thyroid " symmetric  Cardiac: Heart rate and rhythm is regular and strong without murmur  Respiratory: Breathing easy. Lung sounds clear to auscultation  GI: Abdomen is soft, non-tender, BS normal and colostomy with normal stool content is good consistency. No masses or organomegaly  MS: Muscle tone normal- moving well on own, extremities normal with no edema.   Skin: No suspicious lesions or rashes  Neuro: Sensory grossly WNL, gait normal.   Lymph: Normal ant/post cervical, axillary, supraclavicular nodes  Psych: Mentation appears normal and affect normal/bright and smiling    Laboratory Results:   Results for orders placed or performed in visit on 03/26/18   CBC with platelets differential   Result Value Ref Range    WBC 3.2 (L) 4.0 - 11.0 10e9/L    RBC Count 3.91 (L) 4.4 - 5.9 10e12/L    Hemoglobin 12.8 (L) 13.3 - 17.7 g/dL    Hematocrit 37.1 (L) 40.0 - 53.0 %    MCV 95 78 - 100 fl    MCH 32.7 26.5 - 33.0 pg    MCHC 34.5 31.5 - 36.5 g/dL    RDW 14.2 10.0 - 15.0 %    Platelet Count 57 (L) 150 - 450 10e9/L    Diff Method Automated Method     % Neutrophils 46.3 %    % Lymphocytes 35.2 %    % Monocytes 13.8 %    % Eosinophils 3.8 %    % Basophils 0.6 %    % Immature Granulocytes 0.3 %    Absolute Neutrophil 1.5 (L) 1.6 - 8.3 10e9/L    Absolute Lymphocytes 1.1 0.8 - 5.3 10e9/L    Absolute Monocytes 0.4 0.0 - 1.3 10e9/L    Absolute Eosinophils 0.1 0.0 - 0.7 10e9/L    Absolute Basophils 0.0 0.0 - 0.2 10e9/L    Abs Immature Granulocytes 0.0 0 - 0.4 10e9/L     Assessment and Plan:   Stage III Colon Cancer-Pt has been tolerating the FOLFOX treatment well with few side effects. His blood counts are however, showing need to hold treatment with thrombocytopenia with Platelets=57,000 though neutropenia does meet goals for treatment.   He has had initial dose decrease of oxaliplatin with cycle 6 to 88% and cycle 7 went to 76.5%.   We will hold x 1 week and continue with plan- we will discontinuing bolus 5FU since this is the 3nd hold due  to thrombocytopenia with previous dose decreases with the Oxaliplatin.   Loose stools- Though he has seen variability in stools with more firmness early in plan and has more loose stools as cycle continues, he has used imodium properly. Reminded to have adequate fluid intake each day.   This was a 25 min visit with > 50% in counseling and coordinating care including education and management of concerns.    Jessica Marsh,CNP

## 2018-04-02 ENCOUNTER — ONCOLOGY VISIT (OUTPATIENT)
Dept: ONCOLOGY | Facility: CLINIC | Age: 58
End: 2018-04-02
Payer: COMMERCIAL

## 2018-04-02 DIAGNOSIS — C18.7 ADENOCARCINOMA OF SIGMOID COLON (H): Primary | ICD-10-CM

## 2018-04-02 LAB
ALBUMIN SERPL-MCNC: 3.3 G/DL (ref 3.4–5)
ALP SERPL-CCNC: 123 U/L (ref 40–150)
ALT SERPL W P-5'-P-CCNC: 65 U/L (ref 0–70)
ANION GAP SERPL CALCULATED.3IONS-SCNC: 7 MMOL/L (ref 3–14)
AST SERPL W P-5'-P-CCNC: 63 U/L (ref 0–45)
BASOPHILS # BLD AUTO: 0 10E9/L (ref 0–0.2)
BASOPHILS NFR BLD AUTO: 1.2 %
BILIRUB SERPL-MCNC: 0.7 MG/DL (ref 0.2–1.3)
BUN SERPL-MCNC: 11 MG/DL (ref 7–30)
CALCIUM SERPL-MCNC: 8.7 MG/DL (ref 8.5–10.1)
CHLORIDE SERPL-SCNC: 106 MMOL/L (ref 94–109)
CO2 SERPL-SCNC: 27 MMOL/L (ref 20–32)
CREAT SERPL-MCNC: 0.78 MG/DL (ref 0.66–1.25)
DIFFERENTIAL METHOD BLD: ABNORMAL
EOSINOPHIL # BLD AUTO: 0.1 10E9/L (ref 0–0.7)
EOSINOPHIL NFR BLD AUTO: 2.9 %
ERYTHROCYTE [DISTWIDTH] IN BLOOD BY AUTOMATED COUNT: 14.3 % (ref 10–15)
GFR SERPL CREATININE-BSD FRML MDRD: >90 ML/MIN/1.7M2
GLUCOSE SERPL-MCNC: 163 MG/DL (ref 70–99)
HCT VFR BLD AUTO: 39.1 % (ref 40–53)
HGB BLD-MCNC: 13.5 G/DL (ref 13.3–17.7)
IMM GRANULOCYTES # BLD: 0 10E9/L (ref 0–0.4)
IMM GRANULOCYTES NFR BLD: 0.4 %
LYMPHOCYTES # BLD AUTO: 1.1 10E9/L (ref 0.8–5.3)
LYMPHOCYTES NFR BLD AUTO: 44.9 %
MCH RBC QN AUTO: 33.3 PG (ref 26.5–33)
MCHC RBC AUTO-ENTMCNC: 34.5 G/DL (ref 31.5–36.5)
MCV RBC AUTO: 97 FL (ref 78–100)
MONOCYTES # BLD AUTO: 0.4 10E9/L (ref 0–1.3)
MONOCYTES NFR BLD AUTO: 14.3 %
NEUTROPHILS # BLD AUTO: 0.9 10E9/L (ref 1.6–8.3)
NEUTROPHILS NFR BLD AUTO: 36.3 %
PLATELET # BLD AUTO: 82 10E9/L (ref 150–450)
POTASSIUM SERPL-SCNC: 3.9 MMOL/L (ref 3.4–5.3)
PROT SERPL-MCNC: 6.9 G/DL (ref 6.8–8.8)
RBC # BLD AUTO: 4.05 10E12/L (ref 4.4–5.9)
SODIUM SERPL-SCNC: 140 MMOL/L (ref 133–144)
WBC # BLD AUTO: 2.5 10E9/L (ref 4–11)

## 2018-04-02 PROCEDURE — 36591 DRAW BLOOD OFF VENOUS DEVICE: CPT

## 2018-04-02 PROCEDURE — 85025 COMPLETE CBC W/AUTO DIFF WBC: CPT | Performed by: NURSE PRACTITIONER

## 2018-04-02 PROCEDURE — 80053 COMPREHEN METABOLIC PANEL: CPT | Performed by: NURSE PRACTITIONER

## 2018-04-02 RX ORDER — HEPARIN SODIUM (PORCINE) LOCK FLUSH IV SOLN 100 UNIT/ML 100 UNIT/ML
5 SOLUTION INTRAVENOUS
Status: DISCONTINUED | OUTPATIENT
Start: 2018-04-02 | End: 2018-04-02 | Stop reason: HOSPADM

## 2018-04-02 RX ADMIN — HEPARIN SODIUM (PORCINE) LOCK FLUSH IV SOLN 100 UNIT/ML 5 ML: 100 SOLUTION at 07:34

## 2018-04-02 NOTE — PROGRESS NOTES
Power port needle left accessed for treatment. Tolerated lab draw without complaint. [place] drawn-Red/Green/Purple tubes. Double signed by patient and RN. See documentation flowsheet. Nuha De Los Santos, RN, BSN, OCN

## 2018-04-02 NOTE — PROGRESS NOTES
ANC today is 0.9.  Per Jessica Marsh NP, delay cycle 8 another week.  The plan had been adjusted last week removing the Fluorouracil bolus for cycle 8.

## 2018-04-02 NOTE — MR AVS SNAPSHOT
After Visit Summary   4/2/2018    Rian Malik    MRN: [id number]           Patient Information     Date Of Birth          1960        Visit Information        Provider Department      4/2/2018 7:30 AM NURSE ONLY CANCER CENTER [facility]        Today's Diagnoses     Adenocarcinoma of sigmoid colon (H)    -  1       Follow-ups after your visit        Your next 10 appointments already scheduled     Apr 16, 2018 10:00 AM CDT   Return Visit with NURSE ONLY CANCER CENTER   [facility] ([facility])    [address] [place] [id number] [phone]            Apr 16, 2018 10:45 AM CDT   Return Visit with Padilla Last MD   [facility] ([facility])    [address] [place] [address] [id number] [phone]            Apr 16, 2018 11:30 AM CDT   Flofox with BAY 5 INFUSION   [facility] ([facility])    [address] [place] [address] [id number] [phone]            May 03, 2018  9:15 AM CDT   Lab with UC LAB   [facility] ([facility])    [address]  1st Floor  [facility] [address] [phone]            May 03, 2018 10:15 AM CDT   (Arrive by 10:00 AM)   Return General Liver with Yaakov Burroughs MD   [facility] ([facility])    [address]  [address]  [facility] [address] [phone]              Who to contact     If you have questions or need follow up information about today's clinic visit or your schedule please contact [facility] directly at [phone].  Normal or non-critical lab and imaging results will be communicated to you by MyChart, letter or phone within 4 business days after the clinic has received the results. If you do not hear from us within 7 days, please contact the clinic through MyChart or phone. If you have a critical or  abnormal lab result, we will notify you by phone as soon as possible.  Submit refill requests through [employer] or call your pharmacy and they will forward the refill request to us. Please allow 3 business days for your refill to be completed.          Additional Information About Your Visit        [employer] Information     [employer] gives you secure access to your electronic health record. If you see a primary care provider, you can also send messages to your care team and make appointments. If you have questions, please call your primary care clinic.  If you do not have a primary care provider, please call [phone] and they will assist you.      [employer] is an electronic gateway that provides easy, online access to your medical records. With [employer], you can request a clinic appointment, read your test results, renew a prescription or communicate with your care team.     To access your existing account, please contact your [facility] or call [phone] for assistance.        Care EveryWhere ID     This is your Care EveryWhere ID. This could be used by other organizations to access your [place] medical records  [id number]         Blood Pressure from Last 3 Encounters:   03/26/18 120/75   03/12/18 123/73   02/26/18 131/80    Weight from Last 3 Encounters:   03/26/18 73.7 kg (162 lb 7 oz)   03/12/18 75.7 kg (166 lb 14.4 oz)   02/26/18 74.7 kg (164 lb 9.6 oz)              We Performed the Following     CBC with platelets differential     Comprehensive metabolic panel        Primary Care Provider Office Phone # Fax #    Moreno Harris [phone]       [address]  [facility] [address]        Equal Access to Services     [facility]: Hadii cameron Meraz, waaxda maverickqadaha, qaybta calinalalisa queen. So [facility] [phone].    ATENCIÓN: Si habla español, tiene a martino disposición servicios gratuitos de  asistencia lingüística. Alfredo al [phone].    We comply with applicable federal civil rights laws and [place] laws. We do not discriminate on the basis of race, color, national origin, age, disability, sex, sexual orientation, or gender identity.            Thank you!     Thank you for choosing [facility]  for your care. Our goal is always to provide you with excellent care. Hearing back from our patients is one way we can continue to improve our services. Please take a few minutes to complete the written survey that you may receive in the mail after your visit with us. Thank you!             Your Updated Medication List - Protect others around you: Learn how to safely use, store and throw away your medicines at www.disposemymeds.org.          This list is accurate as of 4/2/18  8:56 AM.  Always use your most recent med list.                   Brand Name Dispense Instructions for use Diagnosis    docusate sodium 100 MG tablet    COLACE    60 tablet    Take 100 mg by mouth daily    Special screening for malignant neoplasms, colon       LORazepam 0.5 MG tablet    ATIVAN    30 tablet    Take 1 tablet (0.5 mg) by mouth every 4 hours as needed (Anxiety, Nausea/Vomiting or Sleep)    Adenocarcinoma of sigmoid colon (H)       ondansetron 8 MG tablet    ZOFRAN    30 tablet    Take 1 tablet (8 mg) by mouth every 8 hours as needed (Nausea/Vomiting)    Adenocarcinoma of sigmoid colon (H)       prochlorperazine 10 MG tablet    COMPAZINE    30 tablet    Take 1 tablet (10 mg) by mouth every 6 hours as needed (Nausea/Vomiting)    Adenocarcinoma of sigmoid colon (H)

## 2018-04-06 RX ORDER — LORAZEPAM 2 MG/ML
0.5 INJECTION INTRAMUSCULAR EVERY 4 HOURS PRN
Status: CANCELLED
Start: 2018-04-09

## 2018-04-06 RX ORDER — ALBUTEROL SULFATE 0.83 MG/ML
2.5 SOLUTION RESPIRATORY (INHALATION)
Status: CANCELLED | OUTPATIENT
Start: 2018-04-09

## 2018-04-06 RX ORDER — METHYLPREDNISOLONE SODIUM SUCCINATE 125 MG/2ML
125 INJECTION, POWDER, LYOPHILIZED, FOR SOLUTION INTRAMUSCULAR; INTRAVENOUS
Status: CANCELLED
Start: 2018-04-09

## 2018-04-06 RX ORDER — SODIUM CHLORIDE 9 MG/ML
1000 INJECTION, SOLUTION INTRAVENOUS CONTINUOUS PRN
Status: CANCELLED
Start: 2018-04-09

## 2018-04-06 RX ORDER — DIPHENHYDRAMINE HYDROCHLORIDE 50 MG/ML
50 INJECTION INTRAMUSCULAR; INTRAVENOUS
Status: CANCELLED
Start: 2018-04-09

## 2018-04-06 RX ORDER — EPINEPHRINE 0.3 MG/.3ML
0.3 INJECTION SUBCUTANEOUS EVERY 5 MIN PRN
Status: CANCELLED | OUTPATIENT
Start: 2018-04-09

## 2018-04-06 RX ORDER — ALBUTEROL SULFATE 90 UG/1
1-2 AEROSOL, METERED RESPIRATORY (INHALATION)
Status: CANCELLED
Start: 2018-04-09

## 2018-04-06 RX ORDER — EPINEPHRINE 1 MG/ML
0.3 INJECTION, SOLUTION INTRAMUSCULAR; SUBCUTANEOUS EVERY 5 MIN PRN
Status: CANCELLED | OUTPATIENT
Start: 2018-04-09

## 2018-04-06 RX ORDER — MEPERIDINE HYDROCHLORIDE 50 MG/ML
25 INJECTION INTRAMUSCULAR; INTRAVENOUS; SUBCUTANEOUS EVERY 30 MIN PRN
Status: CANCELLED | OUTPATIENT
Start: 2018-04-09

## 2018-04-09 ENCOUNTER — ONCOLOGY VISIT (OUTPATIENT)
Dept: ONCOLOGY | Facility: CLINIC | Age: 58
End: 2018-04-09
Payer: COMMERCIAL

## 2018-04-09 ENCOUNTER — INFUSION THERAPY VISIT (OUTPATIENT)
Dept: INFUSION THERAPY | Facility: CLINIC | Age: 58
End: 2018-04-09
Payer: COMMERCIAL

## 2018-04-09 VITALS
DIASTOLIC BLOOD PRESSURE: 75 MMHG | SYSTOLIC BLOOD PRESSURE: 122 MMHG | WEIGHT: 167.5 LBS | TEMPERATURE: 97.2 F | HEART RATE: 62 BPM | OXYGEN SATURATION: 99 % | BODY MASS INDEX: 25.47 KG/M2

## 2018-04-09 DIAGNOSIS — C18.7 ADENOCARCINOMA OF SIGMOID COLON (H): Primary | ICD-10-CM

## 2018-04-09 LAB
ALBUMIN SERPL-MCNC: 3.5 G/DL (ref 3.4–5)
ALP SERPL-CCNC: 118 U/L (ref 40–150)
ALT SERPL W P-5'-P-CCNC: 73 U/L (ref 0–70)
ANION GAP SERPL CALCULATED.3IONS-SCNC: 6 MMOL/L (ref 3–14)
AST SERPL W P-5'-P-CCNC: 66 U/L (ref 0–45)
BASOPHILS # BLD AUTO: 0 10E9/L (ref 0–0.2)
BASOPHILS NFR BLD AUTO: 1 %
BILIRUB SERPL-MCNC: 1 MG/DL (ref 0.2–1.3)
BUN SERPL-MCNC: 16 MG/DL (ref 7–30)
CALCIUM SERPL-MCNC: 8.7 MG/DL (ref 8.5–10.1)
CHLORIDE SERPL-SCNC: 106 MMOL/L (ref 94–109)
CO2 SERPL-SCNC: 27 MMOL/L (ref 20–32)
CREAT SERPL-MCNC: 0.74 MG/DL (ref 0.66–1.25)
DIFFERENTIAL METHOD BLD: ABNORMAL
EOSINOPHIL # BLD AUTO: 0.1 10E9/L (ref 0–0.7)
EOSINOPHIL NFR BLD AUTO: 1.7 %
ERYTHROCYTE [DISTWIDTH] IN BLOOD BY AUTOMATED COUNT: 13.8 % (ref 10–15)
GFR SERPL CREATININE-BSD FRML MDRD: >90 ML/MIN/1.7M2
GLUCOSE SERPL-MCNC: 95 MG/DL (ref 70–99)
HCT VFR BLD AUTO: 39.8 % (ref 40–53)
HGB BLD-MCNC: 13.8 G/DL (ref 13.3–17.7)
IMM GRANULOCYTES # BLD: 0 10E9/L (ref 0–0.4)
IMM GRANULOCYTES NFR BLD: 0.7 %
LYMPHOCYTES # BLD AUTO: 1.4 10E9/L (ref 0.8–5.3)
LYMPHOCYTES NFR BLD AUTO: 34.5 %
MCH RBC QN AUTO: 33.3 PG (ref 26.5–33)
MCHC RBC AUTO-ENTMCNC: 34.7 G/DL (ref 31.5–36.5)
MCV RBC AUTO: 96 FL (ref 78–100)
MONOCYTES # BLD AUTO: 0.5 10E9/L (ref 0–1.3)
MONOCYTES NFR BLD AUTO: 11.8 %
NEUTROPHILS # BLD AUTO: 2.1 10E9/L (ref 1.6–8.3)
NEUTROPHILS NFR BLD AUTO: 50.3 %
PLATELET # BLD AUTO: 67 10E9/L (ref 150–450)
POTASSIUM SERPL-SCNC: 3.9 MMOL/L (ref 3.4–5.3)
PROT SERPL-MCNC: 7.2 G/DL (ref 6.8–8.8)
RBC # BLD AUTO: 4.14 10E12/L (ref 4.4–5.9)
SODIUM SERPL-SCNC: 139 MMOL/L (ref 133–144)
WBC # BLD AUTO: 4.2 10E9/L (ref 4–11)

## 2018-04-09 PROCEDURE — 80053 COMPREHEN METABOLIC PANEL: CPT | Performed by: NURSE PRACTITIONER

## 2018-04-09 PROCEDURE — 36591 DRAW BLOOD OFF VENOUS DEVICE: CPT

## 2018-04-09 PROCEDURE — 85025 COMPLETE CBC W/AUTO DIFF WBC: CPT | Performed by: NURSE PRACTITIONER

## 2018-04-09 PROCEDURE — 99207 ZZC NO CHARGE LOS: CPT

## 2018-04-09 RX ORDER — HEPARIN SODIUM (PORCINE) LOCK FLUSH IV SOLN 100 UNIT/ML 100 UNIT/ML
5 SOLUTION INTRAVENOUS
Status: DISCONTINUED | OUTPATIENT
Start: 2018-04-09 | End: 2018-04-09 | Stop reason: HOSPADM

## 2018-04-09 RX ADMIN — HEPARIN SODIUM (PORCINE) LOCK FLUSH IV SOLN 100 UNIT/ML 5 ML: 100 SOLUTION at 08:54

## 2018-04-09 ASSESSMENT — PAIN SCALES - GENERAL: PAINLEVEL: NO PAIN (0)

## 2018-04-09 NOTE — PROGRESS NOTES
Infusion Nursing Note:  Rian Malik presents today for   Cycle 8 Day 1 Oxaliplatin and Fluorouracil Dosi infusor home pump - HELD due to thrombocytopenia  Patient seen by provider today: No   present during visit today: Not Applicable.    Note:     KERRI Last/Lulú Steen RN. HOLD treatment today due to thrombocytopenia; delay 1 week and see Dr. Last also on 4/16. 4/9/18 @ 0864    Pt denies any issues or concerns today. RN reviewed rationale for holding treatment today, he verbalized understanding of delay and will expect to hear from scheduling staff for appointments.     Intravenous Access:  Implanted Port.    Treatment Conditions:  Lab Results   Component Value Date    HGB 13.8 04/09/2018     Lab Results   Component Value Date    WBC 4.2 04/09/2018      Lab Results   Component Value Date    ANEU 2.1 04/09/2018     Lab Results   Component Value Date    PLT 67  Parameters to keep above >75 04/09/2018      Lab Results   Component Value Date     04/09/2018                   Lab Results   Component Value Date    POTASSIUM 3.9 04/09/2018           Lab Results   Component Value Date    MAG 2.0 08/04/2017            Lab Results   Component Value Date    CR 0.74 04/09/2018                   Lab Results   Component Value Date    MAICOL 8.7 04/09/2018                Lab Results   Component Value Date    BILITOTAL 1.0 04/09/2018           Lab Results   Component Value Date    ALBUMIN 3.5 04/09/2018                    Lab Results   Component Value Date    ALT 73 04/09/2018           Lab Results   Component Value Date    AST 66 04/09/2018       Results reviewed, labs did NOT meet treatment parameters: see above.      Post Infusion Assessment:    Discharge Plan:     Pt prefers [facility] for labs. Patient will return 4/16 for next appointment to see Berhane and infusion; pt notified via scheduling with these appointments. Pt verbalized understanding of this plan.   Patient discharged in stable condition  accompanied by: self.  Departure Mode: Ambulatory.    Lulú Steen RN

## 2018-04-09 NOTE — MR AVS SNAPSHOT
After Visit Summary   4/9/2018    Rian Malik    MRN: [id number]           Patient Information     Date Of Birth          1960        Visit Information        Provider Department      4/9/2018 8:45 AM NURSE ONLY CANCER CENTER [facility]        Today's Diagnoses     Adenocarcinoma of sigmoid colon (H)    -  1       Follow-ups after your visit        Your next 10 appointments already scheduled     Apr 23, 2018 10:45 AM CDT   Return Visit with NURSE ONLY CANCER CENTER   [facility] ([facility])    [address] [place] [phone]            Apr 23, 2018 11:30 AM CDT   Return Visit with Padilla Last MD   [facility] ([facility])    [address] [place] [address] [phone] [phone]            Apr 23, 2018 12:00 PM CDT   Flofox with BAY 6 INFUSION   [facility] ([facility])    [address] [place] [address] [phone]            May 03, 2018  9:15 AM CDT   Lab with UC LAB   [facility] ([facility])    [address]  1st Floor  [facility] [address] [phone]            May 03, 2018 10:15 AM CDT   (Arrive by 10:00 AM)   Return General Liver with Yaakov Burroughs MD   [facility] ([facility])    [address]  [address]  [facility] [address] [phone]              Who to contact     If you have questions or need follow up information about today's clinic visit or your schedule please contact [facility] directly at [phone].  Normal or non-critical lab and imaging results will be communicated to you by MyChart, letter or phone within 4 business days after the clinic has received the results. If you do not hear from us within 7 days, please contact the clinic through MyChart or phone. If you have a critical or  abnormal lab result, we will notify you by phone as soon as possible.  Submit refill requests through [employer] or call your pharmacy and they will forward the refill request to us. Please allow 3 business days for your refill to be completed.          Additional Information About Your Visit        [employer] Information     [employer] gives you secure access to your electronic health record. If you see a primary care provider, you can also send messages to your care team and make appointments. If you have questions, please call your primary care clinic.  If you do not have a primary care provider, please call [phone] and they will assist you.      [employer] is an electronic gateway that provides easy, online access to your medical records. With [employer], you can request a clinic appointment, read your test results, renew a prescription or communicate with your care team.     To access your existing account, please contact your [facility] or call [phone] for assistance.        Care EveryWhere ID     This is your Care EveryWhere ID. This could be used by other organizations to access your [place] medical records  [id number]         Blood Pressure from Last 3 Encounters:   03/26/18 120/75   03/12/18 123/73   02/26/18 131/80    Weight from Last 3 Encounters:   03/26/18 73.7 kg (162 lb 7 oz)   03/12/18 75.7 kg (166 lb 14.4 oz)   02/26/18 74.7 kg (164 lb 9.6 oz)              We Performed the Following     CBC with platelets differential     Comprehensive metabolic panel        Primary Care Provider Office Phone # Fax #    Moreno Harris [phone]       [address]  [facility] [address]        Equal Access to Services     [facility]: Hadii cameron Meraz, waaxda maverickqadaha, qaybta calinalalisa queen. So [facility] [phone].    ATENCIÓN: Si habla español, tiene a martino disposición servicios gratuitos de  asistencia lingüística. Alfredo al [phone].    We comply with applicable federal civil rights laws and [place] laws. We do not discriminate on the basis of race, color, national origin, age, disability, sex, sexual orientation, or gender identity.            Thank you!     Thank you for choosing [facility]  for your care. Our goal is always to provide you with excellent care. Hearing back from our patients is one way we can continue to improve our services. Please take a few minutes to complete the written survey that you may receive in the mail after your visit with us. Thank you!             Your Updated Medication List - Protect others around you: Learn how to safely use, store and throw away your medicines at www.disposemymeds.org.          This list is accurate as of 4/9/18  9:11 AM.  Always use your most recent med list.                   Brand Name Dispense Instructions for use Diagnosis    docusate sodium 100 MG tablet    COLACE    60 tablet    Take 100 mg by mouth daily    Special screening for malignant neoplasms, colon       LORazepam 0.5 MG tablet    ATIVAN    30 tablet    Take 1 tablet (0.5 mg) by mouth every 4 hours as needed (Anxiety, Nausea/Vomiting or Sleep)    Adenocarcinoma of sigmoid colon (H)       ondansetron 8 MG tablet    ZOFRAN    30 tablet    Take 1 tablet (8 mg) by mouth every 8 hours as needed (Nausea/Vomiting)    Adenocarcinoma of sigmoid colon (H)       prochlorperazine 10 MG tablet    COMPAZINE    30 tablet    Take 1 tablet (10 mg) by mouth every 6 hours as needed (Nausea/Vomiting)    Adenocarcinoma of sigmoid colon (H)

## 2018-04-09 NOTE — MR AVS SNAPSHOT
After Visit Summary   4/9/2018    Rian Malik    MRN: [id number]           Patient Information     Date Of Birth          1960        Visit Information        Provider Department      4/9/2018 9:00 AM BAY 6 INFUSION [facility]        Today's Diagnoses     Adenocarcinoma of sigmoid colon (H)    -  1       Follow-ups after your visit        Your next 10 appointments already scheduled     Apr 16, 2018  8:00 AM CDT   Return Visit with NURSE ONLY CANCER CENTER   [facility]    [address] [place] [address]   [phone]            Apr 16, 2018  8:45 AM CDT   Return Visit with Padilla Last MD   [facility]    [address] [place] [address]   [phone]            Apr 16, 2018  9:00 AM CDT   Flofox with Gregory 1 INFUSION   [facility]    [address] [place] [address]   [phone]            Apr 30, 2018 11:00 AM CDT   Return Visit with NURSE ONLY CANCER CENTER   [facility]    [address] [facility] [address]   [phone]            Apr 30, 2018 11:45 AM CDT   Return Visit with Padilla Last MD   [facility])    [address] [facility] [address]   [phone]            Apr 30, 2018 12:30 PM CDT   Flofox with BAY 1 INFUSION   [facility]    [address] [facility] [address]   [phone]            May 03, 2018  9:15 AM CDT   Lab with  LAB   [facility] ([facility])    [address] [place] [id number] [phone]            May 03, 2018 10:15 AM CDT   (Arrive by 10:00 AM)   Return General Liver with Yaakov Burroughs MD   [facility] ([facility])    [address]  [address]  [facility] [address] [phone]              Who to contact     If you have questions or need follow up information about today's clinic visit or your schedule please contact [facility] directly at [phone].  Normal or non-critical lab and imaging results will be communicated to you by MyChart, letter or phone within 4 business days after the clinic has received the results. If you do not hear from us within 7 days, please contact the clinic through [employer] or phone. If you have a critical or abnormal lab result, we will notify you by phone as soon as possible.  Submit refill requests through [employer] or call your pharmacy and they will forward the refill request to us. Please allow 3 business days for your refill to be completed.          Additional Information About Your Visit        [employer] Information     [employer] gives you secure access to your electronic health record. If you see a primary care provider, you can also send messages to your care team and make appointments. If you have questions, please call your primary care clinic.  If you do not have a primary care provider, please call [phone] and they will assist you.      [employer] is an electronic gateway that provides easy, online access to your medical records. With [employer], you can request a clinic appointment, read your test results, renew a prescription or communicate with your care team.     To access your existing account, please contact your [facility] or call [phone] for assistance.        Care EveryWhere ID     This is your Care EveryWhere ID. This could be used by other organizations to access your [place] medical records  [id number]        Your Vitals Were     Pulse Temperature Pulse Oximetry Peak Flow BMI (Body Mass Index)       62 97.2  F (36.2  C) (Oral) 99% 99 L/min 25.47 kg/m2        Blood Pressure from  Last 3 Encounters:   04/09/18 122/75   03/26/18 120/75   03/12/18 123/73    Weight from Last 3 Encounters:   04/09/18 76 kg (167 lb 8 oz)   03/26/18 73.7 kg (162 lb 7 oz)   03/12/18 75.7 kg (166 lb 14.4 oz)              Today, you had the following     No orders found for display       Primary Care Provider Office Phone # Fax #    Moreno Harris [phone]       [address]  [facility] [address]        Equal Access to Services     [facility]: Hadii cameron lester hadashcaridad Soomaali, waaxda luqadaha, qaybta kaalmada adebriayahernán, alisa leon . So [facility] [phone].    ATENCIÓN: Si habla español, tiene a martino disposición servicios gratuitos de asistencia lingüística. Ll[facility] [phone].    We comply with applicable federal civil rights laws and [place] laws. We do not discriminate on the basis of race, color, national origin, age, disability, sex, sexual orientation, or gender identity.            Thank you!     Thank you for choosing [facility]  for your care. Our goal is always to provide you with excellent care. Hearing back from our patients is one way we can continue to improve our services. Please take a few minutes to complete the written survey that you may receive in the mail after your visit with us. Thank you!             Your Updated Medication List - Protect others around you: Learn how to safely use, store and throw away your medicines at www.disposemymeds.org.          This list is accurate as of 4/9/18  2:45 PM.  Always use your most recent med list.                   Brand Name Dispense Instructions for use Diagnosis    docusate sodium 100 MG tablet    COLACE    60 tablet    Take 100 mg by mouth daily    Special screening for malignant neoplasms, colon       LORazepam 0.5 MG tablet    ATIVAN    30 tablet    Take 1 tablet (0.5 mg) by mouth every 4 hours as needed (Anxiety, Nausea/Vomiting or Sleep)    Adenocarcinoma of sigmoid colon  (H)       ondansetron 8 MG tablet    ZOFRAN    30 tablet    Take 1 tablet (8 mg) by mouth every 8 hours as needed (Nausea/Vomiting)    Adenocarcinoma of sigmoid colon (H)       prochlorperazine 10 MG tablet    COMPAZINE    30 tablet    Take 1 tablet (10 mg) by mouth every 6 hours as needed (Nausea/Vomiting)    Adenocarcinoma of sigmoid colon (H)

## 2018-04-16 ENCOUNTER — ONCOLOGY VISIT (OUTPATIENT)
Dept: ONCOLOGY | Facility: CLINIC | Age: 58
End: 2018-04-16
Payer: COMMERCIAL

## 2018-04-16 ENCOUNTER — HOME INFUSION (PRE-WILLOW HOME INFUSION) (OUTPATIENT)
Dept: PHARMACY | Facility: CLINIC | Age: 58
End: 2018-04-16

## 2018-04-16 ENCOUNTER — INFUSION THERAPY VISIT (OUTPATIENT)
Dept: INFUSION THERAPY | Facility: CLINIC | Age: 58
End: 2018-04-16
Payer: COMMERCIAL

## 2018-04-16 VITALS
SYSTOLIC BLOOD PRESSURE: 122 MMHG | TEMPERATURE: 98 F | BODY MASS INDEX: 25.54 KG/M2 | WEIGHT: 168.5 LBS | HEIGHT: 68 IN | HEART RATE: 63 BPM | RESPIRATION RATE: 15 BRPM | OXYGEN SATURATION: 98 % | DIASTOLIC BLOOD PRESSURE: 75 MMHG

## 2018-04-16 DIAGNOSIS — C18.7 ADENOCARCINOMA OF SIGMOID COLON (H): Primary | ICD-10-CM

## 2018-04-16 LAB
ALBUMIN SERPL-MCNC: 3.3 G/DL (ref 3.4–5)
ALP SERPL-CCNC: 121 U/L (ref 40–150)
ALT SERPL W P-5'-P-CCNC: 73 U/L (ref 0–70)
ANION GAP SERPL CALCULATED.3IONS-SCNC: 5 MMOL/L (ref 3–14)
AST SERPL W P-5'-P-CCNC: 65 U/L (ref 0–45)
BASOPHILS # BLD AUTO: 0.1 10E9/L (ref 0–0.2)
BASOPHILS NFR BLD AUTO: 1.3 %
BILIRUB SERPL-MCNC: 0.8 MG/DL (ref 0.2–1.3)
BUN SERPL-MCNC: 19 MG/DL (ref 7–30)
CALCIUM SERPL-MCNC: 8.6 MG/DL (ref 8.5–10.1)
CHLORIDE SERPL-SCNC: 106 MMOL/L (ref 94–109)
CO2 SERPL-SCNC: 28 MMOL/L (ref 20–32)
CREAT SERPL-MCNC: 0.61 MG/DL (ref 0.66–1.25)
DIFFERENTIAL METHOD BLD: ABNORMAL
EOSINOPHIL # BLD AUTO: 0.1 10E9/L (ref 0–0.7)
EOSINOPHIL NFR BLD AUTO: 2 %
ERYTHROCYTE [DISTWIDTH] IN BLOOD BY AUTOMATED COUNT: 13.4 % (ref 10–15)
GFR SERPL CREATININE-BSD FRML MDRD: >90 ML/MIN/1.7M2
GLUCOSE SERPL-MCNC: 92 MG/DL (ref 70–99)
HCT VFR BLD AUTO: 39.6 % (ref 40–53)
HGB BLD-MCNC: 13.7 G/DL (ref 13.3–17.7)
IMM GRANULOCYTES # BLD: 0 10E9/L (ref 0–0.4)
IMM GRANULOCYTES NFR BLD: 0.5 %
LYMPHOCYTES # BLD AUTO: 1.3 10E9/L (ref 0.8–5.3)
LYMPHOCYTES NFR BLD AUTO: 31.8 %
MCH RBC QN AUTO: 33.2 PG (ref 26.5–33)
MCHC RBC AUTO-ENTMCNC: 34.6 G/DL (ref 31.5–36.5)
MCV RBC AUTO: 96 FL (ref 78–100)
MONOCYTES # BLD AUTO: 0.4 10E9/L (ref 0–1.3)
MONOCYTES NFR BLD AUTO: 9.8 %
NEUTROPHILS # BLD AUTO: 2.2 10E9/L (ref 1.6–8.3)
NEUTROPHILS NFR BLD AUTO: 54.6 %
PLATELET # BLD AUTO: 69 10E9/L (ref 150–450)
POTASSIUM SERPL-SCNC: 4.3 MMOL/L (ref 3.4–5.3)
PROT SERPL-MCNC: 6.9 G/DL (ref 6.8–8.8)
RBC # BLD AUTO: 4.13 10E12/L (ref 4.4–5.9)
SODIUM SERPL-SCNC: 139 MMOL/L (ref 133–144)
WBC # BLD AUTO: 4 10E9/L (ref 4–11)

## 2018-04-16 PROCEDURE — 99214 OFFICE O/P EST MOD 30 MIN: CPT | Mod: 25 | Performed by: INTERNAL MEDICINE

## 2018-04-16 PROCEDURE — 85025 COMPLETE CBC W/AUTO DIFF WBC: CPT | Performed by: INTERNAL MEDICINE

## 2018-04-16 PROCEDURE — 96413 CHEMO IV INFUSION 1 HR: CPT | Performed by: INTERNAL MEDICINE

## 2018-04-16 PROCEDURE — 96367 TX/PROPH/DG ADDL SEQ IV INF: CPT | Performed by: INTERNAL MEDICINE

## 2018-04-16 PROCEDURE — 80053 COMPREHEN METABOLIC PANEL: CPT | Performed by: INTERNAL MEDICINE

## 2018-04-16 PROCEDURE — 96415 CHEMO IV INFUSION ADDL HR: CPT | Performed by: INTERNAL MEDICINE

## 2018-04-16 PROCEDURE — 96416 CHEMO PROLONG INFUSE W/PUMP: CPT | Performed by: INTERNAL MEDICINE

## 2018-04-16 PROCEDURE — 99207 ZZC NO CHARGE LOS: CPT

## 2018-04-16 RX ORDER — EPINEPHRINE 1 MG/ML
0.3 INJECTION, SOLUTION INTRAMUSCULAR; SUBCUTANEOUS EVERY 5 MIN PRN
Status: CANCELLED | OUTPATIENT
Start: 2018-04-16

## 2018-04-16 RX ORDER — EPINEPHRINE 0.3 MG/.3ML
0.3 INJECTION SUBCUTANEOUS EVERY 5 MIN PRN
Status: CANCELLED | OUTPATIENT
Start: 2018-04-16

## 2018-04-16 RX ORDER — ALBUTEROL SULFATE 90 UG/1
1-2 AEROSOL, METERED RESPIRATORY (INHALATION)
Status: CANCELLED
Start: 2018-04-16

## 2018-04-16 RX ORDER — DIPHENHYDRAMINE HYDROCHLORIDE 50 MG/ML
50 INJECTION INTRAMUSCULAR; INTRAVENOUS
Status: CANCELLED
Start: 2018-04-16

## 2018-04-16 RX ORDER — ALBUTEROL SULFATE 0.83 MG/ML
2.5 SOLUTION RESPIRATORY (INHALATION)
Status: CANCELLED | OUTPATIENT
Start: 2018-04-16

## 2018-04-16 RX ORDER — METHYLPREDNISOLONE SODIUM SUCCINATE 125 MG/2ML
125 INJECTION, POWDER, LYOPHILIZED, FOR SOLUTION INTRAMUSCULAR; INTRAVENOUS
Status: CANCELLED
Start: 2018-04-16

## 2018-04-16 RX ORDER — LORAZEPAM 2 MG/ML
0.5 INJECTION INTRAMUSCULAR EVERY 4 HOURS PRN
Status: CANCELLED
Start: 2018-04-16

## 2018-04-16 RX ORDER — HEPARIN SODIUM (PORCINE) LOCK FLUSH IV SOLN 100 UNIT/ML 100 UNIT/ML
5 SOLUTION INTRAVENOUS ONCE
Status: COMPLETED | OUTPATIENT
Start: 2018-04-16 | End: 2018-04-16

## 2018-04-16 RX ORDER — SODIUM CHLORIDE 9 MG/ML
1000 INJECTION, SOLUTION INTRAVENOUS CONTINUOUS PRN
Status: CANCELLED
Start: 2018-04-16

## 2018-04-16 RX ORDER — MEPERIDINE HYDROCHLORIDE 50 MG/ML
25 INJECTION INTRAMUSCULAR; INTRAVENOUS; SUBCUTANEOUS EVERY 30 MIN PRN
Status: CANCELLED | OUTPATIENT
Start: 2018-04-16

## 2018-04-16 RX ADMIN — HEPARIN SODIUM (PORCINE) LOCK FLUSH IV SOLN 100 UNIT/ML 5 ML: 100 SOLUTION at 08:17

## 2018-04-16 ASSESSMENT — PAIN SCALES - GENERAL: PAINLEVEL: NO PAIN (0)

## 2018-04-16 NOTE — MR AVS SNAPSHOT
After Visit Summary   4/16/2018    Rian Malik    MRN: [id number]           Patient Information     Date Of Birth          1960        Visit Information        Provider Department      4/16/2018 8:00 AM NURSE ONLY CANCER CENTER [facility]        Today's Diagnoses     Adenocarcinoma of sigmoid colon (H)    -  1       Follow-ups after your visit        Your next 10 appointments already scheduled     Apr 23, 2018  9:30 AM CDT   Return Visit with NURSE ONLY CANCER CENTER   [facility] ([facility])    [address] [place] [address]   [phone]            Apr 30, 2018 11:00 AM CDT   Return Visit with NURSE ONLY CANCER CENTER   [facility] ([facility])    [address] [place] [address]   [phone]            Apr 30, 2018 11:45 AM CDT   Return Visit with Padilla Last MD   [facility] ([facility])    [address] [place] [address]   [phone]            Apr 30, 2018 12:30 PM CDT   Flofox with [facility] INFUSION   [facility] ([facility])    [address] [address]   [phone]            May 03, 2018  9:15 AM CDT   Lab with  LAB   [facility] ([facility])    [address]  1st Floor  [facility] [id number] [phone]            May 03, 2018 10:15 AM CDT   (Arrive by 10:00 AM)   Return General Liver with Yaakov Burroughs MD   [facility] Hepatology ([facility])    [address]  [address]  [facility] [id number] [phone]              Future tests that were ordered for you today     Open Future Orders        Priority Expected Expires Ordered    CBC with platelets differential Routine 4/23/2018 4/16/2019 4/16/2018            Who to contact     If you have questions or need follow up information  about today's clinic visit or your schedule please contact [facility] directly at [phone].  Normal or non-critical lab and imaging results will be communicated to you by CartiHealhart, letter or phone within 4 business days after the clinic has received the results. If you do not hear from us within 7 days, please contact the clinic through CartiHealhart or phone. If you have a critical or abnormal lab result, we will notify you by phone as soon as possible.  Submit refill requests through MeFeedia or call your pharmacy and they will forward the refill request to us. Please allow 3 business days for your refill to be completed.          Additional Information About Your Visit        CartiHealharTerra Tech Information     MeFeedia gives you secure access to your electronic health record. If you see a primary care provider, you can also send messages to your care team and make appointments. If you have questions, please call your primary care clinic.  If you do not have a primary care provider, please call [phone] and they will assist you.      MeFeedia is an electronic gateway that provides easy, online access to your medical records. With MeFeedia, you can request a clinic appointment, read your test results, renew a prescription or communicate with your care team.     To access your existing account, please contact your [facility] or call [phone] for assistance.        Care EveryWhere ID     This is your Care EveryWhere ID. This could be used by other organizations to access your [place] medical records  [id number]         Blood Pressure from Last 3 Encounters:   04/16/18 122/75   04/09/18 122/75   03/26/18 120/75    Weight from Last 3 Encounters:   04/16/18 76.4 kg (168 lb 8 oz)   04/09/18 76 kg (167 lb 8 oz)   03/26/18 73.7 kg (162 lb 7 oz)              We Performed the Following     CBC with platelets differential     Comprehensive metabolic panel        Primary Care Provider  Office Phone # Fax #    Moreno Harris [phone]       [address]  [facility] [address]        Equal Access to Services     KHANH FLANAGAN : Hadcassius cameron lester kirto Sokaleyali, waaxda luqadaha, qaybta kaalmada adesatish, alisa gabriellain hayaan enochbria ramirez carolyn mg. So [facility] [phone].    ATENCIÓN: Si habla español, tiene a martino disposición servicios gratuitos de asistencia lingüística. Llame al [phone].    We comply with applicable federal civil rights laws and [place] laws. We do not discriminate on the basis of race, color, national origin, age, disability, sex, sexual orientation, or gender identity.            Thank you!     Thank you for choosing [facility]  for your care. Our goal is always to provide you with excellent care. Hearing back from our patients is one way we can continue to improve our services. Please take a few minutes to complete the written survey that you may receive in the mail after your visit with us. Thank you!             Your Updated Medication List - Protect others around you: Learn how to safely use, store and throw away your medicines at www.disposemymeds.org.          This list is accurate as of 4/16/18 10:44 AM.  Always use your most recent med list.                   Brand Name Dispense Instructions for use Diagnosis    docusate sodium 100 MG tablet    COLACE    60 tablet    Take 100 mg by mouth daily    Special screening for malignant neoplasms, colon       LORazepam 0.5 MG tablet    ATIVAN    30 tablet    Take 1 tablet (0.5 mg) by mouth every 4 hours as needed (Anxiety, Nausea/Vomiting or Sleep)    Adenocarcinoma of sigmoid colon (H)       ondansetron 8 MG tablet    ZOFRAN    30 tablet    Take 1 tablet (8 mg) by mouth every 8 hours as needed (Nausea/Vomiting)    Adenocarcinoma of sigmoid colon (H)       prochlorperazine 10 MG tablet    COMPAZINE    30 tablet    Take 1 tablet (10 mg) by mouth every 6 hours as needed  (Nausea/Vomiting)    Adenocarcinoma of sigmoid colon (H)

## 2018-04-16 NOTE — PROGRESS NOTES
FOLLOW-UP VISIT NOTE    PATIENT NAME: Rian Malik MRN # [id number]  DATE OF VISIT: Apr 16, 2018 YOB: 1960    REFERRING PROVIDER: No referring provider defined for this encounter.    CANCER TYPE: Adenocarcinoma colon  STAGE: III pT4N1c  ECOG PS: 0    ONCOLOGY HISTORY:  57-year-old male who around February 2017r, developed intermittent cramping lower abdominal pain. Was evaluated by PCP and clinical impression was colitis. He was put on p.o. antibiotics. However symptoms did not improve. He was eventually referred to surgery and underwent imaging with CT findings suspicious of sigmoid abscess. He was admitted to the hospital for resection of sigmoid abscess 10/11/17. However intraoperatively was noted to have a large firmly adherent sigmoid colon mass. Laparoscopic surgery was converted to open sigmoidectomy with end colostomy.     Surgical pathology   B.  COLON, SIGMOID, HEMICOLECTOMY   - Poorly differentiated adenocarcinoma, with signet ring cell features   - Tumor size: 5 cm   - Tumor is present on serosal surface and microscopically perforates   serosal surface   - One surgical margin shows extensive serosal involvement by tumor. The   opposite, undesignated margin shows no evidence of malignancy   - Lymphovascular invasion is present   - Perineural invasion is not identified   - Tumor deposits are present   - Immunohistochemistry for mismatch repair proteins shows intact nuclear   expression for MLH1, MSH2, MSH6, and PMS2   - Three reactive lymph nodes, negative for malignancy (0/3)   - Pathologic staging: pT4N1c      Patient's case was discussed at the tumor board. Recommendation was to obtain new staging scans as well as to proceed with systemic chemotherapy at this point with plans for further surgery in future. Staging scans negative for distant disease.     - Started on Folfox 11/21/17    SUBJECTIVE     Patient is here today for his next cycle of chemotherapy. No active complaints. Denies  bleeding/axis bruising, fever/chills, nausea/vomiting, abdominal pain. Ostomy working well with no issues.      PAST MEDICAL HISTORY     Past Medical History:   Diagnosis Date     Cirrhosis (H)      Colon cancer (H) 10/11/2017    Poorly differentiated adenocarcinoma     Hepatitis C          CURRENT OUTPATIENT MEDICATIONS     Current Outpatient Prescriptions   Medication Sig Dispense Refill     ondansetron (ZOFRAN) 8 MG tablet Take 1 tablet (8 mg) by mouth every 8 hours as needed (Nausea/Vomiting) 30 tablet 1     LORazepam (ATIVAN) 0.5 MG tablet Take 1 tablet (0.5 mg) by mouth every 4 hours as needed (Anxiety, Nausea/Vomiting or Sleep) 30 tablet 2     prochlorperazine (COMPAZINE) 10 MG tablet Take 1 tablet (10 mg) by mouth every 6 hours as needed (Nausea/Vomiting) 30 tablet 2     docusate sodium (COLACE) 100 MG tablet Take 100 mg by mouth daily 60 tablet 1        ALLERGIES     Allergies   Allergen Reactions     Acetaminophen      Naproxen         REVIEW OF SYSTEMS   As above in the HPI, o/w complete 14-point ROS was negative.     PHYSICAL EXAM   B/P: 138/78, T: 98.1, P: 64, R: 18  SpO2 Readings from Last 4 Encounters:   04/16/18 98%   04/09/18 99%   03/26/18 98%   03/12/18 96%     Wt Readings from Last 3 Encounters:   04/16/18 76.4 kg (168 lb 8 oz)   04/09/18 76 kg (167 lb 8 oz)   03/26/18 73.7 kg (162 lb 7 oz)     GEN: NAD  EYES:PERRLA  Mouth/ENT: Oropharynx is clear.  NECK: no  lymphadenopathy  LUNGS: clear bilaterally  CV: regular, no murmurs, rubs, or gallops  ABDOMEN: soft, non-tender, non-distended,Ostomy in place  EXT: warm, well perfused, no edema  NEURO: alert  SKIN: no rashes     LABORATORY AND IMAGING STUDIES     Lab Results   Component Value Date    WBC 4.0 04/16/2018     Lab Results   Component Value Date    RBC 4.13 04/16/2018     Lab Results   Component Value Date    HGB 13.7 04/16/2018     Lab Results   Component Value Date    HCT 39.6 04/16/2018     No components found for: MCT  Lab Results    Component Value Date    MCV 96 04/16/2018     Lab Results   Component Value Date    MCH 33.2 04/16/2018     Lab Results   Component Value Date    MCHC 34.6 04/16/2018     Lab Results   Component Value Date    RDW 13.4 04/16/2018     Lab Results   Component Value Date    PLT 69 04/16/2018     Recent Labs   Lab Test  04/16/18   0825  04/09/18   0855   NA  139  139   POTASSIUM  4.3  3.9   CHLORIDE  106  106   CO2  28  27   ANIONGAP  5  6   GLC  92  95   BUN  19  16   CR  0.61*  0.74   MAICOL  8.6  8.7        ASSESSMENT AND PLAN     57-year-old male with newly diagnosed     - Poorly differentiated adenocarcinoma colon  [id number](tumor deposits) with margin involvement -  Stage III   CT scans negative for evidence of metastatic disease  Patient's case was discussed at the tumor board 11/13 and recommendation was to proceed with systemic chemotherapy at this point with plans for additional surgery in future if needed after completion of chemotherapy.  Started on FOLFOX q2 week regimen for 6 months duration - s/p 7 cycles so far.  Chemotherapy has been delayed for the last 3 weeks given cytopenias.  Neutrophils have normalized. However platelets have fluctuated up and down and today are 69K.   Pt clinically doing well with no active complaints. He is informed about the platelet counts. Had a discussion with patient today about borderline cell counts and continuing with chemotherapy at reduced dose with watchful monitoring  versus consideration of stopping treatment. He is agrreable to proceed with treatment at this point.  We'll proceed with with cycle 8 today with omitting 5-FU bolus as well as reduced dose oxaliplatin at 65 mg/m2. Will recheck CBC next week for monitoring counts. He was advised to call back if he has any concerns of bleeding/excess bruising.      - Hepatitis C  Completed antiviral treatment per GI    - Thrombocytopenia  Secondary to chemotherapy Vs Hep C   Continue Oxaliplatin dose reduced at 65  mg/m2  Will omit 5-FU bolus from the remaining cycles    RTC in 2 weeks for cycle 9.    The patient is ready to learn, no apparent learning barriers were identified, Diagnosis and treatment plans were explained to the patient. The patient expressed understanding of the content. The patient questions were answered to his satisfaction.    Chart documentation with Dragon Voice recognition Software. Although reviewed after completion, some words and grammatical errors may remain.  Padilla Last MD  Attending Physician   Hematology/Medical Oncology

## 2018-04-16 NOTE — NURSING NOTE
"Oncology Rooming Note    April 16, 2018 8:41 AM   Rian Malik is a 57 year old male who presents for:    Chief Complaint   Patient presents with     Oncology Clinic Visit     prior to tx     Initial Vitals: /75  Pulse 63  Temp 98  F (36.7  C)  Resp 15  Ht 1.727 m (5' 7.99\")  Wt 76.4 kg (168 lb 8 oz)  SpO2 98%  BMI 25.63 kg/m2 Estimated body mass index is 25.63 kg/(m^2) as calculated from the following:    Height as of this encounter: 1.727 m (5' 7.99\").    Weight as of this encounter: 76.4 kg (168 lb 8 oz). Body surface area is 1.91 meters squared.  No Pain (0) Comment: Data Unavailable   No LMP for male patient.  Allergies reviewed: Yes  Medications reviewed: Yes    Medications: Medication refills not needed today.  Pharmacy name entered into DYNAGENT SOFTWARE SL:    [facility] - [place], [place] - [address]  [facility] [place], [place] - [address]        5 minutes for nursing intake (face to face time)     Agnieszka Ziegler LPN              "

## 2018-04-16 NOTE — MR AVS SNAPSHOT
After Visit Summary   4/16/2018    Rian Malik    MRN: [id number]           Patient Information     Date Of Birth          1960        Visit Information        Provider Department      4/16/2018 9:00 AM [address]        Today's Diagnoses     Adenocarcinoma of sigmoid colon (H)    -  1       Follow-ups after your visit        Your next 10 appointments already scheduled     Apr 23, 2018  9:30 AM CDT   Return Visit with NURSE ONLY CANCER CENTER   [facility] ([facility])    [address] [place] [address]   [phone]            Apr 30, 2018 11:00 AM CDT   Return Visit with NURSE ONLY CANCER CENTER   [facility] ([facility])    [address] [address] [address]   [phone]            Apr 30, 2018 11:45 AM CDT   Return Visit with Padilla Last MD   [facility] ([facility])    [address] [place] [address]   [phone]            Apr 30, 2018 12:30 PM CDT   Flofox with [facility] INFUSION   [facility] ([facility])    [address] [address] [address]   [phone]            May 03, 2018  9:15 AM CDT   Lab with  LAB   [facility] ([facility])    [address]  1st Floor  [facility] [address] [phone]            May 03, 2018 10:15 AM CDT   (Arrive by 10:00 AM)   Return General Liver with Yaakov Burroughs MD   [facility] ([facility])    [address]  [address]  [facility] [id number] [phone]              Future tests that were ordered for you today     Open Future Orders        Priority Expected Expires Ordered    CBC with platelets differential Routine 4/23/2018 4/16/2019 4/16/2018            Who to contact     If you have questions or need follow up information about today's  clinic visit or your schedule please contact [facility] directly at [phone].  Normal or non-critical lab and imaging results will be communicated to you by Promocohart, letter or phone within 4 business days after the clinic has received the results. If you do not hear from us within 7 days, please contact the clinic through Promocohart or phone. If you have a critical or abnormal lab result, we will notify you by phone as soon as possible.  Submit refill requests through CorkCRM or call your pharmacy and they will forward the refill request to us. Please allow 3 business days for your refill to be completed.          Additional Information About Your Visit        PromocoharProPerforma Information     CorkCRM gives you secure access to your electronic health record. If you see a primary care provider, you can also send messages to your care team and make appointments. If you have questions, please call your primary care clinic.  If you do not have a primary care provider, please call [phone] and they will assist you.      CorkCRM is an electronic gateway that provides easy, online access to your medical records. With CorkCRM, you can request a clinic appointment, read your test results, renew a prescription or communicate with your care team.     To access your existing account, please contact your [facility] or call [phone] for assistance.        Care EveryWhere ID     This is your Care EveryWhere ID. This could be used by other organizations to access your [place] medical records  [id number]         Blood Pressure from Last 3 Encounters:   04/16/18 122/75   04/09/18 122/75   03/26/18 120/75    Weight from Last 3 Encounters:   04/16/18 76.4 kg (168 lb 8 oz)   04/09/18 76 kg (167 lb 8 oz)   03/26/18 73.7 kg (162 lb 7 oz)              Today, you had the following     No orders found for display       Primary Care Provider Office Phone # Fax #    Moreno Harris MD  [phone]       [address]  [place] [facility] [address]        Equal Access to Services     KHANH FLANAGAN : Hadii cameron lester senait Sosherri, waaxda luqadaha, qaybta kaalmada jose, alisa mg. So [facility] [phone].    ATENCIÓN: Si habla español, tiene a martino disposición servicios gratuitos de asistencia lingüística. Llame al [phone].    We comply with applicable federal civil rights laws and [place] laws. We do not discriminate on the basis of race, color, national origin, age, disability, sex, sexual orientation, or gender identity.            Thank you!     Thank you for choosing [facility]  for your care. Our goal is always to provide you with excellent care. Hearing back from our patients is one way we can continue to improve our services. Please take a few minutes to complete the written survey that you may receive in the mail after your visit with us. Thank you!             Your Updated Medication List - Protect others around you: Learn how to safely use, store and throw away your medicines at www.disposemymeds.org.          This list is accurate as of 4/16/18  3:34 PM.  Always use your most recent med list.                   Brand Name Dispense Instructions for use Diagnosis    docusate sodium 100 MG tablet    COLACE    60 tablet    Take 100 mg by mouth daily    Special screening for malignant neoplasms, colon       LORazepam 0.5 MG tablet    ATIVAN    30 tablet    Take 1 tablet (0.5 mg) by mouth every 4 hours as needed (Anxiety, Nausea/Vomiting or Sleep)    Adenocarcinoma of sigmoid colon (H)       ondansetron 8 MG tablet    ZOFRAN    30 tablet    Take 1 tablet (8 mg) by mouth every 8 hours as needed (Nausea/Vomiting)    Adenocarcinoma of sigmoid colon (H)       prochlorperazine 10 MG tablet    COMPAZINE    30 tablet    Take 1 tablet (10 mg) by mouth every 6 hours as needed (Nausea/Vomiting)    Adenocarcinoma of sigmoid colon (H)

## 2018-04-16 NOTE — MR AVS SNAPSHOT
After Visit Summary   4/16/2018    Rian Malik    MRN: [id number]           Patient Information     Date Of Birth          1960        Visit Information        Provider Department      4/16/2018 8:45 AM Padilla Last MD [facility]        Today's Diagnoses     Adenocarcinoma of sigmoid colon (H)    -  1       Follow-ups after your visit        Your next 10 appointments already scheduled     Apr 30, 2018 11:00 AM CDT   Return Visit with [facility] ONLY [facility] C[facility]   [facility] ([facility])    [address] [place] [address] [phone]            Apr 30, 2018 11:45 AM CDT   Return Visit with Padilla Last MD   [facility] ([facility])    [address] [place] [id number] [phone]            Apr 30, 2018 12:30 PM CDT   Flofox with BAY 1 INFUSION   [facility] ([facility])    [address] [place] [id number] [phone]            May 03, 2018  9:15 AM CDT   Lab with UC LAB   [facility] ([facility])    [address]  1st Floor  [facility] [address] [phone]            May 03, 2018 10:15 AM CDT   (Arrive by 10:00 AM)   Return General Liver with Yaakov Burroughs MD   [facility] Hepatology ([facility] [place])    [address]  [address]  [facility] [address] [phone]              Who to contact     If you have questions or need follow up information about today's clinic visit or your schedule please contact [facility] directly at [phone].  Normal or non-critical lab and imaging results will be communicated to you by MyChart, letter or phone within 4 business days after the clinic has received the results. If you do not hear from us within 7 days, please contact the clinic through MyChart or phone. If you have a critical or abnormal  "lab result, we will notify you by phone as soon as possible.  Submit refill requests through [employer] or call your pharmacy and they will forward the refill request to us. Please allow 3 business days for your refill to be completed.          Additional Information About Your Visit        "[employer]harHymite Information     [employer] gives you secure access to your electronic health record. If you see a primary care provider, you can also send messages to your care team and make appointments. If you have questions, please call your primary care clinic.  If you do not have a primary care provider, please call [phone] and they will assist you.      [employer] is an electronic gateway that provides easy, online access to your medical records. With [employer], you can request a clinic appointment, read your test results, renew a prescription or communicate with your care team.     To access your existing account, please contact your [facility] or call [phone] for assistance.        Care EveryWhere ID     This is your Care EveryWhere ID. This could be used by other organizations to access your [place] medical records  [id number]        Your Vitals Were     Pulse Temperature Respirations Height Pulse Oximetry BMI (Body Mass Index)    63 98  F (36.7  C) 15 1.727 m (5' 7.99\") 98% 25.63 kg/m2       Blood Pressure from Last 3 Encounters:   No data found for BP    Weight from Last 3 Encounters:   No data found for Wt              Today, you had the following     No orders found for display       Primary Care Provider Office Phone # Fax #    Moreno Harris [phone]       [address]  [facility] [address]        Equal Access to Services     [facility]: Hadii aad trevon hadasho Soomaali, waaxda luqadaha, qaybta kaalmada adeegpatricio, alisa leon . So [facility] [phone].    ATENCIÓN: Si habla español, tiene a martino disposición servicios gratuitos de " asistencia lingüística. Alfredo al [phone].    We comply with applicable federal civil rights laws and [place] laws. We do not discriminate on the basis of race, color, national origin, age, disability, sex, sexual orientation, or gender identity.            Thank you!     Thank you for choosing [facility]  for your care. Our goal is always to provide you with excellent care. Hearing back from our patients is one way we can continue to improve our services. Please take a few minutes to complete the written survey that you may receive in the mail after your visit with us. Thank you!             Your Updated Medication List - Protect others around you: Learn how to safely use, store and throw away your medicines at www.disposemymeds.org.          This list is accurate as of 4/16/18 11:59 PM.  Always use your most recent med list.                   Brand Name Dispense Instructions for use Diagnosis    docusate sodium 100 MG tablet    COLACE    60 tablet    Take 100 mg by mouth daily    Special screening for malignant neoplasms, colon       LORazepam 0.5 MG tablet    ATIVAN    30 tablet    Take 1 tablet (0.5 mg) by mouth every 4 hours as needed (Anxiety, Nausea/Vomiting or Sleep)    Adenocarcinoma of sigmoid colon (H)       ondansetron 8 MG tablet    ZOFRAN    30 tablet    Take 1 tablet (8 mg) by mouth every 8 hours as needed (Nausea/Vomiting)    Adenocarcinoma of sigmoid colon (H)       prochlorperazine 10 MG tablet    COMPAZINE    30 tablet    Take 1 tablet (10 mg) by mouth every 6 hours as needed (Nausea/Vomiting)    Adenocarcinoma of sigmoid colon (H)

## 2018-04-16 NOTE — PROGRESS NOTES
"Infusion Nursing Note:  Rian Malik presents today for C8D1 Oxaliplatin/Fluorouricil pump connect.   Patient seen by provider today: Yes: Dr. Last   present during visit today: Not Applicable.    Note: Prior to discharge: Port is secured in place with tegaderm and flushed with 10cc NS with positive blood return noted.  Continuous home infusion Dosi-Fuser pump connected.    All connectors secured in place and clamps taped open.    Pump started, \"running\" noted on display (CADD): Not Applicable.  Patient instructed to call our clinic or [facility] with any questions or concerns at home.  Patient verbalized understanding.    Patient set up for pump disconnect at home with [facility] on 4/18/18 @ 1000.      Intravenous Access:  PICC.    Treatment Conditions:  Lab Results   Component Value Date    HGB 13.7 04/16/2018     Lab Results   Component Value Date    WBC 4.0 04/16/2018      Lab Results   Component Value Date    ANEU 2.2 04/16/2018     Lab Results   Component Value Date    PLT 69 04/16/2018      Lab Results   Component Value Date     04/16/2018                   Lab Results   Component Value Date    POTASSIUM 4.3 04/16/2018           Lab Results   Component Value Date    MAG 2.0 08/04/2017            Lab Results   Component Value Date    CR 0.61 04/16/2018                   Lab Results   Component Value Date    MAICOL 8.6 04/16/2018                Lab Results   Component Value Date    BILITOTAL 0.8 04/16/2018           Lab Results   Component Value Date    ALBUMIN 3.3 04/16/2018                    Lab Results   Component Value Date    ALT 73 04/16/2018           Lab Results   Component Value Date    AST 65 04/16/2018       Results reviewed, labs did not meet treatment parameters, ok to proceed with treatment per Dr. Last.  Platelets 69,000. Dr. Last removed the Fluorouricil bolus from treatment plan for today. Patient will have a CBC/plt/diff checked in 1 week.     Post " Infusion Assessment:  Patient tolerated infusion without incident.  Blood return noted pre and post infusion.  Site patent and intact, free from redness, edema or discomfort.  No evidence of extravasations.  Access discontinued per protocol.    Discharge Plan:   Instructed patient to call the clinic if he has any signs of bleeding. Patient will have a CBC/plt/diff checked in 1 week.   Copy of AVS reviewed with patient and/or family.  Patient will return 4/30/18 for next appointment.  Patient discharged in stable condition accompanied by: self.  Departure Mode: Ambulatory.    Mervat Jose RN

## 2018-04-18 ENCOUNTER — HOME INFUSION (PRE-WILLOW HOME INFUSION) (OUTPATIENT)
Dept: PHARMACY | Facility: CLINIC | Age: 58
End: 2018-04-18

## 2018-04-18 NOTE — PROGRESS NOTES
This is a recent snapshot of the patient's [facility] medical record.  For current drug dose and complete information and questions, call [phone] or In Basket pool, [facility]v home infusion ([id number])  CSN Number:  [id number]

## 2018-04-19 NOTE — PROGRESS NOTES
This is a recent snapshot of the patient's [facility] medical record.  For current drug dose and complete information and questions, call [phone] or In Basket pool, fv home infusion ([id number])  CSN Number:  [id number]

## 2018-04-23 ENCOUNTER — TELEPHONE (OUTPATIENT)
Dept: ONCOLOGY | Facility: CLINIC | Age: 58
End: 2018-04-23

## 2018-04-23 DIAGNOSIS — C18.7 ADENOCARCINOMA OF SIGMOID COLON (H): ICD-10-CM

## 2018-04-23 LAB
BASOPHILS # BLD AUTO: 0 10E9/L (ref 0–0.2)
BASOPHILS NFR BLD AUTO: 0.6 %
DIFFERENTIAL METHOD BLD: ABNORMAL
EOSINOPHIL # BLD AUTO: 0.1 10E9/L (ref 0–0.7)
EOSINOPHIL NFR BLD AUTO: 1.9 %
ERYTHROCYTE [DISTWIDTH] IN BLOOD BY AUTOMATED COUNT: 13.5 % (ref 10–15)
HCT VFR BLD AUTO: 37.4 % (ref 40–53)
HGB BLD-MCNC: 13.3 G/DL (ref 13.3–17.7)
LYMPHOCYTES # BLD AUTO: 2.1 10E9/L (ref 0.8–5.3)
LYMPHOCYTES NFR BLD AUTO: 33.6 %
MCH RBC QN AUTO: 33.2 PG (ref 26.5–33)
MCHC RBC AUTO-ENTMCNC: 35.6 G/DL (ref 31.5–36.5)
MCV RBC AUTO: 93 FL (ref 78–100)
MONOCYTES # BLD AUTO: 0.5 10E9/L (ref 0–1.3)
MONOCYTES NFR BLD AUTO: 8 %
NEUTROPHILS # BLD AUTO: 3.5 10E9/L (ref 1.6–8.3)
NEUTROPHILS NFR BLD AUTO: 55.9 %
PLATELET # BLD AUTO: 69 10E9/L (ref 150–450)
RBC # BLD AUTO: 4.01 10E12/L (ref 4.4–5.9)
WBC # BLD AUTO: 6.3 10E9/L (ref 4–11)

## 2018-04-23 PROCEDURE — 36415 COLL VENOUS BLD VENIPUNCTURE: CPT | Performed by: INTERNAL MEDICINE

## 2018-04-23 PROCEDURE — 85025 COMPLETE CBC W/AUTO DIFF WBC: CPT | Performed by: INTERNAL MEDICINE

## 2018-04-23 NOTE — TELEPHONE ENCOUNTER
I called vincent to schedule a CBC per [place]'s Radha. He needs it this week per Dr Last. Patient may not be aware when he called that Berhane wants lab this week.    Samreen

## 2018-04-30 ENCOUNTER — HOME INFUSION (PRE-WILLOW HOME INFUSION) (OUTPATIENT)
Dept: PHARMACY | Facility: CLINIC | Age: 58
End: 2018-04-30

## 2018-04-30 ENCOUNTER — ONCOLOGY VISIT (OUTPATIENT)
Dept: ONCOLOGY | Facility: CLINIC | Age: 58
End: 2018-04-30
Payer: COMMERCIAL

## 2018-04-30 ENCOUNTER — INFUSION THERAPY VISIT (OUTPATIENT)
Dept: INFUSION THERAPY | Facility: CLINIC | Age: 58
End: 2018-04-30
Payer: COMMERCIAL

## 2018-04-30 VITALS
OXYGEN SATURATION: 97 % | WEIGHT: 168 LBS | RESPIRATION RATE: 15 BRPM | TEMPERATURE: 97.7 F | HEART RATE: 59 BPM | SYSTOLIC BLOOD PRESSURE: 126 MMHG | HEIGHT: 68 IN | BODY MASS INDEX: 25.46 KG/M2 | DIASTOLIC BLOOD PRESSURE: 74 MMHG

## 2018-04-30 DIAGNOSIS — C18.7 ADENOCARCINOMA OF SIGMOID COLON (H): Primary | ICD-10-CM

## 2018-04-30 LAB
ALBUMIN SERPL-MCNC: 3.4 G/DL (ref 3.4–5)
ALP SERPL-CCNC: 128 U/L (ref 40–150)
ALT SERPL W P-5'-P-CCNC: 89 U/L (ref 0–70)
ANION GAP SERPL CALCULATED.3IONS-SCNC: 7 MMOL/L (ref 3–14)
AST SERPL W P-5'-P-CCNC: 70 U/L (ref 0–45)
BASOPHILS # BLD AUTO: 0 10E9/L (ref 0–0.2)
BASOPHILS NFR BLD AUTO: 0.8 %
BILIRUB SERPL-MCNC: 1.1 MG/DL (ref 0.2–1.3)
BUN SERPL-MCNC: 15 MG/DL (ref 7–30)
CALCIUM SERPL-MCNC: 8.9 MG/DL (ref 8.5–10.1)
CHLORIDE SERPL-SCNC: 105 MMOL/L (ref 94–109)
CO2 SERPL-SCNC: 28 MMOL/L (ref 20–32)
CREAT SERPL-MCNC: 0.74 MG/DL (ref 0.66–1.25)
DIFFERENTIAL METHOD BLD: ABNORMAL
EOSINOPHIL # BLD AUTO: 0.1 10E9/L (ref 0–0.7)
EOSINOPHIL NFR BLD AUTO: 1.7 %
ERYTHROCYTE [DISTWIDTH] IN BLOOD BY AUTOMATED COUNT: 13.4 % (ref 10–15)
GFR SERPL CREATININE-BSD FRML MDRD: >90 ML/MIN/1.7M2
GLUCOSE SERPL-MCNC: 97 MG/DL (ref 70–99)
HCT VFR BLD AUTO: 38.9 % (ref 40–53)
HGB BLD-MCNC: 13.8 G/DL (ref 13.3–17.7)
IMM GRANULOCYTES # BLD: 0 10E9/L (ref 0–0.4)
IMM GRANULOCYTES NFR BLD: 0.4 %
LYMPHOCYTES # BLD AUTO: 1.5 10E9/L (ref 0.8–5.3)
LYMPHOCYTES NFR BLD AUTO: 31.7 %
MCH RBC QN AUTO: 32.9 PG (ref 26.5–33)
MCHC RBC AUTO-ENTMCNC: 35.5 G/DL (ref 31.5–36.5)
MCV RBC AUTO: 93 FL (ref 78–100)
MONOCYTES # BLD AUTO: 0.5 10E9/L (ref 0–1.3)
MONOCYTES NFR BLD AUTO: 11.3 %
NEUTROPHILS # BLD AUTO: 2.6 10E9/L (ref 1.6–8.3)
NEUTROPHILS NFR BLD AUTO: 54.1 %
PLATELET # BLD AUTO: 71 10E9/L (ref 150–450)
POTASSIUM SERPL-SCNC: 3.9 MMOL/L (ref 3.4–5.3)
PROT SERPL-MCNC: 7.2 G/DL (ref 6.8–8.8)
RBC # BLD AUTO: 4.19 10E12/L (ref 4.4–5.9)
SODIUM SERPL-SCNC: 140 MMOL/L (ref 133–144)
WBC # BLD AUTO: 4.8 10E9/L (ref 4–11)

## 2018-04-30 PROCEDURE — 80053 COMPREHEN METABOLIC PANEL: CPT | Performed by: INTERNAL MEDICINE

## 2018-04-30 PROCEDURE — 96416 CHEMO PROLONG INFUSE W/PUMP: CPT | Performed by: INTERNAL MEDICINE

## 2018-04-30 PROCEDURE — 96367 TX/PROPH/DG ADDL SEQ IV INF: CPT | Performed by: INTERNAL MEDICINE

## 2018-04-30 PROCEDURE — 96413 CHEMO IV INFUSION 1 HR: CPT | Performed by: INTERNAL MEDICINE

## 2018-04-30 PROCEDURE — 99207 ZZC NO CHARGE LOS: CPT

## 2018-04-30 PROCEDURE — 99207 ZZC NO CHARGE NURSE ONLY: CPT

## 2018-04-30 PROCEDURE — 96415 CHEMO IV INFUSION ADDL HR: CPT | Performed by: INTERNAL MEDICINE

## 2018-04-30 PROCEDURE — 99214 OFFICE O/P EST MOD 30 MIN: CPT | Mod: 25 | Performed by: INTERNAL MEDICINE

## 2018-04-30 PROCEDURE — 85025 COMPLETE CBC W/AUTO DIFF WBC: CPT | Performed by: INTERNAL MEDICINE

## 2018-04-30 RX ORDER — ALBUTEROL SULFATE 90 UG/1
1-2 AEROSOL, METERED RESPIRATORY (INHALATION)
Status: CANCELLED
Start: 2018-04-30

## 2018-04-30 RX ORDER — METHYLPREDNISOLONE SODIUM SUCCINATE 125 MG/2ML
125 INJECTION, POWDER, LYOPHILIZED, FOR SOLUTION INTRAMUSCULAR; INTRAVENOUS
Status: CANCELLED
Start: 2018-04-30

## 2018-04-30 RX ORDER — ALBUTEROL SULFATE 0.83 MG/ML
2.5 SOLUTION RESPIRATORY (INHALATION)
Status: CANCELLED | OUTPATIENT
Start: 2018-04-30

## 2018-04-30 RX ORDER — LORAZEPAM 2 MG/ML
0.5 INJECTION INTRAMUSCULAR EVERY 4 HOURS PRN
Status: CANCELLED
Start: 2018-04-30

## 2018-04-30 RX ORDER — DIPHENHYDRAMINE HYDROCHLORIDE 50 MG/ML
50 INJECTION INTRAMUSCULAR; INTRAVENOUS
Status: CANCELLED
Start: 2018-04-30

## 2018-04-30 RX ORDER — EPINEPHRINE 1 MG/ML
0.3 INJECTION, SOLUTION INTRAMUSCULAR; SUBCUTANEOUS EVERY 5 MIN PRN
Status: CANCELLED | OUTPATIENT
Start: 2018-04-30

## 2018-04-30 RX ORDER — MEPERIDINE HYDROCHLORIDE 50 MG/ML
25 INJECTION INTRAMUSCULAR; INTRAVENOUS; SUBCUTANEOUS EVERY 30 MIN PRN
Status: CANCELLED | OUTPATIENT
Start: 2018-04-30

## 2018-04-30 RX ORDER — SODIUM CHLORIDE 9 MG/ML
1000 INJECTION, SOLUTION INTRAVENOUS CONTINUOUS PRN
Status: CANCELLED
Start: 2018-04-30

## 2018-04-30 RX ORDER — EPINEPHRINE 0.3 MG/.3ML
0.3 INJECTION SUBCUTANEOUS EVERY 5 MIN PRN
Status: CANCELLED | OUTPATIENT
Start: 2018-04-30

## 2018-04-30 RX ORDER — HEPARIN SODIUM (PORCINE) LOCK FLUSH IV SOLN 100 UNIT/ML 100 UNIT/ML
5 SOLUTION INTRAVENOUS
Status: DISCONTINUED | OUTPATIENT
Start: 2018-04-30 | End: 2018-04-30 | Stop reason: HOSPADM

## 2018-04-30 RX ADMIN — HEPARIN SODIUM (PORCINE) LOCK FLUSH IV SOLN 100 UNIT/ML 5 ML: 100 SOLUTION at 11:06

## 2018-04-30 ASSESSMENT — PAIN SCALES - GENERAL: PAINLEVEL: NO PAIN (0)

## 2018-04-30 NOTE — Clinical Note
4/30/2018         RE: Rian Malik  [address] [place] [address]        Dear Colleague,    Thank you for referring your patient, Rian Malik, to the [facility]. Please see a copy of my visit note below.      FOLLOW-UP VISIT NOTE    PATIENT NAME: Rian Malik MRN # [id number]  DATE OF VISIT: Apr 30, 2018 YOB: 1960    REFERRING PROVIDER: No referring provider defined for this encounter.    CANCER TYPE: Adenocarcinoma colon  STAGE: III pT4N1c  ECOG PS: 0    ONCOLOGY HISTORY:  57-year-old male who around February 2017r, developed intermittent cramping lower abdominal pain. Was evaluated by PCP and clinical impression was colitis. He was put on p.o. antibiotics. However symptoms did not improve. He was eventually referred to surgery and underwent imaging with CT findings suspicious of sigmoid abscess. He was admitted to the hospital for resection of sigmoid abscess 10/11/17. However intraoperatively was noted to have a large firmly adherent sigmoid colon mass. Laparoscopic surgery was converted to open sigmoidectomy with end colostomy.     Surgical pathology   B.  COLON, SIGMOID, HEMICOLECTOMY   - Poorly differentiated adenocarcinoma, with signet ring cell features   - Tumor size: 5 cm   - Tumor is present on serosal surface and microscopically perforates   serosal surface   - One surgical margin shows extensive serosal involvement by tumor. The   opposite, undesignated margin shows no evidence of malignancy   - Lymphovascular invasion is present   - Perineural invasion is not identified   - Tumor deposits are present   - Immunohistochemistry for mismatch repair proteins shows intact nuclear   expression for MLH1, MSH2, MSH6, and PMS2   - Three reactive lymph nodes, negative for malignancy (0/3)   - Pathologic staging: pT4N1c      Patient's case was discussed at the tumor board. Recommendation was to obtain new staging scans as well as to proceed with systemic  chemotherapy at this point with plans for further surgery in future. Staging scans negative for distant disease.     - Started on Folfox 11/21/17    SUBJECTIVE     Patient is here today for his next cycle of chemotherapy. Transient normal sensitivity which improves in 2 days. Denies persistent tingling/numbness, nausea/vomiting, fever/chills, bleeding/excess bruising. Ostomy working well.      PAST MEDICAL HISTORY     Past Medical History:   Diagnosis Date     Cirrhosis (H)      Colon cancer (H) 10/11/2017    Poorly differentiated adenocarcinoma     Hepatitis C          CURRENT OUTPATIENT MEDICATIONS     Current Outpatient Prescriptions   Medication Sig Dispense Refill     docusate sodium (COLACE) 100 MG tablet Take 100 mg by mouth daily 60 tablet 1     LORazepam (ATIVAN) 0.5 MG tablet Take 1 tablet (0.5 mg) by mouth every 4 hours as needed (Anxiety, Nausea/Vomiting or Sleep) 30 tablet 2     ondansetron (ZOFRAN) 8 MG tablet Take 1 tablet (8 mg) by mouth every 8 hours as needed (Nausea/Vomiting) 30 tablet 1     prochlorperazine (COMPAZINE) 10 MG tablet Take 1 tablet (10 mg) by mouth every 6 hours as needed (Nausea/Vomiting) 30 tablet 2        ALLERGIES     Allergies   Allergen Reactions     Acetaminophen      Naproxen         REVIEW OF SYSTEMS   As above in the HPI, o/w complete 14-point ROS was negative.     PHYSICAL EXAM   B/P: 138/78, T: 98.1, P: 64, R: 18  SpO2 Readings from Last 4 Encounters:   04/30/18 97%   04/16/18 98%   04/09/18 99%   03/26/18 98%     Wt Readings from Last 3 Encounters:   04/30/18 76.2 kg (168 lb)   04/16/18 76.4 kg (168 lb 8 oz)   04/09/18 76 kg (167 lb 8 oz)     GEN: NAD  EYES:PERRLA  Mouth/ENT: Oropharynx is clear.  NECK: no  lymphadenopathy  LUNGS: clear bilaterally  CV: regular, no murmurs, rubs, or gallops  ABDOMEN: soft, non-tender, non-distended,Ostomy in place  EXT: warm, well perfused, no edema  NEURO: alert  SKIN: no rashes     LABORATORY AND IMAGING STUDIES     Lab Results    Component Value Date    WBC 4.8 04/30/2018     Lab Results   Component Value Date    RBC 4.19 04/30/2018     Lab Results   Component Value Date    HGB 13.8 04/30/2018     Lab Results   Component Value Date    HCT 38.9 04/30/2018     No components found for: MCT  Lab Results   Component Value Date    MCV 93 04/30/2018     Lab Results   Component Value Date    MCH 32.9 04/30/2018     Lab Results   Component Value Date    MCHC 35.5 04/30/2018     Lab Results   Component Value Date    RDW 13.4 04/30/2018     Lab Results   Component Value Date    PLT 71 04/30/2018     Recent Labs   Lab Test  04/30/18   1105  04/16/18   0825   NA  140  139   POTASSIUM  3.9  4.3   CHLORIDE  105  106   CO2  28  28   ANIONGAP  7  5   GLC  97  92   BUN  15  19   CR  0.74  0.61*   MAICOL  8.9  8.6          ASSESSMENT AND PLAN     57-year-old male with newly diagnosed     - Poorly differentiated adenocarcinoma colon  [id number](tumor deposits) with margin involvement -  Stage III   CT scans negative for evidence of metastatic disease  Patient's case was discussed at the tumor board 11/13 and recommendation was to proceed with systemic chemotherapy at this point with plans for additional surgery in future if needed after completion of chemotherapy.  Started on FOLFOX q2 week regimen for 6 months duration - s/p 8 cycles so far.  Chemo delays due to cytopenias  We'll proceed with with cycle 9  today with continue to omit 5-FU bolus as well as reduced dose oxaliplatin at 65 mg/m2.      - Hepatitis C  Completed antiviral treatment per GI    - Thrombocytopenia  Secondary to chemotherapy Vs Hep C   Continue Oxaliplatin dose reduced at 65 mg/m2  Continue to omit 5-FU bolus from the remaining cycles    RTC in 2 weeks for cycle 10.    The patient is ready to learn, no apparent learning barriers were identified, Diagnosis and treatment plans were explained to the patient. The patient expressed understanding of the content. The patient questions were answered  to his satisfaction.    Chart documentation with Dragon Voice recognition Software. Although reviewed after completion, some words and grammatical errors may remain.  Padilla Last MD  Attending Physician   Hematology/Medical Oncology    Again, thank you for allowing me to participate in the care of your patient.        Sincerely,        Padilla Last MD

## 2018-04-30 NOTE — PROGRESS NOTES
Power port needle left accessed for treatment. Tolerated lab draw without complaint. [place] drawn-Red/Green/Purple tubes. Double signed by patient and RN. See documentation flowsheet. Nuha De Los Santos, RN, BSN, OCN

## 2018-04-30 NOTE — MR AVS SNAPSHOT
After Visit Summary   4/30/2018    Rian Malik    MRN: [id number]           Patient Information     Date Of Birth          1960        Visit Information        Provider Department      4/30/2018 12:30 PM BAY 1 INFUSION [facility]        Today's Diagnoses     Adenocarcinoma of sigmoid colon (H)    -  1       Follow-ups after your visit        Your next 10 appointments already scheduled     May 03, 2018  9:15 AM CDT   Lab with UC LAB   [facility] ([facility])    [address]  1st Floor  [facility] [address] [phone]            May 03, 2018 10:15 AM CDT   (Arrive by 10:00 AM)   Return General Liver with Yaakov Burroughs MD   [facility]atology ([facility])    [address]  [address]  [facility] [address] [phone]              Who to contact     If you have questions or need follow up information about today's clinic visit or your schedule please contact [facility] directly at [phone].  Normal or non-critical lab and imaging results will be communicated to you by Splysthart, letter or phone within 4 business days after the clinic has received the results. If you do not hear from us within 7 days, please contact the clinic through Splysthart or phone. If you have a critical or abnormal lab result, we will notify you by phone as soon as possible.  Submit refill requests through [employer] or call your pharmacy and they will forward the refill request to us. Please allow 3 business days for your refill to be completed.          Additional Information About Your Visit        MyChart Information     [employer] gives you secure access to your electronic health record. If you see a primary care provider, you can also send messages to your care team and make appointments. If you have questions, please call your primary care clinic.  If you do not have a primary care provider, please call  [phone] and they will assist you.      [employer] is an electronic gateway that provides easy, online access to your medical records. With [employer], you can request a clinic appointment, read your test results, renew a prescription or communicate with your care team.     To access your existing account, please contact your [facility] or call [phone] for assistance.        Care EveryWhere ID     This is your Care EveryWhere ID. This could be used by other organizations to access your [place] medical records  [id number]         Blood Pressure from Last 3 Encounters:   04/30/18 126/74   04/16/18 122/75   04/09/18 122/75    Weight from Last 3 Encounters:   04/30/18 76.2 kg (168 lb)   04/16/18 76.4 kg (168 lb 8 oz)   04/09/18 76 kg (167 lb 8 oz)              Today, you had the following     No orders found for display       Primary Care Provider Office Phone # Fax #    Moreno Harris [phone]       [address] [facility] [address]        Equal Access to Services     [facility]: Hadii cameron ku hadasho Sosherri, waaxda luqadaha, qaybta kaalmada jose, alisa leon . So [facility] [phone].    ATENCIÓN: Si habla español, tiene a martino disposición servicios gratuitos de asistencia lingüística. Ll[facility] [phone].    We comply with applicable federal civil rights laws and [place] laws. We do not discriminate on the basis of race, color, national origin, age, disability, sex, sexual orientation, or gender identity.            Thank you!     Thank you for choosing [facility]  for your care. Our goal is always to provide you with excellent care. Hearing back from our patients is one way we can continue to improve our services. Please take a few minutes to complete the written survey that you may receive in the mail after your visit with us. Thank you!             Your Updated Medication List - Protect  others around you: Learn how to safely use, store and throw away your medicines at www.disposemymeds.org.          This list is accurate as of 4/30/18  4:02 PM.  Always use your most recent med list.                   Brand Name Dispense Instructions for use Diagnosis    docusate sodium 100 MG tablet    COLACE    60 tablet    Take 100 mg by mouth daily    Special screening for malignant neoplasms, colon       LORazepam 0.5 MG tablet    ATIVAN    30 tablet    Take 1 tablet (0.5 mg) by mouth every 4 hours as needed (Anxiety, Nausea/Vomiting or Sleep)    Adenocarcinoma of sigmoid colon (H)       ondansetron 8 MG tablet    ZOFRAN    30 tablet    Take 1 tablet (8 mg) by mouth every 8 hours as needed (Nausea/Vomiting)    Adenocarcinoma of sigmoid colon (H)       prochlorperazine 10 MG tablet    COMPAZINE    30 tablet    Take 1 tablet (10 mg) by mouth every 6 hours as needed (Nausea/Vomiting)    Adenocarcinoma of sigmoid colon (H)

## 2018-04-30 NOTE — PROGRESS NOTES
FOLLOW-UP VISIT NOTE    PATIENT NAME: Rian Malik MRN # [id number]  DATE OF VISIT: Apr 30, 2018 YOB: 1960    REFERRING PROVIDER: No referring provider defined for this encounter.    CANCER TYPE: Adenocarcinoma colon  STAGE: III pT4N1c  ECOG PS: 0    ONCOLOGY HISTORY:  57-year-old male who around February 2017r, developed intermittent cramping lower abdominal pain. Was evaluated by PCP and clinical impression was colitis. He was put on p.o. antibiotics. However symptoms did not improve. He was eventually referred to surgery and underwent imaging with CT findings suspicious of sigmoid abscess. He was admitted to the hospital for resection of sigmoid abscess 10/11/17. However intraoperatively was noted to have a large firmly adherent sigmoid colon mass. Laparoscopic surgery was converted to open sigmoidectomy with end colostomy.     Surgical pathology   B.  COLON, SIGMOID, HEMICOLECTOMY   - Poorly differentiated adenocarcinoma, with signet ring cell features   - Tumor size: 5 cm   - Tumor is present on serosal surface and microscopically perforates   serosal surface   - One surgical margin shows extensive serosal involvement by tumor. The   opposite, undesignated margin shows no evidence of malignancy   - Lymphovascular invasion is present   - Perineural invasion is not identified   - Tumor deposits are present   - Immunohistochemistry for mismatch repair proteins shows intact nuclear   expression for MLH1, MSH2, MSH6, and PMS2   - Three reactive lymph nodes, negative for malignancy (0/3)   - Pathologic staging: pT4N1c      Patient's case was discussed at the tumor board. Recommendation was to obtain new staging scans as well as to proceed with systemic chemotherapy at this point with plans for further surgery in future. Staging scans negative for distant disease.     - Started on Folfox 11/21/17    SUBJECTIVE     Patient is here today for his next cycle of chemotherapy. Transient normal sensitivity  which improves in 2 days. Denies persistent tingling/numbness, nausea/vomiting, fever/chills, bleeding/excess bruising. Ostomy working well.      PAST MEDICAL HISTORY     Past Medical History:   Diagnosis Date     Cirrhosis (H)      Colon cancer (H) 10/11/2017    Poorly differentiated adenocarcinoma     Hepatitis C          CURRENT OUTPATIENT MEDICATIONS     Current Outpatient Prescriptions   Medication Sig Dispense Refill     docusate sodium (COLACE) 100 MG tablet Take 100 mg by mouth daily 60 tablet 1     LORazepam (ATIVAN) 0.5 MG tablet Take 1 tablet (0.5 mg) by mouth every 4 hours as needed (Anxiety, Nausea/Vomiting or Sleep) 30 tablet 2     ondansetron (ZOFRAN) 8 MG tablet Take 1 tablet (8 mg) by mouth every 8 hours as needed (Nausea/Vomiting) 30 tablet 1     prochlorperazine (COMPAZINE) 10 MG tablet Take 1 tablet (10 mg) by mouth every 6 hours as needed (Nausea/Vomiting) 30 tablet 2        ALLERGIES     Allergies   Allergen Reactions     Acetaminophen      Naproxen         REVIEW OF SYSTEMS   As above in the HPI, o/w complete 14-point ROS was negative.     PHYSICAL EXAM   B/P: 138/78, T: 98.1, P: 64, R: 18  SpO2 Readings from Last 4 Encounters:   04/30/18 97%   04/16/18 98%   04/09/18 99%   03/26/18 98%     Wt Readings from Last 3 Encounters:   04/30/18 76.2 kg (168 lb)   04/16/18 76.4 kg (168 lb 8 oz)   04/09/18 76 kg (167 lb 8 oz)     GEN: NAD  EYES:PERRLA  Mouth/ENT: Oropharynx is clear.  NECK: no  lymphadenopathy  LUNGS: clear bilaterally  CV: regular, no murmurs, rubs, or gallops  ABDOMEN: soft, non-tender, non-distended,Ostomy in place  EXT: warm, well perfused, no edema  NEURO: alert  SKIN: no rashes     LABORATORY AND IMAGING STUDIES     Lab Results   Component Value Date    WBC 4.8 04/30/2018     Lab Results   Component Value Date    RBC 4.19 04/30/2018     Lab Results   Component Value Date    HGB 13.8 04/30/2018     Lab Results   Component Value Date    HCT 38.9 04/30/2018     No components found  for: MCT  Lab Results   Component Value Date    MCV 93 04/30/2018     Lab Results   Component Value Date    MCH 32.9 04/30/2018     Lab Results   Component Value Date    MCHC 35.5 04/30/2018     Lab Results   Component Value Date    RDW 13.4 04/30/2018     Lab Results   Component Value Date    PLT 71 04/30/2018     Recent Labs   Lab Test  04/30/18   1105  04/16/18   0825   NA  140  139   POTASSIUM  3.9  4.3   CHLORIDE  105  106   CO2  28  28   ANIONGAP  7  5   GLC  97  92   BUN  15  19   CR  0.74  0.61*   MAICOL  8.9  8.6          ASSESSMENT AND PLAN     57-year-old male with newly diagnosed     - Poorly differentiated adenocarcinoma colon  [id number](tumor deposits) with margin involvement -  Stage III   CT scans negative for evidence of metastatic disease  Patient's case was discussed at the tumor board 11/13 and recommendation was to proceed with systemic chemotherapy at this point with plans for additional surgery in future if needed after completion of chemotherapy.  Started on FOLFOX q2 week regimen for 6 months duration - s/p 8 cycles so far.  Chemo delays due to cytopenias  We'll proceed with with cycle 9  today with continue to omit 5-FU bolus as well as reduced dose oxaliplatin at 65 mg/m2.      - Hepatitis C  Completed antiviral treatment per GI    - Thrombocytopenia  Secondary to chemotherapy Vs Hep C   Continue Oxaliplatin dose reduced at 65 mg/m2  Continue to omit 5-FU bolus from the remaining cycles    RTC in 2 weeks for cycle 10.    The patient is ready to learn, no apparent learning barriers were identified, Diagnosis and treatment plans were explained to the patient. The patient expressed understanding of the content. The patient questions were answered to his satisfaction.    Chart documentation with Dragon Voice recognition Software. Although reviewed after completion, some words and grammatical errors may remain.  Padilla Last MD  Attending Physician   Hematology/Medical Oncology

## 2018-04-30 NOTE — NURSING NOTE
"Oncology Rooming Note    April 30, 2018 11:37 AM   Rian Malik is a 57 year old male who presents for:    Chief Complaint   Patient presents with     Oncology Clinic Visit     prior to tx     Initial Vitals: /74  Pulse 59  Temp 97.7  F (36.5  C)  Resp 15  Ht 1.727 m (5' 7.99\")  Wt 76.2 kg (168 lb)  SpO2 97%  BMI 25.55 kg/m2 Estimated body mass index is 25.55 kg/(m^2) as calculated from the following:    Height as of this encounter: 1.727 m (5' 7.99\").    Weight as of this encounter: 76.2 kg (168 lb). Body surface area is 1.91 meters squared.  No Pain (0) Comment: Data Unavailable   No LMP for male patient.  Allergies reviewed: Yes  Medications reviewed: Yes    Medications: Medication refills not needed today.  Pharmacy name entered into [employer]:    [facility] - [place], [place] - [address]  [facility] - [place], [place] - [address]        5 minutes for nursing intake (face to face time)     Agnieszka Ziegler LPN              "

## 2018-04-30 NOTE — MR AVS SNAPSHOT
After Visit Summary   4/30/2018    Rian Malik    MRN: [id number]           Patient Information     Date Of Birth          1960        Visit Information        Provider Department      4/30/2018 11:45 AM Padilla Last MD [facility]        Today's Diagnoses     Adenocarcinoma of sigmoid colon (H)    -  1       Follow-ups after your visit        Your next 10 appointments already scheduled     May 03, 2018  9:15 AM CDT   Lab with UC LAB   [facility] ([facility])    [address]  1st Floor  [facility] [id number] [phone]            May 03, 2018 10:15 AM CDT   (Arrive by 10:00 AM)   Return General Liver with Yaakov Burroughs MD   [facility] ([facility])    [address]  [address]  [facility] [address] [phone]            May 14, 2018  7:45 AM CDT   Return Visit with [facility]SE ONLY CANCER CENTER   [facility]    [address] [facility] [address] [phone]            May 14, 2018  8:15 AM CDT   Return Visit with Padilla Last MD   [facility]    [address] [facility] [address] [phone]            May 14, 2018  8:45 AM CDT   Flofox with BAY 5 INFUSION   [facility]    [address] [facility] [address] [phone]            May 24, 2018  7:45 AM CDT   Return Visit with [facility]SE ONLY CANCER CENTER   [facility]    [address] [facility] [address] [phone]            May 24, 2018  8:15 AM CDT   Return Visit with SAVANNA High [facility] ([facility])    [address] [facility] [address] [phone]            May 29, 2018 11:30 AM CDT   Return Visit with NURSE ONLY CANCER  [facility] ([facility])    [address] [facility] [address] [phone]            May 29, 2018 12:00 PM CDT   Flofox with BAY 9 INFUSION   [facility] ([facility])    [address] [facility] [address] [phone]              Who to contact     If you have questions or need follow up information about today's clinic visit or your schedule please contact [facility] directly at [phone].  Normal or non-critical lab and imaging results will be communicated to you by Sigmatixhart, letter or phone within 4 business days after the clinic has received the results. If you do not hear from us within 7 days, please contact the clinic through Sigmatixhart or phone. If you have a critical or abnormal lab result, we will notify you by phone as soon as possible.  Submit refill requests through Gratafy or call your pharmacy and they will forward the refill request to us. Please allow 3 business days for your refill to be completed.          Additional Information About Your Visit        Sigmatixhart Information     Gratafy gives you secure access to your electronic health record. If you see a primary care provider, you can also send messages to your care team and make appointments. If you have questions, please call your primary care clinic.  If you do not have a primary care provider, please call [phone] and they will assist you.      Gratafy is an electronic gateway that provides easy, online access to your medical records. With Gratafy, you can request a clinic appointment, read your test results, renew a prescription or communicate with your care team.     To access your existing account, please contact your [facility] or call [phone] for assistance.        Care EveryWhere ID     This is your Care EveryWhere ID. This could be used by other organizations to access your  "[facility] medical records  [id number]        Your Vitals Were     Pulse Temperature Respirations Height Pulse Oximetry BMI (Body Mass Index)    59 97.7  F (36.5  C) 15 1.727 m (5' 7.99\") 97% 25.55 kg/m2       Blood Pressure from Last 3 Encounters:   04/30/18 126/74   04/16/18 122/75   04/09/18 122/75    Weight from Last 3 Encounters:   04/30/18 76.2 kg (168 lb)   04/16/18 76.4 kg (168 lb 8 oz)   04/09/18 76 kg (167 lb 8 oz)              Today, you had the following     No orders found for display       Primary Care Provider Office Phone # Fax #    Moreno Harris [phone]       [address]  [facility] [address]        Equal Access to Services     [facility]: Hadii aad trevon hadasho Soomaali, waaxda luqadaha, qaybta kaalmada adeegyada, alisa leon . So [facility] [phone].    ATENCIÓN: Si habla español, tiene a martino disposición servicios gratuitos de asistencia lingüística. Llame al [phone].    We comply with applicable federal civil rights laws and [place] laws. We do not discriminate on the basis of race, color, national origin, age, disability, sex, sexual orientation, or gender identity.            Thank you!     Thank you for choosing [facility]  for your care. Our goal is always to provide you with excellent care. Hearing back from our patients is one way we can continue to improve our services. Please take a few minutes to complete the written survey that you may receive in the mail after your visit with us. Thank you!             Your Updated Medication List - Protect others around you: Learn how to safely use, store and throw away your medicines at www.disposemymeds.org.          This list is accurate as of 4/30/18 11:59 PM.  Always use your most recent med list.                   Brand Name Dispense Instructions for use Diagnosis    docusate sodium 100 MG tablet    COLACE    60 tablet    Take 100 mg by mouth daily    Special " screening for malignant neoplasms, colon       LORazepam 0.5 MG tablet    ATIVAN    30 tablet    Take 1 tablet (0.5 mg) by mouth every 4 hours as needed (Anxiety, Nausea/Vomiting or Sleep)    Adenocarcinoma of sigmoid colon (H)       ondansetron 8 MG tablet    ZOFRAN    30 tablet    Take 1 tablet (8 mg) by mouth every 8 hours as needed (Nausea/Vomiting)    Adenocarcinoma of sigmoid colon (H)       prochlorperazine 10 MG tablet    COMPAZINE    30 tablet    Take 1 tablet (10 mg) by mouth every 6 hours as needed (Nausea/Vomiting)    Adenocarcinoma of sigmoid colon (H)

## 2018-04-30 NOTE — MR AVS SNAPSHOT
After Visit Summary   4/30/2018    Rian Malik    MRN: [id number]           Patient Information     Date Of Birth          1960        Visit Information        Provider Department      4/30/2018 11:00 AM NURSE ONLY CANCER CENTER [facility]        Today's Diagnoses     Adenocarcinoma of sigmoid colon (H)    -  1       Follow-ups after your visit        Your next 10 appointments already scheduled     Apr 30, 2018 11:45 AM CDT   Return Visit with Padilla Last MD   [facility] ([facility])    [address] [place] [address] [phone]            Apr 30, 2018 12:30 PM CDT   Flofox with BAY 1 INFUSION   [facility] ([facility])    [address] [place] [address] [phone]            May 03, 2018  9:15 AM CDT   Lab with UC LAB   [facility] ([facility])    [address]  1st Floor  [facility] [address] [phone]            May 03, 2018 10:15 AM CDT   (Arrive by 10:00 AM)   Return General Liver with Yaakov Burroughs MD   [facility] ([facility])    [address]  [address]  [facility] [address] [phone]              Who to contact     If you have questions or need follow up information about today's clinic visit or your schedule please contact [facility] directly at [phone].  Normal or non-critical lab and imaging results will be communicated to you by MyChart, letter or phone within 4 business days after the clinic has received the results. If you do not hear from us within 7 days, please contact the clinic through MyChart or phone. If you have a critical or abnormal lab result, we will notify you by phone as soon as possible.  Submit refill requests through Terra Tech or call your pharmacy and they will forward the refill request to us. Please allow 3 business days  for your refill to be completed.          Additional Information About Your Visit        Ariesohart Information     Paytopia gives you secure access to your electronic health record. If you see a primary care provider, you can also send messages to your care team and make appointments. If you have questions, please call your primary care clinic.  If you do not have a primary care provider, please call [phone] and they will assist you.      Paytopia is an electronic gateway that provides easy, online access to your medical records. With Paytopia, you can request a clinic appointment, read your test results, renew a prescription or communicate with your care team.     To access your existing account, please contact your [facility] or call [phone] for assistance.        Care EveryWhere ID     This is your Care EveryWhere ID. This could be used by other organizations to access your [facility] medical records  [id number]         Blood Pressure from Last 3 Encounters:   04/16/18 122/75   04/09/18 122/75   03/26/18 120/75    Weight from Last 3 Encounters:   04/16/18 76.4 kg (168 lb 8 oz)   04/09/18 76 kg (167 lb 8 oz)   03/26/18 73.7 kg (162 lb 7 oz)              We Performed the Following     CBC with platelets differential     Comprehensive metabolic panel        Primary Care Provider Office Phone # Fax #    Moreno Harris [phone]       [address]  [facility] [address]        Equal Access to Services     [facility] : Hadii aad ku hadasho Soomaali, waaxda luqadaha, qaybta kaalmada adeegyada, waxay rosey hayharis leon . So [facility] [phone].    ATENCIÓN: Si habla español, tiene a martino disposición servicios gratuitos de asistencia lingüística. Llame al [phone].    We comply with applicable federal civil rights laws and [place] laws. We do not discriminate on the basis of race, color, national origin, age, disability, sex, sexual  orientation, or gender identity.            Thank you!     Thank you for choosing [facility]  for your care. Our goal is always to provide you with excellent care. Hearing back from our patients is one way we can continue to improve our services. Please take a few minutes to complete the written survey that you may receive in the mail after your visit with us. Thank you!             Your Updated Medication List - Protect others around you: Learn how to safely use, store and throw away your medicines at www.disposemymeds.org.          This list is accurate as of 4/30/18 11:15 AM.  Always use your most recent med list.                   Brand Name Dispense Instructions for use Diagnosis    docusate sodium 100 MG tablet    COLACE    60 tablet    Take 100 mg by mouth daily    Special screening for malignant neoplasms, colon       LORazepam 0.5 MG tablet    ATIVAN    30 tablet    Take 1 tablet (0.5 mg) by mouth every 4 hours as needed (Anxiety, Nausea/Vomiting or Sleep)    Adenocarcinoma of sigmoid colon (H)       ondansetron 8 MG tablet    ZOFRAN    30 tablet    Take 1 tablet (8 mg) by mouth every 8 hours as needed (Nausea/Vomiting)    Adenocarcinoma of sigmoid colon (H)       prochlorperazine 10 MG tablet    COMPAZINE    30 tablet    Take 1 tablet (10 mg) by mouth every 6 hours as needed (Nausea/Vomiting)    Adenocarcinoma of sigmoid colon (H)

## 2018-04-30 NOTE — PROGRESS NOTES
"Infusion Nursing Note:  Rian Malik presents today for Oxaliplatin/ 5FU pump connect.    Patient seen by provider today: Yes: Dr. Last   present during visit today: Not Applicable.    Note: N/A.    Intravenous Access:  Implanted Port.    Treatment Conditions:  Lab Results   Component Value Date    HGB 13.8 04/30/2018     Lab Results   Component Value Date    WBC 4.8 04/30/2018      Lab Results   Component Value Date    ANEU 2.6 04/30/2018     Lab Results   Component Value Date    PLT 71 04/30/2018      Lab Results   Component Value Date     04/30/2018                   Lab Results   Component Value Date    POTASSIUM 3.9 04/30/2018           Lab Results   Component Value Date    MAG 2.0 08/04/2017            Lab Results   Component Value Date    CR 0.74 04/30/2018                   Lab Results   Component Value Date    MAICOL 8.9 04/30/2018                Lab Results   Component Value Date    BILITOTAL 1.1 04/30/2018           Lab Results   Component Value Date    ALBUMIN 3.4 04/30/2018                    Lab Results   Component Value Date    ALT 89 04/30/2018           Lab Results   Component Value Date    AST 70 04/30/2018       Results reviewed, labs DO NOT MEET treatment parameters, ok to proceed with treatment per Dr. Last with Plt of 71.      Post Infusion Assessment:  Patient tolerated infusion without incident.  Blood return noted pre and post infusion.  Prior to discharge: Port is secured in place with tegaderm and flushed with 10cc NS with positive blood return noted.  Continuous home infusion Dosi-Fuser pump connected.    All connectors secured in place and clamps taped open.    Pump started, \"running\" noted on display (CADD): Not Applicable.  Patient instructed to call our clinic or [facility] with any questions or concerns at home.  Patient verbalized understanding.    Patient set up for pump disconnect at home with [facility] on 5/2/18 at 1pm.  "           Discharge Plan:   Patient will return in 2 weeks for next treatment.  Patient discharged in stable condition accompanied by: self.  Departure Mode: Ambulatory.    Blanka Mcmahan RN

## 2018-05-01 NOTE — PROGRESS NOTES
This is a recent snapshot of the patient's [facility] medical record.  For current drug dose and complete information and questions, call [phone] or In Basket pool, [facility] home infusion ([id number])  CSN Number:  [id number]

## 2018-05-02 ENCOUNTER — HOME INFUSION (PRE-WILLOW HOME INFUSION) (OUTPATIENT)
Dept: PHARMACY | Facility: CLINIC | Age: 58
End: 2018-05-02

## 2018-05-03 ENCOUNTER — OFFICE VISIT (OUTPATIENT)
Dept: GASTROENTEROLOGY | Facility: CLINIC | Age: 58
End: 2018-05-03
Attending: INTERNAL MEDICINE
Payer: COMMERCIAL

## 2018-05-03 VITALS
DIASTOLIC BLOOD PRESSURE: 72 MMHG | HEART RATE: 72 BPM | TEMPERATURE: 97.8 F | SYSTOLIC BLOOD PRESSURE: 126 MMHG | WEIGHT: 166.2 LBS | BODY MASS INDEX: 25.19 KG/M2 | HEIGHT: 68 IN | OXYGEN SATURATION: 96 %

## 2018-05-03 DIAGNOSIS — B18.2 CHRONIC HEPATITIS C WITHOUT HEPATIC COMA (H): ICD-10-CM

## 2018-05-03 DIAGNOSIS — B18.2 CHRONIC HEPATITIS C WITHOUT HEPATIC COMA (H): Primary | ICD-10-CM

## 2018-05-03 LAB
ALBUMIN SERPL-MCNC: 3.3 G/DL (ref 3.4–5)
ALP SERPL-CCNC: 140 U/L (ref 40–150)
ALT SERPL W P-5'-P-CCNC: 136 U/L (ref 0–70)
AST SERPL W P-5'-P-CCNC: 102 U/L (ref 0–45)
BILIRUB DIRECT SERPL-MCNC: 0.4 MG/DL (ref 0–0.2)
BILIRUB SERPL-MCNC: 1 MG/DL (ref 0.2–1.3)
PROT SERPL-MCNC: 7 G/DL (ref 6.8–8.8)

## 2018-05-03 PROCEDURE — G0463 HOSPITAL OUTPT CLINIC VISIT: HCPCS | Mod: ZF

## 2018-05-03 PROCEDURE — 80076 HEPATIC FUNCTION PANEL: CPT | Performed by: INTERNAL MEDICINE

## 2018-05-03 PROCEDURE — 36415 COLL VENOUS BLD VENIPUNCTURE: CPT | Performed by: INTERNAL MEDICINE

## 2018-05-03 PROCEDURE — 87522 HEPATITIS C REVRS TRNSCRPJ: CPT | Performed by: INTERNAL MEDICINE

## 2018-05-03 ASSESSMENT — PAIN SCALES - GENERAL: PAINLEVEL: NO PAIN (0)

## 2018-05-03 NOTE — MR AVS SNAPSHOT
After Visit Summary   5/3/2018    Rian Malik    MRN: [id number]           Patient Information     Date Of Birth          1960        Visit Information        Provider Department      5/3/2018 10:15 AM Yaakov Burroughs MD [facility] Hepatology        Today's Diagnoses     Chronic hepatitis C without hepatic coma (H)    -  1       Follow-ups after your visit        Your next 10 appointments already scheduled     May 14, 2018  7:45 AM CDT   Return Visit with [facility]   [facility])    [address] [place] [address]   [phone]            May 14, 2018  8:15 AM CDT   Return Visit with Padilla Last MD   [facility] ([facility])    [address] [facility] [address]   [phone]            May 14, 2018  8:45 AM CDT   Fl[place] with BAY 5 INFUSION   [facility])    [address] [facility] [address]   [phone]            May 24, 2018  7:45 AM CDT   Return Visit with [facility] ONLY [facility]   [facility] ([facility])    [address] [facility] [address]   [phone]            May 24, 2018  8:15 AM CDT   Return Visit with SAVANNA High CNP   [facility])    [address] [facility] [address]   [phone]            May 29, 2018 11:30 AM CDT   Return Visit with [facility] ONLY CAN[facility]ER CENTER   [facility] ([facility])    [address] [facility] [address]   [phone]            May 29, 2018 12:00 PM CDT   [place]ox with BAY 9 INFUSION   [facility] ([facility])    [address] [facility] [address] [phone]              Who to contact     If you have questions or need follow up information about today's  "clinic visit or your schedule please contact [facility] HEPATOLOGY directly at [phone].  Normal or non-critical lab and imaging results will be communicated to you by MyChart, letter or phone within 4 business days after the clinic has received the results. If you do not hear from us within 7 days, please contact the clinic through iHealthHomehart or phone. If you have a critical or abnormal lab result, we will notify you by phone as soon as possible.  Submit refill requests through [employer] or call your pharmacy and they will forward the refill request to us. Please allow 3 business days for your refill to be completed.          Additional Information About Your Visit        iHealthHomeharFree & Clear Information     [employer] gives you secure access to your electronic health record. If you see a primary care provider, you can also send messages to your care team and make appointments. If you have questions, please call your primary care clinic.  If you do not have a primary care provider, please call [phone] and they will assist you.        Care EveryWhere ID     This is your Care EveryWhere ID. This could be used by other organizations to access your [place] medical records  [id number]        Your Vitals Were     Pulse Temperature Height Pulse Oximetry BMI (Body Mass Index)       72 97.8  F (36.6  C) (Oral) 1.727 m (5' 7.99\") 96% 25.28 kg/m2        Blood Pressure from Last 3 Encounters:   05/03/18 126/72   04/30/18 126/74   04/16/18 122/75    Weight from Last 3 Encounters:   05/03/18 75.4 kg (166 lb 3.2 oz)   04/30/18 76.2 kg (168 lb)   04/16/18 76.4 kg (168 lb 8 oz)              Today, you had the following     No orders found for display       Primary Care Provider Office Phone # Fax #    Moreno Harris [phone]       [address]  [place] [facility] [address]        Equal Access to Services     KHANH FLANAGAN AH: Hadii cameron ku hadasho Soomaali, waaxda luqadaha, qaybta kaalmada enochegyada, alisa banegas " tamiko boydaatheodore ah. So [facility] [phone].    ATENCIÓN: Si aleksanderla lacey, tiene a martino disposición servicios gratuitos de asistencia lingüística. Alfredo al [phone].    We comply with applicable federal civil rights laws and [place] laws. We do not discriminate on the basis of race, color, national origin, age, disability, sex, sexual orientation, or gender identity.            Thank you!     Thank you for choosing [facility] HEPATOLOGY  for your care. Our goal is always to provide you with excellent care. Hearing back from our patients is one way we can continue to improve our services. Please take a few minutes to complete the written survey that you may receive in the mail after your visit with us. Thank you!             Your Updated Medication List - Protect others around you: Learn how to safely use, store and throw away your medicines at www.disposemymeds.org.          This list is accurate as of 5/3/18 11:59 PM.  Always use your most recent med list.                   Brand Name Dispense Instructions for use Diagnosis    docusate sodium 100 MG tablet    COLACE    60 tablet    Take 100 mg by mouth daily    Special screening for malignant neoplasms, colon       LORazepam 0.5 MG tablet    ATIVAN    30 tablet    Take 1 tablet (0.5 mg) by mouth every 4 hours as needed (Anxiety, Nausea/Vomiting or Sleep)    Adenocarcinoma of sigmoid colon (H)       ondansetron 8 MG tablet    ZOFRAN    30 tablet    Take 1 tablet (8 mg) by mouth every 8 hours as needed (Nausea/Vomiting)    Adenocarcinoma of sigmoid colon (H)       prochlorperazine 10 MG tablet    COMPAZINE    30 tablet    Take 1 tablet (10 mg) by mouth every 6 hours as needed (Nausea/Vomiting)    Adenocarcinoma of sigmoid colon (H)

## 2018-05-03 NOTE — PROGRESS NOTES
I had the pleasure of seeing Rian Malik for followup in the Liver Clinic at the [facility] on 05/03/2018.  Mr. Malik returns for followup of chronic hepatitis C.  He has now completed his treatment and is here today for his 3-month followup.  He has an HCV RNA level pending.      His major ongoing clinical problem is his colon cancer.  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  He is tolerating the chemotherapy fairly well.      However, his liver tests have elevated since starting the FOLFOX.  This is actually before he discontinued his hepatitis C treatments, so it makes it difficult to use his serum transaminase activities to determine whether he is a sustained virologic responder.  Of course, his HCV RNA level will dictate that.      He feels well.  He denies any abdominal pain, itching or skin rash or fatigue.  He denies any increased abdominal girth or lower extremity edema.  He denies any fevers or chills, cough or shortness of breath.  He denies any nausea or vomiting, diarrhea or constipation.  His appetite has been good, and his weight has been increasing.     Current Outpatient Prescriptions   Medication     docusate sodium (COLACE) 100 MG tablet     LORazepam (ATIVAN) 0.5 MG tablet     ondansetron (ZOFRAN) 8 MG tablet     prochlorperazine (COMPAZINE) 10 MG tablet     No current facility-administered medications for this visit.      B/P: 126/72, T: 97.8, P: 72, R: Data Unavailable    HEENT exam shows no scleral icterus or temporal muscle wasting.  His chest is clear.  His abdominal exam shows no increase in girth.  No masses or tenderness to palpation are present.  His liver is 10 cm in span without left lobe enlargement.  No spleen tip is palpable, and extremity exam shows no edema.  Skin exam shows no stigmata of chronic liver disease.  Neurologic exam shows no  vikyixis.     Recent Results (from the past 168 hour(s))   CBC with platelets differential    Collection Time: 04/30/18 11:05 AM   Result Value Ref Range    WBC 4.8 4.0 - 11.0 10e9/L    RBC Count 4.19 (L) 4.4 - 5.9 10e12/L    Hemoglobin 13.8 13.3 - 17.7 g/dL    Hematocrit 38.9 (L) 40.0 - 53.0 %    MCV 93 78 - 100 fl    MCH 32.9 26.5 - 33.0 pg    MCHC 35.5 31.5 - 36.5 g/dL    RDW 13.4 10.0 - 15.0 %    Platelet Count 71 (L) 150 - 450 10e9/L    Diff Method Automated Method     % Neutrophils 54.1 %    % Lymphocytes 31.7 %    % Monocytes 11.3 %    % Eosinophils 1.7 %    % Basophils 0.8 %    % Immature Granulocytes 0.4 %    Absolute Neutrophil 2.6 1.6 - 8.3 10e9/L    Absolute Lymphocytes 1.5 0.8 - 5.3 10e9/L    Absolute Monocytes 0.5 0.0 - 1.3 10e9/L    Absolute Eosinophils 0.1 0.0 - 0.7 10e9/L    Absolute Basophils 0.0 0.0 - 0.2 10e9/L    Abs Immature Granulocytes 0.0 0 - 0.4 10e9/L   Comprehensive metabolic panel    Collection Time: 04/30/18 11:05 AM   Result Value Ref Range    Sodium 140 133 - 144 mmol/L    Potassium 3.9 3.4 - 5.3 mmol/L    Chloride 105 94 - 109 mmol/L    Carbon Dioxide 28 20 - 32 mmol/L    Anion Gap 7 3 - 14 mmol/L    Glucose 97 70 - 99 mg/dL    Urea Nitrogen 15 7 - 30 mg/dL    Creatinine 0.74 0.66 - 1.25 mg/dL    GFR Estimate >90 >60 mL/min/1.7m2    GFR Estimate If Black >90 >60 mL/min/1.7m2    Calcium 8.9 8.5 - 10.1 mg/dL    Bilirubin Total 1.1 0.2 - 1.3 mg/dL    Albumin 3.4 3.4 - 5.0 g/dL    Protein Total 7.2 6.8 - 8.8 g/dL    Alkaline Phosphatase 128 40 - 150 U/L    ALT 89 (H) 0 - 70 U/L    AST 70 (H) 0 - 45 U/L   Hepatic panel (Albumin, ALT, AST, Bili, Alk Phos, TP)    Collection Time: 05/03/18  9:18 AM   Result Value Ref Range    Bilirubin Direct 0.4 (H) 0.0 - 0.2 mg/dL    Bilirubin Total 1.0 0.2 - 1.3 mg/dL    Albumin 3.3 (L) 3.4 - 5.0 g/dL    Protein Total 7.0 6.8 - 8.8 g/dL    Alkaline Phosphatase 140 40 - 150 U/L     (H) 0 - 70 U/L     (H) 0 - 45 U/L      My impression is that  Mr. Malik has completed his hepatitis C treatment.  He was an end of treatment responder.  We are waiting for his HCV RNA level to determine whether he is a sustained virologic responder.  Nonetheless, his liver tests are a bit abnormal, and if they are and if he is HCV RNA negative, we have to look for other causes of his abnormal liver tests such as his chemotherapy.  He is, however, feeling well, and I will not make any change to his current medical regimen.  His platelet count is somewhat down.  My plan will be to see the patient back in the clinic in 6 months, but I will be following up on his HCV RNA over the next week.      Thank you very much for allowing me to participate in the care of this patient.  If you have any questions regarding my recommendations, please do not hesitate to contact me.       Yaakov Burroughs MD      Professor of Medicine  [employer]      Executive Medical Director, Solid Organ Transplant Program  [facility]

## 2018-05-03 NOTE — LETTER
5/3/2018      RE: Rian Malik  [address] [place] [address]       I had the pleasure of seeing Rian Malik for followup in the Liver Clinic at the [facility] on 05/03/2018.  Mr. Malik returns for followup of chronic hepatitis C.  He has now completed his treatment and is here today for his 3-month followup.  He has an HCV RNA level pending.      His major ongoing clinical problem is his colon cancer.  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  He is tolerating the chemotherapy fairly well.      However, his liver tests have elevated since starting the FOLFOX.  This is actually before he discontinued his hepatitis C treatments, so it makes it difficult to use his serum transaminase activities to determine whether he is a sustained virologic responder.  Of course, his HCV RNA level will dictate that.      He feels well.  He denies any abdominal pain, itching or skin rash or fatigue.  He denies any increased abdominal girth or lower extremity edema.  He denies any fevers or chills, cough or shortness of breath.  He denies any nausea or vomiting, diarrhea or constipation.  His appetite has been good, and his weight has been increasing.     Current Outpatient Prescriptions   Medication     docusate sodium (COLACE) 100 MG tablet     LORazepam (ATIVAN) 0.5 MG tablet     ondansetron (ZOFRAN) 8 MG tablet     prochlorperazine (COMPAZINE) 10 MG tablet     No current facility-administered medications for this visit.      B/P: 126/72, T: 97.8, P: 72, R: Data Unavailable    HEENT exam shows no scleral icterus or temporal muscle wasting.  His chest is clear.  His abdominal exam shows no increase in girth.  No masses or tenderness to palpation are present.  His liver is 10 cm in span without left lobe enlargement.  No spleen tip is palpable, and extremity exam shows no  edema.  Skin exam shows no stigmata of chronic liver disease.  Neurologic exam shows no asterixis.     Recent Results (from the past 168 hour(s))   CBC with platelets differential    Collection Time: 04/30/18 11:05 AM   Result Value Ref Range    WBC 4.8 4.0 - 11.0 10e9/L    RBC Count 4.19 (L) 4.4 - 5.9 10e12/L    Hemoglobin 13.8 13.3 - 17.7 g/dL    Hematocrit 38.9 (L) 40.0 - 53.0 %    MCV 93 78 - 100 fl    MCH 32.9 26.5 - 33.0 pg    MCHC 35.5 31.5 - 36.5 g/dL    RDW 13.4 10.0 - 15.0 %    Platelet Count 71 (L) 150 - 450 10e9/L    Diff Method Automated Method     % Neutrophils 54.1 %    % Lymphocytes 31.7 %    % Monocytes 11.3 %    % Eosinophils 1.7 %    % Basophils 0.8 %    % Immature Granulocytes 0.4 %    Absolute Neutrophil 2.6 1.6 - 8.3 10e9/L    Absolute Lymphocytes 1.5 0.8 - 5.3 10e9/L    Absolute Monocytes 0.5 0.0 - 1.3 10e9/L    Absolute Eosinophils 0.1 0.0 - 0.7 10e9/L    Absolute Basophils 0.0 0.0 - 0.2 10e9/L    Abs Immature Granulocytes 0.0 0 - 0.4 10e9/L   Comprehensive metabolic panel    Collection Time: 04/30/18 11:05 AM   Result Value Ref Range    Sodium 140 133 - 144 mmol/L    Potassium 3.9 3.4 - 5.3 mmol/L    Chloride 105 94 - 109 mmol/L    Carbon Dioxide 28 20 - 32 mmol/L    Anion Gap 7 3 - 14 mmol/L    Glucose 97 70 - 99 mg/dL    Urea Nitrogen 15 7 - 30 mg/dL    Creatinine 0.74 0.66 - 1.25 mg/dL    GFR Estimate >90 >60 mL/min/1.7m2    GFR Estimate If Black >90 >60 mL/min/1.7m2    Calcium 8.9 8.5 - 10.1 mg/dL    Bilirubin Total 1.1 0.2 - 1.3 mg/dL    Albumin 3.4 3.4 - 5.0 g/dL    Protein Total 7.2 6.8 - 8.8 g/dL    Alkaline Phosphatase 128 40 - 150 U/L    ALT 89 (H) 0 - 70 U/L    AST 70 (H) 0 - 45 U/L   Hepatic panel (Albumin, ALT, AST, Bili, Alk Phos, TP)    Collection Time: 05/03/18  9:18 AM   Result Value Ref Range    Bilirubin Direct 0.4 (H) 0.0 - 0.2 mg/dL    Bilirubin Total 1.0 0.2 - 1.3 mg/dL    Albumin 3.3 (L) 3.4 - 5.0 g/dL    Protein Total 7.0 6.8 - 8.8 g/dL    Alkaline Phosphatase 140 40  - 150 U/L     (H) 0 - 70 U/L     (H) 0 - 45 U/L      My impression is that Mr. Malik has completed his hepatitis C treatment.  He was an end of treatment responder.  We are waiting for his HCV RNA level to determine whether he is a sustained virologic responder.  Nonetheless, his liver tests are a bit abnormal, and if they are and if he is HCV RNA negative, we have to look for other causes of his abnormal liver tests such as his chemotherapy.  He is, however, feeling well, and I will not make any change to his current medical regimen.  His platelet count is somewhat down.  My plan will be to see the patient back in the clinic in 6 months, but I will be following up on his HCV RNA over the next week.      Thank you very much for allowing me to participate in the care of this patient.  If you have any questions regarding my recommendations, please do not hesitate to contact me.       Yaakov Burroughs MD      Professor of Medicine  [facility] [employer]      Executive Medical Director, Solid Organ Transplant Program  [facility]

## 2018-05-03 NOTE — NURSING NOTE
"Chief Complaint   Patient presents with     RECHECK     hepatitis C       Initial /72  Pulse 72  Temp 97.8  F (36.6  C) (Oral)  Ht 1.727 m (5' 7.99\")  Wt 75.4 kg (166 lb 3.2 oz)  SpO2 96%  BMI 25.28 kg/m2 Estimated body mass index is 25.28 kg/(m^2) as calculated from the following:    Height as of this encounter: 1.727 m (5' 7.99\").    Weight as of this encounter: 75.4 kg (166 lb 3.2 oz).  Medication Reconciliation: complete     Oriana Lin MA    "

## 2018-05-03 NOTE — PROGRESS NOTES
This is a recent snapshot of the patient's [facility] medical record.  For current drug dose and complete information and questions, call [phone] or In Basket pool, [facility] ([id number])  CSN Number:  [id number]

## 2018-05-04 ENCOUNTER — MYC MEDICAL ADVICE (OUTPATIENT)
Dept: FAMILY MEDICINE | Facility: CLINIC | Age: 58
End: 2018-05-04

## 2018-05-07 LAB
HCV RNA SERPL NAA+PROBE-ACNC: NORMAL [IU]/ML
HCV RNA SERPL NAA+PROBE-LOG IU: NORMAL LOG IU/ML

## 2018-05-07 NOTE — TELEPHONE ENCOUNTER
[employer] message sent to patient advising to schedule apt with Dr. Harris to recheck and discuss concerns. Pt last seen by PCP on 10/25/17.    Demetrice Ratliff RN  [facility]

## 2018-05-10 ENCOUNTER — OFFICE VISIT (OUTPATIENT)
Dept: FAMILY MEDICINE | Facility: CLINIC | Age: 58
End: 2018-05-10
Payer: COMMERCIAL

## 2018-05-10 VITALS
OXYGEN SATURATION: 97 % | BODY MASS INDEX: 25.16 KG/M2 | HEART RATE: 77 BPM | TEMPERATURE: 98 F | HEIGHT: 68 IN | DIASTOLIC BLOOD PRESSURE: 76 MMHG | WEIGHT: 166 LBS | RESPIRATION RATE: 12 BRPM | SYSTOLIC BLOOD PRESSURE: 126 MMHG

## 2018-05-10 DIAGNOSIS — D69.6 THROMBOCYTOPENIA (H): ICD-10-CM

## 2018-05-10 DIAGNOSIS — K43.5 PARASTOMAL HERNIA WITHOUT OBSTRUCTION OR GANGRENE: ICD-10-CM

## 2018-05-10 DIAGNOSIS — R79.89 ELEVATED LFTS: Primary | ICD-10-CM

## 2018-05-10 LAB
ALT SERPL W P-5'-P-CCNC: 107 U/L (ref 0–70)
AST SERPL W P-5'-P-CCNC: 82 U/L (ref 0–45)
BASOPHILS # BLD AUTO: 0 10E9/L (ref 0–0.2)
BASOPHILS NFR BLD AUTO: 1 %
DIFFERENTIAL METHOD BLD: ABNORMAL
EOSINOPHIL # BLD AUTO: 0 10E9/L (ref 0–0.7)
EOSINOPHIL NFR BLD AUTO: 1 %
ERYTHROCYTE [DISTWIDTH] IN BLOOD BY AUTOMATED COUNT: 13.9 % (ref 10–15)
HCT VFR BLD AUTO: 37.2 % (ref 40–53)
HGB BLD-MCNC: 13.4 G/DL (ref 13.3–17.7)
LYMPHOCYTES # BLD AUTO: 1.6 10E9/L (ref 0.8–5.3)
LYMPHOCYTES NFR BLD AUTO: 35 %
MCH RBC QN AUTO: 33.8 PG (ref 26.5–33)
MCHC RBC AUTO-ENTMCNC: 36 G/DL (ref 31.5–36.5)
MCV RBC AUTO: 94 FL (ref 78–100)
MONOCYTES # BLD AUTO: 0.7 10E9/L (ref 0–1.3)
MONOCYTES NFR BLD AUTO: 15 %
NEUTROPHILS # BLD AUTO: 2.4 10E9/L (ref 1.6–8.3)
NEUTROPHILS NFR BLD AUTO: 48 %
PLATELET # BLD AUTO: 61 10E9/L (ref 150–450)
PLATELET # BLD EST: ABNORMAL 10*3/UL
RBC # BLD AUTO: 3.97 10E12/L (ref 4.4–5.9)
WBC # BLD AUTO: 4.7 10E9/L (ref 4–11)

## 2018-05-10 PROCEDURE — 85025 COMPLETE CBC W/AUTO DIFF WBC: CPT | Performed by: INTERNAL MEDICINE

## 2018-05-10 PROCEDURE — 36415 COLL VENOUS BLD VENIPUNCTURE: CPT | Performed by: INTERNAL MEDICINE

## 2018-05-10 PROCEDURE — 84450 TRANSFERASE (AST) (SGOT): CPT | Performed by: INTERNAL MEDICINE

## 2018-05-10 PROCEDURE — 99213 OFFICE O/P EST LOW 20 MIN: CPT | Performed by: INTERNAL MEDICINE

## 2018-05-10 PROCEDURE — 84460 ALANINE AMINO (ALT) (SGPT): CPT | Performed by: INTERNAL MEDICINE

## 2018-05-10 ASSESSMENT — PAIN SCALES - GENERAL: PAINLEVEL: NO PAIN (0)

## 2018-05-10 NOTE — PATIENT INSTRUCTIONS
At [facility], we strive to deliver an exceptional experience to you, every time we see you.  If you receive a survey in the mail, please send us back your thoughts. We really do value your feedback.    Based on your medical history, these are the current health maintenance/preventive care services that you are due for (some may have been done at this visit.)  Health Maintenance Due   Topic Date Due     HIV SCREEN (SYSTEM ASSIGNED)  08/19/1978         Suggested websites for health information:  Www.DashThis.org : Up to date and easily searchable information on multiple topics.  Www.medlineplus.gov : medication info, interactive tutorials, watch real surgeries online  Www.familydoctor.org : good info from the Academy of Family Physicians  Www.cdc.gov : public health info, travel advisories, epidemics (H1N1)  Www.aap.org : children's health info, normal development, vaccinations  Www.health.[facility].[place].us : [place] dept of health, public health issues in [place], N1N1    Your care team:                            Family Medicine Internal Medicine   MD Moreno Liu MD Shantel Branch-Fleming, MD Katya Georgiev PA-C Nam Ho, MD Pediatrics   ALTHEA De La Rosa, CNP Thais Hawkins APRN MD Rosa Mckeon MD Deborah Mielke, MD Kim Thein, APRN CNP      Clinic hours: Monday - Thursday 7 am-7 pm; Fridays 7 am-5 pm.   Urgent care: Monday - Friday 11 am-9 pm; Saturday and Sunday 9 am-5 pm.  Pharmacy : Monday -Thursday 8 am-8 pm; Friday 8 am-6 pm; Saturday and Sunday 9 am-5 pm.     Clinic: [phone]   Pharmacy: [phone]

## 2018-05-10 NOTE — MR AVS SNAPSHOT
After Visit Summary   5/10/2018    Rian Malik    MRN: [id number]           Patient Information     Date Of Birth          1960        Visit Information        Provider Department      5/10/2018 3:40 PM Moreno Harris MD [facility]        Today's Diagnoses     Elevated LFTs    -  1    Parastomal hernia without obstruction or gangrene        Thrombocytopenia (H)          Care Instructions    At [facility], we strive to deliver an exceptional experience to you, every time we see you.  If you receive a survey in the mail, please send us back your thoughts. We really do value your feedback.    Based on your medical history, these are the current health maintenance/preventive care services that you are due for (some may have been done at this visit.)  Health Maintenance Due   Topic Date Due     HIV SCREEN (SYSTEM ASSIGNED)  08/19/1978         Suggested websites for health information:  Wwwsli.do : Up to date and easily searchable information on multiple topics.  Www.Vaioni.gov : medication info, interactive tutorials, watch real surgeries online  Www.familydoctor.org : good info from the Academy of Family Physicians  Www.cdc.gov : public health info, travel advisories, epidemics (H1N1)  Www.aap.org : children's health info, normal development, vaccinations  Www.health.[facility].[place].us : [place] dept of health, public health issues in [place], N1N1    Your care team:                            Family Medicine Internal Medicine   MD Moreno Liu MD Shantel Branch-Fleming, MD Katya Georgiev PA-C Nam Ho, MD Pediatrics   ALTHEA De La Rosa, MD Rosa Lehman CNP, MD Deborah Mielke, MD Kim Thein, SAVANNA CNP      Clinic hours: Monday - Thursday 7 am-7 pm; Fridays 7 am-5 pm.   Urgent care: Monday - Friday 11 am-9 pm; Saturday and Sunday 9 am-5 pm.  Pharmacy : Monday -Thursday  8 am-8 pm; Friday 8 am-6 pm; Saturday and Sunday 9 am-5 pm.     Clinic: [phone]   Pharmacy: [phone]            Follow-ups after your visit        Your next 10 appointments already scheduled     May 14, 2018  7:45 AM CDT   [facility]    [address] [place] [address]   [phone]            May 14, 2018  8:15 AM CDT   [facility] Padilla Last [facility]   [facility]    [address] [place] [address]   [phone]            May 14, 2018  8:45 AM CDT   Flofox with BAY 5 INFUSION   [facility])    [address] [place] [address]   [phone]            May 24, 2018  7:45 AM CDT   [facility]    [address] [place] [address]   [phone]            May 24, 2018  8:15 AM CDT   Return Visit with SAVANNA High CNP   [facility])    [address] [place] [address]   [phone]            May 29, 2018 11:30 AM CDT   Return Visit with [facility]   [facility])    [address] [place] [address]   [phone]            May 29, 2018 12:00 PM CDT   Flofox with BAY 9 INFUSION   [facility])    [address] [place] [address]   [phone]              Future tests that were ordered for you today     Open Future Orders        Priority Expected Expires Ordered    ALT Routine 5/17/2018 5/10/2019 5/10/2018    AST Routine 5/17/2018 5/10/2019 5/10/2018    CBC with platelets and differential Routine 5/17/2018 5/10/2019 5/10/2018            Who to contact     If you  "have questions or need follow up information about today's clinic visit or your schedule please contact [facility] directly at [phone].  Normal or non-critical lab and imaging results will be communicated to you by MyChart, letter or phone within 4 business days after the clinic has received the results. If you do not hear from us within 7 days, please contact the clinic through [employer] or phone. If you have a critical or abnormal lab result, we will notify you by phone as soon as possible.  Submit refill requests through [employer] or call your pharmacy and they will forward the refill request to us. Please allow 3 business days for your refill to be completed.          Additional Information About Your Visit        [employer] Social Information     [employer] gives you secure access to your electronic health record. If you see a primary care provider, you can also send messages to your care team and make appointments. If you have questions, please call your primary care clinic.  If you do not have a primary care provider, please call [phone] and they will assist you.        Care EveryWhere ID     This is your Care EveryWhere ID. This could be used by other organizations to access your [place] medical records  [id number]        Your Vitals Were     Pulse Temperature Respirations Height Pulse Oximetry BMI (Body Mass Index)    77 98  F (36.7  C) (Oral) 12 5' 8\" (1.727 m) 97% 25.24 kg/m2       Blood Pressure from Last 3 Encounters:   05/10/18 126/76   05/03/18 126/72   04/30/18 126/74    Weight from Last 3 Encounters:   05/10/18 166 lb (75.3 kg)   05/03/18 166 lb 3.2 oz (75.4 kg)   04/30/18 168 lb (76.2 kg)              We Performed the Following     ALT     AST     CBC with platelets and differential        Primary Care Provider Office Phone # Fax #    Moreno Harris [phone]       [address]  [facility] [address]        Equal Access to Services     KHANH FLANAGAN AH: " Hadii cameron moralezo Soomaali, waaxda luqadaha, qaybta kaalmada jose, alias gabriellain hayaatheodore kendallbria ramirez lajaviertheodore haritha. So [facility] [phone].    ATENCIÓN: Si aleksanderla lacey, tiene a martino disposición servicios gratuitos de asistencia lingüística. Ngaame al [phone].    We comply with applicable federal civil rights laws and [place] laws. We do not discriminate on the basis of race, color, national origin, age, disability, sex, sexual orientation, or gender identity.            Thank you!     Thank you for choosing [facility]  for your care. Our goal is always to provide you with excellent care. Hearing back from our patients is one way we can continue to improve our services. Please take a few minutes to complete the written survey that you may receive in the mail after your visit with us. Thank you!             Your Updated Medication List - Protect others around you: Learn how to safely use, store and throw away your medicines at www.disposemymeds.org.          This list is accurate as of 5/10/18  4:10 PM.  Always use your most recent med list.                   Brand Name Dispense Instructions for use Diagnosis    docusate sodium 100 MG tablet    COLACE    60 tablet    Take 100 mg by mouth daily    Special screening for malignant neoplasms, colon       LORazepam 0.5 MG tablet    ATIVAN    30 tablet    Take 1 tablet (0.5 mg) by mouth every 4 hours as needed (Anxiety, Nausea/Vomiting or Sleep)    Adenocarcinoma of sigmoid colon (H)       ondansetron 8 MG tablet    ZOFRAN    30 tablet    Take 1 tablet (8 mg) by mouth every 8 hours as needed (Nausea/Vomiting)    Adenocarcinoma of sigmoid colon (H)       prochlorperazine 10 MG tablet    COMPAZINE    30 tablet    Take 1 tablet (10 mg) by mouth every 6 hours as needed (Nausea/Vomiting)    Adenocarcinoma of sigmoid colon (H)

## 2018-05-10 NOTE — PROGRESS NOTES
SUBJECTIVE:   Rian Malik is a 57 year old male who presents to clinic today for the following health issues:    Chief Complaint   Patient presents with     RECHECK     was told to come in per provider     Results     go over abnormal results         Problem list and histories reviewed & adjusted, as indicated.  Additional history: as documented    Patient Active Problem List   Diagnosis     Hepatitis C virus infection     Advanced directives, counseling/discussion     CARDIOVASCULAR SCREENING; LDL GOAL LESS THAN 160     Chronic hepatitis C without hepatic coma (H)     Left inguinal pain     Abscess of sigmoid colon     Maldonado's esophagus determined by endoscopy     Adenocarcinoma of sigmoid colon (H)     Past Surgical History:   Procedure Laterality Date     COLONOSCOPY N/A 8/4/2017    Procedure: COMBINED COLONOSCOPY, SINGLE OR MULTIPLE BIOPSY/POLYPECTOMY BY BIOPSY;  Colon & EGD;  Surgeon: Cristobal Blanco MD;  Location: [facility]     COLOSTOMY Left 10/11/2017     CYSTOSCOPY AND LEFT STENT PLACEMENT  10/11/2017    Dr. Reyna     ESOPHAGOSCOPY, GASTROSCOPY, DUODENOSCOPY (EGD), COMBINED N/A 8/4/2017    Procedure: COMBINED ESOPHAGOSCOPY, GASTROSCOPY, DUODENOSCOPY (EGD), BIOPSY SINGLE OR MULTIPLE;;  Surgeon: Cristobal Blanco MD;  Location: [facility]     HERNIA REPAIR Left 1988    Left inguinal     LAPAROSCOPIC CONVERTED TO OPEN SIGMOIDECTOMY WITH END COLOSTOMY  10/11/2017    Dr. Schneider       Social History   Substance Use Topics     Smoking status: Former Smoker     Packs/day: 0.10     Years: 3.00     Types: Cigarettes     Start date: 11/6/2014     Quit date: 10/6/2017     Smokeless tobacco: Never Used      Comment:       Alcohol use No     Family History   Problem Relation Age of Onset     Skin Cancer Sister          Allergies   Allergen Reactions     Acetaminophen      Naproxen      Recent Labs   Lab Test  05/10/18   1636  05/03/18   0918  04/30/18   1105  04/16/18   0825   05/01/17   0831   LDL   --    --    --    --    --    "43   HDL   --    --    --    --    --   52   TRIG   --    --    --    --    --   41   ALT  107*  136*  89*  73*   < >  70   CR   --    --   0.74  0.61*   < >  0.95   GFRESTIMATED   --    --   >90  >90   < >  81   GFRESTBLACK   --    --   >90  >90   < >  >90   GFR Calc     POTASSIUM   --    --   3.9  4.3   < >  4.0    < > = values in this interval not displayed.      BP Readings from Last 3 Encounters:   05/10/18 126/76   05/03/18 126/72   04/30/18 126/74    Wt Readings from Last 3 Encounters:   05/10/18 166 lb (75.3 kg)   05/03/18 166 lb 3.2 oz (75.4 kg)   04/30/18 168 lb (76.2 kg)                    ROS:  CONSTITUTIONAL: NEGATIVE for fever, chills, change in weight  INTEGUMENTARY/SKIN: NEGATIVE for worrisome rashes, moles or lesions  EYES: NEGATIVE for vision changes or irritation  ENT/MOUTH: NEGATIVE for ear, mouth and throat problems  RESP: NEGATIVE for significant cough or SOB  CV: NEGATIVE for chest pain, palpitations or peripheral edema  GI: NEGATIVE for nausea, abdominal pain, heartburn, or change in bowel habits  : NEGATIVE for frequency, dysuria, or hematuria  MUSCULOSKELETAL: NEGATIVE for significant arthralgias or myalgia  NEURO: NEGATIVE for weakness, dizziness or paresthesias  ENDOCRINE: NEGATIVE for temperature intolerance, skin/hair changes  HEME: NEGATIVE for bleeding problems  PSYCHIATRIC: NEGATIVE for changes in mood or affect    OBJECTIVE:     /76 (BP Location: Left arm, Patient Position: Chair, Cuff Size: Adult Regular)  Pulse 77  Temp 98  F (36.7  C) (Oral)  Resp 12  Ht 5' 8\" (1.727 m)  Wt 166 lb (75.3 kg)  SpO2 97%  BMI 25.24 kg/m2  Body mass index is 25.24 kg/(m^2).  GENERAL: healthy, alert and no distress  EYES: Eyes grossly normal to inspection, PERRL and conjunctivae and sclerae normal  HENT: ear canals and TM's normal, nose and mouth without ulcers or lesions  NECK: no adenopathy, no asymmetry, masses, or scars and thyroid normal to palpation  RESP: lungs " clear to auscultation - no rales, rhonchi or wheezes  CV: regular rate and rhythm, normal S1 S2, no S3 or S4, no murmur, click or rub, no peripheral edema and peripheral pulses strong  ABDOMEN: soft, nontender, no hepatosplenomegaly, no masses and bowel sounds normal  MS: no gross musculoskeletal defects noted, no edema  SKIN: no suspicious lesions or rashes  NEURO: Normal strength and tone, mentation intact and speech normal  PSYCH: mentation appears normal, affect normal/bright    Diagnostic Test Results:  Results for orders placed or performed in visit on 05/10/18   ALT   Result Value Ref Range     (H) 0 - 70 U/L   AST   Result Value Ref Range    AST 82 (H) 0 - 45 U/L   CBC with platelets and differential   Result Value Ref Range    WBC 4.7 4.0 - 11.0 10e9/L    RBC Count 3.97 (L) 4.4 - 5.9 10e12/L    Hemoglobin 13.4 13.3 - 17.7 g/dL    Hematocrit 37.2 (L) 40.0 - 53.0 %    MCV 94 78 - 100 fl    MCH 33.8 (H) 26.5 - 33.0 pg    MCHC 36.0 31.5 - 36.5 g/dL    RDW 13.9 10.0 - 15.0 %    Platelet Count 61 (L) 150 - 450 10e9/L    % Neutrophils 48.0 %    % Lymphocytes 35.0 %    % Monocytes 15.0 %    % Eosinophils 1.0 %    % Basophils 1.0 %    Absolute Neutrophil 2.4 1.6 - 8.3 10e9/L    Absolute Lymphocytes 1.6 0.8 - 5.3 10e9/L    Absolute Monocytes 0.7 0.0 - 1.3 10e9/L    Absolute Eosinophils 0.0 0.0 - 0.7 10e9/L    Absolute Basophils 0.0 0.0 - 0.2 10e9/L    Platelet Estimate       Automated count confirmed.  Platelet morphology is normal.    Diff Method Manual Differential        ASSESSMENT/PLAN:     (R79.89) Elevated LFTs  (primary encounter diagnosis)  Comment:   Plan: ALT, AST            (K43.5) Parastomal hernia without obstruction or gangrene  Comment: Leads to tolerable pain at colostomy site.  Plan: Schedule CT scan and refer to General Surgery if conditions worsens.    (D69.6) Thrombocytopenia (H)  Comment:   Plan: ALT, AST, CBC with platelets and differential,         CBC with platelets and differential             Follow-up visit if condition worsens.    Moreno Harris MD  [facility]

## 2018-05-14 ENCOUNTER — ONCOLOGY VISIT (OUTPATIENT)
Dept: ONCOLOGY | Facility: CLINIC | Age: 58
End: 2018-05-14
Payer: COMMERCIAL

## 2018-05-14 VITALS
BODY MASS INDEX: 25.76 KG/M2 | TEMPERATURE: 97.6 F | DIASTOLIC BLOOD PRESSURE: 75 MMHG | HEIGHT: 68 IN | SYSTOLIC BLOOD PRESSURE: 124 MMHG | HEART RATE: 65 BPM | OXYGEN SATURATION: 99 % | WEIGHT: 170 LBS

## 2018-05-14 DIAGNOSIS — C18.7 ADENOCARCINOMA OF SIGMOID COLON (H): Primary | ICD-10-CM

## 2018-05-14 DIAGNOSIS — C18.7 ADENOCARCINOMA OF SIGMOID COLON (H): ICD-10-CM

## 2018-05-14 LAB
ALBUMIN SERPL-MCNC: 3.1 G/DL (ref 3.4–5)
ALP SERPL-CCNC: 126 U/L (ref 40–150)
ALT SERPL W P-5'-P-CCNC: 89 U/L (ref 0–70)
ANION GAP SERPL CALCULATED.3IONS-SCNC: 5 MMOL/L (ref 3–14)
AST SERPL W P-5'-P-CCNC: 91 U/L (ref 0–45)
BASOPHILS # BLD AUTO: 0 10E9/L (ref 0–0.2)
BASOPHILS NFR BLD AUTO: 1.1 %
BILIRUB SERPL-MCNC: 1 MG/DL (ref 0.2–1.3)
BUN SERPL-MCNC: 15 MG/DL (ref 7–30)
CALCIUM SERPL-MCNC: 8.5 MG/DL (ref 8.5–10.1)
CHLORIDE SERPL-SCNC: 109 MMOL/L (ref 94–109)
CO2 SERPL-SCNC: 28 MMOL/L (ref 20–32)
CREAT SERPL-MCNC: 0.7 MG/DL (ref 0.66–1.25)
DIFFERENTIAL METHOD BLD: ABNORMAL
EOSINOPHIL # BLD AUTO: 0.1 10E9/L (ref 0–0.7)
EOSINOPHIL NFR BLD AUTO: 2.5 %
ERYTHROCYTE [DISTWIDTH] IN BLOOD BY AUTOMATED COUNT: 13.8 % (ref 10–15)
GFR SERPL CREATININE-BSD FRML MDRD: >90 ML/MIN/1.7M2
GLUCOSE SERPL-MCNC: 139 MG/DL (ref 70–99)
HCT VFR BLD AUTO: 38.1 % (ref 40–53)
HGB BLD-MCNC: 13.5 G/DL (ref 13.3–17.7)
IMM GRANULOCYTES # BLD: 0 10E9/L (ref 0–0.4)
IMM GRANULOCYTES NFR BLD: 0.3 %
LYMPHOCYTES # BLD AUTO: 1.2 10E9/L (ref 0.8–5.3)
LYMPHOCYTES NFR BLD AUTO: 33 %
MCH RBC QN AUTO: 33.4 PG (ref 26.5–33)
MCHC RBC AUTO-ENTMCNC: 35.4 G/DL (ref 31.5–36.5)
MCV RBC AUTO: 94 FL (ref 78–100)
MONOCYTES # BLD AUTO: 0.4 10E9/L (ref 0–1.3)
MONOCYTES NFR BLD AUTO: 11.8 %
NEUTROPHILS # BLD AUTO: 1.8 10E9/L (ref 1.6–8.3)
NEUTROPHILS NFR BLD AUTO: 51.3 %
PLATELET # BLD AUTO: 52 10E9/L (ref 150–450)
POTASSIUM SERPL-SCNC: 3.9 MMOL/L (ref 3.4–5.3)
PROT SERPL-MCNC: 6.7 G/DL (ref 6.8–8.8)
RBC # BLD AUTO: 4.04 10E12/L (ref 4.4–5.9)
SODIUM SERPL-SCNC: 142 MMOL/L (ref 133–144)
WBC # BLD AUTO: 3.6 10E9/L (ref 4–11)

## 2018-05-14 PROCEDURE — 99215 OFFICE O/P EST HI 40 MIN: CPT | Performed by: INTERNAL MEDICINE

## 2018-05-14 PROCEDURE — 99207 ZZC NO CHARGE NURSE ONLY: CPT

## 2018-05-14 PROCEDURE — 85025 COMPLETE CBC W/AUTO DIFF WBC: CPT | Performed by: INTERNAL MEDICINE

## 2018-05-14 PROCEDURE — 80053 COMPREHEN METABOLIC PANEL: CPT | Performed by: INTERNAL MEDICINE

## 2018-05-14 RX ORDER — HEPARIN SODIUM (PORCINE) LOCK FLUSH IV SOLN 100 UNIT/ML 100 UNIT/ML
5 SOLUTION INTRAVENOUS
Status: DISCONTINUED | OUTPATIENT
Start: 2018-05-14 | End: 2018-05-14 | Stop reason: HOSPADM

## 2018-05-14 RX ORDER — LORAZEPAM 0.5 MG/1
0.5 TABLET ORAL EVERY 4 HOURS PRN
Qty: 30 TABLET | Refills: 2 | Status: SHIPPED | OUTPATIENT
Start: 2018-05-14 | End: 2018-06-18

## 2018-05-14 RX ADMIN — HEPARIN SODIUM (PORCINE) LOCK FLUSH IV SOLN 100 UNIT/ML 5 ML: 100 SOLUTION at 07:51

## 2018-05-14 ASSESSMENT — PAIN SCALES - GENERAL: PAINLEVEL: NO PAIN (0)

## 2018-05-14 NOTE — PROGRESS NOTES
Clementina port needle left accessed for treatment. Tolerated lab draw without complaint. Crowley drawn-Red/Green/Purple tubes. Double signed by patient and RN. See documentation flowsheet. Nuha De Los Santos RN, BSN, OCN    08:30: Port de-accessed. Pt's platelets are 52 and tx will be delayed per MD. Nuha De Los Santos RN

## 2018-05-14 NOTE — PROGRESS NOTES
FOLLOW-UP VISIT NOTE    PATIENT NAME: Rian Malik MRN # [id number]  DATE OF VISIT: May 14, 2018 YOB: 1960    REFERRING PROVIDER: No referring provider defined for this encounter.    CANCER TYPE: Adenocarcinoma colon  STAGE: III pT4N1c  ECOG PS: 0    ONCOLOGY HISTORY:  57-year-old male who around February 2017r, developed intermittent cramping lower abdominal pain. Was evaluated by PCP and clinical impression was colitis. He was put on p.o. antibiotics. However symptoms did not improve. He was eventually referred to surgery and underwent imaging with CT findings suspicious of sigmoid abscess. He was admitted to the hospital for resection of sigmoid abscess 10/11/17. However intraoperatively was noted to have a large firmly adherent sigmoid colon mass. Laparoscopic surgery was converted to open sigmoidectomy with end colostomy.     Surgical pathology   B.  COLON, SIGMOID, HEMICOLECTOMY   - Poorly differentiated adenocarcinoma, with signet ring cell features   - Tumor size: 5 cm   - Tumor is present on serosal surface and microscopically perforates   serosal surface   - One surgical margin shows extensive serosal involvement by tumor. The   opposite, undesignated margin shows no evidence of malignancy   - Lymphovascular invasion is present   - Perineural invasion is not identified   - Tumor deposits are present   - Immunohistochemistry for mismatch repair proteins shows intact nuclear   expression for MLH1, MSH2, MSH6, and PMS2   - Three reactive lymph nodes, negative for malignancy (0/3)   - Pathologic staging: pT4N1c      Patient's case was discussed at the tumor board. Recommendation was to obtain new staging scans as well as to proceed with systemic chemotherapy at this point with plans for further surgery in future. Staging scans negative for distant disease.     - Started on Folfox 11/21/17    SUBJECTIVE     Patient is here today for his next cycle of chemotherapy. No new complaints. Denies  neuropathy, abdominal pain, fever/chills, nausea/vomiting or any other issues. Stable appetite and weight.       PAST MEDICAL HISTORY     Past Medical History:   Diagnosis Date     Cirrhosis (H)      Colon cancer (H) 10/11/2017    Poorly differentiated adenocarcinoma     Hepatitis C          CURRENT OUTPATIENT MEDICATIONS     Current Outpatient Prescriptions   Medication Sig Dispense Refill     docusate sodium (COLACE) 100 MG tablet Take 100 mg by mouth daily 60 tablet 1     LORazepam (ATIVAN) 0.5 MG tablet Take 1 tablet (0.5 mg) by mouth every 4 hours as needed (Anxiety, Nausea/Vomiting or Sleep) 30 tablet 2     ondansetron (ZOFRAN) 8 MG tablet Take 1 tablet (8 mg) by mouth every 8 hours as needed (Nausea/Vomiting) 30 tablet 1     prochlorperazine (COMPAZINE) 10 MG tablet Take 1 tablet (10 mg) by mouth every 6 hours as needed (Nausea/Vomiting) (Patient not taking: Reported on 5/14/2018) 30 tablet 2        ALLERGIES     Allergies   Allergen Reactions     Acetaminophen      Naproxen         REVIEW OF SYSTEMS   As above in the HPI, o/w complete 14-point ROS was negative.     PHYSICAL EXAM   B/P: 138/78, T: 98.1, P: 64, R: 18  SpO2 Readings from Last 4 Encounters:   05/14/18 99%   05/10/18 97%   05/03/18 96%   04/30/18 97%     Wt Readings from Last 3 Encounters:   05/14/18 77.1 kg (170 lb)   05/10/18 75.3 kg (166 lb)   05/03/18 75.4 kg (166 lb 3.2 oz)     GEN: NAD  EYES:PERRLA  Mouth/ENT: Oropharynx is clear.  NECK: no  lymphadenopathy  LUNGS: clear bilaterally  CV: regular, no murmurs, rubs, or gallops  ABDOMEN: soft, non-tender, non-distended,Ostomy in place  EXT: warm, well perfused, no edema  NEURO: alert  SKIN: no rashes     LABORATORY AND IMAGING STUDIES     Lab Results   Component Value Date    WBC 3.6 05/14/2018     Lab Results   Component Value Date    RBC 4.04 05/14/2018     Lab Results   Component Value Date    HGB 13.5 05/14/2018     Lab Results   Component Value Date    HCT 38.1 05/14/2018     No  components found for: MCT  Lab Results   Component Value Date    MCV 94 05/14/2018     Lab Results   Component Value Date    MCH 33.4 05/14/2018     Lab Results   Component Value Date    MCHC 35.4 05/14/2018     Lab Results   Component Value Date    RDW 13.8 05/14/2018     Lab Results   Component Value Date    PLT 52 05/14/2018     Recent Labs   Lab Test  05/14/18   0750  04/30/18   1105   NA  142  140   POTASSIUM  3.9  3.9   CHLORIDE  109  105   CO2  28  28   ANIONGAP  5  7   GLC  139*  97   BUN  15  15   CR  0.70  0.74   MAICOL  8.5  8.9        ASSESSMENT AND PLAN     57-year-old male with      - Poorly differentiated adenocarcinoma colon  [id number](tumor deposits) with margin involvement -  Stage III   CT scans negative for evidence of metastatic disease  Patient's case was discussed at the tumor board 11/13 and recommendation was to proceed with systemic chemotherapy at this point with plans for additional surgery in future if needed after completion of chemotherapy.  Started on FOLFOX q2 week regimen for 6 months duration - s/p 9 cycles so far.  Chemo delays due to cytopenias  Hold off on chemotherapy today given thrombocytopenia.     - Hepatitis C  Completed antiviral treatment per GI  HCV viral load undetectable    - Thrombocytopenia  Secondary to chemotherapy Vs Hep C   Continue Oxaliplatin dose reduced at 65 mg/m2  Continue to omit 5-FU bolus from the remaining cycles    - Transaminitis  Mild  Possibly from chemotherapy  Monitor    Return to infusion ext visit for labs, cycle 10 chemo  RTC in 3 weeks with anshu for ov, labs, Cycle 11    The patient is ready to learn, no apparent learning barriers were identified, Diagnosis and treatment plans were explained to the patient. The patient expressed understanding of the content. The patient questions were answered to his satisfaction.    Chart documentation with Dragon Voice recognition Software. Although reviewed after completion, some words and grammatical errors  may remain.  Padilla Last MD  Attending Physician   Hematology/Medical Oncology

## 2018-05-14 NOTE — Clinical Note
5/14/2018         RE: Rian Malik  [address] [place] [address]        Dear Colleague,    Thank you for referring your patient, Rian Malik, to the [facility]. Please see a copy of my visit note below.      FOLLOW-UP VISIT NOTE    PATIENT NAME: Rian Malik MRN # [id number]  DATE OF VISIT: May 14, 2018 YOB: 1960    REFERRING PROVIDER: No referring provider defined for this encounter.    CANCER TYPE: Adenocarcinoma colon  STAGE: III pT4N1c  ECOG PS: 0    ONCOLOGY HISTORY:  57-year-old male who around February 2017r, developed intermittent cramping lower abdominal pain. Was evaluated by PCP and clinical impression was colitis. He was put on p.o. antibiotics. However symptoms did not improve. He was eventually referred to surgery and underwent imaging with CT findings suspicious of sigmoid abscess. He was admitted to the hospital for resection of sigmoid abscess 10/11/17. However intraoperatively was noted to have a large firmly adherent sigmoid colon mass. Laparoscopic surgery was converted to open sigmoidectomy with end colostomy.     Surgical pathology   B.  COLON, SIGMOID, HEMICOLECTOMY   - Poorly differentiated adenocarcinoma, with signet ring cell features   - Tumor size: 5 cm   - Tumor is present on serosal surface and microscopically perforates   serosal surface   - One surgical margin shows extensive serosal involvement by tumor. The   opposite, undesignated margin shows no evidence of malignancy   - Lymphovascular invasion is present   - Perineural invasion is not identified   - Tumor deposits are present   - Immunohistochemistry for mismatch repair proteins shows intact nuclear   expression for MLH1, MSH2, MSH6, and PMS2   - Three reactive lymph nodes, negative for malignancy (0/3)   - Pathologic staging: pT4N1c      Patient's case was discussed at the tumor board. Recommendation was to obtain new staging scans as well as to proceed with systemic  chemotherapy at this point with plans for further surgery in future. Staging scans negative for distant disease.     - Started on Folfox 11/21/17    SUBJECTIVE     Patient is here today for his next cycle of chemotherapy. No new complaints. Denies neuropathy, abdominal pain, fever/chills, nausea/vomiting or any other issues. Stable appetite and weight.       PAST MEDICAL HISTORY     Past Medical History:   Diagnosis Date     Cirrhosis (H)      Colon cancer (H) 10/11/2017    Poorly differentiated adenocarcinoma     Hepatitis C          CURRENT OUTPATIENT MEDICATIONS     Current Outpatient Prescriptions   Medication Sig Dispense Refill     docusate sodium (COLACE) 100 MG tablet Take 100 mg by mouth daily 60 tablet 1     LORazepam (ATIVAN) 0.5 MG tablet Take 1 tablet (0.5 mg) by mouth every 4 hours as needed (Anxiety, Nausea/Vomiting or Sleep) 30 tablet 2     ondansetron (ZOFRAN) 8 MG tablet Take 1 tablet (8 mg) by mouth every 8 hours as needed (Nausea/Vomiting) 30 tablet 1     prochlorperazine (COMPAZINE) 10 MG tablet Take 1 tablet (10 mg) by mouth every 6 hours as needed (Nausea/Vomiting) (Patient not taking: Reported on 5/14/2018) 30 tablet 2        ALLERGIES     Allergies   Allergen Reactions     Acetaminophen      Naproxen         REVIEW OF SYSTEMS   As above in the HPI, o/w complete 14-point ROS was negative.     PHYSICAL EXAM   B/P: 138/78, T: 98.1, P: 64, R: 18  SpO2 Readings from Last 4 Encounters:   05/14/18 99%   05/10/18 97%   05/03/18 96%   04/30/18 97%     Wt Readings from Last 3 Encounters:   05/14/18 77.1 kg (170 lb)   05/10/18 75.3 kg (166 lb)   05/03/18 75.4 kg (166 lb 3.2 oz)     GEN: NAD  EYES:PERRLA  Mouth/ENT: Oropharynx is clear.  NECK: no  lymphadenopathy  LUNGS: clear bilaterally  CV: regular, no murmurs, rubs, or gallops  ABDOMEN: soft, non-tender, non-distended,Ostomy in place  EXT: warm, well perfused, no edema  NEURO: alert  SKIN: no rashes     LABORATORY AND IMAGING STUDIES     Lab Results    Component Value Date    WBC 3.6 05/14/2018     Lab Results   Component Value Date    RBC 4.04 05/14/2018     Lab Results   Component Value Date    HGB 13.5 05/14/2018     Lab Results   Component Value Date    HCT 38.1 05/14/2018     No components found for: MCT  Lab Results   Component Value Date    MCV 94 05/14/2018     Lab Results   Component Value Date    MCH 33.4 05/14/2018     Lab Results   Component Value Date    MCHC 35.4 05/14/2018     Lab Results   Component Value Date    RDW 13.8 05/14/2018     Lab Results   Component Value Date    PLT 52 05/14/2018     Recent Labs   Lab Test  05/14/18   0750  04/30/18   1105   NA  142  140   POTASSIUM  3.9  3.9   CHLORIDE  109  105   CO2  28  28   ANIONGAP  5  7   GLC  139*  97   BUN  15  15   CR  0.70  0.74   MAICOL  8.5  8.9        ASSESSMENT AND PLAN     57-year-old male with      - Poorly differentiated adenocarcinoma colon  [id number](tumor deposits) with margin involvement -  Stage III   CT scans negative for evidence of metastatic disease  Patient's case was discussed at the tumor board 11/13 and recommendation was to proceed with systemic chemotherapy at this point with plans for additional surgery in future if needed after completion of chemotherapy.  Started on FOLFOX q2 week regimen for 6 months duration - s/p 9 cycles so far.  Chemo delays due to cytopenias  Hold off on chemotherapy today given thrombocytopenia.     - Hepatitis C  Completed antiviral treatment per GI  HCV viral load undetectable    - Thrombocytopenia  Secondary to chemotherapy Vs Hep C   Continue Oxaliplatin dose reduced at 65 mg/m2  Continue to omit 5-FU bolus from the remaining cycles    - Transaminitis  Mild  Possibly from chemotherapy  Monitor    Return to infusion ext visit for labs, cycle 10 chemo  RTC in 3 weeks with anshu for ov, labs, Cycle 11    The patient is ready to learn, no apparent learning barriers were identified, Diagnosis and treatment plans were explained to the patient. The  patient expressed understanding of the content. The patient questions were answered to his satisfaction.    Chart documentation with Dragon Voice recognition Software. Although reviewed after completion, some words and grammatical errors may remain.  Padilla Last MD  Attending Physician   Hematology/Medical Oncology    Again, thank you for allowing me to participate in the care of your patient.        Sincerely,        Padilla Last MD

## 2018-05-14 NOTE — MR AVS SNAPSHOT
After Visit Summary   5/14/2018    Rian Malik    MRN: [id number]           Patient Information     Date Of Birth          1960        Visit Information        Provider Department      5/14/2018 8:15 AM Padilla Last MD [facility]        Today's Diagnoses     Adenocarcinoma of sigmoid colon (H)           Follow-ups after your visit        Your next 10 appointments already scheduled     Jun 04, 2018  9:30 AM CDT   Return Visit with [facility]   [facility] ([facility])    [address] [facility] [address]   [phone]            Jun 04, 2018 10:15 AM CDT   Return Visit with Padilla Last MD   [facility] ([facility])    [address] [facility] [address] [phone]            Jun 04, 2018 11:00 AM CDT   Flofox with BAY 3 INFUSION   [facility] ([facility])    [address] [facility] [address] [phone]            Jun 18, 2018  8:30 AM CDT   Return Visit with [facility]   [facility] ([facility])    [address] [facility] [address] [phone]            Jun 18, 2018  9:15 AM CDT   Return Visit with SAVANNA High CNP   [facility] ([facility])    [address] [facility] [address] [phone]            Jun 18, 2018 10:00 AM CDT   Flofox with BAY 8 INFUSION   [facility] ([facility])    [address] [facility] [address] [phone]              Who to contact     If you have questions or need follow up information about today's clinic visit or your schedule please contact [facility] directly at [phone].  Normal or non-critical lab and imaging results will be communicated to you by MyChart, letter or phone within 4  "business days after the clinic has received the results. If you do not hear from us within 7 days, please contact the clinic through [employer] or phone. If you have a critical or abnormal lab result, we will notify you by phone as soon as possible.  Submit refill requests through [employer] or call your pharmacy and they will forward the refill request to us. Please allow 3 business days for your refill to be completed.          Additional Information About Your Visit        [employer] Information     [employer] gives you secure access to your electronic health record. If you see a primary care provider, you can also send messages to your care team and make appointments. If you have questions, please call your primary care clinic.  If you do not have a primary care provider, please call [phone] and they will assist you.      [employer] is an electronic gateway that provides easy, online access to your medical records. With [employer], you can request a clinic appointment, read your test results, renew a prescription or communicate with your care team.     To access your existing account, please contact your [facility] or call [phone] for assistance.        Care EveryWhere ID     This is your Care EveryWhere ID. This could be used by other organizations to access your [place] medical records  [id number]        Your Vitals Were     Pulse Temperature Height Pulse Oximetry BMI (Body Mass Index)       65 97.6  F (36.4  C) 1.727 m (5' 8\") 99% 25.85 kg/m2        Blood Pressure from Last 3 Encounters:   05/21/18 128/79   05/14/18 124/75   05/10/18 126/76    Weight from Last 3 Encounters:   05/21/18 77.6 kg (171 lb)   05/14/18 77.1 kg (170 lb)   05/10/18 75.3 kg (166 lb)              Today, you had the following     No orders found for display         Where to get your medicines      Some of these will need a paper prescription and others can be bought over the counter.  Ask your nurse if you have " questions.     Bring a paper prescription for each of these medications     LORazepam 0.5 MG tablet          Primary Care Provider Office Phone # Fax #    Moreno Harris [phone] [address] [place]  [facility] [address]        Equal Access to Services     [facility]ITA[facility] PANCHITO : Hadii cameron ku hadasho Soomaali, waaxda luqadaha, qaybta kaalmada adeegyada, waxay rosey hayharis lopezduniacameron leon . So [facility] [phone].    ATENCIÓN: Si habla español, tiene a martino disposición servicios gratuitos de asistencia lingüística. Llame al [phone].    We comply with applicable federal civil rights laws and [place] laws. We do not discriminate on the basis of race, color, national origin, age, disability, sex, sexual orientation, or gender identity.            Thank you!     Thank you for choosing [facility]  for your care. Our goal is always to provide you with excellent care. Hearing back from our patients is one way we can continue to improve our services. Please take a few minutes to complete the written survey that you may receive in the mail after your visit with us. Thank you!             Your Updated Medication List - Protect others around you: Learn how to safely use, store and throw away your medicines at www.disposemymeds.org.          This list is accurate as of 5/14/18 11:59 PM.  Always use your most recent med list.                   Brand Name Dispense Instructions for use Diagnosis    docusate sodium 100 MG tablet    COLACE    60 tablet    Take 100 mg by mouth daily    Special screening for malignant neoplasms, colon       LORazepam 0.5 MG tablet    ATIVAN    30 tablet    Take 1 tablet (0.5 mg) by mouth every 4 hours as needed (Anxiety, Nausea/Vomiting or Sleep)    Adenocarcinoma of sigmoid colon (H)       ondansetron 8 MG tablet    ZOFRAN    30 tablet    Take 1 tablet (8 mg) by mouth every 8 hours as needed (Nausea/Vomiting)    Adenocarcinoma of sigmoid colon (H)        prochlorperazine 10 MG tablet    COMPAZINE    30 tablet    Take 1 tablet (10 mg) by mouth every 6 hours as needed (Nausea/Vomiting)    Adenocarcinoma of sigmoid colon (H)

## 2018-05-14 NOTE — MR AVS SNAPSHOT
After Visit Summary   5/14/2018    Rian Malik    MRN: [id number]           Patient Information     Date Of Birth          1960        Visit Information        Provider Department      5/14/2018 7:45 AM [facility]        Today's Diagnoses     Adenocarcinoma of sigmoid colon (H)    -  1       Follow-ups after your visit        Your next 10 appointments already scheduled     May 14, 2018  8:15 AM CDT   Return Visit with Padilla Last MD   [facility]    [id number] [place] [address] [phone]            May 14, 2018  8:45 AM CDT   Flofox with BAY 5 INFUSION   [facility] ([facility])    [address] [place] [address] [phone]            May 24, 2018  7:45 AM CDT   Return Visit with [facility]   [facility] ([facility])    [address] [place] [address] [phone]            May 24, 2018  8:15 AM CDT   Return Visit with SAVANNA High CNP   [facility] ([facility])    [address] [facility] [address] [phone]            May 29, 2018 11:30 AM CDT   Return Visit with [facility]   [facility]    [address] [place] [address] [phone]            May 29, 2018 12:00 PM CDT   Flofox with BAY 9 INFUSION   [facility])    [address] [facility] [address] [phone]              Who to contact     If you have questions or need follow up information about today's clinic visit or your schedule please contact [facility] directly at [phone].  Normal or non-critical lab and imaging results will be communicated to you by MyChart, letter or phone  within 4 business days after the clinic has received the results. If you do not hear from us within 7 days, please contact the clinic through [employer] or phone. If you have a critical or abnormal lab result, we will notify you by phone as soon as possible.  Submit refill requests through [employer] or call your pharmacy and they will forward the refill request to us. Please allow 3 business days for your refill to be completed.          Additional Information About Your Visit        [employer] Information     [employer] gives you secure access to your electronic health record. If you see a primary care provider, you can also send messages to your care team and make appointments. If you have questions, please call your primary care clinic.  If you do not have a primary care provider, please call [phone] and they will assist you.      [employer] is an electronic gateway that provides easy, online access to your medical records. With [employer], you can request a clinic appointment, read your test results, renew a prescription or communicate with your care team.     To access your existing account, please contact your [facility] or call [phone] for assistance.        Care EveryWhere ID     This is your Care EveryWhere ID. This could be used by other organizations to access your [place] medical records  [id number]         Blood Pressure from Last 3 Encounters:   05/10/18 126/76   05/03/18 126/72   04/30/18 126/74    Weight from Last 3 Encounters:   05/10/18 75.3 kg (166 lb)   05/03/18 75.4 kg (166 lb 3.2 oz)   04/30/18 76.2 kg (168 lb)              We Performed the Following     CBC with platelets differential     Comprehensive metabolic panel        Primary Care Provider Office Phone # Fax #    Moreno Harris [phone]       [address] [facility] [address]        Equal Access to Services     KHANH FLANAGAN : suad Briseno,  carmentiffjeff calinlucianhernán morrisonpatriciosharmintamie gabriellain hayaan adeeg kharash la'aan ah. So [facility] [phone].    ATENCIÓN: Si anival rahman, tiene a martino disposición servicios gratuitos de asistencia lingüística. Alfredo al [phone].    We comply with applicable federal civil rights laws and [place] laws. We do not discriminate on the basis of race, color, national origin, age, disability, sex, sexual orientation, or gender identity.            Thank you!     Thank you for choosing [facility]  for your care. Our goal is always to provide you with excellent care. Hearing back from our patients is one way we can continue to improve our services. Please take a few minutes to complete the written survey that you may receive in the mail after your visit with us. Thank you!             Your Updated Medication List - Protect others around you: Learn how to safely use, store and throw away your medicines at www.disposemymeds.org.          This list is accurate as of 5/14/18  8:14 AM.  Always use your most recent med list.                   Brand Name Dispense Instructions for use Diagnosis    docusate sodium 100 MG tablet    COLACE    60 tablet    Take 100 mg by mouth daily    Special screening for malignant neoplasms, colon       LORazepam 0.5 MG tablet    ATIVAN    30 tablet    Take 1 tablet (0.5 mg) by mouth every 4 hours as needed (Anxiety, Nausea/Vomiting or Sleep)    Adenocarcinoma of sigmoid colon (H)       ondansetron 8 MG tablet    ZOFRAN    30 tablet    Take 1 tablet (8 mg) by mouth every 8 hours as needed (Nausea/Vomiting)    Adenocarcinoma of sigmoid colon (H)       prochlorperazine 10 MG tablet    COMPAZINE    30 tablet    Take 1 tablet (10 mg) by mouth every 6 hours as needed (Nausea/Vomiting)    Adenocarcinoma of sigmoid colon (H)

## 2018-05-14 NOTE — NURSING NOTE
"Oncology Rooming Note    May 14, 2018 8:16 AM   Rian Malik is a 57 year old male who presents for:    Chief Complaint   Patient presents with     Oncology Clinic Visit     prior to treatment     Initial Vitals: /75  Pulse 65  Temp 97.6  F (36.4  C)  Ht 1.727 m (5' 8\")  Wt 77.1 kg (170 lb)  SpO2 99%  BMI 25.85 kg/m2 Estimated body mass index is 25.85 kg/(m^2) as calculated from the following:    Height as of this encounter: 1.727 m (5' 8\").    Weight as of this encounter: 77.1 kg (170 lb). Body surface area is 1.92 meters squared.  No Pain (0) Comment: Data Unavailable   No LMP for male patient.  Allergies reviewed: Yes  Medications reviewed: Yes    Medications: Medication refills not needed today.  Pharmacy name entered into [employer]:    [facility] - [place], [place] - [address]  [facility] - [place], [place] - [address]        5 minutes for nursing intake (face to face time)     Rika Boo LPN              "

## 2018-05-18 ENCOUNTER — MYC MEDICAL ADVICE (OUTPATIENT)
Dept: GASTROENTEROLOGY | Facility: CLINIC | Age: 58
End: 2018-05-18

## 2018-05-21 ENCOUNTER — HOME INFUSION (PRE-WILLOW HOME INFUSION) (OUTPATIENT)
Dept: PHARMACY | Facility: CLINIC | Age: 58
End: 2018-05-21

## 2018-05-21 ENCOUNTER — ONCOLOGY VISIT (OUTPATIENT)
Dept: ONCOLOGY | Facility: CLINIC | Age: 58
End: 2018-05-21
Payer: COMMERCIAL

## 2018-05-21 ENCOUNTER — INFUSION THERAPY VISIT (OUTPATIENT)
Dept: INFUSION THERAPY | Facility: CLINIC | Age: 58
End: 2018-05-21
Payer: COMMERCIAL

## 2018-05-21 VITALS
OXYGEN SATURATION: 98 % | RESPIRATION RATE: 18 BRPM | BODY MASS INDEX: 26 KG/M2 | WEIGHT: 171 LBS | TEMPERATURE: 96.8 F | HEART RATE: 58 BPM | SYSTOLIC BLOOD PRESSURE: 128 MMHG | DIASTOLIC BLOOD PRESSURE: 79 MMHG

## 2018-05-21 DIAGNOSIS — C18.7 ADENOCARCINOMA OF SIGMOID COLON (H): Primary | ICD-10-CM

## 2018-05-21 LAB
ALBUMIN SERPL-MCNC: 3.4 G/DL (ref 3.4–5)
ALP SERPL-CCNC: 139 U/L (ref 40–150)
ALT SERPL W P-5'-P-CCNC: 94 U/L (ref 0–70)
ANION GAP SERPL CALCULATED.3IONS-SCNC: 7 MMOL/L (ref 3–14)
AST SERPL W P-5'-P-CCNC: 73 U/L (ref 0–45)
BASOPHILS # BLD AUTO: 0.1 10E9/L (ref 0–0.2)
BASOPHILS NFR BLD AUTO: 1.1 %
BILIRUB SERPL-MCNC: 1.2 MG/DL (ref 0.2–1.3)
BUN SERPL-MCNC: 14 MG/DL (ref 7–30)
CALCIUM SERPL-MCNC: 8.8 MG/DL (ref 8.5–10.1)
CHLORIDE SERPL-SCNC: 104 MMOL/L (ref 94–109)
CO2 SERPL-SCNC: 28 MMOL/L (ref 20–32)
CREAT SERPL-MCNC: 0.88 MG/DL (ref 0.66–1.25)
DIFFERENTIAL METHOD BLD: ABNORMAL
EOSINOPHIL # BLD AUTO: 0.1 10E9/L (ref 0–0.7)
EOSINOPHIL NFR BLD AUTO: 1.6 %
ERYTHROCYTE [DISTWIDTH] IN BLOOD BY AUTOMATED COUNT: 13.9 % (ref 10–15)
GFR SERPL CREATININE-BSD FRML MDRD: 89 ML/MIN/1.7M2
GLUCOSE SERPL-MCNC: 96 MG/DL (ref 70–99)
HCT VFR BLD AUTO: 39.3 % (ref 40–53)
HGB BLD-MCNC: 13.9 G/DL (ref 13.3–17.7)
IMM GRANULOCYTES # BLD: 0 10E9/L (ref 0–0.4)
IMM GRANULOCYTES NFR BLD: 0.5 %
LYMPHOCYTES # BLD AUTO: 1.4 10E9/L (ref 0.8–5.3)
LYMPHOCYTES NFR BLD AUTO: 32.1 %
MCH RBC QN AUTO: 32.9 PG (ref 26.5–33)
MCHC RBC AUTO-ENTMCNC: 35.4 G/DL (ref 31.5–36.5)
MCV RBC AUTO: 93 FL (ref 78–100)
MONOCYTES # BLD AUTO: 0.5 10E9/L (ref 0–1.3)
MONOCYTES NFR BLD AUTO: 12.1 %
NEUTROPHILS # BLD AUTO: 2.3 10E9/L (ref 1.6–8.3)
NEUTROPHILS NFR BLD AUTO: 52.6 %
PLATELET # BLD AUTO: 81 10E9/L (ref 150–450)
POTASSIUM SERPL-SCNC: 3.9 MMOL/L (ref 3.4–5.3)
PROT SERPL-MCNC: 7.3 G/DL (ref 6.8–8.8)
RBC # BLD AUTO: 4.22 10E12/L (ref 4.4–5.9)
SODIUM SERPL-SCNC: 139 MMOL/L (ref 133–144)
WBC # BLD AUTO: 4.4 10E9/L (ref 4–11)

## 2018-05-21 PROCEDURE — 96415 CHEMO IV INFUSION ADDL HR: CPT | Performed by: INTERNAL MEDICINE

## 2018-05-21 PROCEDURE — 99207 ZZC NO CHARGE LOS: CPT

## 2018-05-21 PROCEDURE — 85025 COMPLETE CBC W/AUTO DIFF WBC: CPT | Performed by: INTERNAL MEDICINE

## 2018-05-21 PROCEDURE — 99207 ZZC NO CHARGE NURSE ONLY: CPT

## 2018-05-21 PROCEDURE — 80053 COMPREHEN METABOLIC PANEL: CPT | Performed by: INTERNAL MEDICINE

## 2018-05-21 PROCEDURE — 96416 CHEMO PROLONG INFUSE W/PUMP: CPT | Performed by: INTERNAL MEDICINE

## 2018-05-21 PROCEDURE — 96413 CHEMO IV INFUSION 1 HR: CPT | Performed by: INTERNAL MEDICINE

## 2018-05-21 PROCEDURE — 96367 TX/PROPH/DG ADDL SEQ IV INF: CPT | Performed by: INTERNAL MEDICINE

## 2018-05-21 RX ORDER — ALBUTEROL SULFATE 0.83 MG/ML
2.5 SOLUTION RESPIRATORY (INHALATION)
Status: CANCELLED | OUTPATIENT
Start: 2018-05-21

## 2018-05-21 RX ORDER — HEPARIN SODIUM (PORCINE) LOCK FLUSH IV SOLN 100 UNIT/ML 100 UNIT/ML
5 SOLUTION INTRAVENOUS
Status: DISCONTINUED | OUTPATIENT
Start: 2018-05-21 | End: 2018-05-21 | Stop reason: HOSPADM

## 2018-05-21 RX ORDER — DIPHENHYDRAMINE HYDROCHLORIDE 50 MG/ML
50 INJECTION INTRAMUSCULAR; INTRAVENOUS
Status: CANCELLED
Start: 2018-05-21

## 2018-05-21 RX ORDER — SODIUM CHLORIDE 9 MG/ML
1000 INJECTION, SOLUTION INTRAVENOUS CONTINUOUS PRN
Status: CANCELLED
Start: 2018-05-21

## 2018-05-21 RX ORDER — EPINEPHRINE 1 MG/ML
0.3 INJECTION, SOLUTION INTRAMUSCULAR; SUBCUTANEOUS EVERY 5 MIN PRN
Status: CANCELLED | OUTPATIENT
Start: 2018-05-21

## 2018-05-21 RX ORDER — ALBUTEROL SULFATE 90 UG/1
1-2 AEROSOL, METERED RESPIRATORY (INHALATION)
Status: CANCELLED
Start: 2018-05-21

## 2018-05-21 RX ORDER — EPINEPHRINE 0.3 MG/.3ML
0.3 INJECTION SUBCUTANEOUS EVERY 5 MIN PRN
Status: CANCELLED | OUTPATIENT
Start: 2018-05-21

## 2018-05-21 RX ORDER — METHYLPREDNISOLONE SODIUM SUCCINATE 125 MG/2ML
125 INJECTION, POWDER, LYOPHILIZED, FOR SOLUTION INTRAMUSCULAR; INTRAVENOUS
Status: CANCELLED
Start: 2018-05-21

## 2018-05-21 RX ORDER — LORAZEPAM 2 MG/ML
0.5 INJECTION INTRAMUSCULAR EVERY 4 HOURS PRN
Status: CANCELLED
Start: 2018-05-21

## 2018-05-21 RX ORDER — MEPERIDINE HYDROCHLORIDE 25 MG/ML
25 INJECTION INTRAMUSCULAR; INTRAVENOUS; SUBCUTANEOUS EVERY 30 MIN PRN
Status: CANCELLED | OUTPATIENT
Start: 2018-05-21

## 2018-05-21 RX ADMIN — HEPARIN SODIUM (PORCINE) LOCK FLUSH IV SOLN 100 UNIT/ML 5 ML: 100 SOLUTION at 12:06

## 2018-05-21 NOTE — MR AVS SNAPSHOT
After Visit Summary   5/21/2018    Rain Malik    MRN: [id number]           Patient Information     Date Of Birth          1960        Visit Information        Provider Department      5/21/2018 12:30 PM BAY 4 INFUSION [facility]        Today's Diagnoses     Adenocarcinoma of sigmoid colon (H)    -  1       Follow-ups after your visit        Who to contact     If you have questions or need follow up information about today's clinic visit or your schedule please contact [facility] directly at [phone].  Normal or non-critical lab and imaging results will be communicated to you by NationalFieldhart, letter or phone within 4 business days after the clinic has received the results. If you do not hear from us within 7 days, please contact the clinic through PersistIQt or phone. If you have a critical or abnormal lab result, we will notify you by phone as soon as possible.  Submit refill requests through ThinkSuit or call your pharmacy and they will forward the refill request to us. Please allow 3 business days for your refill to be completed.          Additional Information About Your Visit        MyChart Information     ThinkSuit gives you secure access to your electronic health record. If you see a primary care provider, you can also send messages to your care team and make appointments. If you have questions, please call your primary care clinic.  If you do not have a primary care provider, please call [phone] and they will assist you.      ThinkSuit is an electronic gateway that provides easy, online access to your medical records. With ThinkSuit, you can request a clinic appointment, read your test results, renew a prescription or communicate with your care team.     To access your existing account, please contact your [facility] or call [phone] for assistance.        Care EveryWhere ID     This is your Care EveryWhere ID. This could  be used by other organizations to access your [place] medical records  [id number]        Your Vitals Were     Pulse Temperature Respirations Pulse Oximetry BMI (Body Mass Index)       58 96.8  F (36  C) (Oral) 18 98% 26 kg/m2        Blood Pressure from Last 3 Encounters:   05/21/18 128/79   05/14/18 124/75   05/10/18 126/76    Weight from Last 3 Encounters:   05/21/18 77.6 kg (171 lb)   05/14/18 77.1 kg (170 lb)   05/10/18 75.3 kg (166 lb)              Today, you had the following     No orders found for display       Primary Care Provider Office Phone # Fax #    Moreno Harris [phone] [address]  [place] [facility] [address]        Equal Access to Services     [facility] HospitalALESS[facility]DRO : Hadii aad ku hadasho Sosherri, waaxda luqadaha, qaybta kaalmada adebriayahernán, alisa leon . So [facility] [phone].    ATENCIÓN: Si habla español, tiene a martino disposición servicios gratuitos de asistencia lingüística. Alfredo al [phone].    We comply with applicable federal civil rights laws and [place] laws. We do not discriminate on the basis of race, color, national origin, age, disability, sex, sexual orientation, or gender identity.            Thank you!     Thank you for choosing [facility]  for your care. Our goal is always to provide you with excellent care. Hearing back from our patients is one way we can continue to improve our services. Please take a few minutes to complete the written survey that you may receive in the mail after your visit with us. Thank you!             Your Updated Medication List - Protect others around you: Learn how to safely use, store and throw away your medicines at www.disposemymeds.org.          This list is accurate as of 5/21/18  4:19 PM.  Always use your most recent med list.                   Brand Name Dispense Instructions for use Diagnosis    docusate sodium 100 MG tablet    COLACE    60 tablet    Take 100 mg by mouth  daily    Special screening for malignant neoplasms, colon       LORazepam 0.5 MG tablet    ATIVAN    30 tablet    Take 1 tablet (0.5 mg) by mouth every 4 hours as needed (Anxiety, Nausea/Vomiting or Sleep)    Adenocarcinoma of sigmoid colon (H)       ondansetron 8 MG tablet    ZOFRAN    30 tablet    Take 1 tablet (8 mg) by mouth every 8 hours as needed (Nausea/Vomiting)    Adenocarcinoma of sigmoid colon (H)       prochlorperazine 10 MG tablet    COMPAZINE    30 tablet    Take 1 tablet (10 mg) by mouth every 6 hours as needed (Nausea/Vomiting)    Adenocarcinoma of sigmoid colon (H)

## 2018-05-21 NOTE — PROGRESS NOTES
Power port needle left accessed for treatment. Tolerated lab draw without complaint. [place] drawn-Red/Green/Purple tubes. Double signed by patient and RN. See documentation flowsheet. Nuha De Los Santos, RN, BSN, OCN

## 2018-05-21 NOTE — PROGRESS NOTES
"Infusion Nursing Note:  Rian Malik presents today for C10 D1 Oxaliplatin/Flourouracil pump connect.    Patient seen by provider today: No   present during visit today: Not Applicable.    Note: N/A.    Intravenous Access:  Implanted Port.    Treatment Conditions:  Lab Results   Component Value Date    HGB 13.9 05/21/2018     Lab Results   Component Value Date    WBC 4.4 05/21/2018      Lab Results   Component Value Date    ANEU 2.3 05/21/2018     Lab Results   Component Value Date    PLT 81 05/21/2018      Lab Results   Component Value Date     05/21/2018                   Lab Results   Component Value Date    POTASSIUM 3.9 05/21/2018           Lab Results   Component Value Date    MAG 2.0 08/04/2017            Lab Results   Component Value Date    CR 0.88 05/21/2018                   Lab Results   Component Value Date    MAICOL 8.8 05/21/2018                Lab Results   Component Value Date    BILITOTAL 1.2 05/21/2018           Lab Results   Component Value Date    ALBUMIN 3.4 05/21/2018                    Lab Results   Component Value Date    ALT 94 05/21/2018           Lab Results   Component Value Date    AST 73 05/21/2018       Results reviewed, labs MET treatment parameters, ok to proceed with treatment.      Post Infusion Assessment:  Patient tolerated infusion without incident.  Blood return noted pre and post infusion.  Prior to discharge: Port is secured in place with tegaderm and flushed with 10cc NS with positive blood return noted.  Continuous home infusion Dosi-Fuser pump connected.    All connectors secured in place and clamps taped open.    Pump started, \"running\" noted on display (CADD): Not Applicable.  Patient instructed to call our clinic or [facility] with any questions or concerns at home.  Patient verbalized understanding.    Patient set up for pump disconnect at home with [facility] on 5/23/18 at 1330.            Discharge Plan:   Patient will need " next appointment for 6/4/18- message in to scheduling.   Patient discharged in stable condition accompanied by: self.  Departure Mode: Ambulatory.    Blanche Villanueva RN

## 2018-05-21 NOTE — MR AVS SNAPSHOT
After Visit Summary   5/21/2018    Rian Malik    MRN: [id number]           Patient Information     Date Of Birth          1960        Visit Information        Provider Department      5/21/2018 12:00 PM NURSE ONLY CANCER CENTER [facility]        Today's Diagnoses     Adenocarcinoma of sigmoid colon (H)    -  1       Follow-ups after your visit        Who to contact     If you have questions or need follow up information about today's clinic visit or your schedule please contact [facility] directly at [phone].  Normal or non-critical lab and imaging results will be communicated to you by GridMarketshart, letter or phone within 4 business days after the clinic has received the results. If you do not hear from us within 7 days, please contact the clinic through Security Innovationt or phone. If you have a critical or abnormal lab result, we will notify you by phone as soon as possible.  Submit refill requests through Nafasi Systems or call your pharmacy and they will forward the refill request to us. Please allow 3 business days for your refill to be completed.          Additional Information About Your Visit        MyChart Information     [employer] Systems gives you secure access to your electronic health record. If you see a primary care provider, you can also send messages to your care team and make appointments. If you have questions, please call your primary care clinic.  If you do not have a primary care provider, please call [phone] and they will assist you.      [employer]asi Systems is an electronic gateway that provides easy, online access to your medical records. With [employer]asi Systems, you can request a clinic appointment, read your test results, renew a prescription or communicate with your care team.     To access your existing account, please contact your [facility] or call [phone] for assistance.        Care EveryWhere ID     This is your Care EveryWhere ID.  This could be used by other organizations to access your [place] medical records  [id number]         Blood Pressure from Last 3 Encounters:   05/21/18 128/79   05/14/18 124/75   05/10/18 126/76    Weight from Last 3 Encounters:   05/21/18 77.6 kg (171 lb)   05/14/18 77.1 kg (170 lb)   05/10/18 75.3 kg (166 lb)              We Performed the Following     CBC with platelets differential     Comprehensive metabolic panel        Primary Care Provider Office Phone # Fax #    Moreno Harris [phone]       [address]  [facility] [address]        Equal Access to Services     GUSTAVO FLANAGAN : Hadii cameron moralezo Sosherri, waaxda luqadaha, qaybta kaalmada adebriayada, alisa leon . So [facility] [phone].    ATENCIÓN: Si habla español, tiene a martino disposición servicios gratuitos de asistencia lingüística. Llame al [phone].    We comply with applicable federal civil rights laws and [place] laws. We do not discriminate on the basis of race, color, national origin, age, disability, sex, sexual orientation, or gender identity.            Thank you!     Thank you for choosing [facility]  for your care. Our goal is always to provide you with excellent care. Hearing back from our patients is one way we can continue to improve our services. Please take a few minutes to complete the written survey that you may receive in the mail after your visit with us. Thank you!             Your Updated Medication List - Protect others around you: Learn how to safely use, store and throw away your medicines at www.disposemymeds.org.          This list is accurate as of 5/21/18 12:41 PM.  Always use your most recent med list.                   Brand Name Dispense Instructions for use Diagnosis    docusate sodium 100 MG tablet    COLACE    60 tablet    Take 100 mg by mouth daily    Special screening for malignant neoplasms, colon       LORazepam 0.5 MG tablet     ATIVAN    30 tablet    Take 1 tablet (0.5 mg) by mouth every 4 hours as needed (Anxiety, Nausea/Vomiting or Sleep)    Adenocarcinoma of sigmoid colon (H)       ondansetron 8 MG tablet    ZOFRAN    30 tablet    Take 1 tablet (8 mg) by mouth every 8 hours as needed (Nausea/Vomiting)    Adenocarcinoma of sigmoid colon (H)       prochlorperazine 10 MG tablet    COMPAZINE    30 tablet    Take 1 tablet (10 mg) by mouth every 6 hours as needed (Nausea/Vomiting)    Adenocarcinoma of sigmoid colon (H)

## 2018-05-22 NOTE — PROGRESS NOTES
This is a recent snapshot of the patient's [facility] medical record.  For current drug dose and complete information and questions, call [phone] or In Basket pool, [facility] home infusion ([id number])  CSN Number:  [id number]

## 2018-05-23 ENCOUNTER — HOME INFUSION (PRE-WILLOW HOME INFUSION) (OUTPATIENT)
Dept: PHARMACY | Facility: CLINIC | Age: 58
End: 2018-05-23

## 2018-05-24 NOTE — PROGRESS NOTES
This is a recent snapshot of the patient's [facility] medical record.  For current drug dose and complete information and questions, call [phone] or In Basket pool, [facility]v home infusion ([id number])  CSN Number:  [id number]

## 2018-06-04 ENCOUNTER — ONCOLOGY VISIT (OUTPATIENT)
Dept: ONCOLOGY | Facility: CLINIC | Age: 58
End: 2018-06-04
Payer: COMMERCIAL

## 2018-06-04 VITALS
HEIGHT: 68 IN | HEART RATE: 62 BPM | OXYGEN SATURATION: 98 % | BODY MASS INDEX: 25.46 KG/M2 | DIASTOLIC BLOOD PRESSURE: 78 MMHG | TEMPERATURE: 98 F | SYSTOLIC BLOOD PRESSURE: 128 MMHG | WEIGHT: 168 LBS

## 2018-06-04 DIAGNOSIS — C18.7 ADENOCARCINOMA OF SIGMOID COLON (H): Primary | ICD-10-CM

## 2018-06-04 LAB
ALBUMIN SERPL-MCNC: 3.2 G/DL (ref 3.4–5)
ALP SERPL-CCNC: 132 U/L (ref 40–150)
ALT SERPL W P-5'-P-CCNC: 75 U/L (ref 0–70)
ANION GAP SERPL CALCULATED.3IONS-SCNC: 7 MMOL/L (ref 3–14)
AST SERPL W P-5'-P-CCNC: 60 U/L (ref 0–45)
BASOPHILS # BLD AUTO: 0 10E9/L (ref 0–0.2)
BASOPHILS NFR BLD AUTO: 0.6 %
BILIRUB SERPL-MCNC: 1 MG/DL (ref 0.2–1.3)
BUN SERPL-MCNC: 15 MG/DL (ref 7–30)
CALCIUM SERPL-MCNC: 8.3 MG/DL (ref 8.5–10.1)
CHLORIDE SERPL-SCNC: 105 MMOL/L (ref 94–109)
CO2 SERPL-SCNC: 28 MMOL/L (ref 20–32)
CREAT SERPL-MCNC: 0.75 MG/DL (ref 0.66–1.25)
DIFFERENTIAL METHOD BLD: ABNORMAL
EOSINOPHIL # BLD AUTO: 0.1 10E9/L (ref 0–0.7)
EOSINOPHIL NFR BLD AUTO: 1.7 %
ERYTHROCYTE [DISTWIDTH] IN BLOOD BY AUTOMATED COUNT: 13.9 % (ref 10–15)
GFR SERPL CREATININE-BSD FRML MDRD: >90 ML/MIN/1.7M2
GLUCOSE SERPL-MCNC: 98 MG/DL (ref 70–99)
HCT VFR BLD AUTO: 38.3 % (ref 40–53)
HGB BLD-MCNC: 13.6 G/DL (ref 13.3–17.7)
IMM GRANULOCYTES # BLD: 0 10E9/L (ref 0–0.4)
IMM GRANULOCYTES NFR BLD: 0.2 %
LYMPHOCYTES # BLD AUTO: 1.4 10E9/L (ref 0.8–5.3)
LYMPHOCYTES NFR BLD AUTO: 29.9 %
MCH RBC QN AUTO: 32.7 PG (ref 26.5–33)
MCHC RBC AUTO-ENTMCNC: 35.5 G/DL (ref 31.5–36.5)
MCV RBC AUTO: 92 FL (ref 78–100)
MONOCYTES # BLD AUTO: 0.5 10E9/L (ref 0–1.3)
MONOCYTES NFR BLD AUTO: 11 %
NEUTROPHILS # BLD AUTO: 2.6 10E9/L (ref 1.6–8.3)
NEUTROPHILS NFR BLD AUTO: 56.6 %
PLATELET # BLD AUTO: 64 10E9/L (ref 150–450)
POTASSIUM SERPL-SCNC: 3.8 MMOL/L (ref 3.4–5.3)
PROT SERPL-MCNC: 7 G/DL (ref 6.8–8.8)
RBC # BLD AUTO: 4.16 10E12/L (ref 4.4–5.9)
SODIUM SERPL-SCNC: 140 MMOL/L (ref 133–144)
WBC # BLD AUTO: 4.6 10E9/L (ref 4–11)

## 2018-06-04 PROCEDURE — 85025 COMPLETE CBC W/AUTO DIFF WBC: CPT | Performed by: INTERNAL MEDICINE

## 2018-06-04 PROCEDURE — 99215 OFFICE O/P EST HI 40 MIN: CPT | Performed by: INTERNAL MEDICINE

## 2018-06-04 PROCEDURE — 99207 ZZC NO CHARGE NURSE ONLY: CPT

## 2018-06-04 PROCEDURE — 80053 COMPREHEN METABOLIC PANEL: CPT | Performed by: INTERNAL MEDICINE

## 2018-06-04 RX ORDER — HEPARIN SODIUM (PORCINE) LOCK FLUSH IV SOLN 100 UNIT/ML 100 UNIT/ML
5 SOLUTION INTRAVENOUS
Status: DISCONTINUED | OUTPATIENT
Start: 2018-06-04 | End: 2018-06-04 | Stop reason: HOSPADM

## 2018-06-04 RX ADMIN — HEPARIN SODIUM (PORCINE) LOCK FLUSH IV SOLN 100 UNIT/ML 5 ML: 100 SOLUTION at 09:34

## 2018-06-04 ASSESSMENT — PAIN SCALES - GENERAL: PAINLEVEL: NO PAIN (0)

## 2018-06-04 NOTE — PROGRESS NOTES
Power port needle left accessed for treatment. Tolerated lab draw without complaint. [place] drawn-Red/Green/Purple tubes. Double signed by patient and RN. See documentation flowsheet. Nuha De Los Santos, RN, BSN, OCN

## 2018-06-04 NOTE — Clinical Note
6/4/2018         RE: Rian Malik  [address] [place] [address]        Dear Colleague,    Thank you for referring your patient, Rian Malik, to the [facility]. Please see a copy of my visit note below.      FOLLOW-UP VISIT NOTE    PATIENT NAME: Rian Malik MRN # [id number]  DATE OF VISIT: Jun 4, 2018 YOB: 1960    REFERRING PROVIDER: No referring provider defined for this encounter.    CANCER TYPE: Adenocarcinoma colon  STAGE: III pT4N1c  ECOG PS: 0    ONCOLOGY HISTORY:  57-year-old male who around February 2017r, developed intermittent cramping lower abdominal pain. Was evaluated by PCP and clinical impression was colitis. He was put on p.o. antibiotics. However symptoms did not improve. He was eventually referred to surgery and underwent imaging with CT findings suspicious of sigmoid abscess. He was admitted to the hospital for resection of sigmoid abscess 10/11/17. However intraoperatively was noted to have a large firmly adherent sigmoid colon mass. Laparoscopic surgery was converted to open sigmoidectomy with end colostomy.     Surgical pathology   B.  COLON, SIGMOID, HEMICOLECTOMY   - Poorly differentiated adenocarcinoma, with signet ring cell features   - Tumor size: 5 cm   - Tumor is present on serosal surface and microscopically perforates   serosal surface   - One surgical margin shows extensive serosal involvement by tumor. The   opposite, undesignated margin shows no evidence of malignancy   - Lymphovascular invasion is present   - Perineural invasion is not identified   - Tumor deposits are present   - Immunohistochemistry for mismatch repair proteins shows intact nuclear   expression for MLH1, MSH2, MSH6, and PMS2   - Three reactive lymph nodes, negative for malignancy (0/3)   - Pathologic staging: pT4N1c      Patient's case was discussed at the tumor board. Recommendation was to obtain new staging scans as well as to proceed with systemic  chemotherapy at this point with plans for further surgery in future. Staging scans negative for distant disease.     - Started on Folfox 11/21/17    SUBJECTIVE     Patient is here today for his next cycle of chemotherapy. It is of intermittent tingling and numbness in hands. Denies fever/chills, nausea/vomiting, abdominal pain. Ostomy working well. Denies presence of blood in stools. Appetite and energy levels have been stable      PAST MEDICAL HISTORY     Past Medical History:   Diagnosis Date     Cirrhosis (H)      Colon cancer (H) 10/11/2017    Poorly differentiated adenocarcinoma     Hepatitis C          CURRENT OUTPATIENT MEDICATIONS     Current Outpatient Prescriptions   Medication Sig Dispense Refill     docusate sodium (COLACE) 100 MG tablet Take 100 mg by mouth daily 60 tablet 1     LORazepam (ATIVAN) 0.5 MG tablet Take 1 tablet (0.5 mg) by mouth every 4 hours as needed (Anxiety, Nausea/Vomiting or Sleep) 30 tablet 2     ondansetron (ZOFRAN) 8 MG tablet Take 1 tablet (8 mg) by mouth every 8 hours as needed (Nausea/Vomiting) 30 tablet 1     prochlorperazine (COMPAZINE) 10 MG tablet Take 1 tablet (10 mg) by mouth every 6 hours as needed (Nausea/Vomiting) 30 tablet 2        ALLERGIES     Allergies   Allergen Reactions     Acetaminophen      Naproxen         REVIEW OF SYSTEMS   As above in the HPI, o/w complete 14-point ROS was negative.     PHYSICAL EXAM   B/P: 138/78, T: 98.1, P: 64, R: 18  SpO2 Readings from Last 4 Encounters:   06/04/18 98%   05/21/18 98%   05/14/18 99%   05/10/18 97%     Wt Readings from Last 3 Encounters:   06/04/18 76.2 kg (168 lb)   05/21/18 77.6 kg (171 lb)   05/14/18 77.1 kg (170 lb)     GEN: NAD  EYES:PERRLA  Mouth/ENT: Oropharynx is clear.  NECK: no  lymphadenopathy  LUNGS: clear bilaterally  CV: regular, no murmurs, rubs, or gallops  ABDOMEN: soft, non-tender, non-distended,Ostomy in place  EXT: warm, well perfused, no edema  NEURO: alert  SKIN: no rashes     LABORATORY AND IMAGING  STUDIES     Lab Results   Component Value Date    WBC 4.6 06/04/2018     Lab Results   Component Value Date    RBC 4.16 06/04/2018     Lab Results   Component Value Date    HGB 13.6 06/04/2018     Lab Results   Component Value Date    HCT 38.3 06/04/2018     No components found for: MCT  Lab Results   Component Value Date    MCV 92 06/04/2018     Lab Results   Component Value Date    MCH 32.7 06/04/2018     Lab Results   Component Value Date    MCHC 35.5 06/04/2018     Lab Results   Component Value Date    RDW 13.9 06/04/2018     Lab Results   Component Value Date    PLT 64 06/04/2018     Recent Labs   Lab Test  06/04/18   0933  05/21/18   1205   NA  140  139   POTASSIUM  3.8  3.9   CHLORIDE  105  104   CO2  28  28   ANIONGAP  7  7   GLC  98  96   BUN  15  14   CR  0.75  0.88   MAICOL  8.3*  8.8        ASSESSMENT AND PLAN     57-year-old male with      - Poorly differentiated adenocarcinoma colon  [id number](tumor deposits) with margin involvement -  Stage III   CT scans negative for evidence of metastatic disease  Patient's case was discussed at the tumor board 11/13 and recommendation was to proceed with systemic chemotherapy at this point with plans for additional surgery in future if needed after completion of chemotherapy.  Started on FOLFOX q2 week regimen for 6 months duration - s/p 10 cycles so far.  He has had chemo delays due to thrombocytopenia.  Hold off on chemotherapy today given thrombocytopenia.     - Hepatitis C  Completed antiviral treatment per GI  HCV viral load undetectable    - Thrombocytopenia  Secondary to chemotherapy Vs Hep C   Continue Oxaliplatin dose reduced at 65 mg/m2  Continue to omit 5-FU bolus from the remaining cycles    - Transaminitis  Mild  Possibly from chemotherapy  Monitor    Return to infusion next visit for labs, cycle 11 chemo  RTC in 4 weeks with anshu for ov, labs, Cycle 12. We'll send him back to surgical oncology for ostomy reversal on completion of chemotherapy.    The  patient is ready to learn, no apparent learning barriers were identified, Diagnosis and treatment plans were explained to the patient. The patient expressed understanding of the content. The patient questions were answered to his satisfaction.    Chart documentation with Dragon Voice recognition Software. Although reviewed after completion, some words and grammatical errors may remain.  Padilla Last MD  Attending Physician   Hematology/Medical Oncology    Again, thank you for allowing me to participate in the care of your patient.        Sincerely,        Padilla Last MD

## 2018-06-04 NOTE — MR AVS SNAPSHOT
After Visit Summary   6/4/2018    Rian Malik    MRN: [id number]           Patient Information     Date Of Birth          1960        Visit Information        Provider Department      6/4/2018 9:30 AM NURSE ONLY CANCER CENTER [facility]        Today's Diagnoses     Adenocarcinoma of sigmoid colon (H)    -  1       Follow-ups after your visit        Your next 10 appointments already scheduled     Jun 18, 2018  8:30 AM CDT   Return Visit with NURSE ONLY CANCER CENTER   [facility] ([facility])    [address] [place] [id number] [phone]            Jun 18, 2018  9:15 AM CDT   Return Visit with SAVANNA High CNP   [facility] ([facility])    [address] [place] [id number] [phone]            Jun 18, 2018 10:00 AM CDT   Flofox with BAY 8 INFUSION   [facility] ([facility])    [address] [place] [id number] [phone]            Nov 13, 2018  9:30 AM CST   Lab with  LAB   [facility] ([facility])    [address]  1st Floor  [facility] [address] [phone]            Nov 13, 2018 10:30 AM CST   (Arrive by 10:15 AM)   Return General Liver with Yaakov Burroughs MD   [facility] ([facility])    [address]  [address]  [facility] [address] [phone]              Who to contact     If you have questions or need follow up information about today's clinic visit or your schedule please contact [facility] directly at [phone].  Normal or non-critical lab and imaging results will be communicated to you by MyChart, letter or phone within 4 business days after the clinic has received the results. If you do not hear from us within 7 days, please contact the clinic through MyChart or phone. If you have a  critical or abnormal lab result, we will notify you by phone as soon as possible.  Submit refill requests through [facility] or call your pharmacy and they will forward the refill request to us. Please allow 3 business days for your refill to be completed.          Additional Information About Your Visit        [facility] Information     [facility] gives you secure access to your electronic health record. If you see a primary care provider, you can also send messages to your care team and make appointments. If you have questions, please call your primary care clinic.  If you do not have a primary care provider, please call [phone] and they will assist you.      [facility] is an electronic gateway that provides easy, online access to your medical records. With [facility], you can request a clinic appointment, read your test results, renew a prescription or communicate with your care team.     To access your existing account, please contact your [facility] or call [phone] for assistance.        Care EveryWhere ID     This is your Care EveryWhere ID. This could be used by other organizations to access your [place] medical records  [id number]         Blood Pressure from Last 3 Encounters:   06/04/18 128/78   05/21/18 128/79   05/14/18 124/75    Weight from Last 3 Encounters:   06/04/18 76.2 kg (168 lb)   05/21/18 77.6 kg (171 lb)   05/14/18 77.1 kg (170 lb)              We Performed the Following     CBC with platelets differential     Comprehensive metabolic panel        Primary Care Provider Office Phone # Fax #    Moreno Harris [phone]       [address]  [facility] [address]        Equal Access to Services     [facility] [place]: Hadii aad trevon hadasho Sosherri, waaxda luqadaha, qaybta kaalmada jose, alisa leon . So [facility] [phone].    ATENCIÓN: Si habla español, tiene a martino disposición servicios gratuitos de asistencia  lingüística. Alfredo al [phone].    We comply with applicable federal civil rights laws and [place] laws. We do not discriminate on the basis of race, color, national origin, age, disability, sex, sexual orientation, or gender identity.            Thank you!     Thank you for choosing [facility]  for your care. Our goal is always to provide you with excellent care. Hearing back from our patients is one way we can continue to improve our services. Please take a few minutes to complete the written survey that you may receive in the mail after your visit with us. Thank you!             Your Updated Medication List - Protect others around you: Learn how to safely use, store and throw away your medicines at www.disposemymeds.org.          This list is accurate as of 6/4/18 11:06 AM.  Always use your most recent med list.                   Brand Name Dispense Instructions for use Diagnosis    docusate sodium 100 MG tablet    COLACE    60 tablet    Take 100 mg by mouth daily    Special screening for malignant neoplasms, colon       LORazepam 0.5 MG tablet    ATIVAN    30 tablet    Take 1 tablet (0.5 mg) by mouth every 4 hours as needed (Anxiety, Nausea/Vomiting or Sleep)    Adenocarcinoma of sigmoid colon (H)       ondansetron 8 MG tablet    ZOFRAN    30 tablet    Take 1 tablet (8 mg) by mouth every 8 hours as needed (Nausea/Vomiting)    Adenocarcinoma of sigmoid colon (H)       prochlorperazine 10 MG tablet    COMPAZINE    30 tablet    Take 1 tablet (10 mg) by mouth every 6 hours as needed (Nausea/Vomiting)    Adenocarcinoma of sigmoid colon (H)

## 2018-06-04 NOTE — PROGRESS NOTES
FOLLOW-UP VISIT NOTE    PATIENT NAME: Rian Malik MRN # [id number]  DATE OF VISIT: Jun 4, 2018 YOB: 1960    REFERRING PROVIDER: No referring provider defined for this encounter.    CANCER TYPE: Adenocarcinoma colon  STAGE: III pT4N1c  ECOG PS: 0    ONCOLOGY HISTORY:  57-year-old male who around February 2017r, developed intermittent cramping lower abdominal pain. Was evaluated by PCP and clinical impression was colitis. He was put on p.o. antibiotics. However symptoms did not improve. He was eventually referred to surgery and underwent imaging with CT findings suspicious of sigmoid abscess. He was admitted to the hospital for resection of sigmoid abscess 10/11/17. However intraoperatively was noted to have a large firmly adherent sigmoid colon mass. Laparoscopic surgery was converted to open sigmoidectomy with end colostomy.     Surgical pathology   B.  COLON, SIGMOID, HEMICOLECTOMY   - Poorly differentiated adenocarcinoma, with signet ring cell features   - Tumor size: 5 cm   - Tumor is present on serosal surface and microscopically perforates   serosal surface   - One surgical margin shows extensive serosal involvement by tumor. The   opposite, undesignated margin shows no evidence of malignancy   - Lymphovascular invasion is present   - Perineural invasion is not identified   - Tumor deposits are present   - Immunohistochemistry for mismatch repair proteins shows intact nuclear   expression for MLH1, MSH2, MSH6, and PMS2   - Three reactive lymph nodes, negative for malignancy (0/3)   - Pathologic staging: pT4N1c      Patient's case was discussed at the tumor board. Recommendation was to obtain new staging scans as well as to proceed with systemic chemotherapy at this point with plans for further surgery in future. Staging scans negative for distant disease.     - Started on Folfox 11/21/17    SUBJECTIVE     Patient is here today for his next cycle of chemotherapy. It is of intermittent  tingling and numbness in hands. Denies fever/chills, nausea/vomiting, abdominal pain. Ostomy working well. Denies presence of blood in stools. Appetite and energy levels have been stable      PAST MEDICAL HISTORY     Past Medical History:   Diagnosis Date     Cirrhosis (H)      Colon cancer (H) 10/11/2017    Poorly differentiated adenocarcinoma     Hepatitis C          CURRENT OUTPATIENT MEDICATIONS     Current Outpatient Prescriptions   Medication Sig Dispense Refill     docusate sodium (COLACE) 100 MG tablet Take 100 mg by mouth daily 60 tablet 1     LORazepam (ATIVAN) 0.5 MG tablet Take 1 tablet (0.5 mg) by mouth every 4 hours as needed (Anxiety, Nausea/Vomiting or Sleep) 30 tablet 2     ondansetron (ZOFRAN) 8 MG tablet Take 1 tablet (8 mg) by mouth every 8 hours as needed (Nausea/Vomiting) 30 tablet 1     prochlorperazine (COMPAZINE) 10 MG tablet Take 1 tablet (10 mg) by mouth every 6 hours as needed (Nausea/Vomiting) 30 tablet 2        ALLERGIES     Allergies   Allergen Reactions     Acetaminophen      Naproxen         REVIEW OF SYSTEMS   As above in the HPI, o/w complete 14-point ROS was negative.     PHYSICAL EXAM   B/P: 138/78, T: 98.1, P: 64, R: 18  SpO2 Readings from Last 4 Encounters:   06/04/18 98%   05/21/18 98%   05/14/18 99%   05/10/18 97%     Wt Readings from Last 3 Encounters:   06/04/18 76.2 kg (168 lb)   05/21/18 77.6 kg (171 lb)   05/14/18 77.1 kg (170 lb)     GEN: NAD  EYES:PERRLA  Mouth/ENT: Oropharynx is clear.  NECK: no  lymphadenopathy  LUNGS: clear bilaterally  CV: regular, no murmurs, rubs, or gallops  ABDOMEN: soft, non-tender, non-distended,Ostomy in place  EXT: warm, well perfused, no edema  NEURO: alert  SKIN: no rashes     LABORATORY AND IMAGING STUDIES     Lab Results   Component Value Date    WBC 4.6 06/04/2018     Lab Results   Component Value Date    RBC 4.16 06/04/2018     Lab Results   Component Value Date    HGB 13.6 06/04/2018     Lab Results   Component Value Date    HCT 38.3  06/04/2018     No components found for: MCT  Lab Results   Component Value Date    MCV 92 06/04/2018     Lab Results   Component Value Date    MCH 32.7 06/04/2018     Lab Results   Component Value Date    MCHC 35.5 06/04/2018     Lab Results   Component Value Date    RDW 13.9 06/04/2018     Lab Results   Component Value Date    PLT 64 06/04/2018     Recent Labs   Lab Test  06/04/18   0933  05/21/18   1205   NA  140  139   POTASSIUM  3.8  3.9   CHLORIDE  105  104   CO2  28  28   ANIONGAP  7  7   GLC  98  96   BUN  15  14   CR  0.75  0.88   MAICOL  8.3*  8.8        ASSESSMENT AND PLAN     57-year-old male with      - Poorly differentiated adenocarcinoma colon  [id number](tumor deposits) with margin involvement -  Stage III   CT scans negative for evidence of metastatic disease  Patient's case was discussed at the tumor board 11/13 and recommendation was to proceed with systemic chemotherapy at this point with plans for additional surgery in future if needed after completion of chemotherapy.  Started on FOLFOX q2 week regimen for 6 months duration - s/p 10 cycles so far.  He has had chemo delays due to thrombocytopenia.  Hold off on chemotherapy today given thrombocytopenia.     - Hepatitis C  Completed antiviral treatment per GI  HCV viral load undetectable    - Thrombocytopenia  Secondary to chemotherapy Vs Hep C   Continue Oxaliplatin dose reduced at 65 mg/m2  Continue to omit 5-FU bolus from the remaining cycles    - Transaminitis  Mild  Possibly from chemotherapy  Monitor    Return to infusion next visit for labs, cycle 11 chemo  RTC in 4 weeks with anshu for ov, labs, Cycle 12. We'll send him back to surgical oncology for ostomy reversal on completion of chemotherapy.    The patient is ready to learn, no apparent learning barriers were identified, Diagnosis and treatment plans were explained to the patient. The patient expressed understanding of the content. The patient questions were answered to his  satisfaction.    Chart documentation with Dragon Voice recognition Software. Although reviewed after completion, some words and grammatical errors may remain.  Padilla Last MD  Attending Physician   Hematology/Medical Oncology

## 2018-06-04 NOTE — MR AVS SNAPSHOT
After Visit Summary   6/4/2018    Rian Malik    MRN: [id number]           Patient Information     Date Of Birth          1960        Visit Information        Provider Department      6/4/2018 10:15 AM Padilla Last MD [facility]        Today's Diagnoses     Adenocarcinoma of sigmoid colon (H)    -  1       Follow-ups after your visit        Your next 10 appointments already scheduled     Jun 25, 2018  8:00 AM CDT   Return Visit with NURSE ONLY CANCER CENTER   [facility] ([facility])    [address] [place] [address] [phone]            Jun 25, 2018  8:45 AM CDT   Return Visit with Padilla Last MD   [facility] ([facility])    [address] [place] [id number] [phone]            Jun 25, 2018  9:30 AM CDT   Level 3 with BAY 1 INFUSION   [facility] ([facility])    [address] [place] [id number] [phone]            Nov 13, 2018  9:30 AM CST   Lab with  LAB   [facility] ([facility])    [address]  1st Floor  [facility] [address] [phone]            Nov 13, 2018 10:30 AM CST   (Arrive by 10:15 AM)   Return General Liver with Yaakov Burroughs MD   [facility] ([facility])    [address]  [address]  [facility] [address] [phone]              Who to contact     If you have questions or need follow up information about today's clinic visit or your schedule please contact [facility] directly at [phone].  Normal or non-critical lab and imaging results will be communicated to you by MyChart, letter or phone within 4 business days after the clinic has received the results. If you do not hear from us within 7 days, please contact the clinic through MyChart or phone. If you have a critical or abnormal  "lab result, we will notify you by phone as soon as possible.  Submit refill requests through [facility] or call your pharmacy and they will forward the refill request to us. Please allow 3 business days for your refill to be completed.          Additional Information About Your Visit        [facility] [employer] Information     [facility] gives you secure access to your electronic health record. If you see a primary care provider, you can also send messages to your care team and make appointments. If you have questions, please call your primary care clinic.  If you do not have a primary care provider, please call [phone] and they will assist you.      [facility] is an electronic gateway that provides easy, online access to your medical records. With [facility], you can request a clinic appointment, read your test results, renew a prescription or communicate with your care team.     To access your existing account, please contact your [facility] or call [phone] for assistance.        Care EveryWhere ID     This is your Care EveryWhere ID. This could be used by other organizations to access your [place] medical records  [id number]        Your Vitals Were     Pulse Temperature Height Pulse Oximetry BMI (Body Mass Index)       62 98  F (36.7  C) 1.727 m (5' 8\") 98% 25.54 kg/m2        Blood Pressure from Last 3 Encounters:   No data found for BP    Weight from Last 3 Encounters:   No data found for Wt              Today, you had the following     No orders found for display       Primary Care Provider Office Phone # Fax #    Moreno Harris [phone]       [address]  [facility] [address]        Equal Access to Services     GUSTAVO FLANAGAN : Hadii cameron sapp Sosherri, waaxda luqadaha, qaybta kaalmada jose, alisa mg. So [facility] [phone].    ATENCIÓN: Si habla español, tiene a martino disposición servicios gratuitos de asistencia " lingüística. Alfredo al [phone].    We comply with applicable federal civil rights laws and [place] laws. We do not discriminate on the basis of race, color, national origin, age, disability, sex, sexual orientation, or gender identity.            Thank you!     Thank you for choosing [facility]  for your care. Our goal is always to provide you with excellent care. Hearing back from our patients is one way we can continue to improve our services. Please take a few minutes to complete the written survey that you may receive in the mail after your visit with us. Thank you!             Your Updated Medication List - Protect others around you: Learn how to safely use, store and throw away your medicines at www.disposemymeds.org.          This list is accurate as of 6/4/18 11:59 PM.  Always use your most recent med list.                   Brand Name Dispense Instructions for use Diagnosis    docusate sodium 100 MG tablet    COLACE    60 tablet    Take 100 mg by mouth daily    Special screening for malignant neoplasms, colon       LORazepam 0.5 MG tablet    ATIVAN    30 tablet    Take 1 tablet (0.5 mg) by mouth every 4 hours as needed (Anxiety, Nausea/Vomiting or Sleep)    Adenocarcinoma of sigmoid colon (H)       ondansetron 8 MG tablet    ZOFRAN    30 tablet    Take 1 tablet (8 mg) by mouth every 8 hours as needed (Nausea/Vomiting)    Adenocarcinoma of sigmoid colon (H)       prochlorperazine 10 MG tablet    COMPAZINE    30 tablet    Take 1 tablet (10 mg) by mouth every 6 hours as needed (Nausea/Vomiting)    Adenocarcinoma of sigmoid colon (H)

## 2018-06-07 RX ORDER — SODIUM CHLORIDE 9 MG/ML
1000 INJECTION, SOLUTION INTRAVENOUS CONTINUOUS PRN
Status: CANCELLED
Start: 2018-06-11

## 2018-06-07 RX ORDER — METHYLPREDNISOLONE SODIUM SUCCINATE 125 MG/2ML
125 INJECTION, POWDER, LYOPHILIZED, FOR SOLUTION INTRAMUSCULAR; INTRAVENOUS
Status: CANCELLED
Start: 2018-06-11

## 2018-06-07 RX ORDER — MEPERIDINE HYDROCHLORIDE 25 MG/ML
25 INJECTION INTRAMUSCULAR; INTRAVENOUS; SUBCUTANEOUS EVERY 30 MIN PRN
Status: CANCELLED | OUTPATIENT
Start: 2018-06-11

## 2018-06-07 RX ORDER — EPINEPHRINE 1 MG/ML
0.3 INJECTION, SOLUTION, CONCENTRATE INTRAVENOUS EVERY 5 MIN PRN
Status: CANCELLED | OUTPATIENT
Start: 2018-06-11

## 2018-06-07 RX ORDER — ALBUTEROL SULFATE 90 UG/1
1-2 AEROSOL, METERED RESPIRATORY (INHALATION)
Status: CANCELLED
Start: 2018-06-11

## 2018-06-07 RX ORDER — EPINEPHRINE 0.3 MG/.3ML
0.3 INJECTION SUBCUTANEOUS EVERY 5 MIN PRN
Status: CANCELLED | OUTPATIENT
Start: 2018-06-11

## 2018-06-07 RX ORDER — LORAZEPAM 2 MG/ML
0.5 INJECTION INTRAMUSCULAR EVERY 4 HOURS PRN
Status: CANCELLED
Start: 2018-06-11

## 2018-06-07 RX ORDER — ALBUTEROL SULFATE 0.83 MG/ML
2.5 SOLUTION RESPIRATORY (INHALATION)
Status: CANCELLED | OUTPATIENT
Start: 2018-06-11

## 2018-06-07 RX ORDER — DIPHENHYDRAMINE HYDROCHLORIDE 50 MG/ML
50 INJECTION INTRAMUSCULAR; INTRAVENOUS
Status: CANCELLED
Start: 2018-06-11

## 2018-06-11 ENCOUNTER — ONCOLOGY VISIT (OUTPATIENT)
Dept: ONCOLOGY | Facility: CLINIC | Age: 58
End: 2018-06-11
Payer: COMMERCIAL

## 2018-06-11 ENCOUNTER — HOME INFUSION (PRE-WILLOW HOME INFUSION) (OUTPATIENT)
Dept: PHARMACY | Facility: CLINIC | Age: 58
End: 2018-06-11

## 2018-06-11 ENCOUNTER — INFUSION THERAPY VISIT (OUTPATIENT)
Dept: INFUSION THERAPY | Facility: CLINIC | Age: 58
End: 2018-06-11
Payer: COMMERCIAL

## 2018-06-11 VITALS
BODY MASS INDEX: 25.44 KG/M2 | WEIGHT: 167.3 LBS | HEART RATE: 58 BPM | OXYGEN SATURATION: 97 % | TEMPERATURE: 98 F | DIASTOLIC BLOOD PRESSURE: 74 MMHG | SYSTOLIC BLOOD PRESSURE: 127 MMHG

## 2018-06-11 DIAGNOSIS — C18.7 ADENOCARCINOMA OF SIGMOID COLON (H): Primary | ICD-10-CM

## 2018-06-11 LAB
ALBUMIN SERPL-MCNC: 3.2 G/DL (ref 3.4–5)
ALP SERPL-CCNC: 126 U/L (ref 40–150)
ALT SERPL W P-5'-P-CCNC: 74 U/L (ref 0–70)
ANION GAP SERPL CALCULATED.3IONS-SCNC: 6 MMOL/L (ref 3–14)
AST SERPL W P-5'-P-CCNC: 61 U/L (ref 0–45)
BASOPHILS # BLD AUTO: 0 10E9/L (ref 0–0.2)
BASOPHILS NFR BLD AUTO: 0.9 %
BILIRUB SERPL-MCNC: 1 MG/DL (ref 0.2–1.3)
BUN SERPL-MCNC: 15 MG/DL (ref 7–30)
CALCIUM SERPL-MCNC: 8.7 MG/DL (ref 8.5–10.1)
CHLORIDE SERPL-SCNC: 105 MMOL/L (ref 94–109)
CO2 SERPL-SCNC: 28 MMOL/L (ref 20–32)
CREAT SERPL-MCNC: 0.76 MG/DL (ref 0.66–1.25)
DIFFERENTIAL METHOD BLD: ABNORMAL
EOSINOPHIL # BLD AUTO: 0.1 10E9/L (ref 0–0.7)
EOSINOPHIL NFR BLD AUTO: 2.5 %
ERYTHROCYTE [DISTWIDTH] IN BLOOD BY AUTOMATED COUNT: 13.9 % (ref 10–15)
GFR SERPL CREATININE-BSD FRML MDRD: >90 ML/MIN/1.7M2
GLUCOSE SERPL-MCNC: 124 MG/DL (ref 70–99)
HCT VFR BLD AUTO: 39.6 % (ref 40–53)
HGB BLD-MCNC: 14 G/DL (ref 13.3–17.7)
IMM GRANULOCYTES # BLD: 0 10E9/L (ref 0–0.4)
IMM GRANULOCYTES NFR BLD: 0.3 %
LYMPHOCYTES # BLD AUTO: 1.3 10E9/L (ref 0.8–5.3)
LYMPHOCYTES NFR BLD AUTO: 39 %
MCH RBC QN AUTO: 32.7 PG (ref 26.5–33)
MCHC RBC AUTO-ENTMCNC: 35.4 G/DL (ref 31.5–36.5)
MCV RBC AUTO: 93 FL (ref 78–100)
MONOCYTES # BLD AUTO: 0.4 10E9/L (ref 0–1.3)
MONOCYTES NFR BLD AUTO: 13.5 %
NEUTROPHILS # BLD AUTO: 1.4 10E9/L (ref 1.6–8.3)
NEUTROPHILS NFR BLD AUTO: 43.8 %
PLATELET # BLD AUTO: 68 10E9/L (ref 150–450)
POTASSIUM SERPL-SCNC: 3.9 MMOL/L (ref 3.4–5.3)
PROT SERPL-MCNC: 7 G/DL (ref 6.8–8.8)
RBC # BLD AUTO: 4.28 10E12/L (ref 4.4–5.9)
SODIUM SERPL-SCNC: 139 MMOL/L (ref 133–144)
WBC # BLD AUTO: 3.3 10E9/L (ref 4–11)

## 2018-06-11 PROCEDURE — 96413 CHEMO IV INFUSION 1 HR: CPT | Performed by: INTERNAL MEDICINE

## 2018-06-11 PROCEDURE — 99207 ZZC NO CHARGE NURSE ONLY: CPT

## 2018-06-11 PROCEDURE — 96416 CHEMO PROLONG INFUSE W/PUMP: CPT | Performed by: INTERNAL MEDICINE

## 2018-06-11 PROCEDURE — 85025 COMPLETE CBC W/AUTO DIFF WBC: CPT | Performed by: INTERNAL MEDICINE

## 2018-06-11 PROCEDURE — 96367 TX/PROPH/DG ADDL SEQ IV INF: CPT | Performed by: INTERNAL MEDICINE

## 2018-06-11 PROCEDURE — 96415 CHEMO IV INFUSION ADDL HR: CPT | Performed by: INTERNAL MEDICINE

## 2018-06-11 PROCEDURE — 80053 COMPREHEN METABOLIC PANEL: CPT | Performed by: INTERNAL MEDICINE

## 2018-06-11 RX ORDER — HEPARIN SODIUM (PORCINE) LOCK FLUSH IV SOLN 100 UNIT/ML 100 UNIT/ML
5 SOLUTION INTRAVENOUS
Status: DISCONTINUED | OUTPATIENT
Start: 2018-06-11 | End: 2018-06-11 | Stop reason: HOSPADM

## 2018-06-11 RX ADMIN — HEPARIN SODIUM (PORCINE) LOCK FLUSH IV SOLN 100 UNIT/ML 5 ML: 100 SOLUTION at 07:36

## 2018-06-11 ASSESSMENT — PAIN SCALES - GENERAL: PAINLEVEL: NO PAIN (0)

## 2018-06-11 NOTE — PROGRESS NOTES
"Infusion Nursing Note:  Rian Malik presents today for C11D1 Folfox.    Patient seen by provider today: No   present during visit today: Not Applicable.    Note: Pt c/o some diarrhea, takes immodium 2 x week prn, see flow sheet for assessment.    Intravenous Access:  Implanted Port.    Treatment Conditions:  Lab Results   Component Value Date    HGB 14.0 06/11/2018     Lab Results   Component Value Date    WBC 3.3 06/11/2018      Lab Results   Component Value Date    ANEU 1.4 06/11/2018     Lab Results   Component Value Date    PLT 68 06/11/2018      Lab Results   Component Value Date     06/11/2018                   Lab Results   Component Value Date    POTASSIUM 3.9 06/11/2018           Lab Results   Component Value Date    MAG 2.0 08/04/2017            Lab Results   Component Value Date    CR 0.76 06/11/2018                   Lab Results   Component Value Date    MAICOL 8.7 06/11/2018                Lab Results   Component Value Date    BILITOTAL 1.0 06/11/2018           Lab Results   Component Value Date    ALBUMIN 3.2 06/11/2018                    Lab Results   Component Value Date    ALT 74 06/11/2018           Lab Results   Component Value Date    AST 61 06/11/2018       Results reviewed, labs MET treatment parameters, ok to proceed with treatment.    Prior to discharge: Port is secured in place with tegaderm and flushed with 10cc NS with positive blood return noted.  Continuous home infusion Dosi-Fuser pump connected.    All connectors secured in place and clamps taped open.    Pump started, \"running\" noted on display (CADD): Not Applicable.  Patient instructed to call our clinic or [facility] with any questions or concerns at home.  Patient verbalized understanding.    Patient set up for pump disconnect at home with [facility] on 6/13/18 at 9:30am.  Spoke with Felipa CHAMPION at [facility].        Post Infusion Assessment:  Patient tolerated infusion without incident.  Blood " return noted pre and post infusion.  Site patent and intact, free from redness, edema or discomfort.    Discharge Plan:   Patient discharged in stable condition accompanied by: self.  Departure Mode: Ambulatory.  Pt will RTC 7/2/18 for C12 Folfox.    Kolton Ag RN

## 2018-06-11 NOTE — MR AVS SNAPSHOT
After Visit Summary   6/11/2018    Rian Malik    MRN: [id number]           Patient Information     Date Of Birth          1960        Visit Information        Provider Department      6/11/2018 8:00 AM [facility]        Today's Diagnoses     Adenocarcinoma of sigmoid colon (H)    -  1       Follow-ups after your visit        Your next 10 appointments already scheduled     Jun 25, 2018  8:00 AM CDT   Return Visit with [facility] ONLY CANCER CENTER   [facility]    [address] [place] [id number] [phone]            Jun 25, 2018  8:45 AM CDT   Return Visit with Padilla Last MD   [facility] ([facility])    [address] [place] [address] [id number] [phone]            Jun 25, 2018  9:30 AM CDT   Level 3 with [place] [facility] ([facility])    [address] [place] [address] [id number] [phone]            Nov 13, 2018  9:30 AM CST   Lab with [facility]   [facility]    [address]  1st Floor  [facility] [address] [phone]            Nov 13, 2018 10:30 AM CST   (Arrive by 10:15 AM)   Return General Liver with Yaakov Burroughs MD   [facility] Hepat[facility] ([place] [facility])    [address]  [address]  [facility] [address] [phone]              Who to contact     If you have questions or need follow up information about today's clinic visit or your schedule please contact [facility] directly at [phone].  Normal or non-critical lab and imaging results will be communicated to you by MyChart, letter or phone within 4 business days after the clinic has received the results. If you do not hear from us within 7 days, please contact the clinic through MyChart or phone. If you have a critical or abnormal  lab result, we will notify you by phone as soon as possible.  Submit refill requests through [employer] or call your pharmacy and they will forward the refill request to us. Please allow 3 business days for your refill to be completed.          Additional Information About Your Visit        [employer] Information     [employer] gives you secure access to your electronic health record. If you see a primary care provider, you can also send messages to your care team and make appointments. If you have questions, please call your primary care clinic.  If you do not have a primary care provider, please call [phone] and they will assist you.      [employer] is an electronic gateway that provides easy, online access to your medical records. With [employer], you can request a clinic appointment, read your test results, renew a prescription or communicate with your care team.     To access your existing account, please contact your [facility] or call [phone] for assistance.        Care EveryWhere ID     This is your Care EveryWhere ID. This could be used by other organizations to access your [place] medical records  [id number]        Your Vitals Were     Pulse Temperature Pulse Oximetry BMI (Body Mass Index)          58 98  F (36.7  C) (Oral) 97% 25.44 kg/m2         Blood Pressure from Last 3 Encounters:   06/11/18 127/74   06/04/18 128/78   05/21/18 128/79    Weight from Last 3 Encounters:   06/11/18 75.9 kg (167 lb 4.8 oz)   06/04/18 76.2 kg (168 lb)   05/21/18 77.6 kg (171 lb)              Today, you had the following     No orders found for display       Primary Care Provider Office Phone # Fax #    Moreno Harris [phone]       [address]  [facility] [address]        Equal Access to Services     KHANH FLANAGAN : Rajesh Meraz, suad peck, iliana garcia, alisa mg. So [facility]  [phone].    ATENCIÓN: Si anival rahman, tiene a martino disposición servicios gratuitos de asistencia lingüística. Alfredo rocha [phone].    We comply with applicable federal civil rights laws and [place] laws. We do not discriminate on the basis of race, color, national origin, age, disability, sex, sexual orientation, or gender identity.            Thank you!     Thank you for choosing [facility]  for your care. Our goal is always to provide you with excellent care. Hearing back from our patients is one way we can continue to improve our services. Please take a few minutes to complete the written survey that you may receive in the mail after your visit with us. Thank you!             Your Updated Medication List - Protect others around you: Learn how to safely use, store and throw away your medicines at www.disposemymeds.org.          This list is accurate as of 6/11/18 12:44 PM.  Always use your most recent med list.                   Brand Name Dispense Instructions for use Diagnosis    docusate sodium 100 MG tablet    COLACE    60 tablet    Take 100 mg by mouth daily    Special screening for malignant neoplasms, colon       LORazepam 0.5 MG tablet    ATIVAN    30 tablet    Take 1 tablet (0.5 mg) by mouth every 4 hours as needed (Anxiety, Nausea/Vomiting or Sleep)    Adenocarcinoma of sigmoid colon (H)       ondansetron 8 MG tablet    ZOFRAN    30 tablet    Take 1 tablet (8 mg) by mouth every 8 hours as needed (Nausea/Vomiting)    Adenocarcinoma of sigmoid colon (H)       prochlorperazine 10 MG tablet    COMPAZINE    30 tablet    Take 1 tablet (10 mg) by mouth every 6 hours as needed (Nausea/Vomiting)    Adenocarcinoma of sigmoid colon (H)

## 2018-06-11 NOTE — MR AVS SNAPSHOT
After Visit Summary   6/11/2018    Rian Malik    MRN: [id number]           Patient Information     Date Of Birth          1960        Visit Information        Provider Department      6/11/2018 7:30 AM [facility]UR[facility] ONLY CANCER CENTER [facility]        Today's Diagnoses     Adenocarcinoma of sigmoid colon (H)    -  1       Follow-ups after your visit        Your next 10 appointments already scheduled     Jun 11, 2018  8:00 AM CDT   Level 3 with [facility] 7 INFUSION   [facility]    [address] [facility] [address] [phone]            Jun 25, 2018  8:00 AM CDT   Return Visit with [facility]SE ONLY CANCER CENTER   [facility]    [address] [facility] [phone] [phone]            Jun 25, 2018  8:45 AM CDT   Return Visit with Padilla Last MD   [facility] ([facility])    [address] [facility] [phone] [phone]            Jun 25, 2018  9:30 AM CDT   Level 3 with BAY 1 INFUSION   [facility] ([facility])    [address] [facility] [phone] [phone]            Nov 13, 2018  9:30 AM CST   Lab with  LAB   [facility] ([facility])    [address]  1st Floor  [facility] [address] [phone]            Nov 13, 2018 10:30 AM CST   (Arrive by 10:15 AM)   Return General Liver with Yaakov Burroughs MD   [facility] ([facility])    [address]  [address]  [facility] [address] [phone]              Who to contact     If you have questions or need follow up information about today's clinic visit or your schedule please contact [facility] directly at [phone].  Normal or non-critical lab and imaging results will be communicated to you by MyChart, letter or  phone within 4 business days after the clinic has received the results. If you do not hear from us within 7 days, please contact the clinic through [employer] or phone. If you have a critical or abnormal lab result, we will notify you by phone as soon as possible.  Submit refill requests through [employer] or call your pharmacy and they will forward the refill request to us. Please allow 3 business days for your refill to be completed.          Additional Information About Your Visit        [employer] Information     [employer] gives you secure access to your electronic health record. If you see a primary care provider, you can also send messages to your care team and make appointments. If you have questions, please call your primary care clinic.  If you do not have a primary care provider, please call [phone] and they will assist you.      [employer] is an electronic gateway that provides easy, online access to your medical records. With [employer], you can request a clinic appointment, read your test results, renew a prescription or communicate with your care team.     To access your existing account, please contact your [facility] or call [phone] for assistance.        Care EveryWhere ID     This is your Care EveryWhere ID. This could be used by other organizations to access your [facility] medical records  [id number]         Blood Pressure from Last 3 Encounters:   06/04/18 128/78   05/21/18 128/79   05/14/18 124/75    Weight from Last 3 Encounters:   06/04/18 76.2 kg (168 lb)   05/21/18 77.6 kg (171 lb)   05/14/18 77.1 kg (170 lb)              We Performed the Following     CBC with platelets differential     Comprehensive metabolic panel        Primary Care Provider Office Phone # Fax #    Moreno Harris [phone]       [address]  [facility] [address]        Equal Access to Services     KHANH FLANAGAN : suad Briseno,  carmentiffjeff calinlucianhernán morrisonpatriciosharmintamie gabriellakatie boydaatheodore ah. So [facility] [phone].    ATENCIÓN: Si anival rahman, tiene a martino disposición servicios gratuitos de asistencia lingüística. Alfredo al [phone].    We comply with applicable federal civil rights laws and [place] laws. We do not discriminate on the basis of race, color, national origin, age, disability, sex, sexual orientation, or gender identity.            Thank you!     Thank you for choosing [facility]  for your care. Our goal is always to provide you with excellent care. Hearing back from our patients is one way we can continue to improve our services. Please take a few minutes to complete the written survey that you may receive in the mail after your visit with us. Thank you!             Your Updated Medication List - Protect others around you: Learn how to safely use, store and throw away your medicines at www.disposemymeds.org.          This list is accurate as of 6/11/18  7:46 AM.  Always use your most recent med list.                   Brand Name Dispense Instructions for use Diagnosis    docusate sodium 100 MG tablet    COLACE    60 tablet    Take 100 mg by mouth daily    Special screening for malignant neoplasms, colon       LORazepam 0.5 MG tablet    ATIVAN    30 tablet    Take 1 tablet (0.5 mg) by mouth every 4 hours as needed (Anxiety, Nausea/Vomiting or Sleep)    Adenocarcinoma of sigmoid colon (H)       ondansetron 8 MG tablet    ZOFRAN    30 tablet    Take 1 tablet (8 mg) by mouth every 8 hours as needed (Nausea/Vomiting)    Adenocarcinoma of sigmoid colon (H)       prochlorperazine 10 MG tablet    COMPAZINE    30 tablet    Take 1 tablet (10 mg) by mouth every 6 hours as needed (Nausea/Vomiting)    Adenocarcinoma of sigmoid colon (H)

## 2018-06-11 NOTE — PROGRESS NOTES
Power port needle left accessed for treatment. Tolerated lab draw without complaint. [place] drawn-Red/Green/Purple tubes. Double signed by patient and RN. See documentation flowsheet. Nuha De Los Santos, RN, BSN, OCN

## 2018-06-12 NOTE — PROGRESS NOTES
This is a recent snapshot of the patient's [facility] medical record.  For current drug dose and complete information and questions, call [phone] or In [facility] pool, fv home infusion ([id number])  CSN Number:  [id number]

## 2018-06-13 ENCOUNTER — HOME INFUSION (PRE-WILLOW HOME INFUSION) (OUTPATIENT)
Dept: PHARMACY | Facility: CLINIC | Age: 58
End: 2018-06-13

## 2018-06-14 NOTE — PROGRESS NOTES
This is a recent snapshot of the patient's [facility] medical record.  For current drug dose and complete information and questions, call [phone] or In [facility] pool, fv home infusion ([id number])  CSN Number:  [id number]

## 2018-06-18 DIAGNOSIS — C18.7 ADENOCARCINOMA OF SIGMOID COLON (H): ICD-10-CM

## 2018-06-18 LAB
ERYTHROCYTE [DISTWIDTH] IN BLOOD BY AUTOMATED COUNT: 13.9 % (ref 10–15)
HCT VFR BLD AUTO: 38.7 % (ref 40–53)
HGB BLD-MCNC: 13.9 G/DL (ref 13.3–17.7)
MCH RBC QN AUTO: 32.9 PG (ref 26.5–33)
MCHC RBC AUTO-ENTMCNC: 35.9 G/DL (ref 31.5–36.5)
MCV RBC AUTO: 92 FL (ref 78–100)
PLATELET # BLD AUTO: 59 10E9/L (ref 150–450)
RBC # BLD AUTO: 4.22 10E12/L (ref 4.4–5.9)
WBC # BLD AUTO: 5.5 10E9/L (ref 4–11)

## 2018-06-18 PROCEDURE — 85027 COMPLETE CBC AUTOMATED: CPT | Performed by: INTERNAL MEDICINE

## 2018-06-18 PROCEDURE — 36415 COLL VENOUS BLD VENIPUNCTURE: CPT | Performed by: INTERNAL MEDICINE

## 2018-06-18 RX ORDER — LORAZEPAM 0.5 MG/1
0.5 TABLET ORAL EVERY 4 HOURS PRN
Qty: 30 TABLET | Refills: 2 | Status: SHIPPED | OUTPATIENT
Start: 2018-06-18 | End: 2018-08-27

## 2018-07-02 ENCOUNTER — ONCOLOGY VISIT (OUTPATIENT)
Dept: ONCOLOGY | Facility: CLINIC | Age: 58
End: 2018-07-02
Payer: COMMERCIAL

## 2018-07-02 ENCOUNTER — INFUSION THERAPY VISIT (OUTPATIENT)
Dept: INFUSION THERAPY | Facility: CLINIC | Age: 58
End: 2018-07-02
Payer: COMMERCIAL

## 2018-07-02 ENCOUNTER — HOME INFUSION (PRE-WILLOW HOME INFUSION) (OUTPATIENT)
Dept: PHARMACY | Facility: CLINIC | Age: 58
End: 2018-07-02

## 2018-07-02 VITALS
SYSTOLIC BLOOD PRESSURE: 115 MMHG | DIASTOLIC BLOOD PRESSURE: 74 MMHG | OXYGEN SATURATION: 97 % | HEIGHT: 68 IN | HEART RATE: 67 BPM | TEMPERATURE: 98.7 F | BODY MASS INDEX: 25.31 KG/M2 | RESPIRATION RATE: 16 BRPM | WEIGHT: 167 LBS

## 2018-07-02 DIAGNOSIS — C18.7 ADENOCARCINOMA OF SIGMOID COLON (H): Primary | ICD-10-CM

## 2018-07-02 LAB
ALBUMIN SERPL-MCNC: 3.3 G/DL (ref 3.4–5)
ALP SERPL-CCNC: 130 U/L (ref 40–150)
ALT SERPL W P-5'-P-CCNC: 74 U/L (ref 0–70)
ANION GAP SERPL CALCULATED.3IONS-SCNC: 6 MMOL/L (ref 3–14)
AST SERPL W P-5'-P-CCNC: 63 U/L (ref 0–45)
BASOPHILS # BLD AUTO: 0 10E9/L (ref 0–0.2)
BASOPHILS NFR BLD AUTO: 1.2 %
BILIRUB SERPL-MCNC: 0.8 MG/DL (ref 0.2–1.3)
BUN SERPL-MCNC: 15 MG/DL (ref 7–30)
CALCIUM SERPL-MCNC: 8.5 MG/DL (ref 8.5–10.1)
CHLORIDE SERPL-SCNC: 107 MMOL/L (ref 94–109)
CO2 SERPL-SCNC: 29 MMOL/L (ref 20–32)
CREAT SERPL-MCNC: 0.84 MG/DL (ref 0.66–1.25)
DIFFERENTIAL METHOD BLD: ABNORMAL
EOSINOPHIL # BLD AUTO: 0.1 10E9/L (ref 0–0.7)
EOSINOPHIL NFR BLD AUTO: 2.1 %
ERYTHROCYTE [DISTWIDTH] IN BLOOD BY AUTOMATED COUNT: 14.1 % (ref 10–15)
GFR SERPL CREATININE-BSD FRML MDRD: >90 ML/MIN/1.7M2
GLUCOSE SERPL-MCNC: 116 MG/DL (ref 70–99)
HCT VFR BLD AUTO: 40.1 % (ref 40–53)
HGB BLD-MCNC: 14.2 G/DL (ref 13.3–17.7)
IMM GRANULOCYTES # BLD: 0 10E9/L (ref 0–0.4)
IMM GRANULOCYTES NFR BLD: 0.6 %
LYMPHOCYTES # BLD AUTO: 1.2 10E9/L (ref 0.8–5.3)
LYMPHOCYTES NFR BLD AUTO: 37 %
MCH RBC QN AUTO: 32.9 PG (ref 26.5–33)
MCHC RBC AUTO-ENTMCNC: 35.4 G/DL (ref 31.5–36.5)
MCV RBC AUTO: 93 FL (ref 78–100)
MONOCYTES # BLD AUTO: 0.5 10E9/L (ref 0–1.3)
MONOCYTES NFR BLD AUTO: 14 %
NEUTROPHILS # BLD AUTO: 1.5 10E9/L (ref 1.6–8.3)
NEUTROPHILS NFR BLD AUTO: 45.1 %
PLATELET # BLD AUTO: 73 10E9/L (ref 150–450)
POTASSIUM SERPL-SCNC: 4 MMOL/L (ref 3.4–5.3)
PROT SERPL-MCNC: 7 G/DL (ref 6.8–8.8)
RBC # BLD AUTO: 4.32 10E12/L (ref 4.4–5.9)
SODIUM SERPL-SCNC: 142 MMOL/L (ref 133–144)
WBC # BLD AUTO: 3.4 10E9/L (ref 4–11)

## 2018-07-02 PROCEDURE — 96413 CHEMO IV INFUSION 1 HR: CPT | Performed by: INTERNAL MEDICINE

## 2018-07-02 PROCEDURE — 96367 TX/PROPH/DG ADDL SEQ IV INF: CPT | Performed by: INTERNAL MEDICINE

## 2018-07-02 PROCEDURE — 80053 COMPREHEN METABOLIC PANEL: CPT | Performed by: INTERNAL MEDICINE

## 2018-07-02 PROCEDURE — 99207 ZZC NO CHARGE LOS: CPT

## 2018-07-02 PROCEDURE — 85025 COMPLETE CBC W/AUTO DIFF WBC: CPT | Performed by: INTERNAL MEDICINE

## 2018-07-02 PROCEDURE — 96415 CHEMO IV INFUSION ADDL HR: CPT | Performed by: INTERNAL MEDICINE

## 2018-07-02 PROCEDURE — 99207 ZZC NO CHARGE NURSE ONLY: CPT

## 2018-07-02 PROCEDURE — 96416 CHEMO PROLONG INFUSE W/PUMP: CPT | Performed by: INTERNAL MEDICINE

## 2018-07-02 PROCEDURE — 99214 OFFICE O/P EST MOD 30 MIN: CPT | Mod: 25 | Performed by: INTERNAL MEDICINE

## 2018-07-02 RX ORDER — LORAZEPAM 2 MG/ML
0.5 INJECTION INTRAMUSCULAR EVERY 4 HOURS PRN
Status: CANCELLED
Start: 2018-07-02

## 2018-07-02 RX ORDER — MEPERIDINE HYDROCHLORIDE 25 MG/ML
25 INJECTION INTRAMUSCULAR; INTRAVENOUS; SUBCUTANEOUS EVERY 30 MIN PRN
Status: CANCELLED | OUTPATIENT
Start: 2018-07-02

## 2018-07-02 RX ORDER — EPINEPHRINE 0.3 MG/.3ML
0.3 INJECTION SUBCUTANEOUS EVERY 5 MIN PRN
Status: CANCELLED | OUTPATIENT
Start: 2018-07-02

## 2018-07-02 RX ORDER — ALBUTEROL SULFATE 90 UG/1
1-2 AEROSOL, METERED RESPIRATORY (INHALATION)
Status: CANCELLED
Start: 2018-07-02

## 2018-07-02 RX ORDER — DIPHENHYDRAMINE HYDROCHLORIDE 50 MG/ML
50 INJECTION INTRAMUSCULAR; INTRAVENOUS
Status: CANCELLED
Start: 2018-07-02

## 2018-07-02 RX ORDER — SODIUM CHLORIDE 9 MG/ML
1000 INJECTION, SOLUTION INTRAVENOUS CONTINUOUS PRN
Status: CANCELLED
Start: 2018-07-02

## 2018-07-02 RX ORDER — HEPARIN SODIUM (PORCINE) LOCK FLUSH IV SOLN 100 UNIT/ML 100 UNIT/ML
5 SOLUTION INTRAVENOUS
Status: DISCONTINUED | OUTPATIENT
Start: 2018-07-02 | End: 2018-07-02 | Stop reason: HOSPADM

## 2018-07-02 RX ORDER — ALBUTEROL SULFATE 0.83 MG/ML
2.5 SOLUTION RESPIRATORY (INHALATION)
Status: CANCELLED | OUTPATIENT
Start: 2018-07-02

## 2018-07-02 RX ORDER — EPINEPHRINE 1 MG/ML
0.3 INJECTION, SOLUTION INTRAMUSCULAR; SUBCUTANEOUS EVERY 5 MIN PRN
Status: CANCELLED | OUTPATIENT
Start: 2018-07-02

## 2018-07-02 RX ORDER — METHYLPREDNISOLONE SODIUM SUCCINATE 125 MG/2ML
125 INJECTION, POWDER, LYOPHILIZED, FOR SOLUTION INTRAMUSCULAR; INTRAVENOUS
Status: CANCELLED
Start: 2018-07-02

## 2018-07-02 RX ADMIN — HEPARIN SODIUM (PORCINE) LOCK FLUSH IV SOLN 100 UNIT/ML 5 ML: 100 SOLUTION at 08:27

## 2018-07-02 ASSESSMENT — PAIN SCALES - GENERAL: PAINLEVEL: NO PAIN (0)

## 2018-07-02 NOTE — MR AVS SNAPSHOT
After Visit Summary   7/2/2018    Rian Malik    MRN: [id number]           Patient Information     Date Of Birth          1960        Visit Information        Provider Department      7/2/2018 10:00 AM [facility] 1 INFUS[facility]        Today's Diagnoses     Adenocarcinoma of sigmoid colon (H)    -  1       Follow-ups after your visit        Your next 10 appointments already scheduled     Jul 30, 2018 10:30 AM CDT   Return Visit with [facility]   [facility]    [address] [place] [address] [id number] [phone]            Jul 30, 2018 11:15 AM CDT   Return Visit with Padilla Last MD   [facility]    [address] [place] [address] [phone] [phone]            Nov 13, 2018  9:30 AM CST   Lab with  LAB   [facility] ([facility])    [address]  1st Floor  [facility] [address] [phone]            Nov 13, 2018 10:30 AM CST   (Arrive by 10:15 AM)   Return General Liver with Yaakov Burroughs MD   [facility] Hepatology ([facility])    [address]  [address]  [facility] [address] [phone]              Future tests that were ordered for you today     Open Future Orders        Priority Expected Expires Ordered    CBC with platelets differential Routine 7/30/2018 7/2/2019 7/2/2018    Comprehensive metabolic panel Routine 7/30/2018 7/2/2019 7/2/2018    CEA Routine 7/30/2018 7/2/2019 7/2/2018    CT Abdomen Pelvis w Contrast Routine  7/2/2019 7/2/2018            Who to contact     If you have questions or need follow up information about today's clinic visit or your schedule please contact [facility] directly at [phone].  Normal or non-critical lab and imaging results will be communicated to you by MyChart, letter or phone within 4 business days after  the clinic has received the results. If you do not hear from us within 7 days, please contact the clinic through [employer] or phone. If you have a critical or abnormal lab result, we will notify you by phone as soon as possible.  Submit refill requests through [employer] or call your pharmacy and they will forward the refill request to us. Please allow 3 business days for your refill to be completed.          Additional Information About Your Visit        [employer] Information     [employer] gives you secure access to your electronic health record. If you see a primary care provider, you can also send messages to your care team and make appointments. If you have questions, please call your primary care clinic.  If you do not have a primary care provider, please call [phone] and they will assist you.      [employer] is an electronic gateway that provides easy, online access to your medical records. With [employer], you can request a clinic appointment, read your test results, renew a prescription or communicate with your care team.     To access your existing account, please contact your [facility] or call [phone] for assistance.        Care EveryWhere ID     This is your Care EveryWhere ID. This could be used by other organizations to access your [place] medical records  [id number]         Blood Pressure from Last 3 Encounters:   07/02/18 115/74   06/11/18 127/74   06/04/18 128/78    Weight from Last 3 Encounters:   07/02/18 75.8 kg (167 lb)   06/11/18 75.9 kg (167 lb 4.8 oz)   06/04/18 76.2 kg (168 lb)              Today, you had the following     No orders found for display       Primary Care Provider Office Phone # Fax #    Moreno Harris [phone]       [address]  [facility] [address]        Equal Access to Services     KHANH FLANAGAN : Rajesh Meraz, suad peck, qaalisa del rosario.  So [facility] [phone].    ATENCIÓN: Si aleksanderla lacey, tiene a martino disposición servicios gratuitos de asistencia lingüística. Alfredo rocha [phone].    We comply with applicable federal civil rights laws and [place] laws. We do not discriminate on the basis of race, color, national origin, age, disability, sex, sexual orientation, or gender identity.            Thank you!     Thank you for choosing [facility]  for your care. Our goal is always to provide you with excellent care. Hearing back from our patients is one way we can continue to improve our services. Please take a few minutes to complete the written survey that you may receive in the mail after your visit with us. Thank you!             Your Updated Medication List - Protect others around you: Learn how to safely use, store and throw away your medicines at www.disposemymeds.org.          This list is accurate as of 7/2/18  4:42 PM.  Always use your most recent med list.                   Brand Name Dispense Instructions for use Diagnosis    docusate sodium 100 MG tablet    COLACE    60 tablet    Take 100 mg by mouth daily    Special screening for malignant neoplasms, colon       LORazepam 0.5 MG tablet    ATIVAN    30 tablet    Take 1 tablet (0.5 mg) by mouth every 4 hours as needed (Anxiety, Nausea/Vomiting or Sleep)    Adenocarcinoma of sigmoid colon (H)       ondansetron 8 MG tablet    ZOFRAN    30 tablet    Take 1 tablet (8 mg) by mouth every 8 hours as needed (Nausea/Vomiting)    Adenocarcinoma of sigmoid colon (H)       prochlorperazine 10 MG tablet    COMPAZINE    30 tablet    Take 1 tablet (10 mg) by mouth every 6 hours as needed (Nausea/Vomiting)    Adenocarcinoma of sigmoid colon (H)

## 2018-07-02 NOTE — PROGRESS NOTES
Power port needle left accessed for treatment. Tolerated lab draw without complaint. [place] drawn-Red/Green/Purple tubes. Double signed by patient and RN. See documentation flowsheet. Nuha De Los Santos, RN, BSN, OCN

## 2018-07-02 NOTE — PROGRESS NOTES
"Infusion Nursing Note:  Rian Malik presents today for C12 D1 Oxaliplatin/Fluorouracil pump connect.    Patient seen by provider today: Yes: Dr Last   present during visit today: Not Applicable.    Note: Today is Rian's last chemo, he is excited.      Intravenous Access:  Implanted Port.    Treatment Conditions:  Lab Results   Component Value Date    HGB 14.2 07/02/2018     Lab Results   Component Value Date    WBC 3.4 07/02/2018      Lab Results   Component Value Date    ANEU 1.5 07/02/2018     Lab Results   Component Value Date    PLT 73 07/02/2018      Lab Results   Component Value Date     07/02/2018                   Lab Results   Component Value Date    POTASSIUM 4.0 07/02/2018           Lab Results   Component Value Date    MAG 2.0 08/04/2017            Lab Results   Component Value Date    CR 0.84 07/02/2018                   Lab Results   Component Value Date    MAICOL 8.5 07/02/2018                Lab Results   Component Value Date    BILITOTAL 0.8 07/02/2018           Lab Results   Component Value Date    ALBUMIN 3.3 07/02/2018                    Lab Results   Component Value Date    ALT 74 07/02/2018           Lab Results   Component Value Date    AST 63 07/02/2018       Results reviewed, labs MET treatment parameters, ok to proceed with treatment.      Post Infusion Assessment:  Prior to discharge: Port is secured in place with tegaderm and flushed with 10cc NS with positive blood return noted.  Continuous home infusion Dosi-Fuser pump connected.    All connectors secured in place and clamps taped open.    Pump started, \"running\" noted on display (CADD): Not Applicable.  Patient instructed to call our clinic or [facility] with any questions or concerns at home.  Patient verbalized understanding.    Patient set up for pump disconnect at home with [facility] on 7/4/18.          Discharge Plan:    Patient will return 7/30/158 for next appointment with Dr Last. "   Patient discharged in stable condition accompanied by: self.  Departure Mode: Ambulatory.    Blanche Villanueva RN

## 2018-07-02 NOTE — MR AVS SNAPSHOT
After Visit Summary   7/2/2018    Rian Malik    MRN: [id number]           Patient Information     Date Of Birth          1960        Visit Information        Provider Department      7/2/2018 9:15 AM Padilla Last MD [facility]        Today's Diagnoses     Adenocarcinoma of sigmoid colon (H)    -  1       Follow-ups after your visit        Additional Services     Surgical Oncology Adult IP Consult                 Your next 10 appointments already scheduled     Jul 30, 2018 10:30 AM CDT   Return Visit with [facility]   [facility]    [address] [place] [id number] [phone]            Jul 30, 2018 11:15 AM CDT   Return Visit with Padilla Last MD   [facility] ([facility]    [address] [place] [id number] [phone]            Nov 13, 2018  9:30 AM CST   Lab with UC LAB   [facility] ([facility])    [address]  1st Floor  [facility] [id number] [phone]            Nov 13, 2018 10:30 AM CST   (Arrive by 10:15 AM)   Return General Liver with Yaakov Burroughs MD   [facility] Hepatology ([facility])    [address]  [address] [place] [id number] [phone]              Who to contact     If you have questions or need follow up information about today's clinic visit or your schedule please contact [facility] directly at [phone].  Normal or non-critical lab and imaging results will be communicated to you by MyChart, letter or phone within 4 business days after the clinic has received the results. If you do not hear from us within 7 days, please contact the clinic through MyChart or phone. If you have a critical or abnormal lab result, we will notify you by phone as soon as possible.  Submit refill requests through [employer] or call your  "pharmacy and they will forward the refill request to us. Please allow 3 business days for your refill to be completed.          Additional Information About Your Visit        Mob[employer]ae[employer]al Information     [facility] gives you secure access to your electronic health record. If you see a primary care provider, you can also send messages to your care team and make appointments. If you have questions, please call your primary care clinic.  If you do not have a primary care provider, please call [phone] and they will assist you.      [facility] is an electronic gateway that provides easy, online access to your medical records. With [facility], you can request a clinic appointment, read your test results, renew a prescription or communicate with your care team.     To access your existing account, please contact your [facility] or call [phone] for assistance.        Care EveryWhere ID     This is your Care EveryWhere ID. This could be used by other organizations to access your [place] medical records  [id number]        Your Vitals Were     Pulse Temperature Respirations Height Pulse Oximetry BMI (Body Mass Index)    67 98.7  F (37.1  C) 16 1.727 m (5' 8\") 97% 25.39 kg/m2       Blood Pressure from Last 3 Encounters:   07/02/18 115/74   06/11/18 127/74   06/04/18 128/78    Weight from Last 3 Encounters:   07/02/18 75.8 kg (167 lb)   06/11/18 75.9 kg (167 lb 4.8 oz)   06/04/18 76.2 kg (168 lb)              We Performed the Following     Surgical Oncology Adult IP Consult        Primary Care Provider Office Phone # Fax #    Moreno Harris [phone]       [address] [place]  [facility] [address]        Equal Access to Services     [facility]: Hadii cameron Meraz, washahnazda ludanaadaha, qaybta kaalmada alisa garcia. So [facility] [phone].    ATENCIÓN: Si habla español, tiene a martino disposición servicios gratuitos de asistencia " lingüística. Alfredo al [phone].    We comply with applicable federal civil rights laws and [place] laws. We do not discriminate on the basis of race, color, national origin, age, disability, sex, sexual orientation, or gender identity.            Thank you!     Thank you for choosing [facility]  for your care. Our goal is always to provide you with excellent care. Hearing back from our patients is one way we can continue to improve our services. Please take a few minutes to complete the written survey that you may receive in the mail after your visit with us. Thank you!             Your Updated Medication List - Protect others around you: Learn how to safely use, store and throw away your medicines at www.disposemymeds.org.          This list is accurate as of 7/2/18 11:59 PM.  Always use your most recent med list.                   Brand Name Dispense Instructions for use Diagnosis    docusate sodium 100 MG tablet    COLACE    60 tablet    Take 100 mg by mouth daily    Special screening for malignant neoplasms, colon       LORazepam 0.5 MG tablet    ATIVAN    30 tablet    Take 1 tablet (0.5 mg) by mouth every 4 hours as needed (Anxiety, Nausea/Vomiting or Sleep)    Adenocarcinoma of sigmoid colon (H)       ondansetron 8 MG tablet    ZOFRAN    30 tablet    Take 1 tablet (8 mg) by mouth every 8 hours as needed (Nausea/Vomiting)    Adenocarcinoma of sigmoid colon (H)       prochlorperazine 10 MG tablet    COMPAZINE    30 tablet    Take 1 tablet (10 mg) by mouth every 6 hours as needed (Nausea/Vomiting)    Adenocarcinoma of sigmoid colon (H)

## 2018-07-02 NOTE — MR AVS SNAPSHOT
After Visit Summary   7/2/2018    Rian Malik    MRN: [id number]           Patient Information     Date Of Birth          1960        Visit Information        Provider Department      7/2/2018 8:30 AM [facility]        Today's Diagnoses     Adenocarcinoma of sigmoid colon (H)    -  1       Follow-ups after your visit        Your next 10 appointments already scheduled     Jul 02, 2018  9:15 AM CDT   Return Visit with Padilla Last MD   [facility] ([facility])    [address] [place] [phone] [phone]            Jul 02, 2018 10:00 AM CDT   Level 3 with BAY 1 INFUSION   [facility] ([facility])    [address] [place] [phone] [phone]            Nov 13, 2018  9:30 AM CST   Lab with  LAB   [facility] Lab ([facility])    [address]  1st Floor  [facility] [address] [phone]            Nov 13, 2018 10:30 AM CST   (Arrive by 10:15 AM)   Return General Liver with Yaakov Burroughs MD   [facility] Hepatology ([facility])    [address]  [address]  [facility] [address] [phone]              Who to contact     If you have questions or need follow up information about today's clinic visit or your schedule please contact [facility] directly at [phone].  Normal or non-critical lab and imaging results will be communicated to you by MyChart, letter or phone within 4 business days after the clinic has received the results. If you do not hear from us within 7 days, please contact the clinic through MyChart or phone. If you have a critical or abnormal lab result, we will notify you by phone as soon as possible.  Submit refill requests through [employer] or call your pharmacy and they will forward the refill request to us. Please allow 3 business days for  your refill to be completed.          Additional Information About Your Visit        [employer] Information     [employer] gives you secure access to your electronic health record. If you see a primary care provider, you can also send messages to your care team and make appointments. If you have questions, please call your primary care clinic.  If you do not have a primary care provider, please call [phone] and they will assist you.      [employer] is an electronic gateway that provides easy, online access to your medical records. With [employer], you can request a clinic appointment, read your test results, renew a prescription or communicate with your care team.     To access your existing account, please contact your [facility] or call [phone] for assistance.        Care EveryWhere ID     This is your Care EveryWhere ID. This could be used by other organizations to access your [place] medical records  [id number]         Blood Pressure from Last 3 Encounters:   06/11/18 127/74   06/04/18 128/78   05/21/18 128/79    Weight from Last 3 Encounters:   06/11/18 75.9 kg (167 lb 4.8 oz)   06/04/18 76.2 kg (168 lb)   05/21/18 77.6 kg (171 lb)              We Performed the Following     CBC with platelets differential     Comprehensive metabolic panel        Primary Care Provider Office Phone # Fax #    Moreno Harris [phone]       [address]  [facility] [address]        Equal Access to Services     [facility]: Hadii aad ku hadasho Soomaali, waaxda luqadaha, qaybta kaalmada adeegyada, waxay rosey hayharis leon . So [facility] [phone].    ATENCIÓN: Si habla español, tiene a martino disposición servicios gratuitos de asistencia lingüística. Llame al [phone].    We comply with applicable federal civil rights laws and [place] laws. We do not discriminate on the basis of race, color, national origin, age, disability, sex, sexual orientation,  or gender identity.            Thank you!     Thank you for choosing [facility]  for your care. Our goal is always to provide you with excellent care. Hearing back from our patients is one way we can continue to improve our services. Please take a few minutes to complete the written survey that you may receive in the mail after your visit with us. Thank you!             Your Updated Medication List - Protect others around you: Learn how to safely use, store and throw away your medicines at www.disposemymeds.org.          This list is accurate as of 7/2/18  8:39 AM.  Always use your most recent med list.                   Brand Name Dispense Instructions for use Diagnosis    docusate sodium 100 MG tablet    COLACE    60 tablet    Take 100 mg by mouth daily    Special screening for malignant neoplasms, colon       LORazepam 0.5 MG tablet    ATIVAN    30 tablet    Take 1 tablet (0.5 mg) by mouth every 4 hours as needed (Anxiety, Nausea/Vomiting or Sleep)    Adenocarcinoma of sigmoid colon (H)       ondansetron 8 MG tablet    ZOFRAN    30 tablet    Take 1 tablet (8 mg) by mouth every 8 hours as needed (Nausea/Vomiting)    Adenocarcinoma of sigmoid colon (H)       prochlorperazine 10 MG tablet    COMPAZINE    30 tablet    Take 1 tablet (10 mg) by mouth every 6 hours as needed (Nausea/Vomiting)    Adenocarcinoma of sigmoid colon (H)

## 2018-07-02 NOTE — NURSING NOTE
"Oncology Rooming Note    July 2, 2018 8:57 AM   Rian Malik is a 57 year old male who presents for:    Chief Complaint   Patient presents with     Oncology Clinic Visit     prior to treatment     Initial Vitals: /74  Pulse 67  Temp 98.7  F (37.1  C)  Resp 16  Ht 1.727 m (5' 8\")  Wt 75.8 kg (167 lb)  SpO2 97%  BMI 25.39 kg/m2 Estimated body mass index is 25.39 kg/(m^2) as calculated from the following:    Height as of this encounter: 1.727 m (5' 8\").    Weight as of this encounter: 75.8 kg (167 lb). Body surface area is 1.91 meters squared.  No Pain (0) Comment: Data Unavailable   No LMP for male patient.  Allergies reviewed: Yes  Medications reviewed: Yes    Medications: Medication refills not needed today.  Pharmacy name entered into Pan[employer]:    [facility] - [place], [place] - [address]  [facility] - [place], [place] - [address]        5 minutes for nursing intake (face to face time)     Rika Boo LPN              "

## 2018-07-02 NOTE — LETTER
7/2/2018         RE: Rian Malik  [address] [place] [address]        Dear Colleague,    Thank you for referring your patient, Rian Malik, to the [facility]. Please see a copy of my visit note below.      FOLLOW-UP VISIT NOTE    PATIENT NAME: Rian Malik MRN # [id number]  DATE OF VISIT: Jul 2, 2018 YOB: 1960    REFERRING PROVIDER: No referring provider defined for this encounter.    CANCER TYPE: Adenocarcinoma colon  STAGE: III [id number]  ECOG PS: 0    ONCOLOGY HISTORY:  57-year-old male who around February 2017r, developed intermittent cramping lower abdominal pain. Was evaluated by PCP and clinical impression was colitis. He was put on p.o. antibiotics. However symptoms did not improve. He was eventually referred to surgery and underwent imaging with CT findings suspicious of sigmoid abscess. He was admitted to the hospital for resection of sigmoid abscess 10/11/17. However intraoperatively was noted to have a large firmly adherent sigmoid colon mass. Laparoscopic surgery was converted to open sigmoidectomy with end colostomy.     Surgical pathology   B.  COLON, SIGMOID, HEMICOLECTOMY   - Poorly differentiated adenocarcinoma, with signet ring cell features   - Tumor size: 5 cm   - Tumor is present on serosal surface and microscopically perforates   serosal surface   - One surgical margin shows extensive serosal involvement by tumor. The   opposite, undesignated margin shows no evidence of malignancy   - Lymphovascular invasion is present   - Perineural invasion is not identified   - Tumor deposits are present   - Immunohistochemistry for mismatch repair proteins shows intact nuclear   expression for MLH1, MSH2, MSH6, and PMS2   - Three reactive lymph nodes, negative for malignancy (0/3)   - Pathologic staging: pT4N1c      Patient's case was discussed at the colorectal tumor board. Recommendation was to obtain new staging scans as well as to proceed with  systemic chemotherapy at this point with plans for further surgery in future. Staging scans negative for distant disease.     - Started on Folfox 11/21/17    SUBJECTIVE     Patient is here today for his next cycle of chemotherapy. Doing well with no active complaints. Denies abdominal pain, neuropathy, fever/chills, nausea/vomiting, mucositis, diarrhea. Ostomy is working well. Good appetite and energy levels.      PAST MEDICAL HISTORY     Past Medical History:   Diagnosis Date     Cirrhosis (H)      Colon cancer (H) 10/11/2017    Poorly differentiated adenocarcinoma     Hepatitis C          CURRENT OUTPATIENT MEDICATIONS     Current Outpatient Prescriptions   Medication Sig Dispense Refill     docusate sodium (COLACE) 100 MG tablet Take 100 mg by mouth daily 60 tablet 1     LORazepam (ATIVAN) 0.5 MG tablet Take 1 tablet (0.5 mg) by mouth every 4 hours as needed (Anxiety, Nausea/Vomiting or Sleep) 30 tablet 2     ondansetron (ZOFRAN) 8 MG tablet Take 1 tablet (8 mg) by mouth every 8 hours as needed (Nausea/Vomiting) 30 tablet 1     prochlorperazine (COMPAZINE) 10 MG tablet Take 1 tablet (10 mg) by mouth every 6 hours as needed (Nausea/Vomiting) (Patient not taking: Reported on 7/2/2018) 30 tablet 2        ALLERGIES     Allergies   Allergen Reactions     Acetaminophen      Naproxen         REVIEW OF SYSTEMS   As above in the HPI, o/w complete 14-point ROS was negative.     PHYSICAL EXAM   B/P: 138/78, T: 98.1, P: 64, R: 18  SpO2 Readings from Last 4 Encounters:   07/02/18 97%   06/11/18 97%   06/04/18 98%   05/21/18 98%     Wt Readings from Last 3 Encounters:   07/02/18 75.8 kg (167 lb)   06/11/18 75.9 kg (167 lb 4.8 oz)   06/04/18 76.2 kg (168 lb)     GEN: NAD  EYES:PERRLA  Mouth/ENT: Oropharynx is clear.  NECK: no  lymphadenopathy  LUNGS: clear bilaterally  CV: regular, no murmurs, rubs, or gallops  ABDOMEN: soft, non-tender, non-distended,Ostomy in place  EXT: warm, well perfused, no edema  NEURO: alert  SKIN: no  stephen     LABORATORY AND IMAGING STUDIES     Lab Results   Component Value Date    WBC 3.4 07/02/2018     Lab Results   Component Value Date    RBC 4.32 07/02/2018     Lab Results   Component Value Date    HGB 14.2 07/02/2018     Lab Results   Component Value Date    HCT 40.1 07/02/2018     No components found for: MCT  Lab Results   Component Value Date    MCV 93 07/02/2018     Lab Results   Component Value Date    MCH 32.9 07/02/2018     Lab Results   Component Value Date    MCHC 35.4 07/02/2018     Lab Results   Component Value Date    RDW 14.1 07/02/2018     Lab Results   Component Value Date    PLT 73 07/02/2018     Recent Labs   Lab Test  07/02/18   0827  06/11/18   0734   NA  142  139   POTASSIUM  4.0  3.9   CHLORIDE  107  105   CO2  29  28   ANIONGAP  6  6   GLC  116*  124*   BUN  15  15   CR  0.84  0.76   MAICOL  8.5  8.7          ASSESSMENT AND PLAN     57-year-old male with      - Poorly differentiated adenocarcinoma colon  [id number](tumor deposits) with margin involvement -  Stage III   CT scans negative for evidence of metastatic disease  Patient's case was discussed at the tumor board 11/13 and recommendation was to proceed with systemic chemotherapy at this point with plans for additional surgery in future if needed after completion of chemotherapy.  Started on FOLFOX q2 week regimen for 6 months duration - s/p 11 cycles so far.  He has had chemo delays due to thrombocytopenia.  Will proceed with last planned chemo today.   Obtain CAT scan Abd/pelvis.  Referral to surgical oncology to review the case as he did not have a standard oncological surgery at the time of diagnosis and also discuss ostomy reversal.     - Hepatitis C  Completed antiviral treatment per GI  HCV viral load undetectable    - Thrombocytopenia  Secondary to chemotherapy Vs Hep C   Continue Oxaliplatin dose reduced at 65 mg/m2  Continue to omit 5-FU bolus from the remaining cycles    - Transaminitis  Mild  Possibly from  chemotherapy  Monitor      RTC in 4 weeks with anshu for ov, labs    The patient is ready to learn, no apparent learning barriers were identified, Diagnosis and treatment plans were explained to the patient. The patient expressed understanding of the content. The patient questions were answered to his satisfaction.    Chart documentation with Dragon Voice recognition Software. Although reviewed after completion, some words and grammatical errors may remain.  Padilla Last MD  Attending Physician   Hematology/Medical Oncology    Again, thank you for allowing me to participate in the care of your patient.        Sincerely,        Padilla Last MD

## 2018-07-02 NOTE — PROGRESS NOTES
FOLLOW-UP VISIT NOTE    PATIENT NAME: Rian Malik MRN # [id number]  DATE OF VISIT: Jul 2, 2018 YOB: 1960    REFERRING PROVIDER: No referring provider defined for this encounter.    CANCER TYPE: Adenocarcinoma colon  STAGE: III [id number]  ECOG PS: 0    ONCOLOGY HISTORY:  57-year-old male who around February 2017r, developed intermittent cramping lower abdominal pain. Was evaluated by PCP and clinical impression was colitis. He was put on p.o. antibiotics. However symptoms did not improve. He was eventually referred to surgery and underwent imaging with CT findings suspicious of sigmoid abscess. He was admitted to the hospital for resection of sigmoid abscess 10/11/17. However intraoperatively was noted to have a large firmly adherent sigmoid colon mass. Laparoscopic surgery was converted to open sigmoidectomy with end colostomy.     Surgical pathology   B.  COLON, SIGMOID, HEMICOLECTOMY   - Poorly differentiated adenocarcinoma, with signet ring cell features   - Tumor size: 5 cm   - Tumor is present on serosal surface and microscopically perforates   serosal surface   - One surgical margin shows extensive serosal involvement by tumor. The   opposite, undesignated margin shows no evidence of malignancy   - Lymphovascular invasion is present   - Perineural invasion is not identified   - Tumor deposits are present   - Immunohistochemistry for mismatch repair proteins shows intact nuclear   expression for MLH1, MSH2, MSH6, and PMS2   - Three reactive lymph nodes, negative for malignancy (0/3)   - Pathologic staging: pT4N1c      Patient's case was discussed at the colorectal tumor board. Recommendation was to obtain new staging scans as well as to proceed with systemic chemotherapy at this point with plans for further surgery in future. Staging scans negative for distant disease.     - Started on Folfox 11/21/17    SUBJECTIVE     Patient is here today for his next cycle of chemotherapy. Doing well with  no active complaints. Denies abdominal pain, neuropathy, fever/chills, nausea/vomiting, mucositis, diarrhea. Ostomy is working well. Good appetite and energy levels.      PAST MEDICAL HISTORY     Past Medical History:   Diagnosis Date     Cirrhosis (H)      Colon cancer (H) 10/11/2017    Poorly differentiated adenocarcinoma     Hepatitis C          CURRENT OUTPATIENT MEDICATIONS     Current Outpatient Prescriptions   Medication Sig Dispense Refill     docusate sodium (COLACE) 100 MG tablet Take 100 mg by mouth daily 60 tablet 1     LORazepam (ATIVAN) 0.5 MG tablet Take 1 tablet (0.5 mg) by mouth every 4 hours as needed (Anxiety, Nausea/Vomiting or Sleep) 30 tablet 2     ondansetron (ZOFRAN) 8 MG tablet Take 1 tablet (8 mg) by mouth every 8 hours as needed (Nausea/Vomiting) 30 tablet 1     prochlorperazine (COMPAZINE) 10 MG tablet Take 1 tablet (10 mg) by mouth every 6 hours as needed (Nausea/Vomiting) (Patient not taking: Reported on 7/2/2018) 30 tablet 2        ALLERGIES     Allergies   Allergen Reactions     Acetaminophen      Naproxen         REVIEW OF SYSTEMS   As above in the HPI, o/w complete 14-point ROS was negative.     PHYSICAL EXAM   B/P: 138/78, T: 98.1, P: 64, R: 18  SpO2 Readings from Last 4 Encounters:   07/02/18 97%   06/11/18 97%   06/04/18 98%   05/21/18 98%     Wt Readings from Last 3 Encounters:   07/02/18 75.8 kg (167 lb)   06/11/18 75.9 kg (167 lb 4.8 oz)   06/04/18 76.2 kg (168 lb)     GEN: NAD  EYES:PERRLA  Mouth/ENT: Oropharynx is clear.  NECK: no  lymphadenopathy  LUNGS: clear bilaterally  CV: regular, no murmurs, rubs, or gallops  ABDOMEN: soft, non-tender, non-distended,Ostomy in place  EXT: warm, well perfused, no edema  NEURO: alert  SKIN: no rashes     LABORATORY AND IMAGING STUDIES     Lab Results   Component Value Date    WBC 3.4 07/02/2018     Lab Results   Component Value Date    RBC 4.32 07/02/2018     Lab Results   Component Value Date    HGB 14.2 07/02/2018     Lab Results    Component Value Date    HCT 40.1 07/02/2018     No components found for: MCT  Lab Results   Component Value Date    MCV 93 07/02/2018     Lab Results   Component Value Date    MCH 32.9 07/02/2018     Lab Results   Component Value Date    MCHC 35.4 07/02/2018     Lab Results   Component Value Date    RDW 14.1 07/02/2018     Lab Results   Component Value Date    PLT 73 07/02/2018     Recent Labs   Lab Test  07/02/18   0827  06/11/18   0734   NA  142  139   POTASSIUM  4.0  3.9   CHLORIDE  107  105   CO2  29  28   ANIONGAP  6  6   GLC  116*  124*   BUN  15  15   CR  0.84  0.76   MAICOL  8.5  8.7          ASSESSMENT AND PLAN     57-year-old male with      - Poorly differentiated adenocarcinoma colon  [id number](tumor deposits) with margin involvement -  Stage III   CT scans negative for evidence of metastatic disease  Patient's case was discussed at the tumor board 11/13 and recommendation was to proceed with systemic chemotherapy at this point with plans for additional surgery in future if needed after completion of chemotherapy.  Started on FOLFOX q2 week regimen for 6 months duration - s/p 11 cycles so far.  He has had chemo delays due to thrombocytopenia.  Will proceed with last planned chemo today.   Obtain CAT scan Abd/pelvis.  Referral to surgical oncology to review the case as he did not have a standard oncological surgery at the time of diagnosis and also discuss ostomy reversal.     - Hepatitis C  Completed antiviral treatment per GI  HCV viral load undetectable    - Thrombocytopenia  Secondary to chemotherapy Vs Hep C   Continue Oxaliplatin dose reduced at 65 mg/m2  Continue to omit 5-FU bolus from the remaining cycles    - Transaminitis  Mild  Possibly from chemotherapy  Monitor      RTC in 4 weeks with anshu for ov, labs    The patient is ready to learn, no apparent learning barriers were identified, Diagnosis and treatment plans were explained to the patient. The patient expressed understanding of the content.  The patient questions were answered to his satisfaction.    Chart documentation with Dragon Voice recognition Software. Although reviewed after completion, some words and grammatical errors may remain.  Padilla Last MD  Attending Physician   Hematology/Medical Oncology

## 2018-07-03 NOTE — PROGRESS NOTES
This is a recent snapshot of the patient's [facility] medical record.  For current drug dose and complete information and questions, call [phone] or In Basket pool, [facility] home infusion ([id number])  CSN Number:  [id number]

## 2018-07-04 ENCOUNTER — HOME INFUSION (PRE-WILLOW HOME INFUSION) (OUTPATIENT)
Dept: PHARMACY | Facility: CLINIC | Age: 58
End: 2018-07-04

## 2018-07-05 NOTE — PROGRESS NOTES
This is a recent snapshot of the patient's [facility] medical record.  For current drug dose and complete information and questions, call [phone] or In Basket pool, fv home infusion ([id number])  CSN Number:  [id number]

## 2018-07-06 DIAGNOSIS — R79.89 ELEVATED LFTS: ICD-10-CM

## 2018-07-06 DIAGNOSIS — D69.6 THROMBOCYTOPENIA (H): ICD-10-CM

## 2018-07-06 DIAGNOSIS — B18.2 CHRONIC HEPATITIS C WITHOUT HEPATIC COMA (H): ICD-10-CM

## 2018-07-06 LAB
ALBUMIN SERPL-MCNC: 3.4 G/DL (ref 3.4–5)
ALP SERPL-CCNC: 134 U/L (ref 40–150)
ALT SERPL W P-5'-P-CCNC: 85 U/L (ref 0–70)
AST SERPL W P-5'-P-CCNC: 65 U/L (ref 0–45)
BASOPHILS # BLD AUTO: 0 10E9/L (ref 0–0.2)
BASOPHILS NFR BLD AUTO: 1 %
BILIRUB DIRECT SERPL-MCNC: 0.3 MG/DL (ref 0–0.2)
BILIRUB SERPL-MCNC: 0.9 MG/DL (ref 0.2–1.3)
DIFFERENTIAL METHOD BLD: ABNORMAL
EOSINOPHIL # BLD AUTO: 0.1 10E9/L (ref 0–0.7)
EOSINOPHIL NFR BLD AUTO: 3 %
ERYTHROCYTE [DISTWIDTH] IN BLOOD BY AUTOMATED COUNT: 14.1 % (ref 10–15)
HCT VFR BLD AUTO: 38.9 % (ref 40–53)
HGB BLD-MCNC: 13.9 G/DL (ref 13.3–17.7)
LYMPHOCYTES # BLD AUTO: 1.5 10E9/L (ref 0.8–5.3)
LYMPHOCYTES NFR BLD AUTO: 36 %
MCH RBC QN AUTO: 32.8 PG (ref 26.5–33)
MCHC RBC AUTO-ENTMCNC: 35.7 G/DL (ref 31.5–36.5)
MCV RBC AUTO: 92 FL (ref 78–100)
MONOCYTES # BLD AUTO: 0.3 10E9/L (ref 0–1.3)
MONOCYTES NFR BLD AUTO: 6 %
NEUTROPHILS # BLD AUTO: 2.4 10E9/L (ref 1.6–8.3)
NEUTROPHILS NFR BLD AUTO: 54 %
PLATELET # BLD AUTO: 67 10E9/L (ref 150–450)
PLATELET # BLD EST: ABNORMAL 10*3/UL
PROT SERPL-MCNC: 7.1 G/DL (ref 6.8–8.8)
RBC # BLD AUTO: 4.24 10E12/L (ref 4.4–5.9)
RBC MORPH BLD: NORMAL
WBC # BLD AUTO: 4.3 10E9/L (ref 4–11)

## 2018-07-06 PROCEDURE — 85025 COMPLETE CBC W/AUTO DIFF WBC: CPT | Performed by: INTERNAL MEDICINE

## 2018-07-06 PROCEDURE — 80076 HEPATIC FUNCTION PANEL: CPT | Performed by: INTERNAL MEDICINE

## 2018-07-06 PROCEDURE — 36415 COLL VENOUS BLD VENIPUNCTURE: CPT | Performed by: INTERNAL MEDICINE

## 2018-07-11 ENCOUNTER — ANCILLARY PROCEDURE (OUTPATIENT)
Dept: GENERAL RADIOLOGY | Facility: CLINIC | Age: 58
End: 2018-07-11
Payer: COMMERCIAL

## 2018-07-11 ENCOUNTER — RADIANT APPOINTMENT (OUTPATIENT)
Dept: CT IMAGING | Facility: CLINIC | Age: 58
End: 2018-07-11
Attending: INTERNAL MEDICINE
Payer: COMMERCIAL

## 2018-07-11 DIAGNOSIS — C18.7 ADENOCARCINOMA OF SIGMOID COLON (H): ICD-10-CM

## 2018-07-11 LAB
ALBUMIN SERPL-MCNC: 3.6 G/DL (ref 3.4–5)
ALP SERPL-CCNC: 135 U/L (ref 40–150)
ALT SERPL W P-5'-P-CCNC: 87 U/L (ref 0–70)
ANION GAP SERPL CALCULATED.3IONS-SCNC: 5 MMOL/L (ref 3–14)
AST SERPL W P-5'-P-CCNC: 69 U/L (ref 0–45)
BASOPHILS # BLD AUTO: 0 10E9/L (ref 0–0.2)
BASOPHILS NFR BLD AUTO: 0.6 %
BILIRUB SERPL-MCNC: 1.1 MG/DL (ref 0.2–1.3)
BUN SERPL-MCNC: 16 MG/DL (ref 7–30)
CALCIUM SERPL-MCNC: 8.8 MG/DL (ref 8.5–10.1)
CEA SERPL-MCNC: 1.6 UG/L (ref 0–2.5)
CHLORIDE SERPL-SCNC: 107 MMOL/L (ref 94–109)
CO2 SERPL-SCNC: 27 MMOL/L (ref 20–32)
CREAT BLD-MCNC: 0.8 MG/DL (ref 0.66–1.25)
CREAT SERPL-MCNC: 0.78 MG/DL (ref 0.66–1.25)
DIFFERENTIAL METHOD BLD: ABNORMAL
EOSINOPHIL # BLD AUTO: 0.1 10E9/L (ref 0–0.7)
EOSINOPHIL NFR BLD AUTO: 1.8 %
ERYTHROCYTE [DISTWIDTH] IN BLOOD BY AUTOMATED COUNT: 14 % (ref 10–15)
GFR SERPL CREATININE-BSD FRML MDRD: >90 ML/MIN/1.7M2
GFR SERPL CREATININE-BSD FRML MDRD: >90 ML/MIN/1.7M2
GLUCOSE SERPL-MCNC: 98 MG/DL (ref 70–99)
HCT VFR BLD AUTO: 39.5 % (ref 40–53)
HGB BLD-MCNC: 14.1 G/DL (ref 13.3–17.7)
IMM GRANULOCYTES # BLD: 0 10E9/L (ref 0–0.4)
IMM GRANULOCYTES NFR BLD: 0.6 %
LYMPHOCYTES # BLD AUTO: 1.4 10E9/L (ref 0.8–5.3)
LYMPHOCYTES NFR BLD AUTO: 27.3 %
MCH RBC QN AUTO: 32.8 PG (ref 26.5–33)
MCHC RBC AUTO-ENTMCNC: 35.7 G/DL (ref 31.5–36.5)
MCV RBC AUTO: 92 FL (ref 78–100)
MONOCYTES # BLD AUTO: 0.5 10E9/L (ref 0–1.3)
MONOCYTES NFR BLD AUTO: 10.7 %
NEUTROPHILS # BLD AUTO: 2.9 10E9/L (ref 1.6–8.3)
NEUTROPHILS NFR BLD AUTO: 59 %
PLATELET # BLD AUTO: 67 10E9/L (ref 150–450)
POTASSIUM SERPL-SCNC: 4.2 MMOL/L (ref 3.4–5.3)
PROT SERPL-MCNC: 7.4 G/DL (ref 6.8–8.8)
RBC # BLD AUTO: 4.3 10E12/L (ref 4.4–5.9)
SODIUM SERPL-SCNC: 139 MMOL/L (ref 133–144)
WBC # BLD AUTO: 4.9 10E9/L (ref 4–11)

## 2018-07-11 PROCEDURE — 82378 CARCINOEMBRYONIC ANTIGEN: CPT | Performed by: INTERNAL MEDICINE

## 2018-07-11 PROCEDURE — 74177 CT ABD & PELVIS W/CONTRAST: CPT | Performed by: RADIOLOGY

## 2018-07-11 PROCEDURE — 80053 COMPREHEN METABOLIC PANEL: CPT | Performed by: INTERNAL MEDICINE

## 2018-07-11 PROCEDURE — 85025 COMPLETE CBC W/AUTO DIFF WBC: CPT | Performed by: INTERNAL MEDICINE

## 2018-07-11 RX ORDER — IOPAMIDOL 755 MG/ML
103 INJECTION, SOLUTION INTRAVASCULAR ONCE
Status: COMPLETED | OUTPATIENT
Start: 2018-07-11 | End: 2018-07-11

## 2018-07-11 RX ORDER — HEPARIN SODIUM (PORCINE) LOCK FLUSH IV SOLN 100 UNIT/ML 100 UNIT/ML
5 SOLUTION INTRAVENOUS ONCE
Status: COMPLETED | OUTPATIENT
Start: 2018-07-11 | End: 2018-07-11

## 2018-07-11 RX ADMIN — IOPAMIDOL 103 ML: 755 INJECTION, SOLUTION INTRAVASCULAR at 10:55

## 2018-07-11 RX ADMIN — HEPARIN SODIUM (PORCINE) LOCK FLUSH IV SOLN 100 UNIT/ML 5 ML: 100 SOLUTION at 11:34

## 2018-07-11 NOTE — PROGRESS NOTES
Power port identified by 3 raised dots and previous imaging  IVAD accessed with 20 gauge 3/4 inch lares gripper plus needle.   Flushed with 10 ml Sterile 0.9% NaCl (NDC [address]-10) and dressed with sterile Tegaderm.  Flushed with 10 cc NS (NDC [address]-12)and 5 cc 100 unit/ml Heparin (NDC [address]-78)  Needle: d/c'd intact  Comments: blood drawn for labs and POC testing

## 2018-07-11 NOTE — MR AVS SNAPSHOT
MRN:[id number]                      After Visit Summary   7/11/2018    Rian Malik    MRN: [id number]           Visit Information        Provider Department      7/11/2018 10:45 AM MG IMAGING NURSE [facility]        Your next 10 appointments already scheduled     Jul 30, 2018 10:30 AM CDT   Return Visit with NURSE ONLY CANCER CENTER   [facility] ([facility])    [address] [address] [place] [id number] [phone]            Jul 30, 2018 11:15 AM CDT   Return Visit with Padilla Last MD   [facility] ([facility])    [address] [facility] [id number] [phone]            Nov 13, 2018  9:30 AM CST   Lab with  LAB   [facility] ([facility])    [address]  1st Floor  [facility] [id number] [phone]            Nov 13, 2018 10:30 AM CST   (Arrive by 10:15 AM)   Return General Liver with Yaakov Burroughs MD   [facility] ([facility] [place])    [address]  [address]  [facility] [address] [phone]              [employer] Information     [employer] gives you secure access to your electronic health record. If you see a primary care provider, you can also send messages to your care team and make appointments. If you have questions, please call your primary care clinic.  If you do not have a primary care provider, please call [phone] and they will assist you.      [employer] is an electronic gateway that provides easy, online access to your medical records. With [employer], you can request a clinic appointment, read your test results, renew a prescription or communicate with your care team.     To access your existing account, please contact your [facility] or call [phone] for assistance.        Care EveryWhere ID     This is your Care EveryWhere ID. This could be used by other  organizations to access your [place] medical records  [id number]        Equal Access to Services     KHANH FLANAGAN : Rajesh Meraz, suad peck, alisa albarran. So [facility] [phone].    ATENCIÓN: Si habla español, tiene a martino disposición servicios gratuitos de asistencia lingüística. Llame al [phone].    We comply with applicable federal civil rights laws and [place] laws. We do not discriminate on the basis of race, color, national origin, age, disability, sex, sexual orientation, or gender identity.

## 2018-07-13 ENCOUNTER — TELEPHONE (OUTPATIENT)
Dept: SURGERY | Facility: CLINIC | Age: 58
End: 2018-07-13

## 2018-07-13 NOTE — TELEPHONE ENCOUNTER
[facility] Call Center    Phone Message    May a detailed message be left on voicemail: yes    Reason for Call: Other: DX: Colon cancer and referred by cotaviano Ahumada at [facility].  Please follow up with pt.  Thank you.     Action Taken: Other: P Surgery Adult CSC

## 2018-07-17 NOTE — TELEPHONE ENCOUNTER
Reviewed referral with Dr. Oswald.  Per Dr. Oswald, patient can be seen next available, as he is still doing chemotherapy.  Patient is scheduled 7/30/18 at 2:00 pm.  Called patient and left a message with appointment details.  Asked patient to confirm.  Provided my direct number.

## 2018-07-30 ENCOUNTER — ONCOLOGY VISIT (OUTPATIENT)
Dept: ONCOLOGY | Facility: CLINIC | Age: 58
End: 2018-07-30
Payer: COMMERCIAL

## 2018-07-30 ENCOUNTER — OFFICE VISIT (OUTPATIENT)
Dept: SURGERY | Facility: CLINIC | Age: 58
End: 2018-07-30
Payer: COMMERCIAL

## 2018-07-30 VITALS
DIASTOLIC BLOOD PRESSURE: 79 MMHG | BODY MASS INDEX: 25.16 KG/M2 | RESPIRATION RATE: 18 BRPM | OXYGEN SATURATION: 99 % | WEIGHT: 166 LBS | TEMPERATURE: 97.7 F | HEART RATE: 60 BPM | SYSTOLIC BLOOD PRESSURE: 136 MMHG | HEIGHT: 68 IN

## 2018-07-30 VITALS
BODY MASS INDEX: 25.1 KG/M2 | SYSTOLIC BLOOD PRESSURE: 128 MMHG | TEMPERATURE: 98.5 F | DIASTOLIC BLOOD PRESSURE: 77 MMHG | HEART RATE: 82 BPM | WEIGHT: 165.6 LBS | OXYGEN SATURATION: 95 % | HEIGHT: 68 IN

## 2018-07-30 DIAGNOSIS — K76.9 LIVER LESION: ICD-10-CM

## 2018-07-30 DIAGNOSIS — C18.7 CANCER OF SIGMOID COLON (H): Primary | ICD-10-CM

## 2018-07-30 DIAGNOSIS — C18.7 ADENOCARCINOMA OF SIGMOID COLON (H): Primary | ICD-10-CM

## 2018-07-30 LAB
ALBUMIN SERPL-MCNC: 3.5 G/DL (ref 3.4–5)
ALP SERPL-CCNC: 133 U/L (ref 40–150)
ALT SERPL W P-5'-P-CCNC: 80 U/L (ref 0–70)
ANION GAP SERPL CALCULATED.3IONS-SCNC: 4 MMOL/L (ref 3–14)
AST SERPL W P-5'-P-CCNC: 74 U/L (ref 0–45)
BASOPHILS # BLD AUTO: 0 10E9/L (ref 0–0.2)
BASOPHILS NFR BLD AUTO: 0.8 %
BILIRUB SERPL-MCNC: 1.1 MG/DL (ref 0.2–1.3)
BUN SERPL-MCNC: 16 MG/DL (ref 7–30)
CALCIUM SERPL-MCNC: 8.7 MG/DL (ref 8.5–10.1)
CHLORIDE SERPL-SCNC: 106 MMOL/L (ref 94–109)
CO2 SERPL-SCNC: 29 MMOL/L (ref 20–32)
CREAT SERPL-MCNC: 0.81 MG/DL (ref 0.66–1.25)
DIFFERENTIAL METHOD BLD: ABNORMAL
EOSINOPHIL # BLD AUTO: 0.1 10E9/L (ref 0–0.7)
EOSINOPHIL NFR BLD AUTO: 1.3 %
ERYTHROCYTE [DISTWIDTH] IN BLOOD BY AUTOMATED COUNT: 14.2 % (ref 10–15)
GFR SERPL CREATININE-BSD FRML MDRD: >90 ML/MIN/1.7M2
GLUCOSE SERPL-MCNC: 101 MG/DL (ref 70–99)
HCT VFR BLD AUTO: 39 % (ref 40–53)
HGB BLD-MCNC: 13.8 G/DL (ref 13.3–17.7)
IMM GRANULOCYTES # BLD: 0 10E9/L (ref 0–0.4)
IMM GRANULOCYTES NFR BLD: 0.3 %
LYMPHOCYTES # BLD AUTO: 1.3 10E9/L (ref 0.8–5.3)
LYMPHOCYTES NFR BLD AUTO: 32.5 %
MCH RBC QN AUTO: 32.7 PG (ref 26.5–33)
MCHC RBC AUTO-ENTMCNC: 35.4 G/DL (ref 31.5–36.5)
MCV RBC AUTO: 92 FL (ref 78–100)
MONOCYTES # BLD AUTO: 0.5 10E9/L (ref 0–1.3)
MONOCYTES NFR BLD AUTO: 11.4 %
NEUTROPHILS # BLD AUTO: 2.1 10E9/L (ref 1.6–8.3)
NEUTROPHILS NFR BLD AUTO: 53.7 %
PLATELET # BLD AUTO: ABNORMAL 10E9/L (ref 150–450)
POTASSIUM SERPL-SCNC: 4.1 MMOL/L (ref 3.4–5.3)
PROT SERPL-MCNC: 7.2 G/DL (ref 6.8–8.8)
RBC # BLD AUTO: 4.22 10E12/L (ref 4.4–5.9)
SODIUM SERPL-SCNC: 139 MMOL/L (ref 133–144)
WBC # BLD AUTO: 3.9 10E9/L (ref 4–11)

## 2018-07-30 PROCEDURE — 85025 COMPLETE CBC W/AUTO DIFF WBC: CPT | Performed by: INTERNAL MEDICINE

## 2018-07-30 PROCEDURE — 99207 ZZC NO CHARGE NURSE ONLY: CPT

## 2018-07-30 PROCEDURE — 99215 OFFICE O/P EST HI 40 MIN: CPT | Performed by: INTERNAL MEDICINE

## 2018-07-30 PROCEDURE — 80053 COMPREHEN METABOLIC PANEL: CPT | Performed by: INTERNAL MEDICINE

## 2018-07-30 RX ORDER — ONDANSETRON 4 MG/1
4 TABLET, FILM COATED ORAL EVERY 6 HOURS PRN
Qty: 3 TABLET | Refills: 0 | Status: SHIPPED | OUTPATIENT
Start: 2018-07-30 | End: 2018-08-27

## 2018-07-30 RX ORDER — NEOMYCIN SULFATE 500 MG/1
1000 TABLET ORAL EVERY 8 HOURS
Qty: 6 TABLET | Refills: 0 | Status: SHIPPED | OUTPATIENT
Start: 2018-07-30 | End: 2018-07-31

## 2018-07-30 RX ORDER — METRONIDAZOLE 500 MG/1
500 TABLET ORAL EVERY 8 HOURS
Qty: 3 TABLET | Refills: 0 | Status: SHIPPED | OUTPATIENT
Start: 2018-07-30 | End: 2018-07-31

## 2018-07-30 RX ORDER — POLYETHYLENE GLYCOL 3350 17 G/17G
POWDER, FOR SOLUTION ORAL
Qty: 238 G | Refills: 0 | Status: SHIPPED | OUTPATIENT
Start: 2018-07-30 | End: 2018-08-27

## 2018-07-30 RX ORDER — HEPARIN SODIUM (PORCINE) LOCK FLUSH IV SOLN 100 UNIT/ML 100 UNIT/ML
5 SOLUTION INTRAVENOUS
Status: DISCONTINUED | OUTPATIENT
Start: 2018-07-30 | End: 2018-07-30 | Stop reason: HOSPADM

## 2018-07-30 RX ADMIN — HEPARIN SODIUM (PORCINE) LOCK FLUSH IV SOLN 100 UNIT/ML 5 ML: 100 SOLUTION at 10:31

## 2018-07-30 ASSESSMENT — PAIN SCALES - GENERAL
PAINLEVEL: NO PAIN (0)
PAINLEVEL: NO PAIN (0)

## 2018-07-30 NOTE — NURSING NOTE
"Chief Complaint   Patient presents with     Cancer     New patient consult, colon cancer, possible HIPEC.       Vitals:    07/30/18 1356   BP: 128/77   BP Location: Left arm   Patient Position: Chair   Cuff Size: Adult Regular   Pulse: 82   Temp: 98.5  F (36.9  C)   TempSrc: Oral   SpO2: 95%   Weight: 165 lb 9.6 oz   Height: 5' 8\"       Body mass index is 25.18 kg/(m^2).    Kiki MEZA LPN                     "

## 2018-07-30 NOTE — NURSING NOTE
"Oncology Rooming Note    July 30, 2018 11:22 AM   Rian Malik is a 57 year old male who presents for:    Chief Complaint   Patient presents with     Oncology Clinic Visit     follow up      Initial Vitals: /79  Pulse 60  Temp 97.7  F (36.5  C)  Resp 18  Ht 1.727 m (5' 8\")  Wt 75.3 kg (166 lb)  SpO2 99%  BMI 25.24 kg/m2 Estimated body mass index is 25.24 kg/(m^2) as calculated from the following:    Height as of this encounter: 1.727 m (5' 8\").    Weight as of this encounter: 75.3 kg (166 lb). Body surface area is 1.9 meters squared.  No Pain (0) Comment: Data Unavailable   No LMP for male patient.  Allergies reviewed: Yes  Medications reviewed: Yes    Medications: Medication refills not needed today.  Pharmacy name entered into [employer]:    [facility] - [place], [place] - [address]  [facility] - [place], [place] - [address]         5 minutes for nursing intake (face to face time)     Oxana Shepard LPN              "

## 2018-07-30 NOTE — MR AVS SNAPSHOT
After Visit Summary   7/30/2018    Rian Malik    MRN: [id number]           Patient Information     Date Of Birth          1960        Visit Information        Provider Department      7/30/2018 10:30 AM NURSE ONLY CANCER CENTER [facility]        Today's Diagnoses     Adenocarcinoma of sigmoid colon (H)    -  1       Follow-ups after your visit        Your next 10 appointments already scheduled     Jul 30, 2018  2:00 PM CDT   (Arrive by 1:45 PM)   NEW CANCER VISIT with Herminio Oswald MD   [facility] ([facility])    [address]  4th Floor  [facility] [address]   [phone]            Nov 13, 2018  9:30 AM CST   Lab with UC LAB   [facility] ([facility])    [address] [place] [address]  1st Floor  [facility] [address]   [phone]            Nov 13, 2018 10:30 AM CST   (Arrive by 10:15 AM)   Return General Liver with Yaakov Burroughs MD   [facility] ([facility])    [address] [place] [address]  [address]  [facility] [address]   [phone]              Who to contact     If you have questions or need follow up information about today's clinic visit or your schedule please contact [facility] directly at [phone].  Normal or non-critical lab and imaging results will be communicated to you by MyChart, letter or phone within 4 business days after the clinic has received the results. If you do not hear from us within 7 days, please contact the clinic through MyChart or phone. If you have a critical or abnormal lab result, we will notify you by phone as soon as possible.  Submit refill requests through [employer] or call your pharmacy and they will forward the refill request to us. Please allow 3 business days for your refill to be completed.          Additional Information About Your Visit        MyChart Information     [employer] gives you  secure access to your electronic health record. If you see a primary care provider, you can also send messages to your care team and make appointments. If you have questions, please call your primary care clinic.  If you do not have a primary care provider, please call [phone] and they will assist you.      [employer] is an electronic gateway that provides easy, online access to your medical records. With [employer], you can request a clinic appointment, read your test results, renew a prescription or communicate with your care team.     To access your existing account, please contact your [facility] or call [phone] for assistance.        Care EveryWhere ID     This is your Care EveryWhere ID. This could be used by other organizations to access your [place] medical records  [id number]         Blood Pressure from Last 3 Encounters:   07/02/18 115/74   06/11/18 127/74   06/04/18 128/78    Weight from Last 3 Encounters:   07/02/18 75.8 kg (167 lb)   06/11/18 75.9 kg (167 lb 4.8 oz)   06/04/18 76.2 kg (168 lb)              We Performed the Following     CBC with platelets differential     Comprehensive metabolic panel        Primary Care Provider Office Phone # Fax #    Moreno Harris [phone]       [address] [facility] [address]        Equal Access to Services     KHANH FLANAGAN : Hadii aad ku hadasho Soomaali, waaxda luqadaha, qaybta kaalmada adeegyada, alisa leon . So [facility] [phone].    ATENCIÓN: Si habla español, tiene a martino disposición servicios gratuitos de asistencia lingüística. Llame al [phone].    We comply with applicable federal civil rights laws and [place] laws. We do not discriminate on the basis of race, color, national origin, age, disability, sex, sexual orientation, or gender identity.            Thank you!     Thank you for choosing [facility]  for your care. Our goal is  always to provide you with excellent care. Hearing back from our patients is one way we can continue to improve our services. Please take a few minutes to complete the written survey that you may receive in the mail after your visit with us. Thank you!             Your Updated Medication List - Protect others around you: Learn how to safely use, store and throw away your medicines at www.disposemymeds.org.          This list is accurate as of 7/30/18 11:19 AM.  Always use your most recent med list.                   Brand Name Dispense Instructions for use Diagnosis    docusate sodium 100 MG tablet    COLACE    60 tablet    Take 100 mg by mouth daily    Special screening for malignant neoplasms, colon       LORazepam 0.5 MG tablet    ATIVAN    30 tablet    Take 1 tablet (0.5 mg) by mouth every 4 hours as needed (Anxiety, Nausea/Vomiting or Sleep)    Adenocarcinoma of sigmoid colon (H)       ondansetron 8 MG tablet    ZOFRAN    30 tablet    Take 1 tablet (8 mg) by mouth every 8 hours as needed (Nausea/Vomiting)    Adenocarcinoma of sigmoid colon (H)       prochlorperazine 10 MG tablet    COMPAZINE    30 tablet    Take 1 tablet (10 mg) by mouth every 6 hours as needed (Nausea/Vomiting)    Adenocarcinoma of sigmoid colon (H)

## 2018-07-30 NOTE — Clinical Note
7/30/2018         RE: Rian Malik  [address] [place] [address]        Dear Colleague,    Thank you for referring your patient, Rian Malik, to the [facility]. Please see a copy of my visit note below.      FOLLOW-UP VISIT NOTE    PATIENT NAME: Rian Malik MRN # [id number]  DATE OF VISIT: Jul 30, 2018 YOB: 1960    REFERRING PROVIDER: No referring provider defined for this encounter.    CANCER TYPE: Adenocarcinoma colon  STAGE: III [id number]  ECOG PS: 0    ONCOLOGY HISTORY:  57-year-old male who around February 2017r, developed intermittent cramping lower abdominal pain. Was evaluated by PCP and clinical impression was colitis. He was put on p.o. antibiotics. However symptoms did not improve. He was eventually referred to surgery and underwent imaging with CT findings suspicious of sigmoid abscess. He was admitted to the hospital for resection of sigmoid abscess 10/11/17. However intraoperatively was noted to have a large firmly adherent sigmoid colon mass. Laparoscopic surgery was converted to open sigmoidectomy with end colostomy.     Surgical pathology   B.  COLON, SIGMOID, HEMICOLECTOMY   - Poorly differentiated adenocarcinoma, with signet ring cell features   - Tumor size: 5 cm   - Tumor is present on serosal surface and microscopically perforates   serosal surface   - One surgical margin shows extensive serosal involvement by tumor. The   opposite, undesignated margin shows no evidence of malignancy   - Lymphovascular invasion is present   - Perineural invasion is not identified   - Tumor deposits are present   - Immunohistochemistry for mismatch repair proteins shows intact nuclear   expression for MLH1, MSH2, MSH6, and PMS2   - Three reactive lymph nodes, negative for malignancy (0/3)   - Pathologic staging: pT4N1c      Patient's case was discussed at the colorectal tumor board. Recommendation was to obtain new staging scans as well as to proceed  with systemic chemotherapy at this point with plans for further surgery in future. Staging scans negative for distant disease.     - Started on Folfox 11/21/17. Started on FOLFOX q2 week regimen for 6 months duration - s/p 12 cycles  He has had chemo delays/ dose adjustments due to thrombocytopenia    SUBJECTIVE     Patient is here today for 4 week follow-up and discuss CT scan. Continues to do well with no active complaints. Denies fever/chills, neuropathy, blood in the ostomy bag, constipation, diarrhea, mouth sores, weight loss, worsening fatigue.      PAST MEDICAL HISTORY     Past Medical History:   Diagnosis Date     Cirrhosis (H)      Colon cancer (H) 10/11/2017    Poorly differentiated adenocarcinoma     Hepatitis C          CURRENT OUTPATIENT MEDICATIONS     Current Outpatient Prescriptions   Medication Sig Dispense Refill     docusate sodium (COLACE) 100 MG tablet Take 100 mg by mouth daily 60 tablet 1     LORazepam (ATIVAN) 0.5 MG tablet Take 1 tablet (0.5 mg) by mouth every 4 hours as needed (Anxiety, Nausea/Vomiting or Sleep) 30 tablet 2     ondansetron (ZOFRAN) 8 MG tablet Take 1 tablet (8 mg) by mouth every 8 hours as needed (Nausea/Vomiting) 30 tablet 1     prochlorperazine (COMPAZINE) 10 MG tablet Take 1 tablet (10 mg) by mouth every 6 hours as needed (Nausea/Vomiting) (Patient not taking: Reported on 7/2/2018) 30 tablet 2        ALLERGIES     Allergies   Allergen Reactions     Acetaminophen      Naproxen         REVIEW OF SYSTEMS   As above in the HPI, o/w complete 14-point ROS was negative.     PHYSICAL EXAM   B/P: 138/78, T: 98.1, P: 64, R: 18  SpO2 Readings from Last 4 Encounters:   07/30/18 99%   07/02/18 97%   06/11/18 97%   06/04/18 98%     Wt Readings from Last 3 Encounters:   07/30/18 75.3 kg (166 lb)   07/02/18 75.8 kg (167 lb)   06/11/18 75.9 kg (167 lb 4.8 oz)     GEN: NAD  Mouth/ENT: Oropharynx is clear.  NECK: no  lymphadenopathy  LUNGS: clear bilaterally  CV: regular, no murmurs, rubs,  or gallops  ABDOMEN: soft, non-tender, non-distended,Ostomy in place  EXT: warm, well perfused, no edema  NEURO: alert  SKIN: no rashes     LABORATORY AND IMAGING STUDIES     Lab Results   Component Value Date    WBC 3.9 07/30/2018     Lab Results   Component Value Date    RBC 4.22 07/30/2018     Lab Results   Component Value Date    HGB 13.8 07/30/2018     Lab Results   Component Value Date    HCT 39.0 07/30/2018     No components found for: MCT  Lab Results   Component Value Date    MCV 92 07/30/2018     Lab Results   Component Value Date    MCH 32.7 07/30/2018     Lab Results   Component Value Date    MCHC 35.4 07/30/2018     Lab Results   Component Value Date    RDW 14.2 07/30/2018     Lab Results   Component Value Date    PLT  07/30/2018     Automated count confirmed.  Platelet morphology is normal.     Recent Labs   Lab Test  07/30/18   1030  07/11/18   1045   NA  139  139   POTASSIUM  4.1  4.2   CHLORIDE  106  107   CO2  29  27   ANIONGAP  4  5   GLC  101*  98   BUN  16  16   CR  0.81  0.78   MAICOL  8.7  8.8         IMPRESSION: In this patient with history of adenocarcinoma status post  partial sigmoid colon resection and end colostomy, there is no  definite evidence of recurrent disease.  1. Hepatic steatosis. Ill-defined 16 mm hepatic segment 5 liver  lesion, more conspicuous from the prior study concerning for  metastatic focus. Attention on follow-up studies.  2. Mild bowel wall thickening noted in the distal ileum which may be  inflammatory or reactive.  3. Small amount of free fluid in the pelvis, increased from the prior  study.  4. Enlarged spleen, increased from the prior study.     I have personally reviewed the examination and initial interpretation  and I agree with the findings.     WYATT WICK MD       ASSESSMENT AND PLAN     57-year-old male with      - Poorly differentiated adenocarcinoma colon  [id number](tumor deposits) with margin involvement -  Stage III   Started on FOLFOX q2 week  regimen for 6 months duration - s/p 12 cycles  He has had chemo delays/ dose adjustments due to thrombocytopenia.    Most recent CT abdomen and pelvis showed an ill-defined hepatic lesion concerning for possible metastasis. We'll obtain MRI abdomen for further evaluation of the liver lesion as well as CT chest to complete workup for distant metastases.  If above workup is negative, will plan on meeting back in 3 months with repeat scans for continued surveillance.  He is also meeting with surgical oncology today as he did not have a standard oncological surgery at the time of diagnosis and also discuss ostomy reversal.     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 The patient expressed understanding of the content. The patient questions were answered to his satisfaction.    Chart documentation with Dragon Voice recognition Software. Although reviewed after completion, some words and grammatical errors may remain.  Padilla Last MD  Attending Physician   Hematology/Medical Oncology    Again, thank you for allowing me to participate in the care of your patient.        Sincerely,        Padilla Last MD

## 2018-07-30 NOTE — PROGRESS NOTES
"Colon and Rectal Surgery Clinic Note    RE: Rian Malik.  : 1960.  AP: 2018.    Reason for visit: Sigmoid adenocarcinoma, s/p Lucas's procedure.    HPI: 56 y/o M who presented in 2017 with abdominal pain and changes in bowel habits. Diagnosed with \"sigmoid diverticular abscess\" in 2017 based on CT. Did not respond well to antibiotic therapy. Attempted colonoscopy in 2017 showed:  Impression:          - Stricture at 40 cm proximal to the anus. Inflammatory                        appearing. Biopsied.                        - Unable to traverse stricture.                        - The distal rectum and anal verge are normal on                        retroflexion view.  Pathology showed:  - Colonic mucosa with mild chronic inflammation, fibrosis, muscularis   mucosae hyperplasia and reactive epithelial changes consistent with   healed local injury   - Negative for dysplasia and malignancy     Underwent laparoscopic converted to open sigmoidectomy with end colostomy at OSH by Dr. Schneider on 10/11/17. Per the operative report, a large, firm, inflammatory, and somewhat adherent RLQ mass was encountered at the sigmoid colon. There was no clear evidence of free perforation, local invasion or peritoneal disease per the operative report. Postop course was largely uncomplicated. Pathology showed:  A.  COLON, SIGMOID WALL, BIOPSY   - Adipose tissue involved by poorly differentiated adenocarcinoma, with   signet ring cell features     B.  COLON, SIGMOID, HEMICOLECTOMY   - Poorly differentiated adenocarcinoma, with signet ring cell features   - Tumor size: 5 cm   - Tumor is present on serosal surface and microscopically perforates   serosal surface   - One surgical margin shows extensive serosal involvement by tumor. The   opposite, undesignated margin shows no evidence of malignancy   - Lymphovascular invasion is present   - Perineural invasion is not identified   - Tumor deposits are present   - " Immunohistochemistry for mismatch repair proteins shows intact nuclear   expression for MLH1, MSH2, MSH6, and PMS2   - Three reactive lymph nodes, negative for malignancy (0/3)   - Pathologic staging: pT4N1c.    Postop adjuvant chemotherapy was recommended and he started FOLFOX in 11/2017. So far, Rian has received 12 cycles and has tolerated this relatively well with only a couple of interruptions because of thrombocytopenia. His last cycle was 7/2/18. Adjuvant radiotherapy was not recommended (rightfully so).    PMH also significant for Hep C, liver cirrhosis (MELD 8), Barretts esophagus, and prior EtOH abuse.     CEA: 2.2->0.9->1.6.    Recent CT a/p showed:  IMPRESSION: In this patient with history of adenocarcinoma status post  partial sigmoid colon resection and end colostomy, there is no  definite evidence of recurrent disease.  1. Hepatic steatosis. Ill-defined 16 mm hepatic segment 5 liver  lesion, more conspicuous from the prior study concerning for  metastatic focus. Attention on follow-up studies.  2. Mild bowel wall thickening noted in the distal ileum which may be  inflammatory or reactive.   3. Small amount of free fluid in the pelvis, increased from the prior  study.  4. Enlarged spleen, increased from the prior study.    Medical history:  Past Medical History:   Diagnosis Date     Cirrhosis (H)      Colon cancer (H) 10/11/2017    Poorly differentiated adenocarcinoma     Hepatitis C        Surgical history:  Past Surgical History:   Procedure Laterality Date     COLONOSCOPY N/A 8/4/2017    Procedure: COMBINED COLONOSCOPY, SINGLE OR MULTIPLE BIOPSY/POLYPECTOMY BY BIOPSY;  Colon & EGD;  Surgeon: Cristobal Blanco MD;  Location: [facility]     COLOSTOMY Left 10/11/2017     CYSTOSCOPY AND LEFT STENT PLACEMENT  10/11/2017    Dr. Reyna     ESOPHAGOSCOPY, GASTROSCOPY, DUODENOSCOPY (EGD), COMBINED N/A 8/4/2017    Procedure: COMBINED ESOPHAGOSCOPY, GASTROSCOPY, DUODENOSCOPY (EGD), BIOPSY SINGLE OR MULTIPLE;;  Surgeon:  "Cristobal Blanco MD;  Location:  GI     HERNIA REPAIR Left 1988    Left inguinal     LAPAROSCOPIC CONVERTED TO OPEN SIGMOIDECTOMY WITH END COLOSTOMY  10/11/2017    Dr. Schneider       Family history:  Family History   Problem Relation Age of Onset     Skin Cancer Sister        Medications:  Current Outpatient Prescriptions   Medication Sig Dispense Refill     docusate sodium (COLACE) 100 MG tablet Take 100 mg by mouth daily 60 tablet 1     LORazepam (ATIVAN) 0.5 MG tablet Take 1 tablet (0.5 mg) by mouth every 4 hours as needed (Anxiety, Nausea/Vomiting or Sleep) 30 tablet 2     ondansetron (ZOFRAN) 8 MG tablet Take 1 tablet (8 mg) by mouth every 8 hours as needed (Nausea/Vomiting) 30 tablet 1     prochlorperazine (COMPAZINE) 10 MG tablet Take 1 tablet (10 mg) by mouth every 6 hours as needed (Nausea/Vomiting) 30 tablet 2       Allergies:  Allergies   Allergen Reactions     Acetaminophen      Naproxen        Social history:   Social History   Substance Use Topics     Smoking status: Former Smoker     Packs/day: 0.10     Years: 3.00     Types: Cigarettes     Start date: 11/6/2014     Quit date: 10/6/2017     Smokeless tobacco: Never Used      Comment:       Alcohol use No     Marital status: .    Physical Examination:  /77 (BP Location: Left arm, Patient Position: Chair, Cuff Size: Adult Regular)  Pulse 82  Temp 98.5  F (36.9  C) (Oral)  Ht 5' 8\"  Wt 165 lb 9.6 oz  SpO2 95%  BMI 25.18 kg/m2  General: Well hydrated. No acute distress.  Abdomen: Soft, NT, left-sided colostomy viable and functioning. Midline incision healed. No inguinal adenopathy palpated.    Investigations:  None.    Procedures:  None.    ASSESSMENT  58 y/o M with T4N1c (0/3 LNs) sigmoid adenocarcinoma, s/p sigmoidectomy with end colostomy with positive serosal and surgical margin (likely distal [rectal] but unclear given no specimen orientation). We discussed the need for resection of proximal and distal colorectal margins and additional " lymphadenectomy given the pathology report. We also discussed the role and impact of cytoreductive surgery with hyperthermic intraperitoneal chemotherapy for metastatic colorectal cancer. The patient understands these well and agrees to proceed. All questions were answered to his satisfaction.      PLAN  1. Would rec MR liver, CT chest, and full colonoscopy for completeness of oncologic work-up and surveillance.  2. If MR confirms metastatic disease, will need referral to Dr. Saqib Hale (Surg Onc).   3. If no M1 liver disease, schedule HALS colostomy takedown with additional mesenteric lymphadenectomy and resection of both proximal and distal colon/recal margins. Ideally 6-8 weeks after last chemo cycle. He will need CT cap with IV and PO contrast the week before surgery. If intraoperative evidence of recurrent or peritoneal disease, would be candidate for CRS+HIPEC. Will need cysto w/U-stents, PAC, Labs, and MBP w/Abx.  4. Will discuss at multidisciplinary CRC conference.    Time spent: 60 minutes.  >50% spent in discussing, counseling and coordinating care.    Herminio Oswald M.D., M.Sc.     Division of Colon and Rectal Surgery  [facility]    Referring Provider:  Padilla Last  [facility]  [address] [place] [address]     Primary Care Provider:  Moreno Harris DO Peter J. Halvorson, MD Michael L. Tran, MD John R. Lake, MD Aika Shoo, MD Nabeel Azeem, MD

## 2018-07-30 NOTE — PROGRESS NOTES
FOLLOW-UP VISIT NOTE    PATIENT NAME: Rian Malik MRN # [id number]  DATE OF VISIT: Jul 30, 2018 YOB: 1960    REFERRING PROVIDER: No referring provider defined for this encounter.    CANCER TYPE: Adenocarcinoma colon  STAGE: III [id number]  ECOG PS: 0    ONCOLOGY HISTORY:  57-year-old male who around February 2017r, developed intermittent cramping lower abdominal pain. Was evaluated by PCP and clinical impression was colitis. He was put on p.o. antibiotics. However symptoms did not improve. He was eventually referred to surgery and underwent imaging with CT findings suspicious of sigmoid abscess. He was admitted to the hospital for resection of sigmoid abscess 10/11/17. However intraoperatively was noted to have a large firmly adherent sigmoid colon mass. Laparoscopic surgery was converted to open sigmoidectomy with end colostomy.     Surgical pathology   B.  COLON, SIGMOID, HEMICOLECTOMY   - Poorly differentiated adenocarcinoma, with signet ring cell features   - Tumor size: 5 cm   - Tumor is present on serosal surface and microscopically perforates   serosal surface   - One surgical margin shows extensive serosal involvement by tumor. The   opposite, undesignated margin shows no evidence of malignancy   - Lymphovascular invasion is present   - Perineural invasion is not identified   - Tumor deposits are present   - Immunohistochemistry for mismatch repair proteins shows intact nuclear   expression for MLH1, MSH2, MSH6, and PMS2   - Three reactive lymph nodes, negative for malignancy (0/3)   - Pathologic staging: pT4N1c      Patient's case was discussed at the colorectal tumor board. Recommendation was to obtain new staging scans as well as to proceed with systemic chemotherapy at this point with plans for further surgery in future. Staging scans negative for distant disease.     - Started on Folfox 11/21/17. Started on FOLFOX q2 week regimen for 6 months duration - s/p 12 cycles  He has had chemo  delays/ dose adjustments due to thrombocytopenia    SUBJECTIVE     Patient is here today for 4 week follow-up and discuss CT scan. Continues to do well with no active complaints. Denies fever/chills, neuropathy, blood in the ostomy bag, constipation, diarrhea, mouth sores, weight loss, worsening fatigue.      PAST MEDICAL HISTORY     Past Medical History:   Diagnosis Date     Cirrhosis (H)      Colon cancer (H) 10/11/2017    Poorly differentiated adenocarcinoma     Hepatitis C          CURRENT OUTPATIENT MEDICATIONS     Current Outpatient Prescriptions   Medication Sig Dispense Refill     docusate sodium (COLACE) 100 MG tablet Take 100 mg by mouth daily 60 tablet 1     LORazepam (ATIVAN) 0.5 MG tablet Take 1 tablet (0.5 mg) by mouth every 4 hours as needed (Anxiety, Nausea/Vomiting or Sleep) 30 tablet 2     ondansetron (ZOFRAN) 8 MG tablet Take 1 tablet (8 mg) by mouth every 8 hours as needed (Nausea/Vomiting) 30 tablet 1     prochlorperazine (COMPAZINE) 10 MG tablet Take 1 tablet (10 mg) by mouth every 6 hours as needed (Nausea/Vomiting) (Patient not taking: Reported on 7/2/2018) 30 tablet 2        ALLERGIES     Allergies   Allergen Reactions     Acetaminophen      Naproxen         REVIEW OF SYSTEMS   As above in the HPI, o/w complete 14-point ROS was negative.     PHYSICAL EXAM   B/P: 138/78, T: 98.1, P: 64, R: 18  SpO2 Readings from Last 4 Encounters:   07/30/18 99%   07/02/18 97%   06/11/18 97%   06/04/18 98%     Wt Readings from Last 3 Encounters:   07/30/18 75.3 kg (166 lb)   07/02/18 75.8 kg (167 lb)   06/11/18 75.9 kg (167 lb 4.8 oz)     GEN: NAD  Mouth/ENT: Oropharynx is clear.  NECK: no  lymphadenopathy  LUNGS: clear bilaterally  CV: regular, no murmurs, rubs, or gallops  ABDOMEN: soft, non-tender, non-distended,Ostomy in place  EXT: warm, well perfused, no edema  NEURO: alert  SKIN: no rashes     LABORATORY AND IMAGING STUDIES     Lab Results   Component Value Date    WBC 3.9 07/30/2018     Lab Results    Component Value Date    RBC 4.22 07/30/2018     Lab Results   Component Value Date    HGB 13.8 07/30/2018     Lab Results   Component Value Date    HCT 39.0 07/30/2018     No components found for: MCT  Lab Results   Component Value Date    MCV 92 07/30/2018     Lab Results   Component Value Date    MCH 32.7 07/30/2018     Lab Results   Component Value Date    MCHC 35.4 07/30/2018     Lab Results   Component Value Date    RDW 14.2 07/30/2018     Lab Results   Component Value Date    PLT  07/30/2018     Automated count confirmed.  Platelet morphology is normal.     Recent Labs   Lab Test  07/30/18   1030  07/11/18   1045   NA  139  139   POTASSIUM  4.1  4.2   CHLORIDE  106  107   CO2  29  27   ANIONGAP  4  5   GLC  101*  98   BUN  16  16   CR  0.81  0.78   MAICOL  8.7  8.8         IMPRESSION: In this patient with history of adenocarcinoma status post  partial sigmoid colon resection and end colostomy, there is no  definite evidence of recurrent disease.  1. Hepatic steatosis. Ill-defined 16 mm hepatic segment 5 liver  lesion, more conspicuous from the prior study concerning for  metastatic focus. Attention on follow-up studies.  2. Mild bowel wall thickening noted in the distal ileum which may be  inflammatory or reactive.  3. Small amount of free fluid in the pelvis, increased from the prior  study.  4. Enlarged spleen, increased from the prior study.     I have personally reviewed the examination and initial interpretation  and I agree with the findings.     WYATT WICK MD       ASSESSMENT AND PLAN     57-year-old male with      - Poorly differentiated adenocarcinoma colon  [id number](tumor deposits) with margin involvement -  Stage III   Started on FOLFOX q2 week regimen for 6 months duration - s/p 12 cycles  He has had chemo delays/ dose adjustments due to thrombocytopenia.    Most recent CT abdomen and pelvis showed an ill-defined hepatic lesion concerning for possible metastasis. We'll obtain MRI abdomen  for further evaluation of the liver lesion as well as CT chest to complete workup for distant metastases.  If above workup is negative, will plan on meeting back in 3 months with repeat scans for continued surveillance.  He is also meeting with surgical oncology today as he did not have a standard oncological surgery at the time of diagnosis and also discuss ostomy reversal.     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 The patient expressed understanding of the content. The patient questions were answered to his satisfaction.    Chart documentation with Dragon Voice recognition Software. Although reviewed after completion, some words and grammatical errors may remain.  Padilla Last MD  Attending Physician   Hematology/Medical Oncology

## 2018-07-30 NOTE — MR AVS SNAPSHOT
After Visit Summary   7/30/2018    Rian Malik    MRN: [id number]           Patient Information     Date Of Birth          1960        Visit Information        Provider Department      7/30/2018 11:15 AM Padilla Last MD [facility]        Today's Diagnoses     Adenocarcinoma of sigmoid colon (H)    -  1    Liver lesion           Follow-ups after your visit        Your next 10 appointments already scheduled     Jul 30, 2018  2:00 PM CDT   (Arrive by 1:45 PM)   NEW CANCER VISIT with Herminio Owsald MD   [facility] Colon and Rectal Surgery ([facility])    [address]  4th [facility] [address] [phone]            Aug 03, 2018  2:00 PM CDT   MR ABDOMEN W/O & W CONTRAST with MGMR1   [facility] ([facility])    [id number] [place] [address] [phone]           Take your medicines as usual, unless your doctor tells you not to. Bring a list of your current medicines to your exam (including vitamins, minerals and over-the-counter drugs). Also bring the results of similar scans you may have had.    You may or may not receive IV contrast for this exam pending the discretion of the Radiologist.   Do not eat or drink for 6 hours prior to exam.  The MRI machine uses a strong magnet. Please wear clothes without metal (snaps, zippers). A sweatsuit works well, or we may give you a hospital gown.  Please remove any body piercings and hair extensions before you arrive. You will also remove watches, jewelry, hairpins, wallets, dentures, partial dental plates and hearing aids. You may wear contact lenses, and you may be able to wear your rings. We have a safe place to keep your personal items, but it is safer to leave them at home.  **IMPORTANT** THE INSTRUCTIONS BELOW ARE ONLY FOR THOSE PATIENTS WHO HAVE BEEN PRESCRIBED SEDATION OR GENERAL ANESTHESIA DURING THEIR MRI PROCEDURE:  IF  YOUR DOCTOR PRESCRIBED ORAL SEDATION (take medicine to help you relax during your exam):   You must get the medicine from your doctor (oral medication) before you arrive. Bring the medicine to the exam. Do not take it at home. You ll be told when to take it upon arriving for your exam.   Arrive one hour early. Bring someone who can take you home after the test. Your medicine will make you sleepy. After the exam, you may not drive, take a bus or take a taxi by yourself.  IF YOUR DOCTOR PRESCRIBED IV SEDATION:   Arrive one hour early. Bring someone who can take you home after the test. Your medicine will make you sleepy. After the exam, you may not drive, take a bus or take a taxi by yourself.   No eating 6 hours before your exam. You may have clear liquids up until 4 hours before your exam. (Clear liquids include water, clear tea, black coffee and fruit juice without pulp.)  IF YOUR DOCTOR PRESCRIBED ANESTHESIA (be asleep for your exam):   Arrive 1 1/2 hours early. Bring someone who can take you home after the test. You may not drive, take a bus or take a taxi by yourself.   No eating 8 hours before your exam. You may have clear liquids up until 4 hours before your exam. (Clear liquids include water, clear tea, black coffee and fruit juice without pulp.)   You will spend four to five hours in the recovery room.  If you have any questions, please contact your Imaging Department exam site.            Aug 03, 2018  2:45 PM CDT   CT CHEST W CONTRAST with MGCT1   [facility] ([facility])    [address] [address] [place] [address] [phone]           Please bring any scans or X-rays taken at other hospitals, if similar tests were done. Also bring a list of your medicines, including vitamins, minerals and over-the-counter drugs. It is safest to leave personal items at home.  Be sure to tell your doctor:   If you have any allergies.   If there s any chance you are pregnant.   If  you are breastfeeding.    If you have diabetes as your medication may need to be adjusted for this exam.  You will have contrast for this exam. To prepare:   Do not eat or drink for 2 hours before your exam. If you need to take medicine, you may take it with small sips of water. (We may ask you to take liquid medicine as well.)   The day before your exam, drink extra fluids at least six 8-ounce glasses (unless your doctor tells you to restrict your fluids).  Patients over 70 or patients with diabetes or kidney problems:   If you haven t had a blood test (creatinine test) within the last 30 days, the Cardiologist/Radiologist may require you to get this test prior to your exam.  Please wear loose clothing, such as a sweat suit or jogging clothes. Avoid snaps, zippers and other metal. We may ask you to undress and put on a hospital gown.  If you have any questions, please call the Imaging Department where you will have your exam.            Oct 29, 2018  1:45 PM CDT   Return Visit with Padilla Last MD   [facility] ([facility])    [address] [place] [id number] [phone]            Nov 13, 2018  9:30 AM CST   Lab with  LAB   [facility] ([facility])    [address] [place] [address]  1st Floor  [facility] [address] [phone]            Nov 13, 2018 10:30 AM CST   (Arrive by 10:15 AM)   Return General Liver with Yaakov Burroughs MD   [facility] Hepatology ([facility])    [address] [place] [address]  [address] [place] [id number] [phone]              Future tests that were ordered for you today     Open Future Orders        Priority Expected Expires Ordered    MRI Abdomen w & w/o contrast Routine  10/28/2018 7/30/2018    CT Chest w Contrast Routine  7/30/2019 7/30/2018            Who to contact     If you have questions or need follow up information about today's clinic visit or your schedule please contact [facility] directly at [phone].  Normal or non-critical lab and imaging results will be communicated to you by Theravancehart, letter or phone within 4 business days after the clinic has received the results. If you do not hear from us within 7 days, please contact the clinic through Theravancehart or phone. If you have a critical or abnormal lab result, we will notify you by phone as soon as possible.  Submit refill requests through [employer] or call your pharmacy and they will forward the refill request to us. Please allow 3 business days for your refill to be completed.          Additional Information About Your Visit        [employer] Information     [employer] gives you secure access to your electronic health record. If you see a primary care provider, you can also send messages to your care team and make appointments. If you have questions, please call your primary care clinic.  If you do not have a primary care provider, please call [phone] and they will assist you.      [employer] is an electronic gateway that provides easy, online access to your medical records. With [employer], you can request a clinic appointment, read your test results, renew a prescription or communicate with your care team.     To access your existing account, please contact your [facility] or call [phone] for assistance.        Care EveryWhere ID     This is your Care EveryWhere ID. This could be used by other organizations to access your [place] medical records  [id number]        Your Vitals Were     Pulse Temperature Respirations Height Pulse Oximetry BMI (Body Mass Index)    60 97.7  F (36.5  C) 18 1.727 m (5' 8\") 99% 25.24 kg/m2       Blood Pressure from Last 3 Encounters:   07/30/18 136/79   07/02/18 115/74   06/11/18 127/74    Weight from Last 3 Encounters:   07/30/18 75.3 kg (166 lb)   07/02/18 75.8 kg (167 lb)   06/11/18 75.9 kg (167 lb 4.8 oz)               Primary Care Provider Office " Phone # Fax #    Moreno Harris [phone]       [address]  [facility] [address]        Equal Access to Services     KHANH FLANAGAN : Hadii aad ku hadmartycaridad Sokaleyali, washahnazda luqadaha, qaybta kaalmada jose, alisa gabriellain hayaatheodore morrison velasquezcameron mg. So [facility] [phone].    ATENCIÓN: Si habla español, tiene a martino disposición servicios gratuitos de asistencia lingüística. Llame al [phone].    We comply with applicable federal civil rights laws and [place] laws. We do not discriminate on the basis of race, color, national origin, age, disability, sex, sexual orientation, or gender identity.            Thank you!     Thank you for choosing [facility]  for your care. Our goal is always to provide you with excellent care. Hearing back from our patients is one way we can continue to improve our services. Please take a few minutes to complete the written survey that you may receive in the mail after your visit with us. Thank you!             Your Updated Medication List - Protect others around you: Learn how to safely use, store and throw away your medicines at www.disposemymeds.org.          This list is accurate as of 7/30/18 11:52 AM.  Always use your most recent med list.                   Brand Name Dispense Instructions for use Diagnosis    docusate sodium 100 MG tablet    COLACE    60 tablet    Take 100 mg by mouth daily    Special screening for malignant neoplasms, colon       LORazepam 0.5 MG tablet    ATIVAN    30 tablet    Take 1 tablet (0.5 mg) by mouth every 4 hours as needed (Anxiety, Nausea/Vomiting or Sleep)    Adenocarcinoma of sigmoid colon (H)       ondansetron 8 MG tablet    ZOFRAN    30 tablet    Take 1 tablet (8 mg) by mouth every 8 hours as needed (Nausea/Vomiting)    Adenocarcinoma of sigmoid colon (H)       prochlorperazine 10 MG tablet    COMPAZINE    30 tablet    Take 1 tablet (10 mg) by mouth every 6 hours as needed (Nausea/Vomiting)     Adenocarcinoma of sigmoid colon (H)

## 2018-07-30 NOTE — LETTER
"2018       RE: Rian Malik  [address] [place] [address]     Dear Colleague,    Thank you for referring your patient, Rian Malik, to the [facility] COLON AND RECTAL SURGERY at [facility]. Please see a copy of my visit note below.    Colon and Rectal Surgery Clinic Note    RE: Rian Malik.  : 1960.  AP: 2018.    Reason for visit: Sigmoid adenocarcinoma, s/p Lucas's procedure.    HPI: 58 y/o M who presented in 2017 with abdominal pain and changes in bowel habits. Diagnosed with \"sigmoid diverticular abscess\" in 2017 based on CT. Did not respond well to antibiotic therapy. Attempted colonoscopy in 2017 showed:  Impression:          - Stricture at 40 cm proximal to the anus. Inflammatory                        appearing. Biopsied.                        - Unable to traverse stricture.                        - The distal rectum and anal verge are normal on                        retroflexion view.  Pathology showed:  - Colonic mucosa with mild chronic inflammation, fibrosis, muscularis   mucosae hyperplasia and reactive epithelial changes consistent with   healed local injury   - Negative for dysplasia and malignancy     Underwent laparoscopic converted to open sigmoidectomy with end colostomy at OSH by Dr. Schneider on 10/11/17. Per the operative report, a large, firm, inflammatory, and somewhat adherent RLQ mass was encountered at the sigmoid colon. There was no clear evidence of free perforation, local invasion or peritoneal disease per the operative report. Postop course was largely uncomplicated. Pathology showed:  A.  COLON, SIGMOID WALL, BIOPSY   - Adipose tissue involved by poorly differentiated adenocarcinoma, with   signet ring cell features     B.  COLON, SIGMOID, HEMICOLECTOMY   - Poorly differentiated adenocarcinoma, with signet ring cell features   - Tumor size: 5 cm   - Tumor is present on serosal surface and " microscopically perforates   serosal surface   - One surgical margin shows extensive serosal involvement by tumor. The   opposite, undesignated margin shows no evidence of malignancy   - Lymphovascular invasion is present   - Perineural invasion is not identified   - Tumor deposits are present   - Immunohistochemistry for mismatch repair proteins shows intact nuclear   expression for MLH1, MSH2, MSH6, and PMS2   - Three reactive lymph nodes, negative for malignancy (0/3)   - Pathologic staging: pT4N1c.    Postop adjuvant chemotherapy was recommended and he started FOLFOX in 11/2017. So far, Rian has received 12 cycles and has tolerated this relatively well with only a couple of interruptions because of thrombocytopenia. His last cycle was 7/2/18. Adjuvant radiotherapy was not recommended (rightfully so).    PMH also significant for Hep C, liver cirrhosis (MELD 8), Barretts esophagus, and prior EtOH abuse.     CEA: 2.2->0.9->1.6.    Recent CT a/p showed:  IMPRESSION: In this patient with history of adenocarcinoma status post  partial sigmoid colon resection and end colostomy, there is no  definite evidence of recurrent disease.  1. Hepatic steatosis. Ill-defined 16 mm hepatic segment 5 liver  lesion, more conspicuous from the prior study concerning for  metastatic focus. Attention on follow-up studies.  2. Mild bowel wall thickening noted in the distal ileum which may be  inflammatory or reactive.   3. Small amount of free fluid in the pelvis, increased from the prior  study.  4. Enlarged spleen, increased from the prior study.    Medical history:  Past Medical History:   Diagnosis Date     Cirrhosis (H)      Colon cancer (H) 10/11/2017    Poorly differentiated adenocarcinoma     Hepatitis C        Surgical history:  Past Surgical History:   Procedure Laterality Date     COLONOSCOPY N/A 8/4/2017    Procedure: COMBINED COLONOSCOPY, SINGLE OR MULTIPLE BIOPSY/POLYPECTOMY BY BIOPSY;  Colon & EGD;  Surgeon: Francis  "MD Cristobal;  Location: [facility]     COLOSTOMY Left 10/11/2017     CYSTOSCOPY AND LEFT STENT PLACEMENT  10/11/2017    Dr. Reyna     ESOPHAGOSCOPY, GASTROSCOPY, DUODENOSCOPY (EGD), COMBINED N/A 8/4/2017    Procedure: COMBINED ESOPHAGOSCOPY, GASTROSCOPY, DUODENOSCOPY (EGD), BIOPSY SINGLE OR MULTIPLE;;  Surgeon: Cristobal Blanco MD;  Location: [facility]     HERNIA REPAIR Left 1988    Left inguinal     LAPAROSCOPIC CONVERTED TO OPEN SIGMOIDECTOMY WITH END COLOSTOMY  10/11/2017    Dr. Schneider       Family history:  Family History   Problem Relation Age of Onset     Skin Cancer Sister        Medications:  Current Outpatient Prescriptions   Medication Sig Dispense Refill     docusate sodium (COLACE) 100 MG tablet Take 100 mg by mouth daily 60 tablet 1     LORazepam (ATIVAN) 0.5 MG tablet Take 1 tablet (0.5 mg) by mouth every 4 hours as needed (Anxiety, Nausea/Vomiting or Sleep) 30 tablet 2     ondansetron (ZOFRAN) 8 MG tablet Take 1 tablet (8 mg) by mouth every 8 hours as needed (Nausea/Vomiting) 30 tablet 1     prochlorperazine (COMPAZINE) 10 MG tablet Take 1 tablet (10 mg) by mouth every 6 hours as needed (Nausea/Vomiting) 30 tablet 2       Allergies:  Allergies   Allergen Reactions     Acetaminophen      Naproxen        Social history:   Social History   Substance Use Topics     Smoking status: Former Smoker     Packs/day: 0.10     Years: 3.00     Types: Cigarettes     Start date: 11/6/2014     Quit date: 10/6/2017     Smokeless tobacco: Never Used      Comment:       Alcohol use No     Marital status: .    Physical Examination:  /77 (BP Location: Left arm, Patient Position: Chair, Cuff Size: Adult Regular)  Pulse 82  Temp 98.5  F (36.9  C) (Oral)  Ht 5' 8\"  Wt 165 lb 9.6 oz  SpO2 95%  BMI 25.18 kg/m2  General: Well hydrated. No acute distress.  Abdomen: Soft, NT, left-sided colostomy viable and functioning. Midline incision healed. No inguinal adenopathy " palpated.    Investigations:  None.    Procedures:  None.    ASSESSMENT  58 y/o M with T4N1c (0/3 LNs) sigmoid adenocarcinoma, s/p sigmoidectomy with end colostomy with positive serosal and surgical margin (likely distal [rectal] but unclear given no specimen orientation). We discussed the need for resection of proximal and distal colorectal margins and additional lymphadenectomy given the pathology report. We also discussed the role and impact of cytoreductive surgery with hyperthermic intraperitoneal chemotherapy for metastatic colorectal cancer. The patient understands these well and agrees to proceed. All questions were answered to his satisfaction.      PLAN  1. Would rec MR liver, CT chest, and full colonoscopy for completeness of oncologic work-up and surveillance.  2. If MR confirms metastatic disease, will need referral to Dr. Saqib Hale (Surg Onc).   3. If no M1 liver disease, schedule HALS colostomy takedown with additional mesenteric lymphadenectomy and resection of both proximal and distal colon/recal margins. Ideally 6-8 weeks after last chemo cycle. He will need CT cap with IV and PO contrast the week before surgery. If intraoperative evidence of recurrent or peritoneal disease, would be candidate for CRS+HIPEC. Will need cysto w/U-stents, PAC, Labs, and MBP w/Abx.  4. Will discuss at multidisciplinary CRC conference.    Time spent: 60 minutes.  >50% spent in discussing, counseling and coordinating care.    Herminio Oswald M.D., M.Sc.     Division of Colon and Rectal Surgery  [facility]    Referring Provider:  Padilla Last  [facility]  [address] [id number]     Primary Care Provider:  Moreno Harris DO Peter J. Halvorson, MD Michael L. Tran, MD John R. Lake, MD Aika Shoo, MD Nabeel Azeem, MD

## 2018-07-30 NOTE — PROGRESS NOTES
Tolerated lab draw without complaint. Port site scrubbed with Chloraprep for 30 seconds. Accessed using sterile technique. Baring drawn-Red/Green/Purple tubes. Double signed by patient and RN. See documentation flowsheet. Nuha De Los Santos, RN, BSN, OCN

## 2018-07-31 ENCOUNTER — HOME INFUSION (PRE-WILLOW HOME INFUSION) (OUTPATIENT)
Dept: PHARMACY | Facility: CLINIC | Age: 58
End: 2018-07-31

## 2018-08-03 ENCOUNTER — RADIANT APPOINTMENT (OUTPATIENT)
Dept: MRI IMAGING | Facility: CLINIC | Age: 58
End: 2018-08-03
Attending: INTERNAL MEDICINE
Payer: COMMERCIAL

## 2018-08-03 ENCOUNTER — RADIANT APPOINTMENT (OUTPATIENT)
Dept: CT IMAGING | Facility: CLINIC | Age: 58
End: 2018-08-03
Attending: INTERNAL MEDICINE
Payer: COMMERCIAL

## 2018-08-03 ENCOUNTER — RADIANT APPOINTMENT (OUTPATIENT)
Dept: GENERAL RADIOLOGY | Facility: CLINIC | Age: 58
End: 2018-08-03
Payer: COMMERCIAL

## 2018-08-03 DIAGNOSIS — K76.9 LIVER LESION: ICD-10-CM

## 2018-08-03 DIAGNOSIS — C18.7 ADENOCARCINOMA OF SIGMOID COLON (H): ICD-10-CM

## 2018-08-03 PROCEDURE — 71260 CT THORAX DX C+: CPT | Performed by: RADIOLOGY

## 2018-08-03 PROCEDURE — A9585 GADOBUTROL INJECTION: HCPCS | Performed by: INTERNAL MEDICINE

## 2018-08-03 PROCEDURE — 74183 MRI ABD W/O CNTR FLWD CNTR: CPT | Performed by: RADIOLOGY

## 2018-08-03 RX ORDER — IOPAMIDOL 755 MG/ML
61 INJECTION, SOLUTION INTRAVASCULAR ONCE
Status: COMPLETED | OUTPATIENT
Start: 2018-08-03 | End: 2018-08-03

## 2018-08-03 RX ORDER — GADOBUTROL 604.72 MG/ML
7.5 INJECTION INTRAVENOUS ONCE
Status: COMPLETED | OUTPATIENT
Start: 2018-08-03 | End: 2018-08-03

## 2018-08-03 RX ORDER — HEPARIN SODIUM (PORCINE) LOCK FLUSH IV SOLN 100 UNIT/ML 100 UNIT/ML
5 SOLUTION INTRAVENOUS ONCE
Status: COMPLETED | OUTPATIENT
Start: 2018-08-03 | End: 2018-08-03

## 2018-08-03 RX ADMIN — GADOBUTROL 7.5 ML: 604.72 INJECTION INTRAVENOUS at 14:12

## 2018-08-03 RX ADMIN — HEPARIN SODIUM (PORCINE) LOCK FLUSH IV SOLN 100 UNIT/ML 5 ML: 100 SOLUTION at 14:38

## 2018-08-03 RX ADMIN — IOPAMIDOL 111 ML: 755 INJECTION, SOLUTION INTRAVASCULAR at 15:29

## 2018-08-03 NOTE — PROGRESS NOTES
Power port identified by 3 raised dots, previous imaging  IVAD accessed with 20 gauge 3/4 inch lares gripper plus needle.   Flushed with 10 ml Sterile 0.9% NaCl (NDC [address]-10) and dressed with sterile Tegaderm.  Flushed with 10 cc NS (NDC [address]-81)and 5 cc 100 unit/ml Heparin (NDC [address]-42)  Needle: d/c'd intact  Comments: none

## 2018-08-06 DIAGNOSIS — C18.7 CANCER OF SIGMOID COLON (H): Primary | ICD-10-CM

## 2018-08-06 LAB — RADIOLOGIST FLAGS: NORMAL

## 2018-08-13 ENCOUNTER — TELEPHONE (OUTPATIENT)
Dept: ONCOLOGY | Facility: CLINIC | Age: 58
End: 2018-08-13

## 2018-08-13 ENCOUNTER — TELEPHONE (OUTPATIENT)
Dept: SURGERY | Facility: CLINIC | Age: 58
End: 2018-08-13

## 2018-08-13 NOTE — TELEPHONE ENCOUNTER
I left a message and mailed a letter for appointment  date/time change. Provider not in clinic rescheduled follow up with DR Last from Oct 29th to October 223nd. -cap-08/13/2018

## 2018-08-13 NOTE — TELEPHONE ENCOUNTER
Patient's surgery on 9/4/18 needs to be rescheduled, as Dr. Hale will now need to be a part of the surgery, and he is unavailable on the currently scheduled date.  Patient is tentatively rescheduled to 9/12/18.  Patient is scheduled for a clinic appointment with Dr. Hale 8/20/18.  Called patient and left a message requesting a call back to go over this information.  Provided my direct number.  Also sent [employer] message to patient.

## 2018-08-13 NOTE — TELEPHONE ENCOUNTER
Date of appointment: 8/20/18     Diagnosis/reason for appointment: colon cancer w/ liver mets  Referring provider/facility: Dr Oswald   Who called: sania Huizar/Maribel to call pt    Recent Studies  Imaging: in [facility]  Pathology: in [facility]  Labs: in Epic  Previous chemo/radiation (if known):    Records requested from: in [facility]  Records received from:    Additional information:

## 2018-08-14 NOTE — TELEPHONE ENCOUNTER
Patient left a message returning my call.  Patient confirms appointment 8/20/18 with Dr. Hale, but would like a call back to find out where to check in for the appointment.  Called patient and left a message informing him appointment is on the 2nd floor of the [facility].  Informed patient that we will reschedule surgery to 9/12/18.  Requested patient call back to discuss in detail.  Provided my direct number.

## 2018-08-17 ENCOUNTER — TELEPHONE (OUTPATIENT)
Dept: GASTROENTEROLOGY | Facility: CLINIC | Age: 58
End: 2018-08-17

## 2018-08-17 ENCOUNTER — TELEPHONE (OUTPATIENT)
Dept: GASTROENTEROLOGY | Facility: OUTPATIENT CENTER | Age: 58
End: 2018-08-17

## 2018-08-20 ENCOUNTER — PRE VISIT (OUTPATIENT)
Dept: ONCOLOGY | Facility: CLINIC | Age: 58
End: 2018-08-20

## 2018-08-20 ENCOUNTER — OFFICE VISIT (OUTPATIENT)
Dept: SURGERY | Facility: CLINIC | Age: 58
End: 2018-08-20
Attending: SURGERY
Payer: COMMERCIAL

## 2018-08-20 VITALS
RESPIRATION RATE: 16 BRPM | DIASTOLIC BLOOD PRESSURE: 76 MMHG | BODY MASS INDEX: 24.96 KG/M2 | WEIGHT: 164.7 LBS | HEIGHT: 68 IN | HEART RATE: 60 BPM | TEMPERATURE: 97.7 F | SYSTOLIC BLOOD PRESSURE: 125 MMHG | OXYGEN SATURATION: 99 %

## 2018-08-20 DIAGNOSIS — C78.7 METASTATIC ADENOCARCINOMA TO LIVER (H): Primary | ICD-10-CM

## 2018-08-20 PROCEDURE — G0463 HOSPITAL OUTPT CLINIC VISIT: HCPCS | Mod: ZF

## 2018-08-20 ASSESSMENT — PAIN SCALES - GENERAL: PAINLEVEL: NO PAIN (0)

## 2018-08-20 NOTE — NURSING NOTE
"Oncology Rooming Note    August 20, 2018 8:35 AM   Rian Malik is a 58 year old male who presents for:    Chief Complaint   Patient presents with     Oncology Clinic Visit     New Patient, Colon Ca with liver mets     Initial Vitals: /76 (BP Location: Right arm, Patient Position: Sitting, Cuff Size: Adult Regular)  Pulse 60  Temp 97.7  F (36.5  C) (Oral)  Resp 16  Ht 1.727 m (5' 7.99\")  Wt 74.7 kg (164 lb 11.2 oz)  SpO2 99%  BMI 25.05 kg/m2 Estimated body mass index is 25.05 kg/(m^2) as calculated from the following:    Height as of this encounter: 1.727 m (5' 7.99\").    Weight as of this encounter: 74.7 kg (164 lb 11.2 oz). Body surface area is 1.89 meters squared.  No Pain (0) Comment: Data Unavailable   No LMP for male patient.  Allergies reviewed: Yes  Medications reviewed: Yes    Medications: Medication refills not needed today.  Pharmacy name entered into [facility]:    [facility] [place], [place] - [address]  [facility] - [place], [place] - [address]    Clinical concerns: none Dr Hale was NOT notified.    10 minutes for nursing intake (face to face time)     [place] LPN            "

## 2018-08-20 NOTE — PROGRESS NOTES
REASON FOR EVALUATION:  Newly diagnosed metastatic colon cancer.      HISTORY OF PRESENT ILLNESS:  Mr. Malik is a 58-year-old gentleman who had perforated colon cancer that was found unexpectedly last year.  He had a nononcologic resection and was found to have positive margins as well as a perforated tumor and has had some lymphadenopathy in the remnant mesentery as well.  He has received up-front chemotherapy and also has a lesion in segment 5/6 of his liver immediately adjacent to the right hepatic vein terminal confluences.  I was asked to see him for consideration of liver-directed surgery.      I had a long discussion with Mr. Malik today regarding the lesion in his liver which is consistent with malignancy on MR but not definitive for colon and rectal.  In my mind, he does not have any known history of cirrhosis and I indicated to him that I would feel comfortable treating this as a liver metastases from his colon cancer without a biopsy, although I did suggest to him that I would review with Radiology just to be sure that I am interpreting the reading correctly.  I discussed the details of a partial right hepatectomy with Mr. Malik that we could perform as a hand-assisted procedure in cooperation with Dr. Oswald.  I also discussed that we would plan to do a cholecystectomy only because the gallbladder is likely in the way of our transection line and will need to be removed  for that reason.  I discussed risks of surgery including bleeding, infection, intra-abdominal abscess, bile leak and a risk of mortality that I quoted at less than 2% for the liver portion of this procedure.  Overall, he should have a fully functional liver and I would expect a full recovery.      We currently have Mr. Malik coordinated on the operating room schedule with Dr. Oswald on 09/12.  I will review his films with Radiology as to the need for biopsy and I will contact him by the end of this week with a definitive  decision regarding that.  Mr. Malik seems to understand the situation quite well and offered no additional questions for me today.        A total of 35 minutes was spent on this case, 30 minutes was spent in face-to-face time and an additional 5 minutes in coordination of care and review of his records.

## 2018-08-20 NOTE — PATIENT INSTRUCTIONS
Below is a brief summary of your discussion and care plan from today's visit below.   ______________________________________________________________________    As discussed with Dr. Hale we will proceed with the following:     -Surgical intervention as discussed.     - we will review your films with our radiologist and contact you by the end of the week with a finalized plan.    ______________________________________________________________________    It was a pleasure seeing you in clinic today - please be in touch if there are any further questions that arise following today's visit.  During business hours, you may reach my Clinic Coordinator at [phone].  For urgent/emergent questions after business hours, you may reach the on-call Surgery Resident by contacting the [facility]  at [phone].    Any benign/non-urgent test results are usually communicated via letter or [employer] message within 1-2 weeks after completion.  Urgent results (those that require a change in the previously-discussed care plan) are usually communicated via a phone call once available from our clinic staff to discuss the results and the next steps in your evaluation.    I recommend signing up for [employer] access if you have not already done so and are comfortable with using a computer.  This allows for online access to your lab results and also helps you communicate efficiently with my clinic should any questions arise in your care.      Sincerely,    Gaye Forte RN  Care Coordinator -   Phone: [phone]  Fax: [phone]

## 2018-08-20 NOTE — MR AVS SNAPSHOT
After Visit Summary   8/20/2018    Rian Malik    MRN: [id number]           Patient Information     Date Of Birth          1960        Visit Information        Provider Department      8/20/2018 9:00 AM Saqib Hale MD [facility]        Care Instructions    Below is a brief summary of your discussion and care plan from today's visit below.   ______________________________________________________________________    As discussed with Dr. Hale we will proceed with the following:     -Surgical intervention as discussed.     - we will review your films with our radiologist and contact you by the end of the week with a finalized plan.    ______________________________________________________________________    It was a pleasure seeing you in clinic today - please be in touch if there are any further questions that arise following today's visit.  During business hours, you may reach my Clinic Coordinator at [phone].  For urgent/emergent questions after business hours, you may reach the on-call Surgery Resident by contacting the [facility]  at [phone].    Any benign/non-urgent test results are usually communicated via letter or [employer] message within 1-2 weeks after completion.  Urgent results (those that require a change in the previously-discussed care plan) are usually communicated via a phone call once available from our clinic staff to discuss the results and the next steps in your evaluation.    I recommend signing up for [employer] access if you have not already done so and are comfortable with using a computer.  This allows for online access to your lab results and also helps you communicate efficiently with my clinic should any questions arise in your care.      Sincerely,    Gaye Forte RN  Care Coordinator -   Phone: [phone]  Fax: [phone]                Follow-ups after your visit        Your next 10 appointments  already scheduled     Aug 24, 2018  1:00 PM CDT   Colonoscopy with Herminio Oswald MD   [facility] ([facility])    [address]  [address]  [facility] [address]   [phone]            Aug 27, 2018  1:45 PM CDT   LAB with UC LAB   [facility] ([facility])    [address] [place] [id number] [phone]           Please do not eat 10-12 hours before your appointment if you are coming in fasting for labs on lipids, cholesterol, or glucose (sugar). This does not apply to pregnant women. Water, hot tea and black coffee (with nothing added) are okay. Do not drink other fluids, diet soda or chew gum.            Aug 27, 2018  2:00 PM CDT   CT CHEST ABDOMEN PELVIS W/O & W CONTRAST with UCCT1   [facility] ([facility])    [address] [place] [id number] [phone]           Please bring any scans or X-rays taken at other hospitals, if similar tests were done. Also bring a list of your medicines, including vitamins, minerals and over-the-counter drugs. It is safest to leave personal items at home.  Be sure to tell your doctor:   If you have any allergies.   If there s any chance you are pregnant.   If you are breastfeeding.  How to prepare:   Do not eat or drink for 2 hours before your exam. If you need to take medicine, you may take it with small sips of water. (We may ask you to take liquid medicine as well.)   Please wear loose clothing, such as a sweat suit or jogging clothes. Avoid snaps, zippers and other metal. We may ask you to undress and put on a hospital gown.  Please arrive 30 minutes early for your CT. Once in the department you might be asked to drink water 15-20 minutes prior to your exam.  If indicated you may be asked to drink an oral contrast in advance of your CT.  If this is the case, the imaging team will let you know or be in  contact with you prior to your appointment  Patients over 70 or patients with diabetes or kidney problems:   If you haven t had a blood test (creatinine test) within the last 30 days, the Cardiologist/Radiologist may require you to get this test prior to your exam.  If you have diabetes:   Continue to take your metformin medication on the day of your exam  If you have any questions, please call the Imaging Department where you will have your exam.            Aug 27, 2018  2:30 PM CDT   (Arrive by 2:15 PM)   PAC EVALUATION with SAVANNA James CNP   [facility] ([facility])    [address]  [address] [facility] [address]   [phone]            Aug 27, 2018  3:30 PM CDT   (Arrive by 3:15 PM)   PAC RN ASSESSMENT with  Pac Rn   [facility] [place] ([facility])    [address]  [address] [facility] [address]   [phone]            Aug 27, 2018  4:00 PM CDT   (Arrive by 3:45 PM)   PAC Anesthesia Consult with  Pac Anesthesiologist   [facility] [place] ([facility])    [address]  [address] [facility] [address]   [phone]            Sep 04, 2018   Procedure with Raymon Cisneros MD   [facility] [place] (--)    [address] [address]   [phone]            Oct 22, 2018  2:15 PM CDT   Return Visit with Padilla Last MD   [facility] ([facility])    [address] [place] [id number] [phone]            Nov 13, 2018  9:30 AM CST   Lab with  LAB   [facility] ([facility])    [address]  [address] [facility] [address]   [phone]            Nov 13, 2018 10:30 AM CST   (Arrive by 10:15 AM)   Return General Liver with Yaakov Burroughs MD   [facility]at[facility] ([facility])  "   [address]  [address]  [facility] [address] [phone]              Who to contact     If you have questions or need follow up information about today's clinic visit or your schedule please contact [facility] directly at [phone].  Normal or non-critical lab and imaging results will be communicated to you by MyChart, letter or phone within 4 business days after the clinic has received the results. If you do not hear from us within 7 days, please contact the clinic through [employer] or phone. If you have a critical or abnormal lab result, we will notify you by phone as soon as possible.  Submit refill requests through [employer] or call your pharmacy and they will forward the refill request to us. Please allow 3 business days for your refill to be completed.          Additional Information About Your Visit        MyChart Information     [employer] gives you secure access to your electronic health record. If you see a primary care provider, you can also send messages to your care team and make appointments. If you have questions, please call your primary care clinic.  If you do not have a primary care provider, please call [phone] and they will assist you.        Care EveryWhere ID     This is your Care EveryWhere ID. This could be used by other organizations to access your [place] medical records  [id number]        Your Vitals Were     Pulse Temperature Respirations Height Pulse Oximetry BMI (Body Mass Index)    60 97.7  F (36.5  C) (Oral) 16 1.727 m (5' 7.99\") 99% 25.05 kg/m2       Blood Pressure from Last 3 Encounters:   08/20/18 125/76   07/30/18 128/77   07/30/18 136/79    Weight from Last 3 Encounters:   08/20/18 74.7 kg (164 lb 11.2 oz)   07/30/18 75.1 kg (165 lb 9.6 oz)   07/30/18 75.3 kg (166 lb)              Today, you had the following     No orders found for display       Primary Care Provider Office Phone # Fax #    Moreno Harris [phone] " "[phone]       [address]  [place] [address]        Equal Access to Services     KHANH FLANAGAN : Hadii cameron ku hadmartyo Sokaleyali, waaxda luqadaha, qaybta kaalmada adesatish, alisa gabriellain hayaatheodore kendallbria ramirez carolyn mg. So [facility] [phone].    ATENCIÓN: Si habla español, tiene a martino disposición servicios gratuitos de asistencia lingüística. Llame al [phone].    We comply with applicable federal civil rights laws and [place] laws. We do not discriminate on the basis of race, color, national origin, age, disability, sex, sexual orientation, or gender identity.            Thank you!     Thank you for choosing [facility]  for your care. Our goal is always to provide you with excellent care. Hearing back from our patients is one way we can continue to improve our services. Please take a few minutes to complete the written survey that you may receive in the mail after your visit with us. Thank you!             Your Updated Medication List - Protect others around you: Learn how to safely use, store and throw away your medicines at www.disposemymeds.org.          This list is accurate as of 8/20/18  9:16 AM.  Always use your most recent med list.                   Brand Name Dispense Instructions for use Diagnosis    docusate sodium 100 MG tablet    COLACE    60 tablet    Take 100 mg by mouth daily    Special screening for malignant neoplasms, colon       LORazepam 0.5 MG tablet    ATIVAN    30 tablet    Take 1 tablet (0.5 mg) by mouth every 4 hours as needed (Anxiety, Nausea/Vomiting or Sleep)    Adenocarcinoma of sigmoid colon (H)       * magnesium citrate solution     296 mL    Take 296 mLs by mouth See Admin Instructions Refer to surgery handout.Refer to \"Getting ready for Surgery\" packet for instructions.    Cancer of sigmoid colon (H)       * magnesium citrate solution     296 mL    Take 296 mLs by mouth See Admin Instructions Refer to \"Getting ready for Colonoscopy\" packet for " "instructions.    Cancer of sigmoid colon (H)       * ondansetron 8 MG tablet    ZOFRAN    30 tablet    Take 1 tablet (8 mg) by mouth every 8 hours as needed (Nausea/Vomiting)    Adenocarcinoma of sigmoid colon (H)       * ondansetron 4 MG tablet    ZOFRAN    3 tablet    Take 1 tablet (4 mg) by mouth every 6 hours as needed for nausea While taking neomycin and flagyl.    Cancer of sigmoid colon (H)       * polyethylene glycol powder    MIRALAX/GLYCOLAX    238 g    Refer to \"Getting ready for Surgery\" packet for instructions.    Cancer of sigmoid colon (H)       * polyethylene glycol powder    MIRALAX/GLYCOLAX    238 g    Refer to \"Getting ready for Colonoscopy\" packet for instructions.    Cancer of sigmoid colon (H)       prochlorperazine 10 MG tablet    COMPAZINE    30 tablet    Take 1 tablet (10 mg) by mouth every 6 hours as needed (Nausea/Vomiting)    Adenocarcinoma of sigmoid colon (H)       * Notice:  This list has 6 medication(s) that are the same as other medications prescribed for you. Read the directions carefully, and ask your doctor or other care provider to review them with you.      "

## 2018-08-20 NOTE — LETTER
8/20/2018       RE: Rian Malik  [address] [place] [address]     Dear Colleague,    Thank you for referring your patient, Rian Malik, to the [facility]. Please see a copy of my visit note below.    REASON FOR EVALUATION:  Newly diagnosed metastatic colon cancer.      HISTORY OF PRESENT ILLNESS:  Mr. Malik is a 58-year-old gentleman who had perforated colon cancer that was found unexpectedly last year.  He had a nononcologic resection and was found to have positive margins as well as a perforated tumor and has had some lymphadenopathy in the remnant mesentery as well.  He has received up-front chemotherapy and also has a lesion in segment 5/6 of his liver immediately adjacent to the right hepatic vein terminal confluences.  I was asked to see him for consideration of liver-directed surgery.      I had a long discussion with Mr. Malik today regarding the lesion in his liver which is consistent with malignancy on MR but not definitive for colon and rectal.  In my mind, he does not have any known history of cirrhosis and I indicated to him that I would feel comfortable treating this as a liver metastases from his colon cancer without a biopsy, although I did suggest to him that I would review with Radiology just to be sure that I am interpreting the reading correctly.  I discussed the details of a partial right hepatectomy with Mr. Malik that we could perform as a hand-assisted procedure in cooperation with Dr. Oswald.  I also discussed that we would plan to do a cholecystectomy only because the gallbladder is likely in the way of our transection line and will need to be removed  for that reason.  I discussed risks of surgery including bleeding, infection, intra-abdominal abscess, bile leak and a risk of mortality that I quoted at less than 2% for the liver portion of this procedure.  Overall, he should have a fully functional liver and I would expect a full  recovery.      We currently have Mr. Malik coordinated on the operating room schedule with Dr. Oswald on 09/12.  I will review his films with Radiology as to the need for biopsy and I will contact him by the end of this week with a definitive decision regarding that.  Mr. Malik seems to understand the situation quite well and offered no additional questions for me today.        A total of 35 minutes was spent on this case, 30 minutes was spent in face-to-face time and an additional 5 minutes in coordination of care and review of his records.         Again, thank you for allowing me to participate in the care of your patient.      Sincerely,    Saqib Hale MD

## 2018-08-21 ENCOUNTER — TELEPHONE (OUTPATIENT)
Dept: GASTROENTEROLOGY | Facility: OUTPATIENT CENTER | Age: 58
End: 2018-08-21

## 2018-08-24 ENCOUNTER — DOCUMENTATION ONLY (OUTPATIENT)
Dept: GASTROENTEROLOGY | Facility: OUTPATIENT CENTER | Age: 58
End: 2018-08-24
Payer: COMMERCIAL

## 2018-08-24 ENCOUNTER — TRANSFERRED RECORDS (OUTPATIENT)
Dept: HEALTH INFORMATION MANAGEMENT | Facility: CLINIC | Age: 58
End: 2018-08-24

## 2018-08-27 ENCOUNTER — ALLIED HEALTH/NURSE VISIT (OUTPATIENT)
Dept: SURGERY | Facility: CLINIC | Age: 58
End: 2018-08-27
Payer: COMMERCIAL

## 2018-08-27 ENCOUNTER — RADIANT APPOINTMENT (OUTPATIENT)
Dept: CT IMAGING | Facility: CLINIC | Age: 58
End: 2018-08-27
Attending: COLON & RECTAL SURGERY
Payer: COMMERCIAL

## 2018-08-27 ENCOUNTER — OFFICE VISIT (OUTPATIENT)
Dept: SURGERY | Facility: CLINIC | Age: 58
End: 2018-08-27
Payer: COMMERCIAL

## 2018-08-27 ENCOUNTER — ANESTHESIA EVENT (OUTPATIENT)
Dept: SURGERY | Facility: CLINIC | Age: 58
DRG: 330 | End: 2018-08-27
Payer: COMMERCIAL

## 2018-08-27 ENCOUNTER — APPOINTMENT (OUTPATIENT)
Dept: SURGERY | Facility: CLINIC | Age: 58
End: 2018-08-27
Payer: COMMERCIAL

## 2018-08-27 VITALS
HEART RATE: 61 BPM | OXYGEN SATURATION: 96 % | HEIGHT: 68 IN | TEMPERATURE: 97.7 F | DIASTOLIC BLOOD PRESSURE: 70 MMHG | WEIGHT: 162.6 LBS | BODY MASS INDEX: 24.64 KG/M2 | SYSTOLIC BLOOD PRESSURE: 128 MMHG

## 2018-08-27 DIAGNOSIS — C18.7 CANCER OF SIGMOID COLON (H): ICD-10-CM

## 2018-08-27 DIAGNOSIS — Z01.818 PRE-OP EXAMINATION: Primary | ICD-10-CM

## 2018-08-27 LAB
ABO + RH BLD: NORMAL
ABO + RH BLD: NORMAL
ALBUMIN SERPL-MCNC: 3.8 G/DL (ref 3.4–5)
ALP SERPL-CCNC: 111 U/L (ref 40–150)
ALT SERPL W P-5'-P-CCNC: 67 U/L (ref 0–70)
ANION GAP SERPL CALCULATED.3IONS-SCNC: 7 MMOL/L (ref 3–14)
AST SERPL W P-5'-P-CCNC: 52 U/L (ref 0–45)
BASOPHILS # BLD AUTO: 0 10E9/L (ref 0–0.2)
BASOPHILS NFR BLD AUTO: 1.1 %
BILIRUB SERPL-MCNC: 1.2 MG/DL (ref 0.2–1.3)
BLD GP AB SCN SERPL QL: NORMAL
BLOOD BANK CMNT PATIENT-IMP: NORMAL
BUN SERPL-MCNC: 14 MG/DL (ref 7–30)
CALCIUM SERPL-MCNC: 8.6 MG/DL (ref 8.5–10.1)
CEA SERPL-MCNC: 1.2 UG/L (ref 0–2.5)
CHLORIDE SERPL-SCNC: 107 MMOL/L (ref 94–109)
CO2 SERPL-SCNC: 25 MMOL/L (ref 20–32)
CREAT SERPL-MCNC: 0.86 MG/DL (ref 0.66–1.25)
DIFFERENTIAL METHOD BLD: ABNORMAL
EOSINOPHIL # BLD AUTO: 0 10E9/L (ref 0–0.7)
EOSINOPHIL NFR BLD AUTO: 1.1 %
ERYTHROCYTE [DISTWIDTH] IN BLOOD BY AUTOMATED COUNT: 13.2 % (ref 10–15)
GFR SERPL CREATININE-BSD FRML MDRD: >90 ML/MIN/1.7M2
GLUCOSE SERPL-MCNC: 90 MG/DL (ref 70–99)
HCT VFR BLD AUTO: 38.6 % (ref 40–53)
HGB BLD-MCNC: 13.8 G/DL (ref 13.3–17.7)
IMM GRANULOCYTES # BLD: 0 10E9/L (ref 0–0.4)
IMM GRANULOCYTES NFR BLD: 0.5 %
LYMPHOCYTES # BLD AUTO: 1.3 10E9/L (ref 0.8–5.3)
LYMPHOCYTES NFR BLD AUTO: 33.9 %
MCH RBC QN AUTO: 32.1 PG (ref 26.5–33)
MCHC RBC AUTO-ENTMCNC: 35.8 G/DL (ref 31.5–36.5)
MCV RBC AUTO: 90 FL (ref 78–100)
MONOCYTES # BLD AUTO: 0.2 10E9/L (ref 0–1.3)
MONOCYTES NFR BLD AUTO: 6.4 %
NEUTROPHILS # BLD AUTO: 2.1 10E9/L (ref 1.6–8.3)
NEUTROPHILS NFR BLD AUTO: 57 %
NRBC # BLD AUTO: 0 10*3/UL
NRBC BLD AUTO-RTO: 0 /100
PLATELET # BLD AUTO: 55 10E9/L (ref 150–450)
POTASSIUM SERPL-SCNC: 3.8 MMOL/L (ref 3.4–5.3)
PREALB SERPL IA-MCNC: 16 MG/DL (ref 15–45)
PROT SERPL-MCNC: 7.3 G/DL (ref 6.8–8.8)
RBC # BLD AUTO: 4.3 10E12/L (ref 4.4–5.9)
SODIUM SERPL-SCNC: 138 MMOL/L (ref 133–144)
SPECIMEN EXP DATE BLD: NORMAL
WBC # BLD AUTO: 3.8 10E9/L (ref 4–11)

## 2018-08-27 RX ORDER — IOPAMIDOL 755 MG/ML
100 INJECTION, SOLUTION INTRAVASCULAR ONCE
Status: COMPLETED | OUTPATIENT
Start: 2018-08-27 | End: 2018-08-27

## 2018-08-27 RX ADMIN — IOPAMIDOL 100 ML: 755 INJECTION, SOLUTION INTRAVASCULAR at 13:49

## 2018-08-27 ASSESSMENT — LIFESTYLE VARIABLES: TOBACCO_USE: 1

## 2018-08-27 NOTE — ANESTHESIA PREPROCEDURE EVALUATION
Anesthesia Evaluation     . Pt has had prior anesthetic. Type: General and MAC    No history of anesthetic complications          ROS/MED HX    ENT/Pulmonary:     (+)tobacco use (Smoked for ~3 years lest than a pack a day.  Quit 2017), Past use 0.1 packs/day  , . .    Neurologic:  - neg neurologic ROS     Cardiovascular:  - neg cardiovascular ROS   (+) ----. : . . . :. . No previous cardiac testing       METS/Exercise Tolerance:  >4 METS   Hematologic:     (+) History of Transfusion no previous transfusion reaction -      Musculoskeletal:  - neg musculoskeletal ROS       GI/Hepatic: Comment: Maldonado's esophagus.     (+) hepatitis (Treated. 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  status post Surgery and Chemo open sigmoidectomy with end colostomy at OSH by Dr. Schneider on 10/11/17        Other:    (+) No chance of pregnancy no H/O Chronic Pain,no other significant disability                    Physical Exam  Normal systems: dental    Airway   Mallampati: I  TM distance: >3 FB  Neck ROM: full    Dental   (+) missing    Cardiovascular   Rhythm and rate: regular and normal      Pulmonary    breath sounds clear to auscultation    Other findings: Right chest port.    Lab Results      Component                Value               Date                      WBC                      3.8                 08/27/2018            Lab Results      Component                Value               Date                      RBC                      4.30                08/27/2018            Lab Results      Component                Value               Date                      HGB                      13.8                08/27/2018            Lab Results       Component                Value               Date                      HCT                      38.6                08/27/2018            Lab Results      Component                Value               Date                      MCV                      90                  08/27/2018            Lab Results      Component                Value               Date                      MCH                      32.1                08/27/2018            Lab Results      Component                Value               Date                      MCHC                     35.8                08/27/2018            Lab Results      Component                Value               Date                      RDW                      13.2                08/27/2018            Lab Results      Component                Value               Date                      PLT                      55                  08/27/2018              Last Comprehensive Metabolic Panel:  Sodium       Date                     Value               Ref Range           Status                08/27/2018               138                 133 - 144 mmol*     Final            ----------  Potassium       Date                     Value               Ref Range           Status                08/27/2018               3.8                 3.4 - 5.3 mmol*     Final            ----------  Chloride       Date                     Value               Ref Range           Status                08/27/2018               107                 94 - 109 mmol/L     Final            ----------  Carbon Dioxide       Date                     Value               Ref Range           Status                08/27/2018               25                  20 - 32 mmol/L      Final            ----------  Anion Gap       Date                     Value               Ref Range           Status                08/27/2018               7                   3 - 14 mmol/L       Final            ----------  Glucose        Date                     Value               Ref Range           Status                08/27/2018               90                  70 - 99 mg/dL       Final            ----------  Urea Nitrogen       Date                     Value               Ref Range           Status                08/27/2018               14                  7 - 30 mg/dL        Final            ----------  Creatinine       Date                     Value               Ref Range           Status                08/27/2018               0.86                0.66 - 1.25 mg*     Final            ----------  GFR Estimate       Date                     Value               Ref Range           Status                08/27/2018               >90                 >60 mL/min/1.7*     Final              Comment:    Non  GFR Calc  ----------  Calcium       Date                     Value               Ref Range           Status                08/27/2018               8.6                 8.5 - 10.1 mg/*     Final            ----------      Procedures  EGD 8/4/2017  Impression:          - Normal examined duodenum.                        - Normal stomach.                        - Esophageal mucosal changes classified as Maldonado's                        stage C0-M2 per [place] criteria. Nodule at 39 cm was                        biopsied.        Surgical pathology exam 8/4/18  Patient Name: TRINY SIMMONS   MR#: [id number]   Specimen #: [id number]   Collected: 8/4/2017   Received: 8/4/2017   Reported: 8/9/2017 11:26   Ordering Phy(s): JANESSA ROMAN     For improved result formatting, select 'View Enhanced Report Format'   under Linked Documents section.     SPECIMEN(S):   A: Esophageal biopsy, nodularity at 39 cm   B: Sigmoid colon biopsy, stricture at 40 cm     FINAL DIAGNOSIS:   A. ESOPHAGEAL BIOPSY, NODULARITY AT 39 CM:   - Squamous and gastric type epithelium with chronic inflammation and   reactive epithelial changes   - Negative for intestinal  metaplasia or dysplasia     B. SIGMOID COLON BIOPSY, STRICTURE AT 40 CM:   - Colonic mucosa with mild chronic inflammation, fibrosis, muscularis   mucosae hyperplasia and reactive epithelial changes consistent with   healed local injury   - Negative for dysplasia and malignancy     Procedures   ECG SR 62 bpm    EXAMINATION: CT CHEST/ABDOMEN/PELVIS W CONTRAST, 8/27/2018 2:05 PM     TECHNIQUE:  Helical CT images from the thoracic inlet through the  symphysis pubis were obtained  with contrast. Contrast dose: ISOVUE  370 100cc     COMPARISON: CT exam 8/3/2018; MRI 8/3/2018; CT exam 7/11/2018.     IMPRESSION: In this patient with history of hepatitis C and sigmoid  colon adenocarcinoma:  1. No overt cirrhotic changes off the liver parenchyma in the present  study.   Stable hepatic segment 5/6 liver lesion which remains suspicious for  malignancy. Liver lesion previously evaluated on MRI 8/3/2018  demonstrated enhancement pattern not characteristic for colorectal  adenocarcinoma metastases. Recommend further evaluation with liver  biopsy for better characterization.  2. Cholelithiasis. Mild gallbladder wall edema, nonspecific, likely  due to adjacent hepatic intrinsic parenchymal disease.  3. Findings concerning for portal hypertension such as splenomegaly  and portosystemic collaterals as periesophageal varices. No ascites.     MRI ABDOMEN 8/3/2018     CLINICAL HISTORY:  Colon cancer. Ill-defined liver lesion on recent  CT.     IMPRESSION: In this patient with history of hepatitis C and sigmoid  colon adenocarcinoma:  1. MRi findings concerning for hepatic intrinsic parenchymal disease  with mild fibrotic changes.  2. 1.8 cm liver lesion in hepatic segment 5 suspicious for malignancy.  Liver lesion does not have MRI enhancement pattern characteristic for  colorectal adenocarcinoma metastases. Recommend further evaluation  with liver biopsy for better characterization.  3. Cholelithiasis. Mild gallbladder wall edema  likely due to adjacent  hepatic intrinsic parenchymal disease.  4. Splenomegaly. No ascites.                PAC Discussion and Assessment    ASA Classification: 3  Case is suitable for: [place]  Anesthetic techniques and relevant risks discussed: GA  Invasive monitoring and risk discussed:   Types:   Possibility and Risk of blood transfusion discussed:   NPO instructions given:   Additional anesthetic preparation and risks discussed:   Needs early admission to pre-op area:   Other:     PAC Resident/NP Anesthesia Assessment:  Rian Malik is a 58 year old male scheduled for Bilateral Cystoscopy With Stents, Laparoscopic Takedown Colostomy, Possible Cryoreductive Chemotherapy (HIPEC), Flexible Sigmoidoscopy, Laparoscopic Hand Assisted Right Hepatectomy, Intra-Op Ultrasound, Laparoscopic Cholecystectomy on 18 with Geoffrey Freedman and Margret at [facility]  under general anesthesia. Mr. Malik was diagnosed with adenocarcinoma of sigmoid colon last  and is status post open sigmoidectomy with Burnett's pouch creation  on 10/11/17 at OSH.  He was then seen by Dr. Last from oncology on 2018.  Mr. Malik was started on chemotherapy for 6 months and completed treatment on 2018.  He was seen by  on 2018 and was started on treatment for his hepatitis C and has since completed treatment.  On follow-up from chemotherapy, Mr. Malik's MRI demonstrated a lesion in his liver which is consistent with malignancy. Dr. Hale saw Mr. Malik on 18 and recommended the above procedure.       Mr. Malik presents to PAC and denies any cardiopulmonary history or symptoms. He denies any nausea or vomiting and reports fair appetite.  He has continued to work through his chemotherapy.  He smoked for about three years after his wife  from complications of pneumonia and septicemia.  H/O ETOH dependence with last drink in .  He would like to proceed with above surgical intervention.       He has the following specific operative considerations:   - METS:  >4. RCRI : No serious cardiac risks.  0.4 % risk of major adverse cardiac event.  - DIANE # of risks 2/8 = low risk  - VTE risk:  3%.  Increased VTE risk: Recommend use of mechanical/chemical VTE prophylaxis in the nivia- and postoperative periods.    - Risk of PONV score = 2.  If > 2, anti-emetic intervention recommended.    1.  Cardiology - denies cardiopulmonary history or symptoms.      2.  Pulmonary - minimal smoking history.   3.  [place]-rectal - adenocarcinoma of the sigmoid colon s/p open sigmoidectomy with Burnett's pouch creation (10/11/17) and chemotherapy (completed 7/2/18).  Now with suspected mets to liver.  Above procedure planned.    4. Liver -  Hepatitis - treated.  H/O cirrhosis.  Plts 55 today.    5.  GI - Maldonado's esophagus>>>last EGD 8/2017.    - Anesthesia considerations:  Refer to PAC assessment in anesthesia records      Arrival time, NPO, shower and medication instructions provided by nursing staff today.  Preparing For Your Surgery handout given.  Patient was discussed with Dr Alexander. I spent 30 minutes face to face with patient assessing, educating, counseling and/or coordinating care and examining the patient.  Of that 30 minutes, I spent greater than 50% of my time counseling and/or coordinating care.          Reviewed and Signed by PAC Mid-Level Provider/Resident  Mid-Level Provider/Resident: Alondra CORRALES CNP  Date: 8/27/18  Time: 16:00    Attending Anesthesiologist Anesthesia Assessment:  58 year old for extensive surgery in ongoing management of colon cancer, now with liver mets. He had sigmoidectomy in the setting of perforated colon cancer, had positive margins, but no further resection done then, just chemotherapy. He comes now with liver lesion, likely metastatic disease, and plan is for ureteral stents, cytoreductive surgery and hyperthermic chemotherapy, further colon resection and right hepatectomy with  cholecystectomy. History of hep C, now clear since treatment.     Active, no cardiac or pulmonary disease although he does have history of smoking.    Patient/case discussed with JORGE L. No need to see patient. Patient is appropriate for the planned procedure without further work-up or medical management.      Reviewed and Signed by PAC Anesthesiologist  Anesthesiologist: tom  Date: 8/27/2018  Time:   Pass/Fail: Pass  Disposition:     PAC Pharmacist Assessment:        Pharmacist:   Date:   Time:      Anesthesia Plan      History & Physical Review  History and physical reviewed and following examination; no interval change.    ASA Status:  3 .    NPO Status:  > 6 hours    Plan for General and ETT with Intravenous induction. Maintenance will be Balanced.    PONV prophylaxis:  Ondansetron (or other 5HT-3)  Additional equipment: 2nd IV and Arterial Line      Postoperative Care      Consents  Anesthetic plan, risks, benefits and alternatives discussed with:  Patient.  Use of blood products discussed: Yes.   Use of blood products discussed with Patient.  Consented to blood products.  .                          .

## 2018-08-27 NOTE — PATIENT INSTRUCTIONS
Preparing for Your Surgery      Name:  Rian Malik   MRN:  [id number]   :  1960   Today's Date:  2018     Arriving for surgery:  Surgery date:  18    Arrival time:  05:30 am    Please come to:       [facility] [address]    [address] [place]  [address]    -   parking is available in front of the hospital from 5:15 am to 8:00 pm    -  Stop at the Information Desk in the lobby    -   Inform the information person that you are here for surgery. An escort to 3c will be provided. If you would not like an escort, please proceed to 3C on the 3rd floor. [phone]     What can I eat or drink?  -  You may have solid food or milk products up until - follow bowel prep instructions.  -  You may have water, apple juice or 7up/Sprite until 2 hours prior to your surgery.    Which medicines can I take?    Stop Aspirin, vitamins and supplements one week prior to surgery.  Hold Ibuprofen and Naproxen for 24 hours prior to surgery.       -  Please take these medications the day of surgery:  Tylenol if needed.      How do I prepare myself?  -  Take two showers: one the night before surgery; and one the morning of surgery.         Use Scrubcare or Hibiclens to wash from neck down.  You may use your own     shampoo and conditioner. No other hair products.   -  Do NOT use lotion, powder, deodorant, or antiperspirant the day of your surgery.  -  Do NOT wear any jewelry.    - Do not bring your own medications to the hospital, except for inhalers and eye   drops.  -  Bring your ID and insurance card.    Questions or Concerns:  -If you have questions or concerns regarding the day of surgery, please call [phone].     -If you are scheduled at the [facility] please call [phone].    -For questions after surgery please call your surgeons office.

## 2018-08-27 NOTE — H&P
Pre-Operative H & P     CC:  Preoperative exam to assess for increased cardiopulmonary risk while undergoing surgery and anesthesia.    Date of Encounter: 2018  Primary Care Physician:  Moreno Harris  Rian Malik is a 58 year old male who presents for pre-operative H & P in preparation for  Bilateral Cystoscopy With Stents, Laparoscopic Takedown Colostomy, Possible Cryoreductive Chemotherapy (HIPEC), Flexible Sigmoidoscopy, Laparoscopic Hand Assisted Right Hepatectomy, Intra-Op Ultrasound, Laparoscopic Cholecystectomy on 18 with Geoffrey Freedman and Margret at [facility]  under general anesthesia. Mr. Malik was diagnosed with adenocarcinoma of sigmoid colon last  and is status post open sigmoidectomy with Burnett's pouch creation  on 10/11/17 at OSH.  He was then seen by Dr. Last from oncology on 2018.  Mr. Malik was started on chemotherapy for 6 months and completed treatment on 2018.  On follow-up from chemotherapy, Mr. Malik's MRI demonstrated a lesion in his liver which is consistent with malignancy. Dr. Hale saw Mr. Malik on 18 and recommended the above procedure.       Mr. Malik presents to PAC and denies any cardiopulmonary history or symptoms. He denies any nausea or vomiting and reports fair appetite.  He has continued to work through his chemotherapy.  He was seen by  on 2018 and was started on treatment for his hepatitis C and reports he completed treatment.  He smoked for about three years after his wife  from complications from pneumonia and septicemia.  H/O ETOH dependence with last drink in .  He would like to proceed with above surgical intervention.       History is obtained from the patient and electronic health record.     Past Medical History  Past Medical History:   Diagnosis Date     Maldonado's esophagus      Cirrhosis (H)      Colon cancer (H) 10/11/2017    Poorly differentiated adenocarcinoma      "Hepatitis C        Past Surgical History  Past Surgical History:   Procedure Laterality Date     COLONOSCOPY N/A 8/4/2017    Procedure: COMBINED COLONOSCOPY, SINGLE OR MULTIPLE BIOPSY/POLYPECTOMY BY BIOPSY;  Colon & EGD;  Surgeon: Cristobal Blanco MD;  Location: [facility]     COLOSTOMY Left 10/11/2017     CYSTOSCOPY AND LEFT STENT PLACEMENT  10/11/2017    Dr. Reyna     ESOPHAGOSCOPY, GASTROSCOPY, DUODENOSCOPY (EGD), COMBINED N/A 8/4/2017    Procedure: COMBINED ESOPHAGOSCOPY, GASTROSCOPY, DUODENOSCOPY (EGD), BIOPSY SINGLE OR MULTIPLE;;  Surgeon: Cristobal Blanco MD;  Location: [facility]     HERNIA REPAIR Left 1988    Left inguinal     LAPAROSCOPIC CONVERTED TO OPEN SIGMOIDECTOMY WITH END COLOSTOMY  10/11/2017    Dr. Schneider       Hx of Blood transfusions/reactions: yes without reaction     Hx of abnormal bleeding or anti-platelet use: denies    Steroid use in the last year: denies    Personal or FH with difficulty with Anesthesia:  denies    Prior to Admission Medications  Current Outpatient Prescriptions   Medication Sig Dispense Refill     docusate sodium (COLACE) 100 MG tablet Take 100 mg by mouth daily 60 tablet 1     magnesium citrate solution Take 296 mLs by mouth See Admin Instructions Refer to surgery handout.Refer to \"Getting ready for Surgery\" packet for instructions. (Patient not taking: Reported on 8/20/2018) 296 mL 0     magnesium citrate solution Take 296 mLs by mouth See Admin Instructions Refer to \"Getting ready for Colonoscopy\" packet for instructions. (Patient not taking: Reported on 8/20/2018) 296 mL 0       Allergies  Allergies   Allergen Reactions     Acetaminophen      Naproxen        Social History  Social History     Social History     Marital status:      Spouse name: N/A     Number of children: 2     Years of education: N/A     Occupational History     Not on file.     Social History Main Topics     Smoking status: Former Smoker     Packs/day: 0.10     Years: 3.00     Types: Cigarettes     Start " "date: 11/6/2014     Quit date: 10/6/2017     Smokeless tobacco: Never Used      Comment:       Alcohol use No      Comment: Sober since 2009.  Drank 6 beers during the weekdays and 12 beers on weekends.       Drug use: No     Sexual activity: Yes     Partners: Female     Other Topics Concern     Not on file     Social History Narrative    Lives in own home with adult son and daughter.       Family History  Family History   Problem Relation Age of Onset     Skin Cancer Sister        ROS/MED HX    ENT/Pulmonary:     (+)tobacco use (Smoked for ~3 years lest than a pack a day.  Quit 2017), Past use 0.1 packs/day  , . .    Neurologic:  - neg neurologic ROS     Cardiovascular:  - neg cardiovascular ROS   (+) ----. : . . . :. . No previous cardiac testing       METS/Exercise Tolerance:  >4 METS   Hematologic:     (+) History of Transfusion no previous transfusion reaction -      Musculoskeletal:  - neg musculoskeletal ROS       GI/Hepatic: Comment: Maldonado's esophagus.     (+) hepatitis (Treated. 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  status post Surgery and Chemo open sigmoidectomy with end colostomy at OSH by Dr. Schneider on 10/11/17        Other:    (+) No chance of pregnancy no H/O Chronic Pain,no other significant disability          Temp: 97.7  F (36.5  C) Temp src: Oral BP: 128/70 Pulse: 61     SpO2: 96 %         162 lbs 9.6 oz  5' 8\"   Body mass index is 24.72 kg/(m^2).       Physical Exam  Constitutional: Awake, alert, cooperative, no apparent distress, and appears stated age.  Eyes: Pupils equal, round and reactive to light, extra ocular muscles intact, sclera clear, conjunctiva normal.  HENT: Normocephalic, oral pharynx with moist mucus " membranes, good dentition with missing molar. No goiter appreciated.   Respiratory: Clear to auscultation bilaterally, no crackles or wheezing.  Cardiovascular: Regular rate and rhythm, normal S1 and S2, and no murmur noted.  Carotids +2, no bruits. No edema. Palpable pulses to radial  DP and PT arteries.   GI: Normal bowel sounds, soft, non-distended, non-tender, no masses palpated, no hepatosplenomegaly.  Left abdominal colostomy with ostomy intact.  Lymph/Hematologic: No cervical lymphadenopathy and no supraclavicular lymphadenopathy.  Genitourinary:  na  Skin: Warm and dry.  No rashes at anticipated surgical site.   Musculoskeletal: Full ROM of neck. There is no redness, warmth, or swelling of the joints. Gross motor strength is normal.    Neurologic: Awake, alert, oriented to name, place and time. Cranial nerves II-XII are grossly intact. Gait is normal.   Neuropsychiatric: Calm, cooperative. Normal affect.     Labs: (personally reviewed)  Lab Results   Component Value Date    WBC 3.8 08/27/2018     Lab Results   Component Value Date    RBC 4.30 08/27/2018     Lab Results   Component Value Date    HGB 13.8 08/27/2018     Lab Results   Component Value Date    HCT 38.6 08/27/2018     Lab Results   Component Value Date    MCV 90 08/27/2018     Lab Results   Component Value Date    MCH 32.1 08/27/2018     Lab Results   Component Value Date    MCHC 35.8 08/27/2018     Lab Results   Component Value Date    RDW 13.2 08/27/2018     Lab Results   Component Value Date    PLT 55 08/27/2018       Last Comprehensive Metabolic Panel:  Sodium   Date Value Ref Range Status   08/27/2018 138 133 - 144 mmol/L Final     Potassium   Date Value Ref Range Status   08/27/2018 3.8 3.4 - 5.3 mmol/L Final     Chloride   Date Value Ref Range Status   08/27/2018 107 94 - 109 mmol/L Final     Carbon Dioxide   Date Value Ref Range Status   08/27/2018 25 20 - 32 mmol/L Final     Anion Gap   Date Value Ref Range Status   08/27/2018 7 3 - 14  mmol/L Final     Glucose   Date Value Ref Range Status   08/27/2018 90 70 - 99 mg/dL Final     Urea Nitrogen   Date Value Ref Range Status   08/27/2018 14 7 - 30 mg/dL Final     Creatinine   Date Value Ref Range Status   08/27/2018 0.86 0.66 - 1.25 mg/dL Final     GFR Estimate   Date Value Ref Range Status   08/27/2018 >90 >60 mL/min/1.7m2 Final     Comment:     Non  GFR Calc     Calcium   Date Value Ref Range Status   08/27/2018 8.6 8.5 - 10.1 mg/dL Final           Surgical pathology exam 8/4/18  Patient Name: TRINY SIMMONS   MR#: [id number]   Specimen #: [id number]   Collected: 8/4/2017   Received: 8/4/2017   Reported: 8/9/2017 11:26   Ordering Phy(s): JANESSA ROMAN     For improved result formatting, select 'View Enhanced Report Format'   under Linked Documents section.     SPECIMEN(S):   A: Esophageal biopsy, nodularity at 39 cm   B: Sigmoid colon biopsy, stricture at 40 cm     FINAL DIAGNOSIS:   A. ESOPHAGEAL BIOPSY, NODULARITY AT 39 CM:   - Squamous and gastric type epithelium with chronic inflammation and   reactive epithelial changes   - Negative for intestinal metaplasia or dysplasia     B. SIGMOID COLON BIOPSY, STRICTURE AT 40 CM:   - Colonic mucosa with mild chronic inflammation, fibrosis, muscularis   mucosae hyperplasia and reactive epithelial changes consistent with   healed local injury   - Negative for dysplasia and malignancy     Procedures   ECG SR 62 bpm    EXAMINATION: CT CHEST/ABDOMEN/PELVIS W CONTRAST, 8/27/2018 2:05 PM     TECHNIQUE:  Helical CT images from the thoracic inlet through the  symphysis pubis were obtained  with contrast. Contrast dose: ISOVUE  370 100cc     COMPARISON: CT exam 8/3/2018; MRI 8/3/2018; CT exam 7/11/2018.     IMPRESSION: In this patient with history of hepatitis C and sigmoid  colon adenocarcinoma:  1. No overt cirrhotic changes off the liver parenchyma in the present  study.   Stable hepatic segment 5/6 liver lesion which remains suspicious  for  malignancy. Liver lesion previously evaluated on MRI 8/3/2018  demonstrated enhancement pattern not characteristic for colorectal  adenocarcinoma metastases. Recommend further evaluation with liver  biopsy for better characterization.  2. Cholelithiasis. Mild gallbladder wall edema, nonspecific, likely  due to adjacent hepatic intrinsic parenchymal disease.  3. Findings concerning for portal hypertension such as splenomegaly  and portosystemic collaterals as periesophageal varices. No ascites.     MRI ABDOMEN 8/3/2018     CLINICAL HISTORY:  Colon cancer. Ill-defined liver lesion on recent  CT.     IMPRESSION: In this patient with history of hepatitis C and sigmoid  colon adenocarcinoma:  1. MRi findings concerning for hepatic intrinsic parenchymal disease  with mild fibrotic changes.  2. 1.8 cm liver lesion in hepatic segment 5 suspicious for malignancy.  Liver lesion does not have MRI enhancement pattern characteristic for  colorectal adenocarcinoma metastases. Recommend further evaluation  with liver biopsy for better characterization.  3. Cholelithiasis. Mild gallbladder wall edema likely due to adjacent  hepatic intrinsic parenchymal disease.  4. Splenomegaly. No ascites.      EGD 8/4/2017  Impression:          - Normal examined duodenum.                        - Normal stomach.                        - Esophageal mucosal changes classified as Maldonado's                        stage C0-M2 per [place] criteria. Nodule at 39 cm was                        biopsied.       Outside records reviewed from: Care Everywhere    ASSESSMENT and PLAN  Rian Malik is a 58 year old male scheduled to undergo  Bilateral Cystoscopy With Stents, Laparoscopic Takedown Colostomy, Possible Cryoreductive Chemotherapy (HIPEC), Flexible Sigmoidoscopy, Laparoscopic Hand Assisted Right Hepatectomy, Intra-Op Ultrasound, Laparoscopic Cholecystectomy on 9/12/18 with Geoffrey Freedman and Margret at [facility]  under general  anesthesia.    He has the following specific operative considerations:   - METS:  >4. RCRI : No serious cardiac risks.  0.4 % risk of major adverse cardiac event.  - DIANE # of risks 2/8 = low risk  - VTE risk:  3%.  Increased VTE risk: Recommend use of mechanical/chemical VTE prophylaxis in the nivia- and postoperative periods.    - Risk of PONV score = 2.  If > 2, anti-emetic intervention recommended.    1.  Cardiology - denies cardiopulmonary history or symptoms.      2.  Pulmonary - minimal smoking history.   3.  [place]-rectal - adenocarcinoma of the sigmoid colon s/p open sigmoidectomy with Burnett's pouch creation (10/11/17) and chemotherapy (completed 7/2/18).  Now with suspected mets to liver.  Above procedure planned.    4. Liver -  Hepatitis - treated.  H/O cirrhosis.  Plts 55 today.  T &S done.  LFTS today continue to improve.    5.  GI - Maldonado's esophagus>>>last EGD 8/2017.    - Anesthesia considerations:  Refer to PAC assessment in anesthesia records  - Right chest port    Arrival time, NPO, shower and medication instructions provided by nursing staff today.  Preparing For Your Surgery handout given.  Patient was discussed with Dr Alexander. I spent 30 minutes face to face with patient assessing, educating, counseling and/or coordinating care and examining the patient.  Of that 30 minutes, I spent greater than 50% of my time counseling and/or coordinating care.      SAVANNA Rothman CNP  Preoperative Assessment Center  [facility]  Clinic and Surgery Center  Phone: [phone]  Fax: [phone]

## 2018-08-27 NOTE — MR AVS SNAPSHOT
After Visit Summary   2018    Rian Malik    MRN: [id number]           Patient Information     Date Of Birth          1960        Visit Information        Provider Department      2018 3:30 PM Rn, [facility] Preoperative Assessment Center        Care Instructions    Preparing for Your Surgery      Name:  Rian Malik   MRN:  [id number]   :  1960   Today's Date:  2018     Arriving for surgery:  Surgery date:  18    Arrival time:  05:30 am    Please come to:       [facility] [address]    [address] [place]  [address]    -  [employer] parking is available in front of the hospital from 5:15 am to 8:00 pm    -  Stop at the Information Desk in the lobby    -   Inform the information person that you are here for surgery. An escort to 3c will be provided. If you would not like an escort, please proceed to 3C on the 3rd floor. [phone]     What can I eat or drink?  -  You may have solid food or milk products up until - follow bowel prep instructions.  -  You may have water, apple juice or 7up/Sprite until 2 hours prior to your surgery.    Which medicines can I take?    Stop Aspirin, vitamins and supplements one week prior to surgery.  Hold Ibuprofen and Naproxen for 24 hours prior to surgery.       -  Please take these medications the day of surgery:  Tylenol if needed.      How do I prepare myself?  -  Take two showers: one the night before surgery; and one the morning of surgery.         Use Scrubcare or Hibiclens to wash from neck down.  You may use your own     shampoo and conditioner. No other hair products.   -  Do NOT use lotion, powder, deodorant, or antiperspirant the day of your surgery.  -  Do NOT wear any jewelry.    - Do not bring your own medications to the hospital, except for inhalers and eye   drops.  -  Bring your ID and insurance card.    Questions or Concerns:  -If you have questions or concerns regarding  the day of surgery, please call [phone].     -If you are scheduled at the Ambulatory Surgery Center please call [phone].    -For questions after surgery please call your surgeons office.                     Follow-ups after your visit        Your next 10 appointments already scheduled     Aug 27, 2018  3:30 PM CDT   (Arrive by 3:15 PM)   PAC RN ASSESSMENT with  Pac Rn   [facility] Preoperative Assessment Center ([facility] [place])    [address]  4th [address]  [facility] [address] [phone]            Aug 27, 2018  4:00 PM CDT   (Arrive by 3:45 PM)   PAC Anesthesia Consult with  Pac Anesthesiologist   [facility] Preoperative Assessment [place] ([facility])    [address]  [address] [facility] [address] [phone]            Sep 12, 2018   Procedure with Hunter Quinetro MD   [facility], [place], Same Day Surgery (--)    [address] [facility] [id number] [phone]            Oct 22, 2018  2:15 PM CDT   Return Visit with Padilla Last MD   [facility]    [address] [address] [phone]            Nov 13, 2018  9:30 AM CST   Lab with  LAB   [facility] ([facility])    [address] [place] [address]  [address] [facility] [id number] [phone]            Nov 13, 2018 10:30 AM CST   (Arrive by 10:15 AM)   Return General Liver with Yaakov Burroughs MD   [facility] Hepatology ([facility])    [address] [place] [address]  [address]  [facility] [address] [phone]              Who to contact     Please call your clinic at [phone] to:    Ask questions about your health    Make or cancel appointments    Discuss your medicines    Learn about your test results    Speak to your doctor            Additional Information About Your Visit        MyChart Information     [employer] gives you secure access to your  electronic health record. If you see a primary care provider, you can also send messages to your care team and make appointments. If you have questions, please call your primary care clinic.  If you do not have a primary care provider, please call [phone] and they will assist you.      [employer] is an electronic gateway that provides easy, online access to your medical records. With [employer], you can request a clinic appointment, read your test results, renew a prescription or communicate with your care team.     To access your existing account, please contact your [facility] or call [phone] for assistance.        Care EveryWhere ID     This is your Care EveryWhere ID. This could be used by other organizations to access your [place] medical records  [id number]         Blood Pressure from Last 3 Encounters:   08/27/18 128/70   08/20/18 125/76   07/30/18 128/77    Weight from Last 3 Encounters:   08/27/18 73.8 kg (162 lb 9.6 oz)   08/20/18 74.7 kg (164 lb 11.2 oz)   07/30/18 75.1 kg (165 lb 9.6 oz)              Today, you had the following     No orders found for display       Primary Care Provider Office Phone # Fax #    Moreno Harris [phone]       [address]  [facility] [address]        Equal Access to Services     KHANH FLANAGAN : Hadii aad ku hadasho Soomaali, waaxda luqadaha, qaybta kaalmada adeegyada, alisa leon . So [facility] [phone].    ATENCIÓN: Si habla español, tiene a martino disposición servicios gratuitos de asistencia lingüística. Llpilar al [phone].    We comply with applicable federal civil rights laws and [place] laws. We do not discriminate on the basis of race, color, national origin, age, disability, sex, sexual orientation, or gender identity.            Thank you!     Thank you for choosing [facility] PREOPERATIVE ASSESSMENT [place]  for your care. Our goal is always to provide you with  "excellent care. Hearing back from our patients is one way we can continue to improve our services. Please take a few minutes to complete the written survey that you may receive in the mail after your visit with us. Thank you!             Your Updated Medication List - Protect others around you: Learn how to safely use, store and throw away your medicines at www.disposemymeds.org.          This list is accurate as of 8/27/18  3:00 PM.  Always use your most recent med list.                   Brand Name Dispense Instructions for use Diagnosis    docusate sodium 100 MG tablet    COLACE    60 tablet    Take 100 mg by mouth daily    Special screening for malignant neoplasms, colon       * magnesium citrate solution     296 mL    Take 296 mLs by mouth See Admin Instructions Refer to surgery handout.Refer to \"Getting ready for Surgery\" packet for instructions.    Cancer of sigmoid colon (H)       * magnesium citrate solution     296 mL    Take 296 mLs by mouth See Admin Instructions Refer to \"Getting ready for Colonoscopy\" packet for instructions.    Cancer of sigmoid colon (H)       * Notice:  This list has 2 medication(s) that are the same as other medications prescribed for you. Read the directions carefully, and ask your doctor or other care provider to review them with you.      "

## 2018-08-27 NOTE — DISCHARGE INSTRUCTIONS

## 2018-09-12 ENCOUNTER — ANESTHESIA (OUTPATIENT)
Dept: SURGERY | Facility: CLINIC | Age: 58
DRG: 330 | End: 2018-09-12
Payer: COMMERCIAL

## 2018-09-12 ENCOUNTER — SURGERY (OUTPATIENT)
Age: 58
End: 2018-09-12

## 2018-09-12 ENCOUNTER — HOSPITAL ENCOUNTER (INPATIENT)
Facility: CLINIC | Age: 58
LOS: 5 days | Discharge: HOME | DRG: 330 | End: 2018-09-17
Attending: UROLOGY | Admitting: COLON & RECTAL SURGERY
Payer: COMMERCIAL

## 2018-09-12 DIAGNOSIS — Z98.890 S/P COLOSTOMY TAKEDOWN: Primary | ICD-10-CM

## 2018-09-12 LAB
ABO + RH BLD: NORMAL
ABO + RH BLD: NORMAL
BLD GP AB SCN SERPL QL: NORMAL
BLD PROD TYP BPU: NORMAL
BLD UNIT ID BPU: 0
BLOOD BANK CMNT PATIENT-IMP: NORMAL
BLOOD PRODUCT CODE: NORMAL
BPU ID: NORMAL
ERYTHROCYTE [DISTWIDTH] IN BLOOD BY AUTOMATED COUNT: 13.5 % (ref 10–15)
GLUCOSE BLDC GLUCOMTR-MCNC: 96 MG/DL (ref 70–99)
HCT VFR BLD AUTO: 37.5 % (ref 40–53)
HGB BLD-MCNC: 13.4 G/DL (ref 13.3–17.7)
MCH RBC QN AUTO: 32.4 PG (ref 26.5–33)
MCHC RBC AUTO-ENTMCNC: 35.7 G/DL (ref 31.5–36.5)
MCV RBC AUTO: 91 FL (ref 78–100)
NUM BPU REQUESTED: 2
NUM BPU REQUESTED: 2
PLATELET # BLD AUTO: 43 10E9/L (ref 150–450)
PLATELET # BLD AUTO: 73 10E9/L (ref 150–450)
RBC # BLD AUTO: 4.13 10E12/L (ref 4.4–5.9)
SPECIMEN EXP DATE BLD: NORMAL
TRANSFUSION STATUS PATIENT QL: NORMAL
WBC # BLD AUTO: 9.9 10E9/L (ref 4–11)

## 2018-09-12 PROCEDURE — P9037 PLATE PHERES LEUKOREDU IRRAD: HCPCS | Performed by: UROLOGY

## 2018-09-12 PROCEDURE — 25000132 ZZH RX MED GY IP 250 OP 250 PS 637: Performed by: COLON & RECTAL SURGERY

## 2018-09-12 PROCEDURE — 40000275 ZZH STATISTIC RCP TIME EA 10 MIN

## 2018-09-12 PROCEDURE — 36415 COLL VENOUS BLD VENIPUNCTURE: CPT | Performed by: ANESTHESIOLOGY

## 2018-09-12 PROCEDURE — 0DTJ0ZZ RESECTION OF APPENDIX, OPEN APPROACH: ICD-10-PCS | Performed by: COLON & RECTAL SURGERY

## 2018-09-12 PROCEDURE — 36000068 ZZH SURGERY LEVEL 5 1ST 30 MIN - UMMC: Performed by: UROLOGY

## 2018-09-12 PROCEDURE — 40000014 ZZH STATISTIC ARTERIAL MONITORING DAILY

## 2018-09-12 PROCEDURE — 0DBN0ZZ EXCISION OF SIGMOID COLON, OPEN APPROACH: ICD-10-PCS | Performed by: COLON & RECTAL SURGERY

## 2018-09-12 PROCEDURE — C9113 INJ PANTOPRAZOLE SODIUM, VIA: HCPCS | Performed by: COLON & RECTAL SURGERY

## 2018-09-12 PROCEDURE — 71000014 ZZH RECOVERY PHASE 1 LEVEL 2 FIRST HR: Performed by: UROLOGY

## 2018-09-12 PROCEDURE — P9016 RBC LEUKOCYTES REDUCED: HCPCS | Performed by: ANESTHESIOLOGY

## 2018-09-12 PROCEDURE — 37000009 ZZH ANESTHESIA TECHNICAL FEE, EACH ADDTL 15 MIN: Performed by: UROLOGY

## 2018-09-12 PROCEDURE — 00000159 ZZHCL STATISTIC H-SEND OUTS PREP: Performed by: UROLOGY

## 2018-09-12 PROCEDURE — 25000128 H RX IP 250 OP 636: Performed by: COLON & RECTAL SURGERY

## 2018-09-12 PROCEDURE — 25000125 ZZHC RX 250: Performed by: NURSE ANESTHETIST, CERTIFIED REGISTERED

## 2018-09-12 PROCEDURE — 25000128 H RX IP 250 OP 636: Performed by: NURSE ANESTHETIST, CERTIFIED REGISTERED

## 2018-09-12 PROCEDURE — 40000171 ZZH STATISTIC PRE-PROCEDURE ASSESSMENT III: Performed by: UROLOGY

## 2018-09-12 PROCEDURE — 36000070 ZZH SURGERY LEVEL 5 EA 15 ADDTL MIN - UMMC: Performed by: UROLOGY

## 2018-09-12 PROCEDURE — 27211024 ZZHC OR SUPPLY OTHER OPNP: Performed by: UROLOGY

## 2018-09-12 PROCEDURE — C1769 GUIDE WIRE: HCPCS | Performed by: UROLOGY

## 2018-09-12 PROCEDURE — 88304 TISSUE EXAM BY PATHOLOGIST: CPT | Performed by: UROLOGY

## 2018-09-12 PROCEDURE — 25000565 ZZH ISOFLURANE, EA 15 MIN: Performed by: UROLOGY

## 2018-09-12 PROCEDURE — C9290 INJ, BUPIVACAINE LIPOSOME: HCPCS

## 2018-09-12 PROCEDURE — 0TJB8ZZ INSPECTION OF BLADDER, VIA NATURAL OR ARTIFICIAL OPENING ENDOSCOPIC: ICD-10-PCS | Performed by: UROLOGY

## 2018-09-12 PROCEDURE — 27210794 ZZH OR GENERAL SUPPLY STERILE: Performed by: UROLOGY

## 2018-09-12 PROCEDURE — 25000128 H RX IP 250 OP 636

## 2018-09-12 PROCEDURE — 71000015 ZZH RECOVERY PHASE 1 LEVEL 2 EA ADDTL HR: Performed by: UROLOGY

## 2018-09-12 PROCEDURE — 85027 COMPLETE CBC AUTOMATED: CPT | Performed by: COLON & RECTAL SURGERY

## 2018-09-12 PROCEDURE — 12000008 ZZH R&B INTERMEDIATE UMMC

## 2018-09-12 PROCEDURE — 00000146 ZZHCL STATISTIC GLUCOSE BY METER IP

## 2018-09-12 PROCEDURE — 86900 BLOOD TYPING SEROLOGIC ABO: CPT | Performed by: ANESTHESIOLOGY

## 2018-09-12 PROCEDURE — 85049 AUTOMATED PLATELET COUNT: CPT | Performed by: UROLOGY

## 2018-09-12 PROCEDURE — 0F514ZZ DESTRUCTION OF RIGHT LOBE LIVER, PERCUTANEOUS ENDOSCOPIC APPROACH: ICD-10-PCS | Performed by: SURGERY

## 2018-09-12 PROCEDURE — 0DBP0ZZ EXCISION OF RECTUM, OPEN APPROACH: ICD-10-PCS | Performed by: COLON & RECTAL SURGERY

## 2018-09-12 PROCEDURE — 88305 TISSUE EXAM BY PATHOLOGIST: CPT | Performed by: UROLOGY

## 2018-09-12 PROCEDURE — C1765 ADHESION BARRIER: HCPCS | Performed by: UROLOGY

## 2018-09-12 PROCEDURE — 37000008 ZZH ANESTHESIA TECHNICAL FEE, 1ST 30 MIN: Performed by: UROLOGY

## 2018-09-12 PROCEDURE — 86850 RBC ANTIBODY SCREEN: CPT | Performed by: ANESTHESIOLOGY

## 2018-09-12 PROCEDURE — 0DBM0ZZ EXCISION OF DESCENDING COLON, OPEN APPROACH: ICD-10-PCS | Performed by: COLON & RECTAL SURGERY

## 2018-09-12 PROCEDURE — 36592 COLLECT BLOOD FROM PICC: CPT | Performed by: COLON & RECTAL SURGERY

## 2018-09-12 PROCEDURE — 86923 COMPATIBILITY TEST ELECTRIC: CPT | Performed by: ANESTHESIOLOGY

## 2018-09-12 PROCEDURE — 25000125 ZZHC RX 250

## 2018-09-12 PROCEDURE — 86901 BLOOD TYPING SEROLOGIC RH(D): CPT | Performed by: ANESTHESIOLOGY

## 2018-09-12 PROCEDURE — 88307 TISSUE EXAM BY PATHOLOGIST: CPT | Performed by: UROLOGY

## 2018-09-12 PROCEDURE — 0DJD8ZZ INSPECTION OF LOWER INTESTINAL TRACT, VIA NATURAL OR ARTIFICIAL OPENING ENDOSCOPIC: ICD-10-PCS | Performed by: COLON & RECTAL SURGERY

## 2018-09-12 RX ORDER — HYDROMORPHONE HYDROCHLORIDE 1 MG/ML
.3-.5 INJECTION, SOLUTION INTRAMUSCULAR; INTRAVENOUS; SUBCUTANEOUS EVERY 5 MIN PRN
Status: DISCONTINUED | OUTPATIENT
Start: 2018-09-12 | End: 2018-09-12 | Stop reason: HOSPADM

## 2018-09-12 RX ORDER — FENTANYL CITRATE 50 UG/ML
INJECTION, SOLUTION INTRAMUSCULAR; INTRAVENOUS PRN
Status: DISCONTINUED | OUTPATIENT
Start: 2018-09-12 | End: 2018-09-12

## 2018-09-12 RX ORDER — SODIUM CHLORIDE, SODIUM LACTATE, POTASSIUM CHLORIDE, CALCIUM CHLORIDE 600; 310; 30; 20 MG/100ML; MG/100ML; MG/100ML; MG/100ML
INJECTION, SOLUTION INTRAVENOUS CONTINUOUS
Status: DISCONTINUED | OUTPATIENT
Start: 2018-09-12 | End: 2018-09-12 | Stop reason: HOSPADM

## 2018-09-12 RX ORDER — SODIUM CHLORIDE, SODIUM LACTATE, POTASSIUM CHLORIDE, CALCIUM CHLORIDE 600; 310; 30; 20 MG/100ML; MG/100ML; MG/100ML; MG/100ML
INJECTION, SOLUTION INTRAVENOUS CONTINUOUS PRN
Status: DISCONTINUED | OUTPATIENT
Start: 2018-09-12 | End: 2018-09-12

## 2018-09-12 RX ORDER — CIPROFLOXACIN 2 MG/ML
400 INJECTION, SOLUTION INTRAVENOUS SEE ADMIN INSTRUCTIONS
Status: DISCONTINUED | OUTPATIENT
Start: 2018-09-12 | End: 2018-09-12 | Stop reason: HOSPADM

## 2018-09-12 RX ORDER — PROPOFOL 10 MG/ML
INJECTION, EMULSION INTRAVENOUS PRN
Status: DISCONTINUED | OUTPATIENT
Start: 2018-09-12 | End: 2018-09-12

## 2018-09-12 RX ORDER — CIPROFLOXACIN 2 MG/ML
400 INJECTION, SOLUTION INTRAVENOUS
Status: COMPLETED | OUTPATIENT
Start: 2018-09-12 | End: 2018-09-12

## 2018-09-12 RX ORDER — ONDANSETRON 2 MG/ML
4 INJECTION INTRAMUSCULAR; INTRAVENOUS EVERY 30 MIN PRN
Status: DISCONTINUED | OUTPATIENT
Start: 2018-09-12 | End: 2018-09-12 | Stop reason: HOSPADM

## 2018-09-12 RX ORDER — POTASSIUM CHLORIDE 29.8 MG/ML
20 INJECTION INTRAVENOUS
Status: DISCONTINUED | OUTPATIENT
Start: 2018-09-12 | End: 2018-09-17 | Stop reason: HOSPADM

## 2018-09-12 RX ORDER — NALOXONE HYDROCHLORIDE 0.4 MG/ML
.1-.4 INJECTION, SOLUTION INTRAMUSCULAR; INTRAVENOUS; SUBCUTANEOUS
Status: DISCONTINUED | OUTPATIENT
Start: 2018-09-12 | End: 2018-09-17 | Stop reason: HOSPADM

## 2018-09-12 RX ORDER — EPHEDRINE SULFATE 50 MG/ML
INJECTION, SOLUTION INTRAMUSCULAR; INTRAVENOUS; SUBCUTANEOUS PRN
Status: DISCONTINUED | OUTPATIENT
Start: 2018-09-12 | End: 2018-09-12

## 2018-09-12 RX ORDER — ALVIMOPAN 12 MG/1
12 CAPSULE ORAL 2 TIMES DAILY
Status: DISCONTINUED | OUTPATIENT
Start: 2018-09-13 | End: 2018-09-15

## 2018-09-12 RX ORDER — ONDANSETRON 2 MG/ML
INJECTION INTRAMUSCULAR; INTRAVENOUS PRN
Status: DISCONTINUED | OUTPATIENT
Start: 2018-09-12 | End: 2018-09-12

## 2018-09-12 RX ORDER — LORAZEPAM 2 MG/ML
0.5 INJECTION INTRAMUSCULAR EVERY 6 HOURS PRN
Status: DISCONTINUED | OUTPATIENT
Start: 2018-09-12 | End: 2018-09-16

## 2018-09-12 RX ORDER — LIDOCAINE 40 MG/G
CREAM TOPICAL
Status: DISCONTINUED | OUTPATIENT
Start: 2018-09-12 | End: 2018-09-12 | Stop reason: HOSPADM

## 2018-09-12 RX ORDER — BUPIVACAINE HYDROCHLORIDE 2.5 MG/ML
INJECTION, SOLUTION EPIDURAL; INFILTRATION; INTRACAUDAL PRN
Status: DISCONTINUED | OUTPATIENT
Start: 2018-09-12 | End: 2018-09-12

## 2018-09-12 RX ORDER — MAGNESIUM SULFATE HEPTAHYDRATE 40 MG/ML
4 INJECTION, SOLUTION INTRAVENOUS EVERY 4 HOURS PRN
Status: DISCONTINUED | OUTPATIENT
Start: 2018-09-12 | End: 2018-09-17 | Stop reason: HOSPADM

## 2018-09-12 RX ORDER — FENTANYL CITRATE 50 UG/ML
25-50 INJECTION, SOLUTION INTRAMUSCULAR; INTRAVENOUS
Status: DISCONTINUED | OUTPATIENT
Start: 2018-09-12 | End: 2018-09-12 | Stop reason: HOSPADM

## 2018-09-12 RX ORDER — NALOXONE HYDROCHLORIDE 0.4 MG/ML
.1-.4 INJECTION, SOLUTION INTRAMUSCULAR; INTRAVENOUS; SUBCUTANEOUS
Status: DISCONTINUED | OUTPATIENT
Start: 2018-09-12 | End: 2018-09-12 | Stop reason: HOSPADM

## 2018-09-12 RX ORDER — POTASSIUM CHLORIDE 7.45 MG/ML
10 INJECTION INTRAVENOUS
Status: DISCONTINUED | OUTPATIENT
Start: 2018-09-12 | End: 2018-09-17 | Stop reason: HOSPADM

## 2018-09-12 RX ORDER — ONDANSETRON 4 MG/1
4 TABLET, ORALLY DISINTEGRATING ORAL EVERY 30 MIN PRN
Status: DISCONTINUED | OUTPATIENT
Start: 2018-09-12 | End: 2018-09-12 | Stop reason: HOSPADM

## 2018-09-12 RX ORDER — LIDOCAINE 40 MG/G
CREAM TOPICAL
Status: DISCONTINUED | OUTPATIENT
Start: 2018-09-12 | End: 2018-09-17 | Stop reason: HOSPADM

## 2018-09-12 RX ORDER — NALOXONE HYDROCHLORIDE 0.4 MG/ML
.1-.4 INJECTION, SOLUTION INTRAMUSCULAR; INTRAVENOUS; SUBCUTANEOUS
Status: DISCONTINUED | OUTPATIENT
Start: 2018-09-12 | End: 2018-09-12

## 2018-09-12 RX ORDER — ALVIMOPAN 12 MG/1
12 CAPSULE ORAL ONCE
Status: COMPLETED | OUTPATIENT
Start: 2018-09-12 | End: 2018-09-12

## 2018-09-12 RX ORDER — SODIUM CHLORIDE, SODIUM LACTATE, POTASSIUM CHLORIDE, CALCIUM CHLORIDE 600; 310; 30; 20 MG/100ML; MG/100ML; MG/100ML; MG/100ML
INJECTION, SOLUTION INTRAVENOUS CONTINUOUS
Status: DISCONTINUED | OUTPATIENT
Start: 2018-09-12 | End: 2018-09-15

## 2018-09-12 RX ORDER — LABETALOL HYDROCHLORIDE 5 MG/ML
INJECTION, SOLUTION INTRAVENOUS PRN
Status: DISCONTINUED | OUTPATIENT
Start: 2018-09-12 | End: 2018-09-12

## 2018-09-12 RX ORDER — POTASSIUM CL/LIDO/0.9 % NACL 10MEQ/0.1L
10 INTRAVENOUS SOLUTION, PIGGYBACK (ML) INTRAVENOUS
Status: DISCONTINUED | OUTPATIENT
Start: 2018-09-12 | End: 2018-09-12 | Stop reason: RX

## 2018-09-12 RX ORDER — FLUMAZENIL 0.1 MG/ML
0.2 INJECTION, SOLUTION INTRAVENOUS
Status: DISCONTINUED | OUTPATIENT
Start: 2018-09-12 | End: 2018-09-12 | Stop reason: HOSPADM

## 2018-09-12 RX ORDER — CIPROFLOXACIN 2 MG/ML
400 INJECTION, SOLUTION INTRAVENOUS EVERY 12 HOURS
Status: COMPLETED | OUTPATIENT
Start: 2018-09-12 | End: 2018-09-13

## 2018-09-12 RX ORDER — DEXAMETHASONE SODIUM PHOSPHATE 4 MG/ML
INJECTION, SOLUTION INTRA-ARTICULAR; INTRALESIONAL; INTRAMUSCULAR; INTRAVENOUS; SOFT TISSUE PRN
Status: DISCONTINUED | OUTPATIENT
Start: 2018-09-12 | End: 2018-09-12

## 2018-09-12 RX ORDER — GRANISETRON HYDROCHLORIDE 1 MG/ML
1 INJECTION INTRAVENOUS ONCE
Status: COMPLETED | OUTPATIENT
Start: 2018-09-12 | End: 2018-09-12

## 2018-09-12 RX ADMIN — FENTANYL CITRATE 50 MCG: 50 INJECTION, SOLUTION INTRAMUSCULAR; INTRAVENOUS at 11:28

## 2018-09-12 RX ADMIN — ROCURONIUM BROMIDE 10 MG: 10 INJECTION INTRAVENOUS at 13:06

## 2018-09-12 RX ADMIN — GLUCAGON HYDROCHLORIDE 1 MG: KIT at 12:14

## 2018-09-12 RX ADMIN — SODIUM CHLORIDE, POTASSIUM CHLORIDE, SODIUM LACTATE AND CALCIUM CHLORIDE: 600; 310; 30; 20 INJECTION, SOLUTION INTRAVENOUS at 07:50

## 2018-09-12 RX ADMIN — Medication: at 15:18

## 2018-09-12 RX ADMIN — ONDANSETRON 4 MG: 2 INJECTION INTRAMUSCULAR; INTRAVENOUS at 13:37

## 2018-09-12 RX ADMIN — FENTANYL CITRATE 50 MCG: 50 INJECTION, SOLUTION INTRAMUSCULAR; INTRAVENOUS at 12:37

## 2018-09-12 RX ADMIN — ROCURONIUM BROMIDE 20 MG: 10 INJECTION INTRAVENOUS at 11:21

## 2018-09-12 RX ADMIN — ROCURONIUM BROMIDE 20 MG: 10 INJECTION INTRAVENOUS at 09:20

## 2018-09-12 RX ADMIN — ROCURONIUM BROMIDE 20 MG: 10 INJECTION INTRAVENOUS at 10:15

## 2018-09-12 RX ADMIN — LABETALOL HYDROCHLORIDE 5 MG: 5 INJECTION INTRAVENOUS at 09:25

## 2018-09-12 RX ADMIN — METRONIDAZOLE 500 MG: 500 INJECTION, SOLUTION INTRAVENOUS at 13:00

## 2018-09-12 RX ADMIN — Medication 5 MG: at 08:28

## 2018-09-12 RX ADMIN — PROPOFOL 50 MG: 10 INJECTION, EMULSION INTRAVENOUS at 13:47

## 2018-09-12 RX ADMIN — MIDAZOLAM 1 MG: 1 INJECTION INTRAMUSCULAR; INTRAVENOUS at 07:28

## 2018-09-12 RX ADMIN — DEXAMETHASONE SODIUM PHOSPHATE 6 MG: 4 INJECTION, SOLUTION INTRA-ARTICULAR; INTRALESIONAL; INTRAMUSCULAR; INTRAVENOUS; SOFT TISSUE at 07:44

## 2018-09-12 RX ADMIN — SODIUM CHLORIDE, POTASSIUM CHLORIDE, SODIUM LACTATE AND CALCIUM CHLORIDE: 600; 310; 30; 20 INJECTION, SOLUTION INTRAVENOUS at 07:45

## 2018-09-12 RX ADMIN — SUGAMMADEX 200 MG: 100 INJECTION, SOLUTION INTRAVENOUS at 13:43

## 2018-09-12 RX ADMIN — HYDROMORPHONE HYDROCHLORIDE 0.5 MG: 1 INJECTION, SOLUTION INTRAMUSCULAR; INTRAVENOUS; SUBCUTANEOUS at 14:09

## 2018-09-12 RX ADMIN — CIPROFLOXACIN 400 MG: 2 INJECTION INTRAVENOUS at 13:00

## 2018-09-12 RX ADMIN — CIPROFLOXACIN 400 MG: 2 INJECTION INTRAVENOUS at 06:05

## 2018-09-12 RX ADMIN — METRONIDAZOLE 500 MG: 500 INJECTION, SOLUTION INTRAVENOUS at 07:45

## 2018-09-12 RX ADMIN — ALVIMOPAN 12 MG: 12 CAPSULE ORAL at 06:39

## 2018-09-12 RX ADMIN — Medication 5 MG: at 08:41

## 2018-09-12 RX ADMIN — FENTANYL CITRATE 100 MCG: 50 INJECTION, SOLUTION INTRAMUSCULAR; INTRAVENOUS at 08:32

## 2018-09-12 RX ADMIN — FENTANYL CITRATE 100 MCG: 50 INJECTION, SOLUTION INTRAMUSCULAR; INTRAVENOUS at 07:44

## 2018-09-12 RX ADMIN — FENTANYL CITRATE 50 MCG: 50 INJECTION, SOLUTION INTRAMUSCULAR; INTRAVENOUS at 07:26

## 2018-09-12 RX ADMIN — MIDAZOLAM HYDROCHLORIDE 1 MG: 2 INJECTION, SOLUTION INTRAMUSCULAR; INTRAVENOUS at 07:26

## 2018-09-12 RX ADMIN — SODIUM CHLORIDE, POTASSIUM CHLORIDE, SODIUM LACTATE AND CALCIUM CHLORIDE 125 ML/HR: 600; 310; 30; 20 INJECTION, SOLUTION INTRAVENOUS at 14:52

## 2018-09-12 RX ADMIN — ROCURONIUM BROMIDE 20 MG: 10 INJECTION INTRAVENOUS at 08:37

## 2018-09-12 RX ADMIN — Medication 100 MG: at 07:44

## 2018-09-12 RX ADMIN — BUPIVACAINE HYDROCHLORIDE 20 ML: 2.5 INJECTION, SOLUTION EPIDURAL; INFILTRATION; INTRACAUDAL; PERINEURAL at 07:20

## 2018-09-12 RX ADMIN — ROCURONIUM BROMIDE 5 MG: 10 INJECTION INTRAVENOUS at 07:44

## 2018-09-12 RX ADMIN — BUPIVACAINE 20 ML: 13.3 INJECTION, SUSPENSION, LIPOSOMAL INFILTRATION at 07:20

## 2018-09-12 RX ADMIN — ROCURONIUM BROMIDE 10 MG: 10 INJECTION INTRAVENOUS at 10:40

## 2018-09-12 RX ADMIN — FENTANYL CITRATE 50 MCG: 50 INJECTION, SOLUTION INTRAMUSCULAR; INTRAVENOUS at 08:50

## 2018-09-12 RX ADMIN — PROPOFOL 150 MG: 10 INJECTION, EMULSION INTRAVENOUS at 07:44

## 2018-09-12 RX ADMIN — PANTOPRAZOLE SODIUM 40 MG: 40 INJECTION, POWDER, FOR SOLUTION INTRAVENOUS at 18:42

## 2018-09-12 RX ADMIN — CIPROFLOXACIN 400 MG: 2 INJECTION, SOLUTION INTRAVENOUS at 20:32

## 2018-09-12 RX ADMIN — ROCURONIUM BROMIDE 10 MG: 10 INJECTION INTRAVENOUS at 12:00

## 2018-09-12 RX ADMIN — FENTANYL CITRATE 100 MCG: 50 INJECTION, SOLUTION INTRAMUSCULAR; INTRAVENOUS at 08:58

## 2018-09-12 RX ADMIN — ROCURONIUM BROMIDE 20 MG: 10 INJECTION INTRAVENOUS at 08:46

## 2018-09-12 RX ADMIN — LABETALOL HYDROCHLORIDE 5 MG: 5 INJECTION INTRAVENOUS at 09:23

## 2018-09-12 RX ADMIN — ROCURONIUM BROMIDE 10 MG: 10 INJECTION INTRAVENOUS at 12:37

## 2018-09-12 RX ADMIN — SODIUM CHLORIDE, POTASSIUM CHLORIDE, SODIUM LACTATE AND CALCIUM CHLORIDE: 600; 310; 30; 20 INJECTION, SOLUTION INTRAVENOUS at 07:30

## 2018-09-12 RX ADMIN — ROCURONIUM BROMIDE 45 MG: 10 INJECTION INTRAVENOUS at 07:58

## 2018-09-12 RX ADMIN — METRONIDAZOLE 500 MG: 500 INJECTION, SOLUTION INTRAVENOUS at 18:43

## 2018-09-12 RX ADMIN — FENTANYL CITRATE 50 MCG: 50 INJECTION, SOLUTION INTRAMUSCULAR; INTRAVENOUS at 09:18

## 2018-09-12 RX ADMIN — GRANISETRON HYDROCHLORIDE 1 MG: 1 INJECTION INTRAVENOUS at 06:15

## 2018-09-12 RX ADMIN — SODIUM CHLORIDE, POTASSIUM CHLORIDE, SODIUM LACTATE AND CALCIUM CHLORIDE: 600; 310; 30; 20 INJECTION, SOLUTION INTRAVENOUS at 23:37

## 2018-09-12 RX ADMIN — ENOXAPARIN SODIUM 40 MG: 40 INJECTION SUBCUTANEOUS at 07:31

## 2018-09-12 ASSESSMENT — ACTIVITIES OF DAILY LIVING (ADL): ADLS_ACUITY_SCORE: 9

## 2018-09-12 NOTE — PHARMACY
Alvimopan (Entereg) Pharmacist Review    Pharmacy has reviewed this patient to determine if they are an appropriate candidate for pre-operative use of alvimopan.    Current approved uses for alvimopan include:  laproscopic, open, or converted partial bowel resection.    The following criteria must be met to be a pre-operative candidate for alvimopan:    Pre-operative dose must be given 30 minutes to 5 hours before surgery    Patient must not have been on chronic narcotics before surgery  o Alvimopan contraindicated if patient has received therapeutic doses of opioids for >7 consecutive days before surgery  o Alvimopan not recommended if patient has received >3 opioid doses in the last 7 days before surgery (increases the risk of patient developing significant GI side effects)    No history of myocardial infarction    No bowel obstruction present (or recent surgery for bowel obstruction)    No End-stage renal disease present    No Severe hepatic impairment present    Patient did not have a pancreatic or gastric anastomosis    Ordering provider has received alvimopan educational materials    This patient does meet the criteria for appropriate use of alvimopan.    Dosing recommendation:  Take one 12 mg capsule by mouth twice daily for up to 15 total doses    1st dose given 30 minutes to 5 hours prior to surgery    Then one 12 mg capsule twice daily for up to 14 additional doses    STOP Alvimopan as soon as GI function returns (upon 1st bowel movement)    May NOT be continued as an outpatient  Note: Alvimopan is a hard gelatin capsule which may not be crushed or opened    Thank you,  Ian Rodriguez, PharmD

## 2018-09-12 NOTE — OP NOTE
PREOPERATIVE DIAGNOSIS: Colon cancer  POSTOPERATIVE DIAGNOSIS: Same  PROCEDURES PERFORMED:     Cystoscopy    Placement of bilateral ureteral stents    Please see separate dictation for the Colorectal service's portion of the procedure.  They served as the primary service for today's procedure.    STAFF SURGEON: Hunter Quintero MD   RESIDENT SURGEON: None  ANESTHESIA: General.   ESTIMATED BLOOD LOSS: 0cc   FLUIDS: Please see anesthesia report.   COMPLICATIONS: None  DRAINS:    Bilateral 5 fr open ended stents.  These will be removed by colorectal surgery at the end of the case.  FINDINGS: Both stents placed easily    PREOPERATIVE INDICATIONS FOR THE PROCEDURE:   I was asked to place bilateral ureteral stents to aid in the procedure today. Informed consent was obtained preoperatively after describing the risks and benefits of today's urologic procedure including injury to the urethra, bladder, or ureter.    DESCRIPTION OF PROCEDURE:   After obtaining informed consent, the patient was brought to the operating room, placed in supine position on operating table. After adequate anesthesia, he was moved to dorsal lithotomy position and prepped and draped in standard sterile fashion.   Procedure was initiated by inserting a #22-Cay[place] Islander rigid cystoscope into the patient's bladder.  The urethra was normal. The prostate was about 3 cm in length and was not significantly obstructing. No significant intravesicle median lobe was appreciated. There were no tumors, stones or diverticula within the bladder. Bilateral ureteral orifices were in their normal positions and seemed to be effluxing clear urine. At this time, the left ureteral orifice was cannulated with a 5-[place] Islander open-ended catheter. This passed easily up into the ureter without resistance. A second 5-C[place] Islander open-ended catheter was placed up the right ureteral orifice as well. The scope was then removed. We then placed a 16-Cay[place] Islander Sherman catheter. The stents were  internalized into the Sherman catheter and these were connected to a drainage bag.  I then turned the procedure over to the primary service.  The primary service will plan to remove the stents at the end of their procedure.  FOLLOW-UP: I will plan to follow-up with Triny Valentelin on an as needed basis.    CC  Patient Care Team:  Moreno Harris MD as PCP - General (Internal Medicine)  Padilla Last MD as MD (Hematology & Oncology)  Keenan Clarke, RN as Nurse Coordinator (Oncology)  Darrell Schneider DO as Surgeon (Surgery)      Copy to patient  TRINY SIMMONS  [address] [place] [address]

## 2018-09-12 NOTE — OP NOTE
Procedure Date: 09/12/2018      SURGEON:  Saqib Hale MD      PREOPERATIVE DIAGNOSIS:  Metastatic colorectal cancer to the liver.      POSTOPERATIVE DIAGNOSIS:  Metastatic colorectal cancer to the liver.      PROCEDURE:  Laparoscopic ultrasound-guided percutaneous ablation of liver tumor x 1.      ANESTHESIA:  General.      ESTIMATED BLOOD LOSS:  Less than 5 mL.      SPECIMENS:  None.      COMPLICATIONS:  None.      INDICATIONS:  Mr. Malik is a 58-year-old gentleman with chronic liver disease including moderate to severe fibrosis of his liver with portal hypertension who had colorectal cancer with a single metastasis to his liver.  He is having combined procedure with me and Dr. Oswald today.  Please see Dr. Oswald's note for details regarding his portion of the procedure.  Initially, we considered resectional surgery for this lesion in the right lobe of the liver.  However, based on the extent of resection that was required by Dr. Oswald in addition to the patient's underlying liver disease, we decided to perform an ablation rather than resection today.  This was discussed with the patient preoperatively and he was agreeable, had no additional questions.      DESCRIPTION OF PROCEDURE:  On my arrival to the room, Dr. Oswald had already started the case and several ports were in place as well as the hand port.  I added a right upper lateral 12 mm trocar and performed intraoperative ultrasound of the liver.  The liver itself was nodular and had numerous nodules visible by ultrasound as well.  This is consistent with his known underlying liver disease.  We were able to identify the tumor in the right lobe of the liver.  It was approximately 1.1 cm in size.  We used a AZ XT 20 cm probe through a small stab incision in the abdominal wall and used ultrasound guidance to target the lesion.  We took several representative photographs to show accurate targeting of the lesion.  We then performed a 65 watt, 10  minute burn, and burned an area slightly over 3 cm with excellent micro bubbling surrounding the tumor.  The needle was removed.  The small bleeding point was corrected on the liver surface.  The patient tolerated that portion of the procedure well.        This terminated my involvement in the case and the case was turned back over to Dr. Oswald and his team.  Please see his notes for details regarding the remainder of the case.         YANETH SHUKLA MD             D: 2018   T: 2018   MT: AMANDA      Name:     TRINY SIMMONS   MRN:      [id number]        Account:        [id number]   :      1960           Procedure Date: 2018      Document: [id number]

## 2018-09-12 NOTE — IP AVS SNAPSHOT
MRN:[id number]                      After Visit Summary   9/12/2018    Rian Malik    MRN: [id number]           Thank you!     Thank you for choosing [facility] for your care. Our goal is always to provide you with excellent care. Hearing back from our patients is one way we can continue to improve our services. Please take a few minutes to complete the written survey that you may receive in the mail after you visit with us. Thank you!        Patient Information     Date Of Birth          1960        Designated Caregiver       Most Recent Value    Caregiver    Will someone help with your care after discharge? yes    Name of designated caregiver santo Meier    Phone number of caregiver [phone]    Caregiver address lives with patient      About your hospital stay     You were admitted on:  September 12, 2018 You last received care in the:  [address] [facility]    You were discharged on:  September 17, 2018        Reason for your hospital stay       S/p colostomy takedown.                  Who to Call     For medical emergencies, please call 911.  For non-urgent questions about your medical care, please call your primary care provider or clinic, [phone]  For questions related to your surgery, please call your surgery clinic        Attending Provider     Provider Specialty    Weight, Raymon Nuno MD Urology    Herminio Oswald MD Colon and Rectal Surgery       Primary Care Provider Office Phone # Fax #    Moreno Harris [phone]       When to contact your care team       Call your primary doctor if you have any of the following:  increased shortness of breath, increased drainage, increased swelling, increased pain or increased redness around your surgical sites.                  After Care Instructions     Activity       Your activity upon discharge: activity as tolerated. Please do not drive or operate heavy machinery while on oxycodone.    "         Diet       Follow this diet upon discharge: Low fiber diet, plenty of clear fluids between meals.            Discharge Instructions           Tubes and drains       You are going home with the following tubes or drains penrose drain. This will be removed in clinic follow up in 1 week.                  Follow-up Appointments     Adult [facility] Follow-up and recommended labs and tests       Please follow up in one week in the colon and rectal surgery clinic for your penrose drain removal ( will call to set up appointment with you), and again in 2 weeks with Dr. Oswald.                  Your next 10 appointments already scheduled     Oct 22, 2018  2:15 PM CDT   [facility]    [address] [place] [address] [phone]              Pending Results     Date and Time Order Name Status Description    9/12/2018 1213 Surgical pathology exam In process     9/12/2018 1141 Surgical pathology exam In process             Statement of Approval     Ordered          09/17/18 0824  I have reviewed and agree with all the recommendations and orders detailed in this document.  EFFECTIVE NOW     Approved and electronically signed by:  Herminio Oswald MD             Admission Information     Date & Time Provider Department Dept. Phone    9/12/2018 Herminio Oswald MD [address] [facility] [phone]      Your Vitals Were     Blood Pressure Pulse Temperature Respirations Height Weight    124/70 (BP Location: Left arm) 67 96.4  F (35.8  C) (Oral) 16 1.727 m (5' 7.99\") 70.9 kg (156 lb 3.2 oz)    Pulse Oximetry BMI (Body Mass Index)                94% 23.76 kg/m2          Opposing Viewshart Information     [facility] gives you secure access to your electronic health record. If you see a primary care provider, you can also send messages to your care team and make appointments. If you have questions, please call " your primary care clinic.  If you do not have a primary care provider, please call [phone] and they will assist you.        Care EveryWhere ID     This is your Care EveryWhere ID. This could be used by other organizations to access your [facility] medical records  [id number]        Equal Access to Services     JESUSITAGUSTAVO PANCHITO : Hadii aad ku hadmartycaridad Sosherri, waaxda luqadaha, qaybta kaalmada adebriaayadhernán, alisa lopezduniacameron mg. So [facility] [phone].    ATENCIÓN: Si habla español, tiene a martino disposición servicios gratuitos de asistencia lingüística. Alfredo al [phone].    We comply with applicable federal civil rights laws and [place] laws. We do not discriminate on the basis of race, color, national origin, age, disability, sex, sexual orientation, or gender identity.               Review of your medicines      START taking        Dose / Directions    enoxaparin 40 MG/0.4ML injection   Commonly known as:  LOVENOX   Used for:  S/P colostomy takedown        Dose:  40 mg   Inject 0.4 mLs (40 mg) Subcutaneous every 24 hours for 28 days   Quantity:  11.2 mL   Refills:  0       oxyCODONE IR 5 MG tablet   Commonly known as:  ROXICODONE   Used for:  S/P colostomy takedown        Dose:  5-10 mg   Take 1-2 tablets (5-10 mg) by mouth every 6 hours as needed for moderate to severe pain   Quantity:  30 tablet   Refills:  0         STOP taking     docusate sodium 100 MG tablet   Commonly known as:  COLACE           magnesium citrate solution                Where to get your medicines      Some of these will need a paper prescription and others can be bought over the counter. Ask your nurse if you have questions.     Bring a paper prescription for each of these medications     enoxaparin 40 MG/0.4ML injection    oxyCODONE IR 5 MG tablet                Protect others around you: Learn how to safely use, store and throw away your medicines at www.disposemymeds.org.        Information about OPIOIDS      PRESCRIPTION OPIOIDS: WHAT YOU NEED TO KNOW   We gave you an opioid (narcotic) pain medicine. It is important to manage your pain, but opioids are not always the best choice. You should first try all the other options your care team gave you. Take this medicine for as short a time (and as few doses) as possible.    Some activities can increase your pain, such as bandage changes or therapy sessions. It may help to take your pain medicine 30 to 60 minutes before these activities. Reduce your stress by getting enough sleep, working on hobbies you enjoy and practicing relaxation or meditation. Talk to your care team about ways to manage your pain beyond prescription opioids.    These medicines have risks:    DO NOT drive when on new or higher doses of pain medicine. These medicines can affect your alertness and reaction times, and you could be arrested for driving under the influence (DUI). If you need to use opioids long-term, talk to your care team about driving.    DO NOT operate heavy machinery    DO NOT do any other dangerous activities while taking these medicines.    DO NOT drink any alcohol while taking these medicines.     If the opioid prescribed includes acetaminophen, DO NOT take with any other medicines that contain acetaminophen. Read all labels carefully. Look for the word  acetaminophen  or  Tylenol.  Ask your pharmacist if you have questions or are unsure.    You can get addicted to pain medicines, especially if you have a history of addiction (chemical, alcohol or substance dependence). Talk to your care team about ways to reduce this risk.    All opioids tend to cause constipation. Drink plenty of water and eat foods that have a lot of fiber, such as fruits, vegetables, prune juice, apple juice and high-fiber cereal. Take a laxative (Miralax, milk of magnesia, Colace, Senna) if you don t move your bowels at least every other day. Other side effects include upset stomach, sleepiness, dizziness,  throwing up, tolerance (needing more of the medicine to have the same effect), physical dependence and slowed breathing.    Store your pills in a secure place, locked if possible. We will not replace any lost or stolen medicine. If you don t finish your medicine, please throw away (dispose) as directed by your pharmacist. The [place] Pollution Control Agency has more information about safe disposal: https://www.pca.[facility].[place].us/living-green/managing-unwanted-medications             Medication List: This is a list of all your medications and when to take them. Check marks below indicate your daily home schedule. Keep this list as a reference.      Medications           Morning Afternoon Evening Bedtime As Needed    enoxaparin 40 MG/0.4ML injection   Commonly known as:  LOVENOX   Inject 0.4 mLs (40 mg) Subcutaneous every 24 hours for 28 days   Last time this was given:  40 mg on 9/16/2018 10:47 AM                                oxyCODONE IR 5 MG tablet   Commonly known as:  ROXICODONE   Take 1-2 tablets (5-10 mg) by mouth every 6 hours as needed for moderate to severe pain   Last time this was given:  10 mg on 9/17/2018  9:15 AM

## 2018-09-12 NOTE — OR NURSING
Bilateral neck reddened and swelling noted in night neck. Aurelia Chamberlain MD notified and assessment completed.

## 2018-09-12 NOTE — OR NURSING
Dr. Oswald in the PACU to see Rian. He was able ot discuss the procedure and we moved the abdominal binder down so it covered the penrose drain area. He was able to assess the urine from the jaramillo. It is quite red, but lightening up some, patent, no clots seen. The jaramillo is causing an urge to void, otherwise he does not report any discomfort.

## 2018-09-12 NOTE — ANESTHESIA PROCEDURE NOTES
Peripheral Nerve Block Procedure Note    Staff:     Anesthesiologist:  NARAYAN ALBERTO    Resident/CRNA:  GORDON ADRIAN    Block performed by resident/CRNA in the presence of a teaching physician    Location: Pre-op  Procedure Start/Stop TImes:      9/12/2018 7:15 AM     9/12/2018 7:25 AM    patient identified, IV checked, site marked, risks and benefits discussed, informed consent, monitors and equipment checked, pre-op evaluation, at physician/surgeon's request and post-op pain management      Correct Patient: Yes      Correct Position: Yes      Correct Site: Yes      Correct Procedure: Yes      Correct Laterality:  Yes    Site Marked:  Yes  Procedure details:     Procedure:  TAP    ASA:  3    Laterality:  Bilateral    Position:  Supine    Sterile Prep: chloraprep, mask and sterile gloves      Needle:  Short bevel    Needle gauge:  21    Needle length (mm):  110    Ultrasound: Yes      Ultrasound used to identify targeted nerve, plexus, or vascular structure and placed a needle adjacent to it      Permanent Image entered into patiient's record      Abnormal pain on injection: No      Blood Aspirated: No      Paresthesias:  No    Bleeding at site: No      Test dose negative for signs of intravascular injection: Yes      Bolus via:  Needle    Infusion Method:  Single Shot    Blood aspirated via catheter: No      Complications:  None

## 2018-09-12 NOTE — OR NURSING
"Right chest power port accessed by ASHLYN Danielle, RN, sterile procedure, 20ga 3/2\" needle, excellent blood return noted, no s/s infiltration  "

## 2018-09-12 NOTE — BRIEF OP NOTE
[facility], [place]    Brief Operative Note    Pre-operative diagnosis: Colon Cancer   Post-operative diagnosis * No post-op diagnosis entered *  Procedure: Procedure(s):  Bilateral Cystoscopy With Temporary Stent Placement, Laparoscopic Hand Assisted Takedown Colostomy, Appendectomy, Mobilization of Splenic Flexure, Flexible Sigmoidoscopy, Endoscopic Clipping, Laparoscopic  Assisted Microablation of Liver Tumor, Intra-Op Ultrasound - Wound Class: II-Clean Contaminated   - Wound Class: I-Clean   - Wound Class: II-Clean Contaminated   - Wound Class: III-Contaminated   - Wound Class: III-Contaminated  Surgeon: Surgeon(s) and Role:  Panel 1:     * Hunter Quintero MD - Primary    Panel 2:     * Saqib Hale MD - Primary    Panel 3:     * Herminio Oswald MD - Primary     * Eduardo Booth MD - Resident - Assisting     * Aurelia Chamberlain MD - Assisting     * Shobha Gutierrez MD - Resident - Assisting  Anesthesia: Combined General with Block   Estimated blood loss: 200 ml  Drains: 19Fr Simone in pelvis and 3/4 in Penrose at old stoma site.  Specimens:   ID Type Source Tests Collected by Time Destination   A :  Tissue Appendix SURGICAL PATHOLOGY EXAM Hunter Quintero MD 9/12/2018 11:40 AM    B : rectal stump margin Tissue Large Intestine, Rectum SURGICAL PATHOLOGY EXAM Herminio Oswald MD 9/12/2018 12:06 PM    C : decending colon margin Tissue Colon SURGICAL PATHOLOGY EXAM Herminio Oswald MD 9/12/2018 12:13 PM    D : descending colon mesentery Tissue Colon SURGICAL PATHOLOGY EXAM Herminio Oswald MD 9/12/2018 12:14 PM    E : proximal anastamotic ring Tissue Other SURGICAL PATHOLOGY EXAM Herminio Oswald MD 9/12/2018 12:46 PM    F : distal anastamotic ring Tissue Other SURGICAL PATHOLOGY EXAM Herminio Oswald MD 9/12/2018 12:46 PM    G : colostomy Tissue Other SURGICAL PATHOLOGY EXAM Herminio Owsald  MD Cyrus 9/12/2018  1:09 PM      Findings:   29mm EEA colorectal AA at 9cm from anal verge. Negative hydropneumatic test. No evidence of metastatic disease apart from the liver.  Complications: None.  Implants: None.

## 2018-09-12 NOTE — ANESTHESIA PROCEDURE NOTES
Arterial Line Procedure Note  Staff:     Anesthesiologist:  JUAN A GALE  Location: In OR After Induction  Procedure Start/Stop Times:     patient identified, IV checked, site marked, risks and benefits discussed, informed consent, monitors and equipment checked, pre-op evaluation and at physician/surgeon's request    Line Placement:     Procedure:  Arterial Line    Insertion Site:  Radial    Insertion laterality:  Left    Skin Prep: Chloraprep      Patient Prep: mask, hat and hand hygiene      Local skin infiltration:  None    Ultrasound Guided?: No      Catheter size:  20 gauge, Quick cath    Dressing:  Tegaderm

## 2018-09-12 NOTE — ANESTHESIA POSTPROCEDURE EVALUATION
Patient: Rian Malik    Procedure(s):  Bilateral Cystoscopy With Temporary Stent Placement, Laparoscopic Hand Assisted Takedown Colostomy, Appendectomy, Mobilization of Splenic Flexure, Flexible Sigmoidoscopy, Endoscopic Clipping, Laparoscopic  Assisted Microablation of Liver Tumor, Intra-Op Ultrasound - Wound Class: II-Clean Contaminated   - Wound Class: I-Clean   - Wound Class: II-Clean Contaminated   - Wound Class: III-Contaminated   - Wound Class: III-Contaminated    Diagnosis:Colon Cancer   Diagnosis Additional Information: No value filed.    Anesthesia Type:  General, ETT    Note:  Anesthesia Post Evaluation    Patient location during evaluation: PACU  Patient participation: Able to fully participate in evaluation  Level of consciousness: awake  Pain management: adequate  Airway patency: patent  Cardiovascular status: acceptable  Respiratory status: acceptable  Hydration status: acceptable  PONV: none     Anesthetic complications: None          Last vitals:  Vitals:    09/12/18 0527 09/12/18 0715 09/12/18 0720   BP: 111/74  130/74   Pulse: 69     Resp: 18 13 29   Temp: 36.4  C (97.5  F)     SpO2: 100% 100% 100%         Electronically Signed By: Rachid Forte MD  September 12, 2018  2:47 PM

## 2018-09-12 NOTE — ANESTHESIA CARE TRANSFER NOTE
Patient: Rian Malik    Procedure(s):  Bilateral Cystoscopy With Temporary Stent Placement, Laparoscopic Hand Assisted Takedown Colostomy, Appendectomy, Mobilization of Splenic Flexure, Flexible Sigmoidoscopy, Endoscopic Clipping, Laparoscopic  Assisted Microablation of Liver Tumor, Intra-Op Ultrasound - Wound Class: II-Clean Contaminated   - Wound Class: I-Clean   - Wound Class: II-Clean Contaminated   - Wound Class: III-Contaminated   - Wound Class: III-Contaminated    Diagnosis: Colon Cancer   Diagnosis Additional Information: No value filed.    Anesthesia Type:   General, ETT     Note:  Airway :Face Mask  Patient transferred to:PACU  Comments: Anesthesia Care Transfer Note      Transfer to:  PACU    Patient Vital Signs:  Stable    Airway:  None    Patient transported to PACU with supplemental O2.  Patient alert and breathing comfortably.  VSS.  Care transferred with report to PACU RN.    Gorge Alex CRNAHandarnulfo Report: Identifed the Patient, Identified the Reponsible Provider, Reviewed the pertinent medical history, Discussed the surgical course, Reviewed Intra-OP anesthesia mangement and issues during anesthesia, Set expectations for post-procedure period and Allowed opportunity for questions and acknowledgement of understanding      Vitals: (Last set prior to Anesthesia Care Transfer)    CRNA VITALS  9/12/2018 1351 - 9/12/2018 1428      9/12/2018             Pulse: 88    ART BP: 156/71    ART Mean: 101    SpO2: 100 %                Electronically Signed By: SAVANNA Odom CRNA  September 12, 2018  2:28 PM

## 2018-09-12 NOTE — IP AVS SNAPSHOT
[address] [address]    Phone:  [phone]                                       After Visit Summary   9/12/2018    Rian Malik    MRN: [id number]           After Visit Summary Signature Page     I have received my discharge instructions, and my questions have been answered. I have discussed any challenges I see with this plan with the nurse or doctor.    ..........................................................................................................................................  Patient/Patient Representative Signature      ..........................................................................................................................................  Patient Representative Print Name and Relationship to Patient    ..................................................               ................................................  Date                                   Time    ..........................................................................................................................................  Reviewed by Signature/Title    ...................................................              ..............................................  Date                                               Time          22EPIC Rev 08/18

## 2018-09-12 NOTE — OP NOTE
DATE OF SURGERY:  09/12/2018.      PREOPERATIVE DIAGNOSES:   1.  Metastatic perforated sigmoid colon cancer (status post sigmoidectomy with end colostomy).   2.  Liver cirrhosis (MELD 8).   3.  Hepatitis C.   4.  Maldonado's esophagus.   5.  Prior alcohol abuse.   6.  ECOG score 0.      POSTOPERATIVE DIAGNOSES:   1.  Metastatic perforated sigmoid colon cancer (status post sigmoidectomy with end colostomy).   2.  Liver cirrhosis (MELD 8).   3.  Hepatitis C.   4.  Maldonado's esophagus.   5.  Prior alcohol abuse.   6.  ECOG score 0.      ANESTHESIA:  General endotracheal anesthesia plus bilateral TAP blocks.      PROCEDURES PERFORMED:   1.  Cystoscopy with insertion of bilateral ureteral stents (per Dr. Quintero).   2.  Laparoscopic liver ablation with intraoperative ultrasound (per Dr. Hale).   3.  Hand-assisted laparoscopic colostomy takedown.   4.  Hand-assisted laparoscopic takedown of splenic flexure.   5.  Hand-assisted laparoscopic appendectomy.   6.  Hand-assisted laparoscopic descending colectomy.   7.  Hand-assisted laparoscopic anterior resection.   8.  Flexible sigmoidoscopy with endoscopic clipping.       SURGEON:  Herminio Oswald MD.        ASSISTANTS:  Aurelia Chamberlain MD (colon and rectal surgery fellow); Mathieu Booth MD (general surgery resident); and Shobha Penaloza MD (general surgery resident).      BRIEF HISTORY:  Rian is a 58-year-old pleasant gentleman who presented in 02/2017 with abdominal pain and changes in bowel habits.  He was diagnosed with a diverticular abscess at that time, but it did not respond to antibiotic therapy.  He subsequently underwent a colonoscopy in 08/2017 that showed a sigmoid stricture.  Biopsies of the stricture showed mild chronic inflammation with no evidence of dysplasia or malignancy.  He subsequently underwent a laparoscopic converted to open sigmoidectomy with end colostomy at an outside hospital in 10/2017.  Per his operative report, there was a large firm  inflammatory mass that was adherent to the right lower quadrant and was coming from the sigmoid colon.  There was no clear evidence of free perforation, local invasion or peritoneal disease per the operative report.  His postop course was largely uncomplicated.  His postoperative pathology showed poorly differentiated adenocarcinoma with signet ring cell features.  The mass measured 5 cm.  The tumor was present on the serosal surface and it was perforating in multiple areas.  One of the surgical margins showed extensive involvement by tumor.  This margin was undesignated.  There was lymphovascular invasion.  Three reactive lymph nodes were present in the specimen and all 3 of them were negative for malignancy.  His final pathology was T4 N1c.  Rian received adjuvant chemotherapy in the form of FOLFOX from 11/2017 until 7/2018 (total of 12 cycles).  He did not receive adjuvant radiotherapy.  His CEA level went from 2.2 to 1.6.  His subsequent CT scans showed no evidence of metastatic disease.  He presented to me after his adjuvant chemotherapy to discuss possible colostomy takedown and additional colectomy given that he did not undergo an oncologic resection because of the inflammation in his abdomen.  A CT scan at that time showed an ill-defined 16 mm segment 5 liver lesion that was suspicious for metastatic disease.  His case was discussed at our multidisciplinary colorectal cancer conference.  We repeated a CT scan of the chest, abdomen and pelvis and it did not show any additional metastatic disease.  He was referred to Dr. Hale for further workup given his liver lesion and possible resection.  He did undergo a liver MR that showed a 1.8 cm liver lesion in segment 5 that was suspicious for malignancy.  Dr. Hale thought that this represented metastatic disease and it would be treatable with a curative intent.  He also underwent a colonoscopy by me on 08/24/2018 and this showed a transverse colon  "diverticulosis.  His Lucas's pouch measured 13 cm from the anal verge and showed mild diversion proctitis.  There was no evidence of recurrent neoplasia or polyps.  I thoroughly discussed the risks, benefits and alternatives of operative treatment with Rian and he agreed to proceed.      DESCRIPTION OF OPERATION:  After obtaining informed consent, the patient was brought to the operating room, placed in the modified lithotomy position.  General endotracheal anesthesia was gently induced without difficulty.  The patient underwent preoperative placement of bilateral TAP blocks.  He also received appropriate preoperative antibiotic prophylaxis as well as mechanical and chemical DVT prophylaxis.  Bilateral lower extremity pneumatic compression devices were applied and all pressure points were cushioned.  The perineal area was prepped and draped in the standard sterile fashion.  A \"timeout\" was performed.  Dr. Quintero performed cystoscopy with insertion of bilateral ureteral stents.  Please see his operative report for full details.  A Sherman catheter was inserted.  The abdomen was then prepped and draped in a standard sterile fashion.  Another \"timeout\" was performed.  A 5 cm lower midline incision was placed and the peritoneal cavity was entered under direct vision.  There was a moderate amount of omental adhesions and these were taken down with monopolar electrocautery.  A gel port was then placed at this incision and pneumoperitoneum was established up to 15 mmHg without difficulty.  A 10 mm 30-degree angle laparoscope was then used to assess the peritoneal cavity.  There was no evidence of any metastatic or peritoneal disease.  After this, Dr. Hale performed laparoscopic-assisted microwave ablation of the segment 5 liver lesion.  He did this under intraoperative ultrasound guidance.  Please see his operative report for full details.  After this, additional trocars were placed at the upper midline (10 mm x1) as " well as the right upper quadrant (10 mm x1).  Both of these were placed under direct vision without difficulty.  The greater omentum was then dissected off of the descending colon with Harmonic scalpel.  The entire splenic flexure was taken down using a lateral to medial approach.  There were multiple varicose veins at the upper abdomen given his portal hypertension.  Care was taken to not injure any of these vascular structures during the dissection.  Both ureters were also clearly identified during our dissection and care was taken to not injure these structures during the operation.  After this, mesenteric window was performed at the level of the colostomy.  The mesentery was transected with Harmonic scalpel.  An Endo-ALYX stapler (blue load -- 60 mm) was used to transect the colostomy below the level of the fascia.  Additional omental attachments were taken down proximal to the splenic flexure in order to have enough length to create a colorectal anastomosis without tension.  After this, we directed our attention towards the pelvis.  The rectum was quite scarred down into the lower pelvis.  We dissected the entire rectum free in a circumferential fashion using the Harmonic scalpel.  The dissection was taken down to below the peritoneal reflection.  The mesoappendix as well as the appendix per se were adhered to the sacral promontory.  These were dissected free and the mesoappendix was transected with the Harmonic scalpel.  The base of the appendix was transected with an Endo-ALYX stapler (blue load -- 60 mm).  The specimen was sent for permanent pathology.  After removing the appendix we desufflated pneumoperitoneum.  All trocars were removed under direct vision.  We used the Reuben wound protector of the lower midline incision for adequate exposure.  Some further dissection of the anterior rectum was performed under direct vision.  The rectum at this level appeared to be pliable and healthy.  Given his previous  positive margin, which was most likely the distal margin, we decided to excise the proximal rectum.  Additional mesentery was transected with the Harmonic scalpel.  A contour stapler (green load) was used to transect the proximal rectum.  This specimen was sent for permanent pathology.  Flexible sigmoidoscopy was performed at this time in order to assure that we did not have any injuries to the rectum as well as testing the new contour staple line.  The rectal stump was irrigated with dilute Betadine and we performed hydropneumatic testing, which revealed no evidence of any air leakage.  We also confirmed that there was adequate length of our colon conduit to reach down to the contour staple line.  The previous Endo-ALYX staple line was excised and this was sent for permanent pathology.  A 2-0 Prolene pursestring suture was placed and the anvil of a 29 mm EA stapler was sewn into this pursestring suture.  Care was taken to avoid incorporating any adjacent diverticula.  We corroborated adequate orientation of our colon conduit.  A 29 mm EA stapler was passed transanally and brought up all the way to the contour staple line.  The post was delivered posterior to the contour staple line.  The anvil was connected to the post and the post was retrieved.  The EA stapler was then fired and retrieved.  Both anastomotic rings appeared to be intact and these were sent separately for permanent pathology.  Flexible endoscopy was repeated and this revealed an intact colorectal anastomosis at approximately 9-10 cm from the anal verge.  There was absolutely no evidence of air leakage from the colorectal anastomosis upon hydropneumatic testing and the mucosa on either sides of the anastomosis appeared to be viable and healthy.  From a gross inspection standpoint, there was absolutely no evidence of tension, torsion, or inadequate blood supply to the colorectal anastomosis.  Upon flexible sigmoidoscopy, we did notice that there was  some bleeding from the left lateral aspect of the anastomosis.  This was controlled with placing one endoscopic clip at this area.  The endoscope was then removed.  The abdomen and pelvis were irrigated with approximately 1 liter of warm sterile water and subsequently suctioned out.  Instrument, sponge, and needle counts were all correct as reported to me.  We then used a separate closing tray to perform the last portions of the operation.  Multiple sheets of Seprafilm were left under the lower midline incision.  The fascia of the lower midline incision was reapproximated with multiple figure-of-eight #1 Prolene sutures.  The two 10 mm trocar sites were closed at the fascial level with figure-of-eight 0 Vicryl sutures.  The wounds were irrigated with dilute peroxide and hemostasis was corroborated.  Skin incisions were reapproximated with running 4-0 Monocryl subcuticular sutures and Dermabond Prineo.  After this, we excised the previous colostomy site with a circumferential incision.  This was then sent for permanent pathology.  The hernia sac was then excised and the muscle was reapproximated with a figure-of-eight 0 Vicryl suture.  The fascia at the old stoma site was reapproximated  with 4 individual figure-of-eight #1 Prolene sutures.  A 2-0 Vicryl pursestring suture was placed at the level of the dermis.  A 3/4-inch Penrose drain was left at the old colostomy site and fixed in place with a 2-0 nylon suture.  The patient also underwent placement of a 19-Maori Simone drain in the pelvis and this was secured to the right lower quadrant with a 2-0 nylon suture.  The patient tolerated the procedure well.  Both ureteral stents were removed at the end of the case.  The Sherman catheter was left in place.      COMPLICATIONS:  None immediately.      ESTIMATED BLOOD LOSS:  200 mL of crystalloid.      REPLACEMENT:  2U of platelets and 2000 mL of crystalloid.      DRAINS AND TUBES:  19-Maori Simone drain in the pelvis,  3/4-inch Penrose drain at the old colostomy site, and Sherman catheter.      SPECIMENS:  Vermiform appendix, colostomy, descending colon, rectum, anastomotic rings, additional descending colon mesentery.      FINDINGS:  No evidence of metastatic disease apart from the liver.  Negative hydropneumatic testing.  Twenty-nine millimeter EEA colorectal anastomosis at 8 cm from the anal verge.      DISPOSITION:  PACU.         RICARDO MATTA MD             D: 2018   T: 2018   MT: CRESCENCIO      Name:     TRINY SIMMONS   MRN:      [id number]        Account:        [id number]   :      1960           Procedure Date: 2018      Document: [id number]       cc: Abby Boone MD

## 2018-09-13 ENCOUNTER — APPOINTMENT (OUTPATIENT)
Dept: OCCUPATIONAL THERAPY | Facility: CLINIC | Age: 58
DRG: 330 | End: 2018-09-13
Attending: COLON & RECTAL SURGERY
Payer: COMMERCIAL

## 2018-09-13 PROBLEM — Z71.89 ADVANCED DIRECTIVES, COUNSELING/DISCUSSION: Status: RESOLVED | Noted: 2017-05-01 | Resolved: 2018-09-13

## 2018-09-13 LAB
ALBUMIN SERPL-MCNC: 3.2 G/DL (ref 3.4–5)
ALP SERPL-CCNC: 87 U/L (ref 40–150)
ALT SERPL W P-5'-P-CCNC: 118 U/L (ref 0–70)
ANION GAP SERPL CALCULATED.3IONS-SCNC: 6 MMOL/L (ref 3–14)
AST SERPL W P-5'-P-CCNC: 124 U/L (ref 0–45)
BILIRUB SERPL-MCNC: 1.3 MG/DL (ref 0.2–1.3)
BUN SERPL-MCNC: 13 MG/DL (ref 7–30)
CALCIUM SERPL-MCNC: 8.1 MG/DL (ref 8.5–10.1)
CHLORIDE SERPL-SCNC: 102 MMOL/L (ref 94–109)
CO2 SERPL-SCNC: 27 MMOL/L (ref 20–32)
CREAT SERPL-MCNC: 0.76 MG/DL (ref 0.66–1.25)
ERYTHROCYTE [DISTWIDTH] IN BLOOD BY AUTOMATED COUNT: 13.9 % (ref 10–15)
GFR SERPL CREATININE-BSD FRML MDRD: >90 ML/MIN/1.7M2
GLUCOSE SERPL-MCNC: 135 MG/DL (ref 70–99)
HCT VFR BLD AUTO: 38.2 % (ref 40–53)
HGB BLD-MCNC: 13.4 G/DL (ref 13.3–17.7)
INR PPP: 1.29 (ref 0.86–1.14)
MAGNESIUM SERPL-MCNC: 1.8 MG/DL (ref 1.6–2.3)
MCH RBC QN AUTO: 32.2 PG (ref 26.5–33)
MCHC RBC AUTO-ENTMCNC: 35.1 G/DL (ref 31.5–36.5)
MCV RBC AUTO: 92 FL (ref 78–100)
PHOSPHATE SERPL-MCNC: 2.8 MG/DL (ref 2.5–4.5)
PLATELET # BLD AUTO: 73 10E9/L (ref 150–450)
POTASSIUM SERPL-SCNC: 4.4 MMOL/L (ref 3.4–5.3)
PROT SERPL-MCNC: 6.6 G/DL (ref 6.8–8.8)
RBC # BLD AUTO: 4.16 10E12/L (ref 4.4–5.9)
SODIUM SERPL-SCNC: 135 MMOL/L (ref 133–144)
WBC # BLD AUTO: 12.3 10E9/L (ref 4–11)

## 2018-09-13 PROCEDURE — 25000132 ZZH RX MED GY IP 250 OP 250 PS 637: Performed by: COLON & RECTAL SURGERY

## 2018-09-13 PROCEDURE — 99231 SBSQ HOSP IP/OBS SF/LOW 25: CPT | Performed by: NURSE PRACTITIONER

## 2018-09-13 PROCEDURE — 36592 COLLECT BLOOD FROM PICC: CPT | Performed by: COLON & RECTAL SURGERY

## 2018-09-13 PROCEDURE — 85027 COMPLETE CBC AUTOMATED: CPT | Performed by: COLON & RECTAL SURGERY

## 2018-09-13 PROCEDURE — 97535 SELF CARE MNGMENT TRAINING: CPT | Mod: GO | Performed by: OCCUPATIONAL THERAPIST

## 2018-09-13 PROCEDURE — 12000008 ZZH R&B INTERMEDIATE UMMC

## 2018-09-13 PROCEDURE — 40000133 ZZH STATISTIC OT WARD VISIT: Performed by: OCCUPATIONAL THERAPIST

## 2018-09-13 PROCEDURE — 25000128 H RX IP 250 OP 636: Performed by: COLON & RECTAL SURGERY

## 2018-09-13 PROCEDURE — 25000128 H RX IP 250 OP 636: Performed by: STUDENT IN AN ORGANIZED HEALTH CARE EDUCATION/TRAINING PROGRAM

## 2018-09-13 PROCEDURE — 97530 THERAPEUTIC ACTIVITIES: CPT | Mod: GO | Performed by: OCCUPATIONAL THERAPIST

## 2018-09-13 PROCEDURE — 84100 ASSAY OF PHOSPHORUS: CPT | Performed by: COLON & RECTAL SURGERY

## 2018-09-13 PROCEDURE — 80053 COMPREHEN METABOLIC PANEL: CPT | Performed by: COLON & RECTAL SURGERY

## 2018-09-13 PROCEDURE — C9113 INJ PANTOPRAZOLE SODIUM, VIA: HCPCS | Performed by: COLON & RECTAL SURGERY

## 2018-09-13 PROCEDURE — 97165 OT EVAL LOW COMPLEX 30 MIN: CPT | Mod: GO | Performed by: OCCUPATIONAL THERAPIST

## 2018-09-13 PROCEDURE — 85610 PROTHROMBIN TIME: CPT | Performed by: COLON & RECTAL SURGERY

## 2018-09-13 PROCEDURE — 40000893 ZZH STATISTIC PT IP EVAL DEFER

## 2018-09-13 PROCEDURE — 83735 ASSAY OF MAGNESIUM: CPT | Performed by: COLON & RECTAL SURGERY

## 2018-09-13 RX ORDER — MAGNESIUM SULFATE 1 G/100ML
1 INJECTION INTRAVENOUS ONCE
Status: COMPLETED | OUTPATIENT
Start: 2018-09-13 | End: 2018-09-13

## 2018-09-13 RX ORDER — METOCLOPRAMIDE HYDROCHLORIDE 5 MG/ML
5 INJECTION INTRAMUSCULAR; INTRAVENOUS ONCE
Status: COMPLETED | OUTPATIENT
Start: 2018-09-13 | End: 2018-09-13

## 2018-09-13 RX ADMIN — CALCIUM GLUCONATE 2 G: 98 INJECTION, SOLUTION INTRAVENOUS at 07:55

## 2018-09-13 RX ADMIN — Medication: at 14:36

## 2018-09-13 RX ADMIN — SODIUM CHLORIDE, POTASSIUM CHLORIDE, SODIUM LACTATE AND CALCIUM CHLORIDE: 600; 310; 30; 20 INJECTION, SOLUTION INTRAVENOUS at 11:40

## 2018-09-13 RX ADMIN — METRONIDAZOLE 500 MG: 500 INJECTION, SOLUTION INTRAVENOUS at 10:28

## 2018-09-13 RX ADMIN — ALVIMOPAN 12 MG: 12 CAPSULE ORAL at 21:47

## 2018-09-13 RX ADMIN — MAGNESIUM SULFATE IN DEXTROSE 1 G: 10 INJECTION, SOLUTION INTRAVENOUS at 11:35

## 2018-09-13 RX ADMIN — PANTOPRAZOLE SODIUM 40 MG: 40 INJECTION, POWDER, FOR SOLUTION INTRAVENOUS at 18:30

## 2018-09-13 RX ADMIN — METOCLOPRAMIDE 5 MG: 5 INJECTION, SOLUTION INTRAMUSCULAR; INTRAVENOUS at 07:50

## 2018-09-13 RX ADMIN — PROCHLORPERAZINE EDISYLATE 10 MG: 5 INJECTION INTRAMUSCULAR; INTRAVENOUS at 03:41

## 2018-09-13 RX ADMIN — ALVIMOPAN 12 MG: 12 CAPSULE ORAL at 07:59

## 2018-09-13 RX ADMIN — METRONIDAZOLE 500 MG: 500 INJECTION, SOLUTION INTRAVENOUS at 02:04

## 2018-09-13 RX ADMIN — CIPROFLOXACIN 400 MG: 2 INJECTION, SOLUTION INTRAVENOUS at 09:16

## 2018-09-13 ASSESSMENT — ACTIVITIES OF DAILY LIVING (ADL)
ADLS_ACUITY_SCORE: 9
PREVIOUS_RESPONSIBILITIES: MEAL PREP;HOUSEKEEPING;LAUNDRY;SHOPPING;YARDWORK;MEDICATION MANAGEMENT;FINANCES;DRIVING;WORK
ADLS_ACUITY_SCORE: 9
IADL_COMMENTS: FAMILY CAN ASSIST AS NEEDED
ADLS_ACUITY_SCORE: 9
ADLS_ACUITY_SCORE: 9

## 2018-09-13 ASSESSMENT — PAIN DESCRIPTION - DESCRIPTORS: DESCRIPTORS: SORE

## 2018-09-13 NOTE — PLAN OF CARE
Problem: Patient Care Overview  Goal: Plan of Care/Patient Progress Review  Discharge Planner OT   Patient plan for discharge: home with assist  Current status: pt ambulating SBA and SBA bed mobility/dressing all within precautions  Barriers to return to prior living situation: none  Recommendations for discharge: home with assist as needed for ADL's requiring greater than 10 lbs lifting.   Rationale for recommendations: see above, pt with sufficient family support.        Entered by: Rory Llanes 09/13/2018 11:09 AM

## 2018-09-13 NOTE — PROGRESS NOTES
SURGERY POST-OP CHECK  09/12/2018 7:30 PM    Patient: Rian Malik  MRN: [id number]    SUBJECTIVE  Patient was evaluated at the bedside and was in no acute distress. Pain well-controlled on PCA dilaudid. Tolerating sips. Sherman in place draining red urine, which is showing a lighter appearance from the output while in PACU. MK drain in place with sanguineous output. Patient denies any fevers, chills, nausea, vomiting, chest pain, or shortness of breath.     OBJECTIVE  Temp:  [96.2  F (35.7  C)-99  F (37.2  C)] 96.2  F (35.7  C)  Pulse:  [67-71] 71  Heart Rate:  [56-88] 75  Resp:  [11-29] 11  BP: (111-139)/(66-81) 129/66  MAP:  [92 mmHg-104 mmHg] 96 mmHg  Arterial Line BP: (149-165)/(66-73) 161/66  SpO2:  [99 %-100 %] 100 %     I/O last 3 completed shifts:  In: 2500 [I.V.:2100]  Out: 905 [Urine:670; Drains:35; Blood:200]    Physical Exam:  General: AAO, NAD, lying in bed, appears comfortable  CV: RRR, no murmurs, gallops, or rubs  Pulm: CTAB, breathing comfortably on 2L O2 NC.  Abd: soft, appropriately tender to palpation, slightly distended, incisions c/d/i with MK draining sanguineous output. Abdominal binder in place.  : Sherman in place collecting red urine.  Extremities: warm, well-perfused without edema  Neuro: No focal deficits noted, patient moves all extremities spontaneously    ASSESSMENT/PLAN  Rian Malik is a 58 year old male with a h/o metastatic colon cancer to the liver POD#0 s/p laparoscopic takedown of splenic flexure, appendectomy, descending colectomy, anterior resection, and colostomy takedown. Patient recovering as expected.    -Neuro: Continue current pain management with dilaudid PCA   -CV: Hemodynamically stable. Continue monitoring vitals.  -Pulm: Satting 100% on 2L O2 NC; encouraged IS use.  -FEN: Continue sips; Advance Diet as Tolerated,LR @ 125 ml/hr ; monitor I/O  -Prophylaxis: SCDs, Lovenox  -Disposition per primary team's decision.      Arturo Ledesma MD  General Surgery (PGY-1)

## 2018-09-13 NOTE — PLAN OF CARE
Problem: Patient Care Overview  Goal: Plan of Care/Patient Progress Review  Outcome: Therapy, progress toward functional goals as expected  Pt arrived from PACU around 1800. VSS, now on 1L oxygen. Pain managed with Dilaudid PCA 0.2 mg q 10 mins, has only used one dose. IVF infusing @ 125 mL/hr into port. PIV x2 saline locked. Full liquid diet ordered, has only had clear liquids so far. No nausea. Sherman with adequate amount of dark red output, no clots noted. MK drain with moderate amount of red drainage. Incisions with MADELAINE green. Drainage marked on dressing over penrose. Abdominal binder in place. Stood at edge of bed with 2 assist. Continue to monitor and with POC.

## 2018-09-13 NOTE — PROGRESS NOTES
09/13/18 0800   Quick Adds   Type of Visit Initial Occupational Therapy Evaluation   Living Environment   Lives With child(candie), adult   Living Arrangements house   Home Accessibility stairs to enter home;stairs within home   Number of Stairs to Enter Home 1   Number of Stairs Within Home 20   Stair Railings at Home inside, present on right side   Transportation Available car;family or friend will provide   Self-Care   Usual Activity Tolerance good   Current Activity Tolerance moderate   Regular Exercise other (see comments)  (pt is very active in work and general lifestyle. )   Equipment Currently Used at Home none   Functional Level Prior   Ambulation 0-->independent   Transferring 0-->independent   Toileting 0-->independent   Bathing 0-->independent   Dressing 0-->independent   Eating 0-->independent   Communication 0-->understands/communicates without difficulty   Swallowing 0-->swallows foods/liquids without difficulty   Cognition 0 - no cognition issues reported   Fall history within last six months no   General Information   Referring Physician Herminio Oswald   Patient/Family Goals Statement return to I ADL's.    Additional Occupational Profile Info/Pertinent History of Current Problem Rian Malik is a 58 year old male with a h/o metastatic colon cancer to the liver POD#0 s/p laparoscopic takedown of splenic flexure, appendectomy, descending colectomy, anterior resection, and colostomy takedown   Precautions/Limitations abdominal precautions   General Observations pt motivated in therapy    Cognitive Status Examination   Orientation orientation to person, place and time   Level of Consciousness alert   Able to Follow Commands WNL/WFL   Personal Safety (Cognitive) WNL/WFL   Memory intact   Cognitive Comment no cognitive concerns.    Visual Perception   Visual Perception No deficits were identified   Sensory Examination   Sensory Quick Adds No deficits were identified   Range of Motion (ROM)   ROM Quick  "Adds No deficits were identified   Hand Strength   Hand Strength Comments no deficits.    Coordination   Coordination Comments no deficits.   (no deficits. )   Transfer Skill: Bed to Chair/Chair to Bed   Level of [place]: Bed to Chair stand-by assist   Transfer Skill: Sit to Stand   Level of [place]: Sit/Stand stand-by assist   Lower Body Dressing   Level of [place]: Dress Lower Body other (see comments)  (education required. )   Instrumental Activities of Daily Living (IADL)   Previous Responsibilities meal prep;housekeeping;laundry;shopping;yardwork;medication management;finances;driving;work   IADL Comments family can assist as needed   Activities of Daily Living Analysis   Impairments Contributing to Impaired Activities of Daily Living pain;post surgical precautions   General Therapy Interventions   Planned Therapy Interventions ADL retraining;IADL retraining;bed mobility training;transfer training;home program guidelines;progressive activity/exercise   Clinical Impression   Criteria for Skilled Therapeutic Interventions Met yes, treatment indicated   OT Diagnosis decreased ADL I   Assessment of Occupational Performance 3-5 Performance Deficits   Identified Performance Deficits dressing, bathing, leisure.    Clinical Decision Making (Complexity) Low complexity   Therapy Frequency other (see comments)   Predicted Duration of Therapy Intervention (days/wks) 1-3 visits   Anticipated Discharge Disposition Home with Assist   Risks and Benefits of Treatment have been explained. Yes   Patient, Family & other staff in agreement with plan of care Yes   Clinical Impression Comments Pt presents to OT with post surgical precautions leading to decreased ADL I. pt to benefit from skilled OT to address the above problem list. See daily note for treatment provided today.    [facility] AM-PAC TM \"6 Clicks\"   2016, Trustees of [facility], under license to [employer].  All rights reserved.   6 Clicks " "Short Forms Daily Activity Inpatient Short Form   [facility] AM-PAC  \"6 Clicks\" Daily Activity Inpatient Short Form   1. Putting on and taking off regular lower body clothing? 3 - A Little   2. Bathing (including washing, rinsing, drying)? 3 - A Little   3. Toileting, which includes using toilet, bedpan or urinal? 3 - A Little   4. Putting on and taking off regular upper body clothing? 4 - None   5. Taking care of personal grooming such as brushing teeth? 4 - None   6. Eating meals? 4 - None   Daily Activity Raw Score (Score out of 24.Lower scores equate to lower levels of function) 21   Total Evaluation Time   Total Evaluation Time (Minutes) 3     "

## 2018-09-13 NOTE — PROGRESS NOTES
"Surgical Oncology Progress Note  09/13/2018    Nausea overnight. Tolerating fulls. Good UOP.     /64 (BP Location: Left arm)  Pulse 67  Temp 96.9  F (36.1  C) (Oral)  Resp 13  Ht 1.727 m (5' 7.99\")  Wt 70.2 kg (154 lb 12.2 oz)  SpO2 97%  BMI 23.54 kg/m2    UOP 1220, 465 overnight    PE  NAD  NLB  Abd incisions c/d/i    Labs  Tbili 1.3       Cr 0.76    A/P: 56M POD1 s/p laparoscopic colostomy takedown with liver ablation. Stable. LFTs slightly elevated, this is expected.     -cares per primary, surg onc will sign off. Please call with questions.    Seen with chief who discussed with staff.    Donita Goldstein MD PGY3  General Surgery      "

## 2018-09-13 NOTE — PROGRESS NOTES
"REGIONAL ANESTHESIA PAIN SERVICE  SUBJECTIVE  Interval history: Patient reports incisional pain adequately controlled with current Dilaudid PCA and nerve block, but has been experiencing RUQ sharp abdominal pain with deep inspiration and cough.  Currently rating pain 5/10 with activity.  Tolerating full liquid diet,  denies nausea/vomiting today.  Denies any weakness, paresthesias, circumoral numbness, metallic taste or tinnitus.  Sherman in place, octavio red colored urine, adequate output.     Clinically Aligned Pain Assessment (CAPA):  Comfort (How is your pain?): Tolerable with discomfort  Change in Pain (Since your last medication/intervention?): About the same  Pain Control (How are your pain treatments working?):  Partially effective pain control    Medications related to Pain Management (Future)    Start     Dose/Rate Route Frequency Ordered Stop    09/13/18 0800  alvimopan (ENTEREG) capsule 12 mg      12 mg Oral 2 TIMES DAILY 09/12/18 1752 09/20/18 0759    09/12/18 1752  bupivacaine liposome (EXPAREL) LONG ACTING injection was administered into the infiltration site to produce postsurgical analgesia. Duration of action is up to 72 hours, and other \"shari\" medications should not be given for 96 hours with the exception of the lidocaine 5% patch (LIDODERM) and the lidocaine 10mg in potassium infusions. This entry is for INFORMATION ONLY.       Does not apply CONTINUOUS PRN 09/12/18 1752 09/16/18 1751    09/12/18 1752  lidocaine 1 % 1 mL      1 mL Other EVERY 1 HOUR PRN 09/12/18 1752      09/12/18 1752  lidocaine (LMX4) cream       Topical EVERY 1 HOUR PRN 09/12/18 1752      09/12/18 1752  LORazepam (ATIVAN) injection 0.5 mg      0.5 mg Intravenous EVERY 6 HOURS PRN 09/12/18 1752      09/12/18 1500  HYDROmorphone (DILAUDID) PCA 1 mg/mL OPIOID NAIVE       Intravenous CONTINUOUS 09/12/18 1447            OBJECTIVE:    /64 (BP Location: Left arm)  Pulse 67  Temp 96.9  F (36.1  C) (Oral)  Resp 13  Ht 1.727 m " "(5' 7.99\")  Wt 70.2 kg (154 lb 12.2 oz)  SpO2 97%  BMI 23.54 kg/m2  Exam:  General: alert and mild distress  Neuro: Strength BLE 5/5     ASSESSMENT/PLAN:    Rian Malik is a 58 year old male with colon cancer, now POD #1 s/p  COMBINED CYSTOSCOPY, INSERT STENT URETER(S), LAPAROSCOPIC HAND ASSISTED HEPATECTOMY PARTIAL, LAPAROSCOPIC CHOLECYSTECTOMY, LAPAROSCOPIC ASSISTED COLOSTOMY TAKEDOWN, COMBINED LAPAROTOMY, TUMOR DEBULKING, CHEMOTHERAPY, SIGMOIDOSCOPY FLEXIBLE with single shot injection bilateral transversus abdominis plane (TAP) nerve block.  Total bupivacaine 0.25% 20 mL and liposomal (long-acting) bupivacaine (Exparel) 1.3% 20 mL administered 9/12/18 for postop pain control.  Patient is ambulating without difficulty.  No weakness or paresthesias.  No evidence of adverse side effects associated with nerve block injections.  Acheiving adequate pain control, with nerve block and IV analgesics, sharp RUQ abd pain slightly improved with loosening of abdominal binder and application of cold pack.  Anticipate 48-72 hours of pain control with long-acting local anesthetic. Additionally, patient will continue to require multimodal analgesia for visceral/muscle pain not controlled with long-acting local anesthetic.      - NO other local anesthetic use within 96 hours of liposomal bupivacaine (Exparel), unless approved by [facility]APS  - patient received verbal and written instructions about liposomal bupivacaine and counseling about pharmacologic and nonpharmacologic measures for acute postoperative pain management  - please call [facility]APS if questions or concerns    SAVANNA Servin [facility]  Regional Anesthesia Pain Service ([facility])  9/13/2018 12:58 PM    RAPS Contact Info (for in-house use only):  Job code ID: [id number]   [employer] 0599  [facility] 0602  [employer] phone: dial [phone], enter jobcode ID, then enter call-back number.    Text: Use [employer] on the Intranet <Paging/Directory> tab and enter Jobcode ID.   If no call back at " any time, contact the hospital  and ask for RAPS attending or backup

## 2018-09-13 NOTE — PROGRESS NOTES
"S: Bloody urine postop, now improving in color without clots. Mild nausea overnight improved with compazine. Pain adequately controlled. Afebrile. Tolerating full liquid diet. -Flatus - BM.    Objective  Vital signs:  Temp: 97.1  F (36.2  C) Temp src: Oral BP: 126/64 Pulse: 67 Heart Rate: 75 Resp: 16 SpO2: 98 % O2 Device: Nasal cannula Oxygen Delivery: 1 LPM Height: 172.7 cm (5' 7.99\") Weight: 70.2 kg (154 lb 12.2 oz)  Estimated body mass index is 23.54 kg/(m^2) as calculated from the following:    Height as of this encounter: 1.727 m (5' 7.99\").    Weight as of this encounter: 70.2 kg (154 lb 12.2 oz).    No acute distress  Non-labored breathing on nasal cannula  Regular rate  Abdomen soft, appropriately tender. Incision sites with dermabond c/d/i. Dressing over penrose exchanged  Extremities warm and well perfused    I/O last 3 completed shifts:  In: 3478 [P.O.:275; I.V.:2803]  Out: 1615 [Urine:1220; Drains:195; Blood:200]    Labs/Imaging  pending    A/P  Rian Malik is a 58 year old male with history of metastatic adenocarcinoma s/p lap converted open non-oncolgoc sigmoidectomy with Burnett's pouch creation 10/11/17.  Has subsequently undergone chemotherapy. POD#0 s/p laparoscopic  liver ablation, laparoscopic assisted appendectomy, splenic flexure mobilization, colostomy takedown with anterior resection, flexible sigmoidoscopy.    -PCA dilaudid, PRN ativan  -Wean nasal cannula. IS  -FLD. Entereg.  -Continue jaramillo  -Periop flagyl  -Will discuss timing of starting lovenox  -Dispo: 2-3 days    Patient seen with fellow. To be discussed with staff.    Eduarod Booth MD   Gen Surg PGY-3  P: [phone]      "

## 2018-09-13 NOTE — PLAN OF CARE
Problem: Pain, Acute (Adult)  Goal: Identify Related Risk Factors and Signs and Symptoms  Related risk factors and signs and symptoms are identified upon initiation of Human Response Clinical Practice Guideline (CPG).   Outcome: Improving  VSS for pt.Respiratory rate occasionally drops to 7-10 via capno.Capno due to be discontinued at 1800.O2 at 1 liter.Incision is cdi.Sherman patent with adequate UV.Urine remains blood tinged.MD aware,states it is to be expected.PCA of dilaudid at 0.2 mg ID with stated decrease of pain.Ice packs applied to upper left quadrant,binder in place with improvement in pain control.Magnesium replaced.Lung sounds clear,diminshed lower lobes.Using IS with encouragement.Taking sips of water,states he is not hungry but is not nauseated.Drainage at penrose site.MK drain patent with serosanguinous drainage.Has not passed flatus this shift.

## 2018-09-13 NOTE — PLAN OF CARE
Problem: Patient Care Overview  Goal: Plan of Care/Patient Progress Review  Outcome: No Change  Pt AVSS. Pt sleeping between cares. Pt pain under control with dilaudid pca at 0.2mg q 10 min. Stated the pain is manageable. IS enc =700ml. Abd dist, midline intact. TD site old drge no changes. MK intact with mod amts s/s drge. Sherman patent with 365cc octavio red uo. IVF at 125/hr. Pt c/o nausea at this time and med with compazine. Slept following. Cont to maintain pain control. Monitor uo. Monitor cbcp levels.

## 2018-09-13 NOTE — PLAN OF CARE
Problem: Patient Care Overview  Goal: Plan of Care/Patient Progress Review  PT / 7C - PT orders received and acknowledged. Per discussion with OT - patient will only require one therapy discipline during inpatient stay to address rehab needs based on current functional mobility status. OT will continue to follow for therapy. PT to defer evaluation and complete orders. Please refer to OT notes for discharge/therapy recs.

## 2018-09-14 ENCOUNTER — APPOINTMENT (OUTPATIENT)
Dept: OCCUPATIONAL THERAPY | Facility: CLINIC | Age: 58
DRG: 330 | End: 2018-09-14
Attending: UROLOGY
Payer: COMMERCIAL

## 2018-09-14 PROCEDURE — 25000128 H RX IP 250 OP 636: Performed by: COLON & RECTAL SURGERY

## 2018-09-14 PROCEDURE — 40000133 ZZH STATISTIC OT WARD VISIT

## 2018-09-14 PROCEDURE — 12000008 ZZH R&B INTERMEDIATE UMMC

## 2018-09-14 PROCEDURE — 12000001 ZZH R&B MED SURG/OB UMMC

## 2018-09-14 PROCEDURE — 25000128 H RX IP 250 OP 636: Performed by: STUDENT IN AN ORGANIZED HEALTH CARE EDUCATION/TRAINING PROGRAM

## 2018-09-14 PROCEDURE — G0463 HOSPITAL OUTPT CLINIC VISIT: HCPCS

## 2018-09-14 PROCEDURE — 97530 THERAPEUTIC ACTIVITIES: CPT | Mod: GO

## 2018-09-14 PROCEDURE — 25000132 ZZH RX MED GY IP 250 OP 250 PS 637: Performed by: COLON & RECTAL SURGERY

## 2018-09-14 RX ADMIN — Medication: at 21:57

## 2018-09-14 RX ADMIN — ALVIMOPAN 12 MG: 12 CAPSULE ORAL at 20:33

## 2018-09-14 RX ADMIN — ALVIMOPAN 12 MG: 12 CAPSULE ORAL at 09:20

## 2018-09-14 RX ADMIN — SODIUM CHLORIDE, POTASSIUM CHLORIDE, SODIUM LACTATE AND CALCIUM CHLORIDE: 600; 310; 30; 20 INJECTION, SOLUTION INTRAVENOUS at 17:39

## 2018-09-14 RX ADMIN — ENOXAPARIN SODIUM 40 MG: 40 INJECTION SUBCUTANEOUS at 09:20

## 2018-09-14 RX ADMIN — SODIUM CHLORIDE, POTASSIUM CHLORIDE, SODIUM LACTATE AND CALCIUM CHLORIDE: 600; 310; 30; 20 INJECTION, SOLUTION INTRAVENOUS at 02:02

## 2018-09-14 ASSESSMENT — ACTIVITIES OF DAILY LIVING (ADL)
ADLS_ACUITY_SCORE: 9

## 2018-09-14 ASSESSMENT — PAIN DESCRIPTION - DESCRIPTORS: DESCRIPTORS: SORE;DISCOMFORT

## 2018-09-14 NOTE — PROGRESS NOTES
"S: Tolerated chocolate ice cream last night without nausea. -flatus, - BM. Urine output now straw colored. Ambulating.     Objective  Vital signs:  Temp: 98.4  F (36.9  C) Temp src: Oral BP: 136/75 Pulse: 77 Heart Rate: 86 Resp: 15 SpO2: 99 % O2 Device: Nasal cannula Oxygen Delivery: 1 LPM Height: 172.7 cm (5' 7.99\") Weight: 70.2 kg (154 lb 12.2 oz)  Estimated body mass index is 23.54 kg/(m^2) as calculated from the following:    Height as of this encounter: 1.727 m (5' 7.99\").    Weight as of this encounter: 70.2 kg (154 lb 12.2 oz).    No acute distress  Non-labored breathing on nasal cannula  Regular rate  Abdomen soft, appropriately tender. Incision sites with dermabond c/d/i. Dressing over penrose exchanged  Extremities warm and well perfused    I/O last 3 completed shifts:  In: 3045 [P.O.:695; I.V.:2350]  Out: 1480 [Urine:1165; Drains:315]    Labs/Imaging  None ordered    A/P  Rian Malik is a 58 year old male with history of metastatic adenocarcinoma s/p lap converted open non-oncolgoc sigmoidectomy with Burnett's pouch creation 10/11/17.  Has subsequently undergone chemotherapy. POD#1 s/p laparoscopic  liver ablation, laparoscopic assisted appendectomy, splenic flexure mobilization, colostomy takedown with anterior resection, flexible sigmoidoscopy.     -PCA dilaudid, PRN ativan  -Wean nasal cannula. IS  -FLD. Entereg. Ok for supplements  -Will discuss timing of jaramillo removal  -Periop flagyl completed  -Possibly resume lovenox today  -Dispo: 2-3 days    Patient seen with fellow. To be discussed with staff.    Eduardo Booth MD   Gen Surg PGY-3  P: [phone]      " School form completed for mother to  at office tomorrow.  Linda Lala RN

## 2018-09-14 NOTE — PLAN OF CARE
Problem: Patient Care Overview  Goal: Plan of Care/Patient Progress Review  Outcome: No Change  VSS. Pt pain controlled using PRN and scheduled analgesics including PCA. Pt tolerating full liquids; denies N/V. No gas/BM. UOP adequate via jaramillo. MK output moderate; penrose dressing intact; incision MADELAINE. Pt on 1L per NC. Port running IVF. Pt has 2 SL PIV. Pt up with SBA. PLAN: continue POC    Pt ok with bedside report if awake.

## 2018-09-14 NOTE — PLAN OF CARE
Problem: Patient Care Overview  Goal: Plan of Care/Patient Progress Review  OT/7C - Discharge Planner OT   Patient plan for discharge: home with assist from son and daughter, possibly Monday  Current status: Facilitated functional mobility ~250 feet x2, progressing to no handhold on IV pole without LOB observed, working to increase activity tolerance needed for ADL/IADL. Pt ascends/descends 8 stairs with unilateral handrail and SBA. Pt reports comfort with stairs at this time. Reviewed post surgical abdominal precautions with pt and pt demos log roll approach for bed mobility supine <> sitting EOB.   Barriers to return to prior living situation: post surgical precautions  Recommendations for discharge: home with assist PRN  Rationale for recommendations: pt will likely require assist for higher level IADLs due to lifting restriction; pt reports children are able to help as needed       Entered by: Shante Evans 09/14/2018 2:55 PM

## 2018-09-14 NOTE — PLAN OF CARE
Problem: Patient Care Overview  Goal: Plan of Care/Patient Progress Review  Writer cared for pt from [id number]. AVSS. Pain well managed with 0.2mg PCA dilaudid q10min. Denies nausea, tolerating FL diet. Incision with dermabond, open to air, approximated. Takedown site covered with dressing, MDs changed this morning, c/d/i. MK dressing c/d/i, high output this shift, MDs aware and aren't concerned d/t no change in color and consistency. Bright red output from MK. Sherman DC'd this morning, pt voiding good amounts. Ambulated 2x with SBA. Passed flatus, no stool. OT worked on stairs with pt. MDs ordered WOC consult to discuss what to expect after a colostomy takedown. Continue POC.

## 2018-09-14 NOTE — DISCHARGE SUMMARY
"[facility]  Discharge Summary  Colon and Rectal Surgery     Rian Malik MRN# [id number]   YOB: 1960 Age: 58 year old     Date of Admission:  9/12/2018  Date of Discharge::  9/17/2018  Admitting Physician:  Herminio Oswald MD  Discharge Physician:  Herminio Oswald MD  Primary Care Physician:        Moreno Harris          Admission Diagnoses:   Colon cancer          Discharge Diagnosis:   Colon cancer   S/P colostomy takedown         Procedures:   Laparoscopic liver ablation, laparoscopic assisted appendectomy, splenic flexure mobilization, colostomy takedown with anterior resection, flexible sigmoidoscopy (9/12/2018)            Consultations:   NUTRITION SERVICES ADULT IP CONSULT  OCCUPATIONAL THERAPY ADULT IP CONSULT  PHYSICAL THERAPY ADULT IP CONSULT  SOCIAL WORK IP CONSULT  PHARMACY IP CONSULT  WOUND OSTOMY CONTINENCE NURSE  IP CONSULT         Imaging Studies:   None performed during this hospitalization         Medications Prior to Admission:     Prescriptions Prior to Admission   Medication Sig Dispense Refill Last Dose     [DISCONTINUED] docusate sodium (COLACE) 100 MG tablet Take 100 mg by mouth daily 60 tablet 1 Past Month at Unknown time     [DISCONTINUED] magnesium citrate solution Take 296 mLs by mouth See Admin Instructions Refer to surgery handout.Refer to \"Getting ready for Surgery\" packet for instructions. 296 mL 0 9/11/2018 at Unknown time     [DISCONTINUED] magnesium citrate solution Take 296 mLs by mouth See Admin Instructions Refer to \"Getting ready for Colonoscopy\" packet for instructions. 296 mL 0 9/11/2018 at Unknown time              Discharge Medications:     Current Discharge Medication List      START taking these medications    Details   enoxaparin (LOVENOX) 40 MG/0.4ML injection Inject 0.4 mLs (40 mg) Subcutaneous every 24 hours for 28 days  Qty: 11.2 mL, Refills: 0    Associated Diagnoses: S/P colostomy takedown      oxyCODONE " "IR (ROXICODONE) 5 MG tablet Take 1-2 tablets (5-10 mg) by mouth every 6 hours as needed for moderate to severe pain  Qty: 30 tablet, Refills: 0    Associated Diagnoses: S/P colostomy takedown         STOP taking these medications       docusate sodium (COLACE) 100 MG tablet Comments:   Reason for Stopping:         magnesium citrate solution Comments:   Reason for Stopping:         magnesium citrate solution Comments:   Reason for Stopping:                        Brief History of Illness:   Mr. Malik is a 57 yo man who presented in 2/2017 with abdominal pain and changes in bowel habits and was iagnosed with \"sigmoid diverticular abscess\" in 2/2017 based on CT. Did not respond well to antibiotic therapy. Attempted colonoscopy in 8/2017 showed stricture 40 cm proximal to anus, unable to transverse; pathology showed colonic mucosa with mild chronic inclammation, fibrosis, muscularis mucosae hyperplasia and reactive epithelial changes negative for dysplasia and malignancy. He underwent laparoscopic converted to open sigmoidectomy with end colostomy OSH by Dr. Schneider on 10/11/17. Per the operative report, a large, firm, inflammatory, and somewhat adherent LLQ mass was encountered at the sigmoid colon. There was no clear evidence of free perforation, local invasion or peritoneal disease per the operative report. Postop course was largely uncomplicated. Pathology showed:  A.  COLON, SIGMOID WALL, BIOPSY   - Adipose tissue involved by poorly differentiated adenocarcinoma, with   signet ring cell features     B.  COLON, SIGMOID, HEMICOLECTOMY   - Poorly differentiated adenocarcinoma, with signet ring cell features   - Tumor size: 5 cm   - Tumor is present on serosal surface and microscopically perforates   serosal surface   - One surgical margin shows extensive serosal involvement by tumor. The   opposite, undesignated margin shows no evidence of malignancy   - Lymphovascular invasion is present   - Perineural invasion is " not identified   - Tumor deposits are present   - Immunohistochemistry for mismatch repair proteins shows intact nuclear   expression for MLH1, MSH2, MSH6, and PMS2   - Three reactive lymph nodes, negative for malignancy (0/3)   - Pathologic staging: pT4N1c.     Postop adjuvant chemotherapy was recommended and he started FOLFOX in 11/2017. So far, Rian has received 12 cycles and has tolerated this relatively well with only a couple of interruptions because of thrombocytopenia. His last cycle was 7/2/18. Adjuvant radiotherapy was not recommended (rightfully so).     PMH also significant for Hep C, liver cirrhosis (MELD 8), Barretts esophagus, and prior EtOH abuse.      CEA: 2.2->0.9->1.6.     Recent CT a/p showed:  IMPRESSION: In this patient with history of adenocarcinoma status post  partial sigmoid colon resection and end colostomy, there is no  definite evidence of recurrent disease.  1. Hepatic steatosis. Ill-defined 16 mm hepatic segment 5 liver  lesion, more conspicuous from the prior study concerning for  metastatic focus. Attention on follow-up studies.  2. Mild bowel wall thickening noted in the distal ileum which may be  inflammatory or reactive.   3. Small amount of free fluid in the pelvis, increased from the prior  study.  4. Enlarged spleen, increased from the prior study.             Hospital Course:     Rian Malik was admitted on 9/12/2018 s/p the aforementioned procedure which the patient tolerated well. The patient was transferred to the general floor for postoperative recovery. Cardiopulmonary and renal status remained stable throughout the admission. Diet was advanced and patient was tolerating full diet well and passing gas, however had not had a BM at the time of discharge on 9/17. The post-operative course was uneventful and RLQ MK drain was pulled on 9/17 prior to discharge.    On day of discharge, the patient was deemed to be in stable and improved condition and discharged with  "appropriate follow up instructions. At that time the patient was tolerating a regular diet, pain was controlled with oral medications and the patient was ambulating and voiding independently.    Pt was also taught how to self-inject lovenox for which he is to do for a total of 30 days post-op.      Patient is to follow up in the Colon and Rectal Surgery Clinic in 1 week with Delmi Storm NP and then with Dr. Oswald 2-3 weeks after.          Day of Discharge Physical Exam:   Blood pressure 124/70, pulse 67, temperature 96.4  F (35.8  C), temperature source Oral, resp. rate 16, height 1.727 m (5' 7.99\"), weight 70.9 kg (156 lb 3.2 oz), SpO2 94 %.    No acute distress  Non-labored breathing on room air  Regular rate  Abdomen soft, appropriately tender. Incision sites with dermabond c/d/i. Dressing c/d/i; MK with serous output  Extremities warm and well perfused         Final Pathology Result:   Surgical pathology from 9/12/2018 still pending           Discharge Instructions and Follow-Up:       Discharge Procedure Orders  Reason for your hospital stay   Order Comments: S/p colostomy takedown.     Adult [facility] Follow-up and recommended labs and tests   Order Comments: Please follow up in one week in the colon and rectal surgery clinic for your penrose drain removal ( will call to set up appointment with you), and again in 2 weeks with Dr. Oswald.     Tubes and drains   Order Comments: You are going home with the following tubes or drains penrose drain. This will be removed in clinic follow up in 1 week.     Activity   Order Comments: Your activity upon discharge: activity as tolerated. Please do not drive or operate heavy machinery while on oxycodone.   Order Specific Question Answer Comments   Is discharge order? Yes      When to contact your care team   Order Comments: Call your primary doctor if you have any of the following:  increased shortness of breath, increased drainage, increased " swelling, increased pain or increased redness around your surgical sites.     Discharge Instructions     Full Code     Diet   Order Comments: Follow this diet upon discharge: Low fiber diet, plenty of clear fluids between meals.   Order Specific Question Answer Comments   Is discharge order? Yes               Home Health Care:   Not needed           Discharge Disposition:   Discharged to home      Condition at discharge: Good      Pt was seen and discussed with Dr. Oswald on 9/17/18      Thai Nelson MD  Surgery PGY-1  [phone]

## 2018-09-14 NOTE — PLAN OF CARE
Problem: Patient Care Overview  Goal: Plan of Care/Patient Progress Review  Outcome: Therapy, progress toward functional goals as expected  VSS on 1L oxygen. Capno d/c'd. Ambulated in [place] with SBA. Adequate UOP from jaramillo, urine dark kash. MK to bulb suction, serosang output. Dressing over penrose with small amount of drainage. Incisions with dermabond, CDI. Tolerating full liquids, no c/o nausea. Not passing flatus yet.  IVF infusing into port @ 75 mL/hr. Pain managed with dilaudid PCA 0.2 mg q 10 mins. Continue with POC.

## 2018-09-14 NOTE — PROGRESS NOTES
CLINICAL NUTRITION SERVICES  Reason for Assessment:  Nutrition education regarding low fiber/residue diet education  Diet History:  Patient has no history of low fiber diet education.  Interventions:  Nutrition Associate provided instruction on low fiber diet. Discussed each food group and foods to eat and avoid. Answered patient's questions regarding diet.   Provided handouts : Low Fiber Nutrition Therapy and Low Fiber Diet Hospital Menu  Goals:   Patient will verbalize at least 5 low fiber foods acceptable on diet and 5 high fiber foods to avoid.  Follow-up:    Patient to ask any further nutrition-related questions before discharge.  In addition, pt may request outpatient RD appointment.    Heavenly Canales DTR  Nutrition Associate    I have read and agree with the above nutrition note  Dorina Harrison RD, LD  7C RD pager: [phone]

## 2018-09-14 NOTE — PROGRESS NOTES
[facility] Nurse Inpatient Wound Assessment     Initial  Assessment  Reason for consultation: Evaluate and treat speak with patient regarding return of bowel function after takedown of colostomy    D: Per Md Notes: Rian Malik is a 58 year old male with history of metastatic adenocarcinoma s/p lap converted open non-oncolgoc sigmoidectomy with Burnett's pouch creation 10/11/17.  Has subsequently undergone chemotherapy. POD#1 s/p laparoscopic  liver ablation, laparoscopic assisted appendectomy, splenic flexure mobilization, colostomy takedown with anterior resection, flexible sigmoidoscopy.     A/I: Discussed return of bowel function after takedown. Recommended use of barrier paste twice per day. Discussed donating remaining supplies to missions. Discussed wound care of prior ostomy site after takedown and showering. Patient excited for surgery, all questions answered to the best of this [facility] ability.       P: WO Nurse follow-up plan: signing off  Nursing to notify the Provider(s) and re-consult the WO Nurse if wound(s) deteriorates or new skin concern.    Discussed plan of care with Patient    Heike WALTONN, RN, CWOCN

## 2018-09-15 ENCOUNTER — APPOINTMENT (OUTPATIENT)
Dept: OCCUPATIONAL THERAPY | Facility: CLINIC | Age: 58
DRG: 330 | End: 2018-09-15
Attending: UROLOGY
Payer: COMMERCIAL

## 2018-09-15 LAB — PLATELET # BLD AUTO: 50 10E9/L (ref 150–450)

## 2018-09-15 PROCEDURE — 97530 THERAPEUTIC ACTIVITIES: CPT | Mod: GO

## 2018-09-15 PROCEDURE — 25000132 ZZH RX MED GY IP 250 OP 250 PS 637: Performed by: COLON & RECTAL SURGERY

## 2018-09-15 PROCEDURE — 36592 COLLECT BLOOD FROM PICC: CPT | Performed by: STUDENT IN AN ORGANIZED HEALTH CARE EDUCATION/TRAINING PROGRAM

## 2018-09-15 PROCEDURE — 40000133 ZZH STATISTIC OT WARD VISIT

## 2018-09-15 PROCEDURE — 85049 AUTOMATED PLATELET COUNT: CPT | Performed by: STUDENT IN AN ORGANIZED HEALTH CARE EDUCATION/TRAINING PROGRAM

## 2018-09-15 PROCEDURE — 25000128 H RX IP 250 OP 636: Performed by: COLON & RECTAL SURGERY

## 2018-09-15 PROCEDURE — 25000132 ZZH RX MED GY IP 250 OP 250 PS 637: Performed by: STUDENT IN AN ORGANIZED HEALTH CARE EDUCATION/TRAINING PROGRAM

## 2018-09-15 PROCEDURE — 12000001 ZZH R&B MED SURG/OB UMMC

## 2018-09-15 RX ORDER — HYDROMORPHONE HYDROCHLORIDE 1 MG/ML
.3-.5 INJECTION, SOLUTION INTRAMUSCULAR; INTRAVENOUS; SUBCUTANEOUS
Status: DISCONTINUED | OUTPATIENT
Start: 2018-09-15 | End: 2018-09-15

## 2018-09-15 RX ORDER — OXYCODONE HYDROCHLORIDE 5 MG/1
5-10 TABLET ORAL
Status: DISCONTINUED | OUTPATIENT
Start: 2018-09-15 | End: 2018-09-17 | Stop reason: HOSPADM

## 2018-09-15 RX ORDER — HYDROMORPHONE HCL/0.9% NACL/PF 0.2MG/0.2
.2-.4 SYRINGE (ML) INTRAVENOUS
Status: DISCONTINUED | OUTPATIENT
Start: 2018-09-15 | End: 2018-09-17 | Stop reason: HOSPADM

## 2018-09-15 RX ORDER — SODIUM CHLORIDE, SODIUM LACTATE, POTASSIUM CHLORIDE, CALCIUM CHLORIDE 600; 310; 30; 20 MG/100ML; MG/100ML; MG/100ML; MG/100ML
INJECTION, SOLUTION INTRAVENOUS CONTINUOUS
Status: DISCONTINUED | OUTPATIENT
Start: 2018-09-15 | End: 2018-09-16

## 2018-09-15 RX ORDER — OXYCODONE HYDROCHLORIDE 5 MG/1
5-10 TABLET ORAL EVERY 4 HOURS PRN
Status: DISCONTINUED | OUTPATIENT
Start: 2018-09-15 | End: 2018-09-15

## 2018-09-15 RX ORDER — METOCLOPRAMIDE HYDROCHLORIDE 5 MG/ML
5 INJECTION INTRAMUSCULAR; INTRAVENOUS ONCE
Status: COMPLETED | OUTPATIENT
Start: 2018-09-15 | End: 2018-09-15

## 2018-09-15 RX ADMIN — SODIUM CHLORIDE, POTASSIUM CHLORIDE, SODIUM LACTATE AND CALCIUM CHLORIDE: 600; 310; 30; 20 INJECTION, SOLUTION INTRAVENOUS at 22:46

## 2018-09-15 RX ADMIN — OXYCODONE HYDROCHLORIDE 5 MG: 5 TABLET ORAL at 17:18

## 2018-09-15 RX ADMIN — METOCLOPRAMIDE 5 MG: 5 INJECTION, SOLUTION INTRAMUSCULAR; INTRAVENOUS at 08:27

## 2018-09-15 RX ADMIN — SODIUM CHLORIDE, POTASSIUM CHLORIDE, SODIUM LACTATE AND CALCIUM CHLORIDE: 600; 310; 30; 20 INJECTION, SOLUTION INTRAVENOUS at 15:12

## 2018-09-15 RX ADMIN — OXYCODONE HYDROCHLORIDE 10 MG: 5 TABLET ORAL at 20:21

## 2018-09-15 RX ADMIN — OXYCODONE HYDROCHLORIDE 5 MG: 5 TABLET ORAL at 14:48

## 2018-09-15 RX ADMIN — CALCIUM GLUCONATE 2 G: 98 INJECTION, SOLUTION INTRAVENOUS at 09:37

## 2018-09-15 ASSESSMENT — ACTIVITIES OF DAILY LIVING (ADL)
ADLS_ACUITY_SCORE: 9

## 2018-09-15 ASSESSMENT — PAIN DESCRIPTION - DESCRIPTORS
DESCRIPTORS: DISCOMFORT
DESCRIPTORS: SORE;DISCOMFORT
DESCRIPTORS: DISCOMFORT
DESCRIPTORS: SORE;DISCOMFORT
DESCRIPTORS: SORE

## 2018-09-15 NOTE — PLAN OF CARE
Problem: Patient Care Overview  Goal: Plan of Care/Patient Progress Review  AVSS. Pt has abdominal soreness, pain controlled with 0.2mg PCA dilaudid q15min. Diet advanced to LF diet. Per MD: if LF diet is tolerated for both breakfast and lunch, the PCA can be DC'd and oral pain medication can be started. Midline incision dermabond, c/d/i. L ostomy takedown site dressing changed, has penrose, c/d/i. R MK dressing changed, serosanguinous output, has high output bulb. Pt ambulates with SBA. Showered today. R port dressing changed after shower, TKO fluids running per MD order. Continue POC.

## 2018-09-15 NOTE — PLAN OF CARE
Problem: Patient Care Overview  Goal: Plan of Care/Patient Progress Review  Discharge Planner OT   Patient plan for discharge: Home w/A  Current status: Pt ambulated w/SBA ~250'x2 room<>rehab room to complete 12 ascending/descending stairs w/no rest break or reported fatigue. EC and abdominal precautions reviewed. Pt ambulated an additional ~400' in the [place] w/SBA and no AE.  Barriers to return to prior living situation: Medical status, and s/p precautions  Recommendations for discharge: Home w/A PRN  Rationale for recommendations: To further IND in ADLs/IADLs       Entered by: Belia Walker 09/15/2018 2:16 PM

## 2018-09-15 NOTE — PLAN OF CARE
"Problem: Patient Care Overview  Goal: Plan of Care/Patient Progress Review  Outcome: Therapy, progress towards functional goals is fair  /74 (BP Location: Left arm)  Pulse 77  Temp 98.3  F (36.8  C) (Oral)  Resp 16  Ht 1.727 m (5' 7.99\")  Wt 70.9 kg (156 lb 3.2 oz)  SpO2 95%  BMI 23.76 kg/m2  Neuro: A&Ox4.   Cardiac: VSS.   Respiratory: RA   GI/: Voiding spontaneously. No BM this shift. Endorse flatus.   Diet/appetite: Tolerating diet( full liquid). Denies nausea   Activity: Up SBA  Pain: . pCA dilaudid 0.2mg q 10 mins  Skin: Intact, no new deficits noted.Colostomy takedown site CDI, dressing changed.  Lines: PIV x1, Port A cath.  Drains:Penrose drain. MK.    Pt has been resting comfortably throughout night, will continue to monitor and follow plan of care.           "

## 2018-09-15 NOTE — PLAN OF CARE
Problem: Patient Care Overview  Goal: Plan of Care/Patient Progress Review  Outcome: Improving  6099-8473:  Patient sleeping between cares overnight.  Up in room with stand-by assist.  Reports pain adequately controlled with Dilaudid PCA.  Voiding adequate amounts, urine concentrated with possible clots per NST report (not seen by RN).  Passing flatus, but still awaiting post-operative BM.  MK x1 to large bulb, with serosanguinous drainage.  Takedown site dressing intact, penrose drain under dressing.  Tolerating small amounts of full liquids.  Portacath infusing, PIV saline-locked.    Monitor for full return of bowel function.

## 2018-09-15 NOTE — PROGRESS NOTES
Paged Cherrie Peña regarding high output from R MK, 425 ml so far this shift. Will continue to monitor output

## 2018-09-15 NOTE — PROGRESS NOTES
"S: No acute events overnight. Passing flatus, no BM. Afebrile, urinating independently s/p jaramillo discontinuation. Ambulating    Objective  Vital signs:  Temp: 97.9  F (36.6  C) Temp src: Oral BP: 134/70   Heart Rate: 61 Resp: 18 SpO2: 96 % O2 Device: None (Room air) Oxygen Delivery: 1 LPM Height: 172.7 cm (5' 7.99\") Weight: 70.9 kg (156 lb 3.2 oz)  Estimated body mass index is 23.76 kg/(m^2) as calculated from the following:    Height as of this encounter: 1.727 m (5' 7.99\").    Weight as of this encounter: 70.9 kg (156 lb 3.2 oz).    No acute distress  Non-labored breathing on nasal cannula  Regular rate  Abdomen soft, appropriately tender. Incision sites with dermabond c/d/i. Dressing over penrose exchanged  Extremities warm and well perfused    I/O last 3 completed shifts:  In: 1808.75 [P.O.:550; I.V.:1258.75]  Out: 3550 [Urine:2950; Drains:600]    Labs/Imaging  None ordered    A/P  Rian Malik is a 58 year old male with history of metastatic adenocarcinoma s/p lap converted open non-oncolgoc sigmoidectomy with Burnett's pouch creation 10/11/17.  Has subsequently undergone chemotherapy. POD#3 s/p laparoscopic  liver ablation, laparoscopic assisted appendectomy, splenic flexure mobilization, colostomy takedown with anterior resection, flexible sigmoidoscopy.     -PRN dilaudid PCA, ok to transition to PRN oxycodone, PRN IV dilaudid if tolerates low fiber diet for breakfast and lunch, PRN ativan  -Incentive spirometer  -Low residue diet. Entereg. Ok for supplements  -Monitor urine output  -Periop flagyl completed  -Lovenox  -Dispo: 1-2 days    Patient seen with fellow and on call staff.    Eduardo Booth MD   Gen Surg PGY-3  P: [phone]      COLON AND RECTAL SURGERY ATTENDING    I performed a physical examination of the patient and discussed their management with the colorectal surgery fellow and the surgery resident. I reviewed the resident's note and agree with the documented findings and plan of care. "       Luis Doss MD  Colon and Rectal Surgery   Pager: [phone]

## 2018-09-15 NOTE — PLAN OF CARE
"Problem: Patient Care Overview  Goal: Plan of Care/Patient Progress Review  Outcome: Improving  Pain:  DC PCA pump and pt pain now managed with PRN PO Oxycodone   Nausea: denies   /70 (BP Location: Left arm)  Pulse 77  Temp 95.9  F (35.5  C) (Oral)  Resp 16  Ht 1.727 m (5' 7.99\")  Wt 70.9 kg (156 lb 3.2 oz)  SpO2 96%  BMI 23.76 kg/m2   Output: Adequately voiding   Diet: Low fiber   Activity: SBA   Bowel Function: Bowel sounds hypoactive in all quadrants, passing flatus, no bm   Lines: R Port, infusing MIVF, dressing CDI. PIV saline locked, dressing CDI   Drains: R drain to high output bulb, dressing CDI, high serosanguinous output   Incisions/wounds: Takedown site dressing place, no drainage, CDI   Plan: Continue to monitor pain, VS, output, and bowel function         "

## 2018-09-16 LAB
CREAT FLD-MCNC: 0.8 MG/DL
ERYTHROCYTE [DISTWIDTH] IN BLOOD BY AUTOMATED COUNT: 13.3 % (ref 10–15)
HCT VFR BLD AUTO: 36.5 % (ref 40–53)
HGB BLD-MCNC: 13.2 G/DL (ref 13.3–17.7)
MCH RBC QN AUTO: 32.7 PG (ref 26.5–33)
MCHC RBC AUTO-ENTMCNC: 36.2 G/DL (ref 31.5–36.5)
MCV RBC AUTO: 90 FL (ref 78–100)
PLATELET # BLD AUTO: 59 10E9/L (ref 150–450)
RBC # BLD AUTO: 4.04 10E12/L (ref 4.4–5.9)
SPECIMEN SOURCE FLD: NORMAL
WBC # BLD AUTO: 6.1 10E9/L (ref 4–11)

## 2018-09-16 PROCEDURE — 85027 COMPLETE CBC AUTOMATED: CPT | Performed by: STUDENT IN AN ORGANIZED HEALTH CARE EDUCATION/TRAINING PROGRAM

## 2018-09-16 PROCEDURE — 36592 COLLECT BLOOD FROM PICC: CPT | Performed by: STUDENT IN AN ORGANIZED HEALTH CARE EDUCATION/TRAINING PROGRAM

## 2018-09-16 PROCEDURE — 25000128 H RX IP 250 OP 636: Performed by: COLON & RECTAL SURGERY

## 2018-09-16 PROCEDURE — 82570 ASSAY OF URINE CREATININE: CPT | Performed by: COLON & RECTAL SURGERY

## 2018-09-16 PROCEDURE — 12000001 ZZH R&B MED SURG/OB UMMC

## 2018-09-16 PROCEDURE — 25000128 H RX IP 250 OP 636: Performed by: STUDENT IN AN ORGANIZED HEALTH CARE EDUCATION/TRAINING PROGRAM

## 2018-09-16 PROCEDURE — 25000132 ZZH RX MED GY IP 250 OP 250 PS 637: Performed by: COLON & RECTAL SURGERY

## 2018-09-16 RX ORDER — HEPARIN SODIUM,PORCINE 10 UNIT/ML
5-10 VIAL (ML) INTRAVENOUS
Status: DISCONTINUED | OUTPATIENT
Start: 2018-09-16 | End: 2018-09-17 | Stop reason: HOSPADM

## 2018-09-16 RX ORDER — HEPARIN SODIUM,PORCINE 10 UNIT/ML
5-10 VIAL (ML) INTRAVENOUS EVERY 24 HOURS
Status: DISCONTINUED | OUTPATIENT
Start: 2018-09-16 | End: 2018-09-17 | Stop reason: HOSPADM

## 2018-09-16 RX ORDER — HEPARIN SODIUM (PORCINE) LOCK FLUSH IV SOLN 100 UNIT/ML 100 UNIT/ML
5 SOLUTION INTRAVENOUS
Status: DISCONTINUED | OUTPATIENT
Start: 2018-09-16 | End: 2018-09-17 | Stop reason: HOSPADM

## 2018-09-16 RX ADMIN — OXYCODONE HYDROCHLORIDE 10 MG: 5 TABLET ORAL at 00:32

## 2018-09-16 RX ADMIN — OXYCODONE HYDROCHLORIDE 10 MG: 5 TABLET ORAL at 20:47

## 2018-09-16 RX ADMIN — OXYCODONE HYDROCHLORIDE 10 MG: 5 TABLET ORAL at 11:54

## 2018-09-16 RX ADMIN — OXYCODONE HYDROCHLORIDE 10 MG: 5 TABLET ORAL at 16:37

## 2018-09-16 RX ADMIN — ENOXAPARIN SODIUM 40 MG: 40 INJECTION SUBCUTANEOUS at 10:47

## 2018-09-16 RX ADMIN — HEPARIN, PORCINE (PF) 10 UNIT/ML INTRAVENOUS SYRINGE 5 ML: at 08:18

## 2018-09-16 RX ADMIN — OXYCODONE HYDROCHLORIDE 5 MG: 5 TABLET ORAL at 08:19

## 2018-09-16 RX ADMIN — SODIUM CHLORIDE, PRESERVATIVE FREE 5 ML: 5 INJECTION INTRAVENOUS at 09:05

## 2018-09-16 ASSESSMENT — ACTIVITIES OF DAILY LIVING (ADL)
ADLS_ACUITY_SCORE: 9

## 2018-09-16 ASSESSMENT — PAIN DESCRIPTION - DESCRIPTORS
DESCRIPTORS: ACHING
DESCRIPTORS: DISCOMFORT
DESCRIPTORS: DISCOMFORT
DESCRIPTORS: ACHING
DESCRIPTORS: ACHING
DESCRIPTORS: ACHING;NUMBNESS

## 2018-09-16 NOTE — PROGRESS NOTES
S: No acute events overnight. Passing flatus, one mucus BM. Afebrile, urinating independently. Ambulating. Tolerating LRD    Objective  Vital signs:  Temp: 98.2  F (36.8  C) Temp src: Oral BP: 116/73 Pulse: 63 Heart Rate: 87 Resp: 16 SpO2: 93 %    No acute distress  Non-labored breathing on room air  Regular rate  Abdomen soft, appropriately tender. Incision sites with dermabond c/d/i. Dressing c/d/i; MK with serous output  Extremities warm and well perfused    I/O last 3 completed shifts:  In: 740 [P.O.:360; I.V.:380]  Out: 2480 [Urine:1600; Drains:880]    Labs/Imaging  CBC pending    A/P  Rian Malik is a 58 year old male with history of metastatic adenocarcinoma s/p lap converted open non-oncolgoc sigmoidectomy with Burnett's pouch creation 10/11/17.  Has subsequently undergone chemotherapy. POD#4 s/p laparoscopic  liver ablation, laparoscopic assisted appendectomy, splenic flexure mobilization, colostomy takedown with anterior resection, flexible sigmoidoscopy. Recovering appropriately, plan for discharge tomorrow.     -PRN oxycodone, dilaudid   -Incentive spirometer  -Low residue diet. Entereg. Ok for supplements  -Monitor urine output  -Lovenox, hold for PLT <50  -Dispo: home tomorrow    Patient seen with fellow and on call staff.    Shobha Gutierrez MD  Gen Surg PGY-1  P: [phone]

## 2018-09-16 NOTE — PLAN OF CARE
Problem: Patient Care Overview  Goal: Plan of Care/Patient Progress Review  Outcome: Therapy, progress towards functional goals is fair  POD4 of a bilateral cystoscopy with stent placement, lap hand assisted takedown colostomy (see notes). A&Ox4, VSS on room air, pain is controlled with PO oxycodone. LSC, IS at 2000. BS+ passing gas, BM of mucous x1 today, on yesterday. Port is hep locked, Up independent. Dressings changed, sites WNL. tolerating a LFD. Lovenox given with orders to give with platelets >50, teaching complete with demonstration. Waiting for ROBF.

## 2018-09-16 NOTE — PLAN OF CARE
Problem: Patient Care Overview  Goal: Plan of Care/Patient Progress Review  Outcome: Improving   09/15/18 2308   OTHER   Plan Of Care Reviewed With patient   Plan of Care Review   Progress improving     Temp: 97.2  F (36.2  C) Temp src: Oral BP: 115/70 Pulse: 77 Heart Rate: 87 Resp: 18 SpO2: 94 % O2 Device: None (Room air)       Patient is doing well post-operatively:    -tolerating low fiber diet with good appetite  -pain well-controlled with Oxycodone  -ambulated in the hallways 4X today with SBA  -right abdominal MK with moderate output  -passing flatus, no BM yet  -incision with liquid bandaid C/D/I  -using IS    Continue with plan of care

## 2018-09-16 NOTE — PLAN OF CARE
Problem: Patient Care Overview  Goal: Plan of Care/Patient Progress Review  Outcome: Improving  7260-4147:  Patient sleeping soundly in bed between cares overnight.  Up in room with stand-by assist.  Reports pain adequately controlled with PRN oxycodone.  Voiding adequate amounts, dark kash in color.  MK to large bulb with moderate amount of serosanguinous drainage, sent to lab for Creatinine level.  Takedown site dressing intact.  Passing flatus and passed very small amount of mucous per rectum.  Tolerating low fiber diet.  Portacath infusing at TKO, awaiting heparin flush orders.  PIV saline-locked.

## 2018-09-17 ENCOUNTER — APPOINTMENT (OUTPATIENT)
Dept: OCCUPATIONAL THERAPY | Facility: CLINIC | Age: 58
DRG: 330 | End: 2018-09-17
Attending: UROLOGY
Payer: COMMERCIAL

## 2018-09-17 VITALS
TEMPERATURE: 97.6 F | DIASTOLIC BLOOD PRESSURE: 80 MMHG | OXYGEN SATURATION: 98 % | SYSTOLIC BLOOD PRESSURE: 128 MMHG | RESPIRATION RATE: 14 BRPM | HEIGHT: 68 IN | WEIGHT: 156.2 LBS | BODY MASS INDEX: 23.67 KG/M2 | HEART RATE: 67 BPM

## 2018-09-17 LAB — COPATH REPORT: NORMAL

## 2018-09-17 PROCEDURE — 97530 THERAPEUTIC ACTIVITIES: CPT | Mod: GO | Performed by: OCCUPATIONAL THERAPIST

## 2018-09-17 PROCEDURE — 25000132 ZZH RX MED GY IP 250 OP 250 PS 637: Performed by: COLON & RECTAL SURGERY

## 2018-09-17 PROCEDURE — 97535 SELF CARE MNGMENT TRAINING: CPT | Mod: GO | Performed by: OCCUPATIONAL THERAPIST

## 2018-09-17 PROCEDURE — 25000128 H RX IP 250 OP 636: Performed by: COLON & RECTAL SURGERY

## 2018-09-17 PROCEDURE — 25000128 H RX IP 250 OP 636: Performed by: STUDENT IN AN ORGANIZED HEALTH CARE EDUCATION/TRAINING PROGRAM

## 2018-09-17 PROCEDURE — 40000133 ZZH STATISTIC OT WARD VISIT: Performed by: OCCUPATIONAL THERAPIST

## 2018-09-17 RX ORDER — OXYCODONE HYDROCHLORIDE 5 MG/1
5-10 TABLET ORAL EVERY 6 HOURS PRN
Qty: 30 TABLET | Refills: 0 | Status: SHIPPED | OUTPATIENT
Start: 2018-09-17 | End: 2018-09-24

## 2018-09-17 RX ORDER — POLYETHYLENE GLYCOL 3350 17 G/17G
17 POWDER, FOR SOLUTION ORAL ONCE
Status: COMPLETED | OUTPATIENT
Start: 2018-09-17 | End: 2018-09-17

## 2018-09-17 RX ADMIN — HEPARIN 5 ML: 100 SYRINGE at 10:18

## 2018-09-17 RX ADMIN — OXYCODONE HYDROCHLORIDE 10 MG: 5 TABLET ORAL at 13:50

## 2018-09-17 RX ADMIN — OXYCODONE HYDROCHLORIDE 10 MG: 5 TABLET ORAL at 16:46

## 2018-09-17 RX ADMIN — ENOXAPARIN SODIUM 40 MG: 40 INJECTION SUBCUTANEOUS at 10:23

## 2018-09-17 RX ADMIN — OXYCODONE HYDROCHLORIDE 10 MG: 5 TABLET ORAL at 09:15

## 2018-09-17 RX ADMIN — OXYCODONE HYDROCHLORIDE 10 MG: 5 TABLET ORAL at 05:08

## 2018-09-17 RX ADMIN — POLYETHYLENE GLYCOL 3350 17 G: 17 POWDER, FOR SOLUTION ORAL at 10:28

## 2018-09-17 ASSESSMENT — PAIN DESCRIPTION - DESCRIPTORS
DESCRIPTORS: ACHING
DESCRIPTORS: ACHING;NUMBNESS
DESCRIPTORS: ACHING

## 2018-09-17 ASSESSMENT — ACTIVITIES OF DAILY LIVING (ADL)
ADLS_ACUITY_SCORE: 9

## 2018-09-17 NOTE — PLAN OF CARE
Problem: Patient Care Overview  Goal: Plan of Care/Patient Progress Review  Outcome: Therapy, progress towards functional goals is fair  POD5 of a colostomy takedown. A&Ox4, VSS on room air, pain is controlled with PO oxycodone. LSC, BS+ passing gas, no BM only serosanguinous mucous. MK removed by Dr. Oswald. Penrose to be removed in clinic. Dressing CDI.Tolerating a LFD, up independently, showered, Port de-accessed. Self administered lovenox. Discharging to home.

## 2018-09-17 NOTE — PROGRESS NOTES
Pt discharging via ride from family member, getting medications from discharge pharmacy. Previous RN pulled out IV and gave discharge paperwork.

## 2018-09-17 NOTE — PLAN OF CARE
Problem: Patient Care Overview  Goal: Plan of Care/Patient Progress Review  Outcome: No Change  AVSS. Pt c/o abdominal pain, well controlled with PRN oxycodone. Incision dermabond, c/d/i. Ostomy takedown site with dressing, c/d/i. MK dressing c/d/i, good output. Pt up independently. Tolerating LF diet.  Passing flatus. Pt resting comfortably overnight. Continue POC.

## 2018-09-17 NOTE — PLAN OF CARE
Problem: Patient Care Overview  Goal: Plan of Care/Patient Progress Review  Discharge Planner OT   Patient plan for discharge: Home  Current status: Pt able to ambulate ~500 ft with SBA and then verbalizing understanding of ADL completion within abdominal precautions and with EC/WS techniques  Barriers to return to prior living situation: Medical status  Recommendations for discharge: Home with assist PRN  Rationale for recommendations: Pt with no further OT needs       Entered by: Gus Mcgarry 09/17/2018 4:54 PM     Occupational Therapy Discharge Summary    Reason for therapy discharge:    Discharged to home.  All goals and outcomes met, no further needs identified.    Progress towards therapy goal(s). See goals on Care Plan in [facility] electronic health record for goal details.  Goals met    Therapy recommendation(s):    No further therapy is recommended.  Continue home exercise program.

## 2018-09-17 NOTE — PROGRESS NOTES
S: No acute events overnight. Afebrile. Passed mucous yesterday, but no bowel movement yet. Tolerating low residue diet and ambulating.    Objective  Vital signs:  Temp: 96.4  F (35.8  C) Temp src: Oral BP: 124/70 Pulse: 67 Heart Rate: 74 Resp: 16 SpO2: 94 %    No acute distress  Non-labored breathing on room air  Regular rate  Abdomen soft, appropriately tender. Incision sites with dermabond c/d/i. Dressing c/d/i; MK with serous output  Extremities warm and well perfused    I/O last 3 completed shifts:  In: 720 [P.O.:720]  Out: 1177 [Urine:775; Drains:402]    Labs/Imaging  Creatinine fluid 0.8    A/P  Rian Malik is a 58 year old male with history of metastatic adenocarcinoma s/p lap converted open non-oncolgoc sigmoidectomy with Burnett's pouch creation 10/11/17.  Has subsequently undergone chemotherapy. POD#5 s/p laparoscopic  liver ablation, laparoscopic assisted appendectomy, splenic flexure mobilization, colostomy takedown with anterior resection, flexible sigmoidoscopy. Recovering appropriately, plan for discharge today    -PRN oxycodone   -Incentive spirometer  -Low residue diet. Entereg. Ok for supplements  -Remove drain  -Monitor urine output  -Lovenox, ok to administer with thrombocytopenia. Plan for 30 days total  -Dispo: likely home today pending diet tolerance    Patient seen with fellow and discussed with on call staff.    Eduardo Booth MD   PGY-3, GEN SURG

## 2018-09-17 NOTE — PLAN OF CARE
Problem: Patient Care Overview  Goal: Plan of Care/Patient Progress Review  AVSS. Pt c/o abdominal pain, well controlled with PRN oxycodone. Incision dermabond, c/d/i. Ostomy takedown site with dressing, c/d/i. MK dressing c/d/i, good output. Pt up independently. Tolerating LF diet. No BM this shift, passing flatus. Pt inquired about taking colace, pt takes it at home, MDs to address tomorrow. Continue POC.

## 2018-09-18 ENCOUNTER — TELEPHONE (OUTPATIENT)
Dept: FAMILY MEDICINE | Facility: CLINIC | Age: 58
End: 2018-09-18

## 2018-09-18 NOTE — TELEPHONE ENCOUNTER
Patient is followed by Oncology and had surgery related to this. Oncology will follow up with patient. Closing encounter.    Ami Arias RN, BSN

## 2018-09-18 NOTE — TELEPHONE ENCOUNTER
Patient discharged from [facility] IP  ( Inpatient or ER).    Discharge location: [facility]  Discharge date: 9/17/18  Diagnosis: COLON CANCER (H)  Patient has been in the ER/IP 0/1 times.  Care Coord:  NA  Please follow up as appropriate. If no follow up required, please close encounter.

## 2018-09-20 ENCOUNTER — TELEPHONE (OUTPATIENT)
Dept: SURGERY | Facility: CLINIC | Age: 58
End: 2018-09-20

## 2018-09-20 NOTE — TELEPHONE ENCOUNTER
Message  Received: 3 days ago       Thai Nelson MD Barnes, Rebecca V Cc: Herminio Oswald MD; Delmi Storm APRN CNP; Kaylyn Garner RN; Gaby Blanc; Sadie Dorsey PA-C                   Hi Everyone,     Please schedule Rian Malik (MR: [id number]) for follow-up in clinic with Delmi Storm NP in 1 week(s) for penrose drain removal.     Also please schedule for clinic follow up with Dr. Oswald in 2-3 weeks.     Date of discharge:  9/17/18.  S/p colostomy takedown     Discharged to:  Home   Home Cares:  None     -Antibiotics:  None   -Pain Medications:  Oxycodone   -Ostomy:  No   -Special Concerns:  None     Thanks!     Macario Nelson   Surgery PGY-1   P: [phone]

## 2018-09-22 NOTE — PROGRESS NOTES
"Colon and Rectal Surgery Follow-up Clinic Note      RE: Rian Malik  : 1960  AP: 2018    DIAGNOSIS: Metastatic perforated sigmoid colon cancer (status post sigmoidectomy with end colostomy and extensive adjuvant chemotherapy), now s/p cystoscopy with insertion of bilateral ureteral stents (per Dr. Quintero), laparoscopic liver ablation with intraoperative ultrasound (per Dr. Hale), and HALS colostomy takedown, appendectomy, descending colectomy, and anterior resection on 18.    INTERVAL HISTORY: Denies increased pain, fevers, or chills. Tolerating diet well. Having 4-6 mushy and non-bloody BM's per day. Continues on Oxycodone at night.     Physical Examination:  /73  Pulse 61  Temp 97.7  F (36.5  C) (Oral)  Ht 5' 8\"  Wt 156 lb 11.2 oz  SpO2 99%  BMI 23.83 kg/m2  Abdomen soft, NT. Incisions with no evidence of infection or hernia. Penrose drain removed.    ASSESSMENT  Doing well postop.    CEA: 2.2->0.9->1.6->1.2.    Pathology:  FINAL DIAGNOSIS:   A. 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 RECTAL STUMP MARGIN, EXCISION:   - No evidence of malignancy   - One lymph node with no evidence of malignancy (0/1)     C. 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 DESCENDING COLON MESENTERY, EXCISION:   - Fibroadipose tissue involved by signet ring cell adenocarcinoma   - Two lymph nodes with no evidence of metastatic carcinoma (0/2)     E. PROXIMAL ANASTOMOTIC RING, EXCISION:   - No evidence of malignancy     F. DISTAL ANASTOMOTIC RING, EXCISION:   - No evidence of malignancy     G. COLON, COLOSTOMY, TAKEDOWN:   - Pericolonic fibroadipose tissue involved by signet ring cell   adenocarcinoma   - Colonic mucosa with focal ulceration, marked acute inflammation and   granulation tissue " formation     PLAN  1. Low fiber diet for another 2 weeks.  2. No lifting >10lbs for another 4 weeks.  3. Refilled Tylenol and Oxycodone (n=15).  4. We discussed pathology results. Very poor tumor biology with increased risk of liver and peritoneal recurrence. Would recommend short-term imaging with MR liver and PET-CT at 3-4 months postop, as well as resuming chemotherapy. If no evidence of recurrent disease, especially in the liver or extra-abdominal, may be a candidate for HIPEC.   5. RTC in 3 weeks, then in 3 months (after new imaging).    Time spent: 30 minutes.  >50% spent in discussing, counseling and coordinating care.    Herminio Oswald M.D., M.Sc.     Division of Colon and Rectal Surgery  [facility]    Referring Provider:  Moreno Harris MD  [facility] [place] [address]     Primary Care Provider:  Moreno Harris    CC:      Saqib Boone MD

## 2018-09-24 ENCOUNTER — OFFICE VISIT (OUTPATIENT)
Dept: SURGERY | Facility: CLINIC | Age: 58
End: 2018-09-24
Payer: COMMERCIAL

## 2018-09-24 VITALS
OXYGEN SATURATION: 99 % | DIASTOLIC BLOOD PRESSURE: 73 MMHG | HEIGHT: 68 IN | SYSTOLIC BLOOD PRESSURE: 128 MMHG | TEMPERATURE: 97.7 F | BODY MASS INDEX: 23.75 KG/M2 | WEIGHT: 156.7 LBS | HEART RATE: 61 BPM

## 2018-09-24 DIAGNOSIS — Z98.890 POSTOPERATIVE STATE: Primary | ICD-10-CM

## 2018-09-24 DIAGNOSIS — Z98.890 S/P COLOSTOMY TAKEDOWN: ICD-10-CM

## 2018-09-24 RX ORDER — OXYCODONE HYDROCHLORIDE 5 MG/1
5 TABLET ORAL EVERY 12 HOURS PRN
Qty: 15 TABLET | Refills: 0 | Status: SHIPPED | OUTPATIENT
Start: 2018-09-24 | End: 2018-10-22

## 2018-09-24 RX ORDER — ACETAMINOPHEN 500 MG
1000 TABLET ORAL 3 TIMES DAILY
Qty: 84 TABLET | Refills: 0 | Status: SHIPPED | OUTPATIENT
Start: 2018-09-24 | End: 2018-10-08

## 2018-09-24 ASSESSMENT — PAIN SCALES - GENERAL: PAINLEVEL: MILD PAIN (3)

## 2018-09-24 NOTE — NURSING NOTE
"Chief Complaint   Patient presents with     Surgical Followup       Vitals:    09/24/18 0819   BP: 128/73   Pulse: 61   Temp: 97.7  F (36.5  C)   TempSrc: Oral   SpO2: 99%   Weight: 156 lb 11.2 oz   Height: 5' 8\"       Body mass index is 23.83 kg/(m^2).      Yandel Bhatti, EMT                      "

## 2018-09-24 NOTE — MR AVS SNAPSHOT
After Visit Summary   9/24/2018    Rian Malik    MRN: [id number]           Patient Information     Date Of Birth          1960        Visit Information        Provider Department      9/24/2018 8:30 AM Herminio Oswald MD [facility] Colon and Rectal Surgery        Today's Diagnoses     Postoperative state    -  1    S/P colostomy takedown           Follow-ups after your visit        Your next 10 appointments already scheduled     Oct 15, 2018  1:00 PM CDT   (Arrive by 12:45 PM)   Return Visit with Herminio Oswald MD   [facility] Colon and Rectal Surgery ([facility])    [address] [facility]  [address] [facility] [address] [phone]            Oct 22, 2018  2:15 PM CDT   Return Visit with Padilla Last MD   [facility] ([facility])    [id number] [place] [address] [phone]              Who to contact     Please call your clinic at [phone] to:    Ask questions about your health    Make or cancel appointments    Discuss your medicines    Learn about your test results    Speak to your doctor            Additional Information About Your Visit        MyChart Information     [employer]amet gives you secure access to your electronic health record. If you see a primary care provider, you can also send messages to your care team and make appointments. If you have questions, please call your primary care clinic.  If you do not have a primary care provider, please call [phone] and they will assist you.      [employer]Agamet is an electronic gateway that provides easy, online access to your medical records. With [employer], you can request a clinic appointment, read your test results, renew a prescription or communicate with your care team.     To access your existing account, please contact your [facility] or call [phone] for assistance.        Care EveryWhere ID     This  "is your Care EveryWhere ID. This could be used by other organizations to access your [facility] medical records  [id number]        Your Vitals Were     Pulse Temperature Height Pulse Oximetry BMI (Body Mass Index)       61 97.7  F (36.5  C) (Oral) 5' 8\" 99% 23.83 kg/m2        Blood Pressure from Last 3 Encounters:   09/24/18 128/73   09/17/18 128/80   08/27/18 128/70    Weight from Last 3 Encounters:   09/24/18 156 lb 11.2 oz   09/14/18 156 lb 3.2 oz   08/27/18 162 lb 9.6 oz              Today, you had the following     No orders found for display         Today's Medication Changes          These changes are accurate as of 9/24/18  9:01 AM.  If you have any questions, ask your nurse or doctor.               Start taking these medicines.        Dose/Directions    acetaminophen 500 MG tablet   Commonly known as:  TYLENOL   Used for:  Postoperative state   Started by:  Herminio Oswald MD        Dose:  1000 mg   Take 2 tablets (1,000 mg) by mouth 3 times daily for 14 days   Quantity:  84 tablet   Refills:  0         These medicines have changed or have updated prescriptions.        Dose/Directions    oxyCODONE IR 5 MG tablet   Commonly known as:  ROXICODONE   This may have changed:    - how much to take  - when to take this  - reasons to take this   Used for:  S/P colostomy takedown   Changed by:  Herminio Oswald MD        Dose:  5 mg   Take 1 tablet (5 mg) by mouth every 12 hours as needed for severe pain   Quantity:  15 tablet   Refills:  0            Where to get your medicines      These medications [place] [facility] [place] [address]  [address], [facility] [address]     Phone:  [phone]     acetaminophen 500 MG tablet         Some of these will need a paper prescription and others can be bought over the counter.  Ask your nurse if you have questions.     Bring a paper prescription for each of these medications     oxyCODONE IR 5 MG " tablet               Information about OPIOIDS     PRESCRIPTION OPIOIDS: WHAT YOU NEED TO KNOW   We gave you an opioid (narcotic) pain medicine. It is important to manage your pain, but opioids are not always the best choice. You should first try all the other options your care team gave you. Take this medicine for as short a time (and as few doses) as possible.    Some activities can increase your pain, such as bandage changes or therapy sessions. It may help to take your pain medicine 30 to 60 minutes before these activities. Reduce your stress by getting enough sleep, working on hobbies you enjoy and practicing relaxation or meditation. Talk to your care team about ways to manage your pain beyond prescription opioids.    These medicines have risks:    DO NOT drive when on new or higher doses of pain medicine. These medicines can affect your alertness and reaction times, and you could be arrested for driving under the influence (DUI). If you need to use opioids long-term, talk to your care team about driving.    DO NOT operate heavy machinery    DO NOT do any other dangerous activities while taking these medicines.    DO NOT drink any alcohol while taking these medicines.     If the opioid prescribed includes acetaminophen, DO NOT take with any other medicines that contain acetaminophen. Read all labels carefully. Look for the word  acetaminophen  or  Tylenol.  Ask your pharmacist if you have questions or are unsure.    You can get addicted to pain medicines, especially if you have a history of addiction (chemical, alcohol or substance dependence). Talk to your care team about ways to reduce this risk.    All opioids tend to cause constipation. Drink plenty of water and eat foods that have a lot of fiber, such as fruits, vegetables, prune juice, apple juice and high-fiber cereal. Take a laxative (Miralax, milk of magnesia, Colace, Senna) if you don t move your bowels at least every other day. Other side effects  include upset stomach, sleepiness, dizziness, throwing up, tolerance (needing more of the medicine to have the same effect), physical dependence and slowed breathing.    Store your pills in a secure place, locked if possible. We will not replace any lost or stolen medicine. If you don t finish your medicine, please throw away (dispose) as directed by your pharmacist. The [place] [employer]ution [employer] has more information about safe disposal: https://www.pca.[facility].mn.us/living-green/managing-unwanted-medications         Primary Care Provider Office Phone # Fax #    Moreno Harris [phone]       [address]  [facility] [address]        Equal Access to Services     KHANH FLANAGAN : Rajesh Meraz, waholli peck, qatiffta kaalmada jose, alisa leon . So [facility] [phone].    ATENCIÓN: Si habla español, tiene a martino disposición servicios gratuitos de asistencia lingüística. Llame al [phone].    We comply with applicable federal civil rights laws and [place] laws. We do not discriminate on the basis of race, color, national origin, age, disability, sex, sexual orientation, or gender identity.            Thank you!     Thank you for choosing [facility] COLON AND RECTAL SURGERY  for your care. Our goal is always to provide you with excellent care. Hearing back from our patients is one way we can continue to improve our services. Please take a few minutes to complete the written survey that you may receive in the mail after your visit with us. Thank you!             Your Updated Medication List - Protect others around you: Learn how to safely use, store and throw away your medicines at www.disposemymeds.org.          This list is accurate as of 9/24/18  9:01 AM.  Always use your most recent med list.                   Brand Name Dispense Instructions for use Diagnosis    acetaminophen 500 MG tablet    TYLENOL    84 tablet    Take 2  tablets (1,000 mg) by mouth 3 times daily for 14 days    Postoperative state       enoxaparin 40 MG/0.4ML injection    LOVENOX    11.2 mL    Inject 0.4 mLs (40 mg) Subcutaneous every 24 hours for 28 days    S/P colostomy takedown       oxyCODONE IR 5 MG tablet    ROXICODONE    15 tablet    Take 1 tablet (5 mg) by mouth every 12 hours as needed for severe pain    S/P colostomy takedown

## 2018-09-24 NOTE — LETTER
"2018       RE: Rian Malik  [address] [place] [address]     Dear Colleague,    Thank you for referring your patient, Rian Malik, to the [facility]ON [facility] at [facility]. Please see a copy of my visit note below.    Colon and Rectal Surgery Follow-up Clinic Note      RE: Rian Malik  : 1960  AP: 2018    DIAGNOSIS: Metastatic perforated sigmoid colon cancer (status post sigmoidectomy with end colostomy and extensive adjuvant chemotherapy), now s/p cystoscopy with insertion of bilateral ureteral stents (per Dr. Quintero), laparoscopic liver ablation with intraoperative ultrasound (per Dr. Hale), and HALS colostomy takedown, appendectomy, descending colectomy, and anterior resection on 18.    INTERVAL HISTORY: Denies increased pain, fevers, or chills. Tolerating diet well. Having 4-6 mushy and non-bloody BM's per day. Continues on Oxycodone at night.     Physical Examination:  /73  Pulse 61  Temp 97.7  F (36.5  C) (Oral)  Ht 5' 8\"  Wt 156 lb 11.2 oz  SpO2 99%  BMI 23.83 kg/m2  Abdomen soft, NT. Incisions with no evidence of infection or hernia. Penrose drain removed.    ASSESSMENT  Doing well postop.    CEA: 2.2->0.9->1.6->1.2.    Pathology:  FINAL DIAGNOSIS:   A. 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 RECTAL STUMP MARGIN, EXCISION:   - No evidence of malignancy   - One lymph node with no evidence of malignancy (0/1)     C. 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 DESCENDING COLON MESENTERY, EXCISION:   - Fibroadipose tissue involved by signet ring cell adenocarcinoma   - Two lymph nodes with no evidence of metastatic carcinoma (0/2)     E. PROXIMAL ANASTOMOTIC RING, EXCISION:   - No evidence " of malignancy     F. DISTAL ANASTOMOTIC RING, EXCISION:   - No evidence of malignancy     G. 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 Low fiber diet for another 2 weeks.  2. No lifting >10lbs for another 4 weeks.  3. Refilled Tylenol and Oxycodone (n=15).  4. We discussed pathology results. Very poor tumor biology with increased risk of liver and peritoneal recurrence. Would recommend short-term imaging with MR liver and PET-CT at 3-4 months postop, as well as resuming chemotherapy. If no evidence of recurrent disease, especially in the liver or extra-abdominal, may be a candidate for HIPEC.   5. RTC in 3 weeks, then in 3 months (after new imaging).    Time spent: 30 minutes.  >50% spent in discussing, counseling and coordinating care.    Referring Provider:  [place] [address]     Primary Care Provider:  Moreno Harris    CC:      Saqib Fitzgerald M.D., M.Sc.     Division of Colon and Rectal Surgery  [facility] [place]

## 2018-10-01 ENCOUNTER — APPOINTMENT (OUTPATIENT)
Dept: LAB | Facility: CLINIC | Age: 58
End: 2018-10-01
Attending: INTERNAL MEDICINE
Payer: COMMERCIAL

## 2018-10-02 ENCOUNTER — ALLIED HEALTH/NURSE VISIT (OUTPATIENT)
Dept: NURSING | Facility: CLINIC | Age: 58
End: 2018-10-02
Payer: COMMERCIAL

## 2018-10-02 DIAGNOSIS — Z23 NEED FOR PROPHYLACTIC VACCINATION AND INOCULATION AGAINST INFLUENZA: Primary | ICD-10-CM

## 2018-10-02 PROCEDURE — 99207 ZZC NO CHARGE NURSE ONLY: CPT

## 2018-10-02 PROCEDURE — 90682 RIV4 VACC RECOMBINANT DNA IM: CPT

## 2018-10-02 PROCEDURE — 90471 IMMUNIZATION ADMIN: CPT

## 2018-10-02 NOTE — PROGRESS NOTES

## 2018-10-02 NOTE — MR AVS SNAPSHOT
After Visit Summary   10/2/2018    Rian Malik    MRN: [id number]           Patient Information     Date Of Birth          1960        Visit Information        Provider Department      10/2/2018 12:20 PM [facility]LL[facility]        Today's Diagnoses     Need for prophylactic vaccination and inoculation against influenza    -  1       Follow-ups after your visit        Your next 10 appointments already scheduled     Oct 15, 2018  1:00 PM CDT   (Arrive by 12:45 PM)   Return Visit with Herminio Oswald MD   [facility] Colon and Rectal Surgery ([facility])    [address] [address] [phone]            Oct 22, 2018  2:15 PM CDT   Return Visit with Padilla Last MD   [facility] ([facility])    [address] [place] [address] [phone]              Who to contact     If you have questions or need follow up information about today's clinic visit or your schedule please contact [facility] directly at [phone].  Normal or non-critical lab and imaging results will be communicated to you by MyChart, letter or phone within 4 business days after the clinic has received the results. If you do not hear from us within 7 days, please contact the clinic through [employer] or phone. If you have a critical or abnormal lab result, we will notify you by phone as soon as possible.  Submit refill requests through [facility] or call your pharmacy and they will forward the refill request to us. Please allow 3 business days for your refill to be completed.          Additional Information About Your Visit        MyChart Information     [facility] gives you secure access to your electronic health record. If you see a primary care provider, you can also send messages to your care team and make appointments. If you have questions, please call your primary care  clinic.  If you do not have a primary care provider, please call [phone] and they will assist you.        Care EveryWhere ID     This is your Care EveryWhere ID. This could be used by other organizations to access your [facility] medical records  [id number]         Blood Pressure from Last 3 Encounters:   09/24/18 128/73   09/17/18 128/80   08/27/18 128/70    Weight from Last 3 Encounters:   09/24/18 156 lb 11.2 oz (71.1 kg)   09/14/18 156 lb 3.2 oz (70.9 kg)   08/27/18 162 lb 9.6 oz (73.8 kg)              We Performed the Following     FLU VACCINE, (RIV4) RECOMBINANT HA  , IM (FluBlok, egg free) [81987]- >18 YRS (G recommended  50-64 YRS)     Vaccine Administration, Initial [43853]        Primary Care Provider Office Phone # Fax #    Moreno Harris [phone]       [address]  [facility] [address]        Equal Access to Services     [facility]: Hadii aad ku hadasho Soomaali, waaxda luqadaha, qaybta kaalmada adeegyahernán, alisa leon . So [facility] [phone].    ATENCIÓN: Si habla español, tiene a martino disposición servicios gratuitos de asistencia lingüística. Llame al [phone].    We comply with applicable federal civil rights laws and [place] laws. We do not discriminate on the basis of race, color, national origin, age, disability, sex, sexual orientation, or gender identity.            Thank you!     Thank you for choosing [facility]  for your care. Our goal is always to provide you with excellent care. Hearing back from our patients is one way we can continue to improve our services. Please take a few minutes to complete the written survey that you may receive in the mail after your visit with us. Thank you!             Your Updated Medication List - Protect others around you: Learn how to safely use, store and throw away your medicines at www.disposemymeds.org.          This list is accurate as of 10/2/18  1:05 PM.   Always use your most recent med list.                   Brand Name Dispense Instructions for use Diagnosis    acetaminophen 500 MG tablet    TYLENOL    84 tablet    Take 2 tablets (1,000 mg) by mouth 3 times daily for 14 days    Postoperative state       enoxaparin 40 MG/0.4ML injection    LOVENOX    11.2 mL    Inject 0.4 mLs (40 mg) Subcutaneous every 24 hours for 28 days    S/P colostomy takedown       oxyCODONE IR 5 MG tablet    ROXICODONE    15 tablet    Take 1 tablet (5 mg) by mouth every 12 hours as needed for severe pain    S/P colostomy takedown

## 2018-10-11 NOTE — PROGRESS NOTES
"Colon and Rectal Surgery Follow-up Clinic Note      RE: Rian Malik  : 1960  AP: 2018    DIAGNOSIS: Metastatic perforated sigmoid colon cancer (status post sigmoidectomy with end colostomy and extensive adjuvant chemotherapy), now s/p cystoscopy with insertion of bilateral ureteral stents (per Dr. Quintero), laparoscopic liver ablation with intraoperative ultrasound (per Dr. Hale), and HALS colostomy takedown, appendectomy, descending colectomy, and anterior resection on 18.    INTERVAL HISTORY: Denies increased pain, fevers, or chills. Tolerating diet well. Having 1-2 soft BM's per day. Off narcotic pain medications.     Physical Examination:  /73 (BP Location: Left arm, Patient Position: Sitting, Cuff Size: Adult Regular)  Pulse 72  Temp 97.8  F (36.6  C) (Oral)  Ht 5' 8\"  Wt 160 lb 14.4 oz  SpO2 97%  BMI 24.46 kg/m2  Abdomen soft, NT. Incisions with no evidence of infection or hernia. Silver nitrate applied to supraumbilical port site wound.    ASSESSMENT  Doing well postop.    CEA: 2.2->0.9->1.6->1.2.    Pathology:  FINAL DIAGNOSIS:   A. 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 RECTAL STUMP MARGIN, EXCISION:   - No evidence of malignancy   - One lymph node with no evidence of malignancy (0/1)     C. 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 DESCENDING COLON MESENTERY, EXCISION:   - Fibroadipose tissue involved by signet ring cell adenocarcinoma   - Two lymph nodes with no evidence of metastatic carcinoma (0/2)     E. PROXIMAL ANASTOMOTIC RING, EXCISION:   - No evidence of malignancy     F. DISTAL ANASTOMOTIC RING, EXCISION:   - No evidence of malignancy     G. COLON, COLOSTOMY, TAKEDOWN:   - Pericolonic fibroadipose tissue involved by signet ring cell "   adenocarcinoma   - Colonic mucosa with focal ulceration, marked acute inflammation and   granulation tissue formation     PLAN  1. Regular diet.  2. No lifting >10lbs for another 2 weeks.  3. We discussed pathology results. Very poor tumor biology with increased risk of liver and peritoneal recurrence. Would recommend short-term imaging with MR liver and PET-CT in 3-4 months, as well as resuming chemotherapy. If no evidence of recurrent disease, especially in the liver or extra-abdominal, may be a candidate for HIPEC.   4. RTC in 3 months (after new imaging).    Time spent: 30 minutes.  >50% spent in discussing, counseling and coordinating care.    Herminio Oswald M.D., M.Sc.     Division of Colon and Rectal Surgery  [facility]    Referring Provider:  [place] [address]     Primary Care Provider:  Moreno Harris    CC:      Saqib Boone MD

## 2018-10-15 ENCOUNTER — OFFICE VISIT (OUTPATIENT)
Dept: SURGERY | Facility: CLINIC | Age: 58
End: 2018-10-15
Payer: COMMERCIAL

## 2018-10-15 VITALS
BODY MASS INDEX: 24.38 KG/M2 | HEIGHT: 68 IN | OXYGEN SATURATION: 97 % | WEIGHT: 160.9 LBS | DIASTOLIC BLOOD PRESSURE: 73 MMHG | HEART RATE: 72 BPM | SYSTOLIC BLOOD PRESSURE: 123 MMHG | TEMPERATURE: 97.8 F

## 2018-10-15 DIAGNOSIS — C18.7 CANCER OF SIGMOID COLON (H): ICD-10-CM

## 2018-10-15 DIAGNOSIS — Z98.890 POSTOPERATIVE STATE: Primary | ICD-10-CM

## 2018-10-15 ASSESSMENT — PAIN SCALES - GENERAL: PAINLEVEL: NO PAIN (1)

## 2018-10-15 NOTE — MR AVS SNAPSHOT
After Visit Summary   10/15/2018    Rian Malik    MRN: [id number]           Patient Information     Date Of Birth          1960        Visit Information        Provider Department      10/15/2018 1:00 PM Herminio Oswald MD [facility] Colon and Rectal Surgery        Today's Diagnoses     Postoperative state    -  1    Cancer of sigmoid colon (H)          Care Instructions    Follow up:  1. Schedule PET scan 3 months  2. Schedule MRI liver 3 months  3. Schedule Clinic visit after scans in 3 months    Please call with any questions or concerns regarding your clinic visit today.    It is a pleasure being involved in your health care.    Contacts post-consultation depending on your need:    Radiology Appointments                [phone]    Schedule Clinic Appointments       [phone] # 1   M-F 7:30 - 5 pm    Nova AGARWAL RN Coordinator           [phone]    Clinic Fax Number          [phone]    Bernadette           [phone]    My Chart is available 24 hours a day and is a secure way to access your records and communicate with your care team.  I strongly recommend signing up if you haven't already done so, if you are comfortable with computers.  If you would like to inquire about this or are having problems with My Chart access, you may call [phone] or go online at [email or web address]  Please allow at least 24 hours for a response and extra time on weekends and Holidays.          Follow-ups after your visit        Your next 10 appointments already scheduled     Oct 22, 2018  2:15 PM CDT   Return Visit with Padilla Last MD   [facility]    [id number] [place] [id number] [phone]              Future tests that were ordered for you today     Open Future Orders        Priority Expected Expires Ordered    PET Oncology Whole Body Routine  10/15/2019 10/15/2018    MR Abdomen w/o & w Contrast  "Routine  10/15/2019 10/15/2018            Who to contact     Please call your clinic at [phone] to:    Ask questions about your health    Make or cancel appointments    Discuss your medicines    Learn about your test results    Speak to your doctor            Additional Information About Your Visit        [employer]hart Information     Meez gives you secure access to your electronic health record. If you see a primary care provider, you can also send messages to your care team and make appointments. If you have questions, please call your primary care clinic.  If you do not have a primary care provider, please call [phone] and they will assist you.      Meez is an electronic gateway that provides easy, online access to your medical records. With Meez, you can request a clinic appointment, read your test results, renew a prescription or communicate with your care team.     To access your existing account, please contact your [facility] or call [phone] for assistance.        Care EveryWhere ID     This is your Care EveryWhere ID. This could be used by other organizations to access your [place] medical records  [id number]        Your Vitals Were     Pulse Temperature Height Pulse Oximetry BMI (Body Mass Index)       72 97.8  F (36.6  C) (Oral) 5' 8\" 97% 24.46 kg/m2        Blood Pressure from Last 3 Encounters:   10/15/18 123/73   09/24/18 128/73   09/17/18 128/80    Weight from Last 3 Encounters:   10/15/18 160 lb 14.4 oz   09/24/18 156 lb 11.2 oz   09/14/18 156 lb 3.2 oz              We Performed the Following     Wound, Silver Nitrate Application        Primary Care Provider Office Phone # Fax #    Moreno Harris [phone]       [address]  [facility] [address]        Equal Access to Services     KHANH FLANAGAN : Rajesh Meraz, suad peck, alisa albarran. So wa " [phone].    ATENCIÓN: Si anival rahman, tiene a martino disposición servicios gratuitos de asistencia lingüística. Alfredo rocha [phone].    We comply with applicable federal civil rights laws and [place] laws. We do not discriminate on the basis of race, color, national origin, age, disability, sex, sexual orientation, or gender identity.            Thank you!     Thank you for choosing [facility] COLON AND RECTAL SURGERY  for your care. Our goal is always to provide you with excellent care. Hearing back from our patients is one way we can continue to improve our services. Please take a few minutes to complete the written survey that you may receive in the mail after your visit with us. Thank you!             Your Updated Medication List - Protect others around you: Learn how to safely use, store and throw away your medicines at www.disposemymeds.org.          This list is accurate as of 10/15/18  1:35 PM.  Always use your most recent med list.                   Brand Name Dispense Instructions for use Diagnosis    enoxaparin 40 MG/0.4ML injection    LOVENOX    11.2 mL    Inject 0.4 mLs (40 mg) Subcutaneous every 24 hours for 28 days    S/P colostomy takedown       oxyCODONE IR 5 MG tablet    ROXICODONE    15 tablet    Take 1 tablet (5 mg) by mouth every 12 hours as needed for severe pain    S/P colostomy takedown

## 2018-10-15 NOTE — NURSING NOTE
"Chief Complaint   Patient presents with     Surgical Followup       Vitals:    10/15/18 1312   BP: 123/73   BP Location: Left arm   Patient Position: Sitting   Cuff Size: Adult Regular   Pulse: 72   Temp: 97.8  F (36.6  C)   TempSrc: Oral   SpO2: 97%   Weight: 160 lb 14.4 oz   Height: 5' 8\"       Body mass index is 24.46 kg/(m^2).      Yandel Bhatti, EMT                      "

## 2018-10-15 NOTE — PATIENT INSTRUCTIONS
Follow up:  1. Schedule PET scan 3 months  2. Schedule MRI liver 3 months  3. Schedule Clinic visit after scans in 3 months    Please call with any questions or concerns regarding your clinic visit today.    It is a pleasure being involved in your health care.    Contacts post-consultation depending on your need:    Radiology Appointments                [phone]    Schedule Clinic Appointments       [phone] # 1   M-F 7:30 - 5 pm    Nova AGARWAL RN Coordinator           [phone]    Clinic Fax Number          [phone]    Bernadette           [phone]    My Chart is available 24 hours a day and is a secure way to access your records and communicate with your care team.  I strongly recommend signing up if you haven't already done so, if you are comfortable with computers.  If you would like to inquire about this or are having problems with My Chart access, you may call [phone] or go online at [email or web address].  Please allow at least 24 hours for a response and extra time on weekends and Holidays.

## 2018-10-15 NOTE — LETTER
"10/15/2018       RE: Rian Malik  [address] [place] [address]     Dear Colleague,    Thank you for referring your patient, Rian Malik, to the [facility] Please see a copy of my visit note below.    Colon and Rectal Surgery Follow-up Clinic Note      RE: Rian Malik  : 1960  AP: 2018    DIAGNOSIS: Metastatic perforated sigmoid colon cancer (status post sigmoidectomy with end colostomy and extensive adjuvant chemotherapy), now s/p cystoscopy with insertion of bilateral ureteral stents (per Dr. Quintero), laparoscopic liver ablation with intraoperative ultrasound (per Dr. Hale), and HALS colostomy takedown, appendectomy, descending colectomy, and anterior resection on 18.    INTERVAL HISTORY: Denies increased pain, fevers, or chills. Tolerating diet well. Having 1-2 soft BM's per day. Off narcotic pain medications.     Physical Examination:  /73 (BP Location: Left arm, Patient Position: Sitting, Cuff Size: Adult Regular)  Pulse 72  Temp 97.8  F (36.6  C) (Oral)  Ht 5' 8\"  Wt 160 lb 14.4 oz  SpO2 97%  BMI 24.46 kg/m2  Abdomen soft, NT. Incisions with no evidence of infection or hernia. Silver nitrate applied to supraumbilical port site wound.    ASSESSMENT  Doing well postop.    CEA: 2.2->0.9->1.6->1.2.    Pathology:  FINAL DIAGNOSIS:   A. 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 RECTAL STUMP MARGIN, EXCISION:   - No evidence of malignancy   - One lymph node with no evidence of malignancy (0/1)     C. 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 DESCENDING COLON MESENTERY, EXCISION:   - Fibroadipose tissue involved by signet ring cell adenocarcinoma   - Two lymph nodes with no " evidence of metastatic carcinoma (0/2)     E. PROXIMAL ANASTOMOTIC RING, EXCISION:   - No evidence of malignancy     F. DISTAL ANASTOMOTIC RING, EXCISION:   - No evidence of malignancy     G. 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 Regular diet.  2. No lifting >10lbs for another 2 weeks.  3. We discussed pathology results. Very poor tumor biology with increased risk of liver and peritoneal recurrence. Would recommend short-term imaging with MR liver and PET-CT in 3-4 months, as well as resuming chemotherapy. If no evidence of recurrent disease, especially in the liver or extra-abdominal, may be a candidate for HIPEC.   4. RTC in 3 months (after new imaging).    Time spent: 30 minutes.  >50% spent in discussing, counseling and coordinating care.    Referring Provider:  Moreno Harris MD  [address] [address]     Primary Care Provider:  Moreno Harris       Again, thank you for allowing me to participate in the care of your patient.      Sincerely,    Herminio Oswald MD    CC:      Saqib Boone MD

## 2018-10-16 DIAGNOSIS — C18.7 ADENOCARCINOMA OF SIGMOID COLON (H): Primary | ICD-10-CM

## 2018-10-22 ENCOUNTER — CARE COORDINATION (OUTPATIENT)
Dept: ONCOLOGY | Facility: CLINIC | Age: 58
End: 2018-10-22
Payer: COMMERCIAL

## 2018-10-22 ENCOUNTER — ONCOLOGY VISIT (OUTPATIENT)
Dept: ONCOLOGY | Facility: CLINIC | Age: 58
End: 2018-10-22
Payer: COMMERCIAL

## 2018-10-22 VITALS
BODY MASS INDEX: 24.65 KG/M2 | WEIGHT: 162.1 LBS | RESPIRATION RATE: 16 BRPM | TEMPERATURE: 97.8 F | DIASTOLIC BLOOD PRESSURE: 69 MMHG | OXYGEN SATURATION: 99 % | HEART RATE: 66 BPM | SYSTOLIC BLOOD PRESSURE: 133 MMHG

## 2018-10-22 DIAGNOSIS — C18.7 ADENOCARCINOMA OF SIGMOID COLON (H): Primary | ICD-10-CM

## 2018-10-22 DIAGNOSIS — C18.7 ADENOCARCINOMA OF SIGMOID COLON (H): ICD-10-CM

## 2018-10-22 LAB
ALBUMIN SERPL-MCNC: 3.6 G/DL (ref 3.4–5)
ALP SERPL-CCNC: 138 U/L (ref 40–150)
ALT SERPL W P-5'-P-CCNC: 86 U/L (ref 0–70)
ANION GAP SERPL CALCULATED.3IONS-SCNC: 5 MMOL/L (ref 3–14)
AST SERPL W P-5'-P-CCNC: 46 U/L (ref 0–45)
BASOPHILS # BLD AUTO: 0.1 10E9/L (ref 0–0.2)
BASOPHILS NFR BLD AUTO: 1.1 %
BILIRUB SERPL-MCNC: 0.7 MG/DL (ref 0.2–1.3)
BUN SERPL-MCNC: 14 MG/DL (ref 7–30)
CALCIUM SERPL-MCNC: 8.7 MG/DL (ref 8.5–10.1)
CEA SERPL-MCNC: 1.2 UG/L (ref 0–2.5)
CHLORIDE SERPL-SCNC: 105 MMOL/L (ref 94–109)
CO2 SERPL-SCNC: 28 MMOL/L (ref 20–32)
CREAT SERPL-MCNC: 0.77 MG/DL (ref 0.66–1.25)
DIFFERENTIAL METHOD BLD: ABNORMAL
EOSINOPHIL # BLD AUTO: 0.1 10E9/L (ref 0–0.7)
EOSINOPHIL NFR BLD AUTO: 2 %
ERYTHROCYTE [DISTWIDTH] IN BLOOD BY AUTOMATED COUNT: 14 % (ref 10–15)
GFR SERPL CREATININE-BSD FRML MDRD: >90 ML/MIN/1.7M2
GLUCOSE SERPL-MCNC: 121 MG/DL (ref 70–99)
HCT VFR BLD AUTO: 37.9 % (ref 40–53)
HGB BLD-MCNC: 13.1 G/DL (ref 13.3–17.7)
IMM GRANULOCYTES # BLD: 0 10E9/L (ref 0–0.4)
IMM GRANULOCYTES NFR BLD: 0.2 %
LYMPHOCYTES # BLD AUTO: 1.4 10E9/L (ref 0.8–5.3)
LYMPHOCYTES NFR BLD AUTO: 29.8 %
MCH RBC QN AUTO: 31.5 PG (ref 26.5–33)
MCHC RBC AUTO-ENTMCNC: 34.6 G/DL (ref 31.5–36.5)
MCV RBC AUTO: 91 FL (ref 78–100)
MONOCYTES # BLD AUTO: 0.3 10E9/L (ref 0–1.3)
MONOCYTES NFR BLD AUTO: 7.4 %
NEUTROPHILS # BLD AUTO: 2.7 10E9/L (ref 1.6–8.3)
NEUTROPHILS NFR BLD AUTO: 59.5 %
PLATELET # BLD AUTO: 78 10E9/L (ref 150–450)
POTASSIUM SERPL-SCNC: 3.7 MMOL/L (ref 3.4–5.3)
PROT SERPL-MCNC: 7.4 G/DL (ref 6.8–8.8)
RBC # BLD AUTO: 4.16 10E12/L (ref 4.4–5.9)
SODIUM SERPL-SCNC: 138 MMOL/L (ref 133–144)
WBC # BLD AUTO: 4.6 10E9/L (ref 4–11)

## 2018-10-22 PROCEDURE — 81210 BRAF GENE: CPT | Performed by: INTERNAL MEDICINE

## 2018-10-22 PROCEDURE — 85025 COMPLETE CBC W/AUTO DIFF WBC: CPT | Performed by: INTERNAL MEDICINE

## 2018-10-22 PROCEDURE — 82378 CARCINOEMBRYONIC ANTIGEN: CPT | Performed by: INTERNAL MEDICINE

## 2018-10-22 PROCEDURE — 99215 OFFICE O/P EST HI 40 MIN: CPT | Performed by: INTERNAL MEDICINE

## 2018-10-22 PROCEDURE — 81311 NRAS GENE VARIANTS EXON 2&3: CPT | Performed by: INTERNAL MEDICINE

## 2018-10-22 PROCEDURE — 80053 COMPREHEN METABOLIC PANEL: CPT | Performed by: INTERNAL MEDICINE

## 2018-10-22 PROCEDURE — 99207 ZZC NO CHARGE LOS: CPT

## 2018-10-22 RX ORDER — HEPARIN SODIUM (PORCINE) LOCK FLUSH IV SOLN 100 UNIT/ML 100 UNIT/ML
5 SOLUTION INTRAVENOUS
Status: DISCONTINUED | OUTPATIENT
Start: 2018-10-22 | End: 2018-10-23 | Stop reason: HOSPADM

## 2018-10-22 RX ADMIN — HEPARIN SODIUM (PORCINE) LOCK FLUSH IV SOLN 100 UNIT/ML 5 ML: 100 SOLUTION at 13:43

## 2018-10-22 ASSESSMENT — PAIN SCALES - GENERAL: PAINLEVEL: NO PAIN (0)

## 2018-10-22 NOTE — Clinical Note
10/22/2018         RE: Rian Malik  [address] [place] [address]        Dear Colleague,    Thank you for referring your patient, Rian Malik, to the [facility]. Please see a copy of my visit note below.      FOLLOW-UP VISIT NOTE    PATIENT NAME: Rian Malik MRN # [id number]  DATE OF VISIT: Oct 22, 2018 YOB: 1960    REFERRING PROVIDER: No referring provider defined for this encounter.    CANCER TYPE: Adenocarcinoma colon  STAGE: III [id number]  ECOG PS: 0    ONCOLOGY HISTORY:  57-year-old male who around February 2017r, developed intermittent cramping lower abdominal pain. Was evaluated by PCP and clinical impression was colitis. He was put on p.o. antibiotics. However symptoms did not improve. He was eventually referred to surgery and underwent imaging with CT findings suspicious of sigmoid abscess. He was admitted to the hospital for resection of sigmoid abscess 10/11/17. However intraoperatively was noted to have a large firmly adherent sigmoid colon mass. Laparoscopic surgery was converted to open sigmoidectomy with end colostomy.     Surgical pathology   B.  COLON, SIGMOID, HEMICOLECTOMY   - Poorly differentiated adenocarcinoma, with signet ring cell features   - Tumor size: 5 cm   - Tumor is present on serosal surface and microscopically perforates   serosal surface   - One surgical margin shows extensive serosal involvement by tumor. The   opposite, undesignated margin shows no evidence of malignancy   - Lymphovascular invasion is present   - Perineural invasion is not identified   - Tumor deposits are present   - Immunohistochemistry for mismatch repair proteins shows intact nuclear   expression for MLH1, MSH2, MSH6, and PMS2   - Three reactive lymph nodes, negative for malignancy (0/3)   - Pathologic staging: pT4N1c      Patient's case was discussed at the colorectal tumor board. Recommendation was to obtain new staging scans as well as to proceed  with systemic chemotherapy at this point with plans for further surgery in future. Staging scans negative for distant disease.     - Started on Folfox 11/21/17. Started on FOLFOX q2 week regimen for 6 months duration - s/p 12 cycles. He has had chemo delays/ dose adjustments due to thrombocytopenia.    - 07/2018 Imaging at completion of adjuvant chemotherapy showed ill-defined 16 mm segment 5 liver lesions suspicious for metastatic disease. MRI liver showed a 1.8 cm liver lesion suspicious for malignancy.    - 09/12/18 laparoscopic liver ablation with intraoperative ultrasound, colostomy takedown, appendectomy, descending colectomy and anterior dissection. Surgical pathology showed periappendiceal tissue, ascending colon margin/mesentery and pericolonic fibroadipose adipose tissue involved by signet ring adenocarcinoma.     Case was discussed at the multidisciplinary tumor board and the recommendation was resuming chemotherapy with plans of repeating  MRI of the liver and PET scan in 3-4 months and if no evidence of recurrent disease consideration for HIPEC    SUBJECTIVE     Patient is here today for follow-up and discuss plans for resuming chemotherapy. He has recovered well from surgery standpoint. Denies abdominal pain, nausea/vomiting, diarrhea, constipation, blood in the stools. Good appetite and energy levels with stable weight.      PAST MEDICAL HISTORY     Past Medical History:   Diagnosis Date     Maldonado's esophagus      Cirrhosis (H)      Colon cancer (H) 10/11/2017    Poorly differentiated adenocarcinoma     Hepatitis C          CURRENT OUTPATIENT MEDICATIONS     No current outpatient prescriptions on file.        ALLERGIES     Allergies   Allergen Reactions     Acetaminophen      Naproxen         REVIEW OF SYSTEMS   As above in the HPI, o/w complete 14-point ROS was negative.     PHYSICAL EXAM   B/P: 138/78, T: 98.1, P: 64, R: 18  SpO2 Readings from Last 4 Encounters:   10/22/18 99%   10/15/18 97%    09/24/18 99%   09/17/18 98%     Wt Readings from Last 3 Encounters:   10/22/18 73.5 kg (162 lb 1.6 oz)   10/15/18 73 kg (160 lb 14.4 oz)   09/24/18 71.1 kg (156 lb 11.2 oz)     GEN: NAD  Mouth/ENT: Oropharynx is clear.  NECK: no  lymphadenopathy  LUNGS: clear bilaterally  CV: regular, no murmurs, rubs, or gallops  ABDOMEN: soft, non-tender, non-distended,Ostomy in place  EXT: warm, well perfused, no edema  NEURO: alert  SKIN: no rashes     LABORATORY AND IMAGING STUDIES     Lab Results   Component Value Date    WBC 4.6 10/22/2018     Lab Results   Component Value Date    RBC 4.16 10/22/2018     Lab Results   Component Value Date    HGB 13.1 10/22/2018     Lab Results   Component Value Date    HCT 37.9 10/22/2018     No components found for: MCT  Lab Results   Component Value Date    MCV 91 10/22/2018     Lab Results   Component Value Date    MCH 31.5 10/22/2018     Lab Results   Component Value Date    MCHC 34.6 10/22/2018     Lab Results   Component Value Date    RDW 14.0 10/22/2018     Lab Results   Component Value Date    PLT 78 10/22/2018     Recent Labs   Lab Test  10/22/18   1343  09/13/18   0734   NA  138  135   POTASSIUM  3.7  4.4   CHLORIDE  105  102   CO2  28  27   ANIONGAP  5  6   GLC  121*  135*   BUN  14  13   CR  0.77  0.76   MAICOL  8.7  8.1*     SPECIMEN(S):   A: Appendix   B: Rectal stump margin   C: Descending colon margin   D: Descending colon mesentery   E: Proximal anastomotic ring   F: Distal anastomotic ring   G: Colon, colostomy     FINAL DIAGNOSIS:   A. 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 RECTAL STUMP MARGIN, EXCISION:   - No evidence of malignancy   - One lymph node with no evidence of malignancy (0/1)     C. 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 DESCENDING COLON  MESENTERY, EXCISION:   - Fibroadipose tissue involved by signet ring cell adenocarcinoma   - Two lymph nodes with no evidence of metastatic carcinoma (0/2)     E. PROXIMAL ANASTOMOTIC RING, EXCISION:   - No evidence of malignancy     F. DISTAL ANASTOMOTIC RING, EXCISION:   - No evidence of malignancy     G. COLON, COLOSTOMY, TAKEDOWN:   - Pericolonic fibroadipose tissue involved by signet ring cell   adenocarcinoma   - Colonic mucosa with focal ulceration, marked acute inflammation and   granulation tissue formation     I have personally reviewed all specimens and/or slides, including the   listed special stains, and used them   with my medical judgement to determine or confirm the final diagnosis.     ASSESSMENT AND PLAN     57-year-old diagnosed with poorly differentiated adenocarcinoma colon after he was admitted to the hospital for concerns for a sigmoid abscess and intraoperatively was noted to have a large firmly adherent sigmoid colon mass- [id number](tumor deposits) with margin involvement and received 12 cycles of FOLFOX with significant chemo delays/ dose adjustments due to thrombocytopenia. Follow up scans showed a 1.8 cm suspicious liver lesions and he undewent  laparoscopic liver ablation with intraoperative ultrasound, colostomy takedown, appendectomy, descending colectomy and anterior dissection. Surgical pathology showed periappendiceal tissue, ascending colon margin/mesentery and pericolonic fibroadipose adipose tissue involved by signet ring adenocarcinoma.      - Poorly differentiated adenocarcinoma colon  [id number]( liver lesion)  Had a detailed discussion with the patient today about the surgical pathology findings and presence of residual signet ring adenocarcinoma - poor risk   Case was discussed at the multidisciplinary tumor board and the recommendation was resuming chemotherapy with plans of repeating  MRI of the liver and PET scan in 3-4 months and if no evidence of recurrent disease  consideration for HIPEC.  Options include proceeding with single agent 5-Follow-up/Xeloda versus FOLFIRI  Given  residual disease with poor tumor biology with liver met despite completing 12 courses of adjuvant FOLFOX , my preferred option would be with systemic chemotherapy with FOLFIRI even though the benefit in the resected metastatic setting for addition of irinotecan is not clear.     Given baseline thrombocytopenia secondary to underlying cirrhosis, we will skip 5-FU bolus and proceed with infusional 5-FU along with Irinotecan at 180 mg/m  with assessment for dose reduction/discontinuation if significant toxicity/ delays noted. Side effect profile including but not limited to lowering of blood counts, infections, diarrhea, allergic reactions. Patient understands that if he were to proceed with the plan of care  Have him come back to infusion later today to proceed with first cycle.     - Hepatitis C/Cirrhosis  Completed antiviral treatment with HCV viral load undetectable  Following with GI    - Thrombocytopenia  Secondary to cirrhosis/hypersplenism   Monitor    - Transaminitis  Monitor    Dental clinic in 3 weeks for office visit, labs, cycle 2 of chemotherapy    The patient is ready to learn, no apparent learning barriers were identified, Diagnosis and treatment plans were explained to the patient. The patient expressed understanding of the content. The patient questions were answered to his satisfaction.    Chart documentation with Dragon Voice recognition Software. Although reviewed after completion, some words and grammatical errors may remain.  Padilla Last MD  Attending Physician   Hematology/Medical Oncology    Again, thank you for allowing me to participate in the care of your patient.        Sincerely,        Padilla Last MD

## 2018-10-22 NOTE — NURSING NOTE
"Oncology Rooming Note    October 22, 2018 2:19 PM   Rian Malik is a 58 year old male who presents for:    Chief Complaint   Patient presents with     Oncology Clinic Visit     3 month follow up, f/u after surgery     Initial Vitals: /69  Pulse 66  Temp 97.8  F (36.6  C) (Oral)  Resp 16  Wt 73.5 kg (162 lb 1.6 oz)  SpO2 99%  BMI 24.65 kg/m2 Estimated body mass index is 24.65 kg/(m^2) as calculated from the following:    Height as of 10/15/18: 1.727 m (5' 8\").    Weight as of this encounter: 73.5 kg (162 lb 1.6 oz). Body surface area is 1.88 meters squared.  No Pain (0) Comment: Data Unavailable   No LMP for male patient.  Allergies reviewed: Yes  Medications reviewed: Yes    Medications: Medication refills not needed today.  Pharmacy name entered into Rise Art:    [facility] - [place], [place] - [address]  [facility] - [place], [place] - [address]    5 minutes for nursing intake (face to face time)     Bernadette Ansari RN  "

## 2018-10-22 NOTE — PROGRESS NOTES
Received request from Dr. Last to add-on TEVIN and BRAF testing to patient's recent surgical pathology collected 9/12/18 (specimen #[id number]).  Order has been entered in Epic and also faxed to the [facility] at [phone].     Keenan Clarke, RN, BSN, OCN  Oncology Care Coordinator  [facility]

## 2018-10-22 NOTE — MR AVS SNAPSHOT
After Visit Summary   10/22/2018    Rian Malik    MRN: [id number]           Patient Information     Date Of Birth          1960        Visit Information        Provider Department      10/22/2018 1:30 PM NURSE ONLY CANCER CENTER [facility] [place]        Today's Diagnoses     Adenocarcinoma of sigmoid colon (H)           Follow-ups after your visit        Your next 10 appointments already scheduled     Oct 24, 2018  8:00 AM CDT   Folfiri with BAY 10 INFUSION   [facility] [place] ([facility] [place])    [address] [place] [address] [phone]            Jan 07, 2019 11:30 AM CST   (Arrive by 11:15 AM)   Return Visit with Herminio Oswald MD   [facility] ([facility])    [address] [address] [phone]              Who to contact     If you have questions or need follow up information about today's clinic visit or your schedule please contact [facility] directly at [phone].  Normal or non-critical lab and imaging results will be communicated to you by [employer], letter or phone within 4 business days after the clinic has received the results. If you do not hear from us within 7 days, please contact the clinic through [employer] or phone. If you have a critical or abnormal lab result, we will notify you by phone as soon as possible.  Submit refill requests through [employer] or call your pharmacy and they will forward the refill request to us. Please allow 3 business days for your refill to be completed.          Additional Information About Your Visit        MyChart Information     [employer] gives you secure access to your electronic health record. If you see a primary care provider, you can also send messages to your care team and make appointments. If you have questions, please call your primary care clinic.  If you do not have a  primary care provider, please call [phone] and they will assist you.      [employer] is an electronic gateway that provides easy, online access to your medical records. With [employer], you can request a clinic appointment, read your test results, renew a prescription or communicate with your care team.     To access your existing account, please contact your [facility] or call [phone] for assistance.        Care EveryWhere ID     This is your Care EveryWhere ID. This could be used by other organizations to access your [place] medical records  [id number]         Blood Pressure from Last 3 Encounters:   10/22/18 133/69   10/15/18 123/73   09/24/18 128/73    Weight from Last 3 Encounters:   10/22/18 73.5 kg (162 lb 1.6 oz)   10/15/18 73 kg (160 lb 14.4 oz)   09/24/18 71.1 kg (156 lb 11.2 oz)              We Performed the Following     CBC with platelets differential     CEA     Comprehensive metabolic panel        Primary Care Provider Office Phone # Fax #    Moreno Harris [phone]       [address]  [facility] [address]        Equal Access to Services     [facility]: Hadii aad ku hadasho Soomaali, waaxda luqadaha, qaybta kaalmada adeegyada, alisa leon . So [facility] [phone].    ATENCIÓN: Si habla español, tiene a martino disposición servicios gratuitos de asistencia lingüística. Llame al [phone].    We comply with applicable federal civil rights laws and [place] laws. We do not discriminate on the basis of race, color, national origin, age, disability, sex, sexual orientation, or gender identity.            Thank you!     Thank you for choosing [facility]  for your care. Our goal is always to provide you with excellent care. Hearing back from our patients is one way we can continue to improve our services. Please take a few minutes to complete the written survey that you may receive in the mail  after your visit with us. Thank you!             Your Updated Medication List - Protect others around you: Learn how to safely use, store and throw away your medicines at www.disposemymeds.org.      Notice  As of 10/22/2018 11:59 PM    You have not been prescribed any medications.

## 2018-10-22 NOTE — MR AVS SNAPSHOT
After Visit Summary   10/22/2018    Rian Malik    MRN: [id number]           Patient Information     Date Of Birth          1960        Visit Information        Provider Department      10/22/2018 2:15 PM Padilla Last MD [facility]        Today's Diagnoses     Adenocarcinoma of sigmoid colon (H)    -  1       Follow-ups after your visit        Your next 10 appointments already scheduled     Nov 06, 2018 10:15 AM CST   Return Visit with NURSE ONLY CANCER CENTER   [facility] ([facility])    [address] [place] [address]   [phone]            Nov 06, 2018 10:45 AM CST   Return Visit with SAVANNA High CNP   [facility] ([facility])    [address] [place] [address]   [phone]            Nov 06, 2018 12:00 PM CST   Folfiri with BAY 1 INFUSION   [facility] ([facility])    [address] [place] [address]   [phone]            Nov 19, 2018  9:15 AM CST   Return Visit with NURSE ONLY CANCER CENTER   [facility] ([facility])    [address] [address]   [phone]            Nov 19, 2018 10:00 AM CST   Return Visit with Padilla Last MD   [facility])    [address] [place] [address]   [phone]            Nov 19, 2018 10:30 AM CST   Folfiri with BAY 9 INFUSION   [facility] ([facility])    [address] [facility] [address]   [phone]            Jan 07, 2019 11:30 AM CST   (Arrive by 11:15 AM)   Return Visit with Herminio Oswald MD   [facility] ([facility])    [address] [place] [address] [phone]              Who to contact     If you have  questions or need follow up information about today's clinic visit or your schedule please contact [facility] directly at [phone].  Normal or non-critical lab and imaging results will be communicated to you by [employer], letter or phone within 4 business days after the clinic has received the results. If you do not hear from us within 7 days, please contact the clinic through [employer] or phone. If you have a critical or abnormal lab result, we will notify you by phone as soon as possible.  Submit refill requests through [employer] or call your pharmacy and they will forward the refill request to us. Please allow 3 business days for your refill to be completed.          Additional Information About Your Visit        [employer] Information     [employer] gives you secure access to your electronic health record. If you see a primary care provider, you can also send messages to your care team and make appointments. If you have questions, please call your primary care clinic.  If you do not have a primary care provider, please call [phone] and they will assist you.      [employer] is an electronic gateway that provides easy, online access to your medical records. With [employer], you can request a clinic appointment, read your test results, renew a prescription or communicate with your care team.     To access your existing account, please contact your [facility] or call [phone] for assistance.        Care EveryWhere ID     This is your Care EveryWhere ID. This could be used by other organizations to access your [place] medical records  [id number]        Your Vitals Were     Pulse Temperature Respirations Pulse Oximetry BMI (Body Mass Index)       66 97.8  F (36.6  C) (Oral) 16 99% 24.65 kg/m2        Blood Pressure from Last 3 Encounters:   10/24/18 118/73   10/22/18 133/69   10/15/18 123/73    Weight from Last 3 Encounters:   10/24/18 73 kg (161 lb)   10/22/18 73.5 kg  (162 lb 1.6 oz)   10/15/18 73 kg (160 lb 14.4 oz)              Today, you had the following     No orders found for display       Primary Care Provider Office Phone # Fax #    Moreno Harris [phone]       [address]  [facility] [address]        Equal Access to Services     [facility]: Hadii aad ku hadasho Soomaali, waaxda luqadaha, qaybta kaalmada adeegyada, waxay idiin hayaan adeeg kharash laNonaaan . So [facility] [phone].    ATENCIÓN: Si habla español, tiene a martino disposición servicios gratuitos de asistencia lingüística. Llame al [phone].    We comply with applicable federal civil rights laws and [place] laws. We do not discriminate on the basis of race, color, national origin, age, disability, sex, sexual orientation, or gender identity.            Thank you!     Thank you for choosing [facility]  for your care. Our goal is always to provide you with excellent care. Hearing back from our patients is one way we can continue to improve our services. Please take a few minutes to complete the written survey that you may receive in the mail after your visit with us. Thank you!             Your Updated Medication List - Protect others around you: Learn how to safely use, store and throw away your medicines at www.disposemymeds.org.          This list is accurate as of 10/22/18 11:59 PM.  Always use your most recent med list.                   Brand Name Dispense Instructions for use Diagnosis    loperamide 2 MG capsule    IMODIUM    30 capsule    2 caps at 1st sign of diarrhea & 1 cap every 2hrs until 12hrs diarrhea free. During night, 2 caps at bedtime & 2 caps every 4hrs until AM    Adenocarcinoma of sigmoid colon (H)       LORazepam 0.5 MG tablet    ATIVAN    30 tablet    Take 1 tablet (0.5 mg) by mouth every 4 hours as needed (Anxiety, Nausea/Vomiting or Sleep)    Adenocarcinoma of sigmoid colon (H)       ondansetron 8 MG tablet    ZOFRAN    10 tablet     Take 1 tablet (8 mg) by mouth every 8 hours as needed (Nausea/Vomiting)    Adenocarcinoma of sigmoid colon (H)       prochlorperazine 10 MG tablet    COMPAZINE    30 tablet    Take 1 tablet (10 mg) by mouth every 6 hours as needed (Nausea/Vomiting)    Adenocarcinoma of sigmoid colon (H)

## 2018-10-22 NOTE — PROGRESS NOTES
FOLLOW-UP VISIT NOTE    PATIENT NAME: Rian Malik MRN # [id number]  DATE OF VISIT: Oct 22, 2018 YOB: 1960    REFERRING PROVIDER: No referring provider defined for this encounter.    CANCER TYPE: Adenocarcinoma colon  STAGE: III [id number]  ECOG PS: 0    ONCOLOGY HISTORY:  57-year-old male who around February 2017r, developed intermittent cramping lower abdominal pain. Was evaluated by PCP and clinical impression was colitis. He was put on p.o. antibiotics. However symptoms did not improve. He was eventually referred to surgery and underwent imaging with CT findings suspicious of sigmoid abscess. He was admitted to the hospital for resection of sigmoid abscess 10/11/17. However intraoperatively was noted to have a large firmly adherent sigmoid colon mass. Laparoscopic surgery was converted to open sigmoidectomy with end colostomy.     Surgical pathology   B.  COLON, SIGMOID, HEMICOLECTOMY   - Poorly differentiated adenocarcinoma, with signet ring cell features   - Tumor size: 5 cm   - Tumor is present on serosal surface and microscopically perforates   serosal surface   - One surgical margin shows extensive serosal involvement by tumor. The   opposite, undesignated margin shows no evidence of malignancy   - Lymphovascular invasion is present   - Perineural invasion is not identified   - Tumor deposits are present   - Immunohistochemistry for mismatch repair proteins shows intact nuclear   expression for MLH1, MSH2, MSH6, and PMS2   - Three reactive lymph nodes, negative for malignancy (0/3)   - Pathologic staging: pT4N1c      Patient's case was discussed at the colorectal tumor board. Recommendation was to obtain new staging scans as well as to proceed with systemic chemotherapy at this point with plans for further surgery in future. Staging scans negative for distant disease.     - Started on Folfox 11/21/17. Started on FOLFOX q2 week regimen for 6 months duration - s/p 12 cycles. He has had chemo  delays/ dose adjustments due to thrombocytopenia.    - 07/2018 Imaging at completion of adjuvant chemotherapy showed ill-defined 16 mm segment 5 liver lesions suspicious for metastatic disease. MRI liver showed a 1.8 cm liver lesion suspicious for malignancy.    - 09/12/18 laparoscopic liver ablation with intraoperative ultrasound, colostomy takedown, appendectomy, descending colectomy and anterior dissection. Surgical pathology showed periappendiceal tissue, ascending colon margin/mesentery and pericolonic fibroadipose adipose tissue involved by signet ring adenocarcinoma.     Case was discussed at the multidisciplinary tumor board and the recommendation was resuming chemotherapy with plans of repeating  MRI of the liver and PET scan in 3-4 months and if no evidence of recurrent disease consideration for HIPEC    SUBJECTIVE     Patient is here today for follow-up and discuss plans for resuming chemotherapy. He has recovered well from surgery standpoint. Denies abdominal pain, nausea/vomiting, diarrhea, constipation, blood in the stools. Good appetite and energy levels with stable weight.      PAST MEDICAL HISTORY     Past Medical History:   Diagnosis Date     Maldonado's esophagus      Cirrhosis (H)      Colon cancer (H) 10/11/2017    Poorly differentiated adenocarcinoma     Hepatitis C          CURRENT OUTPATIENT MEDICATIONS     No current outpatient prescriptions on file.        ALLERGIES     Allergies   Allergen Reactions     Acetaminophen      Naproxen         REVIEW OF SYSTEMS   As above in the HPI, o/w complete 14-point ROS was negative.     PHYSICAL EXAM   B/P: 138/78, T: 98.1, P: 64, R: 18  SpO2 Readings from Last 4 Encounters:   10/22/18 99%   10/15/18 97%   09/24/18 99%   09/17/18 98%     Wt Readings from Last 3 Encounters:   10/22/18 73.5 kg (162 lb 1.6 oz)   10/15/18 73 kg (160 lb 14.4 oz)   09/24/18 71.1 kg (156 lb 11.2 oz)     GEN: NAD  Mouth/ENT: Oropharynx is clear.  NECK: no  lymphadenopathy  LUNGS:  clear bilaterally  CV: regular, no murmurs, rubs, or gallops  ABDOMEN: soft, non-tender, non-distended,Ostomy in place  EXT: warm, well perfused, no edema  NEURO: alert  SKIN: no rashes     LABORATORY AND IMAGING STUDIES     Lab Results   Component Value Date    WBC 4.6 10/22/2018     Lab Results   Component Value Date    RBC 4.16 10/22/2018     Lab Results   Component Value Date    HGB 13.1 10/22/2018     Lab Results   Component Value Date    HCT 37.9 10/22/2018     No components found for: MCT  Lab Results   Component Value Date    MCV 91 10/22/2018     Lab Results   Component Value Date    MCH 31.5 10/22/2018     Lab Results   Component Value Date    MCHC 34.6 10/22/2018     Lab Results   Component Value Date    RDW 14.0 10/22/2018     Lab Results   Component Value Date    PLT 78 10/22/2018     Recent Labs   Lab Test  10/22/18   1343  09/13/18   0734   NA  138  135   POTASSIUM  3.7  4.4   CHLORIDE  105  102   CO2  28  27   ANIONGAP  5  6   GLC  121*  135*   BUN  14  13   CR  0.77  0.76   MAICOL  8.7  8.1*     SPECIMEN(S):   A: Appendix   B: Rectal stump margin   C: Descending colon margin   D: Descending colon mesentery   E: Proximal anastomotic ring   F: Distal anastomotic ring   G: Colon, colostomy     FINAL DIAGNOSIS:   A. 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 RECTAL STUMP MARGIN, EXCISION:   - No evidence of malignancy   - One lymph node with no evidence of malignancy (0/1)     C. 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 DESCENDING COLON MESENTERY, EXCISION:   - Fibroadipose tissue involved by signet ring cell adenocarcinoma   - Two lymph nodes with no evidence of metastatic carcinoma (0/2)     E. PROXIMAL ANASTOMOTIC RING, EXCISION:   - No evidence of malignancy     F. DISTAL ANASTOMOTIC RING,  EXCISION:   - No evidence of malignancy     G. COLON, COLOSTOMY, TAKEDOWN:   - Pericolonic fibroadipose tissue involved by signet ring cell   adenocarcinoma   - Colonic mucosa with focal ulceration, marked acute inflammation and   granulation tissue formation     I have personally reviewed all specimens and/or slides, including the   listed special stains, and used them   with my medical judgement to determine or confirm the final diagnosis.     ASSESSMENT AND PLAN     57-year-old diagnosed with poorly differentiated adenocarcinoma colon after he was admitted to the hospital for concerns for a sigmoid abscess and intraoperatively was noted to have a large firmly adherent sigmoid colon mass- [id number](tumor deposits) with margin involvement and received 12 cycles of FOLFOX with significant chemo delays/ dose adjustments due to thrombocytopenia. Follow up scans showed a 1.8 cm suspicious liver lesions and he undewent  laparoscopic liver ablation with intraoperative ultrasound, colostomy takedown, appendectomy, descending colectomy and anterior dissection. Surgical pathology showed periappendiceal tissue, ascending colon margin/mesentery and pericolonic fibroadipose adipose tissue involved by signet ring adenocarcinoma.      - Poorly differentiated adenocarcinoma colon  [id number]( liver lesion)  Had a detailed discussion with the patient today about the surgical pathology findings and presence of residual signet ring adenocarcinoma - poor risk   Case was discussed at the multidisciplinary tumor board and the recommendation was resuming chemotherapy with plans of repeating  MRI of the liver and PET scan in 3-4 months and if no evidence of recurrent disease consideration for HIPEC.  Options include proceeding with single agent 5-Follow-up/Xeloda versus FOLFIRI  Given  residual disease with poor tumor biology with liver met despite completing 12 courses of adjuvant FOLFOX , my preferred option would be with systemic  chemotherapy with FOLFIRI even though the benefit in the resected metastatic setting for addition of irinotecan is not clear.     Given baseline thrombocytopenia secondary to underlying cirrhosis, we will skip 5-FU bolus and proceed with infusional 5-FU along with Irinotecan at 180 mg/m  with assessment for dose reduction/discontinuation if significant toxicity/ delays noted. Side effect profile including but not limited to lowering of blood counts, infections, diarrhea, allergic reactions. Patient understands that if he were to proceed with the plan of care  Have him come back to infusion later today to proceed with first cycle.     - Hepatitis C/Cirrhosis  Completed antiviral treatment with HCV viral load undetectable  Following with GI    - Thrombocytopenia  Secondary to cirrhosis/hypersplenism   Monitor    - Transaminitis  Monitor    Dental clinic in 3 weeks for office visit, labs, cycle 2 of chemotherapy    The patient is ready to learn, no apparent learning barriers were identified, Diagnosis and treatment plans were explained to the patient. The patient expressed understanding of the content. The patient questions were answered to his satisfaction.    Chart documentation with Dragon Voice recognition Software. Although reviewed after completion, some words and grammatical errors may remain.  Padilla Last MD  Attending Physician   Hematology/Medical Oncology

## 2018-10-23 RX ORDER — PROCHLORPERAZINE MALEATE 10 MG
10 TABLET ORAL EVERY 6 HOURS PRN
Qty: 30 TABLET | Refills: 2 | Status: SHIPPED | OUTPATIENT
Start: 2018-10-23 | End: 2019-01-01

## 2018-10-23 RX ORDER — LOPERAMIDE HCL 2 MG
CAPSULE ORAL
Qty: 30 CAPSULE | Refills: 0 | Status: SHIPPED | OUTPATIENT
Start: 2018-10-23 | End: 2018-12-06

## 2018-10-23 RX ORDER — SODIUM CHLORIDE 9 MG/ML
1000 INJECTION, SOLUTION INTRAVENOUS CONTINUOUS PRN
Status: CANCELLED
Start: 2018-10-24

## 2018-10-23 RX ORDER — EPINEPHRINE 1 MG/ML
0.3 INJECTION, SOLUTION INTRAMUSCULAR; SUBCUTANEOUS EVERY 5 MIN PRN
Status: CANCELLED | OUTPATIENT
Start: 2018-10-24

## 2018-10-23 RX ORDER — METHYLPREDNISOLONE SODIUM SUCCINATE 125 MG/2ML
125 INJECTION, POWDER, LYOPHILIZED, FOR SOLUTION INTRAMUSCULAR; INTRAVENOUS
Status: CANCELLED
Start: 2018-10-24

## 2018-10-23 RX ORDER — EPINEPHRINE 0.3 MG/.3ML
0.3 INJECTION SUBCUTANEOUS EVERY 5 MIN PRN
Status: CANCELLED | OUTPATIENT
Start: 2018-10-24

## 2018-10-23 RX ORDER — LORAZEPAM 0.5 MG/1
0.5 TABLET ORAL EVERY 4 HOURS PRN
Qty: 30 TABLET | Refills: 2 | Status: SHIPPED | OUTPATIENT
Start: 2018-10-23 | End: 2018-11-30

## 2018-10-23 RX ORDER — MEPERIDINE HYDROCHLORIDE 25 MG/ML
25 INJECTION INTRAMUSCULAR; INTRAVENOUS; SUBCUTANEOUS EVERY 30 MIN PRN
Status: CANCELLED | OUTPATIENT
Start: 2018-10-24

## 2018-10-23 RX ORDER — ALBUTEROL SULFATE 90 UG/1
1-2 AEROSOL, METERED RESPIRATORY (INHALATION)
Status: CANCELLED
Start: 2018-10-24

## 2018-10-23 RX ORDER — ALBUTEROL SULFATE 0.83 MG/ML
2.5 SOLUTION RESPIRATORY (INHALATION)
Status: CANCELLED | OUTPATIENT
Start: 2018-10-24

## 2018-10-23 RX ORDER — ONDANSETRON 8 MG/1
8 TABLET, FILM COATED ORAL EVERY 8 HOURS PRN
Qty: 10 TABLET | Refills: 2 | Status: SHIPPED | OUTPATIENT
Start: 2018-10-23 | End: 2018-11-06

## 2018-10-23 RX ORDER — DIPHENHYDRAMINE HYDROCHLORIDE 50 MG/ML
50 INJECTION INTRAMUSCULAR; INTRAVENOUS
Status: CANCELLED
Start: 2018-10-24

## 2018-10-23 RX ORDER — LORAZEPAM 2 MG/ML
0.5 INJECTION INTRAMUSCULAR EVERY 4 HOURS PRN
Status: CANCELLED
Start: 2018-10-24

## 2018-10-24 ENCOUNTER — INFUSION THERAPY VISIT (OUTPATIENT)
Dept: INFUSION THERAPY | Facility: CLINIC | Age: 58
End: 2018-10-24
Payer: COMMERCIAL

## 2018-10-24 ENCOUNTER — HOME INFUSION (PRE-WILLOW HOME INFUSION) (OUTPATIENT)
Dept: PHARMACY | Facility: CLINIC | Age: 58
End: 2018-10-24

## 2018-10-24 VITALS
OXYGEN SATURATION: 98 % | DIASTOLIC BLOOD PRESSURE: 73 MMHG | WEIGHT: 161 LBS | BODY MASS INDEX: 24.48 KG/M2 | SYSTOLIC BLOOD PRESSURE: 118 MMHG | HEART RATE: 75 BPM | RESPIRATION RATE: 18 BRPM | TEMPERATURE: 97.8 F

## 2018-10-24 DIAGNOSIS — C18.7 ADENOCARCINOMA OF SIGMOID COLON (H): Primary | ICD-10-CM

## 2018-10-24 PROCEDURE — 96416 CHEMO PROLONG INFUSE W/PUMP: CPT | Performed by: INTERNAL MEDICINE

## 2018-10-24 PROCEDURE — 99207 ZZC NO CHARGE NURSE ONLY: CPT

## 2018-10-24 PROCEDURE — 96367 TX/PROPH/DG ADDL SEQ IV INF: CPT | Performed by: INTERNAL MEDICINE

## 2018-10-24 PROCEDURE — 96413 CHEMO IV INFUSION 1 HR: CPT | Performed by: INTERNAL MEDICINE

## 2018-10-24 PROCEDURE — 96415 CHEMO IV INFUSION ADDL HR: CPT | Performed by: INTERNAL MEDICINE

## 2018-10-24 PROCEDURE — 96375 TX/PRO/DX INJ NEW DRUG ADDON: CPT | Performed by: INTERNAL MEDICINE

## 2018-10-24 RX ADMIN — Medication 250 ML: at 08:25

## 2018-10-24 ASSESSMENT — PAIN SCALES - GENERAL: PAINLEVEL: NO PAIN (0)

## 2018-10-24 NOTE — PROGRESS NOTES
"Infusion Nursing Note:  Rian Malik presents today for C1 of Folfiri    Patient seen by provider today: Yes: Dr Last on 10/22/18   present during visit today: Not Applicable.    Note: Pt states he needs ativan at home, has Zofran and Compazine.  Pharmacy has refilled Imodium.  Pt oriented to new care plan, pt states he has Irinotecan info sheet.    Intravenous Access:  Implanted Port.    Treatment Conditions:  Lab Results   Component Value Date    HGB 13.1 10/22/2018     Lab Results   Component Value Date    WBC 4.6 10/22/2018      Lab Results   Component Value Date    ANEU 2.7 10/22/2018     Lab Results   Component Value Date    PLT 78 10/22/2018      Lab Results   Component Value Date     10/22/2018                   Lab Results   Component Value Date    POTASSIUM 3.7 10/22/2018           Lab Results   Component Value Date    MAG 1.8 09/13/2018            Lab Results   Component Value Date    CR 0.77 10/22/2018                   Lab Results   Component Value Date    MAICOL 8.7 10/22/2018                Lab Results   Component Value Date    BILITOTAL 0.7 10/22/2018           Lab Results   Component Value Date    ALBUMIN 3.6 10/22/2018                    Lab Results   Component Value Date    ALT 86 10/22/2018           Lab Results   Component Value Date    AST 46 10/22/2018       Results reviewed, labs MET treatment parameters, ok to proceed with treatment.      Post Infusion Assessment:  Patient tolerated infusion without incident.  Blood return noted pre and post infusion.  Site patent and intact, free from redness, edema or discomfort.  No evidence of extravasations.    Prior to discharge: Port is secured in place with tegaderm and flushed with 10cc NS with positive blood return noted.  Continuous home infusion Dosi-Fuser pump connected.    All connectors secured in place and clamps taped open.    Pump started, \"running\" noted on display (CADD): Not Applicable.  Patient instructed to call our " clinic or [facility] with any questions or concerns at home.  Patient verbalized understanding.    Patient set up for pump disconnect at home with [facility] on Friday 10/26/18 at 0845am.      Discharge Plan:   Return on 11/06/18 and 11/19/18 for Folfiri.  Prescription refills given for Imodium and Ativan.  Discharge instructions reviewed with: Patient.  Patient and/or family verbalized understanding of discharge instructions and all questions answered.  Patient discharged in stable condition accompanied by: self.  Departure Mode: Ambulatory.    Stephanie Conner, RN

## 2018-10-24 NOTE — MR AVS SNAPSHOT
After Visit Summary   10/24/2018    Rian Malik    MRN: [id number]           Patient Information     Date Of Birth          1960        Visit Information        Provider Department      10/24/2018 8:00 AM BAY 10 INFUSION [facility]        Today's Diagnoses     Adenocarcinoma of sigmoid colon (H)    -  1       Follow-ups after your visit        Your next 10 appointments already scheduled     Nov 06, 2018 10:15 AM CST   Return Visit with [facility]   [facility]    [address] [place] [address]   [phone]            Nov 06, 2018 10:45 AM CST   Return Visit with SAVANNA High CNP   [facility] ([facility]    [address] [place] [address]   [phone]            Nov 06, 2018 12:00 PM CST   Folfiri with BAY 1 INFUSION   [facility]    [address], [place] [address]   [phone]            Nov 19, 2018  9:15 AM CST   Return Visit with [facility] ONLY [facility]   [facility]    [address] [address]   [phone]            Nov 19, 2018 10:00 AM CST   Return Visit with Padilla Last MD   [facility] ([facility]    [address], [place] [address]   [phone]            Nov 19, 2018 10:30 AM CST   Folfiri with BAY 9 INFUSION   [facility] ([facility]    [address] [facility] [address]   [phone]            Jan 07, 2019 11:30 AM CST   (Arrive by 11:15 AM)   Return Visit with Herminio Oswald MD   [facility] ([facility])    [address] [address] [phone]              Who to contact     If you have  questions or need follow up information about today's clinic visit or your schedule please contact [facility] directly at [phone].  Normal or non-critical lab and imaging results will be communicated to you by Arcarish[employer], letter or phone within 4 business days after the clinic has received the results. If you do not hear from us within 7 days, please contact the clinic through Arcarish[employer] or phone. If you have a critical or abnormal lab result, we will notify you by phone as soon as possible.  Submit refill requests through [employer] or call your pharmacy and they will forward the refill request to us. Please allow 3 business days for your refill to be completed.          Additional Information About Your Visit        [employer] Information     [employer] gives you secure access to your electronic health record. If you see a primary care provider, you can also send messages to your care team and make appointments. If you have questions, please call your primary care clinic.  If you do not have a primary care provider, please call [phone] and they will assist you.      [employer] is an electronic gateway that provides easy, online access to your medical records. With [employer], you can request a clinic appointment, read your test results, renew a prescription or communicate with your care team.     To access your existing account, please contact your [facility] or call [phone] for assistance.        Care EveryWhere ID     This is your Care EveryWhere ID. This could be used by other organizations to access your [place] medical records  [id number]        Your Vitals Were     Pulse Temperature Respirations Pulse Oximetry BMI (Body Mass Index)       75 97.8  F (36.6  C) (Oral) 18 98% 24.48 kg/m2        Blood Pressure from Last 3 Encounters:   10/24/18 118/73   10/22/18 133/69   10/15/18 123/73    Weight from Last 3 Encounters:   10/24/18 73 kg (161 lb)   10/22/18 73.5 kg  (162 lb 1.6 oz)   10/15/18 73 kg (160 lb 14.4 oz)              Today, you had the following     No orders found for display       Primary Care Provider Office Phone # Fax #    Moreno Harris [phone]       [address]  [facility] [address]        Equal Access to Services     [facility]: Hadii aad ku hadasho Soomaali, waaxda luqadaha, qaybta kaalmada adeegyada, waxay idiin hayaan adeeg kharash laNonaaan . So [facility] [phone].    ATENCIÓN: Si habla español, tiene a martino disposición servicios gratuitos de asistencia lingüística. Llame al [phone].    We comply with applicable federal civil rights laws and [place] laws. We do not discriminate on the basis of race, color, national origin, age, disability, sex, sexual orientation, or gender identity.            Thank you!     Thank you for choosing [facility]  for your care. Our goal is always to provide you with excellent care. Hearing back from our patients is one way we can continue to improve our services. Please take a few minutes to complete the written survey that you may receive in the mail after your visit with us. Thank you!             Your Updated Medication List - Protect others around you: Learn how to safely use, store and throw away your medicines at www.disposemymeds.org.          This list is accurate as of 10/24/18 12:19 PM.  Always use your most recent med list.                   Brand Name Dispense Instructions for use Diagnosis    loperamide 2 MG capsule    IMODIUM    30 capsule    2 caps at 1st sign of diarrhea & 1 cap every 2hrs until 12hrs diarrhea free. During night, 2 caps at bedtime & 2 caps every 4hrs until AM    Adenocarcinoma of sigmoid colon (H)       LORazepam 0.5 MG tablet    ATIVAN    30 tablet    Take 1 tablet (0.5 mg) by mouth every 4 hours as needed (Anxiety, Nausea/Vomiting or Sleep)    Adenocarcinoma of sigmoid colon (H)       ondansetron 8 MG tablet    ZOFRAN    10 tablet     Take 1 tablet (8 mg) by mouth every 8 hours as needed (Nausea/Vomiting)    Adenocarcinoma of sigmoid colon (H)       prochlorperazine 10 MG tablet    COMPAZINE    30 tablet    Take 1 tablet (10 mg) by mouth every 6 hours as needed (Nausea/Vomiting)    Adenocarcinoma of sigmoid colon (H)

## 2018-10-26 ENCOUNTER — HOME INFUSION (PRE-WILLOW HOME INFUSION) (OUTPATIENT)
Dept: PHARMACY | Facility: CLINIC | Age: 58
End: 2018-10-26

## 2018-10-29 NOTE — PROGRESS NOTES
This is a recent snapshot of the patient's [facility] medical record.  For current drug dose and complete information and questions, call [phone] or In Basket pool, [facility] home infusion ([id number])  CSN Number:  [id number]

## 2018-10-30 ENCOUNTER — HOME INFUSION (PRE-WILLOW HOME INFUSION) (OUTPATIENT)
Dept: PHARMACY | Facility: CLINIC | Age: 58
End: 2018-10-30

## 2018-10-30 NOTE — PROGRESS NOTES
This is a recent snapshot of the patient's [facility] medical record.  For current drug dose and complete information and questions, call [phone] or In Basket pool, fv home infusion ([id number])  CSN Number:  [id number]

## 2018-10-31 NOTE — PROGRESS NOTES
This is a recent snapshot of the patient's [facility] medical record.  For current drug dose and complete information and questions, call [phone] or In Basket pool, fv home infusion ([id number])  CSN Number:  [id number]

## 2018-11-01 ENCOUNTER — APPOINTMENT (OUTPATIENT)
Dept: LAB | Facility: CLINIC | Age: 58
End: 2018-11-01
Attending: INTERNAL MEDICINE
Payer: COMMERCIAL

## 2018-11-02 LAB — COPATH REPORT: NORMAL

## 2018-11-06 ENCOUNTER — ONCOLOGY VISIT (OUTPATIENT)
Dept: ONCOLOGY | Facility: CLINIC | Age: 58
End: 2018-11-06
Payer: COMMERCIAL

## 2018-11-06 VITALS
RESPIRATION RATE: 16 BRPM | OXYGEN SATURATION: 100 % | DIASTOLIC BLOOD PRESSURE: 79 MMHG | WEIGHT: 161.38 LBS | TEMPERATURE: 97.4 F | BODY MASS INDEX: 24.46 KG/M2 | HEIGHT: 68 IN | SYSTOLIC BLOOD PRESSURE: 129 MMHG | HEART RATE: 67 BPM

## 2018-11-06 DIAGNOSIS — R19.5 LOOSE STOOLS: ICD-10-CM

## 2018-11-06 DIAGNOSIS — C18.7 ADENOCARCINOMA OF SIGMOID COLON (H): Primary | ICD-10-CM

## 2018-11-06 DIAGNOSIS — R11.0 NAUSEA: ICD-10-CM

## 2018-11-06 DIAGNOSIS — D70.2 DRUG-INDUCED NEUTROPENIA (H): ICD-10-CM

## 2018-11-06 DIAGNOSIS — D69.6 THROMBOCYTOPENIA (H): ICD-10-CM

## 2018-11-06 LAB
BASOPHILS # BLD AUTO: 0 10E9/L (ref 0–0.2)
BASOPHILS NFR BLD AUTO: 0.9 %
BILIRUB SERPL-MCNC: 0.6 MG/DL (ref 0.2–1.3)
DIFFERENTIAL METHOD BLD: ABNORMAL
EOSINOPHIL # BLD AUTO: 0.1 10E9/L (ref 0–0.7)
EOSINOPHIL NFR BLD AUTO: 2.6 %
ERYTHROCYTE [DISTWIDTH] IN BLOOD BY AUTOMATED COUNT: 13.5 % (ref 10–15)
HCT VFR BLD AUTO: 34.8 % (ref 40–53)
HGB BLD-MCNC: 12.4 G/DL (ref 13.3–17.7)
IMM GRANULOCYTES # BLD: 0 10E9/L (ref 0–0.4)
IMM GRANULOCYTES NFR BLD: 0.4 %
LYMPHOCYTES # BLD AUTO: 1 10E9/L (ref 0.8–5.3)
LYMPHOCYTES NFR BLD AUTO: 41 %
MCH RBC QN AUTO: 31.7 PG (ref 26.5–33)
MCHC RBC AUTO-ENTMCNC: 35.6 G/DL (ref 31.5–36.5)
MCV RBC AUTO: 89 FL (ref 78–100)
MONOCYTES # BLD AUTO: 0.3 10E9/L (ref 0–1.3)
MONOCYTES NFR BLD AUTO: 12.8 %
NEUTROPHILS # BLD AUTO: 1 10E9/L (ref 1.6–8.3)
NEUTROPHILS NFR BLD AUTO: 42.3 %
PLATELET # BLD AUTO: 63 10E9/L (ref 150–450)
RBC # BLD AUTO: 3.91 10E12/L (ref 4.4–5.9)
WBC # BLD AUTO: 2.3 10E9/L (ref 4–11)

## 2018-11-06 PROCEDURE — 99215 OFFICE O/P EST HI 40 MIN: CPT | Performed by: NURSE PRACTITIONER

## 2018-11-06 PROCEDURE — 85025 COMPLETE CBC W/AUTO DIFF WBC: CPT | Performed by: NURSE PRACTITIONER

## 2018-11-06 PROCEDURE — 99207 ZZC NO CHARGE NURSE ONLY: CPT

## 2018-11-06 PROCEDURE — 82247 BILIRUBIN TOTAL: CPT | Performed by: NURSE PRACTITIONER

## 2018-11-06 RX ORDER — ONDANSETRON 8 MG/1
8 TABLET, FILM COATED ORAL EVERY 8 HOURS PRN
Qty: 30 TABLET | Refills: 1 | Status: SHIPPED | OUTPATIENT
Start: 2018-11-06 | End: 2018-11-30

## 2018-11-06 RX ORDER — HEPARIN SODIUM (PORCINE) LOCK FLUSH IV SOLN 100 UNIT/ML 100 UNIT/ML
5 SOLUTION INTRAVENOUS
Status: DISCONTINUED | OUTPATIENT
Start: 2018-11-06 | End: 2018-11-06 | Stop reason: HOSPADM

## 2018-11-06 RX ADMIN — HEPARIN SODIUM (PORCINE) LOCK FLUSH IV SOLN 100 UNIT/ML 5 ML: 100 SOLUTION at 10:22

## 2018-11-06 ASSESSMENT — PAIN SCALES - GENERAL: PAINLEVEL: NO PAIN (0)

## 2018-11-06 NOTE — LETTER
11/6/2018         RE: Rian Malik  [address] [place] [address]        Dear Colleague,    Thank you for referring your patient, Rian Malik, to the [facility]. Please see a copy of my visit note below.    Oncology Follow Up Visit: November 6, 2018     Oncologist: Dr Real Xiao  PCP: Moreno Harris    Diagnosis: Stage III Colon Cancer  Rian Malik is a 57 yo  male that was c/o lower abdominal cramping. CT showed sigmoid abscess but at resection was found to have large 5 cm firmly adherent sigmoid colon mass.Lymphovascular invasion present but no perineural invasion.  0/3 Reactive Lymph Nodes were positive. ([id number])  He has intact mismatch repair.  Treatment:   10/11/2017 sigmoidectomy with end colostomy  11/21/2017 -6/2018 modified FOLFOX-6 x 12 cycles  9/12/2018 laparoscopic liver oblation with intraoperative US, colostomy takedown, appendectomy, descending colectomy and anterior dissection. Surgical pathology showed periappendiceal tissue, ascending colon margin/mesentery and pericolonic fibroadipose adipose tissue involved by signet ring adenocarcinoma.  10/24/2018 began FOLFIRI    Interval History: Mr. Malik comes to clinic alone for symptom review prior to Cycle 2 of FOLFIRI.Pt shares he noted nausea intermittently early after first cycle but has been eating well. Also noted loose stools early into cycle but is improved - did have to use imodium but now under control. Denies pain, trouble sleeping. Admits to being more fatigued.    Rest of comprehensive and complete ROS is reviewed and is negative.   Past Medical History:   Diagnosis Date     Maldonado's esophagus      Cirrhosis (H)      Colon cancer (H) 10/11/2017    Poorly differentiated adenocarcinoma     Hepatitis C    - previously completed treatment for Hepatitis C.   Current Outpatient Prescriptions   Medication     loperamide (IMODIUM) 2 MG capsule     LORazepam (ATIVAN) 0.5 MG tablet      "ondansetron (ZOFRAN) 8 MG tablet     prochlorperazine (COMPAZINE) 10 MG tablet     No current facility-administered medications for this visit.      Allergies   Allergen Reactions     Acetaminophen      Naproxen        Physical Exam:/79 (BP Location: Right arm)  Pulse 67  Temp 97.4  F (36.3  C) (Oral)  Resp 16  Ht 1.727 m (5' 8\")  Wt 73.2 kg (161 lb 6 oz)  SpO2 100%  BMI 24.54 kg/m2   ECOG PS- 0  Constitutional: Alert and in no distress.   ENT: Eyes bright, No mouth sores  Neck: Supple, No adenopathy.Thyroid symmetric  Cardiac: Heart rate and rhythm is regular and strong without murmur  Respiratory: Breathing easy. Lung sounds clear to auscultation  GI: Abdomen is soft, non-tender, BS normal. No masses or organomegaly. Colostomy take down area healed well and is not tender.   MS: Muscle tone normal- moving well on own, extremities normal with no edema.   Skin: No suspicious lesions or rashes  Neuro: Sensory grossly WNL, gait normal.   Lymph: Normal ant/post cervical, axillary, supraclavicular nodes  Psych: Mentation appears normal and affect normal with good conversation.    Laboratory Results:   Results for orders placed or performed in visit on 10/22/18   Next Generation Sequencing Oncology: Individual Genes; BRAF, NRAS   Result Value Ref Range    Copath Report       Patient Name: TRINY SIMMONS  MR#: [id number]  Specimen #: [id number]  Collected: 10/22/2018 15:14  Received: 10/25/2018 11:55  Reported: 11/2/2018 17:14  Ordering Phy(s): MARIELENA A KATHY    For improved result formatting, select 'View Enhanced Report Format' under   Linked Documents section.  _________________________________________    TEST(S) REQUESTED:  Next Generation Sequencing Oncology-BRAF-NRAS    SPECIMEN DESCRIPTION:  Fixed slides  [id number] C1  Collected 9/12/2018    SIGNIFICANT RESULTS    ---------------------------------------------------------------------  Detected Alterations of Known or Potential Pathogenicity: " None    Detected Alterations of Uncertain Significance: None    Genes with No Detected Alterations of the Amino Acid Sequence: BRAF, NRAS  ---------------------------------------------------------------------     INTERPRETATION OF RESULTS     ---------------------------------------------------------------------   No mutations were identified in the analyzed regions o f BRAF or NRAS.     A brief search of the patient's chart in the EMR did not identify prior   sequencing results for KRAS; if those  results are needed please contact the MDL at [phone].     If the patient's mutation status for KRAS is also negative, the   combination with the current negative testing  for BRAF and NRAS activating mutations would make the patient eligible for   anti-EGFR monoclonal antibody  therapy.     ---------------------------------------------------------------------  DRUG RESPONSE    ---------------------------------------------------------------------  Drugs Associated with Sensitivity for Patient's Tumor Type, Based on   Genomic Analysis  ---------------------------------------------------------------------  Drug: Cetuximab  Response to Drug Associated with Detected Alterations: Primary sensitivity  Alteration(s) Detected: Relevant alterations not detected: NRAS, BRAF,   (KRAS *not tested*)  Condition: Colorectal Cancer  Other Relevant Information: Recomm ended for tumors with wild-type   KRAS/NRAS (codons 12, 13, 59, 61, 117, and  146). Refer to NCCN guidelines for additional criteria, including colon   laterality.  Line of Therapy: Metastatic  Source: FDA,NCCN,ASCO,MCG    Drug: Panitumumab  Response to Drug Associated with Detected Alterations: Primary sensitivity  Alteration(s) Detected: Relevant alterations not detected: NRAS, BRAF,   (KRAS *not tested*)  Condition: Colorectal Cancer  Other Relevant Information: Recommended for tumors with wild-type   KRAS/NRAS (codons 12, 13, 59, 61, 117, and  146). Refer to NCCN  guidelines for additional criteria, including colon   laterality.  Line of Therapy: Metastatic  Source: FDA,NCCN,ASCO,MCG    TEST DETAILS    ---------------------------------------------------------------------  Methodology  ---------------------------------------------------------------------  Genomic DNA is extracted from the sample and sequencing libraries are   prepared using an amplicon-based target  enrichment method  using a [employer] systems or a custom   developed low-input PCR method. The  enriched DNA libraries are sequenced on an Illumina MiSeq instrument, and   FASTQ files are processed through a  custom designed bioinformatics pipeline [based on methods described in   Cuadra et al. Genome Med. 2015 doi:  10.1186/d94068-555-5130-1 and Nataliia et al. Karlene. Transl. Med. 2018 doi:   10.69140/herlinda.2018.05.07]. Amplicons  with less than 500X minimum coverage are flagged for limited analytic   performance. Variant call files (vcf)  are filtered to remove calls with variant allele fractions (VAF) less than   thresholds defined for single  nucleotide variants (5-10%) and insertion/deletion variants (1-5%).   Clinically relevant mutations from this  filtered variant list are annotated by a pathologist with [employer]   software and reported. The assay bed  file and 5% SNV hotspot list are available upon request. The analytic   accuracy of the assay is 99%. Sequenced  regions of the c linically ordered gene set are identified in the table   below.    Gene(NM Reference): Exons covered  BRAF (NM_004333.4): 11,12,14,15; NRAS (NM_002524.4): 2-4    Less than 500X coverage was achieved in portions of the following exons;   additional mutations in these regions  cannot be entirely excluded: None    ---------------------------------------------------------------------  Limitations  ---------------------------------------------------------------------  This amplicon-based NGS assay is designed to detect  recurring, clinically   relevant mutations in selected exons  of the tested genes. Sequence variants occurring within primer binding   sequences may cause allele dropout and  not be detected. The validated allele frequency thresholds for reporting   positive results range from 1% to 10%  dependent on the type of mutation as follows: 5% for single nucleotide   variants within defined hotspots (list  available upon request); 10% for all other single nucleotide variants; 1%   fo r insertion-deletion mutations  greater than 3bp, and 5% for insertion-deletion mutations 3bp or smaller.   Therefore, the tumor cell population  must comprise at least 10-20% of the submitted specimen. Specimens with   borderline tumor cellularity may lead  to false negative results. Caution is advised for the interpretation of   negative results, and correlation with  morphology and other laboratory results is recommended to ensure adequate   representation of the cell  population of interest. This assay is not designed to detect large-scale   genomic rearrangement, gene deletion,  or gene amplification. Such somatic alterations may be relevant and   correlation with known genomic  characteristics for this patient's specific tumor is recommended. Benign   or likely benign variants (including  germline polymorphisms) are not reported.  ---------------------------------------------------------------------  Disclaimer  -------------------------------------------------------------------- -  This test was developed and its performance characteristics determined by   the [facility], Molecular [facility] Laboratory. It has not been cleared or   approved by the FDA. The laboratory is  regulated under CLIA as qualified to perform high-complexity testing. This   test is used for clinical purposes.  It should not be regarded as investigational or for research.    A resident/fellow in an accredited training program was  involved in the   selection of testing, review of  laboratory data, and/or interpretation of this case. I, as the senior   physician, attest that I: (i) confirmed  appropriate testing, (ii) examined the relevant raw data for the   specimen(s); and (iii) rendered or confirmed  the interpretation(s).    ---------------------------------------------------------------------  [employer] Disclaimer  ---------------------------------------------------------------------  This report was produced using software licensed by [employer].    [employer] software is designed to be  used in clinical applications solely as a tool to enhance medical utility   and improve operational efficiency.  The use of [employer] software is not a substitute for medical judgment   and [employer] in no way holds  itself out as having or providing independent medical judgment or   diagnostic services. [employer] is not  liable with respect to any treatment or diagnosis made in connection with   this report.    [employer] Rules Version: [id number]  [employer] Application Version: [id number]    ---------------------------------------------------------------------  Electronic Signature  ---------------------------------------------------------------------  Electronically signed/cosigned by: Xu Hale MD, PhD  11/02/2018    Electronically Signed Out By:  Xu Hale M.D., [facility]    CPT Codes:  A: -FAAJFQ, 40465-IBVBIDWIU, NRASSNGSTC, -BXESEU    TESTING LAB LOCATION:  [address] [address] [phone]    COLLECTION SITE:  Client[facility]  [facility]  [facility]       Assessment and Plan:   Stage III Colon Cancer-Pt began post surgery treatment with FOLFIRI on 10/24/2018 and noted some side effects of mild nausea, loose stools and fatigue.   He does  not meet goals to continue with treatment today due to thrombocytopenia and neutropenia.   We will defer treatment x 1 week and review symptoms at this time for continuation of treatment. Knowing that we may not see significant improvement of platelets due to previous treatment to the liver - we will have to review parameters of treatment plan.   Plan is for resuming chemotherapy with plan to  repeat  MRI of the liver and PET scan in 3-4 months after starting FOLFIRI and if no evidence of recurrent disease consideration for HIPEC  Loose stools- side effect of irinotecan. Using imodium as needed. Reminded of need for fiber and adequate fluids.   Nausea- pt using antiemetics as needed.renewed zofran.  This was a 25 min visit with > 50% in counseling and coordinating care including education and management of concerns.    Jessica Marsh CNP      Again, thank you for allowing me to participate in the care of your patient.        Sincerely,        Jessica Marsh, SILVIA, APRN CNP

## 2018-11-06 NOTE — MR AVS SNAPSHOT
After Visit Summary   11/6/2018    Rian Malik    MRN: [id number]           Patient Information     Date Of Birth          1960        Visit Information        Provider Department      11/6/2018 10:45 AM Jessica Marsh APRN CNP [facility]        Today's Diagnoses     Adenocarcinoma of sigmoid colon (H)    -  1    Thrombocytopenia (H)        Drug-induced neutropenia (H)        Nausea        Loose stools           Follow-ups after your visit        Your next 10 appointments already scheduled     Nov 15, 2018 11:45 AM CST   Return Visit with [facility]   [facility] ([facility])    [address] [place] [address]   [phone]            Nov 15, 2018 12:15 PM CST   Return Visit with SAVANNA High CNP   [facility] ([facility])    [address] [place] [address]   [phone]            Nov 15, 2018  1:00 PM CST   Folfiri with BAY 9 INFUSION   [facility])    [address] [place] [address]   [phone]            Nov 30, 2018  8:15 AM CST   Return Visit with SAVANNA High CNP   [facility])    [address] [place] [address]   [phone]            Nov 30, 2018  9:00 AM CST   Folfiri with BAY 2 INFUSION   [facility] ([facility])    [address] [place] [address]   [phone]            Dec 17, 2018  8:15 AM CST   Return Visit with [facility] ONLY [facility]   [facility] ([facility])    [address] [facility] [address]   [phone]            Dec 17, 2018  9:00 AM CST   Return Visit with Padilla Last MD   [facility])    [address] [place] [address]  [address]   [phone]            Dec 17, 2018  9:30 AM CST   Folfiri with BAY 3 INFUSION   [facility] ([facility])    [address] [place] [id number] [phone]            Jan 07, 2019 11:30 AM CST   (Arrive by 11:15 AM)   Return Visit with Herminio Oswald MD   [facility] ([facility])    [address] [facility]  [address] [facility] [address] [phone]              Who to contact     If you have questions or need follow up information about today's clinic visit or your schedule please contact [facility] directly at [phone].  Normal or non-critical lab and imaging results will be communicated to you by atCollabhart, letter or phone within 4 business days after the clinic has received the results. If you do not hear from us within 7 days, please contact the clinic through atCollabhart or phone. If you have a critical or abnormal lab result, we will notify you by phone as soon as possible.  Submit refill requests through makerist or call your pharmacy and they will forward the refill request to us. Please allow 3 business days for your refill to be completed.          Additional Information About Your Visit        DNAe LTDt Information     makerist gives you secure access to your electronic health record. If you see a primary care provider, you can also send messages to your care team and make appointments. If you have questions, please call your primary care clinic.  If you do not have a primary care provider, please call [phone] and they will assist you.      makerist is an electronic gateway that provides easy, online access to your medical records. With makerist, you can request a clinic appointment, read your test results, renew a prescription or communicate with your care team.     To access your existing account, please contact your [facility] or  "call [phone] for assistance.        Care EveryWhere ID     This is your Care EveryWhere ID. This could be used by other organizations to access your [facility] medical records  [id number]        Your Vitals Were     Pulse Temperature Respirations Height Pulse Oximetry BMI (Body Mass Index)    67 97.4  F (36.3  C) (Oral) 16 1.727 m (5' 8\") 100% 24.54 kg/m2       Blood Pressure from Last 3 Encounters:   11/06/18 129/79   10/24/18 118/73   10/22/18 133/69    Weight from Last 3 Encounters:   11/06/18 73.2 kg (161 lb 6 oz)   10/24/18 73 kg (161 lb)   10/22/18 73.5 kg (162 lb 1.6 oz)              Today, you had the following     No orders found for display         Where to get your medicines      These medications were sent to [facility] - [place], [place] - [address]  [address], [facility] [address]     Phone:  [phone]     ondansetron 8 MG tablet          Primary Care Provider Office Phone # Fax #    Moreno Harris [phone]       [address]  [facility] [address]        Equal Access to Services     KHANH FLANAGAN : Hadii cameron ku hadasho Soomaali, waaxda luqadaha, qaybta kaalmada adeegyada, alisa currie hayharis leon . So [facility] [phone].    ATENCIÓN: Si habla español, tiene a martino disposición servicios gratuitos de asistencia lingüística. Llame al [phone].    We comply with applicable federal civil rights laws and [place] laws. We do not discriminate on the basis of race, color, national origin, age, disability, sex, sexual orientation, or gender identity.            Thank you!     Thank you for choosing [facility]  for your care. Our goal is always to provide you with excellent care. Hearing back from our patients is one way we can continue to improve our services. Please take a few minutes to complete the written survey that you may receive in the mail after your visit with us. Thank you!           "   Your Updated Medication List - Protect others around you: Learn how to safely use, store and throw away your medicines at www.disposemymeds.org.          This list is accurate as of 11/6/18  7:33 PM.  Always use your most recent med list.                   Brand Name Dispense Instructions for use Diagnosis    loperamide 2 MG capsule    IMODIUM    30 capsule    2 caps at 1st sign of diarrhea & 1 cap every 2hrs until 12hrs diarrhea free. During night, 2 caps at bedtime & 2 caps every 4hrs until AM    Adenocarcinoma of sigmoid colon (H)       LORazepam 0.5 MG tablet    ATIVAN    30 tablet    Take 1 tablet (0.5 mg) by mouth every 4 hours as needed (Anxiety, Nausea/Vomiting or Sleep)    Adenocarcinoma of sigmoid colon (H)       ondansetron 8 MG tablet    ZOFRAN    30 tablet    Take 1 tablet (8 mg) by mouth every 8 hours as needed (Nausea/Vomiting)    Adenocarcinoma of sigmoid colon (H)       prochlorperazine 10 MG tablet    COMPAZINE    30 tablet    Take 1 tablet (10 mg) by mouth every 6 hours as needed (Nausea/Vomiting)    Adenocarcinoma of sigmoid colon (H)

## 2018-11-06 NOTE — Clinical Note
ANC=1.0 and Plts=68- reviewing treatment and recent results - should parameters of plts be lowered for treatment?

## 2018-11-06 NOTE — PROGRESS NOTES
Oncology Follow Up Visit: November 6, 2018     Oncologist: Dr Real Xiao  PCP: Moreno Harris    Diagnosis: Stage III Colon Cancer  Rian Malik is a 57 yo  male that was c/o lower abdominal cramping. CT showed sigmoid abscess but at resection was found to have large 5 cm firmly adherent sigmoid colon mass.Lymphovascular invasion present but no perineural invasion.  0/3 Reactive Lymph Nodes were positive. ([id number])  He has intact mismatch repair.  Treatment:   10/11/2017 sigmoidectomy with end colostomy  11/21/2017 -6/2018 modified FOLFOX-6 x 12 cycles  9/12/2018 laparoscopic liver oblation with intraoperative US, colostomy takedown, appendectomy, descending colectomy and anterior dissection. Surgical pathology showed periappendiceal tissue, ascending colon margin/mesentery and pericolonic fibroadipose adipose tissue involved by signet ring adenocarcinoma.  10/24/2018 began FOLFIRI    Interval History: Mr. Malik comes to clinic alone for symptom review prior to Cycle 2 of FOLFIRI.Pt shares he noted nausea intermittently early after first cycle but has been eating well. Also noted loose stools early into cycle but is improved - did have to use imodium but now under control. Denies pain, trouble sleeping. Admits to being more fatigued.    Rest of comprehensive and complete ROS is reviewed and is negative.   Past Medical History:   Diagnosis Date     Maldonado's esophagus      Cirrhosis (H)      Colon cancer (H) 10/11/2017    Poorly differentiated adenocarcinoma     Hepatitis C    - previously completed treatment for Hepatitis C.   Current Outpatient Prescriptions   Medication     loperamide (IMODIUM) 2 MG capsule     LORazepam (ATIVAN) 0.5 MG tablet     ondansetron (ZOFRAN) 8 MG tablet     prochlorperazine (COMPAZINE) 10 MG tablet     No current facility-administered medications for this visit.      Allergies   Allergen Reactions     Acetaminophen      Naproxen        Physical Exam:/79 (BP  "Location: Right arm)  Pulse 67  Temp 97.4  F (36.3  C) (Oral)  Resp 16  Ht 1.727 m (5' 8\")  Wt 73.2 kg (161 lb 6 oz)  SpO2 100%  BMI 24.54 kg/m2   ECOG PS- 0  Constitutional: Alert and in no distress.   ENT: Eyes bright, No mouth sores  Neck: Supple, No adenopathy.Thyroid symmetric  Cardiac: Heart rate and rhythm is regular and strong without murmur  Respiratory: Breathing easy. Lung sounds clear to auscultation  GI: Abdomen is soft, non-tender, BS normal. No masses or organomegaly. Colostomy take down area healed well and is not tender.   MS: Muscle tone normal- moving well on own, extremities normal with no edema.   Skin: No suspicious lesions or rashes  Neuro: Sensory grossly WNL, gait normal.   Lymph: Normal ant/post cervical, axillary, supraclavicular nodes  Psych: Mentation appears normal and affect normal with good conversation.    Laboratory Results:   Results for orders placed or performed in visit on 10/22/18   Next Generation Sequencing Oncology: Individual Genes; BRAF, NRAS   Result Value Ref Range    Copath Report       Patient Name: TRINY SIMMONS  MR#: [id number]  Specimen #: [id number]  Collected: 10/22/2018 15:14  Received: 10/25/2018 11:55  Reported: 11/2/2018 17:14  Ordering Phy(s): MARIELENA A KATHY    For improved result formatting, select 'View Enhanced Report Format' under   Linked Documents section.  _________________________________________    TEST(S) REQUESTED:  Next Generation Sequencing Oncology-BRAF-NRAS    SPECIMEN DESCRIPTION:  Fixed slides  [id number] C1  Collected 9/12/2018    SIGNIFICANT RESULTS    ---------------------------------------------------------------------  Detected Alterations of Known or Potential Pathogenicity: None    Detected Alterations of Uncertain Significance: None    Genes with No Detected Alterations of the Amino Acid Sequence: BRAF, NRAS  ---------------------------------------------------------------------     INTERPRETATION OF RESULTS     " ---------------------------------------------------------------------   No mutations were identified in the analyzed regions o f BRAF or NRAS.     A brief search of the patient's chart in the EMR did not identify prior   sequencing results for KRAS; if those  results are needed please contact the MD at [phone].     If the patient's mutation status for KRAS is also negative, the   combination with the current negative testing  for BRAF and NRAS activating mutations would make the patient eligible for   anti-EGFR monoclonal antibody  therapy.     ---------------------------------------------------------------------  DRUG RESPONSE    ---------------------------------------------------------------------  Drugs Associated with Sensitivity for Patient's Tumor Type, Based on   Genomic Analysis  ---------------------------------------------------------------------  Drug: Cetuximab  Response to Drug Associated with Detected Alterations: Primary sensitivity  Alteration(s) Detected: Relevant alterations not detected: NRAS, BRAF,   (KRAS *not tested*)  Condition: Colorectal Cancer  Other Relevant Information: Recomm ended for tumors with wild-type   KRAS/NRAS (codons 12, 13, 59, 61, 117, and  146). Refer to NCCN guidelines for additional criteria, including colon   laterality.  Line of Therapy: Metastatic  Source: FDA,NCCN,ASCO,MCG    Drug: Panitumumab  Response to Drug Associated with Detected Alterations: Primary sensitivity  Alteration(s) Detected: Relevant alterations not detected: NRAS, BRAF,   (KRAS *not tested*)  Condition: Colorectal Cancer  Other Relevant Information: Recommended for tumors with wild-type   KRAS/NRAS (codons 12, 13, 59, 61, 117, and  146). Refer to NCCN guidelines for additional criteria, including colon   laterality.  Line of Therapy: Metastatic  Source: FDA,NCCN,ASCO,MCG    TEST  DETAILS    ---------------------------------------------------------------------  Methodology  ---------------------------------------------------------------------  Genomic DNA is extracted from the sample and sequencing libraries are   prepared using an amplicon-based target  enrichment method  using a [employer] systems or a custom   developed low-input PCR method. The  enriched DNA libraries are sequenced on an Illumina MiSeq instrument, and   FASTQ files are processed through a  custom designed bioinformatics pipeline [based on methods described in   Khalif et al. Genome Med. 2015 doi:  10.1186/q38860-019-8937-8 and Nataliia et al. Karlene. Transl. Med. 2018 doi:   10.80445/herlinda.2018.05.07]. Amplicons  with less than 500X minimum coverage are flagged for limited analytic   performance. Variant call files (vcf)  are filtered to remove calls with variant allele fractions (VAF) less than   thresholds defined for single  nucleotide variants (5-10%) and insertion/deletion variants (1-5%).   Clinically relevant mutations from this  filtered variant list are annotated by a pathologist with apiOmaty   software and reported. The assay bed  file and 5% SNV hotspot list are available upon request. The analytic   accuracy of the assay is 99%. Sequenced  regions of the c linically ordered gene set are identified in the table   below.    Gene(NM Reference): Exons covered  BRAF (NM_004333.4): 11,12,14,15; NRAS (NM_002524.4): 2-4    Less than 500X coverage was achieved in portions of the following exons;   additional mutations in these regions  cannot be entirely excluded: None    ---------------------------------------------------------------------  Limitations  ---------------------------------------------------------------------  This amplicon-based NGS assay is designed to detect recurring, clinically   relevant mutations in selected exons  of the tested genes. Sequence variants occurring within primer  binding   sequences may cause allele dropout and  not be detected. The validated allele frequency thresholds for reporting   positive results range from 1% to 10%  dependent on the type of mutation as follows: 5% for single nucleotide   variants within defined hotspots (list  available upon request); 10% for all other single nucleotide variants; 1%   fo r insertion-deletion mutations  greater than 3bp, and 5% for insertion-deletion mutations 3bp or smaller.   Therefore, the tumor cell population  must comprise at least 10-20% of the submitted specimen. Specimens with   borderline tumor cellularity may lead  to false negative results. Caution is advised for the interpretation of   negative results, and correlation with  morphology and other laboratory results is recommended to ensure adequate   representation of the cell  population of interest. This assay is not designed to detect large-scale   genomic rearrangement, gene deletion,  or gene amplification. Such somatic alterations may be relevant and   correlation with known genomic  characteristics for this patient's specific tumor is recommended. Benign   or likely benign variants (including  germline polymorphisms) are not reported.  ---------------------------------------------------------------------  Disclaimer  -------------------------------------------------------------------- -  This test was developed and its performance characteristics determined by   the [facility], [facility]. It has not been cleared or   approved by the FDA. The laboratory is  regulated under CLIA as qualified to perform high-complexity testing. This   test is used for clinical purposes.  It should not be regarded as investigational or for research.    A resident/fellow in an accredited training program was involved in the   selection of testing, review of  laboratory data, and/or interpretation of this case. I, as the senior    physician, attest that I: (i) confirmed  appropriate testing, (ii) examined the relevant raw data for the   specimen(s); and (iii) rendered or confirmed  the interpretation(s).    ---------------------------------------------------------------------  [employer]ach[employer] Disclaimer  ---------------------------------------------------------------------  This report was produced using software licensed by [employer].    [employer] software is designed to be  used in clinical applications solely as a tool to enhance medical utility   and improve operational efficiency.  The use of [employer] software is not a substitute for medical judgment   and [employer] in no way holds  itself out as having or providing independent medical judgment or   diagnostic services. [employer] is not  liable with respect to any treatment or diagnosis made in connection with   this report.    M[employer] Rules Version: [id number]  [employer] Application Version: [id number]    ---------------------------------------------------------------------  Electronic Signature  ---------------------------------------------------------------------  Electronically signed/cosigned by: Xu Hale MD, PhD  11/02/2018    Electronically Signed Out By:  Xu Hale M.D., [facility] Codes:  A: -KCSKUY, [id number], NRASSNGSTC, -KHHLKD    TESTING LAB LOCATION:  [address] [id number] [phone]    COLLECTION SITE:  Client:  [facility]  Location:  [facility] ()       Assessment and Plan:   Stage III Colon Cancer-Pt began post surgery treatment with FOLFIRI on 10/24/2018 and noted some side effects of mild nausea, loose stools and fatigue.   He does not meet goals to continue with treatment today due to thrombocytopenia and neutropenia.   We will defer treatment x 1  week and review symptoms at this time for continuation of treatment. Knowing that we may not see significant improvement of platelets due to previous treatment to the liver - we will have to review parameters of treatment plan.   Plan is for resuming chemotherapy with plan to  repeat  MRI of the liver and PET scan in 3-4 months after starting FOLFIRI and if no evidence of recurrent disease consideration for HIPEC  Loose stools- side effect of irinotecan. Using imodium as needed. Reminded of need for fiber and adequate fluids.   Nausea- pt using antiemetics as needed.renewed zofran.  This was a 25 min visit with > 50% in counseling and coordinating care including education and management of concerns.    Jessica Marsh,CNP

## 2018-11-06 NOTE — MR AVS SNAPSHOT
After Visit Summary   11/6/2018    Rian Malik    MRN: [id number]           Patient Information     Date Of Birth          1960        Visit Information        Provider Department      11/6/2018 10:15 AM NURSE ONLY CANCER CENTER [facility]        Today's Diagnoses     Adenocarcinoma of sigmoid colon (H)    -  1       Follow-ups after your visit        Your next 10 appointments already scheduled     Nov 06, 2018 12:00 PM CST   [place] with BAY 1 INFUSION   [facility] ([facility])    [address] [place] [address]   [phone]            Nov 19, 2018  9:15 AM CST   Return Visit with NURSE ONLY CANCER CENTER   [facility] ([facility])    [address] [place] [address] [phone]            Nov 19, 2018 10:00 AM CST   Return Visit with Padilla Last MD   [facility] ([facility])    [address] [place] [address] [phone]            Nov 19, 2018 10:30 AM CST   Folfiri with BAY 9 INFUSION   [facility] ([facility])    [address] [place] [address] [phone]            Jan 07, 2019 11:30 AM CST   (Arrive by 11:15 AM)   Return Visit with Herminio Oswald MD   [facility] ([facility])    [address] [place] [address] [phone]              Who to contact     If you have questions or need follow up information about today's clinic visit or your schedule please contact [facility] directly at [phone].  Normal or non-critical lab and imaging results will be communicated to you by MyChart, letter or phone within 4 business days after the clinic has received the results. If you do not hear from us within 7 days, please contact the clinic through MyChart or phone.  If you have a critical or abnormal lab result, we will notify you by phone as soon as possible.  Submit refill requests through [employer] or call your pharmacy and they will forward the refill request to us. Please allow 3 business days for your refill to be completed.          Additional Information About Your Visit        [employer] Information     [employer] gives you secure access to your electronic health record. If you see a primary care provider, you can also send messages to your care team and make appointments. If you have questions, please call your primary care clinic.  If you do not have a primary care provider, please call [phone] and they will assist you.      [employer] is an electronic gateway that provides easy, online access to your medical records. With [employer], you can request a clinic appointment, read your test results, renew a prescription or communicate with your care team.     To access your existing account, please contact your [facility] or call [phone] for assistance.        Care EveryWhere ID     This is your Care EveryWhere ID. This could be used by other organizations to access your [place] medical records  [id number]         Blood Pressure from Last 3 Encounters:   10/24/18 118/73   10/22/18 133/69   10/15/18 123/73    Weight from Last 3 Encounters:   10/24/18 73 kg (161 lb)   10/22/18 73.5 kg (162 lb 1.6 oz)   10/15/18 73 kg (160 lb 14.4 oz)              We Performed the Following     Bilirubin  total     CBC with platelets differential        Primary Care Provider Office Phone # Fax #    Moreno Harris [phone]       [address]  [facility] [address]        Equal Access to Services     [facility]: Hadii cameron lester hadcas Sosherri, waaxda luqadaha, qaybta kaalmada alisa garcia. So [facility] [phone].    ATENCIÓN: Si habla español, tiene a martino disposición servicios gratuitos de  asistencia lingüística. Alfredo al [phone].    We comply with applicable federal civil rights laws and [place] laws. We do not discriminate on the basis of race, color, national origin, age, disability, sex, sexual orientation, or gender identity.            Thank you!     Thank you for choosing [facility]  for your care. Our goal is always to provide you with excellent care. Hearing back from our patients is one way we can continue to improve our services. Please take a few minutes to complete the written survey that you may receive in the mail after your visit with us. Thank you!             Your Updated Medication List - Protect others around you: Learn how to safely use, store and throw away your medicines at www.disposemymeds.org.          This list is accurate as of 11/6/18 10:45 AM.  Always use your most recent med list.                   Brand Name Dispense Instructions for use Diagnosis    loperamide 2 MG capsule    IMODIUM    30 capsule    2 caps at 1st sign of diarrhea & 1 cap every 2hrs until 12hrs diarrhea free. During night, 2 caps at bedtime & 2 caps every 4hrs until AM    Adenocarcinoma of sigmoid colon (H)       LORazepam 0.5 MG tablet    ATIVAN    30 tablet    Take 1 tablet (0.5 mg) by mouth every 4 hours as needed (Anxiety, Nausea/Vomiting or Sleep)    Adenocarcinoma of sigmoid colon (H)       ondansetron 8 MG tablet    ZOFRAN    10 tablet    Take 1 tablet (8 mg) by mouth every 8 hours as needed (Nausea/Vomiting)    Adenocarcinoma of sigmoid colon (H)       prochlorperazine 10 MG tablet    COMPAZINE    30 tablet    Take 1 tablet (10 mg) by mouth every 6 hours as needed (Nausea/Vomiting)    Adenocarcinoma of sigmoid colon (H)

## 2018-11-06 NOTE — PROGRESS NOTES
Power port needle left accessed for treatment. Tolerated port access and draw without complaint. Port site scrubbed with Chloraprep for 30 seconds. Accessed using sterile technique. [place] drawn-Red/Green/Purple tubes. Double signed by patient and RN. See documentation flowsheet. Nuha De Los Santos, RN, BSN, OCN

## 2018-11-06 NOTE — NURSING NOTE
"Oncology Rooming Note    November 6, 2018 10:46 AM   Rian Malik is a 58 year old male who presents for:    Chief Complaint   Patient presents with     Oncology Clinic Visit     Follow Up/ Prior to Treatment      Initial Vitals: /79 (BP Location: Right arm)  Pulse 67  Temp 97.4  F (36.3  C) (Oral)  Resp 16  Ht 1.727 m (5' 8\")  Wt 73.2 kg (161 lb 6 oz)  SpO2 100%  BMI 24.54 kg/m2 Estimated body mass index is 24.54 kg/(m^2) as calculated from the following:    Height as of this encounter: 1.727 m (5' 8\").    Weight as of this encounter: 73.2 kg (161 lb 6 oz). Body surface area is 1.87 meters squared.  No Pain (0) Comment: Data Unavailable   No LMP for male patient.  Allergies reviewed: Yes  Medications reviewed: Yes    Medications: MEDICATION REFILLS NEEDED TODAY. Provider was notified.  Pharmacy name entered into [employer]:    [facility] - [place], [place] - [address]  [facility] - [place], [place] - [address]      5 minutes for nursing intake (face to face time)     Arabella Boswell LPN              "

## 2018-11-09 NOTE — PROGRESS NOTES
This is a recent snapshot of the patient's [facility] medical record.  For current drug dose and complete information and questions, call [phone] or In Basket pool, [facility] home infusion ([id number])  CSN Number:  [id number]

## 2018-11-15 ENCOUNTER — ONCOLOGY VISIT (OUTPATIENT)
Dept: ONCOLOGY | Facility: CLINIC | Age: 58
End: 2018-11-15
Payer: COMMERCIAL

## 2018-11-15 ENCOUNTER — INFUSION THERAPY VISIT (OUTPATIENT)
Dept: INFUSION THERAPY | Facility: CLINIC | Age: 58
End: 2018-11-15
Payer: COMMERCIAL

## 2018-11-15 ENCOUNTER — HOME INFUSION (PRE-WILLOW HOME INFUSION) (OUTPATIENT)
Dept: PHARMACY | Facility: CLINIC | Age: 58
End: 2018-11-15

## 2018-11-15 VITALS
OXYGEN SATURATION: 98 % | WEIGHT: 162 LBS | HEART RATE: 68 BPM | TEMPERATURE: 97.8 F | BODY MASS INDEX: 24.55 KG/M2 | SYSTOLIC BLOOD PRESSURE: 124 MMHG | RESPIRATION RATE: 18 BRPM | HEIGHT: 68 IN | DIASTOLIC BLOOD PRESSURE: 75 MMHG

## 2018-11-15 VITALS
DIASTOLIC BLOOD PRESSURE: 79 MMHG | OXYGEN SATURATION: 100 % | HEART RATE: 52 BPM | TEMPERATURE: 97.5 F | SYSTOLIC BLOOD PRESSURE: 148 MMHG

## 2018-11-15 DIAGNOSIS — R11.0 NAUSEA: ICD-10-CM

## 2018-11-15 DIAGNOSIS — H61.21 IMPACTED CERUMEN OF RIGHT EAR: ICD-10-CM

## 2018-11-15 DIAGNOSIS — C18.7 ADENOCARCINOMA OF SIGMOID COLON (H): Primary | ICD-10-CM

## 2018-11-15 DIAGNOSIS — R19.5 LOOSE STOOLS: ICD-10-CM

## 2018-11-15 DIAGNOSIS — D69.6 THROMBOCYTOPENIA (H): ICD-10-CM

## 2018-11-15 LAB
BASOPHILS # BLD AUTO: 0 10E9/L (ref 0–0.2)
BASOPHILS NFR BLD AUTO: 0.6 %
BILIRUB SERPL-MCNC: 0.9 MG/DL (ref 0.2–1.3)
DIFFERENTIAL METHOD BLD: ABNORMAL
EOSINOPHIL # BLD AUTO: 0 10E9/L (ref 0–0.7)
EOSINOPHIL NFR BLD AUTO: 0.8 %
ERYTHROCYTE [DISTWIDTH] IN BLOOD BY AUTOMATED COUNT: 14.1 % (ref 10–15)
HCT VFR BLD AUTO: 36.9 % (ref 40–53)
HGB BLD-MCNC: 13 G/DL (ref 13.3–17.7)
IMM GRANULOCYTES # BLD: 0 10E9/L (ref 0–0.4)
IMM GRANULOCYTES NFR BLD: 0.3 %
LYMPHOCYTES # BLD AUTO: 1 10E9/L (ref 0.8–5.3)
LYMPHOCYTES NFR BLD AUTO: 27.5 %
MCH RBC QN AUTO: 31.3 PG (ref 26.5–33)
MCHC RBC AUTO-ENTMCNC: 35.2 G/DL (ref 31.5–36.5)
MCV RBC AUTO: 89 FL (ref 78–100)
MONOCYTES # BLD AUTO: 0.3 10E9/L (ref 0–1.3)
MONOCYTES NFR BLD AUTO: 7.4 %
NEUTROPHILS # BLD AUTO: 2.2 10E9/L (ref 1.6–8.3)
NEUTROPHILS NFR BLD AUTO: 63.4 %
PLATELET # BLD AUTO: 72 10E9/L (ref 150–450)
RBC # BLD AUTO: 4.16 10E12/L (ref 4.4–5.9)
WBC # BLD AUTO: 3.5 10E9/L (ref 4–11)

## 2018-11-15 PROCEDURE — 99207 ZZC NO CHARGE NURSE ONLY: CPT

## 2018-11-15 PROCEDURE — 96413 CHEMO IV INFUSION 1 HR: CPT | Performed by: NURSE PRACTITIONER

## 2018-11-15 PROCEDURE — 96367 TX/PROPH/DG ADDL SEQ IV INF: CPT | Performed by: NURSE PRACTITIONER

## 2018-11-15 PROCEDURE — 96415 CHEMO IV INFUSION ADDL HR: CPT | Performed by: NURSE PRACTITIONER

## 2018-11-15 PROCEDURE — 96375 TX/PRO/DX INJ NEW DRUG ADDON: CPT | Performed by: NURSE PRACTITIONER

## 2018-11-15 PROCEDURE — 99213 OFFICE O/P EST LOW 20 MIN: CPT | Mod: 25 | Performed by: NURSE PRACTITIONER

## 2018-11-15 PROCEDURE — 82247 BILIRUBIN TOTAL: CPT | Performed by: NURSE PRACTITIONER

## 2018-11-15 PROCEDURE — 85025 COMPLETE CBC W/AUTO DIFF WBC: CPT | Performed by: NURSE PRACTITIONER

## 2018-11-15 PROCEDURE — 99207 ZZC NO CHARGE LOS: CPT

## 2018-11-15 RX ORDER — EPINEPHRINE 1 MG/ML
0.3 INJECTION, SOLUTION INTRAMUSCULAR; SUBCUTANEOUS EVERY 5 MIN PRN
Status: CANCELLED | OUTPATIENT
Start: 2018-11-15

## 2018-11-15 RX ORDER — MEPERIDINE HYDROCHLORIDE 25 MG/ML
25 INJECTION INTRAMUSCULAR; INTRAVENOUS; SUBCUTANEOUS EVERY 30 MIN PRN
Status: CANCELLED | OUTPATIENT
Start: 2018-11-15

## 2018-11-15 RX ORDER — METHYLPREDNISOLONE SODIUM SUCCINATE 125 MG/2ML
125 INJECTION, POWDER, LYOPHILIZED, FOR SOLUTION INTRAMUSCULAR; INTRAVENOUS
Status: CANCELLED
Start: 2018-11-15

## 2018-11-15 RX ORDER — HEPARIN SODIUM (PORCINE) LOCK FLUSH IV SOLN 100 UNIT/ML 100 UNIT/ML
5 SOLUTION INTRAVENOUS
Status: DISCONTINUED | OUTPATIENT
Start: 2018-11-15 | End: 2018-11-15 | Stop reason: HOSPADM

## 2018-11-15 RX ORDER — ALBUTEROL SULFATE 0.83 MG/ML
2.5 SOLUTION RESPIRATORY (INHALATION)
Status: CANCELLED | OUTPATIENT
Start: 2018-11-15

## 2018-11-15 RX ORDER — LORAZEPAM 2 MG/ML
0.5 INJECTION INTRAMUSCULAR EVERY 4 HOURS PRN
Status: CANCELLED
Start: 2018-11-15

## 2018-11-15 RX ORDER — EPINEPHRINE 0.3 MG/.3ML
0.3 INJECTION SUBCUTANEOUS EVERY 5 MIN PRN
Status: CANCELLED | OUTPATIENT
Start: 2018-11-15

## 2018-11-15 RX ORDER — ALBUTEROL SULFATE 90 UG/1
1-2 AEROSOL, METERED RESPIRATORY (INHALATION)
Status: CANCELLED
Start: 2018-11-15

## 2018-11-15 RX ORDER — SODIUM CHLORIDE 9 MG/ML
1000 INJECTION, SOLUTION INTRAVENOUS CONTINUOUS PRN
Status: CANCELLED
Start: 2018-11-15

## 2018-11-15 RX ORDER — DIPHENHYDRAMINE HYDROCHLORIDE 50 MG/ML
50 INJECTION INTRAMUSCULAR; INTRAVENOUS
Status: CANCELLED
Start: 2018-11-15

## 2018-11-15 RX ADMIN — Medication 250 ML: at 13:26

## 2018-11-15 RX ADMIN — HEPARIN SODIUM (PORCINE) LOCK FLUSH IV SOLN 100 UNIT/ML 5 ML: 100 SOLUTION at 11:52

## 2018-11-15 ASSESSMENT — PAIN SCALES - GENERAL: PAINLEVEL: NO PAIN (0)

## 2018-11-15 NOTE — PROGRESS NOTES
Power port needle left accessed for treatment. Tolerated port access and draw without complaint. Port site scrubbed with Chloraprep for 30 seconds. Accessed using sterile technique. [place] drawn-Red/Green/Purple tubes. Double signed by patient and RN. See documentation flowsheet. Nuha De Los Santos, RN, BSN, OCN

## 2018-11-15 NOTE — MR AVS SNAPSHOT
After Visit Summary   11/15/2018    Rian Malik    MRN: [id number]           Patient Information     Date Of Birth          1960        Visit Information        Provider Department      11/15/2018 11:45 AM NURSE ONLY CANCER CENTER [facility]        Today's Diagnoses     Adenocarcinoma of sigmoid colon (H)    -  1       Follow-ups after your visit        Your next 10 appointments already scheduled     Nov 15, 2018  1:00 PM CST   Level 3 with BAY 9 INFUSION   [facility] ([facility])    [address] [facility] [address]   [phone]            Nov 30, 2018  8:15 AM CST   Return Visit with SAVANNA High CNP   [facility] ([facility])    [address] [facility] [address]   [phone]            Nov 30, 2018  9:00 AM CST   Level 3 with BAY 2 INFUSION   [facility] ([facility])    [address] [facility] [address]   [phone]            Dec 17, 2018  8:15 AM CST   Return Visit with NURSE ONLY CANCER CENTER   [facility] ([facility])    [address] [facility] [address]   [phone]            Dec 17, 2018  9:00 AM CST   Return Visit with Padilla Last MD   [facility] ([facility])    [address] [facility] [address]   [phone]            Dec 17, 2018  9:30 AM CST   Level 3 with BAY 3 INFUSION   [facility] ([facility])    [address] [facility] [address]   [phone]            Jan 07, 2019 11:30 AM CST   (Arrive by 11:15 AM)   Return Visit with Herminio Oswald MD   [facility] ([facility])    [address] [address] [phone]              Who to contact     If you have  questions or need follow up information about today's clinic visit or your schedule please contact [facility] directly at [phone].  Normal or non-critical lab and imaging results will be communicated to you by Assembl[employer], letter or phone within 4 business days after the clinic has received the results. If you do not hear from us within 7 days, please contact the clinic through [employer] or phone. If you have a critical or abnormal lab result, we will notify you by phone as soon as possible.  Submit refill requests through [employer] or call your pharmacy and they will forward the refill request to us. Please allow 3 business days for your refill to be completed.          Additional Information About Your Visit        [employer] Information     [employer] gives you secure access to your electronic health record. If you see a primary care provider, you can also send messages to your care team and make appointments. If you have questions, please call your primary care clinic.  If you do not have a primary care provider, please call [phone] and they will assist you.      [employer] is an electronic gateway that provides easy, online access to your medical records. With [employer], you can request a clinic appointment, read your test results, renew a prescription or communicate with your care team.     To access your existing account, please contact your [facility] or call [phone] for assistance.        Care EveryWhere ID     This is your Care EveryWhere ID. This could be used by other organizations to access your [place] medical records  [id number]         Blood Pressure from Last 3 Encounters:   11/15/18 124/75   11/06/18 129/79   10/24/18 118/73    Weight from Last 3 Encounters:   11/15/18 73.5 kg (162 lb)   11/06/18 73.2 kg (161 lb 6 oz)   10/24/18 73 kg (161 lb)              We Performed the Following     Bilirubin  total     CBC with platelets differential         Primary Care Provider Office Phone # Fax #    Moreno Harris [phone]       [address]  [place] [facility] [address]        Equal Access to Services     GUSTAVO FLANAGAN : Rajesh cameron lester kirto Soomaali, waaxda luqadaha, qaybta kaalmada adeegyada, alisa banegas enochbria ramirez laNonaharis mg. So [facility] [phone].    ATENCIÓN: Si habla español, tiene a martino disposición servicios gratuitos de asistencia lingüística. Llame al [phone].    We comply with applicable federal civil rights laws and [place] laws. We do not discriminate on the basis of race, color, national origin, age, disability, sex, sexual orientation, or gender identity.            Thank you!     Thank you for choosing [facility]  for your care. Our goal is always to provide you with excellent care. Hearing back from our patients is one way we can continue to improve our services. Please take a few minutes to complete the written survey that you may receive in the mail after your visit with us. Thank you!             Your Updated Medication List - Protect others around you: Learn how to safely use, store and throw away your medicines at www.disposemymeds.org.          This list is accurate as of 11/15/18 12:34 PM.  Always use your most recent med list.                   Brand Name Dispense Instructions for use Diagnosis    loperamide 2 MG capsule    IMODIUM    30 capsule    2 caps at 1st sign of diarrhea & 1 cap every 2hrs until 12hrs diarrhea free. During night, 2 caps at bedtime & 2 caps every 4hrs until AM    Adenocarcinoma of sigmoid colon (H)       LORazepam 0.5 MG tablet    ATIVAN    30 tablet    Take 1 tablet (0.5 mg) by mouth every 4 hours as needed (Anxiety, Nausea/Vomiting or Sleep)    Adenocarcinoma of sigmoid colon (H)       ondansetron 8 MG tablet    ZOFRAN    30 tablet    Take 1 tablet (8 mg) by mouth every 8 hours as needed (Nausea/Vomiting)    Adenocarcinoma of sigmoid colon (H)        prochlorperazine 10 MG tablet    COMPAZINE    30 tablet    Take 1 tablet (10 mg) by mouth every 6 hours as needed (Nausea/Vomiting)    Adenocarcinoma of sigmoid colon (H)

## 2018-11-15 NOTE — MR AVS SNAPSHOT
After Visit Summary   11/15/2018    Rian Malik    MRN: [id number]           Patient Information     Date Of Birth          1960        Visit Information        Provider Department      11/15/2018 1:00 PM BAY 9 INFUSION [facility]        Today's Diagnoses     Adenocarcinoma of sigmoid colon (H)    -  1       Follow-ups after your visit        Your next 10 appointments already scheduled     Nov 30, 2018  8:15 AM CST   Return Visit with SAVANNA High CNP   [facility] ([facility])    [address] [facility] [address] [phone]            Nov 30, 2018  9:00 AM CST   Level 3 with BAY 2 INFUSION   [facility] ([facility])    [address] [facility] [address] [phone]            Dec 17, 2018  8:15 AM CST   Return Visit with NURSE [place] [facility]   [facility] ([facility])    [address] [facility] [address] [phone]            Dec 17, 2018  9:00 AM CST   Return Visit with Padilla Last MD   [facility] ([facility])    [address] [facility] [address] [phone]            Dec 17, 2018  9:30 AM CST   Level 3 with BAY 3 INFUSION   [facility] ([facility])    [address] [facility] [address] [phone]            Jan 07, 2019 11:30 AM CST   (Arrive by 11:15 AM)   Return Visit with Herminio Oswald MD   [facility] ([facility])    [address] [place] [address] [phone]              Who to contact     If you have questions or need follow up information about today's clinic visit or your schedule please contact [facility] directly at [phone].  Normal or non-critical lab and imaging results will be  communicated to you by NoWaithart, letter or phone within 4 business days after the clinic has received the results. If you do not hear from us within 7 days, please contact the clinic through [employer] or phone. If you have a critical or abnormal lab result, we will notify you by phone as soon as possible.  Submit refill requests through [employer] or call your pharmacy and they will forward the refill request to us. Please allow 3 business days for your refill to be completed.          Additional Information About Your Visit        [employer] Information     [employer] gives you secure access to your electronic health record. If you see a primary care provider, you can also send messages to your care team and make appointments. If you have questions, please call your primary care clinic.  If you do not have a primary care provider, please call [phone] and they will assist you.      [employer] is an electronic gateway that provides easy, online access to your medical records. With [employer], you can request a clinic appointment, read your test results, renew a prescription or communicate with your care team.     To access your existing account, please contact your [facility] or call [phone] for assistance.        Care EveryWhere ID     This is your Care EveryWhere ID. This could be used by other organizations to access your [place] medical records  [id number]        Your Vitals Were     Pulse Temperature Pulse Oximetry             52 97.5  F (36.4  C) (Oral) 100%          Blood Pressure from Last 3 Encounters:   11/15/18 148/79   11/15/18 124/75   11/06/18 129/79    Weight from Last 3 Encounters:   11/15/18 73.5 kg (162 lb)   11/06/18 73.2 kg (161 lb 6 oz)   10/24/18 73 kg (161 lb)              Today, you had the following     No orders found for display       Primary Care Provider Office Phone # Fax #    Moreno Harris [phone]       [address]  [place] [address]        Equal Access to Services     : Hadii cameron lester senait Meraz, waaxda luqadaha, qaybta kaalmada jose, alisa lopezduniacameron mg. So [facility] [phone].    ATENCIÓN: Si habla español, tiene a martino disposición servicios gratuitos de asistencia lingüística. Alfredo al [phone].    We comply with applicable federal civil rights laws and [place] laws. We do not discriminate on the basis of race, color, national origin, age, disability, sex, sexual orientation, or gender identity.            Thank you!     Thank you for choosing [facility]  for your care. Our goal is always to provide you with excellent care. Hearing back from our patients is one way we can continue to improve our services. Please take a few minutes to complete the written survey that you may receive in the mail after your visit with us. Thank you!             Your Updated Medication List - Protect others around you: Learn how to safely use, store and throw away your medicines at www.disposemymeds.org.          This list is accurate as of 11/15/18  4:52 PM.  Always use your most recent med list.                   Brand Name Dispense Instructions for use Diagnosis    loperamide 2 MG capsule    IMODIUM    30 capsule    2 caps at 1st sign of diarrhea & 1 cap every 2hrs until 12hrs diarrhea free. During night, 2 caps at bedtime & 2 caps every 4hrs until AM    Adenocarcinoma of sigmoid colon (H)       LORazepam 0.5 MG tablet    ATIVAN    30 tablet    Take 1 tablet (0.5 mg) by mouth every 4 hours as needed (Anxiety, Nausea/Vomiting or Sleep)    Adenocarcinoma of sigmoid colon (H)       ondansetron 8 MG tablet    ZOFRAN    30 tablet    Take 1 tablet (8 mg) by mouth every 8 hours as needed (Nausea/Vomiting)    Adenocarcinoma of sigmoid colon (H)       prochlorperazine 10 MG tablet    COMPAZINE    30 tablet    Take 1 tablet (10 mg) by mouth every 6 hours as needed (Nausea/Vomiting)     Adenocarcinoma of sigmoid colon (H)

## 2018-11-15 NOTE — PROGRESS NOTES
"Infusion Nursing Note:  Rian Malik presents today for C2D1 Irinotecan/5FU pump connect.    Patient seen by provider today: Yes: Jessica Marsh NP   present during visit today: Not Applicable.    Note: At the end of the infusion of irinotecan, pt noted he felt like he was sweating and diaphoresis.  Per treatment plan, atropine was given at 1533.    1540: \"why is my speech funny?\"  My tongue feels slow.    Denies any sensation of swelling.   NO dizzyness, swelling, SOB, chest pressure.   VSS    1545 it feels a little better.      1552: \"about 50% better\"    1555: \"about 95% better\"    Instructed patient to watch for worsening symptoms, advised him to call if symptoms persisted, or to seek care immediately for any difficulty breathing or chest pain.  He verbalized understanding.    Intravenous Access:  Implanted Port.    Treatment Conditions:  Lab Results   Component Value Date    HGB 13.0 11/15/2018     Lab Results   Component Value Date    WBC 3.5 11/15/2018      Lab Results   Component Value Date    ANEU 2.2 11/15/2018     Lab Results   Component Value Date    PLT 72 11/15/2018      Lab Results   Component Value Date     10/22/2018                   Lab Results   Component Value Date    POTASSIUM 3.7 10/22/2018           Lab Results   Component Value Date    MAG 1.8 09/13/2018            Lab Results   Component Value Date    CR 0.77 10/22/2018                   Lab Results   Component Value Date    MAICOL 8.7 10/22/2018                Lab Results   Component Value Date    BILITOTAL 0.9 11/15/2018           Lab Results   Component Value Date    ALBUMIN 3.6 10/22/2018                    Lab Results   Component Value Date    ALT 86 10/22/2018           Lab Results   Component Value Date    AST 46 10/22/2018       Results reviewed, labs MET treatment parameters, ok to proceed with treatment.  Discussed platelet count of 72 with Jessica WINN NP.  Parameters in treatment plan state >75.  Per grace Lopez to " "proceed with treatment today.      Post Infusion Assessment:  Patient tolerated infusion without incident except for above noted side effects.  Blood return noted pre and post infusion.  Site patent and intact, free from redness, edema or discomfort.  No evidence of extravasations.    Prior to discharge: Port is secured in place with tegaderm and flushed with 10cc NS with positive blood return noted.  Continuous home infusion Dosi-Fuser pump connected.    All connectors secured in place and clamps taped open.    Pump started, \"running\" noted on display (CADD): Not Applicable.  Patient instructed to call our clinic or [facility] with any questions or concerns at home.  Patient verbalized understanding.    Patient set up for pump disconnect at home with [facility] on 11/17/2018 at 1330.      Discharge Plan:   Patient will return 11/30/2018 for next appointment.   Patient discharged in stable condition accompanied by: self.  Departure Mode: Ambulatory.    Jael Ansari, RN-BSN, PHN, OCN  [facility]  "

## 2018-11-15 NOTE — NURSING NOTE
"Oncology Rooming Note    November 15, 2018 12:14 PM   Rian Malik is a 58 year old male who presents for:    Chief Complaint   Patient presents with     Oncology Clinic Visit     follow up-treatment     Initial Vitals: /75  Pulse 68  Temp 97.8  F (36.6  C)  Resp 18  Ht 1.727 m (5' 8\")  Wt 73.5 kg (162 lb)  SpO2 98%  BMI 24.63 kg/m2 Estimated body mass index is 24.63 kg/(m^2) as calculated from the following:    Height as of this encounter: 1.727 m (5' 8\").    Weight as of this encounter: 73.5 kg (162 lb). Body surface area is 1.88 meters squared.  No Pain (0) Comment: Data Unavailable   No LMP for male patient.  Allergies reviewed: Yes  Medications reviewed: Yes    Medications: Medication refills not needed today.  Pharmacy name entered into EPIC:    [facility] - [place], [place] - [address]  [facility] - [place], [place] - [address]       5 minutes for nursing intake (face to face time)     Oxana Shepard LPN              "

## 2018-11-15 NOTE — MR AVS SNAPSHOT
After Visit Summary   11/15/2018    Rian Malik    MRN: [id number]           Patient Information     Date Of Birth          1960        Visit Information        Provider Department      11/15/2018 12:15 PM Jessica Marsh APRN CNP [facility]        Today's Diagnoses     Adenocarcinoma of sigmoid colon (H)    -  1    Loose stools        Thrombocytopenia (H)        Nausea        Impacted cerumen of right ear           Follow-ups after your visit        Your next 10 appointments already scheduled     Nov 30, 2018  8:15 AM CST   Return Visit with SAVANNA High CNP   [facility] ([facility])    [id number] [place] [address]   [phone]            Nov 30, 2018  9:00 AM CST   Level 3 with BAY 2 INFUSION   [facility] ([facility])    [address] [place] [address]   [phone]            Dec 17, 2018  8:15 AM CST   Return Visit with NURSE ONLY CANCER CENTER   [facility] ([facility])    [address] [place] [address]   [phone]            Dec 17, 2018  9:00 AM CST   Return Visit with Padilla Last MD   [facility] ([facility])    [address] [place] [address]   [phone]            Dec 17, 2018  9:30 AM CST   Level 3 with BAY 3 INFUSION   [facility] ([facility])    [address] [place] [address]   [phone]            Jan 07, 2019 11:30 AM CST   (Arrive by 11:15 AM)   Return Visit with Herminio Oswald MD   [facility] ([facility])    [address] [place] [address] [phone]              Who to contact     If you have questions or need follow up information about today's clinic visit or your schedule please contact  "[facility] directly at [phone].  Normal or non-critical lab and imaging results will be communicated to you by MyChart, letter or phone within 4 business days after the clinic has received the results. If you do not hear from us within 7 days, please contact the clinic through GoNogginghart or phone. If you have a critical or abnormal lab result, we will notify you by phone as soon as possible.  Submit refill requests through Datamyne or call your pharmacy and they will forward the refill request to us. Please allow 3 business days for your refill to be completed.          Additional Information About Your Visit        Datamyne Information     Dat[employer]ne gives you secure access to your electronic health record. If you see a primary care provider, you can also send messages to your care team and make appointments. If you have questions, please call your primary care clinic.  If you do not have a primary care provider, please call [phone] and they will assist you.      Datamyne is an electronic gateway that provides easy, online access to your medical records. With [employer]ne, you can request a clinic appointment, read your test results, renew a prescription or communicate with your care team.     To access your existing account, please contact your [facility] or call [phone] for assistance.        Care EveryWhere ID     This is your Care EveryWhere ID. This could be used by other organizations to access your [place] medical records  [id number]        Your Vitals Were     Pulse Temperature Respirations Height Pulse Oximetry BMI (Body Mass Index)    68 97.8  F (36.6  C) 18 1.727 m (5' 8\") 98% 24.63 kg/m2       Blood Pressure from Last 3 Encounters:   11/15/18 124/75   11/06/18 129/79   10/24/18 118/73    Weight from Last 3 Encounters:   11/15/18 73.5 kg (162 lb)   11/06/18 73.2 kg (161 lb 6 oz)   10/24/18 73 kg (161 lb)              Today, you had the following     No " orders found for display       Primary Care Provider Office Phone # Fax #    Moreno Harris [phone]       [address]  [facility] [address]        Equal Access to Services     KHANH FLANAGAN : Rajesh cameron lester kirto Sokaleyali, waaxda luqadaha, qaybta kaalmada adeegyada, alisa gabriellain hayaatheodore kendallbria ramirez carolyn mg. So [facility] [phone].    ATENCIÓN: Si habla español, tiene a martino disposición servicios gratuitos de asistencia lingüística. Llame al [phone].    We comply with applicable federal civil rights laws and [place] laws. We do not discriminate on the basis of race, color, national origin, age, disability, sex, sexual orientation, or gender identity.            Thank you!     Thank you for choosing [facility]  for your care. Our goal is always to provide you with excellent care. Hearing back from our patients is one way we can continue to improve our services. Please take a few minutes to complete the written survey that you may receive in the mail after your visit with us. Thank you!             Your Updated Medication List - Protect others around you: Learn how to safely use, store and throw away your medicines at www.disposemymeds.org.          This list is accurate as of 11/15/18  2:59 PM.  Always use your most recent med list.                   Brand Name Dispense Instructions for use Diagnosis    loperamide 2 MG capsule    IMODIUM    30 capsule    2 caps at 1st sign of diarrhea & 1 cap every 2hrs until 12hrs diarrhea free. During night, 2 caps at bedtime & 2 caps every 4hrs until AM    Adenocarcinoma of sigmoid colon (H)       LORazepam 0.5 MG tablet    ATIVAN    30 tablet    Take 1 tablet (0.5 mg) by mouth every 4 hours as needed (Anxiety, Nausea/Vomiting or Sleep)    Adenocarcinoma of sigmoid colon (H)       ondansetron 8 MG tablet    ZOFRAN    30 tablet    Take 1 tablet (8 mg) by mouth every 8 hours as needed (Nausea/Vomiting)    Adenocarcinoma of  sigmoid colon (H)       prochlorperazine 10 MG tablet    COMPAZINE    30 tablet    Take 1 tablet (10 mg) by mouth every 6 hours as needed (Nausea/Vomiting)    Adenocarcinoma of sigmoid colon (H)

## 2018-11-15 NOTE — LETTER
11/15/2018         RE: Rian Malik  [address] [place] [address]        Dear Colleague,    Thank you for referring your patient, Rian Malik, to the [facility]. Please see a copy of my visit note below.    Oncology Follow Up Visit: November 6, 2018     Oncologist: Dr Padilla Last  PCP: Moreno Harris    Diagnosis: Stage III Colon Cancer  Rian Malik is a 59 yo  male that was c/o lower abdominal cramping. CT showed sigmoid abscess but at resection was found to have large 5 cm firmly adherent sigmoid colon mass.Lymphovascular invasion present but no perineural invasion.  0/3 Reactive Lymph Nodes were positive. ([id number])  He has intact mismatch repair.  Treatment:   10/11/2017 sigmoidectomy with end colostomy  11/21/2017 -6/2018 modified FOLFOX-6 x 12 cycles  9/12/2018 laparoscopic liver oblation with intraoperative US, colostomy takedown, appendectomy, descending colectomy and anterior dissection. Surgical pathology showed periappendiceal tissue, ascending colon margin/mesentery and pericolonic fibroadipose adipose tissue involved by signet ring adenocarcinoma.  10/24/2018 began FOLFIRI    Interval History: Mr. Malik comes to clinic alone for symptom review prior to delayed Cycle 2 of FOLFIRI.Pt was delayed form treatment last week due to neutropenia and thrombocytopenia. He has been feeling well this week. He is eating well but does watch intake due to random loose stools- has needed to use imodium intermittently but is afraid of constipation. Denies pain, nausea,SOB, chest pain, weakness or neuropathy.   Rest of comprehensive and complete ROS is reviewed and is negative.   Past Medical History:   Diagnosis Date     Maldonado's esophagus      Cirrhosis (H)      Colon cancer (H) 10/11/2017    Poorly differentiated adenocarcinoma     Hepatitis C    - previously completed treatment for Hepatitis C.   Current Outpatient Prescriptions   Medication      "loperamide (IMODIUM) 2 MG capsule     LORazepam (ATIVAN) 0.5 MG tablet     ondansetron (ZOFRAN) 8 MG tablet     prochlorperazine (COMPAZINE) 10 MG tablet     No current facility-administered medications for this visit.      Facility-Administered Medications Ordered in Other Visits   Medication     heparin 100 UNIT/ML injection 5 mL     sodium chloride (PF) 0.9% PF flush 10-20 mL     Allergies   Allergen Reactions     Acetaminophen      Naproxen        Physical Exam:/75  Pulse 68  Temp 97.8  F (36.6  C)  Resp 18  Ht 1.727 m (5' 8\")  Wt 73.5 kg (162 lb)  SpO2 98%  BMI 24.63 kg/m2   ECOG PS- 0  Constitutional: Alert and in no distress. Moving well and is cooperative.   ENT: Eyes bright, No mouth sores. Right TM with dark wax- pt sharing that this has been bothering him and possibly affecting hearing.   Neck: Supple, No adenopathy.Thyroid symmetric  Cardiac: Heart rate and rhythm is regular and strong without murmur  Respiratory: Breathing easy. Lung sounds clear to auscultation  GI: Abdomen is soft, non-tender, BS normal. No masses or organomegaly. Colostomy take down area healed well and is not tender.   MS: Muscle tone normal- moving well on own, extremities normal with no edema.   Skin: No suspicious lesions or rashes  Neuro: Sensory grossly WNL, gait normal.   Lymph: Normal ant/post cervical, axillary, supraclavicular nodes  Psych: Mentation appears normal and affect normal with good conversation.    Laboratory Results:   Results for orders placed or performed in visit on 11/15/18   CBC with platelets differential   Result Value Ref Range    WBC 3.5 (L) 4.0 - 11.0 10e9/L    RBC Count 4.16 (L) 4.4 - 5.9 10e12/L    Hemoglobin 13.0 (L) 13.3 - 17.7 g/dL    Hematocrit 36.9 (L) 40.0 - 53.0 %    MCV 89 78 - 100 fl    MCH 31.3 26.5 - 33.0 pg    MCHC 35.2 31.5 - 36.5 g/dL    RDW 14.1 10.0 - 15.0 %    Platelet Count 72 (L) 150 - 450 10e9/L    Diff Method Automated Method     % Neutrophils 63.4 %    % Lymphocytes " 27.5 %    % Monocytes 7.4 %    % Eosinophils 0.8 %    % Basophils 0.6 %    % Immature Granulocytes 0.3 %    Absolute Neutrophil 2.2 1.6 - 8.3 10e9/L    Absolute Lymphocytes 1.0 0.8 - 5.3 10e9/L    Absolute Monocytes 0.3 0.0 - 1.3 10e9/L    Absolute Eosinophils 0.0 0.0 - 0.7 10e9/L    Absolute Basophils 0.0 0.0 - 0.2 10e9/L    Abs Immature Granulocytes 0.0 0 - 0.4 10e9/L   Bilirubin  total   Result Value Ref Range    Bilirubin Total 0.9 0.2 - 1.3 mg/dL     Assessment and Plan:   Stage III Colon Cancer-Pt is due to restart FOLFIRI cycle 2 after 1 week delay due to thrombocytopenia and neutropenia. ANC is now adequate but He is still seeing platelets= 72,000 but will continue  We  may not see significant improvement of platelets due to previous treatment to the liver.  He will return in 2 weeks for cycle 3 with review prior with treatment labs.   Plan to repeat  MRI of the liver and PET scan in 3-4 months after starting FOLFIRI and if no evidence of recurrent disease consideration for HIPEC  Loose stools- Known side effect of irinotecan. Using imodium as needed - appears to be working well. Reminded of need for fiber and adequate fluids.   Nausea- pt using antiemetics early in cycle with good results.  Right ear with cerumen impaction. Cleaned with loop of dark ball of wax with pt sharing relief.   This was a 20 min visit with > 50% in counseling and coordinating care including education and management of concerns.    Jessica Marsh CNP      Again, thank you for allowing me to participate in the care of your patient.        Sincerely,        Jessica Marsh, NP, APRN CNP

## 2018-11-17 ENCOUNTER — HOME INFUSION (PRE-WILLOW HOME INFUSION) (OUTPATIENT)
Dept: PHARMACY | Facility: CLINIC | Age: 58
End: 2018-11-17

## 2018-11-19 NOTE — PROGRESS NOTES
This is a recent snapshot of the patient's [facility] medical record.  For current drug dose and complete information and questions, call [phone] or In [facility] pool, fv home infusion ([id number])  CSN Number:  [id number]

## 2018-11-26 ENCOUNTER — TELEPHONE (OUTPATIENT)
Dept: PHARMACY | Facility: CLINIC | Age: 58
End: 2018-11-26

## 2018-11-26 NOTE — TELEPHONE ENCOUNTER
PA Initiation    Medication: FLUOROURACIL  Insurance Company: [facility] - Phone [phone] Fax [phone]  Pharmacy Filling the Rx: [facility]  Filling Pharmacy Phone: [phone]  Filling Pharmacy Fax:    Start Date: 11/26/2018    Central Prior Authorization Team   Phone: [phone]

## 2018-11-26 NOTE — TELEPHONE ENCOUNTER
Insurance Company: [employer]  [facility]UG or FORMULA name:  FLUOROURACIL  Dates of Service: 12/06/2018  What is needed from intake: Authorization renewal of auth #[id number]  Provider: Padilla Last NPI: [id number]        [facility]  NPI: [id number]

## 2018-11-27 NOTE — TELEPHONE ENCOUNTER
Prior Authorization Approval    Authorization Effective Date: 10/27/2018  Authorization Expiration Date: 11/27/2019  Medication: FLUOROURACIL  Approved Dose/Quantity: 5g  Reference #: [id number]   Insurance Company: [id number] - Phone [phone] Fax [phone]  Which Pharmacy is filling the prescription (Not needed for infusion/clinic administered): [facility]  Pharmacy Notified: Yes  Patient Notified: No

## 2018-11-28 NOTE — TELEPHONE ENCOUNTER
Received call from Tarsha from [employer] notifying writer that fluorouracil was approved and is covered from 10/27/18-11/27/19. Will route to Ludmila Jose to take the next steps.  Daria Perrin RN on 11/28/2018 at 8:42 AM

## 2018-11-30 ENCOUNTER — ONCOLOGY VISIT (OUTPATIENT)
Dept: ONCOLOGY | Facility: CLINIC | Age: 58
End: 2018-11-30
Payer: COMMERCIAL

## 2018-11-30 VITALS
HEIGHT: 68 IN | WEIGHT: 162.5 LBS | OXYGEN SATURATION: 99 % | SYSTOLIC BLOOD PRESSURE: 130 MMHG | HEART RATE: 62 BPM | BODY MASS INDEX: 24.63 KG/M2 | TEMPERATURE: 97.6 F | RESPIRATION RATE: 16 BRPM | DIASTOLIC BLOOD PRESSURE: 77 MMHG

## 2018-11-30 DIAGNOSIS — R19.5 LOOSE STOOLS: ICD-10-CM

## 2018-11-30 DIAGNOSIS — C18.9 MALIGNANT NEOPLASM OF COLON, UNSPECIFIED PART OF COLON (H): Primary | ICD-10-CM

## 2018-11-30 DIAGNOSIS — R11.0 NAUSEA: ICD-10-CM

## 2018-11-30 DIAGNOSIS — D70.1 CHEMOTHERAPY-INDUCED NEUTROPENIA (H): ICD-10-CM

## 2018-11-30 DIAGNOSIS — T45.1X5A CHEMOTHERAPY-INDUCED NEUTROPENIA (H): ICD-10-CM

## 2018-11-30 DIAGNOSIS — C18.7 ADENOCARCINOMA OF SIGMOID COLON (H): Primary | ICD-10-CM

## 2018-11-30 LAB
BASOPHILS # BLD AUTO: 0 10E9/L (ref 0–0.2)
BASOPHILS NFR BLD AUTO: 1.4 %
BILIRUB SERPL-MCNC: 1.1 MG/DL (ref 0.2–1.3)
DIFFERENTIAL METHOD BLD: ABNORMAL
EOSINOPHIL # BLD AUTO: 0 10E9/L (ref 0–0.7)
EOSINOPHIL NFR BLD AUTO: 1.8 %
ERYTHROCYTE [DISTWIDTH] IN BLOOD BY AUTOMATED COUNT: 13.9 % (ref 10–15)
HCT VFR BLD AUTO: 37.1 % (ref 40–53)
HGB BLD-MCNC: 12.9 G/DL (ref 13.3–17.7)
IMM GRANULOCYTES # BLD: 0 10E9/L (ref 0–0.4)
IMM GRANULOCYTES NFR BLD: 0.5 %
LYMPHOCYTES # BLD AUTO: 1 10E9/L (ref 0.8–5.3)
LYMPHOCYTES NFR BLD AUTO: 47.7 %
MCH RBC QN AUTO: 30.9 PG (ref 26.5–33)
MCHC RBC AUTO-ENTMCNC: 34.8 G/DL (ref 31.5–36.5)
MCV RBC AUTO: 89 FL (ref 78–100)
MONOCYTES # BLD AUTO: 0.2 10E9/L (ref 0–1.3)
MONOCYTES NFR BLD AUTO: 10.1 %
NEUTROPHILS # BLD AUTO: 0.8 10E9/L (ref 1.6–8.3)
NEUTROPHILS NFR BLD AUTO: 38.5 %
PLATELET # BLD AUTO: 71 10E9/L (ref 150–450)
RBC # BLD AUTO: 4.18 10E12/L (ref 4.4–5.9)
WBC # BLD AUTO: 2.2 10E9/L (ref 4–11)

## 2018-11-30 PROCEDURE — 82247 BILIRUBIN TOTAL: CPT | Performed by: NURSE PRACTITIONER

## 2018-11-30 PROCEDURE — 99207 ZZC NO CHARGE LOS: CPT | Performed by: DIETITIAN, REGISTERED

## 2018-11-30 PROCEDURE — 99214 OFFICE O/P EST MOD 30 MIN: CPT | Performed by: NURSE PRACTITIONER

## 2018-11-30 PROCEDURE — 99207 ZZC NO CHARGE NURSE ONLY: CPT

## 2018-11-30 PROCEDURE — 85025 COMPLETE CBC W/AUTO DIFF WBC: CPT | Performed by: NURSE PRACTITIONER

## 2018-11-30 RX ORDER — HEPARIN SODIUM (PORCINE) LOCK FLUSH IV SOLN 100 UNIT/ML 100 UNIT/ML
5 SOLUTION INTRAVENOUS
Status: DISCONTINUED | OUTPATIENT
Start: 2018-11-30 | End: 2018-11-30 | Stop reason: HOSPADM

## 2018-11-30 RX ORDER — ONDANSETRON 8 MG/1
8 TABLET, FILM COATED ORAL EVERY 8 HOURS PRN
Qty: 30 TABLET | Refills: 1 | Status: SHIPPED | OUTPATIENT
Start: 2018-11-30 | End: 2019-01-03

## 2018-11-30 RX ORDER — LORAZEPAM 0.5 MG/1
0.5 TABLET ORAL EVERY 4 HOURS PRN
Qty: 30 TABLET | Refills: 2 | Status: SHIPPED | OUTPATIENT
Start: 2018-11-30 | End: 2019-01-03

## 2018-11-30 RX ADMIN — HEPARIN SODIUM (PORCINE) LOCK FLUSH IV SOLN 100 UNIT/ML 5 ML: 100 SOLUTION at 08:14

## 2018-11-30 ASSESSMENT — PAIN SCALES - GENERAL: PAINLEVEL: NO PAIN (0)

## 2018-11-30 NOTE — PROGRESS NOTES
Port de-accessed per protocol at the request of Jessica Marsh NP.  Pt tolerated procedure.      Jael Ansari, RN-BSN, PHN, OCN  [facility]

## 2018-11-30 NOTE — PROGRESS NOTES
Oncology Follow Up Visit: November 30, 2018     Oncologist: Dr Padilla Last  PCP: Moreno Harris    Diagnosis: Stage III Colon Cancer  Rian Malik is a 57 yo  male that was c/o lower abdominal cramping. CT showed sigmoid abscess but at resection was found to have large 5 cm firmly adherent sigmoid colon mass.Lymphovascular invasion present but no perineural invasion.  0/3 Reactive Lymph Nodes were positive. ([id number])  He has intact mismatch repair.  Treatment:   10/11/2017 sigmoidectomy with end colostomy  11/21/2017 -6/2018 modified FOLFOX-6 x 12 cycles  9/12/2018 laparoscopic liver oblation with intraoperative US, colostomy takedown, appendectomy, descending colectomy and anterior dissection. Surgical pathology showed periappendiceal tissue, ascending colon margin/mesentery and pericolonic fibroadipose adipose tissue involved by signet ring adenocarcinoma.  10/24/2018 began FOLFIRI    Interval History: Mr. Malik comes to clinic alone for symptom review prior to cycle 3 of FOLFIRI. Pt shares he is feeling well without pain. He has noted some nausea with treatments and has used zofran which he feels is very effective. 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 He is taking food and fluids well with no weight loss noted today. He denies SOB, chest pain, fevers, weakness, neuropathy or skin changes. Bowels are variable - using imdium as needed.   Rest of comprehensive and complete ROS is reviewed and is negative.   Past Medical History:   Diagnosis Date     Maldonado's esophagus      Cirrhosis (H)      Colon cancer (H) 10/11/2017    Poorly differentiated adenocarcinoma     Hepatitis C    - previously completed treatment for Hepatitis C.   Current Outpatient Prescriptions   Medication     loperamide (IMODIUM) 2 MG capsule     LORazepam (ATIVAN) 0.5 MG tablet     ondansetron (ZOFRAN) 8 MG tablet     prochlorperazine (COMPAZINE) 10 MG tablet     No current facility-administered  "medications for this visit.      Allergies   Allergen Reactions     Acetaminophen      Naproxen        Physical Exam:/77 (BP Location: Right arm)  Pulse 62  Temp 97.6  F (36.4  C) (Oral)  Resp 16  Ht 1.727 m (5' 8\")  Wt 73.7 kg (162 lb 8 oz)  SpO2 99%  BMI 24.71 kg/m2   ECOG PS- 0  Constitutional: Alert and in no distress. Moving well and is cooperative.   ENT: Eyes bright, No mouth sores.   Neck: Supple, No adenopathy.Thyroid symmetric  Cardiac: Heart rate and rhythm is regular and strong without murmur  Respiratory: Breathing easy. Lung sounds clear to auscultation  GI: Abdomen is soft, non-tender, BS normal. No masses or organomegaly..   MS: Muscle tone normal- moving well on own, extremities normal with no edema.   Skin: No suspicious lesions or rashes  Neuro: Sensory grossly WNL, gait normal.   Lymph: Normal ant/post cervical, axillary, supraclavicular nodes  Psych: Mentation appears normal and affect normal with good conversation.    Laboratory Results:   Results for orders placed or performed in visit on 11/30/18   CBC with platelets differential   Result Value Ref Range    WBC 2.2 (L) 4.0 - 11.0 10e9/L    RBC Count 4.18 (L) 4.4 - 5.9 10e12/L    Hemoglobin 12.9 (L) 13.3 - 17.7 g/dL    Hematocrit 37.1 (L) 40.0 - 53.0 %    MCV 89 78 - 100 fl    MCH 30.9 26.5 - 33.0 pg    MCHC 34.8 31.5 - 36.5 g/dL    RDW 13.9 10.0 - 15.0 %    Platelet Count 71 (L) 150 - 450 10e9/L    % Neutrophils 38.5 %    % Lymphocytes 47.7 %    % Monocytes 10.1 %    % Eosinophils 1.8 %    % Basophils 1.4 %    % Immature Granulocytes 0.5 %    Absolute Neutrophil 0.8 (L) 1.6 - 8.3 10e9/L    Absolute Lymphocytes 1.0 0.8 - 5.3 10e9/L    Absolute Monocytes 0.2 0.0 - 1.3 10e9/L    Absolute Eosinophils 0.0 0.0 - 0.7 10e9/L    Absolute Basophils 0.0 0.0 - 0.2 10e9/L    Abs Immature Granulocytes 0.0 0 - 0.4 10e9/L    Diff Method Automated Method    Bilirubin  total   Result Value Ref Range    Bilirubin Total 1.1 0.2 - 1.3 mg/dL "     Assessment and Plan:   Stage III Colon Cancer-Pt is due to start cycle 3 today but is noted to have neutropenia again today with ANC =0.8 with goal set at 1.2 and does meet platelet goal now set at 70,000 per Dr Last approval.   We will hold the FOLFIRI and reset plan for next week. Pt understands and will continue to use some precautions with low ANC. He will notify us of any signs of fever or infection.   Plan to repeat  MRI of the liver and PET scan in 3-4 months after starting FOLFIRI and if no evidence of recurrent disease consideration for HIPEC  Loose stools- Known side effect of irinotecan. Using imodium as needed -feels 2 tabs causing constipation- suggested he may try 1 tab and see if this improves concern.. Reminded of need for adequate fluids and may add fiber to diet.   Nausea- pt using antiemetics early in cycle with good results- will renew zofran.  This was a 25 min visit with > 50% in counseling and coordinating care including education and management of concerns.    Jessica Marsh,CNP

## 2018-11-30 NOTE — MR AVS SNAPSHOT
After Visit Summary   11/30/2018    Rian Malik    MRN: [id number]           Patient Information     Date Of Birth          1960        Visit Information        Provider Department      11/30/2018 9:30 AM Sabine Chau, ZACH [facility]        Today's Diagnoses     Malignant neoplasm of colon, unspecified part of colon (H)    -  1       Follow-ups after your visit        Your next 10 appointments already scheduled     Dec 06, 2018  7:30 AM CST   Return Visit with [facility] ONLY CANCER CENTER   [facility]    [address] [place] [address]   [phone]            Dec 06, 2018  8:00 AM CST   Level 3 with BAY 10 INFUSION   [facility]    [address] [place] [address]   [phone]            Dec 17, 2018  8:15 AM CST   Return Visit with NURSE ONLY CANCER CENTER   [facility]    [address] [place] [address]   [phone]            Dec 17, 2018  9:00 AM CST   Return Visit with Padilla Last MD   [facility]    [address] [address]   [phone]            Dec 21, 2018  1:00 PM CST   Level 3 with BAY 4 INFUSION   [facility]    [address] [address]   [phone]            Jan 07, 2019 11:30 AM CST   (Arrive by 11:15 AM)   Return Visit with Herminio Oswald MD   [facility] ([facility])    [address] [place] [address] [phone]              Who to contact     If you have questions or need follow up information about today's clinic visit or your schedule please contact [facility] directly at [phone].  Normal or non-critical  lab and imaging results will be communicated to you by MyChart, letter or phone within 4 business days after the clinic has received the results. If you do not hear from us within 7 days, please contact the clinic through Rollert or phone. If you have a critical or abnormal lab result, we will notify you by phone as soon as possible.  Submit refill requests through [facility] or call your pharmacy and they will forward the refill request to us. Please allow 3 business days for your refill to be completed.          Additional Information About Your Visit        Ketsuhart Information     [facility] gives you secure access to your electronic health record. If you see a primary care provider, you can also send messages to your care team and make appointments. If you have questions, please call your primary care clinic.  If you do not have a primary care provider, please call [phone] and they will assist you.      [facility] is an electronic gateway that provides easy, online access to your medical records. With [facility], you can request a clinic appointment, read your test results, renew a prescription or communicate with your care team.     To access your existing account, please contact your [facility] or call [phone] for assistance.        Care EveryWhere ID     This is your Care EveryWhere ID. This could be used by other organizations to access your [facility] medical records  [id number]         Blood Pressure from Last 3 Encounters:   11/30/18 130/77   11/15/18 148/79   11/15/18 124/75    Weight from Last 3 Encounters:   11/30/18 73.7 kg (162 lb 8 oz)   11/15/18 73.5 kg (162 lb)   11/06/18 73.2 kg (161 lb 6 oz)              Today, you had the following     No orders found for display         Where to get your medicines      These medications were [place] [facility] [place] [address]  [address], [place] [address]     Phone:   [phone]     ondansetron 8 MG tablet         Some of these will need a paper prescription and others can be bought over the counter.  Ask your nurse if you have questions.     Bring a paper prescription for each of these medications     LORazepam 0.5 MG tablet          Primary Care Provider Office Phone # Fax #    Moreno Harris [phone]       [address]  [facility] [address]        Equal Access to Services     [facility] HospitalALESSANDRO : Hadii aad ku hadasho Soomaali, waaxda luqadaha, qaybta kaalmada adeegyada, waxay idiin hayaan adeeg kharash la'sheryln . So [facility] [phone].    ATENCIÓN: Si habla español, tiene a martino disposición servicios gratuitos de asistencia lingüística. [facility] [phone].    We comply with applicable federal civil rights laws and [place] laws. We do not discriminate on the basis of race, color, national origin, age, disability, sex, sexual orientation, or gender identity.            Thank you!     Thank you for choosing [facility]  for your care. Our goal is always to provide you with excellent care. Hearing back from our patients is one way we can continue to improve our services. Please take a few minutes to complete the written survey that you may receive in the mail after your visit with us. Thank you!             Your Updated Medication List - Protect others around you: Learn how to safely use, store and throw away your medicines at www.disposemymeds.org.          This list is accurate as of 11/30/18  9:56 AM.  Always use your most recent med list.                   Brand Name Dispense Instructions for use Diagnosis    loperamide 2 MG capsule    IMODIUM    30 capsule    2 caps at 1st sign of diarrhea & 1 cap every 2hrs until 12hrs diarrhea free. During night, 2 caps at bedtime & 2 caps every 4hrs until AM    Adenocarcinoma of sigmoid colon (H)       LORazepam 0.5 MG tablet    ATIVAN    30 tablet    Take 1 tablet (0.5 mg) by mouth every 4  hours as needed (Anxiety, Nausea/Vomiting or Sleep)    Adenocarcinoma of sigmoid colon (H)       ondansetron 8 MG tablet    ZOFRAN    30 tablet    Take 1 tablet (8 mg) by mouth every 8 hours as needed (Nausea/Vomiting)    Adenocarcinoma of sigmoid colon (H)       prochlorperazine 10 MG tablet    COMPAZINE    30 tablet    Take 1 tablet (10 mg) by mouth every 6 hours as needed (Nausea/Vomiting)    Adenocarcinoma of sigmoid colon (H)

## 2018-11-30 NOTE — LETTER
11/30/2018         RE: Rian Malik  [address] [place] [address]        Dear Colleague,    Thank you for referring your patient, Rian Malik, to the [facility]. Please see a copy of my visit note below.    Oncology Follow Up Visit: November 30, 2018     Oncologist: Dr Padilla Last  PCP: Moreno Harris    Diagnosis: Stage III Colon Cancer  Rian Malik is a 57 yo  male that was c/o lower abdominal cramping. CT showed sigmoid abscess but at resection was found to have large 5 cm firmly adherent sigmoid colon mass.Lymphovascular invasion present but no perineural invasion.  0/3 Reactive Lymph Nodes were positive. ([id number])  He has intact mismatch repair.  Treatment:   10/11/2017 sigmoidectomy with end colostomy  11/21/2017 -6/2018 modified FOLFOX-6 x 12 cycles  9/12/2018 laparoscopic liver oblation with intraoperative US, colostomy takedown, appendectomy, descending colectomy and anterior dissection. Surgical pathology showed periappendiceal tissue, ascending colon margin/mesentery and pericolonic fibroadipose adipose tissue involved by signet ring adenocarcinoma.  10/24/2018 began FOLFIRI    Interval History: Mr. Malik comes to clinic alone for symptom review prior to cycle 3 of FOLFIRI. Pt shares he is feeling well without pain. He has noted some nausea with treatments and has used zofran which he feels is very effective. 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 He is taking food and fluids well with no weight loss noted today. He denies SOB, chest pain, fevers, weakness, neuropathy or skin changes. Bowels are variable - using imdium as needed.   Rest of comprehensive and complete ROS is reviewed and is negative.   Past Medical History:   Diagnosis Date     Maldonado's esophagus      Cirrhosis (H)      Colon cancer (H) 10/11/2017    Poorly differentiated adenocarcinoma     Hepatitis C    - previously completed treatment for Hepatitis  "C.   Current Outpatient Prescriptions   Medication     loperamide (IMODIUM) 2 MG capsule     LORazepam (ATIVAN) 0.5 MG tablet     ondansetron (ZOFRAN) 8 MG tablet     prochlorperazine (COMPAZINE) 10 MG tablet     No current facility-administered medications for this visit.      Allergies   Allergen Reactions     Acetaminophen      Naproxen        Physical Exam:/77 (BP Location: Right arm)  Pulse 62  Temp 97.6  F (36.4  C) (Oral)  Resp 16  Ht 1.727 m (5' 8\")  Wt 73.7 kg (162 lb 8 oz)  SpO2 99%  BMI 24.71 kg/m2   ECOG PS- 0  Constitutional: Alert and in no distress. Moving well and is cooperative.   ENT: Eyes bright, No mouth sores.   Neck: Supple, No adenopathy.Thyroid symmetric  Cardiac: Heart rate and rhythm is regular and strong without murmur  Respiratory: Breathing easy. Lung sounds clear to auscultation  GI: Abdomen is soft, non-tender, BS normal. No masses or organomegaly..   MS: Muscle tone normal- moving well on own, extremities normal with no edema.   Skin: No suspicious lesions or rashes  Neuro: Sensory grossly WNL, gait normal.   Lymph: Normal ant/post cervical, axillary, supraclavicular nodes  Psych: Mentation appears normal and affect normal with good conversation.    Laboratory Results:   Results for orders placed or performed in visit on 11/30/18   CBC with platelets differential   Result Value Ref Range    WBC 2.2 (L) 4.0 - 11.0 10e9/L    RBC Count 4.18 (L) 4.4 - 5.9 10e12/L    Hemoglobin 12.9 (L) 13.3 - 17.7 g/dL    Hematocrit 37.1 (L) 40.0 - 53.0 %    MCV 89 78 - 100 fl    MCH 30.9 26.5 - 33.0 pg    MCHC 34.8 31.5 - 36.5 g/dL    RDW 13.9 10.0 - 15.0 %    Platelet Count 71 (L) 150 - 450 10e9/L    % Neutrophils 38.5 %    % Lymphocytes 47.7 %    % Monocytes 10.1 %    % Eosinophils 1.8 %    % Basophils 1.4 %    % Immature Granulocytes 0.5 %    Absolute Neutrophil 0.8 (L) 1.6 - 8.3 10e9/L    Absolute Lymphocytes 1.0 0.8 - 5.3 10e9/L    Absolute Monocytes 0.2 0.0 - 1.3 10e9/L    Absolute " Eosinophils 0.0 0.0 - 0.7 10e9/L    Absolute Basophils 0.0 0.0 - 0.2 10e9/L    Abs Immature Granulocytes 0.0 0 - 0.4 10e9/L    Diff Method Automated Method    Bilirubin  total   Result Value Ref Range    Bilirubin Total 1.1 0.2 - 1.3 mg/dL     Assessment and Plan:   Stage III Colon Cancer-Pt is due to start cycle 3 today but is noted to have neutropenia again today with ANC =0.8 with goal set at 1.2 and does meet platelet goal now set at 70,000 per Dr Last approval.   We will hold the FOLFIRI and reset plan for next week. Pt understands and will continue to use some precautions with low ANC. He will notify us of any signs of fever or infection.   Plan to repeat  MRI of the liver and PET scan in 3-4 months after starting FOLFIRI and if no evidence of recurrent disease consideration for HIPEC  Loose stools- Known side effect of irinotecan. Using imodium as needed -feels 2 tabs causing constipation- suggested he may try 1 tab and see if this improves concern.. Reminded of need for adequate fluids and may add fiber to diet.   Nausea- pt using antiemetics early in cycle with good results- will renew zofran.  This was a 25 min visit with > 50% in counseling and coordinating care including education and management of concerns.    Jessica Marsh CNP      Port de-accessed per protocol at the request of Jessica Marsh NP.  Pt tolerated procedure.      Jael Ansari, RN-BSN, PHN, OCN  [facility]        Again, thank you for allowing me to participate in the care of your patient.        Sincerely,        Jessica Marsh NP, APRN CNP

## 2018-11-30 NOTE — PROGRESS NOTES
Power port needle left accessed for treatment. Tolerated port access and draw without complaint. Port site scrubbed with Chloraprep for 30 seconds. Accessed using sterile technique. Medora drawn-Red/Green/Purple tubes. Double signed by patient and RN. See documentation flowsheet. Nuha De Los Santos, RN, BSN, OCN

## 2018-11-30 NOTE — NURSING NOTE
"Oncology Rooming Note    November 30, 2018 8:29 AM   Rian Malik is a 58 year old male who presents for:    Chief Complaint   Patient presents with     Oncology Clinic Visit     Follow Up/Prior to Treatment     Initial Vitals: /77 (BP Location: Right arm)  Pulse 62  Temp 97.6  F (36.4  C) (Oral)  Resp 16  Ht 1.727 m (5' 8\")  Wt 73.7 kg (162 lb 8 oz)  SpO2 99%  BMI 24.71 kg/m2 Estimated body mass index is 24.71 kg/(m^2) as calculated from the following:    Height as of this encounter: 1.727 m (5' 8\").    Weight as of this encounter: 73.7 kg (162 lb 8 oz). Body surface area is 1.88 meters squared.  No Pain (0) Comment: Data Unavailable   No LMP for male patient.  Allergies reviewed: Yes  Medications reviewed: Yes    Medications: MEDICATION REFILLS NEEDED TODAY. Provider was notified.  Pharmacy name entered into [employer]:    [facility] - [place], [place] - [address]  [facility] - [place], [place] - [address]      5 minutes for nursing intake (face to face time)     Arabella Boswell LPN                        "

## 2018-11-30 NOTE — MR AVS SNAPSHOT
After Visit Summary   11/30/2018    Rian Malik    MRN: [id number]           Patient Information     Date Of Birth          1960        Visit Information        Provider Department      11/30/2018 8:15 AM Jessica Marsh APRN CNP [facility]        Today's Diagnoses     Adenocarcinoma of sigmoid colon (H)    -  1    Chemotherapy-induced neutropenia (H)        Loose stools        Nausea           Follow-ups after your visit        Your next 10 appointments already scheduled     Dec 01, 2018  3:30 PM CST   LAB with UR LAB HOME INFUSION   [facility], Laboratory Services (--)    [address] [facility] [address] [phone]           Please do not eat 10-12 hours before your appointment if you are coming in fasting for labs on lipids, cholesterol, or glucose (sugar). This does not apply to pregnant women. Water, hot tea and black coffee (with nothing added) are okay. Do not drink other fluids, diet soda or chew gum.            Dec 06, 2018  7:30 AM CST   Return Visit with NURSE ONLY CANCER CENTER   [facility] ([facility])    [address] [facility] [address]   [phone]            Dec 06, 2018  8:00 AM CST   Level 3 with BAY 10 INFUSION   [facility] ([facility])    [address] [facility] [address]   [phone]            Dec 17, 2018  8:15 AM CST   Return Visit with NURSE ONLY CANCER CENTER   [facility])    [address] [facility] [address]   [phone]            Dec 17, 2018  9:00 AM CST   Return Visit with Padilla Last MD   [facility] ([facility])    [address] [facility] [address]   [phone]            Dec 21, 2018  1:00 PM CST   Level 3 with BAY 4 INFUSION   [facility] ([facility])    [address]  [facility] [address] [phone]            Jan 07, 2019 11:30 AM CST   (Arrive by 11:15 AM)   Return Visit with Herminio Oswald MD   [facility] ([facility])    [address] [address]  [address] [facility] [address] [phone]              Who to contact     If you have questions or need follow up information about today's clinic visit or your schedule please contact [facility] directly at [phone].  Normal or non-critical lab and imaging results will be communicated to you by [employer]hart, letter or phone within 4 business days after the clinic has received the results. If you do not hear from us within 7 days, please contact the clinic through [employer]hart or phone. If you have a critical or abnormal lab result, we will notify you by phone as soon as possible.  Submit refill requests through [employer] or call your pharmacy and they will forward the refill request to us. Please allow 3 business days for your refill to be completed.          Additional Information About Your Visit        MyChart Information     [employer] gives you secure access to your electronic health record. If you see a primary care provider, you can also send messages to your care team and make appointments. If you have questions, please call your primary care clinic.  If you do not have a primary care provider, please call [phone] and they will assist you.      [employer] is an electronic gateway that provides easy, online access to your medical records. With [employer], you can request a clinic appointment, read your test results, renew a prescription or communicate with your care team.     To access your existing account, please contact your [facility] or call [phone] for assistance.        Care EveryWhere ID     This is your Care EveryWhere ID. This could be used by other organizations to access your [facility]  "records  [id number]        Your Vitals Were     Pulse Temperature Respirations Height Pulse Oximetry BMI (Body Mass Index)    62 97.6  F (36.4  C) (Oral) 16 1.727 m (5' 8\") 99% 24.71 kg/m2       Blood Pressure from Last 3 Encounters:   11/30/18 130/77   11/15/18 148/79   11/15/18 124/75    Weight from Last 3 Encounters:   11/30/18 73.7 kg (162 lb 8 oz)   11/15/18 73.5 kg (162 lb)   11/06/18 73.2 kg (161 lb 6 oz)              Today, you had the following     No orders found for display         Where to get your medicines      These medications were sent to [facility] [place] - [address]  [address], [facility] [address]     Phone:  [phone]     ondansetron 8 MG tablet         Some of these will need a paper prescription and others can be bought over the counter.  Ask your nurse if you have questions.     Bring a paper prescription for each of these medications     LORazepam 0.5 MG tablet          Primary Care Provider Office Phone # Fax #    Moreno Harris [phone]       [address]  [facility] [address]        Equal Access to Services     KHANH FLANAGAN AH: Hadii cameron lester hadasho Soomaali, waaxda luqadaha, qaybta kaalmada adeegyada, alisa currie hayharis leon . So [facility] [phone].    ATENCIÓN: Si habla español, tiene a martino disposición servicios gratuitos de asistencia lingüística. Llame al [phone].    We comply with applicable federal civil rights laws and [place] laws. We do not discriminate on the basis of race, color, national origin, age, disability, sex, sexual orientation, or gender identity.            Thank you!     Thank you for choosing [facility]  for your care. Our goal is always to provide you with excellent care. Hearing back from our patients is one way we can continue to improve our services. Please take a few minutes to complete the written survey that you may receive in the mail " after your visit with us. Thank you!             Your Updated Medication List - Protect others around you: Learn how to safely use, store and throw away your medicines at www.disposemymeds.org.          This list is accurate as of 11/30/18  2:10 PM.  Always use your most recent med list.                   Brand Name Dispense Instructions for use Diagnosis    loperamide 2 MG capsule    IMODIUM    30 capsule    2 caps at 1st sign of diarrhea & 1 cap every 2hrs until 12hrs diarrhea free. During night, 2 caps at bedtime & 2 caps every 4hrs until AM    Adenocarcinoma of sigmoid colon (H)       LORazepam 0.5 MG tablet    ATIVAN    30 tablet    Take 1 tablet (0.5 mg) by mouth every 4 hours as needed (Anxiety, Nausea/Vomiting or Sleep)    Adenocarcinoma of sigmoid colon (H)       ondansetron 8 MG tablet    ZOFRAN    30 tablet    Take 1 tablet (8 mg) by mouth every 8 hours as needed (Nausea/Vomiting)    Adenocarcinoma of sigmoid colon (H)       prochlorperazine 10 MG tablet    COMPAZINE    30 tablet    Take 1 tablet (10 mg) by mouth every 6 hours as needed (Nausea/Vomiting)    Adenocarcinoma of sigmoid colon (H)

## 2018-11-30 NOTE — PROGRESS NOTES
Nutrition Services:     Met with patient ~ 1 year ago for nutrition assessment and education with cancer treatment.    Unable to follow-up with patient today as he will not be receiving treatment this week 2/2 to low platelets. RD will attempt to see patient at next infusion.     Sabine Serrano RD, LD  [facility]

## 2018-11-30 NOTE — MR AVS SNAPSHOT
After Visit Summary   11/30/2018    Rian Malik    MRN: [id number]           Patient Information     Date Of Birth          1960        Visit Information        Provider Department      11/30/2018 7:45 AM NURSE ONLY CANCER CENTER [facility]        Today's Diagnoses     Adenocarcinoma of sigmoid colon (H)    -  1       Follow-ups after your visit        Your next 10 appointments already scheduled     Nov 30, 2018  9:00 AM CST   Level 3 with BAY 2 INFUSION   [facility] ([facility])    [address] [place] [id number] [phone]            Nov 30, 2018  9:30 AM CST   New Visit with Sabine Serrano RD   [facility] ([facility])    [address] [place] [address] [phone]            Dec 17, 2018  8:15 AM CST   Return Visit with NURSE ONLY CANCER CENTER   [facility] ([facility])    [address] [facility] [address] [phone]            Dec 17, 2018  9:00 AM CST   Return Visit with Padilla Last MD   [facility] ([facility])    [address] [facility] [address] [phone]            Dec 17, 2018  9:30 AM CST   Level 3 with BAY 3 INFUSION   [facility] ([facility])    [address] [place] [address] [phone]            Jan 07, 2019 11:30 AM CST   (Arrive by 11:15 AM)   Return Visit with Herminio Oswald MD   [facility] ([facility])    [address] [place] [address] [phone]              Who to contact     If you have questions or need follow up information about today's clinic visit or your schedule please contact [facility] directly at [phone].  Normal or non-critical lab and imaging results  will be communicated to you by [employer], letter or phone within 4 business days after the clinic has received the results. If you do not hear from us within 7 days, please contact the clinic through [employer] or phone. If you have a critical or abnormal lab result, we will notify you by phone as soon as possible.  Submit refill requests through [employer] or call your pharmacy and they will forward the refill request to us. Please allow 3 business days for your refill to be completed.          Additional Information About Your Visit        [employer] Information     [employer] gives you secure access to your electronic health record. If you see a primary care provider, you can also send messages to your care team and make appointments. If you have questions, please call your primary care clinic.  If you do not have a primary care provider, please call [phone] and they will assist you.      [employer] is an electronic gateway that provides easy, online access to your medical records. With [employer], you can request a clinic appointment, read your test results, renew a prescription or communicate with your care team.     To access your existing account, please contact your [facility] or call [phone] for assistance.        Care EveryWhere ID     This is your Care EveryWhere ID. This could be used by other organizations to access your [place] medical records  [id number]         Blood Pressure from Last 3 Encounters:   11/30/18 130/77   11/15/18 148/79   11/15/18 124/75    Weight from Last 3 Encounters:   11/30/18 73.7 kg (162 lb 8 oz)   11/15/18 73.5 kg (162 lb)   11/06/18 73.2 kg (161 lb 6 oz)              We Performed the Following     Bilirubin  total     CBC with platelets differential        Primary Care Provider Office Phone # Fax #    Moreno Harris [phone]       [address]  [facility] [address]        Equal Access to Services     KHANH LANTIGUA: Rajesh  cameron Meraz, washahnazda luqadaha, qaybta kaalmada enochsatish, waxtamie rosey lopezduniacameron leon haritha. So [facility] [phone].    ATENCIÓN: Si habla kaitlinañol, tiene a martino disposición servicios gratuitos de asistencia lingüística. Alfredo al [phone].    We comply with applicable federal civil rights laws and [place] laws. We do not discriminate on the basis of race, color, national origin, age, disability, sex, sexual orientation, or gender identity.            Thank you!     Thank you for choosing [facility]  for your care. Our goal is always to provide you with excellent care. Hearing back from our patients is one way we can continue to improve our services. Please take a few minutes to complete the written survey that you may receive in the mail after your visit with us. Thank you!             Your Updated Medication List - Protect others around you: Learn how to safely use, store and throw away your medicines at www.disposemymeds.org.          This list is accurate as of 11/30/18  8:39 AM.  Always use your most recent med list.                   Brand Name Dispense Instructions for use Diagnosis    loperamide 2 MG capsule    IMODIUM    30 capsule    2 caps at 1st sign of diarrhea & 1 cap every 2hrs until 12hrs diarrhea free. During night, 2 caps at bedtime & 2 caps every 4hrs until AM    Adenocarcinoma of sigmoid colon (H)       LORazepam 0.5 MG tablet    ATIVAN    30 tablet    Take 1 tablet (0.5 mg) by mouth every 4 hours as needed (Anxiety, Nausea/Vomiting or Sleep)    Adenocarcinoma of sigmoid colon (H)       ondansetron 8 MG tablet    ZOFRAN    30 tablet    Take 1 tablet (8 mg) by mouth every 8 hours as needed (Nausea/Vomiting)    Adenocarcinoma of sigmoid colon (H)       prochlorperazine 10 MG tablet    COMPAZINE    30 tablet    Take 1 tablet (10 mg) by mouth every 6 hours as needed (Nausea/Vomiting)    Adenocarcinoma of sigmoid colon (H)

## 2018-12-01 ENCOUNTER — APPOINTMENT (OUTPATIENT)
Dept: LAB | Facility: CLINIC | Age: 58
End: 2018-12-01
Attending: INTERNAL MEDICINE
Payer: COMMERCIAL

## 2018-12-03 RX ORDER — EPINEPHRINE 0.3 MG/.3ML
0.3 INJECTION SUBCUTANEOUS EVERY 5 MIN PRN
Status: CANCELLED | OUTPATIENT
Start: 2018-12-06

## 2018-12-03 RX ORDER — MEPERIDINE HYDROCHLORIDE 25 MG/ML
25 INJECTION INTRAMUSCULAR; INTRAVENOUS; SUBCUTANEOUS EVERY 30 MIN PRN
Status: CANCELLED | OUTPATIENT
Start: 2018-12-06

## 2018-12-03 RX ORDER — METHYLPREDNISOLONE SODIUM SUCCINATE 125 MG/2ML
125 INJECTION, POWDER, LYOPHILIZED, FOR SOLUTION INTRAMUSCULAR; INTRAVENOUS
Status: CANCELLED
Start: 2018-12-06

## 2018-12-03 RX ORDER — ALBUTEROL SULFATE 0.83 MG/ML
2.5 SOLUTION RESPIRATORY (INHALATION)
Status: CANCELLED | OUTPATIENT
Start: 2018-12-06

## 2018-12-03 RX ORDER — SODIUM CHLORIDE 9 MG/ML
1000 INJECTION, SOLUTION INTRAVENOUS CONTINUOUS PRN
Status: CANCELLED
Start: 2018-12-06

## 2018-12-03 RX ORDER — DIPHENHYDRAMINE HYDROCHLORIDE 50 MG/ML
50 INJECTION INTRAMUSCULAR; INTRAVENOUS
Status: CANCELLED
Start: 2018-12-06

## 2018-12-03 RX ORDER — EPINEPHRINE 1 MG/ML
0.3 INJECTION, SOLUTION INTRAMUSCULAR; SUBCUTANEOUS EVERY 5 MIN PRN
Status: CANCELLED | OUTPATIENT
Start: 2018-12-06

## 2018-12-03 RX ORDER — LORAZEPAM 2 MG/ML
0.5 INJECTION INTRAMUSCULAR EVERY 4 HOURS PRN
Status: CANCELLED
Start: 2018-12-06

## 2018-12-03 RX ORDER — ALBUTEROL SULFATE 90 UG/1
1-2 AEROSOL, METERED RESPIRATORY (INHALATION)
Status: CANCELLED
Start: 2018-12-06

## 2018-12-06 ENCOUNTER — HOME INFUSION (PRE-WILLOW HOME INFUSION) (OUTPATIENT)
Dept: PHARMACY | Facility: CLINIC | Age: 58
End: 2018-12-06

## 2018-12-06 ENCOUNTER — INFUSION THERAPY VISIT (OUTPATIENT)
Dept: INFUSION THERAPY | Facility: CLINIC | Age: 58
End: 2018-12-06
Payer: COMMERCIAL

## 2018-12-06 ENCOUNTER — ONCOLOGY VISIT (OUTPATIENT)
Dept: ONCOLOGY | Facility: CLINIC | Age: 58
End: 2018-12-06
Payer: COMMERCIAL

## 2018-12-06 ENCOUNTER — DOCUMENTATION ONLY (OUTPATIENT)
Dept: SPIRITUAL SERVICES | Facility: CLINIC | Age: 58
End: 2018-12-06

## 2018-12-06 VITALS
WEIGHT: 166.7 LBS | HEART RATE: 53 BPM | BODY MASS INDEX: 25.35 KG/M2 | OXYGEN SATURATION: 100 % | RESPIRATION RATE: 16 BRPM | SYSTOLIC BLOOD PRESSURE: 119 MMHG | DIASTOLIC BLOOD PRESSURE: 73 MMHG | TEMPERATURE: 98 F

## 2018-12-06 DIAGNOSIS — C18.7 ADENOCARCINOMA OF SIGMOID COLON (H): Primary | ICD-10-CM

## 2018-12-06 LAB
BASOPHILS # BLD AUTO: 0 10E9/L (ref 0–0.2)
BASOPHILS NFR BLD AUTO: 1.3 %
BILIRUB SERPL-MCNC: 0.7 MG/DL (ref 0.2–1.3)
DIFFERENTIAL METHOD BLD: ABNORMAL
EOSINOPHIL # BLD AUTO: 0.1 10E9/L (ref 0–0.7)
EOSINOPHIL NFR BLD AUTO: 2.2 %
ERYTHROCYTE [DISTWIDTH] IN BLOOD BY AUTOMATED COUNT: 14.5 % (ref 10–15)
HCT VFR BLD AUTO: 38.4 % (ref 40–53)
HGB BLD-MCNC: 13.4 G/DL (ref 13.3–17.7)
IMM GRANULOCYTES # BLD: 0 10E9/L (ref 0–0.4)
IMM GRANULOCYTES NFR BLD: 0.4 %
LYMPHOCYTES # BLD AUTO: 1 10E9/L (ref 0.8–5.3)
LYMPHOCYTES NFR BLD AUTO: 42.5 %
MCH RBC QN AUTO: 31.1 PG (ref 26.5–33)
MCHC RBC AUTO-ENTMCNC: 34.9 G/DL (ref 31.5–36.5)
MCV RBC AUTO: 89 FL (ref 78–100)
MONOCYTES # BLD AUTO: 0.3 10E9/L (ref 0–1.3)
MONOCYTES NFR BLD AUTO: 11.4 %
NEUTROPHILS # BLD AUTO: 1 10E9/L (ref 1.6–8.3)
NEUTROPHILS NFR BLD AUTO: 42.2 %
PLATELET # BLD AUTO: 86 10E9/L (ref 150–450)
RBC # BLD AUTO: 4.31 10E12/L (ref 4.4–5.9)
WBC # BLD AUTO: 2.3 10E9/L (ref 4–11)

## 2018-12-06 PROCEDURE — 85025 COMPLETE CBC W/AUTO DIFF WBC: CPT | Performed by: NURSE PRACTITIONER

## 2018-12-06 PROCEDURE — 99207 ZZC NO CHARGE LOS: CPT

## 2018-12-06 PROCEDURE — 96415 CHEMO IV INFUSION ADDL HR: CPT | Performed by: NURSE PRACTITIONER

## 2018-12-06 PROCEDURE — 96416 CHEMO PROLONG INFUSE W/PUMP: CPT | Performed by: NURSE PRACTITIONER

## 2018-12-06 PROCEDURE — 99207 ZZC NO CHARGE NURSE ONLY: CPT

## 2018-12-06 PROCEDURE — 82247 BILIRUBIN TOTAL: CPT | Performed by: NURSE PRACTITIONER

## 2018-12-06 PROCEDURE — 96413 CHEMO IV INFUSION 1 HR: CPT | Performed by: NURSE PRACTITIONER

## 2018-12-06 PROCEDURE — 96375 TX/PRO/DX INJ NEW DRUG ADDON: CPT | Performed by: NURSE PRACTITIONER

## 2018-12-06 RX ORDER — LOPERAMIDE HCL 2 MG
CAPSULE ORAL
Qty: 30 CAPSULE | Refills: 1 | Status: SHIPPED | OUTPATIENT
Start: 2018-12-06 | End: 2019-01-03

## 2018-12-06 RX ORDER — HEPARIN SODIUM (PORCINE) LOCK FLUSH IV SOLN 100 UNIT/ML 100 UNIT/ML
5 SOLUTION INTRAVENOUS
Status: DISCONTINUED | OUTPATIENT
Start: 2018-12-06 | End: 2018-12-06 | Stop reason: HOSPADM

## 2018-12-06 RX ADMIN — HEPARIN SODIUM (PORCINE) LOCK FLUSH IV SOLN 100 UNIT/ML 5 ML: 100 SOLUTION at 07:37

## 2018-12-06 RX ADMIN — Medication 250 ML: at 08:35

## 2018-12-06 NOTE — MR AVS SNAPSHOT
After Visit Summary   12/6/2018    Rian Malik    MRN: [id number]           Patient Information     Date Of Birth          1960        Visit Information        Provider Department      12/6/2018 8:00 AM [address] [facility]        Today's Diagnoses     Adenocarcinoma of sigmoid colon (H)    -  1       Follow-ups after your visit        Your next 10 appointments already scheduled     Dec 17, 2018  8:15 AM CST   Return Visit with NURSE ONLY CANCER CENTER   [facility] ([facility])    [address] [place] [address] [phone]            Dec 17, 2018  9:00 AM CST   Return Visit with Padilla Last MD   [facility] ([facility])    [address] [place] [address] [phone]            Dec 21, 2018  1:00 PM CST   Level 3 with [address] [address] [facility] ([facility])    [address] [place] [address] [phone]            Jan 07, 2019 11:30 AM CST   (Arrive by 11:15 AM)   Return Visit with Herminio Oswald MD   [facility] ([facility])    [address] [place] [address] [phone]              Who to contact     If you have questions or need follow up information about today's clinic visit or your schedule please contact [facility] directly at [phone].  Normal or non-critical lab and imaging results will be communicated to you by MyChart, letter or phone within 4 business days after the clinic has received the results. If you do not hear from us within 7 days, please contact the clinic through MyChart or phone. If you have a critical or abnormal lab result, we will notify you by phone as soon as possible.  Submit refill requests through [employer] or call your pharmacy and they will forward the refill request to  us. Please allow 3 business days for your refill to be completed.          Additional Information About Your Visit        [employer] Information     [employer] gives you secure access to your electronic health record. If you see a primary care provider, you can also send messages to your care team and make appointments. If you have questions, please call your primary care clinic.  If you do not have a primary care provider, please call [phone] and they will assist you.      [employer] is an electronic gateway that provides easy, online access to your medical records. With [employer], you can request a clinic appointment, read your test results, renew a prescription or communicate with your care team.     To access your existing account, please contact your [facility] or call [phone] for assistance.        Care EveryWhere ID     This is your Care EveryWhere ID. This could be used by other organizations to access your [place] medical records  [id number]        Your Vitals Were     Pulse Temperature Respirations Pulse Oximetry BMI (Body Mass Index)       53 98  F (36.7  C) (Oral) 16 100% 25.35 kg/m2        Blood Pressure from Last 3 Encounters:   12/06/18 119/73   11/30/18 130/77   11/15/18 148/79    Weight from Last 3 Encounters:   12/06/18 75.6 kg (166 lb 11.2 oz)   11/30/18 73.7 kg (162 lb 8 oz)   11/15/18 73.5 kg (162 lb)              We Performed the Following     Nursing Communication 1        Primary Care Provider Office Phone # Fax #    Moreno Harris [phone]       [address]  [facility] [address]        Equal Access to Services     [facility]: Hadii aad ku hadasho Soomaali, waaxda luqadaha, qaybta kaalmada adeegyada, alisa leon . So [facility] [phone].    ATENCIÓN: Si habla español, tiene a martino disposición servicios gratuitos de asistencia lingüística. Llame al [phone].    We comply with applicable federal  civil rights laws and [place] laws. We do not discriminate on the basis of race, color, national origin, age, disability, sex, sexual orientation, or gender identity.            Thank you!     Thank you for choosing [facility]  for your care. Our goal is always to provide you with excellent care. Hearing back from our patients is one way we can continue to improve our services. Please take a few minutes to complete the written survey that you may receive in the mail after your visit with us. Thank you!             Your Updated Medication List - Protect others around you: Learn how to safely use, store and throw away your medicines at www.disposemymeds.org.          This list is accurate as of 12/6/18 11:12 AM.  Always use your most recent med list.                   Brand Name Dispense Instructions for use Diagnosis    loperamide 2 MG capsule    IMODIUM    30 capsule    2 caps at 1st sign of diarrhea & 1 cap every 2hrs until 12hrs diarrhea free. During night, 2 caps at bedtime & 2 caps every 4hrs until AM    Adenocarcinoma of sigmoid colon (H)       LORazepam 0.5 MG tablet    ATIVAN    30 tablet    Take 1 tablet (0.5 mg) by mouth every 4 hours as needed (Anxiety, Nausea/Vomiting or Sleep)    Adenocarcinoma of sigmoid colon (H)       ondansetron 8 MG tablet    ZOFRAN    30 tablet    Take 1 tablet (8 mg) by mouth every 8 hours as needed (Nausea/Vomiting)    Adenocarcinoma of sigmoid colon (H)       prochlorperazine 10 MG tablet    COMPAZINE    30 tablet    Take 1 tablet (10 mg) by mouth every 6 hours as needed (Nausea/Vomiting)    Adenocarcinoma of sigmoid colon (H)

## 2018-12-06 NOTE — PROGRESS NOTES
"Infusion Nursing Note:  Rian Malik presents today for C3 D1 Folfiri/5FU pump.    Patient seen by provider today: No   present during visit today: Not Applicable.    Note: ANC was 1.0 today. Spoke with Jessica regarding treatment today. OK to proceed knowing that he will receive onpro post pump disconnect on Saturday.    FHI (Celia) notified of pump disconnect time and that this is a new onpro for this patient and more education is needed. FHI verbalized understanding.     Intravenous Access:  Implanted Port.    Treatment Conditions:  Lab Results   Component Value Date    HGB 13.4 12/06/2018     Lab Results   Component Value Date    WBC 2.3 12/06/2018      Lab Results   Component Value Date    ANEU 1.0 12/06/2018     Lab Results   Component Value Date    PLT 86 12/06/2018      Results reviewed, labs did NOT meet treatment parameters: ANC 1.0- see above note.      Post Infusion Assessment:  Patient tolerated infusion without incident.  Blood return noted pre and post infusion.  Site patent and intact, free from redness, edema or discomfort.  No evidence of extravasations.    Prior to discharge: Port is secured in place with tegaderm and flushed with 10cc NS with positive blood return noted.  Continuous home infusion Dosi-Fuser pump connected.    All connectors secured in place and clamps taped open.    Pump started, \"running\" noted on display (CADD): Not Applicable.  Patient instructed to call our clinic or [facility] with any questions or concerns at home.  Patient verbalized understanding.    Patient set up for pump disconnect at home with [facility] on Saturday 12/8/18 @ 0845.            Discharge Plan:   Discharge instructions reviewed with: Patient.  Patient and/or family verbalized understanding of discharge instructions and all questions answered.  Patient discharged in stable condition accompanied by: self.  Departure Mode: Ambulatory.    Blanka Tate, " RN

## 2018-12-06 NOTE — TELEPHONE ENCOUNTER
SPIRITUAL HEALTH SERVICES Progress Note  [facility]    Visited Rian Malik in the infusion clinic and introduced  services. Offered support and affirmation.       Illness Narrative - Patient shared with me his cancer journey and his need to return for a second round of chemotherapy with a different medication.       Distress - He states that at times he feels nauseous after the treatments.      Coping - Patient is still working full time in a print shop.  Patient mentioned that he has 2 adult children and gets his strength from his family.       Meaning-Making - Family gives his life meaning.       Plan - I let the patient know of my availability and that he can request a  visit as needed    Ariela Mueller  Associate   Pager

## 2018-12-06 NOTE — PROGRESS NOTES
Power port needle left accessed for treatment. Tolerated port access and draw without complaint. Port site scrubbed with Chloraprep for 30 seconds. Accessed using sterile technique. Colton drawn-Red/Green/Purple tubes. Double signed by patient and RN. See documentation flowsheet. Nuha De Los Santos, RN, BSN, OCN

## 2018-12-06 NOTE — MR AVS SNAPSHOT
After Visit Summary   12/6/2018    Rian Malik    MRN: [id number]           Patient Information     Date Of Birth          1960        Visit Information        Provider Department      12/6/2018 7:30 AM NURSE ONLY CANCER CENTER [facility]        Today's Diagnoses     Adenocarcinoma of sigmoid colon (H)    -  1       Follow-ups after your visit        Your next 10 appointments already scheduled     Dec 17, 2018  8:15 AM CST   Return Visit with NURSE ONLY CANCER CENTER   [facility] ([facility]    [address] [place] [address] [phone]            Dec 17, 2018  9:00 AM CST   Return Visit with Padilla Last MD   [facility] ([facility])    [address] [place] [address] [phone]            Dec 21, 2018  1:00 PM CST   Level 3 with BAY 4 INFUSION   [facility] ([facility])    [address] [place] [address] [phone]            Jan 07, 2019 11:30 AM CST   (Arrive by 11:15 AM)   Return Visit with Herminio Oswald MD   [facility] ([facility])    [address] [place] [address] [phone]              Who to contact     If you have questions or need follow up information about today's clinic visit or your schedule please contact [facility] directly at [phone].  Normal or non-critical lab and imaging results will be communicated to you by MyChart, letter or phone within 4 business days after the clinic has received the results. If you do not hear from us within 7 days, please contact the clinic through MyChart or phone. If you have a critical or abnormal lab result, we will notify you by phone as soon as possible.  Submit refill requests through [employer] or call your pharmacy and they will forward the refill  request to us. Please allow 3 business days for your refill to be completed.          Additional Information About Your Visit        [employer] Information     [employer] gives you secure access to your electronic health record. If you see a primary care provider, you can also send messages to your care team and make appointments. If you have questions, please call your primary care clinic.  If you do not have a primary care provider, please call [phone] and they will assist you.      [employer] is an electronic gateway that provides easy, online access to your medical records. With [employer], you can request a clinic appointment, read your test results, renew a prescription or communicate with your care team.     To access your existing account, please contact your [facility] or call [phone] for assistance.        Care EveryWhere ID     This is your Care EveryWhere ID. This could be used by other organizations to access your [place] medical records  [id number]         Blood Pressure from Last 3 Encounters:   12/06/18 119/73   11/30/18 130/77   11/15/18 148/79    Weight from Last 3 Encounters:   12/06/18 75.6 kg (166 lb 11.2 oz)   11/30/18 73.7 kg (162 lb 8 oz)   11/15/18 73.5 kg (162 lb)              We Performed the Following     Bilirubin  total     CBC with platelets differential        Primary Care Provider Office Phone # Fax #    Moreno Harris [phone]       [address] [facility] [address]        Equal Access to Services     KHANH FLANAGAN : Hadii cameron ku hadasho Soomaali, waaxda luqadaha, qaybta kaalmada adeegyada, waxay rosey leon . So [facility] [phone].    ATENCIÓN: Si habla español, tiene a martino disposición servicios gratuitos de asistencia lingüística. Llame al [phone].    We comply with applicable federal civil rights laws and [place] laws. We do not discriminate on the basis of race, color, national origin,  age, disability, sex, sexual orientation, or gender identity.            Thank you!     Thank you for choosing [facility]  for your care. Our goal is always to provide you with excellent care. Hearing back from our patients is one way we can continue to improve our services. Please take a few minutes to complete the written survey that you may receive in the mail after your visit with us. Thank you!             Your Updated Medication List - Protect others around you: Learn how to safely use, store and throw away your medicines at www.disposemymeds.org.          This list is accurate as of 12/6/18  9:40 AM.  Always use your most recent med list.                   Brand Name Dispense Instructions for use Diagnosis    loperamide 2 MG capsule    IMODIUM    30 capsule    2 caps at 1st sign of diarrhea & 1 cap every 2hrs until 12hrs diarrhea free. During night, 2 caps at bedtime & 2 caps every 4hrs until AM    Adenocarcinoma of sigmoid colon (H)       LORazepam 0.5 MG tablet    ATIVAN    30 tablet    Take 1 tablet (0.5 mg) by mouth every 4 hours as needed (Anxiety, Nausea/Vomiting or Sleep)    Adenocarcinoma of sigmoid colon (H)       ondansetron 8 MG tablet    ZOFRAN    30 tablet    Take 1 tablet (8 mg) by mouth every 8 hours as needed (Nausea/Vomiting)    Adenocarcinoma of sigmoid colon (H)       prochlorperazine 10 MG tablet    COMPAZINE    30 tablet    Take 1 tablet (10 mg) by mouth every 6 hours as needed (Nausea/Vomiting)    Adenocarcinoma of sigmoid colon (H)

## 2018-12-07 NOTE — PROGRESS NOTES
This is a recent snapshot of the patient's [facility] medical record.  For current drug dose and complete information and questions, call [phone] or In Basket pool, [facility]v home infusion ([id number])  CSN Number:  [id number]

## 2018-12-08 ENCOUNTER — HOME INFUSION (PRE-WILLOW HOME INFUSION) (OUTPATIENT)
Dept: PHARMACY | Facility: CLINIC | Age: 58
End: 2018-12-08

## 2018-12-10 NOTE — PROGRESS NOTES
This is a recent snapshot of the patient's [facility] medical record.  For current drug dose and complete information and questions, call [phone] or In Basket pool, [facility]v home infusion ([id number])  CSN Number:  [id number]

## 2018-12-17 ENCOUNTER — ONCOLOGY VISIT (OUTPATIENT)
Dept: ONCOLOGY | Facility: CLINIC | Age: 58
End: 2018-12-17
Payer: COMMERCIAL

## 2018-12-17 ENCOUNTER — HOME INFUSION (PRE-WILLOW HOME INFUSION) (OUTPATIENT)
Dept: PHARMACY | Facility: CLINIC | Age: 58
End: 2018-12-17

## 2018-12-17 VITALS
BODY MASS INDEX: 24.71 KG/M2 | HEART RATE: 57 BPM | WEIGHT: 163 LBS | OXYGEN SATURATION: 99 % | TEMPERATURE: 98.4 F | RESPIRATION RATE: 18 BRPM | HEIGHT: 68 IN | DIASTOLIC BLOOD PRESSURE: 80 MMHG | SYSTOLIC BLOOD PRESSURE: 124 MMHG

## 2018-12-17 DIAGNOSIS — C18.7 ADENOCARCINOMA OF SIGMOID COLON (H): Primary | ICD-10-CM

## 2018-12-17 LAB
BASOPHILS # BLD AUTO: 0.1 10E9/L (ref 0–0.2)
BASOPHILS NFR BLD AUTO: 1 %
BILIRUB SERPL-MCNC: 0.5 MG/DL (ref 0.2–1.3)
DIFFERENTIAL METHOD BLD: ABNORMAL
ERYTHROCYTE [DISTWIDTH] IN BLOOD BY AUTOMATED COUNT: 14.8 % (ref 10–15)
HCT VFR BLD AUTO: 36.9 % (ref 40–53)
HGB BLD-MCNC: 12.8 G/DL (ref 13.3–17.7)
LYMPHOCYTES # BLD AUTO: 1.2 10E9/L (ref 0.8–5.3)
LYMPHOCYTES NFR BLD AUTO: 12 %
MCH RBC QN AUTO: 30.9 PG (ref 26.5–33)
MCHC RBC AUTO-ENTMCNC: 34.7 G/DL (ref 31.5–36.5)
MCV RBC AUTO: 89 FL (ref 78–100)
MONOCYTES # BLD AUTO: 0.3 10E9/L (ref 0–1.3)
MONOCYTES NFR BLD AUTO: 3 %
NEUTROPHILS # BLD AUTO: 8.7 10E9/L (ref 1.6–8.3)
NEUTROPHILS NFR BLD AUTO: 83 %
PLATELET # BLD AUTO: 66 10E9/L (ref 150–450)
PLATELET # BLD EST: ABNORMAL 10*3/UL
PROMYELOCYTES # BLD MANUAL: 0.1 10E9/L
PROMYELOCYTES NFR BLD MANUAL: 1 %
RBC # BLD AUTO: 4.14 10E12/L (ref 4.4–5.9)
RBC MORPH BLD: NORMAL
WBC # BLD AUTO: 10.4 10E9/L (ref 4–11)

## 2018-12-17 PROCEDURE — 82247 BILIRUBIN TOTAL: CPT | Performed by: INTERNAL MEDICINE

## 2018-12-17 PROCEDURE — 99207 ZZC NO CHARGE NURSE ONLY: CPT

## 2018-12-17 PROCEDURE — 99214 OFFICE O/P EST MOD 30 MIN: CPT | Performed by: INTERNAL MEDICINE

## 2018-12-17 PROCEDURE — 85025 COMPLETE CBC W/AUTO DIFF WBC: CPT | Performed by: INTERNAL MEDICINE

## 2018-12-17 RX ORDER — METHYLPREDNISOLONE SODIUM SUCCINATE 125 MG/2ML
125 INJECTION, POWDER, LYOPHILIZED, FOR SOLUTION INTRAMUSCULAR; INTRAVENOUS
Status: CANCELLED
Start: 2018-12-21

## 2018-12-17 RX ORDER — HEPARIN SODIUM (PORCINE) LOCK FLUSH IV SOLN 100 UNIT/ML 100 UNIT/ML
5 SOLUTION INTRAVENOUS
Status: DISCONTINUED | OUTPATIENT
Start: 2018-12-17 | End: 2018-12-17 | Stop reason: HOSPADM

## 2018-12-17 RX ORDER — EPINEPHRINE 1 MG/ML
0.3 INJECTION, SOLUTION INTRAMUSCULAR; SUBCUTANEOUS EVERY 5 MIN PRN
Status: CANCELLED | OUTPATIENT
Start: 2018-12-21

## 2018-12-17 RX ORDER — LORAZEPAM 2 MG/ML
0.5 INJECTION INTRAMUSCULAR EVERY 4 HOURS PRN
Status: CANCELLED
Start: 2018-12-21

## 2018-12-17 RX ORDER — ALBUTEROL SULFATE 90 UG/1
1-2 AEROSOL, METERED RESPIRATORY (INHALATION)
Status: CANCELLED
Start: 2018-12-21

## 2018-12-17 RX ORDER — MEPERIDINE HYDROCHLORIDE 25 MG/ML
25 INJECTION INTRAMUSCULAR; INTRAVENOUS; SUBCUTANEOUS EVERY 30 MIN PRN
Status: CANCELLED | OUTPATIENT
Start: 2018-12-21

## 2018-12-17 RX ORDER — DIPHENHYDRAMINE HYDROCHLORIDE 50 MG/ML
50 INJECTION INTRAMUSCULAR; INTRAVENOUS
Status: CANCELLED
Start: 2018-12-21

## 2018-12-17 RX ORDER — SODIUM CHLORIDE 9 MG/ML
1000 INJECTION, SOLUTION INTRAVENOUS CONTINUOUS PRN
Status: CANCELLED
Start: 2018-12-21

## 2018-12-17 RX ORDER — ALBUTEROL SULFATE 0.83 MG/ML
2.5 SOLUTION RESPIRATORY (INHALATION)
Status: CANCELLED | OUTPATIENT
Start: 2018-12-21

## 2018-12-17 RX ORDER — EPINEPHRINE 0.3 MG/.3ML
0.3 INJECTION SUBCUTANEOUS EVERY 5 MIN PRN
Status: CANCELLED | OUTPATIENT
Start: 2018-12-21

## 2018-12-17 RX ADMIN — HEPARIN SODIUM (PORCINE) LOCK FLUSH IV SOLN 100 UNIT/ML 5 ML: 100 SOLUTION at 08:23

## 2018-12-17 ASSESSMENT — MIFFLIN-ST. JEOR: SCORE: 1533.86

## 2018-12-17 ASSESSMENT — PAIN SCALES - GENERAL: PAINLEVEL: NO PAIN (0)

## 2018-12-17 NOTE — PROGRESS NOTES
FOLLOW-UP VISIT NOTE    PATIENT NAME: Rian Malik MRN # [id number]  DATE OF VISIT: Dec 17, 2018 YOB: 1960    REFERRING PROVIDER: No referring provider defined for this encounter.    CANCER TYPE: Adenocarcinoma colon  STAGE: III [id number]  ECOG PS: 0    ONCOLOGY HISTORY:  57-year-old male who around February 2017r, developed intermittent cramping lower abdominal pain. Was evaluated by PCP and clinical impression was colitis. He was put on p.o. antibiotics. However symptoms did not improve. He was eventually referred to surgery and underwent imaging with CT findings suspicious of sigmoid abscess. He was admitted to the hospital for resection of sigmoid abscess 10/11/17. However intraoperatively was noted to have a large firmly adherent sigmoid colon mass. Laparoscopic surgery was converted to open sigmoidectomy with end colostomy.     Surgical pathology   B.  COLON, SIGMOID, HEMICOLECTOMY   - Poorly differentiated adenocarcinoma, with signet ring cell features   - Tumor size: 5 cm   - Tumor is present on serosal surface and microscopically perforates   serosal surface   - One surgical margin shows extensive serosal involvement by tumor. The   opposite, undesignated margin shows no evidence of malignancy   - Lymphovascular invasion is present   - Perineural invasion is not identified   - Tumor deposits are present   - Immunohistochemistry for mismatch repair proteins shows intact nuclear   expression for MLH1, MSH2, MSH6, and PMS2   - Three reactive lymph nodes, negative for malignancy (0/3)   - Pathologic staging: pT4N1c      Patient's case was discussed at the colorectal tumor board. Recommendation was to obtain new staging scans as well as to proceed with systemic chemotherapy at this point with plans for further surgery in future. Staging scans negative for distant disease.     - Started on Folfox 11/21/17. Started on FOLFOX q2 week regimen for 6 months duration - s/p 12 cycles. He has had chemo  delays/ dose adjustments due to thrombocytopenia.    - 07/2018 Imaging at completion of adjuvant chemotherapy showed ill-defined 16 mm segment 5 liver lesions suspicious for metastatic disease. MRI liver showed a 1.8 cm liver lesion suspicious for malignancy.    - 09/12/18 laparoscopic liver ablation with intraoperative ultrasound, colostomy takedown, appendectomy, descending colectomy and anterior dissection. Surgical pathology showed periappendiceal tissue, ascending colon margin/mesentery and pericolonic fibroadipose adipose tissue involved by signet ring adenocarcinoma.     Case was discussed at the multidisciplinary tumor board and the recommendation was resuming chemotherapy with plans of repeating  MRI of the liver and PET scan in 3-4 months and if no evidence of recurrent disease consideration for HIPEC    - 10/24/18 -  Started on FOLFIRI with skipping 5FU bolus giving cytopenias as well as neulasta added with last cycle..   He is s/p 3 cycles and is tolerating it well except for mild diarrhea.    SUBJECTIVE     Patient is here today for follow-up. Tolerating chemotherapy well overall except for mild area which is under control with Imodium. Denies nausea/vomiting, fever/chills, bleeding/bruising, mucositis or any other complaints. Stable appetite and energy levels      PAST MEDICAL HISTORY     Past Medical History:   Diagnosis Date     Maldonado's esophagus      Cirrhosis (H)      Colon cancer (H) 10/11/2017    Poorly differentiated adenocarcinoma     Hepatitis C          CURRENT OUTPATIENT MEDICATIONS     Current Outpatient Medications   Medication Sig Dispense Refill     loperamide (IMODIUM) 2 MG capsule 2 caps at 1st sign of diarrhea & 1 cap every 2hrs until 12hrs diarrhea free. During night, 2 caps at bedtime & 2 caps every 4hrs until AM 30 capsule 1     LORazepam (ATIVAN) 0.5 MG tablet Take 1 tablet (0.5 mg) by mouth every 4 hours as needed (Anxiety, Nausea/Vomiting or Sleep) 30 tablet 2     ondansetron  (ZOFRAN) 8 MG tablet Take 1 tablet (8 mg) by mouth every 8 hours as needed (Nausea/Vomiting) 30 tablet 1     prochlorperazine (COMPAZINE) 10 MG tablet Take 1 tablet (10 mg) by mouth every 6 hours as needed (Nausea/Vomiting) 30 tablet 2        ALLERGIES     Allergies   Allergen Reactions     Acetaminophen      Naproxen         REVIEW OF SYSTEMS   As above in the HPI, o/w complete 14-point ROS was negative.     PHYSICAL EXAM   B/P: 138/78, T: 98.1, P: 64, R: 18  SpO2 Readings from Last 4 Encounters:   10/22/18 99%   10/15/18 97%   09/24/18 99%   09/17/18 98%     Wt Readings from Last 3 Encounters:   12/17/18 73.9 kg (163 lb)   12/06/18 75.6 kg (166 lb 11.2 oz)   11/30/18 73.7 kg (162 lb 8 oz)     GEN: NAD  Mouth/ENT: Oropharynx is clear.  NECK: no  lymphadenopathy  LUNGS: clear bilaterally  CV: regular, no murmurs, rubs, or gallops  ABDOMEN: soft, non-tender, non-distended,Ostomy in place  EXT: warm, well perfused, no edema  NEURO: alert  SKIN: no rashes     LABORATORY AND IMAGING STUDIES     Lab Results   Component Value Date    WBC 10.4 12/17/2018     Lab Results   Component Value Date    RBC 4.14 12/17/2018     Lab Results   Component Value Date    HGB 12.8 12/17/2018     Lab Results   Component Value Date    HCT 36.9 12/17/2018     No components found for: MCT  Lab Results   Component Value Date    MCV 89 12/17/2018     Lab Results   Component Value Date    MCH 30.9 12/17/2018     Lab Results   Component Value Date    MCHC 34.7 12/17/2018     Lab Results   Component Value Date    RDW 14.8 12/17/2018     Lab Results   Component Value Date    PLT 66 12/17/2018     .     ASSESSMENT AND PLAN     57-year-old diagnosed with poorly differentiated adenocarcinoma colon after he was admitted to the hospital for concerns for a sigmoid abscess and intraoperatively was noted to have a large firmly adherent sigmoid colon mass- [id number](tumor deposits) with margin involvement and received 12 cycles of FOLFOX with significant  chemo delays/ dose adjustments due to thrombocytopenia. Follow up scans showed a 1.8 cm suspicious liver lesions and he undewent  laparoscopic liver ablation with intraoperative ultrasound, colostomy takedown, appendectomy, descending colectomy and anterior dissection. Surgical pathology showed periappendiceal tissue, ascending colon margin/mesentery and pericolonic fibroadipose adipose tissue involved by signet ring adenocarcinoma.      - Poorly differentiated adenocarcinoma colon  [id number]( liver lesion)  Had a detailed discussion with the patient today about the surgical pathology findings and presence of residual signet ring adenocarcinoma - poor risk . Case was discussed at the multidisciplinary tumor board and the recommendation was resuming chemotherapy with plans of repeating MRI of the liver and PET scan in 3-4 months and if no evidence of recurrent disease consideration for HIPEC.  - 10/24/18 Started on FOLFIRI Chemotherapywith skipping 5FU bolus given cytopenias as well as neulasta added with last cycle.   He is s/p 3 cycles and is tolerating it well except for mild diarrhea.    Have him come back to infusion on Friday for labs, proceed with next cycle.  Plan is to continue with chemotherapy until patient follows back with surgery for review of repeat scans.     - Hepatitis C/Cirrhosis  Completed antiviral treatment with HCV viral load undetectable  Following with GI    - Thrombocytopenia  Secondary to cirrhosis/hypersplenism/chemotherapy  Monitor      The patient is ready to learn, no apparent learning barriers were identified, Diagnosis and treatment plans were explained to the patient. The patient expressed understanding of the content. The patient questions were answered to his satisfaction.    Chart documentation with Dragon Voice recognition Software. Although reviewed after completion, some words and grammatical errors may remain.  Padilla Last MD  Attending Physician   Hematology/Medical  Oncology

## 2018-12-17 NOTE — Clinical Note
12/17/2018         RE: Rian Malik  [address] [place] [address]        Dear Colleague,    Thank you for referring your patient, Rian Malik, to the [facility]. Please see a copy of my visit note below.      FOLLOW-UP VISIT NOTE    PATIENT NAME: Rian Malik MRN # [id number]  DATE OF VISIT: Dec 17, 2018 YOB: 1960    REFERRING PROVIDER: No referring provider defined for this encounter.    CANCER TYPE: Adenocarcinoma colon  STAGE: III [id number]  ECOG PS: 0    ONCOLOGY HISTORY:  57-year-old male who around February 2017r, developed intermittent cramping lower abdominal pain. Was evaluated by PCP and clinical impression was colitis. He was put on p.o. antibiotics. However symptoms did not improve. He was eventually referred to surgery and underwent imaging with CT findings suspicious of sigmoid abscess. He was admitted to the hospital for resection of sigmoid abscess 10/11/17. However intraoperatively was noted to have a large firmly adherent sigmoid colon mass. Laparoscopic surgery was converted to open sigmoidectomy with end colostomy.     Surgical pathology   B.  COLON, SIGMOID, HEMICOLECTOMY   - Poorly differentiated adenocarcinoma, with signet ring cell features   - Tumor size: 5 cm   - Tumor is present on serosal surface and microscopically perforates   serosal surface   - One surgical margin shows extensive serosal involvement by tumor. The   opposite, undesignated margin shows no evidence of malignancy   - Lymphovascular invasion is present   - Perineural invasion is not identified   - Tumor deposits are present   - Immunohistochemistry for mismatch repair proteins shows intact nuclear   expression for MLH1, MSH2, MSH6, and PMS2   - Three reactive lymph nodes, negative for malignancy (0/3)   - Pathologic staging: pT4N1c      Patient's case was discussed at the colorectal tumor board. Recommendation was to obtain new staging scans as well as to proceed  with systemic chemotherapy at this point with plans for further surgery in future. Staging scans negative for distant disease.     - Started on Folfox 11/21/17. Started on FOLFOX q2 week regimen for 6 months duration - s/p 12 cycles. He has had chemo delays/ dose adjustments due to thrombocytopenia.    - 07/2018 Imaging at completion of adjuvant chemotherapy showed ill-defined 16 mm segment 5 liver lesions suspicious for metastatic disease. MRI liver showed a 1.8 cm liver lesion suspicious for malignancy.    - 09/12/18 laparoscopic liver ablation with intraoperative ultrasound, colostomy takedown, appendectomy, descending colectomy and anterior dissection. Surgical pathology showed periappendiceal tissue, ascending colon margin/mesentery and pericolonic fibroadipose adipose tissue involved by signet ring adenocarcinoma.     Case was discussed at the multidisciplinary tumor board and the recommendation was resuming chemotherapy with plans of repeating  MRI of the liver and PET scan in 3-4 months and if no evidence of recurrent disease consideration for HIPEC    - 10/24/18 -  Started on FOLFIRI with skipping 5FU bolus giving cytopenias as well as neulasta added with last cycle..   He is s/p 3 cycles and is tolerating it well except for mild diarrhea.    SUBJECTIVE     Patient is here today for follow-up. Tolerating chemotherapy well overall except for mild area which is under control with Imodium. Denies nausea/vomiting, fever/chills, bleeding/bruising, mucositis or any other complaints. Stable appetite and energy levels      PAST MEDICAL HISTORY     Past Medical History:   Diagnosis Date     Maldoando's esophagus      Cirrhosis (H)      Colon cancer (H) 10/11/2017    Poorly differentiated adenocarcinoma     Hepatitis C          CURRENT OUTPATIENT MEDICATIONS     Current Outpatient Medications   Medication Sig Dispense Refill     loperamide (IMODIUM) 2 MG capsule 2 caps at 1st sign of diarrhea & 1 cap every 2hrs until  12hrs diarrhea free. During night, 2 caps at bedtime & 2 caps every 4hrs until AM 30 capsule 1     LORazepam (ATIVAN) 0.5 MG tablet Take 1 tablet (0.5 mg) by mouth every 4 hours as needed (Anxiety, Nausea/Vomiting or Sleep) 30 tablet 2     ondansetron (ZOFRAN) 8 MG tablet Take 1 tablet (8 mg) by mouth every 8 hours as needed (Nausea/Vomiting) 30 tablet 1     prochlorperazine (COMPAZINE) 10 MG tablet Take 1 tablet (10 mg) by mouth every 6 hours as needed (Nausea/Vomiting) 30 tablet 2        ALLERGIES     Allergies   Allergen Reactions     Acetaminophen      Naproxen         REVIEW OF SYSTEMS   As above in the HPI, o/w complete 14-point ROS was negative.     PHYSICAL EXAM   B/P: 138/78, T: 98.1, P: 64, R: 18  SpO2 Readings from Last 4 Encounters:   10/22/18 99%   10/15/18 97%   09/24/18 99%   09/17/18 98%     Wt Readings from Last 3 Encounters:   12/17/18 73.9 kg (163 lb)   12/06/18 75.6 kg (166 lb 11.2 oz)   11/30/18 73.7 kg (162 lb 8 oz)     GEN: NAD  Mouth/ENT: Oropharynx is clear.  NECK: no  lymphadenopathy  LUNGS: clear bilaterally  CV: regular, no murmurs, rubs, or gallops  ABDOMEN: soft, non-tender, non-distended,Ostomy in place  EXT: warm, well perfused, no edema  NEURO: alert  SKIN: no rashes     LABORATORY AND IMAGING STUDIES     Lab Results   Component Value Date    WBC 10.4 12/17/2018     Lab Results   Component Value Date    RBC 4.14 12/17/2018     Lab Results   Component Value Date    HGB 12.8 12/17/2018     Lab Results   Component Value Date    HCT 36.9 12/17/2018     No components found for: MCT  Lab Results   Component Value Date    MCV 89 12/17/2018     Lab Results   Component Value Date    MCH 30.9 12/17/2018     Lab Results   Component Value Date    MCHC 34.7 12/17/2018     Lab Results   Component Value Date    RDW 14.8 12/17/2018     Lab Results   Component Value Date    PLT 66 12/17/2018     .     ASSESSMENT AND PLAN     57-year-old diagnosed with poorly differentiated adenocarcinoma colon after  he was admitted to the hospital for concerns for a sigmoid abscess and intraoperatively was noted to have a large firmly adherent sigmoid colon mass- [id number](tumor deposits) with margin involvement and received 12 cycles of FOLFOX with significant chemo delays/ dose adjustments due to thrombocytopenia. Follow up scans showed a 1.8 cm suspicious liver lesions and he undewent  laparoscopic liver ablation with intraoperative ultrasound, colostomy takedown, appendectomy, descending colectomy and anterior dissection. Surgical pathology showed periappendiceal tissue, ascending colon margin/mesentery and pericolonic fibroadipose adipose tissue involved by signet ring adenocarcinoma.      - Poorly differentiated adenocarcinoma colon  [id number]( liver lesion)  Had a detailed discussion with the patient today about the surgical pathology findings and presence of residual signet ring adenocarcinoma - poor risk . Case was discussed at the multidisciplinary tumor board and the recommendation was resuming chemotherapy with plans of repeating MRI of the liver and PET scan in 3-4 months and if no evidence of recurrent disease consideration for HIPEC.  - 10/24/18 Started on FOLFIRI Chemotherapywith skipping 5FU bolus given cytopenias as well as neulasta added with last cycle.   He is s/p 3 cycles and is tolerating it well except for mild diarrhea.    Have him come back to infusion on Friday for labs, proceed with next cycle.  Plan is to continue with chemotherapy until patient follows back with surgery for review of repeat scans.     - Hepatitis C/Cirrhosis  Completed antiviral treatment with HCV viral load undetectable  Following with GI    - Thrombocytopenia  Secondary to cirrhosis/hypersplenism/chemotherapy  Monitor      The patient is ready to learn, no apparent learning barriers were identified, Diagnosis and treatment plans were explained to the patient. The patient expressed understanding of the content. The patient questions  were answered to his satisfaction.    Chart documentation with Dragon Voice recognition Software. Although reviewed after completion, some words and grammatical errors may remain.  Padilla Last MD  Attending Physician   Hematology/Medical Oncology    Again, thank you for allowing me to participate in the care of your patient.        Sincerely,        Padilla Last MD

## 2018-12-17 NOTE — PROGRESS NOTES
"Patient's name and  were verified.  See Doc Flowsheet - IV assess for details.  IVAD accessed with 20G 3/4\" lares gripper plus needle  blood return positive: YES  Site without redness, tenderness or swelling: YES  flushed with 30cc NS and 5cc 100u/ml heparin  Needle: De-accessed  Comments: Labs drawn.  Patient tolerated procedure without incident.    Joanna Mclaughlin  RN, BSN, OCN        "

## 2018-12-17 NOTE — NURSING NOTE
"Oncology Rooming Note    December 17, 2018 8:57 AM   Rian Malik is a 58 year old male who presents for:    Chief Complaint   Patient presents with     Oncology Clinic Visit     prior to treatment on 12/21     Initial Vitals: /80   Pulse 57   Temp 98.4  F (36.9  C)   Resp 18   Ht 1.727 m (5' 8\")   Wt 73.9 kg (163 lb)   SpO2 99%   BMI 24.78 kg/m   Estimated body mass index is 24.78 kg/m  as calculated from the following:    Height as of this encounter: 1.727 m (5' 8\").    Weight as of this encounter: 73.9 kg (163 lb). Body surface area is 1.88 meters squared.  No Pain (0) Comment: Data Unavailable   No LMP for male patient.  Allergies reviewed: Yes  Medications reviewed: Yes    Medications: Medication refills not needed today.  Pharmacy name entered into EPIC:    [facility] - [place], [place] - [address]  [facility] - [place], [place] - [address]       5 minutes for nursing intake (face to face time)     Oxana Shepard LPN              "

## 2018-12-18 NOTE — PROGRESS NOTES
This is a recent snapshot of the patient's [facility] medical record.  For current drug dose and complete information and questions, call [phone] or In Basket pool, fv home infusion ([id number])  CSN Number:  [id number]

## 2018-12-21 ENCOUNTER — INFUSION THERAPY VISIT (OUTPATIENT)
Dept: INFUSION THERAPY | Facility: CLINIC | Age: 58
End: 2018-12-21
Payer: COMMERCIAL

## 2018-12-21 ENCOUNTER — HOME INFUSION (PRE-WILLOW HOME INFUSION) (OUTPATIENT)
Dept: PHARMACY | Facility: CLINIC | Age: 58
End: 2018-12-21

## 2018-12-21 ENCOUNTER — ONCOLOGY VISIT (OUTPATIENT)
Dept: ONCOLOGY | Facility: CLINIC | Age: 58
End: 2018-12-21
Payer: COMMERCIAL

## 2018-12-21 VITALS
OXYGEN SATURATION: 99 % | DIASTOLIC BLOOD PRESSURE: 70 MMHG | BODY MASS INDEX: 25.07 KG/M2 | WEIGHT: 164.9 LBS | HEART RATE: 70 BPM | TEMPERATURE: 98 F | SYSTOLIC BLOOD PRESSURE: 120 MMHG | RESPIRATION RATE: 16 BRPM

## 2018-12-21 DIAGNOSIS — C18.7 ADENOCARCINOMA OF SIGMOID COLON (H): Primary | ICD-10-CM

## 2018-12-21 LAB
BASOPHILS # BLD AUTO: 0.1 10E9/L (ref 0–0.2)
BASOPHILS NFR BLD AUTO: 0.6 %
DIFFERENTIAL METHOD BLD: ABNORMAL
EOSINOPHIL # BLD AUTO: 0.1 10E9/L (ref 0–0.7)
EOSINOPHIL NFR BLD AUTO: 0.6 %
ERYTHROCYTE [DISTWIDTH] IN BLOOD BY AUTOMATED COUNT: 14.9 % (ref 10–15)
HCT VFR BLD AUTO: 36.3 % (ref 40–53)
HGB BLD-MCNC: 12.8 G/DL (ref 13.3–17.7)
IMM GRANULOCYTES # BLD: 0.2 10E9/L (ref 0–0.4)
IMM GRANULOCYTES NFR BLD: 1.6 %
LYMPHOCYTES # BLD AUTO: 1.5 10E9/L (ref 0.8–5.3)
LYMPHOCYTES NFR BLD AUTO: 14.2 %
MCH RBC QN AUTO: 31.2 PG (ref 26.5–33)
MCHC RBC AUTO-ENTMCNC: 35.3 G/DL (ref 31.5–36.5)
MCV RBC AUTO: 89 FL (ref 78–100)
MONOCYTES # BLD AUTO: 0.5 10E9/L (ref 0–1.3)
MONOCYTES NFR BLD AUTO: 4.5 %
NEUTROPHILS # BLD AUTO: 8.2 10E9/L (ref 1.6–8.3)
NEUTROPHILS NFR BLD AUTO: 78.5 %
PLATELET # BLD AUTO: 72 10E9/L (ref 150–450)
RBC # BLD AUTO: 4.1 10E12/L (ref 4.4–5.9)
WBC # BLD AUTO: 10.4 10E9/L (ref 4–11)

## 2018-12-21 PROCEDURE — 96375 TX/PRO/DX INJ NEW DRUG ADDON: CPT | Performed by: INTERNAL MEDICINE

## 2018-12-21 PROCEDURE — 96415 CHEMO IV INFUSION ADDL HR: CPT | Performed by: INTERNAL MEDICINE

## 2018-12-21 PROCEDURE — 99207 ZZC NO CHARGE LOS: CPT

## 2018-12-21 PROCEDURE — 85025 COMPLETE CBC W/AUTO DIFF WBC: CPT | Performed by: INTERNAL MEDICINE

## 2018-12-21 PROCEDURE — 99207 ZZC NO CHARGE NURSE ONLY: CPT

## 2018-12-21 PROCEDURE — 96416 CHEMO PROLONG INFUSE W/PUMP: CPT | Performed by: INTERNAL MEDICINE

## 2018-12-21 PROCEDURE — 96413 CHEMO IV INFUSION 1 HR: CPT | Performed by: INTERNAL MEDICINE

## 2018-12-21 PROCEDURE — 96367 TX/PROPH/DG ADDL SEQ IV INF: CPT | Performed by: INTERNAL MEDICINE

## 2018-12-21 RX ORDER — HEPARIN SODIUM (PORCINE) LOCK FLUSH IV SOLN 100 UNIT/ML 100 UNIT/ML
5 SOLUTION INTRAVENOUS
Status: DISCONTINUED | OUTPATIENT
Start: 2018-12-21 | End: 2018-12-21 | Stop reason: HOSPADM

## 2018-12-21 RX ADMIN — Medication 250 ML: at 13:33

## 2018-12-21 RX ADMIN — HEPARIN SODIUM (PORCINE) LOCK FLUSH IV SOLN 100 UNIT/ML 5 ML: 100 SOLUTION at 12:40

## 2018-12-21 ASSESSMENT — PAIN SCALES - GENERAL: PAINLEVEL: NO PAIN (0)

## 2018-12-21 NOTE — PROGRESS NOTES
Power port needle left accessed for treatment. Tolerated port access and draw without complaint. Port site scrubbed with Chloraprep for 30 seconds. Accessed using sterile technique. [place] drawn-Red/Green/Purple tubes. Double signed by patient and RN. See documentation flowsheet. Nuha De Los Santos, RN, BSN, OCN

## 2018-12-21 NOTE — PROGRESS NOTES
"Infusion Nursing Note:  Rian Malik presents today for C4 Irinotecan/5FU pump connect.    Patient seen by provider today: No   present during visit today: Not Applicable.    Note: Atropine was given prior to irinotecan infusion due to prior reactions.    Intravenous Access:  Implanted Port.    Treatment Conditions:  Lab Results   Component Value Date    HGB 12.8 12/21/2018     Lab Results   Component Value Date    WBC 10.4 12/21/2018      Lab Results   Component Value Date    ANEU 8.2 12/21/2018     Lab Results   Component Value Date    PLT 72 12/21/2018      Lab Results   Component Value Date     10/22/2018                   Lab Results   Component Value Date    POTASSIUM 3.7 10/22/2018           Lab Results   Component Value Date    MAG 1.8 09/13/2018            Lab Results   Component Value Date    CR 0.77 10/22/2018                   Lab Results   Component Value Date    MAICOL 8.7 10/22/2018                Lab Results   Component Value Date    BILITOTAL 0.5 12/17/2018           Lab Results   Component Value Date    ALBUMIN 3.6 10/22/2018                    Lab Results   Component Value Date    ALT 86 10/22/2018           Lab Results   Component Value Date    AST 46 10/22/2018       Results reviewed, labs MET treatment parameters, ok to proceed with treatment.      Post Infusion Assessment:  Patient tolerated infusion without incident.  Blood return noted pre and post infusion.  Site patent and intact, free from redness, edema or discomfort.  No evidence of extravasations.    Prior to discharge: Port is secured in place with tegaderm and flushed with 10cc NS with positive blood return noted.  Continuous home infusion Dosi-Fuser pump connected.    All connectors secured in place and clamps taped open.    Pump started, \"running\" noted on display (CADD): Not Applicable.  Patient instructed to call our clinic or [facility] with any questions or concerns at home.  Patient verbalized " understanding.    Patient set up for pump disconnect at home with [facility] on 12/23/2018 at 1:30 pm.    Discharge Plan:   Patient will return around 1/4/2019 for next appointment.   Patient discharged in stable condition accompanied by: self.  Departure Mode: Ambulatory.    Jael Ansari, RN-BSN, PHN, OCN  [facility]

## 2018-12-23 ENCOUNTER — HOME INFUSION (PRE-WILLOW HOME INFUSION) (OUTPATIENT)
Dept: PHARMACY | Facility: CLINIC | Age: 58
End: 2018-12-23

## 2018-12-24 NOTE — PROGRESS NOTES
This is a recent snapshot of the patient's [facility] medical record.  For current drug dose and complete information and questions, call [phone] or In Basket pool, fv home infusion ([id number])  CSN Number:  [id number]

## 2018-12-26 NOTE — PROGRESS NOTES
This is a recent snapshot of the patient's [facility] medical record.  For current drug dose and complete information and questions, call [phone] or In Basket pool, fv home infusion ([id number])  CSN Number:  [id number]

## 2019-01-01 ENCOUNTER — TELEPHONE (OUTPATIENT)
Dept: ONCOLOGY | Facility: CLINIC | Age: 59
End: 2019-01-01

## 2019-01-01 ENCOUNTER — MYC MEDICAL ADVICE (OUTPATIENT)
Dept: FAMILY MEDICINE | Facility: CLINIC | Age: 59
End: 2019-01-01

## 2019-01-01 ENCOUNTER — TELEPHONE (OUTPATIENT)
Dept: GASTROENTEROLOGY | Facility: OUTPATIENT CENTER | Age: 59
End: 2019-01-01

## 2019-01-01 ENCOUNTER — PATIENT OUTREACH (OUTPATIENT)
Dept: CARE COORDINATION | Facility: CLINIC | Age: 59
End: 2019-01-01

## 2019-01-01 ENCOUNTER — PATIENT OUTREACH (OUTPATIENT)
Dept: SURGERY | Facility: CLINIC | Age: 59
End: 2019-01-01

## 2019-01-01 ENCOUNTER — TELEPHONE (OUTPATIENT)
Dept: FAMILY MEDICINE | Facility: CLINIC | Age: 59
End: 2019-01-01

## 2019-01-01 ENCOUNTER — TELEPHONE (OUTPATIENT)
Dept: SURGERY | Facility: CLINIC | Age: 59
End: 2019-01-01

## 2019-01-01 ENCOUNTER — ANESTHESIA (OUTPATIENT)
Dept: MEDSURG UNIT | Facility: CLINIC | Age: 59
End: 2019-01-01

## 2019-01-01 ENCOUNTER — INFUSION THERAPY VISIT (OUTPATIENT)
Dept: INFUSION THERAPY | Facility: CLINIC | Age: 59
End: 2019-01-01
Payer: COMMERCIAL

## 2019-01-01 ENCOUNTER — ONCOLOGY VISIT (OUTPATIENT)
Dept: ONCOLOGY | Facility: CLINIC | Age: 59
End: 2019-01-01
Payer: COMMERCIAL

## 2019-01-01 ENCOUNTER — ONCOLOGY VISIT (OUTPATIENT)
Dept: ONCOLOGY | Facility: CLINIC | Age: 59
End: 2019-01-01
Attending: INTERNAL MEDICINE
Payer: COMMERCIAL

## 2019-01-01 ENCOUNTER — ANESTHESIA EVENT (OUTPATIENT)
Dept: MEDSURG UNIT | Facility: CLINIC | Age: 59
End: 2019-01-01

## 2019-01-01 ENCOUNTER — ANCILLARY PROCEDURE (OUTPATIENT)
Dept: CT IMAGING | Facility: CLINIC | Age: 59
End: 2019-01-01
Attending: NURSE PRACTITIONER
Payer: COMMERCIAL

## 2019-01-01 ENCOUNTER — HOSPITAL ENCOUNTER (INPATIENT)
Facility: CLINIC | Age: 59
LOS: 4 days | Discharge: HOME | DRG: 389 | End: 2019-09-20
Attending: COLON & RECTAL SURGERY | Admitting: COLON & RECTAL SURGERY
Payer: COMMERCIAL

## 2019-01-01 ENCOUNTER — TELEPHONE (OUTPATIENT)
Dept: GASTROENTEROLOGY | Facility: CLINIC | Age: 59
End: 2019-01-01

## 2019-01-01 ENCOUNTER — MYC REFILL (OUTPATIENT)
Dept: ONCOLOGY | Facility: CLINIC | Age: 59
End: 2019-01-01

## 2019-01-01 ENCOUNTER — ANESTHESIA (OUTPATIENT)
Dept: SURGERY | Facility: CLINIC | Age: 59
DRG: 389 | End: 2019-01-01
Payer: COMMERCIAL

## 2019-01-01 ENCOUNTER — HOSPITAL ENCOUNTER (OUTPATIENT)
Dept: PET IMAGING | Facility: CLINIC | Age: 59
Discharge: HOME | End: 2019-08-26
Attending: COLON & RECTAL SURGERY | Admitting: COLON & RECTAL SURGERY
Payer: COMMERCIAL

## 2019-01-01 ENCOUNTER — APPOINTMENT (OUTPATIENT)
Dept: GENERAL RADIOLOGY | Facility: CLINIC | Age: 59
DRG: 389 | End: 2019-01-01
Attending: INTERNAL MEDICINE
Payer: COMMERCIAL

## 2019-01-01 ENCOUNTER — OFFICE VISIT (OUTPATIENT)
Dept: URGENT CARE | Facility: URGENT CARE | Age: 59
End: 2019-01-01
Payer: COMMERCIAL

## 2019-01-01 ENCOUNTER — APPOINTMENT (OUTPATIENT)
Dept: LAB | Facility: CLINIC | Age: 59
End: 2019-01-01
Attending: INTERNAL MEDICINE
Payer: COMMERCIAL

## 2019-01-01 ENCOUNTER — HOME INFUSION (PRE-WILLOW HOME INFUSION) (OUTPATIENT)
Dept: PHARMACY | Facility: CLINIC | Age: 59
End: 2019-01-01

## 2019-01-01 ENCOUNTER — ANCILLARY PROCEDURE (OUTPATIENT)
Dept: CT IMAGING | Facility: CLINIC | Age: 59
End: 2019-01-01
Attending: INTERNAL MEDICINE
Payer: COMMERCIAL

## 2019-01-01 ENCOUNTER — ANCILLARY PROCEDURE (OUTPATIENT)
Dept: GENERAL RADIOLOGY | Facility: CLINIC | Age: 59
End: 2019-01-01
Attending: INTERNAL MEDICINE
Payer: COMMERCIAL

## 2019-01-01 ENCOUNTER — PATIENT OUTREACH (OUTPATIENT)
Dept: ONCOLOGY | Facility: CLINIC | Age: 59
End: 2019-01-01

## 2019-01-01 ENCOUNTER — ANCILLARY PROCEDURE (OUTPATIENT)
Dept: GENERAL RADIOLOGY | Facility: CLINIC | Age: 59
End: 2019-01-01
Attending: COLON & RECTAL SURGERY
Payer: COMMERCIAL

## 2019-01-01 ENCOUNTER — ALLIED HEALTH/NURSE VISIT (OUTPATIENT)
Dept: NURSING | Facility: CLINIC | Age: 59
End: 2019-01-01
Payer: COMMERCIAL

## 2019-01-01 ENCOUNTER — ANCILLARY PROCEDURE (OUTPATIENT)
Dept: PET IMAGING | Facility: CLINIC | Age: 59
End: 2019-01-01
Attending: INTERNAL MEDICINE
Payer: COMMERCIAL

## 2019-01-01 ENCOUNTER — APPOINTMENT (OUTPATIENT)
Dept: GENERAL RADIOLOGY | Facility: CLINIC | Age: 59
DRG: 389 | End: 2019-01-01
Attending: COLON & RECTAL SURGERY
Payer: COMMERCIAL

## 2019-01-01 ENCOUNTER — TELEPHONE (OUTPATIENT)
Dept: PEDIATRICS | Facility: CLINIC | Age: 59
End: 2019-01-01

## 2019-01-01 ENCOUNTER — ANESTHESIA EVENT (OUTPATIENT)
Dept: SURGERY | Facility: CLINIC | Age: 59
DRG: 389 | End: 2019-01-01
Payer: COMMERCIAL

## 2019-01-01 ENCOUNTER — TRANSFERRED RECORDS (OUTPATIENT)
Dept: HEALTH INFORMATION MANAGEMENT | Facility: CLINIC | Age: 59
End: 2019-01-01

## 2019-01-01 ENCOUNTER — OFFICE VISIT (OUTPATIENT)
Dept: SURGERY | Facility: CLINIC | Age: 59
End: 2019-01-01
Payer: COMMERCIAL

## 2019-01-01 ENCOUNTER — OFFICE VISIT (OUTPATIENT)
Dept: FAMILY MEDICINE | Facility: CLINIC | Age: 59
End: 2019-01-01
Payer: COMMERCIAL

## 2019-01-01 ENCOUNTER — DOCUMENTATION ONLY (OUTPATIENT)
Dept: SURGERY | Facility: CLINIC | Age: 59
End: 2019-01-01

## 2019-01-01 ENCOUNTER — HOSPITAL ENCOUNTER (INPATIENT)
Facility: CLINIC | Age: 59
LOS: 4 days | Discharge: HOME | DRG: 389 | End: 2019-07-14
Attending: COLON & RECTAL SURGERY | Admitting: COLON & RECTAL SURGERY
Payer: COMMERCIAL

## 2019-01-01 ENCOUNTER — CARE COORDINATION (OUTPATIENT)
Dept: PALLIATIVE CARE | Facility: CLINIC | Age: 59
End: 2019-01-01

## 2019-01-01 ENCOUNTER — DOCUMENTATION ONLY (OUTPATIENT)
Dept: GASTROENTEROLOGY | Facility: OUTPATIENT CENTER | Age: 59
End: 2019-01-01
Payer: COMMERCIAL

## 2019-01-01 ENCOUNTER — ONCOLOGY VISIT (OUTPATIENT)
Dept: ONCOLOGY | Facility: CLINIC | Age: 59
End: 2019-01-01
Attending: NURSE PRACTITIONER
Payer: COMMERCIAL

## 2019-01-01 ENCOUNTER — ANCILLARY PROCEDURE (OUTPATIENT)
Dept: GENERAL RADIOLOGY | Facility: CLINIC | Age: 59
End: 2019-01-01
Attending: NURSE PRACTITIONER
Payer: COMMERCIAL

## 2019-01-01 ENCOUNTER — APPOINTMENT (OUTPATIENT)
Dept: GENERAL RADIOLOGY | Facility: CLINIC | Age: 59
End: 2019-01-01
Payer: COMMERCIAL

## 2019-01-01 ENCOUNTER — ANCILLARY PROCEDURE (OUTPATIENT)
Dept: GENERAL RADIOLOGY | Facility: CLINIC | Age: 59
End: 2019-01-01
Payer: COMMERCIAL

## 2019-01-01 VITALS
BODY MASS INDEX: 22.84 KG/M2 | DIASTOLIC BLOOD PRESSURE: 75 MMHG | HEIGHT: 68 IN | HEART RATE: 84 BPM | WEIGHT: 150.7 LBS | RESPIRATION RATE: 16 BRPM | SYSTOLIC BLOOD PRESSURE: 117 MMHG | TEMPERATURE: 97.6 F | OXYGEN SATURATION: 97 %

## 2019-01-01 VITALS
BODY MASS INDEX: 24.1 KG/M2 | DIASTOLIC BLOOD PRESSURE: 81 MMHG | RESPIRATION RATE: 12 BRPM | HEIGHT: 68 IN | WEIGHT: 159 LBS | TEMPERATURE: 97.8 F | SYSTOLIC BLOOD PRESSURE: 127 MMHG | OXYGEN SATURATION: 98 % | HEART RATE: 76 BPM

## 2019-01-01 VITALS
BODY MASS INDEX: 23.6 KG/M2 | TEMPERATURE: 97.8 F | HEART RATE: 87 BPM | DIASTOLIC BLOOD PRESSURE: 79 MMHG | RESPIRATION RATE: 16 BRPM | WEIGHT: 155.7 LBS | OXYGEN SATURATION: 94 % | HEIGHT: 68 IN | SYSTOLIC BLOOD PRESSURE: 126 MMHG

## 2019-01-01 VITALS
WEIGHT: 149.5 LBS | DIASTOLIC BLOOD PRESSURE: 80 MMHG | HEIGHT: 68 IN | RESPIRATION RATE: 16 BRPM | TEMPERATURE: 97.7 F | HEART RATE: 90 BPM | BODY MASS INDEX: 22.66 KG/M2 | SYSTOLIC BLOOD PRESSURE: 122 MMHG | OXYGEN SATURATION: 97 %

## 2019-01-01 VITALS
WEIGHT: 165.4 LBS | BODY MASS INDEX: 25.15 KG/M2 | HEART RATE: 90 BPM | OXYGEN SATURATION: 92 % | RESPIRATION RATE: 16 BRPM | SYSTOLIC BLOOD PRESSURE: 141 MMHG | TEMPERATURE: 97.2 F | DIASTOLIC BLOOD PRESSURE: 84 MMHG

## 2019-01-01 VITALS
WEIGHT: 133.2 LBS | HEART RATE: 105 BPM | OXYGEN SATURATION: 100 % | DIASTOLIC BLOOD PRESSURE: 81 MMHG | BODY MASS INDEX: 20.26 KG/M2 | RESPIRATION RATE: 18 BRPM | SYSTOLIC BLOOD PRESSURE: 135 MMHG

## 2019-01-01 VITALS
RESPIRATION RATE: 16 BRPM | DIASTOLIC BLOOD PRESSURE: 78 MMHG | HEIGHT: 68 IN | WEIGHT: 158.4 LBS | HEART RATE: 64 BPM | TEMPERATURE: 98.2 F | SYSTOLIC BLOOD PRESSURE: 122 MMHG | BODY MASS INDEX: 24.01 KG/M2 | OXYGEN SATURATION: 97 %

## 2019-01-01 VITALS
HEART RATE: 71 BPM | WEIGHT: 155.3 LBS | DIASTOLIC BLOOD PRESSURE: 82 MMHG | BODY MASS INDEX: 23.62 KG/M2 | SYSTOLIC BLOOD PRESSURE: 154 MMHG | OXYGEN SATURATION: 100 % | RESPIRATION RATE: 16 BRPM | TEMPERATURE: 98.3 F

## 2019-01-01 VITALS
SYSTOLIC BLOOD PRESSURE: 133 MMHG | OXYGEN SATURATION: 99 % | HEART RATE: 89 BPM | RESPIRATION RATE: 18 BRPM | DIASTOLIC BLOOD PRESSURE: 82 MMHG

## 2019-01-01 VITALS
RESPIRATION RATE: 16 BRPM | WEIGHT: 142.5 LBS | HEART RATE: 90 BPM | BODY MASS INDEX: 21.6 KG/M2 | HEIGHT: 68 IN | OXYGEN SATURATION: 96 % | SYSTOLIC BLOOD PRESSURE: 119 MMHG | DIASTOLIC BLOOD PRESSURE: 82 MMHG

## 2019-01-01 VITALS
OXYGEN SATURATION: 99 % | WEIGHT: 129.6 LBS | DIASTOLIC BLOOD PRESSURE: 79 MMHG | HEART RATE: 111 BPM | SYSTOLIC BLOOD PRESSURE: 110 MMHG | TEMPERATURE: 97.5 F | BODY MASS INDEX: 19.71 KG/M2 | RESPIRATION RATE: 18 BRPM

## 2019-01-01 VITALS
HEART RATE: 115 BPM | OXYGEN SATURATION: 98 % | TEMPERATURE: 97.4 F | RESPIRATION RATE: 16 BRPM | DIASTOLIC BLOOD PRESSURE: 87 MMHG | SYSTOLIC BLOOD PRESSURE: 113 MMHG

## 2019-01-01 VITALS
WEIGHT: 159.7 LBS | DIASTOLIC BLOOD PRESSURE: 73 MMHG | SYSTOLIC BLOOD PRESSURE: 144 MMHG | HEART RATE: 49 BPM | OXYGEN SATURATION: 99 % | HEIGHT: 68 IN | RESPIRATION RATE: 16 BRPM | TEMPERATURE: 96.7 F | BODY MASS INDEX: 24.2 KG/M2

## 2019-01-01 VITALS
OXYGEN SATURATION: 96 % | HEIGHT: 68 IN | DIASTOLIC BLOOD PRESSURE: 81 MMHG | BODY MASS INDEX: 24.58 KG/M2 | RESPIRATION RATE: 16 BRPM | WEIGHT: 162.2 LBS | HEART RATE: 68 BPM | SYSTOLIC BLOOD PRESSURE: 143 MMHG | TEMPERATURE: 97.9 F

## 2019-01-01 VITALS
OXYGEN SATURATION: 96 % | RESPIRATION RATE: 16 BRPM | TEMPERATURE: 98.3 F | HEART RATE: 72 BPM | SYSTOLIC BLOOD PRESSURE: 134 MMHG | DIASTOLIC BLOOD PRESSURE: 86 MMHG

## 2019-01-01 VITALS
OXYGEN SATURATION: 99 % | RESPIRATION RATE: 16 BRPM | WEIGHT: 134 LBS | BODY MASS INDEX: 20.31 KG/M2 | SYSTOLIC BLOOD PRESSURE: 117 MMHG | DIASTOLIC BLOOD PRESSURE: 80 MMHG | HEART RATE: 84 BPM | HEIGHT: 68 IN

## 2019-01-01 VITALS
WEIGHT: 166.13 LBS | BODY MASS INDEX: 25.18 KG/M2 | TEMPERATURE: 98.1 F | DIASTOLIC BLOOD PRESSURE: 72 MMHG | RESPIRATION RATE: 16 BRPM | HEIGHT: 68 IN | OXYGEN SATURATION: 97 % | HEART RATE: 72 BPM | SYSTOLIC BLOOD PRESSURE: 116 MMHG

## 2019-01-01 VITALS
BODY MASS INDEX: 23.65 KG/M2 | WEIGHT: 155.5 LBS | SYSTOLIC BLOOD PRESSURE: 133 MMHG | OXYGEN SATURATION: 93 % | TEMPERATURE: 96.7 F | HEART RATE: 65 BPM | RESPIRATION RATE: 16 BRPM | DIASTOLIC BLOOD PRESSURE: 72 MMHG

## 2019-01-01 VITALS
BODY MASS INDEX: 24.07 KG/M2 | TEMPERATURE: 97.5 F | HEIGHT: 68 IN | OXYGEN SATURATION: 97 % | SYSTOLIC BLOOD PRESSURE: 143 MMHG | RESPIRATION RATE: 16 BRPM | DIASTOLIC BLOOD PRESSURE: 86 MMHG | WEIGHT: 158.8 LBS | HEART RATE: 90 BPM

## 2019-01-01 VITALS
DIASTOLIC BLOOD PRESSURE: 83 MMHG | HEIGHT: 68 IN | SYSTOLIC BLOOD PRESSURE: 136 MMHG | OXYGEN SATURATION: 94 % | BODY MASS INDEX: 25.04 KG/M2 | HEART RATE: 83 BPM | WEIGHT: 165.2 LBS

## 2019-01-01 DIAGNOSIS — C18.7 ADENOCARCINOMA OF SIGMOID COLON (H): ICD-10-CM

## 2019-01-01 DIAGNOSIS — R19.5 LOOSE STOOLS: ICD-10-CM

## 2019-01-01 DIAGNOSIS — K56.609 LARGE BOWEL OBSTRUCTION (H): ICD-10-CM

## 2019-01-01 DIAGNOSIS — C18.7 ADENOCARCINOMA OF SIGMOID COLON (H): Primary | ICD-10-CM

## 2019-01-01 DIAGNOSIS — E87.6 HYPOKALEMIA: Primary | ICD-10-CM

## 2019-01-01 DIAGNOSIS — K56.49 OTHER IMPACTION OF INTESTINE (H): ICD-10-CM

## 2019-01-01 DIAGNOSIS — N40.1 BENIGN PROSTATIC HYPERPLASIA WITH POST-VOID DRIBBLING: ICD-10-CM

## 2019-01-01 DIAGNOSIS — C18.9 MALIGNANT NEOPLASM OF COLON, UNSPECIFIED PART OF COLON (H): ICD-10-CM

## 2019-01-01 DIAGNOSIS — K62.89 RECTAL PAIN: ICD-10-CM

## 2019-01-01 DIAGNOSIS — E87.6 HYPOKALEMIA: ICD-10-CM

## 2019-01-01 DIAGNOSIS — N39.43 BENIGN PROSTATIC HYPERPLASIA WITH POST-VOID DRIBBLING: ICD-10-CM

## 2019-01-01 DIAGNOSIS — R19.7 DIARRHEA: Primary | ICD-10-CM

## 2019-01-01 DIAGNOSIS — D69.6 THROMBOCYTOPENIA (H): ICD-10-CM

## 2019-01-01 DIAGNOSIS — R14.0 ABDOMINAL BLOATING: ICD-10-CM

## 2019-01-01 DIAGNOSIS — R19.5 LOOSE STOOLS: Primary | ICD-10-CM

## 2019-01-01 DIAGNOSIS — K62.89 RECTAL PAIN: Primary | ICD-10-CM

## 2019-01-01 DIAGNOSIS — R11.0 NAUSEA: ICD-10-CM

## 2019-01-01 DIAGNOSIS — Z23 NEED FOR PROPHYLACTIC VACCINATION AND INOCULATION AGAINST INFLUENZA: Primary | ICD-10-CM

## 2019-01-01 DIAGNOSIS — R19.7 DIARRHEA: ICD-10-CM

## 2019-01-01 DIAGNOSIS — L27.0 DRUG-INDUCED SKIN RASH: ICD-10-CM

## 2019-01-01 DIAGNOSIS — K90.89 OTHER SPECIFIED INTESTINAL MALABSORPTION: ICD-10-CM

## 2019-01-01 DIAGNOSIS — C18.9 MALIGNANT NEOPLASM OF COLON, UNSPECIFIED PART OF COLON (H): Primary | ICD-10-CM

## 2019-01-01 DIAGNOSIS — K56.609 SMALL BOWEL OBSTRUCTION (H): Primary | ICD-10-CM

## 2019-01-01 DIAGNOSIS — N39.43 BENIGN PROSTATIC HYPERPLASIA WITH POST-VOID DRIBBLING: Primary | ICD-10-CM

## 2019-01-01 DIAGNOSIS — N40.1 BENIGN PROSTATIC HYPERPLASIA WITH POST-VOID DRIBBLING: Primary | ICD-10-CM

## 2019-01-01 DIAGNOSIS — R63.4 WEIGHT LOSS: ICD-10-CM

## 2019-01-01 DIAGNOSIS — L70.8 ACNEIFORM RASH: ICD-10-CM

## 2019-01-01 DIAGNOSIS — E55.9 VITAMIN D DEFICIENCY: Primary | ICD-10-CM

## 2019-01-01 DIAGNOSIS — R14.0 ABDOMINAL DISTENTION: Primary | ICD-10-CM

## 2019-01-01 DIAGNOSIS — C78.6 PERITONEAL CARCINOMATOSIS (H): ICD-10-CM

## 2019-01-01 DIAGNOSIS — C18.9 COLON CANCER (H): Primary | ICD-10-CM

## 2019-01-01 DIAGNOSIS — R14.0 ABDOMINAL BLOATING: Primary | ICD-10-CM

## 2019-01-01 DIAGNOSIS — K56.609 LARGE BOWEL OBSTRUCTION (H): Primary | ICD-10-CM

## 2019-01-01 DIAGNOSIS — K56.699 COLON STRICTURE (H): ICD-10-CM

## 2019-01-01 DIAGNOSIS — E83.42 HYPOMAGNESEMIA: ICD-10-CM

## 2019-01-01 DIAGNOSIS — F11.90 CHRONIC, CONTINUOUS USE OF OPIOIDS: ICD-10-CM

## 2019-01-01 DIAGNOSIS — D64.9 ANEMIA, UNSPECIFIED TYPE: ICD-10-CM

## 2019-01-01 DIAGNOSIS — R30.0 DYSURIA: ICD-10-CM

## 2019-01-01 DIAGNOSIS — L27.0 DRUG-INDUCED SKIN RASH: Primary | ICD-10-CM

## 2019-01-01 DIAGNOSIS — Z13.6 CARDIOVASCULAR SCREENING; LDL GOAL LESS THAN 160: Primary | ICD-10-CM

## 2019-01-01 DIAGNOSIS — L70.8 ACNEIFORM RASH: Primary | ICD-10-CM

## 2019-01-01 LAB
ABO + RH BLD: NORMAL
ALBUMIN SERPL-MCNC: 2.7 G/DL (ref 3.4–5)
ALBUMIN SERPL-MCNC: 2.9 G/DL (ref 3.4–5)
ALBUMIN SERPL-MCNC: 2.9 G/DL (ref 3.4–5)
ALBUMIN SERPL-MCNC: 3.1 G/DL (ref 3.4–5)
ALBUMIN SERPL-MCNC: 3.2 G/DL (ref 3.4–5)
ALBUMIN SERPL-MCNC: 3.4 G/DL (ref 3.4–5)
ALBUMIN SERPL-MCNC: 3.5 G/DL (ref 3.4–5)
ALBUMIN SERPL-MCNC: 3.5 G/DL (ref 3.4–5)
ALBUMIN SERPL-MCNC: 4 G/DL (ref 3.4–5)
ALBUMIN SERPL-MCNC: 4.2 G/DL (ref 3.4–5)
ALBUMIN UR-MCNC: NEGATIVE MG/DL
ALP SERPL-CCNC: 120 U/L (ref 40–150)
ALP SERPL-CCNC: 64 U/L (ref 40–150)
ALP SERPL-CCNC: 71 U/L (ref 40–150)
ALP SERPL-CCNC: 76 U/L (ref 40–150)
ALP SERPL-CCNC: 82 U/L (ref 40–150)
ALP SERPL-CCNC: 83 U/L (ref 40–150)
ALP SERPL-CCNC: 84 U/L (ref 40–150)
ALP SERPL-CCNC: 85 U/L (ref 40–150)
ALP SERPL-CCNC: 89 U/L (ref 40–150)
ALP SERPL-CCNC: 97 U/L (ref 40–150)
ALT SERPL W P-5'-P-CCNC: 22 U/L (ref 0–70)
ALT SERPL W P-5'-P-CCNC: 22 U/L (ref 0–70)
ALT SERPL W P-5'-P-CCNC: 26 U/L (ref 0–70)
ALT SERPL W P-5'-P-CCNC: 29 U/L (ref 0–70)
ALT SERPL W P-5'-P-CCNC: 32 U/L (ref 0–70)
ALT SERPL W P-5'-P-CCNC: 35 U/L (ref 0–70)
ALT SERPL W P-5'-P-CCNC: 37 U/L (ref 0–70)
ALT SERPL W P-5'-P-CCNC: 38 U/L (ref 0–70)
ALT SERPL W P-5'-P-CCNC: 49 U/L (ref 0–70)
ALT SERPL W P-5'-P-CCNC: 91 U/L (ref 0–70)
ALT SERPL-CCNC: 34 IU/L (ref 12–68)
ANION GAP SERPL CALCULATED.3IONS-SCNC: 10 MMOL/L (ref 3–14)
ANION GAP SERPL CALCULATED.3IONS-SCNC: 3 MMOL/L (ref 3–14)
ANION GAP SERPL CALCULATED.3IONS-SCNC: 4 MMOL/L (ref 3–14)
ANION GAP SERPL CALCULATED.3IONS-SCNC: 5 MMOL/L (ref 3–14)
ANION GAP SERPL CALCULATED.3IONS-SCNC: 6 MMOL/L (ref 3–14)
ANION GAP SERPL CALCULATED.3IONS-SCNC: 6 MMOL/L (ref 3–14)
ANION GAP SERPL CALCULATED.3IONS-SCNC: 7 MMOL/L (ref 3–14)
ANION GAP SERPL CALCULATED.3IONS-SCNC: 7 MMOL/L (ref 3–14)
APPEARANCE UR: CLEAR
AST SERPL W P-5'-P-CCNC: 14 U/L (ref 0–45)
AST SERPL W P-5'-P-CCNC: 15 U/L (ref 0–45)
AST SERPL W P-5'-P-CCNC: 16 U/L (ref 0–45)
AST SERPL W P-5'-P-CCNC: 16 U/L (ref 0–45)
AST SERPL W P-5'-P-CCNC: 18 U/L (ref 0–45)
AST SERPL W P-5'-P-CCNC: 18 U/L (ref 0–45)
AST SERPL W P-5'-P-CCNC: 22 U/L (ref 0–45)
AST SERPL W P-5'-P-CCNC: 32 U/L (ref 0–45)
AST SERPL W P-5'-P-CCNC: 35 U/L (ref 0–45)
AST SERPL W P-5'-P-CCNC: 39 U/L (ref 0–45)
AST SERPL-CCNC: 22 IU/L (ref 15–37)
BASOPHILS # BLD AUTO: 0 10E9/L (ref 0–0.2)
BASOPHILS # BLD AUTO: 0.1 10E9/L (ref 0–0.2)
BASOPHILS NFR BLD AUTO: 0.3 %
BASOPHILS NFR BLD AUTO: 0.3 %
BASOPHILS NFR BLD AUTO: 0.4 %
BASOPHILS NFR BLD AUTO: 0.6 %
BASOPHILS NFR BLD AUTO: 0.7 %
BASOPHILS NFR BLD AUTO: 0.9 %
BASOPHILS NFR BLD AUTO: 1 %
BASOPHILS NFR BLD AUTO: 1.2 %
BASOPHILS NFR BLD AUTO: 1.6 %
BILIRUB SERPL-MCNC: 0.3 MG/DL (ref 0.2–1.3)
BILIRUB SERPL-MCNC: 0.5 MG/DL (ref 0.2–1.3)
BILIRUB SERPL-MCNC: 0.5 MG/DL (ref 0.2–1.3)
BILIRUB SERPL-MCNC: 0.6 MG/DL (ref 0.2–1.3)
BILIRUB SERPL-MCNC: 0.6 MG/DL (ref 0.2–1.3)
BILIRUB SERPL-MCNC: 0.8 MG/DL (ref 0.2–1.3)
BILIRUB SERPL-MCNC: 0.9 MG/DL (ref 0.2–1.3)
BILIRUB SERPL-MCNC: 1 MG/DL (ref 0.2–1.3)
BILIRUB UR QL STRIP: NEGATIVE
BLD GP AB SCN SERPL QL: NORMAL
BLD GP AB SCN SERPL QL: NORMAL
BLOOD BANK CMNT PATIENT-IMP: NORMAL
BLOOD BANK CMNT PATIENT-IMP: NORMAL
BUN SERPL-MCNC: 14 MG/DL (ref 7–30)
BUN SERPL-MCNC: 15 MG/DL (ref 7–30)
BUN SERPL-MCNC: 16 MG/DL (ref 7–30)
BUN SERPL-MCNC: 17 MG/DL (ref 7–30)
BUN SERPL-MCNC: 17 MG/DL (ref 7–30)
BUN SERPL-MCNC: 18 MG/DL (ref 7–30)
BUN SERPL-MCNC: 19 MG/DL (ref 7–30)
BUN SERPL-MCNC: 22 MG/DL (ref 7–30)
C DIFF TOX B STL QL: NEGATIVE
CALCIUM SERPL-MCNC: 8 MG/DL (ref 8.5–10.1)
CALCIUM SERPL-MCNC: 8.2 MG/DL (ref 8.5–10.1)
CALCIUM SERPL-MCNC: 8.2 MG/DL (ref 8.5–10.1)
CALCIUM SERPL-MCNC: 8.4 MG/DL (ref 8.5–10.1)
CALCIUM SERPL-MCNC: 8.5 MG/DL (ref 8.5–10.1)
CALCIUM SERPL-MCNC: 8.6 MG/DL (ref 8.5–10.1)
CALCIUM SERPL-MCNC: 8.7 MG/DL (ref 8.5–10.1)
CALCIUM SERPL-MCNC: 8.8 MG/DL (ref 8.5–10.1)
CALCIUM SERPL-MCNC: 8.8 MG/DL (ref 8.5–10.1)
CALCIUM SERPL-MCNC: 9 MG/DL (ref 8.5–10.1)
CEA SERPL-MCNC: 2.1 UG/L (ref 0–2.5)
CEA SERPL-MCNC: 2.7 UG/L (ref 0–2.5)
CEA SERPL-MCNC: 2.9 UG/L (ref 0–2.5)
CEA SERPL-MCNC: 3.5 UG/L (ref 0–2.5)
CHLORIDE SERPL-SCNC: 102 MMOL/L (ref 94–109)
CHLORIDE SERPL-SCNC: 103 MMOL/L (ref 94–109)
CHLORIDE SERPL-SCNC: 104 MMOL/L (ref 94–109)
CHLORIDE SERPL-SCNC: 105 MMOL/L (ref 94–109)
CHLORIDE SERPL-SCNC: 106 MMOL/L (ref 94–109)
CHLORIDE SERPL-SCNC: 107 MMOL/L (ref 94–109)
CHLORIDE SERPL-SCNC: 108 MMOL/L (ref 94–109)
CHLORIDE SERPL-SCNC: 108 MMOL/L (ref 94–109)
CHLORIDE SERPL-SCNC: 110 MMOL/L (ref 94–109)
CHLORIDE SERPL-SCNC: 111 MMOL/L (ref 94–109)
CO2 SERPL-SCNC: 24 MMOL/L (ref 20–32)
CO2 SERPL-SCNC: 26 MMOL/L (ref 20–32)
CO2 SERPL-SCNC: 26 MMOL/L (ref 20–32)
CO2 SERPL-SCNC: 27 MMOL/L (ref 20–32)
CO2 SERPL-SCNC: 28 MMOL/L (ref 20–32)
CO2 SERPL-SCNC: 28 MMOL/L (ref 20–32)
CO2 SERPL-SCNC: 29 MMOL/L (ref 20–32)
CO2 SERPL-SCNC: 30 MMOL/L (ref 20–32)
CO2 SERPL-SCNC: 31 MMOL/L (ref 20–32)
CO2 SERPL-SCNC: 31 MMOL/L (ref 20–32)
COLONOSCOPY: NORMAL
COLOR UR AUTO: YELLOW
COMPREHEN DRUG ANALYSIS UR: NORMAL
COPATH REPORT: NORMAL
CREAT SERPL-MCNC: 0.64 MG/DL (ref 0.66–1.25)
CREAT SERPL-MCNC: 0.67 MG/DL (ref 0.66–1.25)
CREAT SERPL-MCNC: 0.68 MG/DL (ref 0.66–1.25)
CREAT SERPL-MCNC: 0.7 MG/DL (ref 0.66–1.25)
CREAT SERPL-MCNC: 0.75 MG/DL (ref 0.66–1.25)
CREAT SERPL-MCNC: 0.78 MG/DL (ref 0.66–1.25)
CREAT SERPL-MCNC: 0.78 MG/DL (ref 0.66–1.25)
CREAT SERPL-MCNC: 0.8 MG/DL (ref 0.66–1.25)
CREAT SERPL-MCNC: 0.83 MG/DL (ref 0.66–1.25)
CREAT SERPL-MCNC: 0.83 MG/DL (ref 0.66–1.25)
CREAT SERPL-MCNC: 0.87 MG/DL (ref 0.66–1.25)
CREAT SERPL-MCNC: 0.93 MG/DL (ref 0.66–1.25)
CREAT SERPL-MCNC: 0.94 MG/DL (ref 0.7–1.3)
DEPRECATED CALCIDIOL+CALCIFEROL SERPL-MC: 13 UG/L (ref 20–75)
DIFFERENTIAL METHOD BLD: ABNORMAL
EOSINOPHIL # BLD AUTO: 0 10E9/L (ref 0–0.7)
EOSINOPHIL # BLD AUTO: 0.1 10E9/L (ref 0–0.7)
EOSINOPHIL # BLD AUTO: 0.2 10E9/L (ref 0–0.7)
EOSINOPHIL NFR BLD AUTO: 0.2 %
EOSINOPHIL NFR BLD AUTO: 0.3 %
EOSINOPHIL NFR BLD AUTO: 0.6 %
EOSINOPHIL NFR BLD AUTO: 0.8 %
EOSINOPHIL NFR BLD AUTO: 1.6 %
EOSINOPHIL NFR BLD AUTO: 1.9 %
EOSINOPHIL NFR BLD AUTO: 2 %
EOSINOPHIL NFR BLD AUTO: 2 %
EOSINOPHIL NFR BLD AUTO: 3 %
EOSINOPHIL NFR BLD AUTO: 3.5 %
ERYTHROCYTE [DISTWIDTH] IN BLOOD BY AUTOMATED COUNT: 12.9 % (ref 10–15)
ERYTHROCYTE [DISTWIDTH] IN BLOOD BY AUTOMATED COUNT: 13.1 % (ref 10–15)
ERYTHROCYTE [DISTWIDTH] IN BLOOD BY AUTOMATED COUNT: 13.5 % (ref 10–15)
ERYTHROCYTE [DISTWIDTH] IN BLOOD BY AUTOMATED COUNT: 13.8 % (ref 10–15)
ERYTHROCYTE [DISTWIDTH] IN BLOOD BY AUTOMATED COUNT: 13.9 % (ref 10–15)
ERYTHROCYTE [DISTWIDTH] IN BLOOD BY AUTOMATED COUNT: 14 % (ref 10–15)
ERYTHROCYTE [DISTWIDTH] IN BLOOD BY AUTOMATED COUNT: 14.1 % (ref 10–15)
ERYTHROCYTE [DISTWIDTH] IN BLOOD BY AUTOMATED COUNT: 14.4 % (ref 10–15)
ERYTHROCYTE [DISTWIDTH] IN BLOOD BY AUTOMATED COUNT: 14.8 % (ref 10–15)
ERYTHROCYTE [DISTWIDTH] IN BLOOD BY AUTOMATED COUNT: 14.9 % (ref 10–15)
ERYTHROCYTE [DISTWIDTH] IN BLOOD BY AUTOMATED COUNT: 15.2 % (ref 10–15)
ERYTHROCYTE [DISTWIDTH] IN BLOOD BY AUTOMATED COUNT: 15.5 % (ref 10–15)
ERYTHROCYTE [DISTWIDTH] IN BLOOD BY AUTOMATED COUNT: 15.8 % (ref 10–15)
FLEXIBLE SIGMOIDOSCOPY: NORMAL
GFR SERPL CREATININE-BSD FRML MDRD: 90 ML/MIN/{1.73_M2}
GFR SERPL CREATININE-BSD FRML MDRD: >60 ML/MIN
GFR SERPL CREATININE-BSD FRML MDRD: >90 ML/MIN/{1.73_M2}
GLUCOSE BLDC GLUCOMTR-MCNC: 91 MG/DL (ref 70–99)
GLUCOSE SERPL-MCNC: 102 MG/DL (ref 70–99)
GLUCOSE SERPL-MCNC: 104 MG/DL (ref 70–99)
GLUCOSE SERPL-MCNC: 104 MG/DL (ref 70–99)
GLUCOSE SERPL-MCNC: 108 MG/DL (ref 70–110)
GLUCOSE SERPL-MCNC: 114 MG/DL (ref 70–99)
GLUCOSE SERPL-MCNC: 119 MG/DL (ref 70–99)
GLUCOSE SERPL-MCNC: 120 MG/DL (ref 70–99)
GLUCOSE SERPL-MCNC: 121 MG/DL (ref 70–99)
GLUCOSE SERPL-MCNC: 122 MG/DL (ref 70–99)
GLUCOSE SERPL-MCNC: 127 MG/DL (ref 70–99)
GLUCOSE SERPL-MCNC: 132 MG/DL (ref 70–99)
GLUCOSE SERPL-MCNC: 134 MG/DL (ref 70–99)
GLUCOSE SERPL-MCNC: 135 MG/DL (ref 70–99)
GLUCOSE SERPL-MCNC: 198 MG/DL (ref 70–99)
GLUCOSE SERPL-MCNC: 92 MG/DL (ref 70–99)
GLUCOSE UR STRIP-MCNC: NEGATIVE MG/DL
HCT VFR BLD AUTO: 31.5 % (ref 40–53)
HCT VFR BLD AUTO: 31.9 % (ref 40–53)
HCT VFR BLD AUTO: 34.5 % (ref 40–53)
HCT VFR BLD AUTO: 35 % (ref 40–53)
HCT VFR BLD AUTO: 36.7 % (ref 40–53)
HCT VFR BLD AUTO: 37.4 % (ref 40–53)
HCT VFR BLD AUTO: 37.4 % (ref 40–53)
HCT VFR BLD AUTO: 37.6 % (ref 40–53)
HCT VFR BLD AUTO: 37.8 % (ref 40–53)
HCT VFR BLD AUTO: 39.5 % (ref 40–53)
HCT VFR BLD AUTO: 40 % (ref 40–53)
HCT VFR BLD AUTO: 40.5 % (ref 40–53)
HCT VFR BLD AUTO: 40.8 % (ref 40–53)
HGB BLD-MCNC: 11.2 G/DL (ref 13.3–17.7)
HGB BLD-MCNC: 11.4 G/DL (ref 13.3–17.7)
HGB BLD-MCNC: 12.1 G/DL (ref 13.3–17.7)
HGB BLD-MCNC: 12.5 G/DL (ref 13.3–17.7)
HGB BLD-MCNC: 12.6 G/DL (ref 13.3–17.7)
HGB BLD-MCNC: 12.9 G/DL (ref 13.3–17.7)
HGB BLD-MCNC: 12.9 G/DL (ref 13.3–17.7)
HGB BLD-MCNC: 13.1 G/DL (ref 13.3–17.7)
HGB BLD-MCNC: 13.4 G/DL (ref 13.3–17.7)
HGB BLD-MCNC: 13.9 G/DL (ref 13.3–17.7)
HGB BLD-MCNC: 14.2 G/DL (ref 13.3–17.7)
HGB BLD-MCNC: 14.3 G/DL (ref 13.3–17.7)
HGB BLD-MCNC: 14.8 G/DL (ref 13.3–17.7)
HGB UR QL STRIP: NEGATIVE
IMM GRANULOCYTES # BLD: 0 10E9/L (ref 0–0.4)
IMM GRANULOCYTES # BLD: 0.1 10E9/L (ref 0–0.4)
IMM GRANULOCYTES NFR BLD: 0 %
IMM GRANULOCYTES NFR BLD: 0.3 %
IMM GRANULOCYTES NFR BLD: 0.3 %
IMM GRANULOCYTES NFR BLD: 0.4 %
IMM GRANULOCYTES NFR BLD: 0.4 %
IMM GRANULOCYTES NFR BLD: 1 %
IMM GRANULOCYTES NFR BLD: 1.1 %
IMM GRANULOCYTES NFR BLD: 1.5 %
INR PPP: 1.21 (ref 0.86–1.14)
KETONES UR STRIP-MCNC: NEGATIVE MG/DL
LEUKOCYTE ESTERASE UR QL STRIP: NEGATIVE
LYMPHOCYTES # BLD AUTO: 0.6 10E9/L (ref 0.8–5.3)
LYMPHOCYTES # BLD AUTO: 0.7 10E9/L (ref 0.8–5.3)
LYMPHOCYTES # BLD AUTO: 0.9 10E9/L (ref 0.8–5.3)
LYMPHOCYTES # BLD AUTO: 1 10E9/L (ref 0.8–5.3)
LYMPHOCYTES # BLD AUTO: 1.2 10E9/L (ref 0.8–5.3)
LYMPHOCYTES # BLD AUTO: 1.4 10E9/L (ref 0.8–5.3)
LYMPHOCYTES # BLD AUTO: 1.7 10E9/L (ref 0.8–5.3)
LYMPHOCYTES # BLD AUTO: 1.9 10E9/L (ref 0.8–5.3)
LYMPHOCYTES # BLD AUTO: 2 10E9/L (ref 0.8–5.3)
LYMPHOCYTES # BLD AUTO: 2.1 10E9/L (ref 0.8–5.3)
LYMPHOCYTES NFR BLD AUTO: 11.1 %
LYMPHOCYTES NFR BLD AUTO: 14.3 %
LYMPHOCYTES NFR BLD AUTO: 15.1 %
LYMPHOCYTES NFR BLD AUTO: 20.3 %
LYMPHOCYTES NFR BLD AUTO: 21.5 %
LYMPHOCYTES NFR BLD AUTO: 23.8 %
LYMPHOCYTES NFR BLD AUTO: 27.4 %
LYMPHOCYTES NFR BLD AUTO: 29.2 %
LYMPHOCYTES NFR BLD AUTO: 30 %
LYMPHOCYTES NFR BLD AUTO: 34.9 %
MAGNESIUM SERPL-MCNC: 1.5 MG/DL (ref 1.6–2.3)
MAGNESIUM SERPL-MCNC: 1.5 MG/DL (ref 1.6–2.3)
MAGNESIUM SERPL-MCNC: 1.6 MG/DL (ref 1.6–2.3)
MAGNESIUM SERPL-MCNC: 1.6 MG/DL (ref 1.6–2.3)
MAGNESIUM SERPL-MCNC: 1.8 MG/DL (ref 1.6–2.3)
MAGNESIUM SERPL-MCNC: 1.9 MG/DL (ref 1.6–2.3)
MCH RBC QN AUTO: 30.8 PG (ref 26.5–33)
MCH RBC QN AUTO: 30.8 PG (ref 26.5–33)
MCH RBC QN AUTO: 30.9 PG (ref 26.5–33)
MCH RBC QN AUTO: 31 PG (ref 26.5–33)
MCH RBC QN AUTO: 31.1 PG (ref 26.5–33)
MCH RBC QN AUTO: 31.6 PG (ref 26.5–33)
MCH RBC QN AUTO: 31.9 PG (ref 26.5–33)
MCH RBC QN AUTO: 32 PG (ref 26.5–33)
MCH RBC QN AUTO: 32.1 PG (ref 26.5–33)
MCH RBC QN AUTO: 32.3 PG (ref 26.5–33)
MCH RBC QN AUTO: 32.5 PG (ref 26.5–33)
MCHC RBC AUTO-ENTMCNC: 33.7 G/DL (ref 31.5–36.5)
MCHC RBC AUTO-ENTMCNC: 34.1 G/DL (ref 31.5–36.5)
MCHC RBC AUTO-ENTMCNC: 34.5 G/DL (ref 31.5–36.5)
MCHC RBC AUTO-ENTMCNC: 34.8 G/DL (ref 31.5–36.5)
MCHC RBC AUTO-ENTMCNC: 35.1 G/DL (ref 31.5–36.5)
MCHC RBC AUTO-ENTMCNC: 35.4 G/DL (ref 31.5–36.5)
MCHC RBC AUTO-ENTMCNC: 35.7 G/DL (ref 31.5–36.5)
MCHC RBC AUTO-ENTMCNC: 35.8 G/DL (ref 31.5–36.5)
MCHC RBC AUTO-ENTMCNC: 36.2 G/DL (ref 31.5–36.5)
MCHC RBC AUTO-ENTMCNC: 37.5 G/DL (ref 31.5–36.5)
MCV RBC AUTO: 86 FL (ref 78–100)
MCV RBC AUTO: 88 FL (ref 78–100)
MCV RBC AUTO: 89 FL (ref 78–100)
MCV RBC AUTO: 90 FL (ref 78–100)
MCV RBC AUTO: 90 FL (ref 78–100)
MCV RBC AUTO: 91 FL (ref 78–100)
MCV RBC AUTO: 92 FL (ref 78–100)
MCV RBC AUTO: 93 FL (ref 78–100)
MCV RBC AUTO: 93 FL (ref 78–100)
MCV RBC AUTO: 94 FL (ref 78–100)
MONOCYTES # BLD AUTO: 0.3 10E9/L (ref 0–1.3)
MONOCYTES # BLD AUTO: 0.4 10E9/L (ref 0–1.3)
MONOCYTES # BLD AUTO: 0.5 10E9/L (ref 0–1.3)
MONOCYTES # BLD AUTO: 0.5 10E9/L (ref 0–1.3)
MONOCYTES # BLD AUTO: 0.7 10E9/L (ref 0–1.3)
MONOCYTES # BLD AUTO: 0.8 10E9/L (ref 0–1.3)
MONOCYTES # BLD AUTO: 1 10E9/L (ref 0–1.3)
MONOCYTES NFR BLD AUTO: 14.8 %
MONOCYTES NFR BLD AUTO: 16.2 %
MONOCYTES NFR BLD AUTO: 4.8 %
MONOCYTES NFR BLD AUTO: 5.3 %
MONOCYTES NFR BLD AUTO: 5.9 %
MONOCYTES NFR BLD AUTO: 6.5 %
MONOCYTES NFR BLD AUTO: 7.5 %
MONOCYTES NFR BLD AUTO: 8 %
MONOCYTES NFR BLD AUTO: 8 %
MONOCYTES NFR BLD AUTO: 9.1 %
NEUTROPHILS # BLD AUTO: 1.9 10E9/L (ref 1.6–8.3)
NEUTROPHILS # BLD AUTO: 2 10E9/L (ref 1.6–8.3)
NEUTROPHILS # BLD AUTO: 2.6 10E9/L (ref 1.6–8.3)
NEUTROPHILS # BLD AUTO: 2.9 10E9/L (ref 1.6–8.3)
NEUTROPHILS # BLD AUTO: 3.4 10E9/L (ref 1.6–8.3)
NEUTROPHILS # BLD AUTO: 4 10E9/L (ref 1.6–8.3)
NEUTROPHILS # BLD AUTO: 4.5 10E9/L (ref 1.6–8.3)
NEUTROPHILS # BLD AUTO: 5.3 10E9/L (ref 1.6–8.3)
NEUTROPHILS # BLD AUTO: 6.3 10E9/L (ref 1.6–8.3)
NEUTROPHILS # BLD AUTO: 8.8 10E9/L (ref 1.6–8.3)
NEUTROPHILS NFR BLD AUTO: 43.7 %
NEUTROPHILS NFR BLD AUTO: 58.7 %
NEUTROPHILS NFR BLD AUTO: 60 %
NEUTROPHILS NFR BLD AUTO: 60.7 %
NEUTROPHILS NFR BLD AUTO: 61.8 %
NEUTROPHILS NFR BLD AUTO: 67.6 %
NEUTROPHILS NFR BLD AUTO: 68.7 %
NEUTROPHILS NFR BLD AUTO: 76.2 %
NEUTROPHILS NFR BLD AUTO: 79.5 %
NEUTROPHILS NFR BLD AUTO: 81.5 %
NITRATE UR QL: NEGATIVE
NRBC # BLD AUTO: 0 10*3/UL
NRBC BLD AUTO-RTO: 0 /100
PH UR STRIP: 6 PH (ref 5–7)
PHOSPHATE SERPL-MCNC: 1.9 MG/DL (ref 2.5–4.5)
PHOSPHATE SERPL-MCNC: 2.6 MG/DL (ref 2.5–4.5)
PLATELET # BLD AUTO: 104 10E9/L (ref 150–450)
PLATELET # BLD AUTO: 121 10E9/L (ref 150–450)
PLATELET # BLD AUTO: 129 10E9/L (ref 150–450)
PLATELET # BLD AUTO: 134 10E9/L (ref 150–450)
PLATELET # BLD AUTO: 138 10E9/L (ref 150–450)
PLATELET # BLD AUTO: 147 10E9/L (ref 150–450)
PLATELET # BLD AUTO: 227 10E9/L (ref 150–450)
PLATELET # BLD AUTO: 48 10E9/L (ref 150–450)
PLATELET # BLD AUTO: 56 10E9/L (ref 150–450)
PLATELET # BLD AUTO: 57 10E9/L (ref 150–450)
PLATELET # BLD AUTO: 75 10E9/L (ref 150–450)
PLATELET # BLD AUTO: 77 10E9/L (ref 150–450)
PLATELET # BLD AUTO: 87 10E9/L (ref 150–450)
PLATELET # BLD EST: ABNORMAL 10*3/UL
POTASSIUM SERPL-SCNC: 2.8 MMOL/L (ref 3.4–5.3)
POTASSIUM SERPL-SCNC: 3 MMOL/L (ref 3.4–5.3)
POTASSIUM SERPL-SCNC: 3 MMOL/L (ref 3.4–5.3)
POTASSIUM SERPL-SCNC: 3.1 MMOL/L (ref 3.4–5.3)
POTASSIUM SERPL-SCNC: 3.2 MMOL/L (ref 3.4–5.3)
POTASSIUM SERPL-SCNC: 3.3 MMOL/L (ref 3.4–5.3)
POTASSIUM SERPL-SCNC: 3.3 MMOL/L (ref 3.4–5.3)
POTASSIUM SERPL-SCNC: 3.5 MMOL/L (ref 3.4–5.3)
POTASSIUM SERPL-SCNC: 3.6 MMOL/L (ref 3.4–5.3)
POTASSIUM SERPL-SCNC: 3.7 MMOL/L (ref 3.4–5.3)
POTASSIUM SERPL-SCNC: 3.7 MMOL/L (ref 3.4–5.3)
POTASSIUM SERPL-SCNC: 3.8 MMOL/L (ref 3.4–5.3)
POTASSIUM SERPL-SCNC: 3.9 MMOL/L (ref 3.4–5.3)
POTASSIUM SERPL-SCNC: 4.1 MMOL/L (ref 3.4–5.3)
POTASSIUM SERPL-SCNC: 4.3 MMOL/L (ref 3.5–5.1)
POTASSIUM SERPL-SCNC: 4.5 MMOL/L (ref 3.4–5.3)
PREALB SERPL IA-MCNC: 10 MG/DL (ref 15–45)
PREALB SERPL IA-MCNC: 19 MG/DL (ref 15–45)
PROT SERPL-MCNC: 5.7 G/DL (ref 6.8–8.8)
PROT SERPL-MCNC: 5.7 G/DL (ref 6.8–8.8)
PROT SERPL-MCNC: 5.8 G/DL (ref 6.8–8.8)
PROT SERPL-MCNC: 5.8 G/DL (ref 6.8–8.8)
PROT SERPL-MCNC: 6.2 G/DL (ref 6.8–8.8)
PROT SERPL-MCNC: 6.4 G/DL (ref 6.8–8.8)
PROT SERPL-MCNC: 6.6 G/DL (ref 6.8–8.8)
PROT SERPL-MCNC: 6.8 G/DL (ref 6.8–8.8)
PROT SERPL-MCNC: 7.1 G/DL (ref 6.8–8.8)
PROT SERPL-MCNC: 7.2 G/DL (ref 6.8–8.8)
RADIOLOGIST FLAGS: ABNORMAL
RADIOLOGIST FLAGS: ABNORMAL
RBC # BLD AUTO: 3.45 10E12/L (ref 4.4–5.9)
RBC # BLD AUTO: 3.51 10E12/L (ref 4.4–5.9)
RBC # BLD AUTO: 3.9 10E12/L (ref 4.4–5.9)
RBC # BLD AUTO: 3.92 10E12/L (ref 4.4–5.9)
RBC # BLD AUTO: 3.97 10E12/L (ref 4.4–5.9)
RBC # BLD AUTO: 4.06 10E12/L (ref 4.4–5.9)
RBC # BLD AUTO: 4.08 10E12/L (ref 4.4–5.9)
RBC # BLD AUTO: 4.19 10E12/L (ref 4.4–5.9)
RBC # BLD AUTO: 4.24 10E12/L (ref 4.4–5.9)
RBC # BLD AUTO: 4.46 10E12/L (ref 4.4–5.9)
RBC # BLD AUTO: 4.51 10E12/L (ref 4.4–5.9)
RBC # BLD AUTO: 4.56 10E12/L (ref 4.4–5.9)
RBC # BLD AUTO: 4.62 10E12/L (ref 4.4–5.9)
RBC MORPH BLD: NORMAL
SODIUM SERPL-SCNC: 137 MMOL/L (ref 133–144)
SODIUM SERPL-SCNC: 137 MMOL/L (ref 133–144)
SODIUM SERPL-SCNC: 138 MMOL/L (ref 133–144)
SODIUM SERPL-SCNC: 138 MMOL/L (ref 133–144)
SODIUM SERPL-SCNC: 139 MMOL/L (ref 133–144)
SODIUM SERPL-SCNC: 139 MMOL/L (ref 133–144)
SODIUM SERPL-SCNC: 140 MMOL/L (ref 133–144)
SODIUM SERPL-SCNC: 141 MMOL/L (ref 133–144)
SODIUM SERPL-SCNC: 141 MMOL/L (ref 133–144)
SODIUM SERPL-SCNC: 142 MMOL/L (ref 133–144)
SODIUM SERPL-SCNC: 143 MMOL/L (ref 133–144)
SODIUM SERPL-SCNC: 143 MMOL/L (ref 133–144)
SOURCE: NORMAL
SP GR UR STRIP: <=1.005 (ref 1–1.03)
SPECIMEN EXP DATE BLD: NORMAL
SPECIMEN EXP DATE BLD: NORMAL
SPECIMEN SOURCE: NORMAL
UROBILINOGEN UR STRIP-ACNC: 0.2 EU/DL (ref 0.2–1)
VARIANT LYMPHS BLD QL SMEAR: PRESENT
WBC # BLD AUTO: 11.5 10E9/L (ref 4–11)
WBC # BLD AUTO: 3.2 10E9/L (ref 4–11)
WBC # BLD AUTO: 3.2 10E9/L (ref 4–11)
WBC # BLD AUTO: 3.9 10E9/L (ref 4–11)
WBC # BLD AUTO: 4.4 10E9/L (ref 4–11)
WBC # BLD AUTO: 4.8 10E9/L (ref 4–11)
WBC # BLD AUTO: 4.9 10E9/L (ref 4–11)
WBC # BLD AUTO: 5.1 10E9/L (ref 4–11)
WBC # BLD AUTO: 5.5 10E9/L (ref 4–11)
WBC # BLD AUTO: 6 10E9/L (ref 4–11)
WBC # BLD AUTO: 6.6 10E9/L (ref 4–11)
WBC # BLD AUTO: 6.8 10E9/L (ref 4–11)
WBC # BLD AUTO: 9.3 10E9/L (ref 4–11)

## 2019-01-01 PROCEDURE — 86900 BLOOD TYPING SEROLOGIC ABO: CPT | Performed by: COLON & RECTAL SURGERY

## 2019-01-01 PROCEDURE — 96523 IRRIG DRUG DELIVERY DEVICE: CPT

## 2019-01-01 PROCEDURE — 71260 CT THORAX DX C+: CPT | Mod: 59

## 2019-01-01 PROCEDURE — 0D7M8ZZ DILATION OF DESCENDING COLON, VIA NATURAL OR ARTIFICIAL OPENING ENDOSCOPIC: ICD-10-PCS | Performed by: INTERNAL MEDICINE

## 2019-01-01 PROCEDURE — 85025 COMPLETE CBC W/AUTO DIFF WBC: CPT | Performed by: NURSE PRACTITIONER

## 2019-01-01 PROCEDURE — 80053 COMPREHEN METABOLIC PANEL: CPT | Performed by: NURSE PRACTITIONER

## 2019-01-01 PROCEDURE — 25000132 ZZH RX MED GY IP 250 OP 250 PS 637: Performed by: STUDENT IN AN ORGANIZED HEALTH CARE EDUCATION/TRAINING PROGRAM

## 2019-01-01 PROCEDURE — 25800025 ZZH RX 258: Performed by: STUDENT IN AN ORGANIZED HEALTH CARE EDUCATION/TRAINING PROGRAM

## 2019-01-01 PROCEDURE — 99207 ZZC NO CHARGE NURSE ONLY: CPT

## 2019-01-01 PROCEDURE — 80053 COMPREHEN METABOLIC PANEL: CPT | Performed by: COLON & RECTAL SURGERY

## 2019-01-01 PROCEDURE — 25000128 H RX IP 250 OP 636: Performed by: NURSE ANESTHETIST, CERTIFIED REGISTERED

## 2019-01-01 PROCEDURE — 25800030 ZZH RX IP 258 OP 636: Performed by: STUDENT IN AN ORGANIZED HEALTH CARE EDUCATION/TRAINING PROGRAM

## 2019-01-01 PROCEDURE — 81003 URINALYSIS AUTO W/O SCOPE: CPT | Performed by: NURSE PRACTITIONER

## 2019-01-01 PROCEDURE — 74177 CT ABD & PELVIS W/CONTRAST: CPT | Performed by: RADIOLOGY

## 2019-01-01 PROCEDURE — 25800030 ZZH RX IP 258 OP 636: Performed by: COLON & RECTAL SURGERY

## 2019-01-01 PROCEDURE — 80307 DRUG TEST PRSMV CHEM ANLYZR: CPT | Mod: 90 | Performed by: HOSPITALIST

## 2019-01-01 PROCEDURE — 36592 COLLECT BLOOD FROM PICC: CPT | Performed by: COLON & RECTAL SURGERY

## 2019-01-01 PROCEDURE — 80053 COMPREHEN METABOLIC PANEL: CPT | Performed by: STUDENT IN AN ORGANIZED HEALTH CARE EDUCATION/TRAINING PROGRAM

## 2019-01-01 PROCEDURE — C1769 GUIDE WIRE: HCPCS | Performed by: INTERNAL MEDICINE

## 2019-01-01 PROCEDURE — 71260 CT THORAX DX C+: CPT | Performed by: RADIOLOGY

## 2019-01-01 PROCEDURE — 96366 THER/PROPH/DIAG IV INF ADDON: CPT | Performed by: NURSE PRACTITIONER

## 2019-01-01 PROCEDURE — 99000 SPECIMEN HANDLING OFFICE-LAB: CPT | Performed by: HOSPITALIST

## 2019-01-01 PROCEDURE — 96417 CHEMO IV INFUS EACH ADDL SEQ: CPT | Performed by: NURSE PRACTITIONER

## 2019-01-01 PROCEDURE — 84134 ASSAY OF PREALBUMIN: CPT | Performed by: COLON & RECTAL SURGERY

## 2019-01-01 PROCEDURE — 99207 ZZC NO CHARGE LOS: CPT

## 2019-01-01 PROCEDURE — 25000128 H RX IP 250 OP 636: Performed by: STUDENT IN AN ORGANIZED HEALTH CARE EDUCATION/TRAINING PROGRAM

## 2019-01-01 PROCEDURE — 83735 ASSAY OF MAGNESIUM: CPT | Performed by: NURSE PRACTITIONER

## 2019-01-01 PROCEDURE — 96375 TX/PRO/DX INJ NEW DRUG ADDON: CPT | Performed by: NURSE PRACTITIONER

## 2019-01-01 PROCEDURE — 0D7P8DZ DILATION OF RECTUM WITH INTRALUMINAL DEVICE, VIA NATURAL OR ARTIFICIAL OPENING ENDOSCOPIC: ICD-10-PCS | Performed by: INTERNAL MEDICINE

## 2019-01-01 PROCEDURE — 96415 CHEMO IV INFUSION ADDL HR: CPT | Performed by: NURSE PRACTITIONER

## 2019-01-01 PROCEDURE — 36000053 ZZH SURGERY LEVEL 2 EA 15 ADDTL MIN - UMMC: Performed by: INTERNAL MEDICINE

## 2019-01-01 PROCEDURE — C9113 INJ PANTOPRAZOLE SODIUM, VIA: HCPCS | Performed by: COLON & RECTAL SURGERY

## 2019-01-01 PROCEDURE — 25000128 H RX IP 250 OP 636: Performed by: COLON & RECTAL SURGERY

## 2019-01-01 PROCEDURE — A9552 F18 FDG: HCPCS | Performed by: COLON & RECTAL SURGERY

## 2019-01-01 PROCEDURE — 99214 OFFICE O/P EST MOD 30 MIN: CPT | Performed by: INTERNAL MEDICINE

## 2019-01-01 PROCEDURE — C1876 STENT, NON-COA/NON-COV W/DEL: HCPCS | Performed by: INTERNAL MEDICINE

## 2019-01-01 PROCEDURE — 12000001 ZZH R&B MED SURG/OB UMMC

## 2019-01-01 PROCEDURE — 96367 TX/PROPH/DG ADDL SEQ IV INF: CPT | Performed by: INTERNAL MEDICINE

## 2019-01-01 PROCEDURE — 83735 ASSAY OF MAGNESIUM: CPT | Performed by: INTERNAL MEDICINE

## 2019-01-01 PROCEDURE — 80053 COMPREHEN METABOLIC PANEL: CPT | Performed by: INTERNAL MEDICINE

## 2019-01-01 PROCEDURE — 97802 MEDICAL NUTRITION INDIV IN: CPT | Performed by: DIETITIAN, REGISTERED

## 2019-01-01 PROCEDURE — 82378 CARCINOEMBRYONIC ANTIGEN: CPT | Performed by: INTERNAL MEDICINE

## 2019-01-01 PROCEDURE — 74019 RADEX ABDOMEN 2 VIEWS: CPT

## 2019-01-01 PROCEDURE — 96375 TX/PRO/DX INJ NEW DRUG ADDON: CPT | Performed by: INTERNAL MEDICINE

## 2019-01-01 PROCEDURE — 86901 BLOOD TYPING SEROLOGIC RH(D): CPT | Performed by: STUDENT IN AN ORGANIZED HEALTH CARE EDUCATION/TRAINING PROGRAM

## 2019-01-01 PROCEDURE — 85025 COMPLETE CBC W/AUTO DIFF WBC: CPT | Performed by: COLON & RECTAL SURGERY

## 2019-01-01 PROCEDURE — 96360 HYDRATION IV INFUSION INIT: CPT | Performed by: NURSE PRACTITIONER

## 2019-01-01 PROCEDURE — 37000008 ZZH ANESTHESIA TECHNICAL FEE, 1ST 30 MIN: Performed by: INTERNAL MEDICINE

## 2019-01-01 PROCEDURE — 87493 C DIFF AMPLIFIED PROBE: CPT | Performed by: COLON & RECTAL SURGERY

## 2019-01-01 PROCEDURE — 85025 COMPLETE CBC W/AUTO DIFF WBC: CPT | Performed by: STUDENT IN AN ORGANIZED HEALTH CARE EDUCATION/TRAINING PROGRAM

## 2019-01-01 PROCEDURE — 96417 CHEMO IV INFUS EACH ADDL SEQ: CPT | Performed by: INTERNAL MEDICINE

## 2019-01-01 PROCEDURE — 96413 CHEMO IV INFUSION 1 HR: CPT | Performed by: INTERNAL MEDICINE

## 2019-01-01 PROCEDURE — 99214 OFFICE O/P EST MOD 30 MIN: CPT | Performed by: NURSE PRACTITIONER

## 2019-01-01 PROCEDURE — 36415 COLL VENOUS BLD VENIPUNCTURE: CPT | Performed by: COLON & RECTAL SURGERY

## 2019-01-01 PROCEDURE — 80048 BASIC METABOLIC PNL TOTAL CA: CPT | Performed by: NURSE PRACTITIONER

## 2019-01-01 PROCEDURE — 34300033 ZZH RX 343: Performed by: COLON & RECTAL SURGERY

## 2019-01-01 PROCEDURE — 36592 COLLECT BLOOD FROM PICC: CPT | Performed by: STUDENT IN AN ORGANIZED HEALTH CARE EDUCATION/TRAINING PROGRAM

## 2019-01-01 PROCEDURE — 85027 COMPLETE CBC AUTOMATED: CPT | Performed by: COLON & RECTAL SURGERY

## 2019-01-01 PROCEDURE — 36415 COLL VENOUS BLD VENIPUNCTURE: CPT | Performed by: RADIOLOGY

## 2019-01-01 PROCEDURE — 96366 THER/PROPH/DIAG IV INF ADDON: CPT | Performed by: INTERNAL MEDICINE

## 2019-01-01 PROCEDURE — C1726 CATH, BAL DIL, NON-VASCULAR: HCPCS | Performed by: INTERNAL MEDICINE

## 2019-01-01 PROCEDURE — 84100 ASSAY OF PHOSPHORUS: CPT | Performed by: STUDENT IN AN ORGANIZED HEALTH CARE EDUCATION/TRAINING PROGRAM

## 2019-01-01 PROCEDURE — 37000009 ZZH ANESTHESIA TECHNICAL FEE, EACH ADDTL 15 MIN: Performed by: INTERNAL MEDICINE

## 2019-01-01 PROCEDURE — 80048 BASIC METABOLIC PNL TOTAL CA: CPT | Performed by: INTERNAL MEDICINE

## 2019-01-01 PROCEDURE — 25000128 H RX IP 250 OP 636: Performed by: PHYSICIAN ASSISTANT

## 2019-01-01 PROCEDURE — 80053 COMPREHEN METABOLIC PANEL: CPT | Performed by: RADIOLOGY

## 2019-01-01 PROCEDURE — 99214 OFFICE O/P EST MOD 30 MIN: CPT | Mod: 25 | Performed by: INTERNAL MEDICINE

## 2019-01-01 PROCEDURE — 85025 COMPLETE CBC W/AUTO DIFF WBC: CPT | Performed by: INTERNAL MEDICINE

## 2019-01-01 PROCEDURE — 82306 VITAMIN D 25 HYDROXY: CPT | Performed by: INTERNAL MEDICINE

## 2019-01-01 PROCEDURE — 86901 BLOOD TYPING SEROLOGIC RH(D): CPT | Performed by: COLON & RECTAL SURGERY

## 2019-01-01 PROCEDURE — 27210794 ZZH OR GENERAL SUPPLY STERILE: Performed by: INTERNAL MEDICINE

## 2019-01-01 PROCEDURE — 96361 HYDRATE IV INFUSION ADD-ON: CPT | Mod: 59 | Performed by: NURSE PRACTITIONER

## 2019-01-01 PROCEDURE — 36415 COLL VENOUS BLD VENIPUNCTURE: CPT | Performed by: PHYSICIAN ASSISTANT

## 2019-01-01 PROCEDURE — 00000146 ZZHCL STATISTIC GLUCOSE BY METER IP

## 2019-01-01 PROCEDURE — 84132 ASSAY OF SERUM POTASSIUM: CPT | Performed by: INTERNAL MEDICINE

## 2019-01-01 PROCEDURE — 74177 CT ABD & PELVIS W/CONTRAST: CPT | Mod: PS

## 2019-01-01 PROCEDURE — 36415 COLL VENOUS BLD VENIPUNCTURE: CPT | Performed by: INTERNAL MEDICINE

## 2019-01-01 PROCEDURE — 85025 COMPLETE CBC W/AUTO DIFF WBC: CPT | Performed by: RADIOLOGY

## 2019-01-01 PROCEDURE — 90682 RIV4 VACC RECOMBINANT DNA IM: CPT

## 2019-01-01 PROCEDURE — 90471 IMMUNIZATION ADMIN: CPT

## 2019-01-01 PROCEDURE — 96415 CHEMO IV INFUSION ADDL HR: CPT | Performed by: INTERNAL MEDICINE

## 2019-01-01 PROCEDURE — 25000125 ZZHC RX 250: Performed by: NURSE ANESTHETIST, CERTIFIED REGISTERED

## 2019-01-01 PROCEDURE — 40000170 ZZH STATISTIC PRE-PROCEDURE ASSESSMENT II: Performed by: INTERNAL MEDICINE

## 2019-01-01 PROCEDURE — 25000132 ZZH RX MED GY IP 250 OP 250 PS 637: Performed by: COLON & RECTAL SURGERY

## 2019-01-01 PROCEDURE — 84100 ASSAY OF PHOSPHORUS: CPT | Performed by: COLON & RECTAL SURGERY

## 2019-01-01 PROCEDURE — 71000014 ZZH RECOVERY PHASE 1 LEVEL 2 FIRST HR: Performed by: INTERNAL MEDICINE

## 2019-01-01 PROCEDURE — 99207 ZZC NO CHARGE LOS: CPT | Performed by: DIETITIAN, REGISTERED

## 2019-01-01 PROCEDURE — 40000277 XR SURGERY CARM FLUORO LESS THAN 5 MIN W STILLS: Mod: TC

## 2019-01-01 PROCEDURE — 86850 RBC ANTIBODY SCREEN: CPT | Performed by: STUDENT IN AN ORGANIZED HEALTH CARE EDUCATION/TRAINING PROGRAM

## 2019-01-01 PROCEDURE — 86850 RBC ANTIBODY SCREEN: CPT | Performed by: COLON & RECTAL SURGERY

## 2019-01-01 PROCEDURE — 99214 OFFICE O/P EST MOD 30 MIN: CPT | Mod: 25 | Performed by: NURSE PRACTITIONER

## 2019-01-01 PROCEDURE — 99215 OFFICE O/P EST HI 40 MIN: CPT | Performed by: INTERNAL MEDICINE

## 2019-01-01 PROCEDURE — 25000125 ZZHC RX 250: Performed by: STUDENT IN AN ORGANIZED HEALTH CARE EDUCATION/TRAINING PROGRAM

## 2019-01-01 PROCEDURE — 83735 ASSAY OF MAGNESIUM: CPT | Performed by: STUDENT IN AN ORGANIZED HEALTH CARE EDUCATION/TRAINING PROGRAM

## 2019-01-01 PROCEDURE — 99205 OFFICE O/P NEW HI 60 MIN: CPT | Performed by: HOSPITALIST

## 2019-01-01 PROCEDURE — A9552 F18 FDG: HCPCS | Performed by: INTERNAL MEDICINE

## 2019-01-01 PROCEDURE — 96413 CHEMO IV INFUSION 1 HR: CPT | Performed by: NURSE PRACTITIONER

## 2019-01-01 PROCEDURE — 71260 CT THORAX DX C+: CPT | Mod: PS

## 2019-01-01 PROCEDURE — 96367 TX/PROPH/DG ADDL SEQ IV INF: CPT | Performed by: NURSE PRACTITIONER

## 2019-01-01 PROCEDURE — 85027 COMPLETE CBC AUTOMATED: CPT | Performed by: INTERNAL MEDICINE

## 2019-01-01 PROCEDURE — 96365 THER/PROPH/DIAG IV INF INIT: CPT | Performed by: NURSE PRACTITIONER

## 2019-01-01 PROCEDURE — 25000131 ZZH RX MED GY IP 250 OP 636 PS 637: Performed by: STUDENT IN AN ORGANIZED HEALTH CARE EDUCATION/TRAINING PROGRAM

## 2019-01-01 PROCEDURE — 74019 RADEX ABDOMEN 2 VIEWS: CPT | Performed by: RADIOLOGY

## 2019-01-01 PROCEDURE — 25000125 ZZHC RX 250: Performed by: INTERNAL MEDICINE

## 2019-01-01 PROCEDURE — 99213 OFFICE O/P EST LOW 20 MIN: CPT | Performed by: INTERNAL MEDICINE

## 2019-01-01 PROCEDURE — 40000279 XR SURGERY CARM FLUORO GREATER THAN 5 MIN W STILLS: Mod: TC

## 2019-01-01 PROCEDURE — 86900 BLOOD TYPING SEROLOGIC ABO: CPT | Performed by: STUDENT IN AN ORGANIZED HEALTH CARE EDUCATION/TRAINING PROGRAM

## 2019-01-01 PROCEDURE — 96365 THER/PROPH/DIAG IV INF INIT: CPT | Performed by: INTERNAL MEDICINE

## 2019-01-01 PROCEDURE — 25000566 ZZH SEVOFLURANE, EA 15 MIN: Performed by: INTERNAL MEDICINE

## 2019-01-01 PROCEDURE — 78816 PET IMAGE W/CT FULL BODY: CPT | Mod: PS

## 2019-01-01 PROCEDURE — 74177 CT ABD & PELVIS W/CONTRAST: CPT | Mod: 59

## 2019-01-01 PROCEDURE — 36000051 ZZH SURGERY LEVEL 2 1ST 30 MIN - UMMC: Performed by: INTERNAL MEDICINE

## 2019-01-01 PROCEDURE — 82378 CARCINOEMBRYONIC ANTIGEN: CPT | Performed by: COLON & RECTAL SURGERY

## 2019-01-01 PROCEDURE — 85027 COMPLETE CBC AUTOMATED: CPT | Performed by: STUDENT IN AN ORGANIZED HEALTH CARE EDUCATION/TRAINING PROGRAM

## 2019-01-01 PROCEDURE — 82378 CARCINOEMBRYONIC ANTIGEN: CPT | Performed by: PHYSICIAN ASSISTANT

## 2019-01-01 PROCEDURE — 85610 PROTHROMBIN TIME: CPT | Performed by: INTERNAL MEDICINE

## 2019-01-01 DEVICE — STENT DUODENAL EVOLUTION 12CM22X27MM EVO-22-27-12-D: Type: IMPLANTABLE DEVICE | Site: DUODENUM | Status: FUNCTIONAL

## 2019-01-01 RX ORDER — LIDOCAINE 40 MG/G
CREAM TOPICAL
Status: DISCONTINUED | OUTPATIENT
Start: 2019-01-01 | End: 2019-01-01 | Stop reason: HOSPADM

## 2019-01-01 RX ORDER — EPINEPHRINE 1 MG/ML
0.3 INJECTION, SOLUTION INTRAMUSCULAR; SUBCUTANEOUS EVERY 5 MIN PRN
Status: CANCELLED | OUTPATIENT
Start: 2019-01-01

## 2019-01-01 RX ORDER — ONDANSETRON 2 MG/ML
4 INJECTION INTRAMUSCULAR; INTRAVENOUS EVERY 30 MIN PRN
Status: DISCONTINUED | OUTPATIENT
Start: 2019-01-01 | End: 2019-01-01 | Stop reason: HOSPADM

## 2019-01-01 RX ORDER — FENTANYL CITRATE 50 UG/ML
25-50 INJECTION, SOLUTION INTRAMUSCULAR; INTRAVENOUS
Status: DISCONTINUED | OUTPATIENT
Start: 2019-01-01 | End: 2019-01-01 | Stop reason: HOSPADM

## 2019-01-01 RX ORDER — POLYETHYLENE GLYCOL 3350 17 G/17G
17 POWDER, FOR SOLUTION ORAL AT BEDTIME
Status: DISCONTINUED | OUTPATIENT
Start: 2019-01-01 | End: 2019-01-01

## 2019-01-01 RX ORDER — HYDROMORPHONE HYDROCHLORIDE 1 MG/ML
.3-.5 INJECTION, SOLUTION INTRAMUSCULAR; INTRAVENOUS; SUBCUTANEOUS EVERY 10 MIN PRN
Status: DISCONTINUED | OUTPATIENT
Start: 2019-01-01 | End: 2019-01-01 | Stop reason: HOSPADM

## 2019-01-01 RX ORDER — OXYCODONE HYDROCHLORIDE 5 MG/1
5-10 TABLET ORAL EVERY 4 HOURS PRN
Status: DISCONTINUED | OUTPATIENT
Start: 2019-01-01 | End: 2019-01-01

## 2019-01-01 RX ORDER — TRAMADOL HYDROCHLORIDE 50 MG/1
50 TABLET ORAL EVERY 6 HOURS PRN
Status: DISCONTINUED | OUTPATIENT
Start: 2019-01-01 | End: 2019-01-01 | Stop reason: HOSPADM

## 2019-01-01 RX ORDER — ONDANSETRON 4 MG/1
4 TABLET, ORALLY DISINTEGRATING ORAL EVERY 6 HOURS PRN
Status: DISCONTINUED | OUTPATIENT
Start: 2019-01-01 | End: 2019-01-01 | Stop reason: HOSPADM

## 2019-01-01 RX ORDER — DEXTROSE MONOHYDRATE, SODIUM CHLORIDE, SODIUM LACTATE, POTASSIUM CHLORIDE, CALCIUM CHLORIDE 5; 600; 310; 179; 20 G/100ML; MG/100ML; MG/100ML; MG/100ML; MG/100ML
INJECTION, SOLUTION INTRAVENOUS CONTINUOUS
Status: DISCONTINUED | OUTPATIENT
Start: 2019-01-01 | End: 2019-01-01

## 2019-01-01 RX ORDER — ATROPINE SULFATE 0.4 MG/ML
0.4 AMPUL (ML) INJECTION
Status: CANCELLED | OUTPATIENT
Start: 2019-01-01

## 2019-01-01 RX ORDER — HEPARIN SODIUM,PORCINE 10 UNIT/ML
5 VIAL (ML) INTRAVENOUS
Status: CANCELLED | OUTPATIENT
Start: 2019-01-01

## 2019-01-01 RX ORDER — DEXAMETHASONE 2 MG/1
2 TABLET ORAL 2 TIMES DAILY WITH MEALS
Qty: 30 TABLET | Refills: 3 | Status: SHIPPED | OUTPATIENT
Start: 2019-01-01 | End: 2019-01-01

## 2019-01-01 RX ORDER — OXYCODONE HYDROCHLORIDE 5 MG/1
5 TABLET ORAL
Qty: 90 TABLET | Refills: 0 | Status: SHIPPED | OUTPATIENT
Start: 2019-01-01 | End: 2019-01-01

## 2019-01-01 RX ORDER — NALOXONE HYDROCHLORIDE 0.4 MG/ML
.1-.4 INJECTION, SOLUTION INTRAMUSCULAR; INTRAVENOUS; SUBCUTANEOUS
Status: DISCONTINUED | OUTPATIENT
Start: 2019-01-01 | End: 2019-01-01 | Stop reason: HOSPADM

## 2019-01-01 RX ORDER — ONDANSETRON 4 MG/1
4 TABLET, ORALLY DISINTEGRATING ORAL EVERY 30 MIN PRN
Status: DISCONTINUED | OUTPATIENT
Start: 2019-01-01 | End: 2019-01-01 | Stop reason: HOSPADM

## 2019-01-01 RX ORDER — HYDRALAZINE HYDROCHLORIDE 20 MG/ML
2.5-5 INJECTION INTRAMUSCULAR; INTRAVENOUS EVERY 10 MIN PRN
Status: DISCONTINUED | OUTPATIENT
Start: 2019-01-01 | End: 2019-01-01 | Stop reason: HOSPADM

## 2019-01-01 RX ORDER — EPINEPHRINE 0.3 MG/.3ML
0.3 INJECTION SUBCUTANEOUS EVERY 5 MIN PRN
Status: CANCELLED | OUTPATIENT
Start: 2019-01-01

## 2019-01-01 RX ORDER — FENTANYL CITRATE 50 UG/ML
25-50 INJECTION, SOLUTION INTRAMUSCULAR; INTRAVENOUS
Status: CANCELLED | OUTPATIENT
Start: 2019-01-01

## 2019-01-01 RX ORDER — HYDROMORPHONE HYDROCHLORIDE 1 MG/ML
.3-.5 INJECTION, SOLUTION INTRAMUSCULAR; INTRAVENOUS; SUBCUTANEOUS
Status: DISCONTINUED | OUTPATIENT
Start: 2019-01-01 | End: 2019-01-01

## 2019-01-01 RX ORDER — NALOXONE HYDROCHLORIDE 0.4 MG/ML
.1-.4 INJECTION, SOLUTION INTRAMUSCULAR; INTRAVENOUS; SUBCUTANEOUS
Status: CANCELLED | OUTPATIENT
Start: 2019-01-01

## 2019-01-01 RX ORDER — LOPERAMIDE HCL 2 MG
CAPSULE ORAL
Qty: 30 CAPSULE | Refills: 0 | Status: SHIPPED | OUTPATIENT
Start: 2019-01-01 | End: 2019-01-01

## 2019-01-01 RX ORDER — ALBUTEROL SULFATE 0.83 MG/ML
2.5 SOLUTION RESPIRATORY (INHALATION)
Status: CANCELLED | OUTPATIENT
Start: 2019-01-01

## 2019-01-01 RX ORDER — LORAZEPAM 2 MG/ML
0.5 INJECTION INTRAMUSCULAR EVERY 4 HOURS PRN
Status: CANCELLED
Start: 2019-01-01

## 2019-01-01 RX ORDER — DIPHENHYDRAMINE HYDROCHLORIDE 50 MG/ML
50 INJECTION INTRAMUSCULAR; INTRAVENOUS
Status: CANCELLED
Start: 2019-01-01

## 2019-01-01 RX ORDER — OXYCODONE HYDROCHLORIDE 5 MG/1
5-10 TABLET ORAL EVERY 6 HOURS PRN
Status: DISCONTINUED | OUTPATIENT
Start: 2019-01-01 | End: 2019-01-01 | Stop reason: HOSPADM

## 2019-01-01 RX ORDER — ACETAMINOPHEN 325 MG/1
650 TABLET ORAL EVERY 6 HOURS PRN
Status: DISCONTINUED | OUTPATIENT
Start: 2019-01-01 | End: 2019-01-01 | Stop reason: HOSPADM

## 2019-01-01 RX ORDER — HEPARIN SODIUM,PORCINE 10 UNIT/ML
5-10 VIAL (ML) INTRAVENOUS EVERY 24 HOURS
Status: DISCONTINUED | OUTPATIENT
Start: 2019-01-01 | End: 2019-01-01 | Stop reason: HOSPADM

## 2019-01-01 RX ORDER — ALBUTEROL SULFATE 90 UG/1
1-2 AEROSOL, METERED RESPIRATORY (INHALATION)
Status: CANCELLED
Start: 2019-01-01

## 2019-01-01 RX ORDER — MEPERIDINE HYDROCHLORIDE 25 MG/ML
12.5 INJECTION INTRAMUSCULAR; INTRAVENOUS; SUBCUTANEOUS
Status: DISCONTINUED | OUTPATIENT
Start: 2019-01-01 | End: 2019-01-01 | Stop reason: HOSPADM

## 2019-01-01 RX ORDER — HYDROMORPHONE HCL/0.9% NACL/PF 0.2MG/0.2
0.2 SYRINGE (ML) INTRAVENOUS
Status: DISCONTINUED | OUTPATIENT
Start: 2019-01-01 | End: 2019-01-01

## 2019-01-01 RX ORDER — HEPARIN SODIUM,PORCINE 10 UNIT/ML
5-10 VIAL (ML) INTRAVENOUS
Status: DISCONTINUED | OUTPATIENT
Start: 2019-01-01 | End: 2019-01-01 | Stop reason: HOSPADM

## 2019-01-01 RX ORDER — INDOMETHACIN 50 MG/1
100 SUPPOSITORY RECTAL
Status: DISCONTINUED | OUTPATIENT
Start: 2019-01-01 | End: 2019-01-01 | Stop reason: HOSPADM

## 2019-01-01 RX ORDER — TRAMADOL HYDROCHLORIDE 50 MG/1
50 TABLET ORAL EVERY 6 HOURS PRN
Qty: 20 TABLET | Refills: 0 | Status: ON HOLD | OUTPATIENT
Start: 2019-01-01 | End: 2019-01-01

## 2019-01-01 RX ORDER — POLYETHYLENE GLYCOL 3350 17 G/17G
17 POWDER, FOR SOLUTION ORAL 2 TIMES DAILY
Qty: 90 PACKET | Refills: 0 | Status: CANCELLED | OUTPATIENT
Start: 2019-01-01

## 2019-01-01 RX ORDER — FLUMAZENIL 0.1 MG/ML
0.2 INJECTION, SOLUTION INTRAVENOUS
Status: CANCELLED | OUTPATIENT
Start: 2019-01-01

## 2019-01-01 RX ORDER — NALOXONE HYDROCHLORIDE 0.4 MG/ML
.1-.4 INJECTION, SOLUTION INTRAMUSCULAR; INTRAVENOUS; SUBCUTANEOUS
Status: DISCONTINUED | OUTPATIENT
Start: 2019-01-01 | End: 2019-01-01

## 2019-01-01 RX ORDER — HEPARIN SODIUM (PORCINE) LOCK FLUSH IV SOLN 100 UNIT/ML 100 UNIT/ML
5 SOLUTION INTRAVENOUS EVERY 8 HOURS
Status: DISCONTINUED | OUTPATIENT
Start: 2019-01-01 | End: 2019-01-01 | Stop reason: HOSPADM

## 2019-01-01 RX ORDER — BISACODYL 10 MG
10 SUPPOSITORY, RECTAL RECTAL DAILY PRN
Status: DISCONTINUED | OUTPATIENT
Start: 2019-01-01 | End: 2019-01-01

## 2019-01-01 RX ORDER — TAMSULOSIN HYDROCHLORIDE 0.4 MG/1
0.4 CAPSULE ORAL DAILY
Qty: 30 CAPSULE | Refills: 0 | Status: SHIPPED | OUTPATIENT
Start: 2019-01-01

## 2019-01-01 RX ORDER — PROCHLORPERAZINE MALEATE 5 MG
10 TABLET ORAL EVERY 6 HOURS PRN
Status: DISCONTINUED | OUTPATIENT
Start: 2019-01-01 | End: 2019-01-01 | Stop reason: HOSPADM

## 2019-01-01 RX ORDER — HEPARIN SODIUM (PORCINE) LOCK FLUSH IV SOLN 100 UNIT/ML 100 UNIT/ML
5 SOLUTION INTRAVENOUS
Status: CANCELLED | OUTPATIENT
Start: 2019-01-01

## 2019-01-01 RX ORDER — OXYCODONE HYDROCHLORIDE 5 MG/1
5 TABLET ORAL EVERY 6 HOURS PRN
Qty: 20 TABLET | Refills: 0 | Status: SHIPPED | OUTPATIENT
Start: 2019-01-01 | End: 2019-01-01

## 2019-01-01 RX ORDER — ONDANSETRON 4 MG/1
4 TABLET, ORALLY DISINTEGRATING ORAL EVERY 6 HOURS PRN
Status: DISCONTINUED | OUTPATIENT
Start: 2019-01-01 | End: 2019-01-01

## 2019-01-01 RX ORDER — CIPROFLOXACIN 2 MG/ML
400 INJECTION, SOLUTION INTRAVENOUS
Status: CANCELLED | OUTPATIENT
Start: 2019-01-01

## 2019-01-01 RX ORDER — HEPARIN SODIUM (PORCINE) LOCK FLUSH IV SOLN 100 UNIT/ML 100 UNIT/ML
5 SOLUTION INTRAVENOUS
Status: DISCONTINUED | OUTPATIENT
Start: 2019-01-01 | End: 2019-01-01 | Stop reason: HOSPADM

## 2019-01-01 RX ORDER — POTASSIUM CHLORIDE 750 MG/1
20 TABLET, EXTENDED RELEASE ORAL ONCE
Status: COMPLETED | OUTPATIENT
Start: 2019-01-01 | End: 2019-01-01

## 2019-01-01 RX ORDER — HEPARIN SODIUM (PORCINE) LOCK FLUSH IV SOLN 100 UNIT/ML 100 UNIT/ML
5 SOLUTION INTRAVENOUS ONCE
Status: COMPLETED | OUTPATIENT
Start: 2019-01-01 | End: 2019-01-01

## 2019-01-01 RX ORDER — IOPAMIDOL 755 MG/ML
20-135 INJECTION, SOLUTION INTRAVASCULAR ONCE
Status: COMPLETED | OUTPATIENT
Start: 2019-01-01 | End: 2019-01-01

## 2019-01-01 RX ORDER — ALBUTEROL SULFATE 0.83 MG/ML
2.5 SOLUTION RESPIRATORY (INHALATION) EVERY 4 HOURS PRN
Status: DISCONTINUED | OUTPATIENT
Start: 2019-01-01 | End: 2019-01-01 | Stop reason: HOSPADM

## 2019-01-01 RX ORDER — ATROPINE SULFATE 0.4 MG/ML
0.4 AMPUL (ML) INJECTION
Status: DISCONTINUED | OUTPATIENT
Start: 2019-01-01 | End: 2019-01-01 | Stop reason: HOSPADM

## 2019-01-01 RX ORDER — HEPARIN SODIUM,PORCINE 10 UNIT/ML
5 VIAL (ML) INTRAVENOUS
Status: DISCONTINUED | OUTPATIENT
Start: 2019-01-01 | End: 2019-01-01 | Stop reason: HOSPADM

## 2019-01-01 RX ORDER — FENTANYL CITRATE 50 UG/ML
25-50 INJECTION, SOLUTION INTRAMUSCULAR; INTRAVENOUS
Status: DISCONTINUED | OUTPATIENT
Start: 2019-01-01 | End: 2019-01-01

## 2019-01-01 RX ORDER — LOPERAMIDE HCL 2 MG
2 CAPSULE ORAL 4 TIMES DAILY PRN
COMMUNITY
End: 2019-01-01

## 2019-01-01 RX ORDER — LORAZEPAM 0.5 MG/1
0.5 TABLET ORAL EVERY 4 HOURS PRN
Qty: 30 TABLET | Refills: 0 | Status: SHIPPED | OUTPATIENT
Start: 2019-01-01 | End: 2019-01-01

## 2019-01-01 RX ORDER — POTASSIUM CHLORIDE 1.5 G/1.58G
20-40 POWDER, FOR SOLUTION ORAL
Status: DISCONTINUED | OUTPATIENT
Start: 2019-01-01 | End: 2019-01-01 | Stop reason: HOSPADM

## 2019-01-01 RX ORDER — ACETAMINOPHEN 325 MG/1
650 TABLET ORAL EVERY 4 HOURS PRN
Status: DISCONTINUED | OUTPATIENT
Start: 2019-01-01 | End: 2019-01-01

## 2019-01-01 RX ORDER — DEXAMETHASONE 2 MG/1
2 TABLET ORAL 2 TIMES DAILY WITH MEALS
Qty: 30 TABLET | Refills: 3 | Status: SHIPPED | OUTPATIENT
Start: 2019-01-01

## 2019-01-01 RX ORDER — MEPERIDINE HYDROCHLORIDE 25 MG/ML
25 INJECTION INTRAMUSCULAR; INTRAVENOUS; SUBCUTANEOUS EVERY 30 MIN PRN
Status: CANCELLED | OUTPATIENT
Start: 2019-01-01

## 2019-01-01 RX ORDER — IOPAMIDOL 755 MG/ML
92 INJECTION, SOLUTION INTRAVASCULAR ONCE
Status: COMPLETED | OUTPATIENT
Start: 2019-01-01 | End: 2019-01-01

## 2019-01-01 RX ORDER — SIMETHICONE 80 MG
80 TABLET,CHEWABLE ORAL EVERY 6 HOURS PRN
Qty: 120 TABLET | Refills: 3 | Status: SHIPPED | OUTPATIENT
Start: 2019-01-01

## 2019-01-01 RX ORDER — DEXTROSE MONOHYDRATE, SODIUM CHLORIDE, AND POTASSIUM CHLORIDE 50; 1.49; 4.5 G/1000ML; G/1000ML; G/1000ML
INJECTION, SOLUTION INTRAVENOUS CONTINUOUS
Status: DISCONTINUED | OUTPATIENT
Start: 2019-01-01 | End: 2019-01-01

## 2019-01-01 RX ORDER — ONDANSETRON 8 MG/1
8 TABLET, FILM COATED ORAL EVERY 8 HOURS PRN
Qty: 10 TABLET | Refills: 2 | Status: SHIPPED | OUTPATIENT
Start: 2019-01-01 | End: 2019-01-01

## 2019-01-01 RX ORDER — ACETAMINOPHEN 500 MG
1000 TABLET ORAL 3 TIMES DAILY
COMMUNITY
Start: 2019-01-01

## 2019-01-01 RX ORDER — SODIUM CHLORIDE 9 MG/ML
1000 INJECTION, SOLUTION INTRAVENOUS CONTINUOUS PRN
Status: CANCELLED
Start: 2019-01-01

## 2019-01-01 RX ORDER — POLYETHYLENE GLYCOL 3350 17 G/17G
17 POWDER, FOR SOLUTION ORAL DAILY PRN
Status: DISCONTINUED | OUTPATIENT
Start: 2019-01-01 | End: 2019-01-01 | Stop reason: HOSPADM

## 2019-01-01 RX ORDER — POTASSIUM CHLORIDE 7.45 MG/ML
10 INJECTION INTRAVENOUS
Status: DISCONTINUED | OUTPATIENT
Start: 2019-01-01 | End: 2019-01-01 | Stop reason: HOSPADM

## 2019-01-01 RX ORDER — FLUMAZENIL 0.1 MG/ML
0.2 INJECTION, SOLUTION INTRAVENOUS
Status: DISCONTINUED | OUTPATIENT
Start: 2019-01-01 | End: 2019-01-01

## 2019-01-01 RX ORDER — POTASSIUM CHLORIDE 29.8 MG/ML
20 INJECTION INTRAVENOUS
Status: DISCONTINUED | OUTPATIENT
Start: 2019-01-01 | End: 2019-01-01 | Stop reason: HOSPADM

## 2019-01-01 RX ORDER — HYDROMORPHONE HYDROCHLORIDE 1 MG/ML
.3-.5 INJECTION, SOLUTION INTRAMUSCULAR; INTRAVENOUS; SUBCUTANEOUS
Status: DISCONTINUED | OUTPATIENT
Start: 2019-01-01 | End: 2019-01-01 | Stop reason: HOSPADM

## 2019-01-01 RX ORDER — METHYLPREDNISOLONE SODIUM SUCCINATE 125 MG/2ML
125 INJECTION, POWDER, LYOPHILIZED, FOR SOLUTION INTRAMUSCULAR; INTRAVENOUS
Status: CANCELLED
Start: 2019-01-01

## 2019-01-01 RX ORDER — OXYCODONE HYDROCHLORIDE 5 MG/1
5 TABLET ORAL
Qty: 90 TABLET | Refills: 0 | Status: CANCELLED | OUTPATIENT
Start: 2019-01-01

## 2019-01-01 RX ORDER — POTASSIUM CL/LIDO/0.9 % NACL 10MEQ/0.1L
10 INTRAVENOUS SOLUTION, PIGGYBACK (ML) INTRAVENOUS
Status: DISCONTINUED | OUTPATIENT
Start: 2019-01-01 | End: 2019-01-01 | Stop reason: HOSPADM

## 2019-01-01 RX ORDER — POLYETHYLENE GLYCOL 3350 17 G/17G
17 POWDER, FOR SOLUTION ORAL DAILY
Qty: 100 PACKET | Refills: 1 | Status: SHIPPED | OUTPATIENT
Start: 2019-01-01

## 2019-01-01 RX ORDER — OXYCODONE HYDROCHLORIDE 5 MG/1
5-10 TABLET ORAL
Status: DISCONTINUED | OUTPATIENT
Start: 2019-01-01 | End: 2019-01-01

## 2019-01-01 RX ORDER — POLYETHYLENE GLYCOL 3350 17 G/17G
17 POWDER, FOR SOLUTION ORAL DAILY PRN
Status: DISCONTINUED | OUTPATIENT
Start: 2019-01-01 | End: 2019-01-01

## 2019-01-01 RX ORDER — ONDANSETRON 2 MG/ML
4 INJECTION INTRAMUSCULAR; INTRAVENOUS EVERY 6 HOURS PRN
Status: DISCONTINUED | OUTPATIENT
Start: 2019-01-01 | End: 2019-01-01 | Stop reason: HOSPADM

## 2019-01-01 RX ORDER — IOPAMIDOL 755 MG/ML
100 INJECTION, SOLUTION INTRAVASCULAR ONCE
Status: COMPLETED | OUTPATIENT
Start: 2019-01-01 | End: 2019-01-01

## 2019-01-01 RX ORDER — IOPAMIDOL 755 MG/ML
81 INJECTION, SOLUTION INTRAVASCULAR ONCE
Status: COMPLETED | OUTPATIENT
Start: 2019-01-01 | End: 2019-01-01

## 2019-01-01 RX ORDER — POLYETHYLENE GLYCOL 3350 17 G/17G
17 POWDER, FOR SOLUTION ORAL 2 TIMES DAILY
Status: DISCONTINUED | OUTPATIENT
Start: 2019-01-01 | End: 2019-01-01

## 2019-01-01 RX ORDER — SODIUM CHLORIDE, SODIUM LACTATE, POTASSIUM CHLORIDE, CALCIUM CHLORIDE 600; 310; 30; 20 MG/100ML; MG/100ML; MG/100ML; MG/100ML
INJECTION, SOLUTION INTRAVENOUS CONTINUOUS
Status: DISCONTINUED | OUTPATIENT
Start: 2019-01-01 | End: 2019-01-01

## 2019-01-01 RX ORDER — LORAZEPAM 0.5 MG/1
0.5 TABLET ORAL EVERY 4 HOURS PRN
Qty: 30 TABLET | Refills: 0 | Status: SHIPPED | OUTPATIENT
Start: 2019-01-01

## 2019-01-01 RX ORDER — TAMSULOSIN HYDROCHLORIDE 0.4 MG/1
0.4 CAPSULE ORAL DAILY
Qty: 30 CAPSULE | Refills: 0 | Status: SHIPPED | OUTPATIENT
Start: 2019-01-01 | End: 2019-01-01

## 2019-01-01 RX ORDER — PROPOFOL 10 MG/ML
INJECTION, EMULSION INTRAVENOUS CONTINUOUS PRN
Status: DISCONTINUED | OUTPATIENT
Start: 2019-01-01 | End: 2019-01-01

## 2019-01-01 RX ORDER — DIMENHYDRINATE 50 MG/ML
25 INJECTION, SOLUTION INTRAMUSCULAR; INTRAVENOUS
Status: DISCONTINUED | OUTPATIENT
Start: 2019-01-01 | End: 2019-01-01 | Stop reason: HOSPADM

## 2019-01-01 RX ORDER — MAGNESIUM SULFATE HEPTAHYDRATE 40 MG/ML
4 INJECTION, SOLUTION INTRAVENOUS EVERY 4 HOURS PRN
Status: DISCONTINUED | OUTPATIENT
Start: 2019-01-01 | End: 2019-01-01 | Stop reason: HOSPADM

## 2019-01-01 RX ORDER — SODIUM CHLORIDE, SODIUM LACTATE, POTASSIUM CHLORIDE, CALCIUM CHLORIDE 600; 310; 30; 20 MG/100ML; MG/100ML; MG/100ML; MG/100ML
INJECTION, SOLUTION INTRAVENOUS CONTINUOUS
Status: DISCONTINUED | OUTPATIENT
Start: 2019-01-01 | End: 2019-01-01 | Stop reason: HOSPADM

## 2019-01-01 RX ORDER — IOPAMIDOL 755 MG/ML
50-135 INJECTION, SOLUTION INTRAVASCULAR ONCE
Status: COMPLETED | OUTPATIENT
Start: 2019-01-01 | End: 2019-01-01

## 2019-01-01 RX ORDER — METOPROLOL TARTRATE 1 MG/ML
1-2 INJECTION, SOLUTION INTRAVENOUS EVERY 5 MIN PRN
Status: DISCONTINUED | OUTPATIENT
Start: 2019-01-01 | End: 2019-01-01 | Stop reason: HOSPADM

## 2019-01-01 RX ORDER — ERGOCALCIFEROL 1.25 MG/1
50000 CAPSULE, LIQUID FILLED ORAL WEEKLY
Qty: 8 CAPSULE | Refills: 0 | Status: SHIPPED | OUTPATIENT
Start: 2019-01-01

## 2019-01-01 RX ORDER — LORAZEPAM 0.5 MG/1
0.5 TABLET ORAL EVERY 4 HOURS PRN
Qty: 30 TABLET | Refills: 2 | Status: SHIPPED | OUTPATIENT
Start: 2019-01-01 | End: 2019-01-01

## 2019-01-01 RX ORDER — ONDANSETRON 2 MG/ML
INJECTION INTRAMUSCULAR; INTRAVENOUS PRN
Status: DISCONTINUED | OUTPATIENT
Start: 2019-01-01 | End: 2019-01-01

## 2019-01-01 RX ORDER — LIDOCAINE 40 MG/G
CREAM TOPICAL
Status: DISCONTINUED | OUTPATIENT
Start: 2019-01-01 | End: 2019-01-01

## 2019-01-01 RX ORDER — FENTANYL CITRATE 50 UG/ML
25-50 INJECTION, SOLUTION INTRAMUSCULAR; INTRAVENOUS EVERY 5 MIN PRN
Status: DISCONTINUED | OUTPATIENT
Start: 2019-01-01 | End: 2019-01-01 | Stop reason: HOSPADM

## 2019-01-01 RX ORDER — CLINDAMYCIN PHOSPHATE 10 MG/G
GEL TOPICAL 2 TIMES DAILY
Qty: 30 G | Refills: 1 | Status: SHIPPED | OUTPATIENT
Start: 2019-01-01

## 2019-01-01 RX ORDER — DOXYCYCLINE HYCLATE 100 MG
100 TABLET ORAL 2 TIMES DAILY
Qty: 60 TABLET | Refills: 0 | Status: SHIPPED | OUTPATIENT
Start: 2019-01-01 | End: 2020-01-01

## 2019-01-01 RX ORDER — SIMETHICONE 80 MG
80 TABLET,CHEWABLE ORAL EVERY 6 HOURS PRN
Qty: 120 TABLET | Refills: 3 | Status: SHIPPED | OUTPATIENT
Start: 2019-01-01 | End: 2019-01-01

## 2019-01-01 RX ORDER — SULFAMETHOXAZOLE/TRIMETHOPRIM 800-160 MG
1 TABLET ORAL 2 TIMES DAILY
Qty: 10 TABLET | Refills: 0 | Status: SHIPPED | OUTPATIENT
Start: 2019-01-01 | End: 2019-01-01

## 2019-01-01 RX ORDER — POTASSIUM CHLORIDE 750 MG/1
20-40 TABLET, EXTENDED RELEASE ORAL
Status: DISCONTINUED | OUTPATIENT
Start: 2019-01-01 | End: 2019-01-01 | Stop reason: HOSPADM

## 2019-01-01 RX ORDER — OXYCODONE HYDROCHLORIDE 5 MG/1
5 TABLET ORAL EVERY 6 HOURS PRN
Qty: 20 TABLET | Refills: 0 | Status: CANCELLED | OUTPATIENT
Start: 2019-01-01

## 2019-01-01 RX ORDER — POLYETHYLENE GLYCOL 3350 17 G/17G
17 POWDER, FOR SOLUTION ORAL DAILY
Status: DISCONTINUED | OUTPATIENT
Start: 2019-01-01 | End: 2019-01-01 | Stop reason: HOSPADM

## 2019-01-01 RX ORDER — OXYCODONE HYDROCHLORIDE 5 MG/1
5 TABLET ORAL
Qty: 90 TABLET | Refills: 0 | Status: SHIPPED | OUTPATIENT
Start: 2019-01-01 | End: 2020-01-01

## 2019-01-01 RX ORDER — PROCHLORPERAZINE 25 MG
25 SUPPOSITORY, RECTAL RECTAL EVERY 12 HOURS PRN
Status: DISCONTINUED | OUTPATIENT
Start: 2019-01-01 | End: 2019-01-01 | Stop reason: HOSPADM

## 2019-01-01 RX ORDER — HYDROMORPHONE HYDROCHLORIDE 1 MG/ML
.3-.5 INJECTION, SOLUTION INTRAMUSCULAR; INTRAVENOUS; SUBCUTANEOUS EVERY 5 MIN PRN
Status: DISCONTINUED | OUTPATIENT
Start: 2019-01-01 | End: 2019-01-01 | Stop reason: HOSPADM

## 2019-01-01 RX ORDER — EPHEDRINE SULFATE 50 MG/ML
INJECTION, SOLUTION INTRAMUSCULAR; INTRAVENOUS; SUBCUTANEOUS PRN
Status: DISCONTINUED | OUTPATIENT
Start: 2019-01-01 | End: 2019-01-01

## 2019-01-01 RX ORDER — CIPROFLOXACIN 2 MG/ML
400 INJECTION, SOLUTION INTRAVENOUS SEE ADMIN INSTRUCTIONS
Status: CANCELLED | OUTPATIENT
Start: 2019-01-01

## 2019-01-01 RX ORDER — LOPERAMIDE HCL 2 MG
CAPSULE ORAL
Qty: 30 CAPSULE | Refills: 1 | Status: ON HOLD | OUTPATIENT
Start: 2019-01-01 | End: 2019-01-01

## 2019-01-01 RX ORDER — FENTANYL CITRATE 50 UG/ML
INJECTION, SOLUTION INTRAMUSCULAR; INTRAVENOUS PRN
Status: DISCONTINUED | OUTPATIENT
Start: 2019-01-01 | End: 2019-01-01

## 2019-01-01 RX ORDER — PROPOFOL 10 MG/ML
INJECTION, EMULSION INTRAVENOUS PRN
Status: DISCONTINUED | OUTPATIENT
Start: 2019-01-01 | End: 2019-01-01

## 2019-01-01 RX ADMIN — HEPARIN SODIUM (PORCINE) LOCK FLUSH IV SOLN 100 UNIT/ML 5 ML: 100 SOLUTION at 11:25

## 2019-01-01 RX ADMIN — MIDAZOLAM 1 MG: 1 INJECTION INTRAMUSCULAR; INTRAVENOUS at 13:30

## 2019-01-01 RX ADMIN — POTASSIUM CHLORIDE 20 MEQ: 750 TABLET, EXTENDED RELEASE ORAL at 09:42

## 2019-01-01 RX ADMIN — ACETAMINOPHEN 650 MG: 325 TABLET, FILM COATED ORAL at 13:27

## 2019-01-01 RX ADMIN — IOPAMIDOL 97 ML: 755 INJECTION, SOLUTION INTRAVENOUS at 15:37

## 2019-01-01 RX ADMIN — PROPOFOL 100 MG: 10 INJECTION, EMULSION INTRAVENOUS at 13:40

## 2019-01-01 RX ADMIN — HYDROMORPHONE HYDROCHLORIDE 0.5 MG: 1 INJECTION, SOLUTION INTRAMUSCULAR; INTRAVENOUS; SUBCUTANEOUS at 06:01

## 2019-01-01 RX ADMIN — Medication 5 ML: at 16:56

## 2019-01-01 RX ADMIN — FENTANYL CITRATE 50 MCG: 50 INJECTION, SOLUTION INTRAMUSCULAR; INTRAVENOUS at 14:06

## 2019-01-01 RX ADMIN — BENZOCAINE, MENTHOL 1 LOZENGE: 15; 3.6 LOZENGE ORAL at 21:01

## 2019-01-01 RX ADMIN — Medication 500 ML: at 11:20

## 2019-01-01 RX ADMIN — OXYCODONE HYDROCHLORIDE 10 MG: 5 TABLET ORAL at 21:45

## 2019-01-01 RX ADMIN — Medication 0.2 MG: at 06:58

## 2019-01-01 RX ADMIN — SODIUM CHLORIDE, POTASSIUM CHLORIDE, SODIUM LACTATE AND CALCIUM CHLORIDE: 600; 310; 30; 20 INJECTION, SOLUTION INTRAVENOUS at 00:53

## 2019-01-01 RX ADMIN — BENZOCAINE, MENTHOL 1 LOZENGE: 15; 3.6 LOZENGE ORAL at 18:27

## 2019-01-01 RX ADMIN — BENZOCAINE, MENTHOL 1 LOZENGE: 15; 3.6 LOZENGE ORAL at 12:46

## 2019-01-01 RX ADMIN — HYDROMORPHONE HYDROCHLORIDE 0.3 MG: 1 INJECTION, SOLUTION INTRAMUSCULAR; INTRAVENOUS; SUBCUTANEOUS at 19:42

## 2019-01-01 RX ADMIN — ACETAMINOPHEN 650 MG: 325 TABLET, FILM COATED ORAL at 20:24

## 2019-01-01 RX ADMIN — ONDANSETRON 4 MG: 2 INJECTION INTRAMUSCULAR; INTRAVENOUS at 18:01

## 2019-01-01 RX ADMIN — SUGAMMADEX 150 MG: 100 INJECTION, SOLUTION INTRAVENOUS at 15:09

## 2019-01-01 RX ADMIN — HYDROMORPHONE HYDROCHLORIDE 0.5 MG: 1 INJECTION, SOLUTION INTRAMUSCULAR; INTRAVENOUS; SUBCUTANEOUS at 12:46

## 2019-01-01 RX ADMIN — POLYETHYLENE GLYCOL 3350 17 G: 17 POWDER, FOR SOLUTION ORAL at 09:54

## 2019-01-01 RX ADMIN — HEPARIN SODIUM (PORCINE) LOCK FLUSH IV SOLN 100 UNIT/ML 5 ML: 100 SOLUTION at 13:36

## 2019-01-01 RX ADMIN — POTASSIUM CHLORIDE, DEXTROSE MONOHYDRATE AND SODIUM CHLORIDE: 150; 5; 450 INJECTION, SOLUTION INTRAVENOUS at 23:36

## 2019-01-01 RX ADMIN — TRAMADOL HYDROCHLORIDE 50 MG: 50 TABLET, COATED ORAL at 08:42

## 2019-01-01 RX ADMIN — BENZOCAINE, MENTHOL 1 LOZENGE: 15; 3.6 LOZENGE ORAL at 23:57

## 2019-01-01 RX ADMIN — HEPARIN SODIUM (PORCINE) LOCK FLUSH IV SOLN 100 UNIT/ML 5 ML: 100 SOLUTION at 13:59

## 2019-01-01 RX ADMIN — Medication 1000 ML: at 09:52

## 2019-01-01 RX ADMIN — HEPARIN SODIUM (PORCINE) LOCK FLUSH IV SOLN 100 UNIT/ML 5 ML: 100 SOLUTION at 10:45

## 2019-01-01 RX ADMIN — OXYCODONE HYDROCHLORIDE 10 MG: 5 TABLET ORAL at 09:23

## 2019-01-01 RX ADMIN — HEPARIN SODIUM (PORCINE) LOCK FLUSH IV SOLN 100 UNIT/ML 5 ML: 100 SOLUTION at 07:08

## 2019-01-01 RX ADMIN — PANTOPRAZOLE SODIUM 40 MG: 40 INJECTION, POWDER, LYOPHILIZED, FOR SOLUTION INTRAVENOUS at 09:00

## 2019-01-01 RX ADMIN — Medication 0.4 MG: at 12:37

## 2019-01-01 RX ADMIN — SODIUM CHLORIDE, POTASSIUM CHLORIDE, SODIUM LACTATE AND CALCIUM CHLORIDE: 600; 310; 30; 20 INJECTION, SOLUTION INTRAVENOUS at 21:09

## 2019-01-01 RX ADMIN — HEPARIN SODIUM (PORCINE) LOCK FLUSH IV SOLN 100 UNIT/ML 5 ML: 100 SOLUTION at 08:12

## 2019-01-01 RX ADMIN — OXYCODONE HYDROCHLORIDE 10 MG: 5 TABLET ORAL at 07:37

## 2019-01-01 RX ADMIN — HEPARIN SODIUM (PORCINE) LOCK FLUSH IV SOLN 100 UNIT/ML 5 ML: 100 SOLUTION at 09:20

## 2019-01-01 RX ADMIN — ACETAMINOPHEN 650 MG: 325 TABLET, FILM COATED ORAL at 17:04

## 2019-01-01 RX ADMIN — ACETAMINOPHEN 650 MG: 325 TABLET, FILM COATED ORAL at 06:28

## 2019-01-01 RX ADMIN — HEPARIN SODIUM (PORCINE) LOCK FLUSH IV SOLN 100 UNIT/ML 5 ML: 100 SOLUTION at 14:52

## 2019-01-01 RX ADMIN — SODIUM CHLORIDE, POTASSIUM CHLORIDE, SODIUM LACTATE AND CALCIUM CHLORIDE: 600; 310; 30; 20 INJECTION, SOLUTION INTRAVENOUS at 02:59

## 2019-01-01 RX ADMIN — ACETAMINOPHEN 650 MG: 325 TABLET, FILM COATED ORAL at 00:35

## 2019-01-01 RX ADMIN — BENZOCAINE, MENTHOL 1 LOZENGE: 15; 3.6 LOZENGE ORAL at 07:50

## 2019-01-01 RX ADMIN — TRAMADOL HYDROCHLORIDE 50 MG: 50 TABLET, COATED ORAL at 07:16

## 2019-01-01 RX ADMIN — HYDROMORPHONE HYDROCHLORIDE 0.5 MG: 1 INJECTION, SOLUTION INTRAMUSCULAR; INTRAVENOUS; SUBCUTANEOUS at 15:08

## 2019-01-01 RX ADMIN — Medication 250 ML: at 09:25

## 2019-01-01 RX ADMIN — ENOXAPARIN SODIUM 40 MG: 40 INJECTION SUBCUTANEOUS at 07:43

## 2019-01-01 RX ADMIN — OXYCODONE HYDROCHLORIDE 10 MG: 5 TABLET ORAL at 20:16

## 2019-01-01 RX ADMIN — OXYCODONE HYDROCHLORIDE 10 MG: 5 TABLET ORAL at 01:51

## 2019-01-01 RX ADMIN — HEPARIN SODIUM (PORCINE) LOCK FLUSH IV SOLN 100 UNIT/ML 5 ML: 100 SOLUTION at 07:20

## 2019-01-01 RX ADMIN — Medication 5 MG: at 18:37

## 2019-01-01 RX ADMIN — POTASSIUM CHLORIDE, DEXTROSE MONOHYDRATE AND SODIUM CHLORIDE: 150; 5; 450 INJECTION, SOLUTION INTRAVENOUS at 23:17

## 2019-01-01 RX ADMIN — SODIUM CHLORIDE, POTASSIUM CHLORIDE, SODIUM LACTATE AND CALCIUM CHLORIDE: 600; 310; 30; 20 INJECTION, SOLUTION INTRAVENOUS at 10:34

## 2019-01-01 RX ADMIN — MIDAZOLAM 1 MG: 1 INJECTION INTRAMUSCULAR; INTRAVENOUS at 13:36

## 2019-01-01 RX ADMIN — HYDROMORPHONE HYDROCHLORIDE 0.5 MG: 1 INJECTION, SOLUTION INTRAMUSCULAR; INTRAVENOUS; SUBCUTANEOUS at 00:16

## 2019-01-01 RX ADMIN — POLYETHYLENE GLYCOL 3350 17 G: 17 POWDER, FOR SOLUTION ORAL at 07:45

## 2019-01-01 RX ADMIN — HEPARIN SODIUM (PORCINE) LOCK FLUSH IV SOLN 100 UNIT/ML 5 ML: 100 SOLUTION at 12:28

## 2019-01-01 RX ADMIN — HYDROMORPHONE HYDROCHLORIDE 0.5 MG: 1 INJECTION, SOLUTION INTRAMUSCULAR; INTRAVENOUS; SUBCUTANEOUS at 16:56

## 2019-01-01 RX ADMIN — ACETAMINOPHEN 650 MG: 325 TABLET, FILM COATED ORAL at 23:47

## 2019-01-01 RX ADMIN — Medication 5 ML: at 09:23

## 2019-01-01 RX ADMIN — OXYCODONE HYDROCHLORIDE 10 MG: 5 TABLET ORAL at 13:52

## 2019-01-01 RX ADMIN — ROCURONIUM BROMIDE 50 MG: 10 INJECTION INTRAVENOUS at 13:40

## 2019-01-01 RX ADMIN — PROPOFOL 125 MCG/KG/MIN: 10 INJECTION, EMULSION INTRAVENOUS at 18:13

## 2019-01-01 RX ADMIN — OXYCODONE HYDROCHLORIDE 10 MG: 5 TABLET ORAL at 00:39

## 2019-01-01 RX ADMIN — Medication 5 ML: at 11:07

## 2019-01-01 RX ADMIN — POTASSIUM CHLORIDE, DEXTROSE MONOHYDRATE AND SODIUM CHLORIDE: 150; 5; 450 INJECTION, SOLUTION INTRAVENOUS at 05:28

## 2019-01-01 RX ADMIN — SODIUM CHLORIDE, POTASSIUM CHLORIDE, SODIUM LACTATE AND CALCIUM CHLORIDE: 600; 310; 30; 20 INJECTION, SOLUTION INTRAVENOUS at 06:06

## 2019-01-01 RX ADMIN — ONDANSETRON 4 MG: 4 TABLET, ORALLY DISINTEGRATING ORAL at 00:08

## 2019-01-01 RX ADMIN — Medication 5 ML: at 18:01

## 2019-01-01 RX ADMIN — POTASSIUM CHLORIDE, DEXTROSE MONOHYDRATE AND SODIUM CHLORIDE: 150; 5; 450 INJECTION, SOLUTION INTRAVENOUS at 13:24

## 2019-01-01 RX ADMIN — HYDROMORPHONE HYDROCHLORIDE 0.5 MG: 1 INJECTION, SOLUTION INTRAMUSCULAR; INTRAVENOUS; SUBCUTANEOUS at 20:58

## 2019-01-01 RX ADMIN — HEPARIN SODIUM (PORCINE) LOCK FLUSH IV SOLN 100 UNIT/ML 5 ML: 100 SOLUTION at 11:57

## 2019-01-01 RX ADMIN — HEPARIN SODIUM (PORCINE) LOCK FLUSH IV SOLN 100 UNIT/ML 5 ML: 100 SOLUTION at 08:11

## 2019-01-01 RX ADMIN — ENOXAPARIN SODIUM 40 MG: 40 INJECTION SUBCUTANEOUS at 17:19

## 2019-01-01 RX ADMIN — DEXTROSE MONOHYDRATE, SODIUM CHLORIDE, SODIUM LACTATE, POTASSIUM CHLORIDE, CALCIUM CHLORIDE: 5; 600; 310; 179; 20 INJECTION, SOLUTION INTRAVENOUS at 00:31

## 2019-01-01 RX ADMIN — IOPAMIDOL 95 ML: 755 INJECTION, SOLUTION INTRAVASCULAR at 10:59

## 2019-01-01 RX ADMIN — HYDROMORPHONE HYDROCHLORIDE 0.5 MG: 1 INJECTION, SOLUTION INTRAMUSCULAR; INTRAVENOUS; SUBCUTANEOUS at 12:09

## 2019-01-01 RX ADMIN — POTASSIUM CHLORIDE, DEXTROSE MONOHYDRATE AND SODIUM CHLORIDE: 150; 5; 450 INJECTION, SOLUTION INTRAVENOUS at 08:43

## 2019-01-01 RX ADMIN — Medication 1000 ML: at 12:52

## 2019-01-01 RX ADMIN — HYDROMORPHONE HYDROCHLORIDE 0.5 MG: 1 INJECTION, SOLUTION INTRAMUSCULAR; INTRAVENOUS; SUBCUTANEOUS at 22:57

## 2019-01-01 RX ADMIN — FENTANYL CITRATE 100 MCG: 50 INJECTION, SOLUTION INTRAMUSCULAR; INTRAVENOUS at 13:40

## 2019-01-01 RX ADMIN — POLYETHYLENE GLYCOL 3350 17 G: 17 POWDER, FOR SOLUTION ORAL at 11:52

## 2019-01-01 RX ADMIN — ACETAMINOPHEN 650 MG: 325 TABLET, FILM COATED ORAL at 00:31

## 2019-01-01 RX ADMIN — HEPARIN SODIUM (PORCINE) LOCK FLUSH IV SOLN 100 UNIT/ML 5 ML: 100 SOLUTION at 15:08

## 2019-01-01 RX ADMIN — SODIUM PHOSPHATE, MONOBASIC, MONOHYDRATE AND SODIUM PHOSPHATE, DIBASIC, ANHYDROUS 20 MMOL: 276; 142 INJECTION, SOLUTION INTRAVENOUS at 12:30

## 2019-01-01 RX ADMIN — HEPARIN SODIUM (PORCINE) LOCK FLUSH IV SOLN 100 UNIT/ML 5 ML: 100 SOLUTION at 15:03

## 2019-01-01 RX ADMIN — Medication 0.2 MG: at 23:47

## 2019-01-01 RX ADMIN — HEPARIN SODIUM (PORCINE) LOCK FLUSH IV SOLN 100 UNIT/ML 5 ML: 100 SOLUTION at 12:23

## 2019-01-01 RX ADMIN — ACETAMINOPHEN 650 MG: 325 TABLET, FILM COATED ORAL at 06:58

## 2019-01-01 RX ADMIN — POTASSIUM CHLORIDE, DEXTROSE MONOHYDRATE AND SODIUM CHLORIDE: 150; 5; 450 INJECTION, SOLUTION INTRAVENOUS at 08:33

## 2019-01-01 RX ADMIN — TRAMADOL HYDROCHLORIDE 50 MG: 50 TABLET, COATED ORAL at 18:48

## 2019-01-01 RX ADMIN — HYDROMORPHONE HYDROCHLORIDE 0.5 MG: 1 INJECTION, SOLUTION INTRAMUSCULAR; INTRAVENOUS; SUBCUTANEOUS at 10:34

## 2019-01-01 RX ADMIN — BENZOCAINE, MENTHOL 1 LOZENGE: 15; 3.6 LOZENGE ORAL at 22:55

## 2019-01-01 RX ADMIN — HEPARIN SODIUM (PORCINE) LOCK FLUSH IV SOLN 100 UNIT/ML 5 ML: 100 SOLUTION at 12:07

## 2019-01-01 RX ADMIN — BENZOCAINE, MENTHOL 1 LOZENGE: 15; 3.6 LOZENGE ORAL at 02:50

## 2019-01-01 RX ADMIN — HEPARIN SODIUM (PORCINE) LOCK FLUSH IV SOLN 100 UNIT/ML 5 ML: 100 SOLUTION at 12:35

## 2019-01-01 RX ADMIN — OXYCODONE HYDROCHLORIDE 10 MG: 5 TABLET ORAL at 04:23

## 2019-01-01 RX ADMIN — ENOXAPARIN SODIUM 40 MG: 40 INJECTION SUBCUTANEOUS at 09:00

## 2019-01-01 RX ADMIN — PANTOPRAZOLE SODIUM 40 MG: 40 INJECTION, POWDER, LYOPHILIZED, FOR SOLUTION INTRAVENOUS at 09:26

## 2019-01-01 RX ADMIN — IOPAMIDOL 100 ML: 755 INJECTION, SOLUTION INTRAVASCULAR at 09:17

## 2019-01-01 RX ADMIN — TRAMADOL HYDROCHLORIDE 50 MG: 50 TABLET, COATED ORAL at 17:18

## 2019-01-01 RX ADMIN — Medication 0.4 MG: at 13:07

## 2019-01-01 RX ADMIN — Medication 0.4 MG: at 09:30

## 2019-01-01 RX ADMIN — ENOXAPARIN SODIUM 40 MG: 40 INJECTION SUBCUTANEOUS at 21:35

## 2019-01-01 RX ADMIN — Medication 0.4 MG: at 10:28

## 2019-01-01 RX ADMIN — Medication 5 ML: at 12:05

## 2019-01-01 RX ADMIN — IOPAMIDOL 92 ML: 755 INJECTION, SOLUTION INTRAVASCULAR at 15:13

## 2019-01-01 RX ADMIN — HEPARIN SODIUM (PORCINE) LOCK FLUSH IV SOLN 100 UNIT/ML 5 ML: 100 SOLUTION at 12:10

## 2019-01-01 RX ADMIN — HYDROMORPHONE HYDROCHLORIDE 0.5 MG: 1 INJECTION, SOLUTION INTRAMUSCULAR; INTRAVENOUS; SUBCUTANEOUS at 21:49

## 2019-01-01 RX ADMIN — SODIUM CHLORIDE, POTASSIUM CHLORIDE, SODIUM LACTATE AND CALCIUM CHLORIDE: 600; 310; 30; 20 INJECTION, SOLUTION INTRAVENOUS at 13:30

## 2019-01-01 RX ADMIN — HYDROMORPHONE HYDROCHLORIDE 0.5 MG: 1 INJECTION, SOLUTION INTRAMUSCULAR; INTRAVENOUS; SUBCUTANEOUS at 07:40

## 2019-01-01 RX ADMIN — Medication 250 ML: at 12:11

## 2019-01-01 RX ADMIN — PANTOPRAZOLE SODIUM 40 MG: 40 INJECTION, POWDER, LYOPHILIZED, FOR SOLUTION INTRAVENOUS at 07:39

## 2019-01-01 RX ADMIN — HEPARIN SODIUM (PORCINE) LOCK FLUSH IV SOLN 100 UNIT/ML 5 ML: 100 SOLUTION at 13:06

## 2019-01-01 RX ADMIN — HEPARIN SODIUM (PORCINE) LOCK FLUSH IV SOLN 100 UNIT/ML 5 ML: 100 SOLUTION at 08:48

## 2019-01-01 RX ADMIN — ONDANSETRON 4 MG: 2 INJECTION INTRAMUSCULAR; INTRAVENOUS at 15:04

## 2019-01-01 RX ADMIN — ONDANSETRON 4 MG: 2 INJECTION INTRAMUSCULAR; INTRAVENOUS at 06:06

## 2019-01-01 RX ADMIN — Medication 250 ML: at 08:01

## 2019-01-01 RX ADMIN — FLUDEOXYGLUCOSE F-18 10.23 MCI.: 500 INJECTION, SOLUTION INTRAVENOUS at 14:21

## 2019-01-01 RX ADMIN — OXYCODONE HYDROCHLORIDE 10 MG: 5 TABLET ORAL at 18:36

## 2019-01-01 RX ADMIN — Medication 5 ML: at 07:56

## 2019-01-01 RX ADMIN — IOPAMIDOL 81 ML: 755 INJECTION, SOLUTION INTRAVASCULAR at 14:23

## 2019-01-01 ASSESSMENT — ACTIVITIES OF DAILY LIVING (ADL)
ADLS_ACUITY_SCORE: 10
AMBULATION: 0-->INDEPENDENT
ADLS_ACUITY_SCORE: 10
BATHING: 0-->INDEPENDENT
RETIRED_EATING: 0-->INDEPENDENT
ADLS_ACUITY_SCORE: 10
ADLS_ACUITY_SCORE: 10
TRANSFERRING: 0-->INDEPENDENT
ADLS_ACUITY_SCORE: 10
ADLS_ACUITY_SCORE: 10
DRESS: 0-->INDEPENDENT
ADLS_ACUITY_SCORE: 10
PRIOR_FUNCTIONAL_LEVEL_COMMENT: INDEPENDENT
ADLS_ACUITY_SCORE: 10
SWALLOWING: 0-->SWALLOWS FOODS/LIQUIDS WITHOUT DIFFICULTY
ADLS_ACUITY_SCORE: 10
TOILETING: 0-->INDEPENDENT
ADLS_ACUITY_SCORE: 10
RETIRED_COMMUNICATION: 0-->UNDERSTANDS/COMMUNICATES WITHOUT DIFFICULTY
ADLS_ACUITY_SCORE: 10
RETIRED_EATING: 0-->INDEPENDENT
TOILETING: 0-->INDEPENDENT
COGNITION: 0 - NO COGNITION ISSUES REPORTED
ADLS_ACUITY_SCORE: 10
DRESS: 0-->INDEPENDENT
ADLS_ACUITY_SCORE: 10
RETIRED_COMMUNICATION: 0-->UNDERSTANDS/COMMUNICATES WITHOUT DIFFICULTY
COGNITION: 0 - NO COGNITION ISSUES REPORTED
ADLS_ACUITY_SCORE: 10
SWALLOWING: 0-->SWALLOWS FOODS/LIQUIDS WITHOUT DIFFICULTY
ADLS_ACUITY_SCORE: 10
FALL_HISTORY_WITHIN_LAST_SIX_MONTHS: NO
ADLS_ACUITY_SCORE: 10
TRANSFERRING: 0-->INDEPENDENT
ADLS_ACUITY_SCORE: 10
BATHING: 0-->INDEPENDENT
FALL_HISTORY_WITHIN_LAST_SIX_MONTHS: NO
ADLS_ACUITY_SCORE: 10
AMBULATION: 0-->INDEPENDENT
ADLS_ACUITY_SCORE: 10

## 2019-01-01 ASSESSMENT — PAIN DESCRIPTION - DESCRIPTORS
DESCRIPTORS: CRAMPING;DISCOMFORT
DESCRIPTORS: CRAMPING
DESCRIPTORS: CONSTANT;CRAMPING
DESCRIPTORS: ACHING;CONSTANT;DISCOMFORT;SORE;SHARP
DESCRIPTORS: ACHING;CONSTANT;DISCOMFORT;SORE;SHOOTING
DESCRIPTORS: ACHING;DISCOMFORT
DESCRIPTORS: CRAMPING
DESCRIPTORS: CONSTANT;CRAMPING
DESCRIPTORS: CRAMPING;ACHING;SORE
DESCRIPTORS: DISCOMFORT
DESCRIPTORS: ACHING
DESCRIPTORS: CRAMPING
DESCRIPTORS: ACHING
DESCRIPTORS: ACHING;CRAMPING
DESCRIPTORS: ACHING
DESCRIPTORS: CRAMPING
DESCRIPTORS: CRAMPING
DESCRIPTORS: SORE
DESCRIPTORS: ACHING;CRAMPING;DISCOMFORT
DESCRIPTORS: CRAMPING
DESCRIPTORS: ACHING
DESCRIPTORS: CRAMPING
DESCRIPTORS: CRAMPING
DESCRIPTORS: CRAMPING;DISCOMFORT
DESCRIPTORS: CRAMPING
DESCRIPTORS: ACHING
DESCRIPTORS: ACHING;CRAMPING;DISCOMFORT
DESCRIPTORS: SORE
DESCRIPTORS: CRAMPING
DESCRIPTORS: ACHING;CRAMPING;DISCOMFORT

## 2019-01-01 ASSESSMENT — ENCOUNTER SYMPTOMS
APPETITE CHANGE: 0
FLANK PAIN: 0
DYSURIA: 1
FEVER: 0
DIARRHEA: 0
HEMATURIA: 0
MYALGIAS: 0
CHILLS: 1
FREQUENCY: 1
FATIGUE: 0
ACTIVITY CHANGE: 0
ABDOMINAL PAIN: 0

## 2019-01-01 ASSESSMENT — PAIN SCALES - GENERAL
PAINLEVEL: NO PAIN (0)
PAINLEVEL: MODERATE PAIN (5)
PAINLEVEL: MILD PAIN (2)
PAINLEVEL: NO PAIN (0)
PAINLEVEL: MODERATE PAIN (4)
PAINLEVEL: NO PAIN (0)
PAINLEVEL: SEVERE PAIN (6)
PAINLEVEL: MILD PAIN (2)
PAINLEVEL: MODERATE PAIN (5)
PAINLEVEL: NO PAIN (0)
PAINLEVEL: NO PAIN (0)
PAINLEVEL: MODERATE PAIN (5)
PAINLEVEL: NO PAIN (0)
PAINLEVEL: MILD PAIN (2)

## 2019-01-01 ASSESSMENT — MIFFLIN-ST. JEOR
SCORE: 1472.94
SCORE: 1495.62
SCORE: 1435.75
SCORE: 1513
SCORE: 1397.19
SCORE: 1540.67
SCORE: 1525.1
SCORE: 1564.5
SCORE: 1538.68
SCORE: 1467.5
SCORE: 1518.89
SCORE: 1547.91
SCORE: 1509.68
SCORE: 1510.72

## 2019-01-01 ASSESSMENT — LIFESTYLE VARIABLES
TOBACCO_USE: 1
TOBACCO_USE: 1

## 2019-01-02 ENCOUNTER — TELEPHONE (OUTPATIENT)
Dept: ONCOLOGY | Facility: CLINIC | Age: 59
End: 2019-01-02

## 2019-01-02 NOTE — TELEPHONE ENCOUNTER
I left a message and mailed a letter for appointment times on 01/03/2019, 01/04/2019 and 01/18/2019 for follow up appointment and Infusion times.

## 2019-01-03 ENCOUNTER — ONCOLOGY VISIT (OUTPATIENT)
Dept: ONCOLOGY | Facility: CLINIC | Age: 59
End: 2019-01-03
Attending: INTERNAL MEDICINE
Payer: COMMERCIAL

## 2019-01-03 ENCOUNTER — ONCOLOGY VISIT (OUTPATIENT)
Dept: ONCOLOGY | Facility: CLINIC | Age: 59
End: 2019-01-03
Payer: COMMERCIAL

## 2019-01-03 VITALS
OXYGEN SATURATION: 96 % | HEIGHT: 68 IN | HEART RATE: 78 BPM | TEMPERATURE: 97.2 F | DIASTOLIC BLOOD PRESSURE: 66 MMHG | WEIGHT: 164 LBS | BODY MASS INDEX: 24.86 KG/M2 | SYSTOLIC BLOOD PRESSURE: 111 MMHG | RESPIRATION RATE: 18 BRPM

## 2019-01-03 DIAGNOSIS — R19.5 LOOSE STOOLS: ICD-10-CM

## 2019-01-03 DIAGNOSIS — D70.1 CHEMOTHERAPY-INDUCED NEUTROPENIA (H): Primary | ICD-10-CM

## 2019-01-03 DIAGNOSIS — C18.7 ADENOCARCINOMA OF SIGMOID COLON (H): ICD-10-CM

## 2019-01-03 DIAGNOSIS — C18.7 ADENOCARCINOMA OF SIGMOID COLON (H): Primary | ICD-10-CM

## 2019-01-03 DIAGNOSIS — T45.1X5A CHEMOTHERAPY-INDUCED NEUTROPENIA (H): Primary | ICD-10-CM

## 2019-01-03 DIAGNOSIS — T14.8XXA OPEN WOUND: ICD-10-CM

## 2019-01-03 LAB
BASOPHILS # BLD AUTO: 0.1 10E9/L (ref 0–0.2)
BASOPHILS NFR BLD AUTO: 0.7 %
BILIRUB SERPL-MCNC: 0.7 MG/DL (ref 0.2–1.3)
DIFFERENTIAL METHOD BLD: ABNORMAL
EOSINOPHIL # BLD AUTO: 0.1 10E9/L (ref 0–0.7)
EOSINOPHIL NFR BLD AUTO: 1 %
ERYTHROCYTE [DISTWIDTH] IN BLOOD BY AUTOMATED COUNT: 15.9 % (ref 10–15)
HCT VFR BLD AUTO: 36.6 % (ref 40–53)
HGB BLD-MCNC: 12.7 G/DL (ref 13.3–17.7)
IMM GRANULOCYTES # BLD: 0.2 10E9/L (ref 0–0.4)
IMM GRANULOCYTES NFR BLD: 1.7 %
LYMPHOCYTES # BLD AUTO: 1.3 10E9/L (ref 0.8–5.3)
LYMPHOCYTES NFR BLD AUTO: 12.7 %
MCH RBC QN AUTO: 31.2 PG (ref 26.5–33)
MCHC RBC AUTO-ENTMCNC: 34.7 G/DL (ref 31.5–36.5)
MCV RBC AUTO: 90 FL (ref 78–100)
MONOCYTES # BLD AUTO: 0.5 10E9/L (ref 0–1.3)
MONOCYTES NFR BLD AUTO: 4.7 %
NEUTROPHILS # BLD AUTO: 8.3 10E9/L (ref 1.6–8.3)
NEUTROPHILS NFR BLD AUTO: 79.2 %
PLATELET # BLD AUTO: 83 10E9/L (ref 150–450)
RBC # BLD AUTO: 4.07 10E12/L (ref 4.4–5.9)
WBC # BLD AUTO: 10.4 10E9/L (ref 4–11)

## 2019-01-03 PROCEDURE — 85025 COMPLETE CBC W/AUTO DIFF WBC: CPT | Performed by: NURSE PRACTITIONER

## 2019-01-03 PROCEDURE — 99207 ZZC NO CHARGE NURSE ONLY: CPT

## 2019-01-03 PROCEDURE — 99215 OFFICE O/P EST HI 40 MIN: CPT | Performed by: NURSE PRACTITIONER

## 2019-01-03 PROCEDURE — 82247 BILIRUBIN TOTAL: CPT | Performed by: NURSE PRACTITIONER

## 2019-01-03 RX ORDER — LORAZEPAM 0.5 MG/1
0.5 TABLET ORAL EVERY 4 HOURS PRN
Qty: 30 TABLET | Refills: 2 | Status: SHIPPED | OUTPATIENT
Start: 2019-01-03 | End: 2019-01-18

## 2019-01-03 RX ORDER — ALBUTEROL SULFATE 90 UG/1
1-2 AEROSOL, METERED RESPIRATORY (INHALATION)
Status: CANCELLED
Start: 2019-01-04

## 2019-01-03 RX ORDER — EPINEPHRINE 1 MG/ML
0.3 INJECTION, SOLUTION INTRAMUSCULAR; SUBCUTANEOUS EVERY 5 MIN PRN
Status: CANCELLED | OUTPATIENT
Start: 2019-01-04

## 2019-01-03 RX ORDER — ALBUTEROL SULFATE 0.83 MG/ML
2.5 SOLUTION RESPIRATORY (INHALATION)
Status: CANCELLED | OUTPATIENT
Start: 2019-01-04

## 2019-01-03 RX ORDER — HEPARIN SODIUM (PORCINE) LOCK FLUSH IV SOLN 100 UNIT/ML 100 UNIT/ML
5 SOLUTION INTRAVENOUS
Status: DISCONTINUED | OUTPATIENT
Start: 2019-01-03 | End: 2019-01-03 | Stop reason: HOSPADM

## 2019-01-03 RX ORDER — LORAZEPAM 2 MG/ML
0.5 INJECTION INTRAMUSCULAR EVERY 4 HOURS PRN
Status: CANCELLED
Start: 2019-01-04

## 2019-01-03 RX ORDER — MEPERIDINE HYDROCHLORIDE 25 MG/ML
25 INJECTION INTRAMUSCULAR; INTRAVENOUS; SUBCUTANEOUS EVERY 30 MIN PRN
Status: CANCELLED | OUTPATIENT
Start: 2019-01-04

## 2019-01-03 RX ORDER — SODIUM CHLORIDE 9 MG/ML
1000 INJECTION, SOLUTION INTRAVENOUS CONTINUOUS PRN
Status: CANCELLED
Start: 2019-01-04

## 2019-01-03 RX ORDER — METHYLPREDNISOLONE SODIUM SUCCINATE 125 MG/2ML
125 INJECTION, POWDER, LYOPHILIZED, FOR SOLUTION INTRAMUSCULAR; INTRAVENOUS
Status: CANCELLED
Start: 2019-01-04

## 2019-01-03 RX ORDER — EPINEPHRINE 0.3 MG/.3ML
0.3 INJECTION SUBCUTANEOUS EVERY 5 MIN PRN
Status: CANCELLED | OUTPATIENT
Start: 2019-01-04

## 2019-01-03 RX ORDER — ONDANSETRON 8 MG/1
8 TABLET, FILM COATED ORAL EVERY 8 HOURS PRN
Qty: 30 TABLET | Refills: 1 | Status: SHIPPED | OUTPATIENT
Start: 2019-01-03 | End: 2019-01-18

## 2019-01-03 RX ORDER — LOPERAMIDE HCL 2 MG
CAPSULE ORAL
Qty: 30 CAPSULE | Refills: 1 | Status: SHIPPED | OUTPATIENT
Start: 2019-01-03 | End: 2019-01-18

## 2019-01-03 RX ORDER — DIPHENHYDRAMINE HYDROCHLORIDE 50 MG/ML
50 INJECTION INTRAMUSCULAR; INTRAVENOUS
Status: CANCELLED
Start: 2019-01-04

## 2019-01-03 RX ADMIN — HEPARIN SODIUM (PORCINE) LOCK FLUSH IV SOLN 100 UNIT/ML 5 ML: 100 SOLUTION at 08:06

## 2019-01-03 ASSESSMENT — MIFFLIN-ST. JEOR: SCORE: 1538.4

## 2019-01-03 ASSESSMENT — PAIN SCALES - GENERAL: PAINLEVEL: NO PAIN (0)

## 2019-01-03 NOTE — PROGRESS NOTES
Tolerated port access and draw without complaint. Port site scrubbed with Chloraprep for 30 seconds. Accessed using sterile technique. [place] drawn-Red/Green/Purple tubes. Double signed by patient and RN. See documentation flowsheet. Nuha De Los Santos, RN, BSN, OCN

## 2019-01-03 NOTE — PROGRESS NOTES
Oncology Follow Up Visit: January 3, 2019     Oncologist: Dr Padilla Last  PCP: Moreno Harris    Diagnosis: Stage III Colon Cancer  Rian Malik is a 57 yo  male that was c/o lower abdominal cramping. CT showed sigmoid abscess but at resection was found to have large 5 cm firmly adherent sigmoid colon mass.Lymphovascular invasion present but no perineural invasion.  0/3 Reactive Lymph Nodes were positive. ([id number])  He has intact mismatch repair.  Treatment:   10/11/2017 sigmoidectomy with end colostomy  11/21/2017 -6/2018 modified FOLFOX-6 x 12 cycles  9/12/2018 laparoscopic liver oblation with intraoperative US, colostomy takedown, appendectomy, descending colectomy and anterior dissection. Surgical pathology showed periappendiceal tissue, ascending colon margin/mesentery and pericolonic fibroadipose adipose tissue involved by signet ring adenocarcinoma.  10/24/2018 began FOLFIRI    Interval History: Mr. Malik comes to clinic alone for symptom review prior to cycle 5 of FOLFIRI. Pt shares he has been feeling well and eating well with no weight loss. He continues to work his usual job and has good energy most days. He is having some loose stools but this has improved recently- still using imodium as needed- most mornings.He denies pain, nausea, mouth sores, SOB chest pain or neuropathy.    Rest of comprehensive and complete ROS is reviewed and is negative.   Past Medical History:   Diagnosis Date     Maldonado's esophagus      Cirrhosis (H)      Colon cancer (H) 10/11/2017    Poorly differentiated adenocarcinoma     Hepatitis C    - previously completed treatment for Hepatitis C.   Current Outpatient Medications   Medication     loperamide (IMODIUM) 2 MG capsule     LORazepam (ATIVAN) 0.5 MG tablet     ondansetron (ZOFRAN) 8 MG tablet     prochlorperazine (COMPAZINE) 10 MG tablet     No current facility-administered medications for this visit.      Allergies   Allergen Reactions     Acetaminophen   "    Naproxen        Physical Exam:/66   Pulse 78   Temp 97.2  F (36.2  C)   Resp 18   Ht 1.727 m (5' 8\")   Wt 74.4 kg (164 lb)   SpO2 96%   BMI 24.94 kg/m     ECOG PS- 0  Constitutional: Alert and in no distress. Moving well and is cooperative.   ENT: Eyes bright, No mouth sores.   Neck: Supple, No adenopathy.Thyroid symmetric  Cardiac: Heart rate and rhythm is regular and strong without murmur  Respiratory: Breathing easy. Lung sounds clear to auscultation  GI: Abdomen is soft, non-tender, BS normal. No masses or organomegaly..   MS: Muscle tone normal- moving well on own, extremities normal with no edema.   Skin: No suspicious lesions or rashes. Does mention a sore on the end of the scar from his abdominal surgery that has been irritated over last several days- found to have a stiff blue internal suture that is poking out of bottom of scar- grabbed and clipped with suture removal kit- pt will keep bacitracin and bandage over site until healed - to be reviewed by surgeon at visit next week.   Neuro: Sensory grossly WNL, gait normal.   Lymph: Normal ant/post cervical, axillary, supraclavicular nodes  Psych: Mentation appears normal and affect normal with good conversation.    Laboratory Results:   Assessment and Plan:   Stage III Colon Cancer-Pt is due to start cycle 5 tomorrow and does meet goals to continue with plan as set without changes.   Pt is using Neulasta with each cycle for treatment of chemotherapy induced neutropenia.   Thrombocytopenia continues ans is acceptable level today but will continue to monitor.   Pt will return in 2 weeks for review prior to next cycle with treatment labs.   Plan to repeat  MRI of the liver and PET scan in 3-4 months after starting FOLFIRI which would be due prior to 2/4 infusion- if no evidence of recurrent disease consideration for HIPEC  Loose stools- Known side effect of irinotecan. Using imodium as needed -most mornings. Reminded to push fluids to make up " for loss with the loose stools.    Open sore on the distal end of the scar from his abdominal surgery that has been irritated over last several days- found to have a stiff blue internal suture that is poking out of bottom of scar- grabbed and clipped with suture removal kit- pt will keep bacitracin and bandage over site until healed - to be reviewed by surgeon at visit next week.   This was a 25 min visit with > 50% in counseling and coordinating care including education and management of concerns.    Jessica Marsh,CNP

## 2019-01-03 NOTE — NURSING NOTE
"Oncology Rooming Note    January 3, 2019 8:31 AM   Rian Malik is a 58 year old male who presents for:    Chief Complaint   Patient presents with     Oncology Clinic Visit     follow up prior to chemo     Initial Vitals: /66   Pulse 78   Temp 97.2  F (36.2  C)   Resp 18   Ht 1.727 m (5' 8\")   Wt 74.4 kg (164 lb)   SpO2 96%   BMI 24.94 kg/m   Estimated body mass index is 24.94 kg/m  as calculated from the following:    Height as of this encounter: 1.727 m (5' 8\").    Weight as of this encounter: 74.4 kg (164 lb). Body surface area is 1.89 meters squared.  No Pain (0) Comment: Data Unavailable   No LMP for male patient.  Allergies reviewed: Yes  Medications reviewed: Yes    Medications: MEDICATION REFILLS NEEDED TODAY. Provider was notified.  Pharmacy name entered into EPIC:    [facility] - [place], [place] - [address]  [facility] - [place], [place] - [address]       5 minutes for nursing intake (face to face time)     Oxana Shepard LPN              "

## 2019-01-03 NOTE — LETTER
1/3/2019         RE: Rian Malik  [address] [place] [address]        Dear Colleague,    Thank you for referring your patient, Rian Malik, to the [facility]. Please see a copy of my visit note below.    Oncology Follow Up Visit: January 3, 2019     Oncologist: Dr Padilla Last  PCP: Moreno Harris    Diagnosis: Stage III Colon Cancer  Rian Malik is a 59 yo  male that was c/o lower abdominal cramping. CT showed sigmoid abscess but at resection was found to have large 5 cm firmly adherent sigmoid colon mass.Lymphovascular invasion present but no perineural invasion.  0/3 Reactive Lymph Nodes were positive. ([id number])  He has intact mismatch repair.  Treatment:   10/11/2017 sigmoidectomy with end colostomy  11/21/2017 -6/2018 modified FOLFOX-6 x 12 cycles  9/12/2018 laparoscopic liver oblation with intraoperative US, colostomy takedown, appendectomy, descending colectomy and anterior dissection. Surgical pathology showed periappendiceal tissue, ascending colon margin/mesentery and pericolonic fibroadipose adipose tissue involved by signet ring adenocarcinoma.  10/24/2018 began FOLFIRI    Interval History: Mr. Malik comes to clinic alone for symptom review prior to cycle 5 of FOLFIRI. Pt shares he has been feeling well and eating well with no weight loss. He continues to work his usual job and has good energy most days. He is having some loose stools but this has improved recently- still using imodium as needed- most mornings.He denies pain, nausea, mouth sores, SOB chest pain or neuropathy.    Rest of comprehensive and complete ROS is reviewed and is negative.   Past Medical History:   Diagnosis Date     Maldonado's esophagus      Cirrhosis (H)      Colon cancer (H) 10/11/2017    Poorly differentiated adenocarcinoma     Hepatitis C    - previously completed treatment for Hepatitis C.   Current Outpatient Medications   Medication     loperamide (IMODIUM) 2 MG  "capsule     LORazepam (ATIVAN) 0.5 MG tablet     ondansetron (ZOFRAN) 8 MG tablet     prochlorperazine (COMPAZINE) 10 MG tablet     No current facility-administered medications for this visit.      Allergies   Allergen Reactions     Acetaminophen      Naproxen        Physical Exam:/66   Pulse 78   Temp 97.2  F (36.2  C)   Resp 18   Ht 1.727 m (5' 8\")   Wt 74.4 kg (164 lb)   SpO2 96%   BMI 24.94 kg/m      ECOG PS- 0  Constitutional: Alert and in no distress. Moving well and is cooperative.   ENT: Eyes bright, No mouth sores.   Neck: Supple, No adenopathy.Thyroid symmetric  Cardiac: Heart rate and rhythm is regular and strong without murmur  Respiratory: Breathing easy. Lung sounds clear to auscultation  GI: Abdomen is soft, non-tender, BS normal. No masses or organomegaly..   MS: Muscle tone normal- moving well on own, extremities normal with no edema.   Skin: No suspicious lesions or rashes. Does mention a sore on the end of the scar from his abdominal surgery that has been irritated over last several days- found to have a stiff blue internal suture that is poking out of bottom of scar- grabbed and clipped with suture removal kit- pt will keep bacitracin and bandage over site until healed - to be reviewed by surgeon at visit next week.   Neuro: Sensory grossly WNL, gait normal.   Lymph: Normal ant/post cervical, axillary, supraclavicular nodes  Psych: Mentation appears normal and affect normal with good conversation.    Laboratory Results:   Assessment and Plan:   Stage III Colon Cancer-Pt is due to start cycle 5 tomorrow and does meet goals to continue with plan as set without changes.   Pt is using Neulasta with each cycle for treatment of chemotherapy induced neutropenia.   Thrombocytopenia continues ans is acceptable level today but will continue to monitor.   Pt will return in 2 weeks for review prior to next cycle with treatment labs.   Plan to repeat  MRI of the liver and PET scan in 3-4 months " after starting FOLFIRI which would be due prior to 2/4 infusion- if no evidence of recurrent disease consideration for HIPEC  Loose stools- Known side effect of irinotecan. Using imodium as needed -most mornings. Reminded to push fluids to make up for loss with the loose stools.    Open sore on the distal end of the scar from his abdominal surgery that has been irritated over last several days- found to have a stiff blue internal suture that is poking out of bottom of scar- grabbed and clipped with suture removal kit- pt will keep bacitracin and bandage over site until healed - to be reviewed by surgeon at visit next week.   This was a 25 min visit with > 50% in counseling and coordinating care including education and management of concerns.    Jessica Marsh CNP      Again, thank you for allowing me to participate in the care of your patient.        Sincerely,        Jessica Marsh, SILVIA, APRN CNP

## 2019-01-04 ENCOUNTER — INFUSION THERAPY VISIT (OUTPATIENT)
Dept: INFUSION THERAPY | Facility: CLINIC | Age: 59
End: 2019-01-04
Payer: COMMERCIAL

## 2019-01-04 ENCOUNTER — HOME INFUSION (PRE-WILLOW HOME INFUSION) (OUTPATIENT)
Dept: PHARMACY | Facility: CLINIC | Age: 59
End: 2019-01-04

## 2019-01-04 VITALS
SYSTOLIC BLOOD PRESSURE: 111 MMHG | HEIGHT: 68 IN | DIASTOLIC BLOOD PRESSURE: 67 MMHG | BODY MASS INDEX: 24.86 KG/M2 | OXYGEN SATURATION: 99 % | WEIGHT: 164.03 LBS | TEMPERATURE: 96.2 F | HEART RATE: 67 BPM | RESPIRATION RATE: 16 BRPM

## 2019-01-04 DIAGNOSIS — C18.7 ADENOCARCINOMA OF SIGMOID COLON (H): Primary | ICD-10-CM

## 2019-01-04 PROCEDURE — 96413 CHEMO IV INFUSION 1 HR: CPT | Performed by: INTERNAL MEDICINE

## 2019-01-04 PROCEDURE — 96375 TX/PRO/DX INJ NEW DRUG ADDON: CPT | Performed by: INTERNAL MEDICINE

## 2019-01-04 PROCEDURE — 99207 ZZC NO CHARGE LOS: CPT

## 2019-01-04 PROCEDURE — 96415 CHEMO IV INFUSION ADDL HR: CPT | Performed by: INTERNAL MEDICINE

## 2019-01-04 RX ADMIN — Medication 250 ML: at 08:30

## 2019-01-04 ASSESSMENT — PAIN SCALES - GENERAL: PAINLEVEL: NO PAIN (0)

## 2019-01-04 ASSESSMENT — MIFFLIN-ST. JEOR: SCORE: 1538.54

## 2019-01-04 NOTE — PROGRESS NOTES
Infusion Nursing Note:  Rian Malik presents today for Folfiri.    Patient seen by provider today: No *NP on 1/3   present during visit today: Not Applicable.    Note: patient given atropine before and after Irinotecan due to previous issues with nausea/diaphoresis/abd cramping.    Intravenous Access:  Implanted Port.    Treatment Conditions:  Results reviewed, labs MET treatment parameters, ok to proceed with treatment.    Post Infusion Assessment:  Patient tolerated infusion without incident.    Discharge Plan:   [facility] notified of disconnect time and need for OnPro.    HARSHAD GALE RN

## 2019-01-05 NOTE — PROGRESS NOTES
"Colon and Rectal Surgery Follow-up Clinic Note      RE: Rian Malik  : 1960  AP: 2019    DIAGNOSIS: Metastatic perforated sigmoid colon cancer (status post sigmoidectomy with end colostomy and extensive adjuvant chemotherapy), now s/p cystoscopy with insertion of bilateral ureteral stents (per Dr. Quintero), laparoscopic liver ablation with intraoperative ultrasound (per Dr. Hale), and HALS colostomy takedown, appendectomy, descending colectomy, and anterior resection on 18. Start adjuvant FOLFIRI on 10/28/18.    INTERVAL HISTORY: Tolerating chemotherapy relatively well. Has received 5/6 cycles so far. Denies increased pain, fevers, or chills. Tolerating diet well. Having 1-2 soft BM's per day. Off narcotic pain medications.    Physical Examination:  /79 (BP Location: Left arm, Patient Position: Sitting, Cuff Size: Adult Regular)   Pulse 82   Temp 97.8  F (36.6  C) (Oral)   Ht 5' 8\"   Wt 163 lb 14.4 oz   SpO2 97%   BMI 24.92 kg/m    Abdomen soft, NT. Incisions with no evidence of infection or hernia.   Perianal skin intact. SUBHASH: no masses palpated.  Flexible sigmoidoscopy: after obtaining informed consent and performing a \"time out\", an adult flexible sigmoidoscope was introduced through the anus and passed up to the descending colon. The quality of the prep was good. Findings: widely patent and healthy appearing colorectal anastomosis at 13-cm from dentate line with no evidence of neoplasia. There was no active ulceration, inflammation or bleeding. No additional abnormalities were seen. Total scope time: 12 minutes. The patient tolerated the procedure well.    ASSESSMENT  Metastatic colon cancer. Tolerating adjuvant chemotherapy well. No evidence of progressive or recurrent disease. We discussed the role and impact of cytoreductive surgery with hyperthermic intraperitoneal chemotherapy for metastatic colorectal cancer. We also discussed recent grade IA data (Prodige 7 trial) " showing no significant differences in overall survival with cytoreductive surgery with hyperthermic intraperitoneal chemotherapy versus cytoreductive surgery alone. He understands these well. All questions were answered to his/her satisfaction.     CEA: 2.2->0.9->1.6->1.2->1.2.    Pathology:  FINAL DIAGNOSIS:   A. 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 RECTAL STUMP MARGIN, EXCISION:   - No evidence of malignancy   - One lymph node with no evidence of malignancy (0/1)     C. 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 DESCENDING COLON MESENTERY, EXCISION:   - Fibroadipose tissue involved by signet ring cell adenocarcinoma   - Two lymph nodes with no evidence of metastatic carcinoma (0/2)     E. PROXIMAL ANASTOMOTIC RING, EXCISION:   - No evidence of malignancy     F. DISTAL ANASTOMOTIC RING, EXCISION:   - No evidence of malignancy     G. 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 Would like for him to finish adjuvant therapy first. No clear indication for HIPEC at this time given that he has already undergone complete cytoreduction and has liver disease. Will review imaging and colonoscopy in the fall.  2. Surveillance per Medical Oncology.  3. Colonoscopy in 9/2019.    Time spent: 30 minutes.  >50% spent in discussing, counseling and coordinating care.    Herminio Oswald M.D., M.Sc.     Division of Colon and Rectal Surgery  [facility]    Referring Provider:  Moreno Harris MD  [facility] [place] [address]     Primary Care Provider:  Moreno Harris  Jelani    CC:      Saqib Boone MD

## 2019-01-06 ENCOUNTER — HOME INFUSION (PRE-WILLOW HOME INFUSION) (OUTPATIENT)
Dept: PHARMACY | Facility: CLINIC | Age: 59
End: 2019-01-06

## 2019-01-07 ENCOUNTER — OFFICE VISIT (OUTPATIENT)
Dept: SURGERY | Facility: CLINIC | Age: 59
End: 2019-01-07
Payer: COMMERCIAL

## 2019-01-07 VITALS
HEIGHT: 68 IN | BODY MASS INDEX: 24.84 KG/M2 | SYSTOLIC BLOOD PRESSURE: 139 MMHG | HEART RATE: 82 BPM | WEIGHT: 163.9 LBS | DIASTOLIC BLOOD PRESSURE: 79 MMHG | TEMPERATURE: 97.8 F | OXYGEN SATURATION: 97 %

## 2019-01-07 DIAGNOSIS — C18.7 CANCER OF SIGMOID COLON (H): Primary | ICD-10-CM

## 2019-01-07 ASSESSMENT — MIFFLIN-ST. JEOR: SCORE: 1537.95

## 2019-01-07 NOTE — NURSING NOTE
"Chief Complaint   Patient presents with     RECHECK     3 month follow-up.       Vitals:    01/07/19 1125   BP: 139/79   BP Location: Left arm   Patient Position: Sitting   Cuff Size: Adult Regular   Pulse: 82   Temp: 97.8  F (36.6  C)   TempSrc: Oral   SpO2: 97%   Weight: 163 lb 14.4 oz   Height: 5' 8\"       Body mass index is 24.92 kg/m .      Yandel Bhatti, EMT                      "

## 2019-01-07 NOTE — LETTER
"2019       RE: Rian Malik  [address] [place] [address]     Dear Colleague,    Thank you for referring your patient, Rian Malik, to the [facility] COLON AND RECTAL SURGERY at [facility]. Please see a copy of my visit note below.    Colon and Rectal Surgery Follow-up Clinic Note      RE: Rian Malik  : 1960  AP: 2019    DIAGNOSIS: Metastatic perforated sigmoid colon cancer (status post sigmoidectomy with end colostomy and extensive adjuvant chemotherapy), now s/p cystoscopy with insertion of bilateral ureteral stents (per Dr. Quintero), laparoscopic liver ablation with intraoperative ultrasound (per Dr. Hale), and HALS colostomy takedown, appendectomy, descending colectomy, and anterior resection on 18. Start adjuvant FOLFIRI on 10/28/18.    INTERVAL HISTORY: Tolerating chemotherapy relatively well. Has received 5/6 cycles so far. Denies increased pain, fevers, or chills. Tolerating diet well. Having 1-2 soft BM's per day. Off narcotic pain medications.    Physical Examination:  /79 (BP Location: Left arm, Patient Position: Sitting, Cuff Size: Adult Regular)   Pulse 82   Temp 97.8  F (36.6  C) (Oral)   Ht 5' 8\"   Wt 163 lb 14.4 oz   SpO2 97%   BMI 24.92 kg/m     Abdomen soft, NT. Incisions with no evidence of infection or hernia.   Perianal skin intact. SUBHASH: no masses palpated.  Flexible sigmoidoscopy: after obtaining informed consent and performing a \"time out\", an adult flexible sigmoidoscope was introduced through the anus and passed up to the descending colon. The quality of the prep was good. Findings: widely patent and healthy appearing colorectal anastomosis at 13-cm from dentate line with no evidence of neoplasia. There was no active ulceration, inflammation or bleeding. No additional abnormalities were seen. Total scope time: 12 minutes. The patient tolerated the procedure well.    ASSESSMENT  Metastatic colon " cancer. Tolerating adjuvant chemotherapy well. No evidence of progressive or recurrent disease. We discussed the role and impact of cytoreductive surgery with hyperthermic intraperitoneal chemotherapy for metastatic colorectal cancer. We also discussed recent grade IA data (Prodige 7 trial) showing no significant differences in overall survival with cytoreductive surgery with hyperthermic intraperitoneal chemotherapy versus cytoreductive surgery alone. He understands these well. All questions were answered to his/her satisfaction.     CEA: 2.2->0.9->1.6->1.2->1.2.    Pathology:  FINAL DIAGNOSIS:   A. 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 RECTAL STUMP MARGIN, EXCISION:   - No evidence of malignancy   - One lymph node with no evidence of malignancy (0/1)     C. 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 DESCENDING COLON MESENTERY, EXCISION:   - Fibroadipose tissue involved by signet ring cell adenocarcinoma   - Two lymph nodes with no evidence of metastatic carcinoma (0/2)     E. PROXIMAL ANASTOMOTIC RING, EXCISION:   - No evidence of malignancy     F. DISTAL ANASTOMOTIC RING, EXCISION:   - No evidence of malignancy     G. 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 Would like for him to finish adjuvant therapy first. No clear indication for HIPEC at this time given that he has already undergone complete cytoreduction and has liver disease. Will review imaging and colonoscopy in the fall.  2. Surveillance per Medical Oncology.  3. Colonoscopy in 9/2019.    Time spent: 30 minutes.  >50% spent in discussing, counseling and  coordinating care.    Herminio Oswald M.D., M.Sc.     Division of Colon and Rectal Surgery  [facility]    Referring Provider:  [place] [address]     Primary Care Provider:  Moreno Harris    CC:      Saqib Boone MD

## 2019-01-07 NOTE — PROGRESS NOTES
This is a recent snapshot of the patient's [facility] medical record.  For current drug dose and complete information and questions, call [phone] or In Basket pool, [facility] home infusion ([id number])  CSN Number:  [id number]

## 2019-01-07 NOTE — PROGRESS NOTES
This is a recent snapshot of the patient's [facility] medical record.  For current drug dose and complete information and questions, call [phone] or In Basket pool, [facility] home infusion ([id number])  CSN Number:  [id number]

## 2019-01-09 PROBLEM — C18.7 ADENOCARCINOMA OF SIGMOID COLON (H): Status: ACTIVE | Noted: 2017-10-25

## 2019-01-18 ENCOUNTER — HOME INFUSION (PRE-WILLOW HOME INFUSION) (OUTPATIENT)
Dept: PHARMACY | Facility: CLINIC | Age: 59
End: 2019-01-18

## 2019-01-18 ENCOUNTER — ONCOLOGY VISIT (OUTPATIENT)
Dept: ONCOLOGY | Facility: CLINIC | Age: 59
End: 2019-01-18
Payer: COMMERCIAL

## 2019-01-18 ENCOUNTER — INFUSION THERAPY VISIT (OUTPATIENT)
Dept: INFUSION THERAPY | Facility: CLINIC | Age: 59
End: 2019-01-18
Payer: COMMERCIAL

## 2019-01-18 VITALS
WEIGHT: 164.5 LBS | HEART RATE: 65 BPM | SYSTOLIC BLOOD PRESSURE: 120 MMHG | OXYGEN SATURATION: 97 % | HEIGHT: 68 IN | DIASTOLIC BLOOD PRESSURE: 69 MMHG | BODY MASS INDEX: 24.93 KG/M2 | RESPIRATION RATE: 16 BRPM | TEMPERATURE: 97 F

## 2019-01-18 DIAGNOSIS — T45.1X5A CHEMOTHERAPY-INDUCED NEUTROPENIA (H): ICD-10-CM

## 2019-01-18 DIAGNOSIS — C18.7 ADENOCARCINOMA OF SIGMOID COLON (H): Primary | ICD-10-CM

## 2019-01-18 DIAGNOSIS — T14.8XXA OPEN WOUND: ICD-10-CM

## 2019-01-18 DIAGNOSIS — C18.7 ADENOCARCINOMA OF SIGMOID COLON (H): ICD-10-CM

## 2019-01-18 DIAGNOSIS — R19.5 LOOSE STOOLS: ICD-10-CM

## 2019-01-18 DIAGNOSIS — R11.0 NAUSEA: Primary | ICD-10-CM

## 2019-01-18 DIAGNOSIS — D70.1 CHEMOTHERAPY-INDUCED NEUTROPENIA (H): ICD-10-CM

## 2019-01-18 LAB
BASOPHILS # BLD AUTO: 0.1 10E9/L (ref 0–0.2)
BASOPHILS NFR BLD AUTO: 0.7 %
BILIRUB SERPL-MCNC: 0.7 MG/DL (ref 0.2–1.3)
DIFFERENTIAL METHOD BLD: ABNORMAL
EOSINOPHIL # BLD AUTO: 0.1 10E9/L (ref 0–0.7)
EOSINOPHIL NFR BLD AUTO: 0.7 %
ERYTHROCYTE [DISTWIDTH] IN BLOOD BY AUTOMATED COUNT: 15.9 % (ref 10–15)
HCT VFR BLD AUTO: 37.7 % (ref 40–53)
HGB BLD-MCNC: 12.9 G/DL (ref 13.3–17.7)
IMM GRANULOCYTES # BLD: 0.1 10E9/L (ref 0–0.4)
IMM GRANULOCYTES NFR BLD: 1.7 %
LYMPHOCYTES # BLD AUTO: 1.1 10E9/L (ref 0.8–5.3)
LYMPHOCYTES NFR BLD AUTO: 12.6 %
MCH RBC QN AUTO: 31.5 PG (ref 26.5–33)
MCHC RBC AUTO-ENTMCNC: 34.2 G/DL (ref 31.5–36.5)
MCV RBC AUTO: 92 FL (ref 78–100)
MONOCYTES # BLD AUTO: 0.4 10E9/L (ref 0–1.3)
MONOCYTES NFR BLD AUTO: 4.8 %
NEUTROPHILS # BLD AUTO: 6.7 10E9/L (ref 1.6–8.3)
NEUTROPHILS NFR BLD AUTO: 79.5 %
PLATELET # BLD AUTO: 98 10E9/L (ref 150–450)
RBC # BLD AUTO: 4.1 10E12/L (ref 4.4–5.9)
WBC # BLD AUTO: 8.4 10E9/L (ref 4–11)

## 2019-01-18 PROCEDURE — 96367 TX/PROPH/DG ADDL SEQ IV INF: CPT | Performed by: NURSE PRACTITIONER

## 2019-01-18 PROCEDURE — 96415 CHEMO IV INFUSION ADDL HR: CPT | Performed by: NURSE PRACTITIONER

## 2019-01-18 PROCEDURE — 96416 CHEMO PROLONG INFUSE W/PUMP: CPT | Performed by: NURSE PRACTITIONER

## 2019-01-18 PROCEDURE — 99207 ZZC NO CHARGE NURSE ONLY: CPT

## 2019-01-18 PROCEDURE — 82247 BILIRUBIN TOTAL: CPT | Performed by: NURSE PRACTITIONER

## 2019-01-18 PROCEDURE — 85025 COMPLETE CBC W/AUTO DIFF WBC: CPT | Performed by: NURSE PRACTITIONER

## 2019-01-18 PROCEDURE — 99214 OFFICE O/P EST MOD 30 MIN: CPT | Mod: 25 | Performed by: NURSE PRACTITIONER

## 2019-01-18 PROCEDURE — 96413 CHEMO IV INFUSION 1 HR: CPT | Performed by: NURSE PRACTITIONER

## 2019-01-18 PROCEDURE — 96375 TX/PRO/DX INJ NEW DRUG ADDON: CPT | Performed by: NURSE PRACTITIONER

## 2019-01-18 PROCEDURE — 99207 ZZC NO CHARGE LOS: CPT

## 2019-01-18 RX ORDER — ONDANSETRON 8 MG/1
8 TABLET, FILM COATED ORAL EVERY 8 HOURS PRN
Qty: 30 TABLET | Refills: 1 | Status: SHIPPED | OUTPATIENT
Start: 2019-01-18 | End: 2019-02-04

## 2019-01-18 RX ORDER — LORAZEPAM 2 MG/ML
0.5 INJECTION INTRAMUSCULAR EVERY 4 HOURS PRN
Status: CANCELLED
Start: 2019-01-18

## 2019-01-18 RX ORDER — MEPERIDINE HYDROCHLORIDE 25 MG/ML
25 INJECTION INTRAMUSCULAR; INTRAVENOUS; SUBCUTANEOUS EVERY 30 MIN PRN
Status: CANCELLED | OUTPATIENT
Start: 2019-01-18

## 2019-01-18 RX ORDER — EPINEPHRINE 1 MG/ML
0.3 INJECTION, SOLUTION INTRAMUSCULAR; SUBCUTANEOUS EVERY 5 MIN PRN
Status: CANCELLED | OUTPATIENT
Start: 2019-01-18

## 2019-01-18 RX ORDER — SODIUM CHLORIDE 9 MG/ML
1000 INJECTION, SOLUTION INTRAVENOUS CONTINUOUS PRN
Status: CANCELLED
Start: 2019-01-18

## 2019-01-18 RX ORDER — EPINEPHRINE 0.3 MG/.3ML
0.3 INJECTION SUBCUTANEOUS EVERY 5 MIN PRN
Status: CANCELLED | OUTPATIENT
Start: 2019-01-18

## 2019-01-18 RX ORDER — METHYLPREDNISOLONE SODIUM SUCCINATE 125 MG/2ML
125 INJECTION, POWDER, LYOPHILIZED, FOR SOLUTION INTRAMUSCULAR; INTRAVENOUS
Status: CANCELLED
Start: 2019-01-18

## 2019-01-18 RX ORDER — ALBUTEROL SULFATE 90 UG/1
1-2 AEROSOL, METERED RESPIRATORY (INHALATION)
Status: CANCELLED
Start: 2019-01-18

## 2019-01-18 RX ORDER — LORAZEPAM 0.5 MG/1
0.5 TABLET ORAL EVERY 4 HOURS PRN
Qty: 30 TABLET | Refills: 2 | Status: SHIPPED | OUTPATIENT
Start: 2019-01-18 | End: 2019-02-04

## 2019-01-18 RX ORDER — ALBUTEROL SULFATE 0.83 MG/ML
2.5 SOLUTION RESPIRATORY (INHALATION)
Status: CANCELLED | OUTPATIENT
Start: 2019-01-18

## 2019-01-18 RX ORDER — HEPARIN SODIUM (PORCINE) LOCK FLUSH IV SOLN 100 UNIT/ML 100 UNIT/ML
5 SOLUTION INTRAVENOUS
Status: DISCONTINUED | OUTPATIENT
Start: 2019-01-18 | End: 2019-01-18 | Stop reason: HOSPADM

## 2019-01-18 RX ORDER — DIPHENHYDRAMINE HYDROCHLORIDE 50 MG/ML
50 INJECTION INTRAMUSCULAR; INTRAVENOUS
Status: CANCELLED
Start: 2019-01-18

## 2019-01-18 RX ORDER — LOPERAMIDE HCL 2 MG
CAPSULE ORAL
Qty: 30 CAPSULE | Refills: 1 | Status: SHIPPED | OUTPATIENT
Start: 2019-01-18 | End: 2019-01-01

## 2019-01-18 RX ADMIN — Medication 250 ML: at 08:57

## 2019-01-18 RX ADMIN — HEPARIN SODIUM (PORCINE) LOCK FLUSH IV SOLN 100 UNIT/ML 5 ML: 100 SOLUTION at 07:32

## 2019-01-18 ASSESSMENT — MIFFLIN-ST. JEOR: SCORE: 1540.67

## 2019-01-18 ASSESSMENT — PAIN SCALES - GENERAL: PAINLEVEL: NO PAIN (0)

## 2019-01-18 NOTE — NURSING NOTE
"Oncology Rooming Note    January 18, 2019 7:58 AM   Rian Malik is a 58 year old male who presents for:    Chief Complaint   Patient presents with     Oncology Clinic Visit     Follow Up/Prior to Treatment     Initial Vitals: /69 (BP Location: Right arm)   Pulse 65   Temp 97  F (36.1  C) (Oral)   Resp 16   Ht 1.727 m (5' 8\")   Wt 74.6 kg (164 lb 8 oz)   SpO2 97%   BMI 25.01 kg/m   Estimated body mass index is 25.01 kg/m  as calculated from the following:    Height as of this encounter: 1.727 m (5' 8\").    Weight as of this encounter: 74.6 kg (164 lb 8 oz). Body surface area is 1.89 meters squared.  No Pain (0) Comment: Data Unavailable   No LMP for male patient.  Allergies reviewed: Yes  Medications reviewed: Yes    Medications: Medication refills not needed today.  Pharmacy name entered into [address]:    [facility] - [place], [place] - [address]  [facility] - [place], [place] - [address]    5 minutes for nursing intake (face to face time)     Arabella Boswell LPN              "

## 2019-01-18 NOTE — PROGRESS NOTES
Power port needle left accessed for treatment. Tolerated port access and draw without complaint. Port site scrubbed with Chloraprep for 30 seconds. Accessed using sterile technique. [place] drawn-Red/Green/Purple tubes. Double signed by patient and RN. See documentation flowsheet. Nuha De Los Santos, RN, BSN, OCN      
n/a

## 2019-01-18 NOTE — PROGRESS NOTES
Oncology Follow Up Visit: January 18, 2019     Oncologist: Dr Padilla Last  PCP: Moreno Harris    Diagnosis: Stage III Colon Cancer  Rian Malik is a 59 yo  male that was c/o lower abdominal cramping. CT showed sigmoid abscess but at resection was found to have large 5 cm firmly adherent sigmoid colon mass.Lymphovascular invasion present but no perineural invasion.  0/3 Reactive Lymph Nodes were positive. ([id number])  He has intact mismatch repair.  Treatment:   10/11/2017 sigmoidectomy with end colostomy  11/21/2017 -6/2018 modified FOLFOX-6 x 12 cycles  9/12/2018 laparoscopic liver oblation with intraoperative US, colostomy takedown, appendectomy, descending colectomy and anterior dissection. Surgical pathology showed periappendiceal tissue, ascending colon margin/mesentery and pericolonic fibroadipose adipose tissue involved by signet ring adenocarcinoma.  10/24/2018 began FOLFIRI    Interval History: Mr. Malik comes to clinic alone for symptom review prior to cycle 6 of FOLFIRI. Pt states he has been feeling well with only very occasional loose stools. 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 He feels his appetite is good and he is taking fluids well. Denies any other symptoms related to treatment or of concern.   Rest of comprehensive and complete ROS is reviewed and is negative.   Past Medical History:   Diagnosis Date     Maldonado's esophagus      Cirrhosis (H)      Colon cancer (H) 10/11/2017    Poorly differentiated adenocarcinoma     Hepatitis C    - previously completed treatment for Hepatitis C.   Current Outpatient Medications   Medication     loperamide (IMODIUM) 2 MG capsule     LORazepam (ATIVAN) 0.5 MG tablet     ondansetron (ZOFRAN) 8 MG tablet     prochlorperazine (COMPAZINE) 10 MG tablet     No current facility-administered medications for this visit.      Facility-Administered  "Medications Ordered in Other Visits   Medication     heparin 100 UNIT/ML injection 5 mL     sodium chloride (PF) 0.9% PF flush 10-20 mL     Allergies   Allergen Reactions     Acetaminophen      Naproxen        Physical Exam: /69 (BP Location: Right arm)   Pulse 65   Temp 97  F (36.1  C) (Oral)   Resp 16   Ht 1.727 m (5' 8\")   Wt 74.6 kg (164 lb 8 oz)   SpO2 97%   BMI 25.01 kg/m     ECOG PS- 0  Constitutional: Alert and in no distress. Moving well + cooperative.   ENT: Eyes bright, No mouth sores.   Neck: Supple, No adenopathy.Thyroid symmetric  Cardiac: Heart rate and rhythm is regular and strong without murmur  Respiratory: Breathing easy. Lung sounds clear to auscultation  GI: Abdomen is soft, non-tender, BS normal. No masses or organomegaly.   MS: Muscle tone normal- moving well on own, extremities normal with no edema.   Skin: No suspicious lesions or rashes.   Neuro: Sensory grossly WNL, gait normal.   Lymph: Normal ant/post cervical, axillary, supraclavicular nodes  Psych: Mentation appears normal and affect normal with good conversation.    Laboratory Results:   Results for orders placed or performed in visit on 01/18/19   CBC with platelets differential   Result Value Ref Range    WBC 8.4 4.0 - 11.0 10e9/L    RBC Count 4.10 (L) 4.4 - 5.9 10e12/L    Hemoglobin 12.9 (L) 13.3 - 17.7 g/dL    Hematocrit 37.7 (L) 40.0 - 53.0 %    MCV 92 78 - 100 fl    MCH 31.5 26.5 - 33.0 pg    MCHC 34.2 31.5 - 36.5 g/dL    RDW 15.9 (H) 10.0 - 15.0 %    Platelet Count 98 (L) 150 - 450 10e9/L    Diff Method Automated Method     % Neutrophils 79.5 %    % Lymphocytes 12.6 %    % Monocytes 4.8 %    % Eosinophils 0.7 %    % Basophils 0.7 %    % Immature Granulocytes 1.7 %    Absolute Neutrophil 6.7 1.6 - 8.3 10e9/L    Absolute Lymphocytes 1.1 0.8 - 5.3 10e9/L    Absolute Monocytes 0.4 0.0 - 1.3 10e9/L    Absolute Eosinophils 0.1 0.0 - 0.7 10e9/L    Absolute Basophils 0.1 0.0 - 0.2 10e9/L    Abs Immature Granulocytes 0.1 0 " - 0.4 10e9/L   Bilirubin  total   Result Value Ref Range    Bilirubin Total 0.7 0.2 - 1.3 mg/dL     Assessment and Plan:   Stage III Colon Cancer-Pt continues to tolerate treatment well and does meet goals to continue with cycle 6 of FOLFIRI with out changes to plan.   Pt will return in 2 weeks  Pt is using Neulasta with each cycle for treatment of chemotherapy induced neutropenia.   Thrombocytopenia continues ans is acceptable level today but will continue to monitor.   Pt will return in 2 weeks for review prior to next cycle with treatment labs.   Plan to repeat  MRI of the liver and PET scan in 3-4 months after starting FOLFIRI which would be due prior to 2/4 infusion- if no evidence of recurrent disease consideration for HIPEC  Loose stools- Known side effect of irinotecan. Using imodium some mornings for control of loose stools.     Insomnia and nausea- pt using zofran and ativan as needed for nausea and states he is using ativan most evenings to help with sleep-reviewed that we would like him to use good sleep hygiene and regualr exercise to help with sleep as first line treatment for the sleep issues-  medications renewed for pt today.   This was a 25 min visit with > 50% in counseling and coordinating care including education and management of concerns.    Jessica Marsh,CNP

## 2019-01-18 NOTE — LETTER
1/18/2019         RE: Rian Malik  [address] [place] [address]        Dear Colleague,    Thank you for referring your patient, Rian Malik, to the [facility]. Please see a copy of my visit note below.    Oncology Follow Up Visit: January 18, 2019     Oncologist: Dr Padilla Last  PCP: Moreno Harris    Diagnosis: Stage III Colon Cancer  Rian Malik is a 57 yo  male that was c/o lower abdominal cramping. CT showed sigmoid abscess but at resection was found to have large 5 cm firmly adherent sigmoid colon mass.Lymphovascular invasion present but no perineural invasion.  0/3 Reactive Lymph Nodes were positive. ([id number])  He has intact mismatch repair.  Treatment:   10/11/2017 sigmoidectomy with end colostomy  11/21/2017 -6/2018 modified FOLFOX-6 x 12 cycles  9/12/2018 laparoscopic liver oblation with intraoperative US, colostomy takedown, appendectomy, descending colectomy and anterior dissection. Surgical pathology showed periappendiceal tissue, ascending colon margin/mesentery and pericolonic fibroadipose adipose tissue involved by signet ring adenocarcinoma.  10/24/2018 began FOLFIRI    Interval History: Mr. Malik comes to clinic alone for symptom review prior to cycle 6 of FOLFIRI. Pt states he has been feeling well with only very occasional loose stools. 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 He feels his appetite is good and he is taking fluids well. Denies any other symptoms related to treatment or of concern.   Rest of comprehensive and complete ROS is reviewed and is negative.   Past Medical History:   Diagnosis Date     Maldonado's esophagus      Cirrhosis (H)      Colon cancer (H) 10/11/2017    Poorly differentiated adenocarcinoma     Hepatitis C    - previously completed treatment for Hepatitis C.   Current Outpatient Medications   Medication      "loperamide (IMODIUM) 2 MG capsule     LORazepam (ATIVAN) 0.5 MG tablet     ondansetron (ZOFRAN) 8 MG tablet     prochlorperazine (COMPAZINE) 10 MG tablet     No current facility-administered medications for this visit.      Facility-Administered Medications Ordered in Other Visits   Medication     heparin 100 UNIT/ML injection 5 mL     sodium chloride (PF) 0.9% PF flush 10-20 mL     Allergies   Allergen Reactions     Acetaminophen      Naproxen        Physical Exam: /69 (BP Location: Right arm)   Pulse 65   Temp 97  F (36.1  C) (Oral)   Resp 16   Ht 1.727 m (5' 8\")   Wt 74.6 kg (164 lb 8 oz)   SpO2 97%   BMI 25.01 kg/m      ECOG PS- 0  Constitutional: Alert and in no distress. Moving well + cooperative.   ENT: Eyes bright, No mouth sores.   Neck: Supple, No adenopathy.Thyroid symmetric  Cardiac: Heart rate and rhythm is regular and strong without murmur  Respiratory: Breathing easy. Lung sounds clear to auscultation  GI: Abdomen is soft, non-tender, BS normal. No masses or organomegaly.   MS: Muscle tone normal- moving well on own, extremities normal with no edema.   Skin: No suspicious lesions or rashes.   Neuro: Sensory grossly WNL, gait normal.   Lymph: Normal ant/post cervical, axillary, supraclavicular nodes  Psych: Mentation appears normal and affect normal with good conversation.    Laboratory Results:   Results for orders placed or performed in visit on 01/18/19   CBC with platelets differential   Result Value Ref Range    WBC 8.4 4.0 - 11.0 10e9/L    RBC Count 4.10 (L) 4.4 - 5.9 10e12/L    Hemoglobin 12.9 (L) 13.3 - 17.7 g/dL    Hematocrit 37.7 (L) 40.0 - 53.0 %    MCV 92 78 - 100 fl    MCH 31.5 26.5 - 33.0 pg    MCHC 34.2 31.5 - 36.5 g/dL    RDW 15.9 (H) 10.0 - 15.0 %    Platelet Count 98 (L) 150 - 450 10e9/L    Diff Method Automated Method     % Neutrophils 79.5 %    % Lymphocytes 12.6 %    % Monocytes 4.8 %    % Eosinophils 0.7 %    % Basophils 0.7 %    % Immature Granulocytes 1.7 %    " Absolute Neutrophil 6.7 1.6 - 8.3 10e9/L    Absolute Lymphocytes 1.1 0.8 - 5.3 10e9/L    Absolute Monocytes 0.4 0.0 - 1.3 10e9/L    Absolute Eosinophils 0.1 0.0 - 0.7 10e9/L    Absolute Basophils 0.1 0.0 - 0.2 10e9/L    Abs Immature Granulocytes 0.1 0 - 0.4 10e9/L   Bilirubin  total   Result Value Ref Range    Bilirubin Total 0.7 0.2 - 1.3 mg/dL     Assessment and Plan:   Stage III Colon Cancer-Pt continues to tolerate treatment well and does meet goals to continue with cycle 6 of FOLFIRI with out changes to plan.   Pt will return in 2 weeks  Pt is using Neulasta with each cycle for treatment of chemotherapy induced neutropenia.   Thrombocytopenia continues ans is acceptable level today but will continue to monitor.   Pt will return in 2 weeks for review prior to next cycle with treatment labs.   Plan to repeat  MRI of the liver and PET scan in 3-4 months after starting FOLFIRI which would be due prior to 2/4 infusion- if no evidence of recurrent disease consideration for HIPEC  Loose stools- Known side effect of irinotecan. Using imodium some mornings for control of loose stools.     Insomnia and nausea- pt using zofran and ativan as needed for nausea and states he is using ativan most evenings to help with sleep-reviewed that we would like him to use good sleep hygiene and regualr exercise to help with sleep as first line treatment for the sleep issues-  medications renewed for pt today.   This was a 25 min visit with > 50% in counseling and coordinating care including education and management of concerns.    Jessica Marsh CNP      Again, thank you for allowing me to participate in the care of your patient.        Sincerely,        Jessica Marsh, NP, APRN CNP

## 2019-01-18 NOTE — PROGRESS NOTES
Infusion Nursing Note:  Rian Malik presents today for C6 FOLFIRI.    Patient seen by provider today: Yes: Jessica Marsh NP   present during visit today: Not Applicable.    Note: Due to side effects immediately after getting home after cycle 4, a second dose of atropine was given after irinotecan during cycle 5.  Patient reports that this worked well.  Atropine was given as a pre-med today and repeated at the end of the infusion.    Intravenous Access:  Implanted Port.    Treatment Conditions:  Lab Results   Component Value Date    HGB 12.9 01/18/2019     Lab Results   Component Value Date    WBC 8.4 01/18/2019      Lab Results   Component Value Date    ANEU 6.7 01/18/2019     Lab Results   Component Value Date    PLT 98 01/18/2019      Lab Results   Component Value Date     10/22/2018                   Lab Results   Component Value Date    POTASSIUM 3.7 10/22/2018           Lab Results   Component Value Date    MAG 1.8 09/13/2018            Lab Results   Component Value Date    CR 0.77 10/22/2018                   Lab Results   Component Value Date    MAICOL 8.7 10/22/2018                Lab Results   Component Value Date    BILITOTAL 0.7 01/18/2019           Lab Results   Component Value Date    ALBUMIN 3.6 10/22/2018                    Lab Results   Component Value Date    ALT 86 10/22/2018           Lab Results   Component Value Date    AST 46 10/22/2018       Results reviewed, labs MET treatment parameters, ok to proceed with treatment.      Post Infusion Assessment:  Patient tolerated infusion without incident.  Blood return noted pre and post infusion.  Site patent and intact, free from redness, edema or discomfort.  No evidence of extravasations.    Prior to discharge: Port is secured in place with tegaderm and flushed with 10cc NS with positive blood return noted.  Continuous home infusion Dosi-Fuser pump connected.    All connectors secured in place and clamps taped open.    Pump  "started, \"running\" noted on display (CADD): Not Applicable.  Patient instructed to call our clinic or [facility] with any questions or concerns at home.  Patient verbalized understanding.    Patient set up for pump disconnect at home with [facility] on 1/20/2019 at 0900.        Discharge Plan:   Patient will return 2/4/2019 for next appointment.   Patient discharged in stable condition accompanied by: self.  Departure Mode: Ambulatory.    Jael Ansari RN-BSN, PHN, OCN  [facility]  "

## 2019-01-20 ENCOUNTER — HOME INFUSION (PRE-WILLOW HOME INFUSION) (OUTPATIENT)
Dept: PHARMACY | Facility: CLINIC | Age: 59
End: 2019-01-20

## 2019-01-21 NOTE — PROGRESS NOTES
This is a recent snapshot of the patient's [facility] medical record.  For current drug dose and complete information and questions, call [phone] or In Basket pool, fv home infusion ([id number])  CSN Number:  [id number]

## 2019-01-22 NOTE — PROGRESS NOTES
This is a recent snapshot of the patient's [facility] medical record.  For current drug dose and complete information and questions, call [phone] or In Basket pool, fv home infusion ([id number])  CSN Number:  [id number]

## 2019-01-29 ENCOUNTER — ANCILLARY PROCEDURE (OUTPATIENT)
Dept: PET IMAGING | Facility: CLINIC | Age: 59
End: 2019-01-29
Payer: COMMERCIAL

## 2019-01-29 ENCOUNTER — ANCILLARY PROCEDURE (OUTPATIENT)
Dept: GENERAL RADIOLOGY | Facility: CLINIC | Age: 59
End: 2019-01-29
Payer: COMMERCIAL

## 2019-01-29 ENCOUNTER — ANCILLARY PROCEDURE (OUTPATIENT)
Dept: MRI IMAGING | Facility: CLINIC | Age: 59
End: 2019-01-29
Payer: COMMERCIAL

## 2019-01-29 ENCOUNTER — TRANSFERRED RECORDS (OUTPATIENT)
Dept: HEALTH INFORMATION MANAGEMENT | Facility: CLINIC | Age: 59
End: 2019-01-29

## 2019-01-29 DIAGNOSIS — C18.7 CANCER OF SIGMOID COLON (H): Primary | ICD-10-CM

## 2019-01-29 DIAGNOSIS — C18.7 CANCER OF SIGMOID COLON (H): ICD-10-CM

## 2019-01-29 PROCEDURE — 74177 CT ABD & PELVIS W/CONTRAST: CPT | Mod: 59

## 2019-01-29 PROCEDURE — A9552 F18 FDG: HCPCS | Performed by: COLON & RECTAL SURGERY

## 2019-01-29 PROCEDURE — 74183 MRI ABD W/O CNTR FLWD CNTR: CPT | Performed by: RADIOLOGY

## 2019-01-29 PROCEDURE — A9581 GADOXETATE DISODIUM INJ: HCPCS | Mod: JW | Performed by: COLON & RECTAL SURGERY

## 2019-01-29 PROCEDURE — 71260 CT THORAX DX C+: CPT | Mod: 59

## 2019-01-29 PROCEDURE — 78815 PET IMAGE W/CT SKULL-THIGH: CPT | Mod: PI

## 2019-01-29 RX ORDER — HEPARIN SODIUM (PORCINE) LOCK FLUSH IV SOLN 100 UNIT/ML 100 UNIT/ML
500 SOLUTION INTRAVENOUS EVERY 8 HOURS
Status: DISCONTINUED | OUTPATIENT
Start: 2019-01-29 | End: 2019-01-01

## 2019-01-29 RX ORDER — IOPAMIDOL 755 MG/ML
1-135 INJECTION, SOLUTION INTRAVASCULAR ONCE
Status: COMPLETED | OUTPATIENT
Start: 2019-01-29 | End: 2019-01-29

## 2019-01-29 RX ADMIN — HEPARIN SODIUM (PORCINE) LOCK FLUSH IV SOLN 100 UNIT/ML 500 UNITS: 100 SOLUTION at 14:28

## 2019-01-29 RX ADMIN — IOPAMIDOL 100 ML: 755 INJECTION, SOLUTION INTRAVASCULAR at 14:28

## 2019-02-01 ENCOUNTER — APPOINTMENT (OUTPATIENT)
Dept: LAB | Facility: CLINIC | Age: 59
End: 2019-02-01
Attending: INTERNAL MEDICINE
Payer: COMMERCIAL

## 2019-02-04 ENCOUNTER — ONCOLOGY VISIT (OUTPATIENT)
Dept: ONCOLOGY | Facility: CLINIC | Age: 59
End: 2019-02-04
Payer: COMMERCIAL

## 2019-02-04 VITALS
HEART RATE: 60 BPM | SYSTOLIC BLOOD PRESSURE: 123 MMHG | TEMPERATURE: 97.7 F | BODY MASS INDEX: 24.91 KG/M2 | DIASTOLIC BLOOD PRESSURE: 73 MMHG | HEIGHT: 68 IN | OXYGEN SATURATION: 98 % | RESPIRATION RATE: 16 BRPM | WEIGHT: 164.38 LBS

## 2019-02-04 DIAGNOSIS — R11.0 NAUSEA: ICD-10-CM

## 2019-02-04 DIAGNOSIS — C18.7 ADENOCARCINOMA OF SIGMOID COLON (H): ICD-10-CM

## 2019-02-04 PROCEDURE — 99215 OFFICE O/P EST HI 40 MIN: CPT | Performed by: INTERNAL MEDICINE

## 2019-02-04 RX ORDER — ONDANSETRON 8 MG/1
8 TABLET, FILM COATED ORAL EVERY 8 HOURS PRN
Qty: 30 TABLET | Refills: 1 | Status: SHIPPED | OUTPATIENT
Start: 2019-02-04

## 2019-02-04 RX ORDER — LORAZEPAM 0.5 MG/1
0.5 TABLET ORAL EVERY 4 HOURS PRN
Qty: 30 TABLET | Refills: 2 | Status: SHIPPED | OUTPATIENT
Start: 2019-02-04 | End: 2019-01-01

## 2019-02-04 ASSESSMENT — MIFFLIN-ST. JEOR: SCORE: 1539.97

## 2019-02-04 ASSESSMENT — PAIN SCALES - GENERAL: PAINLEVEL: NO PAIN (0)

## 2019-02-04 NOTE — Clinical Note
2/4/2019         RE: Rian Malik  [address] [address]        Dear Colleague,    Thank you for referring your patient, Rian Malik, to the [facility]. Please see a copy of my visit note below.      FOLLOW-UP VISIT NOTE    PATIENT NAME: Rian Malik MRN # [id number]  DATE OF VISIT: Feb 4, 2019 YOB: 1960    REFERRING PROVIDER: No referring provider defined for this encounter.    CANCER TYPE: Adenocarcinoma colon  STAGE: III [id number]  ECOG PS: 0    ONCOLOGY HISTORY:  57-year-old male who around February 2017r, developed intermittent cramping lower abdominal pain. Was evaluated by PCP and clinical impression was colitis. He was put on p.o. antibiotics. However symptoms did not improve. He was eventually referred to surgery and underwent imaging with CT findings suspicious of sigmoid abscess. He was admitted to the hospital for resection of sigmoid abscess 10/11/17. However intraoperatively was noted to have a large firmly adherent sigmoid colon mass. Laparoscopic surgery was converted to open sigmoidectomy with end colostomy.     Surgical pathology   B.  COLON, SIGMOID, HEMICOLECTOMY   - Poorly differentiated adenocarcinoma, with signet ring cell features   - Tumor size: 5 cm   - Tumor is present on serosal surface and microscopically perforates   serosal surface   - One surgical margin shows extensive serosal involvement by tumor. The   opposite, undesignated margin shows no evidence of malignancy   - Lymphovascular invasion is present   - Perineural invasion is not identified   - Tumor deposits are present   - Immunohistochemistry for mismatch repair proteins shows intact nuclear   expression for MLH1, MSH2, MSH6, and PMS2   - Three reactive lymph nodes, negative for malignancy (0/3)   - Pathologic staging: [id number]      Patient's case was discussed at the colorectal tumor board. Recommendation was to obtain new staging scans as well as to proceed with  systemic chemotherapy at this point with plans for further surgery in future. Staging scans negative for distant disease.     - Started on Folfox 11/21/17. Started on FOLFOX q2 week regimen for 6 months duration - s/p 12 cycles. He has had chemo delays/ dose adjustments due to thrombocytopenia.    - 07/2018 Imaging at completion of adjuvant chemotherapy showed ill-defined 16 mm segment 5 liver lesions suspicious for metastatic disease. MRI liver showed a 1.8 cm liver lesion suspicious for malignancy.    - 09/12/18 laparoscopic liver ablation with intraoperative ultrasound, colostomy takedown, appendectomy, descending colectomy and anterior dissection. Surgical pathology showed periappendiceal tissue, ascending colon margin/mesentery and pericolonic fibroadipose adipose tissue involved by signet ring adenocarcinoma.     Case was discussed at the multidisciplinary tumor board and the recommendation was resuming chemotherapy with plans of repeating  MRI of the liver and PET scan in 3-4 months and if no evidence of recurrent disease consideration for HIPEC    - 10/24/18 -  Started on FOLFIRI with skipping 5FU bolus giving cytopenias as well as neulasta added with last cycle- s/p 6 cycles    SUBJECTIVE     Patient is here today for follow-up and review results of the recent scan.Elective complaints. Denies abdominal pain, nausea/vomiting, diarrhea, blood in the stools, black stools, loss of weight, fatigue or any other complaints. Good appetite and energy levels      PAST MEDICAL HISTORY     Past Medical History:   Diagnosis Date     Maldonado's esophagus      Cirrhosis (H)      Colon cancer (H) 10/11/2017    Poorly differentiated adenocarcinoma     Hepatitis C          CURRENT OUTPATIENT MEDICATIONS     Current Outpatient Medications   Medication Sig Dispense Refill     loperamide (IMODIUM) 2 MG capsule 2 caps at 1st sign of diarrhea & 1 cap every 2hrs until 12hrs diarrhea free. During night, 2 caps at bedtime & 2 caps  every 4hrs until AM 30 capsule 1     LORazepam (ATIVAN) 0.5 MG tablet Take 1 tablet (0.5 mg) by mouth every 4 hours as needed (Anxiety, Nausea/Vomiting or Sleep) 30 tablet 2     ondansetron (ZOFRAN) 8 MG tablet Take 1 tablet (8 mg) by mouth every 8 hours as needed (Nausea/Vomiting) 30 tablet 1     prochlorperazine (COMPAZINE) 10 MG tablet Take 1 tablet (10 mg) by mouth every 6 hours as needed (Nausea/Vomiting) 30 tablet 2        ALLERGIES     Allergies   Allergen Reactions     Acetaminophen      Naproxen         REVIEW OF SYSTEMS   As above in the HPI, o/w complete 14-point ROS was negative.     PHYSICAL EXAM   B/P: 138/78, T: 98.1, P: 64, R: 18  SpO2 Readings from Last 4 Encounters:   10/22/18 99%   10/15/18 97%   09/24/18 99%   09/17/18 98%     Wt Readings from Last 3 Encounters:   02/04/19 74.6 kg (164 lb 6 oz)   01/18/19 74.6 kg (164 lb 8 oz)   01/07/19 74.3 kg (163 lb 14.4 oz)     GEN: NAD  Mouth/ENT: Oropharynx is clear.  NECK: no  lymphadenopathy  LUNGS: clear bilaterally  CV: regular  ABDOMEN: soft, non-tender, non-distended  EXT: warm, well perfused, no edema  NEURO: alert  SKIN: no rashes     LABORATORY AND IMAGING STUDIES     Lab Results   Component Value Date    WBC 8.4 01/18/2019     Lab Results   Component Value Date    RBC 4.10 01/18/2019     Lab Results   Component Value Date    HGB 12.9 01/18/2019     Lab Results   Component Value Date    HCT 37.7 01/18/2019     No components found for: MCT  Lab Results   Component Value Date    MCV 92 01/18/2019     Lab Results   Component Value Date    MCH 31.5 01/18/2019     Lab Results   Component Value Date    MCHC 34.2 01/18/2019     Lab Results   Component Value Date    RDW 15.9 01/18/2019     Lab Results   Component Value Date    PLT 98 01/18/2019     Recent Labs   Lab Test 10/22/18  1343 09/13/18  0734    135   POTASSIUM 3.7 4.4   CHLORIDE 105 102   CO2 28 27   ANIONGAP 5 6   * 135*   BUN 14 13   CR 0.77 0.76   MAICOL 8.7 8.1*       PET and CT  1/29/2019                                                             IMPRESSION:   In this patient with sigmoid colon adenocarcinoma and Hepatitis C:  1. There is no evidence of hypermetabolic activity in the chest,  abdomen, or pelvis to suggest recurrent or metastatic disease.  2. Cirrhotic configuration of the liver with postablation changes in  hepatic segment 5.   3. Rectal wall thickening with trace free fluid and mesenteric  stranding in the pelvis, suggestive of proctitis.  4. Nonspecific increased presacral and mesenteric nodular soft tissue  densities without evidence of hypermetabolic activity. Recommend  attention on follow-up.  5. Mild diffuse FDG uptake throughout the axial skeleton without  suspicious lytic or blastic lesion, consistent with reactive marrow.  6. Splenomegaly.         MRI abdomen 1/29/19                                            IMPRESSION:  1.  Postsurgical changes of liver ablation to the previously  visualized segment 5 lesion. No evidence of local recurrence. No new  hepatic lesions.  2.  Cirrhotic configuration of the liver with associated splenomegaly  and trace ascites.  3.  Cholelithiasis without cholecystitis.          ASSESSMENT AND PLAN     57-year-old with large firmly adherent sigmoid colon mass- [id number](tumor deposits) with margin involvement and received 12 cycles of FOLFOX with significant chemo delays/ dose adjustments due to thrombocytopenia. Follow up scans showed a 1.8 cm suspicious liver lesions and he undewent  laparoscopic liver ablation, colostomy takedown, appendectomy, descending colectomy and anterior dissection. Surgical pathology showed periappendiceal tissue, ascending colon margin/mesentery and pericolonic fibroadipose adipose tissue involved by signet ring adenocarcinoma.      - Poorly differentiated adenocarcinoma colon  [id number]( liver lesion)  Case was discussed at the multidisciplinary tumor board and the recommendation was resuming chemotherapy  with plans of repeating MRI of the liver and PET scan in 3-4 months and if no evidence of recurrent disease consideration for HIPEC.  - 10/24/18 Started on FOLFIRI Chemotherapywith skipping 5FU bolus given cytopenias as well as neulasta added with last cycle s/p 6 cycles   Discussed  results of the PET scan as well as the MRI liver which came back negative for evidence of recurrence or metastasis. Patient's case was discussed with surgical oncology and given negative scans, planned for HIPEC was deferred.    He was informed that our recommendation at this point would be surveillance with repeat scans in 3 months along with laboratory markers. He was advised to return sooner if symptomatic      - Hepatitis C/Cirrhosis  Completed antiviral treatment with HCV viral load undetectable  Following with GI    - Thrombocytopenia  Secondary to cirrhosis/hypersplenism/chemotherapy  Monitor      The patient is ready to learn, no apparent learning barriers were identified, Diagnosis and treatment plans were explained to the patient. The patient expressed understanding of the content. The patient questions were answered to his satisfaction.    Chart documentation with Dragon Voice recognition Software. Although reviewed after completion, some words and grammatical errors may remain.  Padilla Last MD  Attending Physician   Hematology/Medical Oncology    Again, thank you for allowing me to participate in the care of your patient.        Sincerely,        Padilla Last MD

## 2019-02-04 NOTE — PROGRESS NOTES
FOLLOW-UP VISIT NOTE    PATIENT NAME: Rian Malik MRN # [id number]  DATE OF VISIT: Feb 4, 2019 YOB: 1960    REFERRING PROVIDER: No referring provider defined for this encounter.    CANCER TYPE: Adenocarcinoma colon  STAGE: III [id number]  ECOG PS: 0    ONCOLOGY HISTORY:  57-year-old male who around February 2017r, developed intermittent cramping lower abdominal pain. Was evaluated by PCP and clinical impression was colitis. He was put on p.o. antibiotics. However symptoms did not improve. He was eventually referred to surgery and underwent imaging with CT findings suspicious of sigmoid abscess. He was admitted to the hospital for resection of sigmoid abscess 10/11/17. However intraoperatively was noted to have a large firmly adherent sigmoid colon mass. Laparoscopic surgery was converted to open sigmoidectomy with end colostomy.     Surgical pathology   B.  COLON, SIGMOID, HEMICOLECTOMY   - Poorly differentiated adenocarcinoma, with signet ring cell features   - Tumor size: 5 cm   - Tumor is present on serosal surface and microscopically perforates   serosal surface   - One surgical margin shows extensive serosal involvement by tumor. The   opposite, undesignated margin shows no evidence of malignancy   - Lymphovascular invasion is present   - Perineural invasion is not identified   - Tumor deposits are present   - Immunohistochemistry for mismatch repair proteins shows intact nuclear   expression for MLH1, MSH2, MSH6, and PMS2   - Three reactive lymph nodes, negative for malignancy (0/3)   - Pathologic staging: pT4N1c      Patient's case was discussed at the colorectal tumor board. Recommendation was to obtain new staging scans as well as to proceed with systemic chemotherapy at this point with plans for further surgery in future. Staging scans negative for distant disease.     - Started on Folfox 11/21/17. Started on FOLFOX q2 week regimen for 6 months duration - s/p 12 cycles. He has had chemo  delays/ dose adjustments due to thrombocytopenia.    - 07/2018 Imaging at completion of adjuvant chemotherapy showed ill-defined 16 mm segment 5 liver lesions suspicious for metastatic disease. MRI liver showed a 1.8 cm liver lesion suspicious for malignancy.    - 09/12/18 laparoscopic liver ablation with intraoperative ultrasound, colostomy takedown, appendectomy, descending colectomy and anterior dissection. Surgical pathology showed periappendiceal tissue, ascending colon margin/mesentery and pericolonic fibroadipose adipose tissue involved by signet ring adenocarcinoma.     Case was discussed at the multidisciplinary tumor board and the recommendation was resuming chemotherapy with plans of repeating  MRI of the liver and PET scan in 3-4 months and if no evidence of recurrent disease consideration for HIPEC    - 10/24/18 -  Started on FOLFIRI with skipping 5FU bolus giving cytopenias as well as neulasta added with last cycle- s/p 6 cycles    SUBJECTIVE     Patient is here today for follow-up and review results of the recent scan.Elective complaints. Denies abdominal pain, nausea/vomiting, diarrhea, blood in the stools, black stools, loss of weight, fatigue or any other complaints. Good appetite and energy levels      PAST MEDICAL HISTORY     Past Medical History:   Diagnosis Date     Maldonado's esophagus      Cirrhosis (H)      Colon cancer (H) 10/11/2017    Poorly differentiated adenocarcinoma     Hepatitis C          CURRENT OUTPATIENT MEDICATIONS     Current Outpatient Medications   Medication Sig Dispense Refill     loperamide (IMODIUM) 2 MG capsule 2 caps at 1st sign of diarrhea & 1 cap every 2hrs until 12hrs diarrhea free. During night, 2 caps at bedtime & 2 caps every 4hrs until AM 30 capsule 1     LORazepam (ATIVAN) 0.5 MG tablet Take 1 tablet (0.5 mg) by mouth every 4 hours as needed (Anxiety, Nausea/Vomiting or Sleep) 30 tablet 2     ondansetron (ZOFRAN) 8 MG tablet Take 1 tablet (8 mg) by mouth every 8  hours as needed (Nausea/Vomiting) 30 tablet 1     prochlorperazine (COMPAZINE) 10 MG tablet Take 1 tablet (10 mg) by mouth every 6 hours as needed (Nausea/Vomiting) 30 tablet 2        ALLERGIES     Allergies   Allergen Reactions     Acetaminophen      Naproxen         REVIEW OF SYSTEMS   As above in the HPI, o/w complete 14-point ROS was negative.     PHYSICAL EXAM   B/P: 138/78, T: 98.1, P: 64, R: 18  SpO2 Readings from Last 4 Encounters:   10/22/18 99%   10/15/18 97%   09/24/18 99%   09/17/18 98%     Wt Readings from Last 3 Encounters:   02/04/19 74.6 kg (164 lb 6 oz)   01/18/19 74.6 kg (164 lb 8 oz)   01/07/19 74.3 kg (163 lb 14.4 oz)     GEN: NAD  Mouth/ENT: Oropharynx is clear.  NECK: no  lymphadenopathy  LUNGS: clear bilaterally  CV: regular  ABDOMEN: soft, non-tender, non-distended  EXT: warm, well perfused, no edema  NEURO: alert  SKIN: no rashes     LABORATORY AND IMAGING STUDIES     Lab Results   Component Value Date    WBC 8.4 01/18/2019     Lab Results   Component Value Date    RBC 4.10 01/18/2019     Lab Results   Component Value Date    HGB 12.9 01/18/2019     Lab Results   Component Value Date    HCT 37.7 01/18/2019     No components found for: MCT  Lab Results   Component Value Date    MCV 92 01/18/2019     Lab Results   Component Value Date    MCH 31.5 01/18/2019     Lab Results   Component Value Date    MCHC 34.2 01/18/2019     Lab Results   Component Value Date    RDW 15.9 01/18/2019     Lab Results   Component Value Date    PLT 98 01/18/2019     Recent Labs   Lab Test 10/22/18  1343 09/13/18  0734    135   POTASSIUM 3.7 4.4   CHLORIDE 105 102   CO2 28 27   ANIONGAP 5 6   * 135*   BUN 14 13   CR 0.77 0.76   MAICOL 8.7 8.1*       PET and CT 1/29/2019                                                             IMPRESSION:   In this patient with sigmoid colon adenocarcinoma and Hepatitis C:  1. There is no evidence of hypermetabolic activity in the chest,  abdomen, or pelvis to suggest  recurrent or metastatic disease.  2. Cirrhotic configuration of the liver with postablation changes in  hepatic segment 5.   3. Rectal wall thickening with trace free fluid and mesenteric  stranding in the pelvis, suggestive of proctitis.  4. Nonspecific increased presacral and mesenteric nodular soft tissue  densities without evidence of hypermetabolic activity. Recommend  attention on follow-up.  5. Mild diffuse FDG uptake throughout the axial skeleton without  suspicious lytic or blastic lesion, consistent with reactive marrow.  6. Splenomegaly.        MRI abdomen 1/29/19                                            IMPRESSION:  1.  Postsurgical changes of liver ablation to the previously  visualized segment 5 lesion. No evidence of local recurrence. No new  hepatic lesions.  2.  Cirrhotic configuration of the liver with associated splenomegaly  and trace ascites.  3.  Cholelithiasis without cholecystitis.          ASSESSMENT AND PLAN     57-year-old with large firmly adherent sigmoid colon mass- [id number](tumor deposits) with margin involvement and received 12 cycles of FOLFOX with significant chemo delays/ dose adjustments due to thrombocytopenia. Follow up scans showed a 1.8 cm suspicious liver lesions and he undewent  laparoscopic liver ablation, colostomy takedown, appendectomy, descending colectomy and anterior dissection. Surgical pathology showed periappendiceal tissue, ascending colon margin/mesentery and pericolonic fibroadipose adipose tissue involved by signet ring adenocarcinoma.      - Poorly differentiated adenocarcinoma colon  [id number]( liver lesion)  Case was discussed at the multidisciplinary tumor board and the recommendation was resuming chemotherapy with plans of repeating MRI of the liver and PET scan in 3-4 months and if no evidence of recurrent disease consideration for HIPEC.  - 10/24/18 Started on FOLFIRI Chemotherapywith skipping 5FU bolus given cytopenias as well as neulasta added with  last cycle s/p 6 cycles   Discussed  results of the PET scan as well as the MRI liver which came back negative for evidence of recurrence or metastasis. Patient's case was discussed with surgical oncology and given negative scans, planned for HIPEC was deferred.    He was informed that our recommendation at this point would be surveillance with repeat scans in 3 months along with laboratory markers. He was advised to return sooner if symptomatic      - Hepatitis C/Cirrhosis  Completed antiviral treatment with HCV viral load undetectable  Following with GI    - Thrombocytopenia  Secondary to cirrhosis/hypersplenism/chemotherapy  Monitor      The patient is ready to learn, no apparent learning barriers were identified, Diagnosis and treatment plans were explained to the patient. The patient expressed understanding of the content. The patient questions were answered to his satisfaction.    Chart documentation with Dragon Voice recognition Software. Although reviewed after completion, some words and grammatical errors may remain.  Padilla Last MD  Attending Physician   Hematology/Medical Oncology

## 2019-02-04 NOTE — NURSING NOTE
"Oncology Rooming Note    February 4, 2019 9:13 AM   Rian Malik is a 58 year old male who presents for:    Chief Complaint   Patient presents with     Oncology Clinic Visit     Follow up/Prior to treatment     Initial Vitals: /73 (BP Location: Right arm)   Pulse 60   Temp 97.7  F (36.5  C) (Oral)   Resp 16   Ht 1.727 m (5' 7.99\")   Wt 74.6 kg (164 lb 6 oz)   SpO2 98%   BMI 25.00 kg/m   Estimated body mass index is 25 kg/m  as calculated from the following:    Height as of this encounter: 1.727 m (5' 7.99\").    Weight as of this encounter: 74.6 kg (164 lb 6 oz). Body surface area is 1.89 meters squared.  No Pain (0) Comment: Data Unavailable   No LMP for male patient.  Allergies reviewed: Yes  Medications reviewed: Yes    Medications: MEDICATION REFILLS NEEDED TODAY. Provider was notified.  Pharmacy name entered into Prezi:    [facility] - [place], [place] - [address]  [facility] - [place], [place] - [address]        5 minutes for nursing intake (face to face time)     Donita Alaniz LPN            "

## 2019-03-04 NOTE — PROGRESS NOTES
"Patient's name and  were verified. See Doc Flowsheet - IV assess for details.   IVAD accessed with 20G 3/4\" lares gripper plus needle   blood return positive: YES   Site without redness, tenderness or swelling: YES   flushed with 30cc NS and 5cc 100u/ml heparin   Needle: D/cd intact. Gauze dressing applied.   Comments: Port flushed. Patient tolerated procedure without incident.   Joanna Mclaughlin  RN, BSN, OCN           "

## 2019-05-06 NOTE — Clinical Note
5/6/2019         RE: Rian Malik  [address] [place] [address]        Dear Colleague,    Thank you for referring your patient, Rian Malik, to the [facility]. Please see a copy of my visit note below.      FOLLOW-UP VISIT NOTE    PATIENT NAME: Rian Malik MRN # [id number]  DATE OF VISIT: May 6, 2019 YOB: 1960    REFERRING PROVIDER: No referring provider defined for this encounter.    CANCER TYPE: Adenocarcinoma colon  STAGE: III [id number]  ECOG PS: 0    ONCOLOGY HISTORY:  58-year-old male     -02/2017  developed intermittent cramping lower abdominal pain. Symptoms did not improve and  underwent imaging with CT findings suspicious of sigmoid abscess. He was admitted to the hospital for resection of sigmoid abscess 10/11/17. However intraoperatively was noted to have a large firmly adherent sigmoid colon mass. Laparoscopic surgery was converted to open sigmoidectomy with end colostomy. Surgical pathology - pT4N1c      - Patient's case was discussed at the colorectal tumor board. Recommendation was to obtain new staging scans as well as to proceed with systemic chemotherapy at this point with plans for further surgery in future. Staging scans negative for distant disease.     - 11/21/17 Started on FOLFOX q2 week regimen for 6 months duration - s/p 12 cycles. He has had chemo delays/ dose adjustments due to thrombocytopenia.    - 07/2018 Imaging at completion of adjuvant chemotherapy showed ill-defined 16 mm segment 5 liver lesions suspicious for metastatic disease. MRI liver showed a 1.8 cm liver lesion suspicious for malignancy.    - 09/12/18 laparoscopic liver ablation with intraoperative ultrasound, colostomy takedown, appendectomy, descending colectomy and anterior dissection. Surgical pathology showed periappendiceal tissue, ascending colon margin/mesentery and pericolonic fibroadipose adipose tissue involved by signet ring adenocarcinoma.     - 10/24/18  -  Started on FOLFIRI with skipping 5FU bolus giving cytopenias as well as neulasta added with last cycle- s/p 6 cycles    01/2019 PET scan as well as the MRI liver came back negative for evidence of recurrence or metastasis. Patient's case was discussed with surgical oncology and given negative scans, planned for HIPEC was deferred.Sedation was made to start him on surveillance    SUBJECTIVE     Patient is here today for follow-up and review results of the recent scan. Denies abdominal pain, nausea/vomiting, diarrhea, blood in the stools, black stools, loss of weight, fatigue or any other complaints. Good appetite and energy levels      PAST MEDICAL HISTORY     Past Medical History:   Diagnosis Date     Maldonado's esophagus      Cirrhosis (H)      Colon cancer (H) 10/11/2017    Poorly differentiated adenocarcinoma     Hepatitis C          CURRENT OUTPATIENT MEDICATIONS     Current Outpatient Medications   Medication Sig Dispense Refill     loperamide (IMODIUM) 2 MG capsule 2 caps at 1st sign of diarrhea & 1 cap every 2hrs until 12hrs diarrhea free. During night, 2 caps at bedtime & 2 caps every 4hrs until AM 30 capsule 1     LORazepam (ATIVAN) 0.5 MG tablet Take 1 tablet (0.5 mg) by mouth every 4 hours as needed (Anxiety, Nausea/Vomiting or Sleep) 30 tablet 2     ondansetron (ZOFRAN) 8 MG tablet Take 1 tablet (8 mg) by mouth every 8 hours as needed (Nausea/Vomiting) 30 tablet 1     prochlorperazine (COMPAZINE) 10 MG tablet Take 1 tablet (10 mg) by mouth every 6 hours as needed (Nausea/Vomiting) 30 tablet 2        ALLERGIES     Allergies   Allergen Reactions     Acetaminophen      Naproxen         REVIEW OF SYSTEMS   As above in the HPI, o/w complete 14-point ROS was negative.     PHYSICAL EXAM   B/P: 138/78, T: 98.1, P: 64, R: 18  SpO2 Readings from Last 4 Encounters:   10/22/18 99%   10/15/18 97%   09/24/18 99%   09/17/18 98%     Wt Readings from Last 3 Encounters:   05/06/19 75.4 kg (166 lb 2 oz)   02/04/19 74.6  kg (164 lb 6 oz)   01/18/19 74.6 kg (164 lb 8 oz)     GEN: NAD  Mouth/ENT: Oropharynx is clear.  NECK: no  lymphadenopathy  LUNGS: clear bilaterally  CV: regular  ABDOMEN: soft, non-tender, non-distended  EXT: warm, well perfused, no edema  NEURO: alert  SKIN: no rashes     LABORATORY AND IMAGING STUDIES     Lab Results   Component Value Date    WBC 3.2 05/01/2019     Lab Results   Component Value Date    RBC 4.56 05/01/2019     Lab Results   Component Value Date    HGB 14.2 05/01/2019     Lab Results   Component Value Date    HCT 40.5 05/01/2019     No components found for: MCT  Lab Results   Component Value Date    MCV 89 05/01/2019     Lab Results   Component Value Date    MCH 31.1 05/01/2019     Lab Results   Component Value Date    MCHC 35.1 05/01/2019     Lab Results   Component Value Date    RDW 12.9 05/01/2019     Lab Results   Component Value Date    PLT 57 05/01/2019     Recent Labs   Lab Test 05/01/19  0915 10/22/18  1343    138   POTASSIUM 4.1 3.7   CHLORIDE 110* 105   CO2 29 28   ANIONGAP 3 5   * 121*   BUN 15 14   CR 0.83 0.77   MAICOL 8.5 8.7       EXAMINATION: CT ABDOMEN PELVIS W CONTRAST, 5/1/2019 9:24 AM     TECHNIQUE:  Helical CT images from the lung bases through the  symphysis pubis were obtained with contrast.  Coronal reformatted  images were generated at a workstation for further assessment.     CONTRAST:  100 cc Isovue 370 IV.     COMPARISON: 1/29/2018.     HISTORY: colon ca -follow up; Adenocarcinoma of sigmoid colon (H)     FINDINGS:     Abdomen and pelvis:   Nodular contour of the liver. Post ablation changes in hepatic segment  5 with unchanged hypodense tract extending to the right lobe. No focal  liver lesion. Cholelithiasis without cholecystitis. Splenomegaly.  Portosystemic shunting including esophageal varices. Unremarkable  appearance of the pancreas adrenals and kidneys. No hydronephrosis or  focal renal mass. Partially distended during. Symmetric seminal  vesicles. The  prostate is not enlarged. Pelvic phleboliths.     There is a ventral abdominal hernia containing unobstructed loop of  bowel. Small amount of free fluid in the pelvis. Postsurgical changes  of sigmoid colon resection with reanastomosis. No evidence of local  recurrence. No lymphadenopathy in the abdomen or pelvis by size  criteria. The portal vein is patent. No infrarenal aortic aneurysm.     Lung bases: No consolidation or pleural effusion.     Bones and soft tissues: No suspicious osseous lesions.                                                                      IMPRESSION:   1.  No evidence of recurrence or metastatic disease in the abdomen or  pelvis.  2.  Unchanged mild rectal wall thickening with trace free fluid and  mesenteric stranding in the pelvis.  3.  Cirrhotic appearance of the liver with postablation changes in  hepatic segment 5.  4.  Cholelithiasis without cholecystitis.      I have personally reviewed the examination and initial interpretation  and I agree with the findings.     WYATT WICK MD     ASSESSMENT AND PLAN     58 year-old with      - Poorly differentiated adenocarcinoma colon  [id number]( liver lesion)  Clinically doing well with no active complaints. CEA pending. Reviewed results of the lab work as well as CT scans with him which are essentially unremarkable with no concerns for recurrent/metastatic disease. Pending CEA, recommendation would be to continue with surveillance at this point. 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 Return sooner if symptomatic    The patient is ready to learn, no apparent learning barriers were identified, Diagnosis and treatment plans were explained to the patient. The  patient expressed understanding of the content. The patient questions were answered to his satisfaction.    Chart documentation with Dragon Voice recognition Software. Although reviewed after completion, some words and grammatical errors may remain.  Padilla Last MD  Attending Physician   Hematology/Medical Oncology    Again, thank you for allowing me to participate in the care of your patient.        Sincerely,        Padilla Last MD

## 2019-05-06 NOTE — PROGRESS NOTES
FOLLOW-UP VISIT NOTE    PATIENT NAME: Rian Malik MRN # [id number]  DATE OF VISIT: May 6, 2019 YOB: 1960    REFERRING PROVIDER: No referring provider defined for this encounter.    CANCER TYPE: Adenocarcinoma colon  STAGE: III [id number]  ECOG PS: 0    ONCOLOGY HISTORY:  58-year-old male     -02/2017  developed intermittent cramping lower abdominal pain. Symptoms did not improve and  underwent imaging with CT findings suspicious of sigmoid abscess. He was admitted to the hospital for resection of sigmoid abscess 10/11/17. However intraoperatively was noted to have a large firmly adherent sigmoid colon mass. Laparoscopic surgery was converted to open sigmoidectomy with end colostomy. Surgical pathology - [id number]      - Patient's case was discussed at the colorectal tumor board. Recommendation was to obtain new staging scans as well as to proceed with systemic chemotherapy at this point with plans for further surgery in future. Staging scans negative for distant disease.     - 11/21/17 Started on FOLFOX q2 week regimen for 6 months duration - s/p 12 cycles. He has had chemo delays/ dose adjustments due to thrombocytopenia.    - 07/2018 Imaging at completion of adjuvant chemotherapy showed ill-defined 16 mm segment 5 liver lesions suspicious for metastatic disease. MRI liver showed a 1.8 cm liver lesion suspicious for malignancy.    - 09/12/18 laparoscopic liver ablation with intraoperative ultrasound, colostomy takedown, appendectomy, descending colectomy and anterior dissection. Surgical pathology showed periappendiceal tissue, ascending colon margin/mesentery and pericolonic fibroadipose adipose tissue involved by signet ring adenocarcinoma.     - 10/24/18 -  Started on FOLFIRI with skipping 5FU bolus giving cytopenias as well as neulasta added with last cycle- s/p 6 cycles    01/2019 PET scan as well as the MRI liver came back negative for evidence of recurrence or metastasis. Patient's case was  discussed with surgical oncology and given negative scans, planned for HIPEC was deferred.Sedation was made to start him on surveillance    SUBJECTIVE     Patient is here today for follow-up and review results of the recent scan. Denies abdominal pain, nausea/vomiting, diarrhea, blood in the stools, black stools, loss of weight, fatigue or any other complaints. Good appetite and energy levels      PAST MEDICAL HISTORY     Past Medical History:   Diagnosis Date     Maldonado's esophagus      Cirrhosis (H)      Colon cancer (H) 10/11/2017    Poorly differentiated adenocarcinoma     Hepatitis C          CURRENT OUTPATIENT MEDICATIONS     Current Outpatient Medications   Medication Sig Dispense Refill     loperamide (IMODIUM) 2 MG capsule 2 caps at 1st sign of diarrhea & 1 cap every 2hrs until 12hrs diarrhea free. During night, 2 caps at bedtime & 2 caps every 4hrs until AM 30 capsule 1     LORazepam (ATIVAN) 0.5 MG tablet Take 1 tablet (0.5 mg) by mouth every 4 hours as needed (Anxiety, Nausea/Vomiting or Sleep) 30 tablet 2     ondansetron (ZOFRAN) 8 MG tablet Take 1 tablet (8 mg) by mouth every 8 hours as needed (Nausea/Vomiting) 30 tablet 1     prochlorperazine (COMPAZINE) 10 MG tablet Take 1 tablet (10 mg) by mouth every 6 hours as needed (Nausea/Vomiting) 30 tablet 2        ALLERGIES     Allergies   Allergen Reactions     Acetaminophen      Naproxen         REVIEW OF SYSTEMS   As above in the HPI, o/w complete 14-point ROS was negative.     PHYSICAL EXAM   B/P: 138/78, T: 98.1, P: 64, R: 18  SpO2 Readings from Last 4 Encounters:   10/22/18 99%   10/15/18 97%   09/24/18 99%   09/17/18 98%     Wt Readings from Last 3 Encounters:   05/06/19 75.4 kg (166 lb 2 oz)   02/04/19 74.6 kg (164 lb 6 oz)   01/18/19 74.6 kg (164 lb 8 oz)     GEN: NAD  Mouth/ENT: Oropharynx is clear.  NECK: no  lymphadenopathy  LUNGS: clear bilaterally  CV: regular  ABDOMEN: soft, non-tender, non-distended  EXT: warm, well perfused, no  edema  NEURO: alert  SKIN: no rashes     LABORATORY AND IMAGING STUDIES     Lab Results   Component Value Date    WBC 3.2 05/01/2019     Lab Results   Component Value Date    RBC 4.56 05/01/2019     Lab Results   Component Value Date    HGB 14.2 05/01/2019     Lab Results   Component Value Date    HCT 40.5 05/01/2019     No components found for: MCT  Lab Results   Component Value Date    MCV 89 05/01/2019     Lab Results   Component Value Date    MCH 31.1 05/01/2019     Lab Results   Component Value Date    MCHC 35.1 05/01/2019     Lab Results   Component Value Date    RDW 12.9 05/01/2019     Lab Results   Component Value Date    PLT 57 05/01/2019     Recent Labs   Lab Test 05/01/19  0915 10/22/18  1343    138   POTASSIUM 4.1 3.7   CHLORIDE 110* 105   CO2 29 28   ANIONGAP 3 5   * 121*   BUN 15 14   CR 0.83 0.77   MAICOL 8.5 8.7       EXAMINATION: CT ABDOMEN PELVIS W CONTRAST, 5/1/2019 9:24 AM     TECHNIQUE:  Helical CT images from the lung bases through the  symphysis pubis were obtained with contrast.  Coronal reformatted  images were generated at a workstation for further assessment.     CONTRAST:  100 cc Isovue 370 IV.     COMPARISON: 1/29/2018.     HISTORY: colon ca -follow up; Adenocarcinoma of sigmoid colon (H)     FINDINGS:     Abdomen and pelvis:   Nodular contour of the liver. Post ablation changes in hepatic segment  5 with unchanged hypodense tract extending to the right lobe. No focal  liver lesion. Cholelithiasis without cholecystitis. Splenomegaly.  Portosystemic shunting including esophageal varices. Unremarkable  appearance of the pancreas adrenals and kidneys. No hydronephrosis or  focal renal mass. Partially distended during. Symmetric seminal  vesicles. The prostate is not enlarged. Pelvic phleboliths.     There is a ventral abdominal hernia containing unobstructed loop of  bowel. Small amount of free fluid in the pelvis. Postsurgical changes  of sigmoid colon resection with  reanastomosis. No evidence of local  recurrence. No lymphadenopathy in the abdomen or pelvis by size  criteria. The portal vein is patent. No infrarenal aortic aneurysm.     Lung bases: No consolidation or pleural effusion.     Bones and soft tissues: No suspicious osseous lesions.                                                                      IMPRESSION:   1.  No evidence of recurrence or metastatic disease in the abdomen or  pelvis.  2.  Unchanged mild rectal wall thickening with trace free fluid and  mesenteric stranding in the pelvis.  3.  Cirrhotic appearance of the liver with postablation changes in  hepatic segment 5.  4.  Cholelithiasis without cholecystitis.      I have personally reviewed the examination and initial interpretation  and I agree with the findings.     WYATT WICK MD     ASSESSMENT AND PLAN     58 year-old with      - Poorly differentiated adenocarcinoma colon  [id number]( liver lesion)  Clinically doing well with no active complaints. CEA pending. Reviewed results of the lab work as well as CT scans with him which are essentially unremarkable with no concerns for recurrent/metastatic disease. Pending CEA, recommendation would be to continue with surveillance at this point. 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 Return sooner if symptomatic    The patient is ready to learn, no apparent learning barriers were identified, Diagnosis and treatment plans were explained to the patient. The patient expressed understanding of the content. The patient questions were answered to his satisfaction.    Chart documentation with Dragon Voice recognition Software. Although reviewed after completion, some words and  grammatical errors may remain.  Padilla Last MD  Attending Physician   Hematology/Medical Oncology

## 2019-05-06 NOTE — NURSING NOTE
"Oncology Rooming Note    May 6, 2019 2:04 PM   Rian Malik is a 58 year old male who presents for:    Chief Complaint   Patient presents with     Oncology Clinic Visit     3 month follow up     Initial Vitals: /72 (BP Location: Left arm)   Pulse 72   Temp 98.1  F (36.7  C) (Oral)   Resp 16   Ht 1.727 m (5' 7.99\")   Wt 75.4 kg (166 lb 2 oz)   SpO2 97%   BMI 25.27 kg/m   Estimated body mass index is 25.27 kg/m  as calculated from the following:    Height as of this encounter: 1.727 m (5' 7.99\").    Weight as of this encounter: 75.4 kg (166 lb 2 oz). Body surface area is 1.9 meters squared.  No Pain (0) Comment: Data Unavailable   No LMP for male patient.  Allergies reviewed: Yes  Medications reviewed: Yes    Medications: Medication refills not needed today.  Pharmacy name entered into [facility]:    [facility] - [place], [place] - [address]  [facility] - [place], [place] - [address]            "

## 2019-05-07 NOTE — TELEPHONE ENCOUNTER
Left voicemail for patient to return my call per MD result note as below  Jyoti Forte RN            Slightly elevated. Will order CT chest to rule out mets. Please schedule and inform patient.      Addendum: patient returned my call.  Given CEA results and transferred to imaging scheduling.    Jyoti Forte RN

## 2019-06-04 PROBLEM — C18.9 COLON CANCER (H): Status: ACTIVE | Noted: 2018-09-12

## 2019-07-10 PROBLEM — K56.609 BOWEL OBSTRUCTION (H): Status: ACTIVE | Noted: 2019-01-01

## 2019-07-11 NOTE — PLAN OF CARE
Bradycardic, asymptomatic (Sadie PEREZ aware). OVSS. Denied nausea. C/O abdominal discomfort, declined intervention. Up ad haritha in room. NPO. Adequate urine output. Loose BM x 3 (had been taking laxatives PTA). Phosphorous currently being replaced, recheck ordered for 1830. On the OR schedule for tomorrow at 12 noon for sigmoidoscopy with dilation.

## 2019-07-11 NOTE — CONSULTS
GASTROENTEROLOGY CONSULTATION      Date of Admission:  7/10/2019           Reason for Consultation:   We were asked by Tarsha Dasilva MD of Colorectal Surgery to evaluate this patient with colonic stricture           ASSESSMENT AND RECOMMENDATIONS:   Assessment:  58 year old male with a history of poorly differentiated adenocarcinoma of the colon s/p open sigmoidectomy with end colostomy (10/2017), HCV cirrhosis, thrombocytopenia due to cirrhosis, lesion in the liver due to colon cancer s/p ablation; colostomy takedown, appendectomy and descending colectomy and anterior dissection in 9/2018, who is admitted to colorectal service with large bowel obstruction, which was been resolving. CT imaging showed stricture at the anastomotic site, and GI is consulted for further management.  Discussed the findings with Dr Carmona, the stricture is somewhat elongated and is not typical for anastomotic stricture. Ddx includes recurrence of his malignancy.    Recommendations  - Will need flex sig with biopsy and possible balloon stenting with Dr Carmona in the OR tomorrow  - Please keep patient NPO  - Please obtain INR in addition to his morning labs in the AM  - IVF per primary team  - For preparation of procedure, will need tap water enema in the morning    Recommendations discussed with Colorectal surgery      Gastroenterology outpatient follow up recommendations: TBD    Thank you for involving us in this patient's care. Please do not hesitate to contact the GI service with any questions or concerns.     Pt care plan discussed with Dr. Rojas and Dr Carmona, GI staff physician.    Michelle OWENS  GI Fellow  [phone]  -------------------------------------------------------------------------------------------------------------------           History of Present Illness:   Rian Malik is a 58 year old male with a history of poorly differentiated adenocarcinoma of the colon s/p open sigmoidectomy with end colostomy (10/2017), HCV  cirrhosis, thrombocytopenia due to cirrhosis, lesion in the liver due to colon cancer s/p ablation; colostomy takedown, appendectomy and descending colectomy and anterior dissection in 9/2018, who is admitted to colorectal service with large bowel obstruction, which was been resolving. CT imaging showed stricture at the anastomotic site, and GI is consulted for possible stenting of this site.    Patient started having constipation symptoms about 8-10 days ago which were unresolving with bowel regimen and prunes. He did have a bowel movement two days ago, however, since then has not had any BMs or flatus. He has not have nausea or vomiting. No fevers, chills. Has had lower abdominal crampy abdominal pain. No hematochezia or melena.     CT was obtained which showed distal colonic obstruction. The transition zone is on the left side of the pelvis just proximal to a suture line. Bowel wall continues to enhace on imaging. No pneumatosis or portal venous gas. No abscess. No perforation.              Past Medical History:   Reviewed and edited as appropriate    >>Colon Cancer History:  February 2017-presented with crampy abdominal pain diagnosed with sigmoid abscess, treated with antibiotics.  May 2017-R scopic converted to open sigmoidectomy with end colostomy.  Pathology showed poorly differential adenocarcinoma. pT4N1c  November 2017-started on FOLFOX every 2 week regimen for 6 months and completed 12 cycles.  Had some delays due to thrombocytopenia  July 2018-imaging at end of adjuvant chemotherapy showed ill-defined 6 mm liver lesion in segment 5.  MRI showed a 1.8 cm liver lesion suspicious for malignancy  September 2018-underwent laparoscopic liver ablation with intraoperative ultrasound, colostomy takedown, appendectomy, and descending colectomy and anterior dissection.  Surgical path showed a periappendiceal tissue, ascending colon margin/mesentery and pericolonic fibroadipose tissue involved by signet ring  adenocarcinoma  October 2018-started on FOLFIRI with skipping 5-FU bolus, status post 6 cycles.  Had cytopenias and needed Neulasta  January 2019-PET scan as well as MRI liver came back negative for evidence of recurrence or  Metastases.  Surveillance was started.    >>Hepatitis C Cirrhosis    >>History of Hepatitis C, s/p treatment and SVR    >>Maldonado's Esophagus - last EGD in 8/2017 showed Maldonado's stage C0-M2 per [place] criteria. Biopsy was negative for intestinal metaplasia or dysplasia           Past Surgical History:   Reviewed and edited as appropriate   Past Surgical History:   Procedure Laterality Date     COLONOSCOPY N/A 8/4/2017    Procedure: COMBINED COLONOSCOPY, SINGLE OR MULTIPLE BIOPSY/POLYPECTOMY BY BIOPSY;  Colon & EGD;  Surgeon: Cristobal Blanco MD;  Location: [facility]     COLOSTOMY Left 10/11/2017     COMBINED CYSTOSCOPY, INSERT STENT URETER(S) Bilateral 9/12/2018    Procedure: COMBINED CYSTOSCOPY, INSERT STENT URETER(S);  Bilateral Cystoscopy With Temporary Stent Placement, Laparoscopic Hand Assisted Takedown Colostomy, Appendectomy, Mobilization of Splenic Flexure, Flexible Sigmoidoscopy, Endoscopic Clipping, Laparoscopic  Assisted Microablation of Liver Tumor, Intra-Op Ultrasound;  Surgeon: Hunter Quintero MD;  Location: [facility]     CYSTOSCOPY AND LEFT STENT PLACEMENT  10/11/2017    Dr. Reyna     ESOPHAGOSCOPY, GASTROSCOPY, DUODENOSCOPY (EGD), COMBINED N/A 8/4/2017    Procedure: COMBINED ESOPHAGOSCOPY, GASTROSCOPY, DUODENOSCOPY (EGD), BIOPSY SINGLE OR MULTIPLE;;  Surgeon: Cristobal Blanco MD;  Location: [facility]     HERNIA REPAIR Left 1988    Left inguinal     LAPAROSCOPIC ASSISTED COLOSTOMY TAKEDOWN N/A 9/12/2018    Procedure: LAPAROSCOPIC ASSISTED COLOSTOMY TAKEDOWN;;  Surgeon: Herminio Oswald MD;  Location: [facility]     LAPAROSCOPIC CHOLECYSTECTOMY N/A 9/12/2018    Procedure: LAPAROSCOPIC CHOLECYSTECTOMY;;  Surgeon: Saqib Hale MD;  Location: [facility]     LAPAROSCOPIC CONVERTED TO OPEN  SIGMOIDECTOMY WITH END COLOSTOMY  10/11/2017    Dr. Schneider     LAPAROSCOPIC HAND ASSISTED HEPATECTOMY PARTIAL N/A 9/12/2018    Procedure: LAPAROSCOPIC HAND ASSISTED HEPATECTOMY PARTIAL;;  Surgeon: Saqib Hale MD;  Location: [facility]     SIGMOIDOSCOPY FLEXIBLE N/A 9/12/2018    Procedure: SIGMOIDOSCOPY FLEXIBLE;;  Surgeon: Herminio Oswald MD;  Location: [facility]            Previous Endoscopy:     Results for orders placed or performed during the hospital encounter of 08/04/17   COLONOSCOPY   Result Value Ref Range    COLONOSCOPY       [address] [place] [address] [phone]     Endoscopy Department  _______________________________________________________________________________  Patient Name: Rian Malik          Procedure Date: 8/4/2017 2:19 PM  MRN: [id number]                       Account Number: [id number]  YOB: 1960              Admit Type: Outpatient  Age: 56                               Room:  #4  Gender: Male                          Note Status: Supervisor Override  Attending MD: Cristobal Blanco MD        Total Sedation Time:   _______________________________________________________________________________     Procedure:           Colonoscopy  Indications:         Abnormal CT of the GI tract, Follow-up of diverticulitis  Providers:           Cristobal Blanco MD, Paula Calhoun RN  Referring MD:        Yaakov Burroughs MD, Edgar Wray  Medicines:           See the other procedure note for documentation of the                        administered m edications  Complications:       No immediate complications. Estimated blood loss:                        Minimal.  _______________________________________________________________________________  Procedure:           Pre-Anesthesia Assessment:                       - Prior to the procedure, a History and Physical was                        performed, and patient medications and allergies were                         reviewed. The patient is competent. The risks and                        benefits of the procedure and the sedation options and                        risks were discussed with the patient. All questions                        were answered and informed consent was obtained. Patient                        identification and proposed procedure were verified by                        the physician and the nurse in the procedure room.                        Mental Status Examination: alert and oriented. Airway                        Examination: normal oropharyngeal airway and  neck                        mobility. Respiratory Examination: clear to                        auscultation. CV Examination: normal. Prophylactic                        Antibiotics: The patient does not require prophylactic                        antibiotics. Prior Anticoagulants: The patient has taken                        no previous anticoagulant or antiplatelet agents. ASA                        Grade Assessment: III - A patient with severe systemic                        disease. After reviewing the risks and benefits, the                        patient was deemed in satisfactory condition to undergo                        the procedure. The anesthesia plan was to use moderate                        sedation / analgesia (conscious sedation). Immediately                        prior to administration of medications, the patient was                        re-assessed for adequacy to receive sedatives. The heart                        rate, respiratory rate, oxygen saturations, blo od                        pressure, adequacy of pulmonary ventilation, and                        response to care were monitored throughout the                        procedure. The physical status of the patient was                        re-assessed after the procedure.                       After obtaining informed consent, the colonoscope  was                        passed under direct vision. Throughout the procedure,                        the patient's blood pressure, pulse, and oxygen                        saturations were monitored continuously. The Colonoscope                        was introduced through the anus and advanced to the                        sigmoid colon to examine a stricture. This was the                        intended extent. The colonoscopy was performed without                        difficulty. The patient tolerated the procedure well.                        The quality of the bowel preparation was good.                                                                                    Findings:       The perianal and digital rectal examinations were normal.       A severe stenosis measuring of unknown length was found at 40 cm        proximal to the anus and was not able to be traversed with a pediatric        colonoscope. The appearance appeared to be inflammatory in nature and        not adenomatous. Cannot rule out an extrinsic cause of this stricture.        Biopsies were taken with a cold forceps for histology. Verification of        patient identification for the specimen was done by the physician and        nurse using the patient's name, birth date and medical record number.        Estimated blood loss was minimal.       The retroflexed view of the distal rectum and anal verge was normal and        showed no anal or rectal abnormalities.                                                                                   Moderate Sedation:       Moderate (conscious) sedation was administered by the endoscopy nurse        and martino pervised by the endoscopist. The following parameters were        monitored: oxygen saturation, heart rate, blood pressure, and response        to care. Total physician intraservice time was 10 minutes.  Impression:          - Stricture at 40 cm proximal to the anus. Inflammatory                         appearing. Biopsied.                       - Unable to traverse stricture.                       - The distal rectum and anal verge are normal on                        retroflexion view.  Recommendation:      - Await pathology results.                       - Case and imaging reviewed with Dr. Sid Carmona.                        Appearance on CT over the last three months has not                        improved and in fact may show worsening sigmoid                        inflammation. Will discuss with Dr. Yaakov Burroughs about                        referral to Colorectal surgery for resection. If needed,                        it could be feasible to perform a lower EUS to further                         evaluate the wall of this lesion.                       - Discharge home                                                                                     Cristobal Blanco MD  ________________  Cristobal Blanco MD  2017 4:03:37 PM  I was physically present for the entire viewing portion of the exam.  __________________________  Signature of teaching physician  Jami/Carlos Eduardo Blanco MD  Number of Addenda: 0    Note Initiated On: 2017 2:19 PM  Scope In:  Scope Out:              Social History:   Reviewed and edited as appropriate  Social History     Socioeconomic History     Marital status:      Spouse name: Not on file     Number of children: 2     Years of education: Not on file     Highest education level: Not on file   Occupational History     Not on file   Social Needs     Financial resource strain: Not on file     Food insecurity:     Worry: Not on file     Inability: Not on file     Transportation needs:     Medical: Not on file     Non-medical: Not on file   Tobacco Use     Smoking status: Former Smoker     Packs/day: 0.10     Years: 3.00     Pack years: 0.30     Types: Cigarettes     Start date: 2014     Last attempt to quit: 10/6/2017     Years since quittin.7     Smokeless  tobacco: Never Used     Tobacco comment:     Substance and Sexual Activity     Alcohol use: No     Comment: Sober since 2009.  Drank 6 beers/day during the weekdays and 12 beers/day on weekends.       Drug use: No     Sexual activity: Yes     Partners: Female   Lifestyle     Physical activity:     Days per week: Not on file     Minutes per session: Not on file     Stress: Not on file   Relationships     Social connections:     Talks on phone: Not on file     Gets together: Not on file     Attends [employer] service: Not on file     Active member of club or organization: Not on file     Attends meetings of clubs or organizations: Not on file     Relationship status: Not on file     Intimate partner violence:     Fear of current or ex partner: Not on file     Emotionally abused: Not on file     Physically abused: Not on file     Forced sexual activity: Not on file   Other Topics Concern     Parent/sibling w/ CABG, MI or angioplasty before 65F 55M? Not Asked   Social History Narrative    Lives in own home with adult son and daughter.            Family History:   Reviewed and edited as appropriate  Family History   Problem Relation Age of Onset     Skin Cancer Sister       No known history of colorectal cancer, liver disease, or inflammatory bowel disease.         Allergies:   Reviewed and edited as appropriate     Allergies   Allergen Reactions     Acetaminophen      Naproxen             Medications:     Medications Prior to Admission   Medication Sig Dispense Refill Last Dose     LORazepam (ATIVAN) 0.5 MG tablet Take 1 tablet (0.5 mg) by mouth every 4 hours as needed (Anxiety, Nausea/Vomiting or Sleep) 30 tablet 2 Taking     ondansetron (ZOFRAN) 8 MG tablet Take 1 tablet (8 mg) by mouth every 8 hours as needed (Nausea/Vomiting) 30 tablet 1 Taking     prochlorperazine (COMPAZINE) 10 MG tablet Take 1 tablet (10 mg) by mouth every 6 hours as needed (Nausea/Vomiting) 30 tablet 2 Taking     [DISCONTINUED] loperamide  "(IMODIUM) 2 MG capsule 2 caps at 1st sign of diarrhea & 1 cap every 2hrs until 12hrs diarrhea free. During night, 2 caps at bedtime & 2 caps every 4hrs until AM 30 capsule 1              Review of Systems:     A complete review of systems was performed and is negative except as noted in the HPI           Physical Exam:   /71 (BP Location: Right arm)   Pulse 55   Temp 95.5  F (35.3  C) (Oral)   Resp 16   Ht 1.727 m (5' 8\")   Wt 77 kg (169 lb 12.1 oz)   SpO2 96%   BMI 25.81 kg/m    Wt:   Wt Readings from Last 2 Encounters:   07/10/19 77 kg (169 lb 12.1 oz)   05/06/19 75.4 kg (166 lb 2 oz)      Constitutional: cooperative, pleasant, not dyspneic/diaphoretic, no acute distress  Eyes: Sclera anicteric/injected  Ears/nose/mouth/throat: Normal oropharynx without ulcers or exudate, mucus membranes moist, hearing intact  Neck: supple, thyroid normal size  CV: No edema  Respiratory: Unlabored breathing  Lymph: No axillary, submandibular, supraclavicular or inguinal lymphadenopathy  Abd: Soft, mod distention, no bowel sounds, no hepatosplenomegaly, mild tenderness in the left lower quadrant  Skin: warm, perfused, no jaundice  Neuro: AAO x 3, No asterixis  Psych: Normal affect  MSK: No gross deformities         Data:   Labs and imaging below were independently reviewed and interpreted    BMP  Recent Labs   Lab 07/11/19  0652      POTASSIUM 3.9   CHLORIDE 107   MAICOL 8.0*   CO2 27   BUN 14   CR 0.83   *     CBC  Recent Labs   Lab 07/11/19  0652   WBC 5.1   RBC 4.08*   HGB 12.6*   HCT 37.4*   MCV 92   MCH 30.9   MCHC 33.7   RDW 13.5   PLT 48*     INRNo lab results found in last 7 days.  LFTs  Recent Labs   Lab 07/11/19  0652   ALKPHOS 71   AST 15   ALT 22   BILITOTAL 0.8   PROTTOTAL 5.7*   ALBUMIN 2.9*      PANCNo lab results found in last 7 days.    Imaging:  IMAGING:  From [facility]:  CT ABDOMEN & PELVIS W/O ORAL W IV CON 7/9/19: IMPRESSION:   1. Distal colonic obstruction. The transition zone " is in the left side of the pelvis just proximal to a suture line. Ascites. Bowel wall continues to enhance. No pneumatosis or portal venous gas. No discernible abscess. No perforation.  2. Cirrhosis with portal hypertension.  3. Gallstone.

## 2019-07-11 NOTE — H&P
COLORECTAL SURGERY ADMISSION HISTORY & PHYSICAL   Rian Malik MRN# [id number]   YOB: 1960 Age: 58 year old     Date of Admission: 07/10/19    CC: Abdominal pain    HPI: Rian Malik is a 58 year old year old male with history of HepC cirrhosis (viral load from OSH undetectable), thrombocytopenia 2/2 cirrhosis & hypersplenism, and poorly differentiated adenocarcinoma colon  [id number]( liver lesion) s/p open sigmoidectomy with end colostomy (10/2017) and later underwent laparoscopic liver ablation with intraoperative ultrasound, colostomy takedown, appendectomy, descending colectomy and anterior dissection (9/2018) who presented to [facility] yesterday following four days increasing dull diffuse cramping lower abdominal pain and constipation.     Constipation did not resolve despite prunes and Mirilax on Monday so pt drank magnesium citrate on Tuesday. Pain and constipation persisted so he sought care and was admitted to OSH 7/9/19. No episodes of nausea or emesis. No NGT placed at OSH. OSH CT sig for: Distal colonic obstruction. The transition zone is in the left side of the pelvis just proximal to a suture line. Ascites. Bowel wall continues to enhance. No pneumatosis or portal venous gas. No discernible abscess. No perforation. Cirrhosis with portal hypertension.    Labs from OSH were sig for normal electrolytes, tbili (1.7) and dbili (0.31), and thrombocytopenia (50).     Since arrival from OSH pt continues to have dull lower abdominal cramping, however pain has improved. He also reports a a large watery, non-bloody bowel movement this morning prior to transfer. At baseline has 1-2 soft, non-bloody, non-melanic BM daily.        Oncologic interval history:  02/2017: Cramping lower abdominal pain diagnosed as sigmoid abscess treated with antibiotics    05/2017: Laparoscopic converted to open sigmoidectomy with end colostomy. Poorly differentiated adenocarcinoma. Surgical pathology -  [id number]      Case was discussed at the colorectal tumor board. Recommendation was to obtain new staging scans as well as to proceed with systemic chemotherapy at this point with plans for further surgery in future. Staging scans negative for distant disease.      11/21/17 Started on FOLFOX q2 week regimen for 6 months duration - s/p 12 cycles. He has had chemo delays/ dose adjustments due to thrombocytopenia.     07/2018 Imaging at completion of adjuvant chemotherapy showed ill-defined 16 mm segment 5 liver lesions suspicious for metastatic disease. MRI liver showed a 1.8 cm liver lesion suspicious for malignancy.     09/12/18 laparoscopic liver ablation with intraoperative ultrasound, colostomy takedown, appendectomy, descending colectomy and anterior dissection. Surgical pathology showed periappendiceal tissue, ascending colon margin/mesentery and pericolonic fibroadipose adipose tissue involved by signet ring adenocarcinoma.      10/24/18 - Started on FOLFIRI with skipping 5FU bolus giving cytopenias as well as neulasta added with last cycle- s/p 6 cycles     01/2019 PET scan as well as the MRI liver came back negative for evidence of recurrence or metastasis. Patient's case was discussed with surgical oncology and given negative scans, planned for HIPEC was deferred.Sedation was made to start him on surveillance        REVIEW OF SYSTEMS: The remainder of the complete ROS was negative unless noted in the HPI. Denies visual changes, headache, sore throat, rhinorrhea, chest pain, sob, abdominal pain, diarrhea, constipation, fevers, night sweats, weight loss.     PAST MEDICAL HISTORY:   Past Medical History:   Diagnosis Date     Maldonado's esophagus      Cirrhosis (H)      Colon cancer (H) 10/11/2017    Poorly differentiated adenocarcinoma     Hepatitis C        PAST SURGICAL HISTORY:   Past Surgical History:   Procedure Laterality Date     COLONOSCOPY N/A 8/4/2017    Procedure: COMBINED COLONOSCOPY, SINGLE OR  MULTIPLE BIOPSY/POLYPECTOMY BY BIOPSY;  Colon & EGD;  Surgeon: Cristobal Blanco MD;  Location: [facility]     COLOSTOMY Left 10/11/2017     COMBINED CYSTOSCOPY, INSERT STENT URETER(S) Bilateral 9/12/2018    Procedure: COMBINED CYSTOSCOPY, INSERT STENT URETER(S);  Bilateral Cystoscopy With Temporary Stent Placement, Laparoscopic Hand Assisted Takedown Colostomy, Appendectomy, Mobilization of Splenic Flexure, Flexible Sigmoidoscopy, Endoscopic Clipping, Laparoscopic  Assisted Microablation of Liver Tumor, Intra-Op Ultrasound;  Surgeon: Hunter Quintero MD;  Location: [facility]     CYSTOSCOPY AND LEFT STENT PLACEMENT  10/11/2017    Dr. Reyna     ESOPHAGOSCOPY, GASTROSCOPY, DUODENOSCOPY (EGD), COMBINED N/A 8/4/2017    Procedure: COMBINED ESOPHAGOSCOPY, GASTROSCOPY, DUODENOSCOPY (EGD), BIOPSY SINGLE OR MULTIPLE;;  Surgeon: Cristobal Blanco MD;  Location: [facility]     HERNIA REPAIR Left 1988    Left inguinal     LAPAROSCOPIC ASSISTED COLOSTOMY TAKEDOWN N/A 9/12/2018    Procedure: LAPAROSCOPIC ASSISTED COLOSTOMY TAKEDOWN;;  Surgeon: Herminio Oswald MD;  [facility]     LAPAROSCOPIC CHOLECYSTECTOMY N/A 9/12/2018    Procedure: LAPAROSCOPIC CHOLECYSTECTOMY;;  Surgeon: Saqib Hale MD;  [facility]     LAPAROSCOPIC CONVERTED TO OPEN SIGMOIDECTOMY WITH END COLOSTOMY  10/11/2017    Dr. Schneider     LAPAROSCOPIC HAND ASSISTED HEPATECTOMY PARTIAL N/A 9/12/2018    Procedure: LAPAROSCOPIC HAND ASSISTED HEPATECTOMY PARTIAL;;  Surgeon: Saqib Hale MD;  [facility]     SIGMOIDOSCOPY FLEXIBLE N/A 9/12/2018    Procedure: SIGMOIDOSCOPY FLEXIBLE;;  Surgeon: Herminio Oswald MD;  [facility]       ALLERGIES:    Allergies   Allergen Reactions     Acetaminophen      Naproxen        HOME MEDICATIONS:   Current Facility-Administered Medications on File Prior to Encounter:  heparin 100 UNIT/ML injection 500 Units     Current Outpatient Medications on File Prior to Encounter:  loperamide (IMODIUM) 2 MG capsule 2  caps at 1st sign of diarrhea & 1 cap every 2hrs until 12hrs diarrhea free. During night, 2 caps at bedtime & 2 caps every 4hrs until AM   LORazepam (ATIVAN) 0.5 MG tablet Take 1 tablet (0.5 mg) by mouth every 4 hours as needed (Anxiety, Nausea/Vomiting or Sleep)   ondansetron (ZOFRAN) 8 MG tablet Take 1 tablet (8 mg) by mouth every 8 hours as needed (Nausea/Vomiting)   prochlorperazine (COMPAZINE) 10 MG tablet Take 1 tablet (10 mg) by mouth every 6 hours as needed (Nausea/Vomiting)       SOCIAL HISTORY:   Social History     Tobacco Use     Smoking status: Former Smoker     Packs/day: 0.10     Years: 3.00     Pack years: 0.30     Types: Cigarettes     Start date: 2014     Last attempt to quit: 10/6/2017     Years since quittin.7     Smokeless tobacco: Never Used     Tobacco comment:     Substance Use Topics     Alcohol use: No     Comment: Sober since .  Drank 6 beers/day during the weekdays and 12 beers/day on weekends.       Drug use: No       FAMILY HISTORY:   Family History   Problem Relation Age of Onset     Skin Cancer Sister        PHYSICAL EXAMINATION:  There were no vitals taken for this visit.     General: NAD, awake and alert   CV: Non-cyanotic, Peripheral pulses intact   Pulm: No increased work of breathing on room air   Abd: soft, non-distended, non tender to palpation, Tympanic throughout  : No jaramillo  Extremities: WWP without edema  Neuro: No focal deficits noted, patient moves all extremities spontaneously    LABS:  No results for input(s): WBC, RBC, HGB, HCT, MCV, MCH, MCHC, RDW, PLT in the last 168 hours.    No results for input(s): NA, POTASSIUM, CHLORIDE, CO2, BUN, CR, GLC, MAICOL, MAG, PHOS in the last 168 hours.    No results for input(s): AST, ALT, ALKPHOS, BILITOTAL, BILIDIRECT, ALBUMIN, INR in the last 168 hours.    Invalid input(s): PROTEINTOT    IMAGING:  From [facility]:  CT ABDOMEN & PELVIS W/O ORAL W IV CON 19: IMPRESSION:   1. Distal colonic obstruction. The  transition zone is in the left side of the pelvis just proximal to a suture line. Ascites. Bowel wall continues to enhance. No pneumatosis or portal venous gas. No discernible abscess. No perforation.  2. Cirrhosis with portal hypertension.  3. Gallstone.      A/P: Rian Malik is a 58 year old male with with history of HepC cirrhosis, thrombocytopenia 2/2 cirrhosis & hypersplenism, and poorly differentiated adenocarcinoma colon [id number]( liver lesion) s/p open sigmoidectomy with end colostomy (10/2017) and colostomy takedown, liver ablation, appendectomy, descending colectomy and anterior dissection (9/2018) who was found to have LBO now resolved however CT from OSH concerning for stricture at the anastomotic site.     - Admit to Obs under Dr. Castillo  - Diet/NPO, Meds and ice chips okay  - GI consult in AM  - Labs in AM    Discussed with Dr. Josef FLANAGAN, Colorectal Fellow      Tarsha Dasilva MD   Surgery PGY-1     Staff Addendum:  Agree with the consultation H&P as documented by the housestaff. I was personally involved with the recommendations made by our service for this patient.  Rona Castillo MD  Colon and Rectal Surgery Staff  [facility]

## 2019-07-11 NOTE — H&P
ADDENDUM; please see full H&P done by Tarsha Dasilva MD   Surgery PGY-1 for full note.    Briefly, patient is a 58yoM [id number] colon cancer, s/p Sigmoidectomy and Lucas's, followed by takedown w liver ablation, LAD, and primary anastomosis.  He also has HCV and cirrhosis.  He has completed his chemotherapy (please see oncology notes most recently from 5/2019).  He reports abdominal cramping starting 5 days ago, progressively worsening, and w associated constipation. He tool laxatives w no effect and sought treatment at an OSH.  He had imaging there, though I cannot see those images.  He was told he had a stricture at the coloproctostomy.  He had a large BM there and his abd distension improved.    He is afebrile and HD normal.  He is awake, alert, and in no distress.  His abdomen is soft, nontender, mildly distended. SUBHASH is notable for a nonpalpable anastomosis, no masses, no blood, no pain, no fissures, no fistulae.    Labs from OSH reviewed. Pls see Dr. Dasilva's note for those values.  No leukocytosis, no acidosis, no MARYANN.    Per reports from imaging he has an anastomotic stricture.  Fortunately he is passing some BMs and has a benign exam.  Plan as follows:    - Upload OSH images  - NPO  - IVF  - GI consultation in am for flex sig w biopsy and possibly dilation vs stent placement (latter much less preferable in the event that the patient may be a re-reoperative candidate).  - miralax bid   - avoid narcotics  - oncology team should be aware of his admission and be formally consulted should this turn out to be a cancer recurrence.    Kimberlee Baehna MD  Colon and Rectal Surgery Fellow  [facility]

## 2019-07-11 NOTE — PROGRESS NOTES
"Colorectal Surgery Progress Note      Subjective:  Feeling better since passing the large stool on Tues morning.  Denies any more flatus/stool since.  Has some bloating but the bloating is at his baseline.  No nausea.  Currently no lower abd cramping and pain, but is also on pain medications.  Counseled to try to go without pain meds for now.     Vitals:  Vitals:    07/10/19 1924 07/10/19 2340 07/11/19 0424 07/11/19 0718   BP: 127/70 132/67 144/71 124/68   BP Location:   Right arm Right arm   Pulse: 54 60 55 51   Resp: 18 16 16 16   Temp: 98  F (36.7  C) 98.3  F (36.8  C) 95.5  F (35.3  C) 97.2  F (36.2  C)   TempSrc: Axillary  Oral Oral   SpO2:  95% 96% 97%   Weight:  77 kg (169 lb 12.1 oz)     Height:  1.727 m (5' 8\")       I/O:  I/O last 3 completed shifts:  In: 646.67 [I.V.:646.67]  Out: 175 [Urine:175]    Physical Exam:  Gen: AAOx3, NAD  Pulm: Non-labored breathing  Abd: Soft, protuberant abdomen, that is moderatelydistended, minimally tender, no guarding/rebound  Ext:  Warm and well-perfused      ASSESSMENT: This is a 58 year old male with history of Hep C (viral load undetectable at OSH), thrombocytopenia, cirrhosis, with 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  Most recent PET scan in Jan 2019 & MRI was negative for recurrent/metastatic disease.  Pt presents with 4 days of lower abd pain and constipation.  OSH CT scan shows a stricture just proximal to his anastomosis.        Neuro/Pain: oxy, tylenol, break thru IV dilaudid  CV:  CORNEL  PULM: encourage IS.  GI/FEN:   - NPO for now  - GI consult - for flex sig, biopsy stricture?, possible balloon dilatation?  Would recommend avoiding a stent for now from a colorectal perspective. Pt does have an end-to-side anastomosis. Verbally discussed with " GI.   - minimize narcotics  - will likely need miralax at baseline after  : CORNEL  Heme: WBC 3.2 and PLT 57. Monitor.   ID: CORNEL  Endocrine: CORNEL    Activity: as tolerated.  Ppx: lovneox ppx (this is on hold for possible procedure today, if not will need to be ordered)  Dispo: TBBRUNA Dorsey PA-C   Colon and Rectal Surgery  2150     Patient was seen and discussed with Dr. Evans

## 2019-07-11 NOTE — PLAN OF CARE
"/71 (BP Location: Right arm)   Pulse 55   Temp 95.5  F (35.3  C) (Oral)   Resp 16   Ht 1.727 m (5' 8\")   Wt 77 kg (169 lb 12.1 oz)   SpO2 96%   BMI 25.81 kg/m      Bradycardic, otherwise AVSS. Abdominal pain managed with Tylenol, Oxycodone, IV Dilaudid and hot packs. Port infusing, with good blood return. Up ad haritha to bathroom. Voiding, urine is dark. Bowel sounds active. Passing gas, no BM. Continue with plan of care - GI consult.   "

## 2019-07-12 NOTE — ANESTHESIA PREPROCEDURE EVALUATION
Anesthesia Pre-Procedure Evaluation    Patient: Rian Malik   MRN:     [id number] Gender:   male   Age:    58 year old :      1960        Preoperative Diagnosis: Colonic Stricture   Procedure(s):  Flexible Sigmoidoscopy With Dilation     Past Medical History:   Diagnosis Date     Maldonado's esophagus      Cirrhosis (H)      Colon cancer (H) 10/11/2017    Poorly differentiated adenocarcinoma     Hepatitis C       Past Surgical History:   Procedure Laterality Date     COLONOSCOPY N/A 2017    Procedure: COMBINED COLONOSCOPY, SINGLE OR MULTIPLE BIOPSY/POLYPECTOMY BY BIOPSY;  Colon & EGD;  Surgeon: Cristobal Blanco MD;  Location: [facility]     COLOSTOMY Left 10/11/2017     COMBINED CYSTOSCOPY, INSERT STENT URETER(S) Bilateral 2018    Procedure: COMBINED CYSTOSCOPY, INSERT STENT URETER(S);  Bilateral Cystoscopy With Temporary Stent Placement, Laparoscopic Hand Assisted Takedown Colostomy, Appendectomy, Mobilization of Splenic Flexure, Flexible Sigmoidoscopy, Endoscopic Clipping, Laparoscopic  Assisted Microablation of Liver Tumor, Intra-Op Ultrasound;  Surgeon: Hunter Quintero MD;  [facility]     CYSTOSCOPY AND LEFT STENT PLACEMENT  10/11/2017    Dr. Reyna     ESOPHAGOSCOPY, GASTROSCOPY, DUODENOSCOPY (EGD), COMBINED N/A 2017    Procedure: COMBINED ESOPHAGOSCOPY, GASTROSCOPY, DUODENOSCOPY (EGD), BIOPSY SINGLE OR MULTIPLE;;  Surgeon: Cristobal Blanco MD;  [facility]: [facility]     HERNIA REPAIR Left 1988    Left inguinal     LAPAROSCOPIC ASSISTED COLOSTOMY TAKEDOWN N/A 2018    Procedure: LAPAROSCOPIC ASSISTED COLOSTOMY TAKEDOWN;;  Surgeon: Herminio Oswald MD;  [facility]     LAPAROSCOPIC CHOLECYSTECTOMY N/A 2018    Procedure: LAPAROSCOPIC CHOLECYSTECTOMY;;  Surgeon: Saqib Hale MD;  [facility]     LAPAROSCOPIC CONVERTED TO OPEN SIGMOIDECTOMY WITH END COLOSTOMY  10/11/2017    Dr. Schneider     LAPAROSCOPIC HAND ASSISTED HEPATECTOMY PARTIAL N/A 2018    Procedure:  LAPAROSCOPIC HAND ASSISTED HEPATECTOMY PARTIAL;;  Surgeon: Saqib Hale MD;  [facility]     SIGMOIDOSCOPY FLEXIBLE N/A 9/12/2018    Procedure: SIGMOIDOSCOPY FLEXIBLE;;  Surgeon: Herminio Oswald MD;  [facility]          Anesthesia Evaluation     . Pt has had prior anesthetic. Type: General and MAC    No history of anesthetic complications          ROS/MED HX    ENT/Pulmonary:     (+)tobacco use (Smoked for ~3 years lest than a pack a day.  Quit 2017), Past use 0.1 packs/day  , . .    Neurologic:  - neg neurologic ROS     Cardiovascular:  - neg cardiovascular ROS   (+) ----. : . . . :. . No previous cardiac testing       METS/Exercise Tolerance:  >4 METS   Hematologic:     (+) History of Transfusion no previous transfusion reaction Other Hematologic Disorder-thrombocytopenia plt=56      Musculoskeletal:  - neg musculoskeletal ROS       GI/Hepatic: Comment: Colonic stricture    Maldonado's esophagus.     (+) hepatitis (Treated. 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  status post Surgery and Chemo open sigmoidectomy with end colostomy at OSH by Dr. Schneider on 10/11/17        Other:    (+) No chance of pregnancy no H/O Chronic Pain,no other significant disability                    [facility] AN PHYSICAL EXAM    Lab Results   Component Value Date    WBC 4.4 07/12/2019    HGB 12.9 (L) 07/12/2019    HCT 36.7 (L) 07/12/2019    PLT 56 (L) 07/12/2019    CRP <2.9 05/09/2017     07/11/2019    POTASSIUM 3.9 07/11/2019    CHLORIDE 107 07/11/2019    CO2 27 07/11/2019    BUN 14 07/11/2019    CR 0.83 07/11/2019     (H) 07/11/2019    MAICOL 8.0 (L) 07/11/2019    PHOS 2.6 07/11/2019    MAG 1.9 07/11/2019    ALBUMIN 2.9 (L)  "07/11/2019    PROTTOTAL 5.7 (L) 07/11/2019    ALT 22 07/11/2019    AST 15 07/11/2019    ALKPHOS 71 07/11/2019    BILITOTAL 0.8 07/11/2019    PTT 28 11/06/2017    INR 1.21 (H) 07/12/2019       Preop Vitals  BP Readings from Last 3 Encounters:   07/12/19 141/73   05/06/19 116/72   02/04/19 123/73    Pulse Readings from Last 3 Encounters:   07/12/19 (!) 48   05/06/19 72   02/04/19 60      Resp Readings from Last 3 Encounters:   07/12/19 16   05/06/19 16   02/04/19 16    SpO2 Readings from Last 3 Encounters:   07/12/19 97%   05/06/19 97%   02/04/19 98%      Temp Readings from Last 1 Encounters:   07/12/19 35.8  C (96.5  F) (Oral)    Ht Readings from Last 1 Encounters:   07/10/19 1.727 m (5' 8\")      Wt Readings from Last 1 Encounters:   07/10/19 77 kg (169 lb 12.1 oz)    Estimated body mass index is 25.81 kg/m  as calculated from the following:    Height as of this encounter: 1.727 m (5' 8\").    Weight as of this encounter: 77 kg (169 lb 12.1 oz).     LDA:  Port A Cath Single 11/07/17 Right Chest wall (Active)   Access Date 07/10/19 7/12/2019 10:00 AM   Access Attempts 1 7/10/2019 11:00 PM   Gauge/Length 20 gauge;3/4 inch;Power noncoring 90 degree bend 7/10/2019 11:00 PM   Site Assessment WDL 7/12/2019 10:00 AM   Line Status Blood return noted;Infusing 7/12/2019 10:00 AM   Extravasation? No 7/12/2019 10:00 AM   Dressing Intervention Transparent 7/12/2019 10:00 AM   Dressing change due 07/17/19 7/12/2019 10:00 AM   Needle Change Due 07/17/19 7/12/2019 10:00 AM   Line Necessity Yes, meets criteria 7/12/2019 10:00 AM   De-Access Date 05/06/19 5/6/2019  1:00 PM   Number of days: 612       Open Drain Left Abdomen 5/8 Penrose Drain, gauze 4x4, tape (Active)   Number of days: 303            Assessment:   ASA SCORE: 3    NPO Status: > 6 hours since completed Solid [employer]   Documentation: H&P complete; Preop Testing complete; Consents complete   Proceeding: Proceed without further delay  Tobacco Use:  NO Active use of " Tobacco/UNKNOWN Tobacco use status     Plan:   Anes. Type:  MAC   Pre-Induction: Midazolam IV   Induction:  Not applicable   Airway: Native Airway   Access/Monitoring: PIV   Maintenance: N/a   Emergence: N/a   Logistics: Same Day Surgery     Postop Pain/Sedation Strategy:  Standard-Options: Opioids PRN     PONV Management:  Adult Risk Factors:, Non-Smoker, Postop Opioids  Prevention: Propofol Infusion     CONSENT: Direct conversation   Plan and risks discussed with: Patient   Blood Products: Consent Deferred (Minimal Blood Loss)                         Izabela Garcia MD

## 2019-07-12 NOTE — PLAN OF CARE
A/O x4. AVSS on RA. Pain managed with PRN Tylenol and Tramadol. Denies nausea. Tolerating clears diet, but NPO at midnight for surgery tomorrow. Port with MIVF at 100ml/hr. Up independently. Voiding adequate amts, passing gas + BS.   POC: On the OR schedule for tomorrow at 12 noon for sigmoidoscopy with dilation.

## 2019-07-12 NOTE — PLAN OF CARE
NPO for flex sigmoidoscopy today.  Pre-op showers done x2.  Mild abdominal cramping managed with Tylenol.  UAL in room, voiding spontaneously, not saving, is having loose BMs.  Tap water enema with results given at 1530 on call to OR.   Report called to PACU.  Daughter notified of plan by patient. Awaiting transport to OR.    Addendum: To OR at 1640

## 2019-07-12 NOTE — PROGRESS NOTES
Colorectal Surgery Progress Note  HD #2    Subjective:  Pt seen and examined. No acute events overnight. Passing gas and BMs. No pain. Plan for flex sig at 12 PM today to possibly dilate, bx stricture, pt aware. Fleet enema ordered.    Vitals:  Vitals:    07/11/19 1233 07/11/19 1451 07/11/19 2325 07/12/19 0400   BP: 139/71 135/68 129/71 131/74   BP Location: Right arm Right arm Right arm    Pulse: (!) 48 (!) 49 52 52   Resp:  16 16 16   Temp: 96.7  F (35.9  C) 99.1  F (37.3  C) 97.5  F (36.4  C) 96.5  F (35.8  C)   TempSrc: Oral Oral Oral    SpO2: 98% 97% 94% 98%   Weight:       Height:         Lab Results   Component Value Date    WBC 5.1 07/11/2019     Lab Results   Component Value Date    RBC 4.08 07/11/2019     Lab Results   Component Value Date    HGB 12.6 07/11/2019     Lab Results   Component Value Date    HCT 37.4 07/11/2019     No components found for: MCT  Lab Results   Component Value Date    MCV 92 07/11/2019     Lab Results   Component Value Date    MCH 30.9 07/11/2019     Lab Results   Component Value Date    MCHC 33.7 07/11/2019     Lab Results   Component Value Date    RDW 13.5 07/11/2019     Lab Results   Component Value Date    PLT 48 07/11/2019         I/O:  I/O last 3 completed shifts:  In: 1930 [P.O.:20; I.V.:1910]  Out: 800 [Urine:800]    Physical Exam:  Gen: AAOx3, NAD  Pulm: Non-labored breathing  Abd: Soft, protruberant abdomen, moderately distended, minimally tender, no guarding/rebound  Ext:  Warm and well-perfused     ASSESSMENT: This is a 58 year old male with history of Hep C (viral load undetectable at OSH), thrombocytopenia, cirrhosis, with 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  Most recent PET scan in Jan 2019 & MRI was negative for recurrent/metastatic disease.   Pt presents with 4 days of lower abd pain and constipation.  OSH CT scan shows a stricture just proximal to his anastomosis.  Now passing BMs, flatus, tolerated brief CLD yesterday.    Neuro/Pain: ultram, scheduled tylenol  CV:  CORNEL  PULM: encourage IS.  GI/FEN:   - NPO until after procedure  - minimize narcotics  - will likely need miralax at baseline after  : CORNEL  Heme: WBC 5.1, Plt 48. Monitor. GI to decide if pt requires plts pre-procedure.   ID: CORNEL  Endocrine: CORNEL  Activity: as tolerated.  Ppx: will order lovneox ppx after scope  Dispo: TBD    Patient seen and discussed with Dr. Josef Alexandre MD  PGY-1  [id number]

## 2019-07-12 NOTE — PLAN OF CARE
AVSS. Abdominal pain managed with Tylenol and hot packs. NPO after midnight. IV fluids via port. Up ad haritha. Voiding spontaneously. Received a tap water enema. Had pre-op shower #1 this morning. Continue with plan of care - plan for OR today.

## 2019-07-13 NOTE — PLAN OF CARE
VSS, HR heather to low 40s, MD aware. Pt reports HA, relieved with Tylenol x1. Tolerated CLD, NPO @ 0000, pt denies nausea. BS hypoactive, pt not yet passing gas. Pt up with SBA, voiding adequately. Possible abdominal X-ray this AM. Plan for repeat colonoscopy outpatient.

## 2019-07-13 NOTE — PLAN OF CARE
Alert and oriented, UAL ambulating few times in the hallway. C/o some cramping pain in the abdomen, prn po tramadol given with relief. On clear liquid diet tolerating well-denies nausea and vomiting. Bowel sounds audible, + gas and stool. Daily miralax given with good relief. Provider aware that pt is bradycardic and not concern on this. Otherwise OVSS, afebrile. MIVF at 50 ml/hr with via port. PLAN: Continue with the care plan. Pain management, bowel management, VS.

## 2019-07-13 NOTE — ANESTHESIA CARE TRANSFER NOTE
Patient: Rian Malik    Procedure(s):  Flexible Sigmoidoscopy With Balloon Dilation    Diagnosis: Colonic Stricture  Diagnosis Additional Information: No value filed.    Anesthesia Type:   No value filed.     Note:  Airway :Room Air  Patient transferred to:Medical/Surgical Unit  Comments: Pt alert and oriented. VSS. Report called to RN on 7C.  Orderly to tx to floor.Handoff Report: Identifed the Patient, Identified the Reponsible Provider, Reviewed the pertinent medical history, Discussed the surgical course, Reviewed Intra-OP anesthesia mangement and issues during anesthesia, Set expectations for post-procedure period and Allowed opportunity for questions and acknowledgement of understanding      Vitals: (Last set prior to Anesthesia Care Transfer)    CRNA VITALS  7/12/2019 1836 - 7/12/2019 1907      7/12/2019             Pulse:  54    SpO2:  100 %                Electronically Signed By: SAVANNA Navarro CRNA  July 12, 2019  7:07 PM

## 2019-07-13 NOTE — PROVIDER NOTIFICATION
Notified provider regarding HR of 43-44, awaiting response.     Addendum 2115: Provider aware, cont to monitor, no interventions at this time.

## 2019-07-13 NOTE — PLAN OF CARE
Assumed care of pt from [id number].    Pt arrived on unit at 1900 from a flex sig with balloon dilation for colonic stricture; team to reassess in the AM.     AOx4, up with SBA, pt ambulated in room. Bradycardic, team aware, cont to monitor; no interventions for bradycardia this evening as pt is otherwise asymptomatic. OVSS on ra. Tolerating clear liq diet, pt is to be NPO at midnight, pt aware. Voiding AUO. Denies flatus, no BM on shift. Denies pain. Denies N/V. Port infusing with IVMF. Cont poc.

## 2019-07-13 NOTE — BRIEF OP NOTE
[facility], [place]    Brief Operative Note    Pre-operative diagnosis: Colonic Stricture  Post-operative diagnosis * Same *  Procedure: Procedure(s):  Flexible Sigmoidoscopy With Balloon Dilation  Surgeon: Surgeon(s) and Role:     * Hunter Carmona MD - Primary  Anesthesia: General   Estimated blood loss: None  Drains: None  Specimens: * No specimens in log *  Findings:   Initially passage of endoscope performed with minimal air insufflation entered section of dilated colon without resistance. Suctioned significant liquid stool and gas. On withdrawal, caliber change noted at 20 cm. Lumen narrowed between 12-20 cm with otherwise grossly normal mucosa. No obvious suggestion of malignancy, ulceration or focal anastomotic stricture (specific site of anastomosis unclear).  Sequential balloon dilation of narrowed region performed with 12 and then 15 mm diameter elation balloon. Upstream colon suctioned to decompress. Advanced scope to limits of adult endoscope (appeared to be splenic flexure region) without other narrowing.  Complications: None.  Implants:  * No implants in log *     Recommend:  NPO tonight and reassess in am re abdominal exam, passage of gas and passage of stool. If not clearly decompressed, check axr flat and upright.  If passing gas and stool without pain, OK to start clears and ADAT.    Given inadequate prep and concern re possible anastomotic recurrent raised, would recommend formal colonoscopy with prep if can tolerate in next few weeks.    Further evaluation and follow-up per colorectal surgery and luminal GI team.  Repeat balloon dilation adams Carmona MD  Associate Professor of Medicine  Division of Gastroenterology, Hepatology and Nutrition  [facility]

## 2019-07-13 NOTE — ANESTHESIA POSTPROCEDURE EVALUATION
Anesthesia POST Procedure Evaluation    Patient: Rian Malik   MRN:     [id number] Gender:   male   Age:    58 year old :      1960        Preoperative Diagnosis: Colonic Stricture   Procedure(s):  Flexible Sigmoidoscopy With Balloon Dilation   Postop Comments: No value filed.       Anesthesia Type:  MAC  No value filed.    Reportable Event: NO     PAIN: Uncomplicated   Sign Out status: Comfortable, Well controlled pain     PONV: No PONV   Sign Out status:  No Nausea or Vomiting     Neuro/Psych: Uneventful perioperative course   Sign Out Status: Preoperative baseline; Age appropriate mentation     Airway/Resp.: Uneventful perioperative course   Sign Out Status: Non labored breathing, age appropriate RR; Resp. Status within EXPECTED Parameters     CV: Uneventful perioperative course   Sign Out status: Appropriate BP and perfusion indices; Appropriate HR/Rhythm     Disposition:   Sign Out in:  PACU  Disposition:  Floor  Recovery Course: Uneventful  Follow-Up: Not required           Last Anesthesia Record Vitals:  CRNA VITALS  2019 1836 - 2019 1936      2019             Pulse:  54    SpO2:  100 %          Last PACU Vitals:  No vitals data found for the desired time range.        Electronically Signed By: Braden De Oliveira DO, 2019, 10:18 PM

## 2019-07-13 NOTE — PROGRESS NOTES
Colorectal Surgery Progress Note      Subjective:  Passing flatus, no bowel movements since dilation. Feels less distended but still bloated on exam. No nausea.     Vitals:  Vitals:    07/12/19 2120 07/13/19 0348 07/13/19 0711 07/13/19 0825   BP: 133/58 135/63 131/89    BP Location: Right arm Right arm Right arm    Pulse: (!) 45 (!) 43 (!) 42    Resp: 16 16 16    Temp:  97.1  F (36.2  C) 97.6  F (36.4  C)    TempSrc:  Oral Oral    SpO2: 99% 99% 98%    Weight:    72.4 kg (159 lb 11.2 oz)   Height:         I/O:  I/O last 3 completed shifts:  In: 488.33 [I.V.:488.33]  Out: 1600 [Urine:1600]    Physical Exam:  Gen: AAOx3, NAD  Pulm: Non-labored breathing  Abd: Soft, moderately distended, non tender, no guarding/rebound  Ext:  Warm and well-perfused      ASSESSMENT: This is a 58 year old male with history of Hep C (viral load undetectable at OSH), thrombocytopenia, cirrhosis, with 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  Most recent PET scan in Jan 2019 & MRI was negative for recurrent/metastatic disease.  Pt presents with 4 days of lower abd pain and constipation.  OSH CT scan shows a stricture just proximal to his anastomosis. S/p endoscopic dilation with GI 7/13 with no evidence of luminal recurrence.       Neuro/Pain: Multimodal pain control, avoid narcotics as able.   CV:  CORNEL  PULM: encourage IS.  GI/FEN:   - Clears. Will advance diet with return of bowel function.  - Miralax daily.   - GI consult - flex sig 7/12 with endoscopic balloon dilation, no evidence of luminal recurrence   : CORNEL  Heme: no acute bleeding, monitor thrombocytopenia   ID: CORNEL  Endocrine: CORNEL  Activity: as tolerated.  Ppx: lovneox ppx, SCDs  Dispo: home when return of bowel function and tolerating diet      Marybeth Orona,  MD  Colon and Rectal Surgery Fellow  [facility]  Office: [phone]

## 2019-07-13 NOTE — PLAN OF CARE
Returned from OR at 1915. A&O x4, denies pain.  VSS, continues to be bradycardic with HR in the 40s.  Continue post-procedure monitoring

## 2019-07-14 NOTE — PLAN OF CARE
HR heather (unchanged). All other VSS. Pt denies pain. Port with IVF infusing. Tolerating CLD, BS hypoactive, pt reports passing gas, x1 loose BM per pt report. Pt up independently, voiding adequately. Plan for repeat colonoscopy outpatient.

## 2019-07-14 NOTE — PLAN OF CARE
Bradycardic. Order parameters for notification not met. Continue to monitor. AOVSS. C/o abdominal cramping. Tylenol administered per pt request. Port infusing 50ml/hr. Tolerating clear liquid diet, denies n/v. Abdomen round. Bowel sounds audible. + gas, + BM. Up ad haritha in room. Continue POC.

## 2019-07-14 NOTE — DISCHARGE SUMMARY
[facility], [place]    Discharge Summary    Date of Admission:  7/10/2019  Date of Discharge:  07/14/19  Discharging Provider: Dr. uLis Doss  Discharge Team: Colrooctal Surgery     Discharge Diagnoses   Colonic obstruction, flexible sigmoidoscopy and dilation of colonic stricture     Follow-ups Needed After Discharge   Will need outpatient colonoscopy and abdominal imaging coordinated.     Hospital Course   Rian Malik was admitted on 7/10/2019 for colonic obstruction. On 7/12 he underwent a flexible sigmoidoscopy and dilation of colonic stricture with no concern for luminal recurrence. Post-procedure, he had passage of flatus and stool and his diet was slowly advanced as tolerated. He was started on miralax daily as a bowel regimen. At the time of discharge he was tolerating a diet, passing stool and had significant improvement in his abdominal distension.       Consultations This Hospital Stay   Gastroenterology    ______________________________________________________________________    Significant Results and Procedures   7/12: Flexible Sigmoidoscopy  Findings:                     Initially passage of endoscope performed with minimal air insufflation entered section of dilated colon without resistance. Suctioned significant liquid stool and gas. On withdrawal, caliber change noted at 20 cm. Lumen narrowed between 12-20 cm with otherwise grossly normal mucosa. No obvious suggestion of malignancy, ulceration or focal anastomotic stricture (specific site of anastomosis unclear).  Sequential balloon dilation of narrowed region performed with 12 and then 15 mm diameter elation balloon. Upstream colon suctioned to decompress. Advanced scope to limits of adult endoscope (appeared to be splenic flexure region) without other narrowing.         Primary Care Physician   Moreno Palomares Vocal    Discharge Disposition   Discharged to home  Condition at discharge: Improving     Discharge  Orders      Reason for your hospital stay    Colonic obstruction, endoscopic dilation     Adult [facility] Follow-up and recommended labs and tests    Will arrange for outpatient colonoscopy and abdominal imaging in conjunction with the oncology team.     Activity    Your activity upon discharge: activity as tolerated     Discharge Instructions    Full liquid and low fiber diet as tolerated.  Miralax daily to keep stools soft. If having multiple stools can back down to 1/2 dose or every other day dosing.     Diet    Follow this diet upon discharge: Full Liquid Diet or Low Fiber, recommend 5-6 small meals throughout the day. Nutritional supplements and protein shakes as needed.     Discharge Medications   Current Discharge Medication List      START taking these medications    Details   polyethylene glycol (MIRALAX/GLYCOLAX) packet Take 17 g by mouth daily  Qty: 100 packet, Refills: 1    Associated Diagnoses: Adenocarcinoma of sigmoid colon (H)      traMADol (ULTRAM) 50 MG tablet Take 1 tablet (50 mg) by mouth every 6 hours as needed for moderate pain  Qty: 20 tablet, Refills: 0    Associated Diagnoses: Adenocarcinoma of sigmoid colon (H)         CONTINUE these medications which have NOT CHANGED    Details   LORazepam (ATIVAN) 0.5 MG tablet Take 1 tablet (0.5 mg) by mouth every 4 hours as needed (Anxiety, Nausea/Vomiting or Sleep)  Qty: 30 tablet, Refills: 2    Associated Diagnoses: Adenocarcinoma of sigmoid colon (H); Nausea      ondansetron (ZOFRAN) 8 MG tablet Take 1 tablet (8 mg) by mouth every 8 hours as needed (Nausea/Vomiting)  Qty: 30 tablet, Refills: 1    Associated Diagnoses: Adenocarcinoma of sigmoid colon (H); Nausea      prochlorperazine (COMPAZINE) 10 MG tablet Take 1 tablet (10 mg) by mouth every 6 hours as needed (Nausea/Vomiting)  Qty: 30 tablet, Refills: 2    Associated Diagnoses: Adenocarcinoma of sigmoid colon (H)         STOP taking these medications       loperamide (IMODIUM) 2 MG capsule  Comments:   Reason for Stopping:             Allergies   Allergies   Allergen Reactions     Acetaminophen      Naproxen      Staff Addendum:  Agree with the discharge summary as documented. I was personally involved with the discharge planning and hospital decision-making for this patient.  Rona Brewer MD  Colon and Rectal Surgery Staff  [facility]

## 2019-07-14 NOTE — PROGRESS NOTES
Colorectal Surgery Progress Note      Subjective:  Passing flatus and stool. Feels less distended. Would like to go home today.    Vitals:  Vitals:    07/13/19 2025 07/13/19 2218 07/14/19 0310 07/14/19 0626   BP:  148/71 133/66 144/73   BP Location:  Right arm Right arm Right arm   Pulse: 51 (!) 46 (!) 48 (!) 49   Resp: 16 16 16 16   Temp: 96.9  F (36.1  C) 97.9  F (36.6  C) 96.6  F (35.9  C) 96.7  F (35.9  C)   TempSrc: Oral Oral Oral Oral   SpO2: 99% 97% 97% 99%   Weight:       Height:         I/O:  I/O last 3 completed shifts:  In: 3291.5 [P.O.:2200; I.V.:1091.5]  Out: 2700 [Urine:2700]    Physical Exam:  Gen: AAOx3, NAD  Pulm: Non-labored breathing  Abd: Soft, moderately distended, non tender, no guarding/rebound  Ext:  Warm and well-perfused      ASSESSMENT: This is a 58 year old male with history of Hep C (viral load undetectable at OSH), thrombocytopenia, cirrhosis, with 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  Most recent PET scan in Jan 2019 & MRI was negative for recurrent/metastatic disease.  Pt presents with 4 days of lower abd pain and constipation.  OSH CT scan shows a stricture just proximal to his anastomosis. S/p endoscopic dilation with GI 7/13 with no evidence of luminal recurrence.       Neuro/Pain: Multimodal pain control, avoid narcotics as able.   CV:  CORNEL  PULM: encourage IS.  GI/FEN:   - Advance diet as tolerated. Stop IVF.   - Miralax daily.   - GI consult - flex sig 7/12 with endoscopic balloon dilation, no evidence of luminal recurrence   : CORNEL  Heme: no acute bleeding, monitor thrombocytopenia   ID: CORNEL  Endocrine: CORNEL  Activity: as tolerated.  Ppx: lovneox ppx, SCDs  Dispo: home if tolerates diet advancement.      Marybeth Orona MD  Colon and Rectal Surgery  Fellow  [facility]  Office: [phone]

## 2019-07-14 NOTE — PLAN OF CARE
DISCHARGE      D: Orders written for discharge to home    A/I: Alert and oriented. Abdominal pain managed with prn tramadol. Daily Miralax given with good results. Diet advance to full liquid diet for breakfast-tolerating it well. Provider ok to proceed to LFD if he decided to go home this am. Pt stated that he is comfortable going home at 1100H. Daughter will provide him a ride. Port a cath heparinized and de accessed. Site CDI.  AVS reviewed and appropriate. Discuss AVS with the pt and pt verbalized understanding. Copy of AVS provided to pt. Low fiber hand outs provided to pt and discussed. Pt verbalized understanding.    PLAN: Discharge to home ambulatory. Ride at 1100H waiting in the [facility] area.

## 2019-07-15 NOTE — TELEPHONE ENCOUNTER
Called patient to follow up after hospital discharge on 7/14/19. No answer, LM for patient with direct number to call back to discuss follow up plans and set up appt.    Kiki Cuadra LPN

## 2019-07-22 NOTE — TELEPHONE ENCOUNTER
Patient prescription faxed to [facility].   Donita Alaniz LPN on 7/22/2019 at 3:30 PM

## 2019-07-31 NOTE — PROGRESS NOTES
Faxed orders for colonoscopy to [facility] @ [phone] per patient's request.     Kiki Cuadra LPN

## 2019-08-06 NOTE — PROGRESS NOTES
Subjective     Rian Malik is a 58 year old male who presents to clinic today for the following health issues:    HPI       Hospital Follow-up Visit:    Hospital/Nursing Home/ Rehab Facility: [facility]   Date of Admission: 07/09/2019  Date of Discharge: 07/12/2019  Reason(s) for Admission: Abdominal Pain            Problems taking medications regularly:  None       Medication changes since discharge: None       Problems adhering to non-medication therapy:  None  Summary of hospitalization:  CareEverywhere information obtained and reviewed  Diagnostic Tests/Treatments reviewed.  Follow up needed: Yes  Other Healthcare Providers Involved in Patient s Care:         None  Update since discharge: fluctuating course.     Post Discharge Medication Reconciliation: discharge medications reconciled, continue medications without change.  Plan of care communicated with patient     Coding guidelines for this visit:  Type of Medical   Decision Making Face-to-Face Visit       within 7 Days of discharge Face-to-Face Visit        within 14 days of discharge   Moderate Complexity [id number]   High Complexity [id number]                Patient Active Problem List   Diagnosis     Hepatitis C virus infection     CARDIOVASCULAR SCREENING; LDL GOAL LESS THAN 160     Chronic hepatitis C without hepatic coma (H)     Left inguinal pain     Abscess of sigmoid colon     Maldonado's esophagus determined by endoscopy     Adenocarcinoma of sigmoid colon (H)     Colon cancer (H)     Bowel obstruction (H)     Past Surgical History:   Procedure Laterality Date     COLONOSCOPY N/A 8/4/2017    Procedure: COMBINED COLONOSCOPY, SINGLE OR MULTIPLE BIOPSY/POLYPECTOMY BY BIOPSY;  Colon & EGD;  Surgeon: Cristobal Blanco MD;  [facility]: [facility]     COLOSTOMY Left 10/11/2017     COMBINED CYSTOSCOPY, INSERT STENT URETER(S) Bilateral 9/12/2018    Procedure: COMBINED CYSTOSCOPY, INSERT STENT URETER(S);  Bilateral Cystoscopy With Temporary  Stent Placement, Laparoscopic Hand Assisted Takedown Colostomy, Appendectomy, Mobilization of Splenic Flexure, Flexible Sigmoidoscopy, Endoscopic Clipping, Laparoscopic  Assisted Microablation of Liver Tumor, Intra-Op Ultrasound;  Surgeon: Hunter Quintero MD;  [facility]     CYSTOSCOPY AND LEFT STENT PLACEMENT  10/11/2017    Dr. Reyna     ESOPHAGOSCOPY, GASTROSCOPY, DUODENOSCOPY (EGD), COMBINED N/A 2017    Procedure: COMBINED ESOPHAGOSCOPY, GASTROSCOPY, DUODENOSCOPY (EGD), BIOPSY SINGLE OR MULTIPLE;;  Surgeon: Cristobal Blanco MD;  [facility]: [facility]     HERNIA REPAIR Left 1988    Left inguinal     LAPAROSCOPIC ASSISTED COLOSTOMY TAKEDOWN N/A 2018    Procedure: LAPAROSCOPIC ASSISTED COLOSTOMY TAKEDOWN;;  Surgeon: Herminio Oswald MD;  [facility]     LAPAROSCOPIC CHOLECYSTECTOMY N/A 2018    Procedure: LAPAROSCOPIC CHOLECYSTECTOMY;;  Surgeon: Saqib Hale MD;  [facility]     LAPAROSCOPIC CONVERTED TO OPEN SIGMOIDECTOMY WITH END COLOSTOMY  10/11/2017    Dr. Schneider     LAPAROSCOPIC HAND ASSISTED HEPATECTOMY PARTIAL N/A 2018    Procedure: LAPAROSCOPIC HAND ASSISTED HEPATECTOMY PARTIAL;;  Surgeon: Saqib Hale MD;  [facility]     SIGMOIDOSCOPY FLEXIBLE N/A 2018    Procedure: SIGMOIDOSCOPY FLEXIBLE;;  Surgeon: Herminio Oswald MD;  [facility]     SIGMOIDOSCOPY FLEXIBLE N/A 2019    Procedure: Flexible Sigmoidoscopy With Balloon Dilation;  Surgeon: Hunter Carmona MD;  [facility]       Social History     Tobacco Use     Smoking status: Former Smoker     Packs/day: 0.10     Years: 3.00     Pack years: 0.30     Types: Cigarettes     Start date: 2014     Last attempt to quit: 10/6/2017     Years since quittin.8     Smokeless tobacco: Never Used     Tobacco comment:     Substance Use Topics     Alcohol use: No     Comment: Sober since .  Drank 6 beers/day during the weekdays and 12 beers/day on weekends.       Family History  "  Problem Relation Age of Onset     Skin Cancer Sister          Allergies   Allergen Reactions     Acetaminophen      Naproxen      Recent Labs   Lab Test 07/11/19  0652 07/10/19 07/09/19 05/01/19  0915  05/01/17  0831   LDL  --   --   --   --   --  43   HDL  --   --   --   --   --  52   TRIG  --   --   --   --   --  41   ALT 22  --  34 49   < > 70   CR 0.83 0.94  --  0.83   < > 0.95   GFRESTIMATED >90 >60  --  >90   < > 81   GFRESTBLACK >90 >60  --  >90   < > >90  African American GFR Calc     POTASSIUM 3.9 4.3  --  4.1   < > 4.0    < > = values in this interval not displayed.      BP Readings from Last 3 Encounters:   08/06/19 122/78   07/14/19 144/73   05/06/19 116/72    Wt Readings from Last 3 Encounters:   08/06/19 71.8 kg (158 lb 6.4 oz)   07/13/19 72.4 kg (159 lb 11.2 oz)   05/06/19 75.4 kg (166 lb 2 oz)                      Reviewed and updated as needed this visit by Provider         Review of Systems   ROS COMP: CONSTITUTIONAL: NEGATIVE for fever, chills, change in weight  INTEGUMENTARY/SKIN: NEGATIVE for worrisome rashes, moles or lesions  EYES: NEGATIVE for vision changes or irritation  ENT/MOUTH: NEGATIVE for ear, mouth and throat problems  RESP: NEGATIVE for significant cough or SOB  CV: NEGATIVE for chest pain, palpitations or peripheral edema  GI: NEGATIVE for hematemesis, hematochezia, jaundice and melena  : NEGATIVE for frequency, dysuria, or hematuria  MUSCULOSKELETAL: NEGATIVE for significant arthralgias or myalgia  NEURO: NEGATIVE for weakness, dizziness or paresthesias  ENDOCRINE: NEGATIVE for temperature intolerance, skin/hair changes  HEME: NEGATIVE for bleeding problems  PSYCHIATRIC: NEGATIVE for changes in mood or affect      Objective    /78 (BP Location: Right arm, Patient Position: Sitting, Cuff Size: Adult Large)   Pulse 64   Temp 98.2  F (36.8  C) (Oral)   Resp 16   Ht 1.727 m (5' 8\")   Wt 71.8 kg (158 lb 6.4 oz)   SpO2 97%   BMI 24.08 kg/m    Physical Exam   GENERAL: " "healthy, alert and no distress  EYES: Eyes grossly normal to inspection  HENT: normal cephalic/atraumatic and oral mucous membranes moist  RESP: lungs clear to auscultation - no rales, rhonchi or wheezes  CV: regular rate and rhythm, normal S1 S2, no S3 or S4, no murmur, click or rub, no peripheral edema and peripheral pulses strong  ABDOMEN: Distended, tympanic on percussion with hyperactive bowel sounds.  MS: no gross musculoskeletal defects noted, no edema  SKIN: no suspicious lesions or rashes  NEURO: Normal strength and tone, mentation intact and speech normal  PSYCH: mentation appears normal, affect normal/bright    Diagnostic Test Results:  Results for orders placed or performed in visit on 08/06/19   XR Abdomen 2 Views    Narrative    Examination:  XR ABDOMEN 2 VW    Date:  8/6/2019 4:55 PM     Clinical Information: Abdominal distention; Colon stricture.    Additional Information: none    Comparison: Colonoscopy 7/12/2019.    Findings:     Gaseous distention of the colon with air-fluid level, nonspecific. No  definite colonic dilation. Cannot exclude partial obstruction. Normal  appearance of the small bowel loops. No free air. No abnormal  abdominal masses or calcifications. No significant osseous  abnormality. Vascular calcifications in the pelvis.      Impression    Impression:    1. Gaseous distention of the colon with air-fluid levels, cannot  exclude obstruction. No significant colonic dilation.    KRUNAL HALL MD           Assessment & Plan     1. Abdominal distention  Bowel obstruction cannot be ruled out. X-rays reviewed with Mr. Malik, and a \"cut-off\" sign is noted. Patient claims that he has flatus and denies any postprandial nausea/vomiting. I recommended to the patient to start \"liquid\" diet to avoid worsening abdominal distention. I also advised Mr. Malik to go ER if his condition worsens.  - XR Abdomen 2 Views    2. History of colon stricture.  History of colonic adenocarcinoma, " S/P sigmoidectomy with end colostomy last 2/2017.  CT of abdomen/pelvis last 7/9/2019 shows distal colonic obstruction, leading to transfer to [facility] where patient underwent  flexible sigmoidoscopy and dilatation for colonic stricture last 7/12/2019.  - XR Abdomen 2 Views       FUTURE APPOINTMENTS:       - Follow-up visit in 7 days or earlier as needed.      Moreno Harris MD  [facility]

## 2019-08-06 NOTE — PATIENT INSTRUCTIONS
At [facility], we strive to deliver an exceptional experience to you, every time we see you.  If you receive a survey in the mail, please send us back your thoughts. We really do value your feedback.    Based on your medical history, these are the current health maintenance/preventive care services that you are due for (some may have been done at this visit.)  Health Maintenance Due   Topic Date Due     PREVENTIVE CARE VISIT  1960     HIV SCREENING  08/19/1975     ZOSTER IMMUNIZATION (1 of 2) 08/19/2010     PHQ-2  01/01/2019         Suggested websites for health information:  Www.GoCoin.Zadspace : Up to date and easily searchable information on multiple topics.  Www.medlineplus.gov : medication info, interactive tutorials, watch real surgeries online  Www.familydoctor.org : good info from the Academy of Family Physicians  Www.cdc.gov : public health info, travel advisories, epidemics (H1N1)  Www.aap.org : children's health info, normal development, vaccinations  Www.health.[facility].[place].us : [place] dept of health, public health issues in [place], N1N1    Your care team:                            Family Medicine Internal Medicine   MD Moreno Liu MD Shantel Branch-Fleming, MD Katya Georgiev PA-C Nam Ho, MD Pediatrics   ALTHEA De La Rosa, MD Rosa Lehman CNP, MD Deborah Mielke, MD Kim Thein, APRN CNP      Clinic hours: Monday - Thursday 7 am-7 pm; Fridays 7 am-5 pm.   Urgent care: Monday - Friday 11 am-9 pm; Saturday and Sunday 9 am-5 pm.  Pharmacy : Monday -Thursday 8 am-8 pm; Friday 8 am-6 pm; Saturday and Sunday 9 am-5 pm.     Clinic: [phone]   Pharmacy: [phone]

## 2019-08-09 NOTE — TELEPHONE ENCOUNTER
Patient taking any blood thinners ? No     Heart disease ? Denies     Lung disease ? Denies       Sleep apnea ? Denies     Diabetic ? Denies     Kidney disease ? Denies     Electronic implanted medical devices ? Denies     Are you taking any narcotic pain medication ?    No     PTSD ? N/a     Prep instructions reviewed with patient ? Instructions, policy,MAC sedation plan reviewed. Advised patient to have someone stay with them post exam.    Pharmacy : N/a    Indication for procedure :Colon cancer (H) [C18.9    Referring provider :MICHAELA AGUILAR     Arrival Time : 2 PM     : Daughter

## 2019-08-16 NOTE — PROGRESS NOTES
Patient was called to assess how he is doing after his colonoscopy and to schedule his PET and labs. The direct phone number was left in a voicemail with a request for the patient to call back at his earliest convenience.     AKIRA Bhatt Care Coordinator  [facility]  Phone: [phone]

## 2019-08-19 NOTE — PROGRESS NOTES
Pt called back and instructed to obtain labs and PET/CT. He will go to lab today in  and is scheduled for PET/CT on 8/26 at 1:45 check in time in MRI/CT waiting room on 2nd floor [facility].

## 2019-08-20 NOTE — PATIENT INSTRUCTIONS
At [facility], we strive to deliver an exceptional experience to you, every time we see you.  If you receive a survey in the mail, please send us back your thoughts. We really do value your feedback.    Based on your medical history, these are the current health maintenance/preventive care services that you are due for (some may have been done at this visit.)  Health Maintenance Due   Topic Date Due     PREVENTIVE CARE VISIT  1960     HIV SCREENING  08/19/1975     ZOSTER IMMUNIZATION (1 of 2) 08/19/2010         Suggested websites for health information:  Www.FootballScout.org : Up to date and easily searchable information on multiple topics.  Www.Relevvant.gov : medication info, interactive tutorials, watch real surgeries online  Www.familydoctor.org : good info from the Academy of Family Physicians  Www.cdc.gov : public health info, travel advisories, epidemics (H1N1)  Www.aap.org : children's health info, normal development, vaccinations  Www.health.[facility].us : [place] dept of health, public health issues in [place], N1N1    Your care team:                            Family Medicine Internal Medicine   MD Moreno Liu MD Shantel Branch-Fleming, MD Katya Georgiev PA-C Nam Ho, MD Pediatrics   ALTHEA De La Rosa, MD Rosa Lehman CNP, MD Deborah Mielke, MD Kim Thein, APRN CNP      Clinic hours: Monday - Thursday 7 am-7 pm; Fridays 7 am-5 pm.   Urgent care: Monday - Friday 11 am-9 pm; Saturday and Sunday 9 am-5 pm.  Pharmacy : Monday -Thursday 8 am-8 pm; Friday 8 am-6 pm; Saturday and Sunday 9 am-5 pm.     Clinic: [phone]   Pharmacy: [phone]

## 2019-08-20 NOTE — PROGRESS NOTES
Subjective     Rian Malik is a 59 year old male who presents to clinic today for the following health issues:    HPI     Follow up after colonoscopy, complete some labs ordered by GI.        Patient Active Problem List   Diagnosis     Hepatitis C virus infection     CARDIOVASCULAR SCREENING; LDL GOAL LESS THAN 160     Chronic hepatitis C without hepatic coma (H)     Left inguinal pain     Abscess of sigmoid colon     Maldonado's esophagus determined by endoscopy     Adenocarcinoma of sigmoid colon (H)     Colon cancer (H)     Bowel obstruction (H)     Past Surgical History:   Procedure Laterality Date     COLONOSCOPY N/A 8/4/2017    Procedure: COMBINED COLONOSCOPY, SINGLE OR MULTIPLE BIOPSY/POLYPECTOMY BY BIOPSY;  Colon & EGD;  Surgeon: Cristobal Blanco MD;  Location: [facility]     COLOSTOMY Left 10/11/2017     COMBINED CYSTOSCOPY, INSERT STENT URETER(S) Bilateral 9/12/2018    Procedure: COMBINED CYSTOSCOPY, INSERT STENT URETER(S);  Bilateral Cystoscopy With Temporary Stent Placement, Laparoscopic Hand Assisted Takedown Colostomy, Appendectomy, Mobilization of Splenic Flexure, Flexible Sigmoidoscopy, Endoscopic Clipping, Laparoscopic  Assisted Microablation of Liver Tumor, Intra-Op Ultrasound;  Surgeon: Hunter Quintero MD;  [facility]     CYSTOSCOPY AND LEFT STENT PLACEMENT  10/11/2017    Dr. Reyna     ESOPHAGOSCOPY, GASTROSCOPY, DUODENOSCOPY (EGD), COMBINED N/A 8/4/2017    Procedure: COMBINED ESOPHAGOSCOPY, GASTROSCOPY, DUODENOSCOPY (EGD), BIOPSY SINGLE OR MULTIPLE;;  Surgeon: Cristobal Blanco MD;  Location: [facility]     HERNIA REPAIR Left 1988    Left inguinal     LAPAROSCOPIC ASSISTED COLOSTOMY TAKEDOWN N/A 9/12/2018    Procedure: LAPAROSCOPIC ASSISTED COLOSTOMY TAKEDOWN;;  Surgeon: Herminio Oswald MD;  [facility]     LAPAROSCOPIC CHOLECYSTECTOMY N/A 9/12/2018    Procedure: LAPAROSCOPIC CHOLECYSTECTOMY;;  Surgeon: Saqib Hale MD;  [facility]     LAPAROSCOPIC CONVERTED TO OPEN  SIGMOIDECTOMY WITH END COLOSTOMY  10/11/2017    Dr. Schneider     LAPAROSCOPIC HAND ASSISTED HEPATECTOMY PARTIAL N/A 2018    Procedure: LAPAROSCOPIC HAND ASSISTED HEPATECTOMY PARTIAL;;  Surgeon: Saqib Hale MD;  [facility]     SIGMOIDOSCOPY FLEXIBLE N/A 2018    Procedure: SIGMOIDOSCOPY FLEXIBLE;;  Surgeon: Herminio Oswald MD;  [facility]     SIGMOIDOSCOPY FLEXIBLE N/A 2019    Procedure: Flexible Sigmoidoscopy With Balloon Dilation;  Surgeon: Hunter Carmona MD;  [facility]       Social History     Tobacco Use     Smoking status: Former Smoker     Packs/day: 0.10     Years: 3.00     Pack years: 0.30     Types: Cigarettes     Start date: 2014     Last attempt to quit: 10/6/2017     Years since quittin.8     Smokeless tobacco: Never Used     Tobacco comment:     Substance Use Topics     Alcohol use: No     Comment: Sober since .  Drank 6 beers/day during the weekdays and 12 beers/day on weekends.       Family History   Problem Relation Age of Onset     Skin Cancer Sister          Allergies   Allergen Reactions     Acetaminophen      Naproxen      Recent Labs   Lab Test 19  1705 19  0652  19  0831   LDL  --   --   --   --   --  43   HDL  --   --   --   --   --  52   TRIG  --   --   --   --   --  41   ALT 29 22  --  34   < > 70   CR 0.93 0.83   < >  --    < > 0.95   GFRESTIMATED 90 >90   < >  --    < > 81   GFRESTBLACK >90 >90   < >  --    < > >90   GFR Calc     POTASSIUM 4.5 3.9   < >  --    < > 4.0    < > = values in this interval not displayed.      BP Readings from Last 3 Encounters:   19 127/81   19 122/78   19 144/73    Wt Readings from Last 3 Encounters:   19 72.1 kg (159 lb)   19 71.8 kg (158 lb 6.4 oz)   19 72.4 kg (159 lb 11.2 oz)                      Reviewed and updated as needed this visit by Provider         Review of Systems   ROS COMP: CONSTITUTIONAL: NEGATIVE for  "fever, chills, change in weight  INTEGUMENTARY/SKIN: NEGATIVE for worrisome rashes, moles or lesions  EYES: NEGATIVE for vision changes or irritation  ENT/MOUTH: NEGATIVE for ear, mouth and throat problems  RESP: NEGATIVE for significant cough or SOB  CV: NEGATIVE for chest pain, palpitations or peripheral edema  GI: NEGATIVE for nausea, abdominal pain, heartburn, or change in bowel habits  : NEGATIVE for frequency, dysuria, or hematuria  MUSCULOSKELETAL: NEGATIVE for significant arthralgias or myalgia  NEURO: NEGATIVE for weakness, dizziness or paresthesias  ENDOCRINE: NEGATIVE for temperature intolerance, skin/hair changes  HEME: NEGATIVE for bleeding problems  PSYCHIATRIC: NEGATIVE for changes in mood or affect      Objective    /81 (BP Location: Left arm, Patient Position: Sitting, Cuff Size: Adult Large)   Pulse 76   Temp 97.8  F (36.6  C) (Oral)   Resp 12   Ht 1.727 m (5' 8\")   Wt 72.1 kg (159 lb)   SpO2 98%   BMI 24.18 kg/m    Body mass index is 24.18 kg/m .  Physical Exam   GENERAL: healthy, alert and no distress  EYES: Eyes grossly normal to inspection, PERRL and conjunctivae and sclerae normal  HENT: ear canals and TM's normal, nose and mouth without ulcers or lesions  NECK: no adenopathy, no asymmetry, masses, or scars and thyroid normal to palpation  RESP: lungs clear to auscultation - no rales, rhonchi or wheezes  CV: regular rate and rhythm, normal S1 S2, no S3 or S4, no murmur, click or rub, no peripheral edema and peripheral pulses strong  ABDOMEN: soft, nontender, no hepatosplenomegaly, no masses and bowel sounds normal  MS: no gross musculoskeletal defects noted, no edema  SKIN: no suspicious lesions or rashes  NEURO: Normal strength and tone, mentation intact and speech normal  PSYCH: mentation appears normal, affect normal/bright    Diagnostic Test Results:  Results for orders placed or performed in visit on 08/20/19   Prealbumin   Result Value Ref Range    Prealbumin 19 15 - 45 " mg/dL   Comprehensive metabolic panel   Result Value Ref Range    Sodium 137 133 - 144 mmol/L    Potassium 4.5 3.4 - 5.3 mmol/L    Chloride 103 94 - 109 mmol/L    Carbon Dioxide 29 20 - 32 mmol/L    Anion Gap 5 3 - 14 mmol/L    Glucose 92 70 - 99 mg/dL    Urea Nitrogen 19 7 - 30 mg/dL    Creatinine 0.93 0.66 - 1.25 mg/dL    GFR Estimate 90 >60 mL/min/[1.73_m2]    GFR Estimate If Black >90 >60 mL/min/[1.73_m2]    Calcium 9.0 8.5 - 10.1 mg/dL    Bilirubin Total 0.5 0.2 - 1.3 mg/dL    Albumin 4.2 3.4 - 5.0 g/dL    Protein Total 7.2 6.8 - 8.8 g/dL    Alkaline Phosphatase 89 40 - 150 U/L    ALT 29 0 - 70 U/L    AST 16 0 - 45 U/L   CEA   Result Value Ref Range    CEA 2.7 (H) 0 - 2.5 ug/L   CBC with platelets differential   Result Value Ref Range    WBC 4.8 4.0 - 11.0 10e9/L    RBC Count 4.46 4.4 - 5.9 10e12/L    Hemoglobin 14.3 13.3 - 17.7 g/dL    Hematocrit 40.0 40.0 - 53.0 %    MCV 90 78 - 100 fl    MCH 32.1 26.5 - 33.0 pg    MCHC 35.8 31.5 - 36.5 g/dL    RDW 14.1 10.0 - 15.0 %    Platelet Count 75 (L) 150 - 450 10e9/L    % Neutrophils 60.0 %    % Lymphocytes 30.0 %    % Monocytes 8.0 %    % Eosinophils 2.0 %    Absolute Neutrophil 2.9 1.6 - 8.3 10e9/L    Absolute Lymphocytes 1.4 0.8 - 5.3 10e9/L    Absolute Monocytes 0.4 0.0 - 1.3 10e9/L    Absolute Eosinophils 0.1 0.0 - 0.7 10e9/L    Reactive Lymphs Present     RBC Morphology Normal     Platelet Estimate       Automated count confirmed.  Platelet morphology is normal.    Diff Method Automated Method    ABO/Rh type and screen   Result Value Ref Range    ABO O     RH(D) Pos     Antibody Screen Neg     Test Valid Only At [facility]        Specimen Expires 08/23/2019            Assessment & Plan     1. Malignant neoplasm of colon, unspecified part of colon (H)    - Prealbumin  - Comprehensive metabolic panel  - CEA  - CBC with platelets differential  - ABO/Rh type and screen       FUTURE APPOINTMENTS:       - Follow-up visit as needed.    No follow-ups on  file.    Moreno Harris MD  [facility]

## 2019-09-10 NOTE — PROGRESS NOTES
[facility]:  Care Coordination Note    Order for Foundation [employer] testing submitted via the [facility] online portal, at Dr. Yousif's request, for pathology case #[id number] (collected 10/11/2017, [facility]).  Pathology report, patient demographics/insurance information, and Patient Financial Assistance Application (Foundation Access) faxed to [facility] team at fax number [phone].    Writer will follow status of testing to watch for results to arrive.  Patient will follow-up with Dr. Yousif in clinic to discuss final testing results.     Keenan Clarke, RN, BSN, OCN  Oncology Care Coordinator  [facility]

## 2019-09-10 NOTE — PROGRESS NOTES
Visit Date:   09/10/2019      HISTORY OF PRESENT ILLNESS:  Rian Malik is a 59-year-old gentleman who presents to [facility] to establish care with myself after the recent departure of Dr. Padilla Last, his primary oncologist.  The patient's history dates back to 02/2017 when he developed intermittent cramping and lower abdominal pain.  A CT was performed which showed a sigmoid abscess.  He underwent resection of sigmoid abscess on 10/11/2017.  Intraoperatively he was noted to have a large firmly adherent sigmoid colon mass.  Laparoscopic surgery was converted to open sigmoidectomy.  Surgical pathology showed a PT4 N1c cancer.  The patient's case was discussed at Colorectal Tumor Board and it was recommended to proceed with systemic chemotherapy with plans for further surgery.  The patient was initiated on FOLFOX every 2 weeks for 6 months in 11/2017.  In 07/2018 imaging after completion of adjuvant chemotherapy showed ill-defined 16 mm segment 5 liver lesions suspicious for metastatic disease.  MRI showed a 1.8 cm liver lesion.  In 09/2018 patient underwent laparoscopic liver ablation with intraoperative ultrasound, colostomy takedown, appendectomy, descending colectomy and anterior dissection.  Surgical pathology showed periappendiceal tissue, ascending colon margin/mesenteric pericolonic fibroadipose tissue involved by signet ring adenocarcinoma.  In 10/2018 the patient was started on FOLFIRI with omission of bolus 5-FU.  From the notes, it does not state the patient received Avastin.  In 01/2019 PET scan was negative and was planned for surveillance.  Most recently patient was under the care of Dr. Herminio Oswald where he was found to have recurrence of disease.  A flexible sigmoidoscopy from 07/2019 showed dilated colon proximal with approximately 8 cm region of narrowing with normal lining mucosa.  No discrete anastomotic stricture identified.  May indicate  "extrinsic compression.  A colonoscopy was performed 08/15.  Unfortunately, I cannot see the results, but appears from the notes the patient had a mass-like effect in the ileocecum.  He eventually had a PET scan 08/26/2019 which showed ileocecal valve mass-like enhancement, moderate FDG uptake with upstream marked colon dilatation corresponding to recent colonoscopy from 08/15/2019.  Findings are suspicious for malignancy, though there is no malignancy identified on pathology report.  No pneumatosis or portal venous gas.  No evidence of remote metastatic lesion in the neck, chest, abdomen, or pelvis.  No hepatic lesion.      On today's visit, the patient reports that he is passing gas, but his stools are liquid.  He is using Imodium if needed.  He has also been noticing increasing abdominal distention.  He does not have any pain, no nausea, vomiting, fevers, chills, or chest pain.  No new palpable masses.  Remaining comprehensive review of systems is negative.      PAST MEDICAL HISTORY:   1.  Colon cancer, see above.   2.  Hepatitis C.   3.  Cirrhosis.   4.  Maldonado's esophagus.      ALLERGIES:  ACETAMINOPHEN AND NAPROXEN ARE LISTED, BUT PER PATIENT, THIS WAS PUT ON BY HIS DAUGHTER BECAUSE HE WAS NOT SUPPOSED TO TAKE IT BECAUSE OF HIS LIVER DISEASE.      SOCIAL HISTORY:  , has a son and a daughter.  Works as a printer.  Denies tobacco use.      PHYSICAL EXAMINATION  BP (!) 143/81 (BP Location: Left arm)   Pulse 68   Temp 97.9  F (36.6  C) (Oral)   Resp 16   Ht 1.727 m (5' 7.99\")   Wt 73.6 kg (162 lb 3.2 oz)   SpO2 96%   BMI 24.67 kg/m     GENERAL:  Thin, frail appearing.   HEENT:  Atraumatic, normocephalic.  Pupils equal, round, reactive.  Sclerae anicteric.  Oropharynx:  Moist mucous membranes.  No lesions, ulcers or exudates.   NECK:  Supple, full range of motion.  Trachea midline.   HEART:  Regular rate and rhythm, normal S1, S2.  No murmurs or gallop.  No edema.   LUNGS:  Clear to auscultation " bilaterally.  No crackles or wheezes.  Normal respiratory effort.   GASTROINTESTINAL:  Abdomen is distended.  Tenderness to palpation. Positive BS   EXTREMITIES:  No cyanosis.  Warm.   MUSCULOSKELETAL:  No point tenderness.   LYMPH NODES:  No cervical, supraclavicular or axillary nodes palpable.   SKIN:  No petechiae or rashes. Nonicteric.  NEUROLOGIC:  Alert and oriented.      LABORATORY DATA:  Sodium 140, potassium 3.6, creatinine 0.8, alkaline phosphatase 76, ALT 26, AST 22.  WBC 4.9, hemoglobin 13.4, hematocrit 37.8, platelet 87, ANC 3.4.      IMAGING:  As stated in the HPI.      Pathology from 08/15/2019 from colonoscopy did not show evidence of malignancy.  Outside pathology review from 11/2017 showed poorly differentiated adenocarcinoma with signet ring cell features.  Tumor size was 5 cm.  It was present on serosal surface and microscopically perforates the serosal surface.  Surgical margins showed involvement.  Tumor cells positive for CK20 and CDX2, while negative for CK7.  NGS sequencing was negative for BRAF and NRAF.  KRAS status unknown.      ASSESSMENT AND PLAN:     1.  Rian Malik is a 59-year-old gentleman with known metastatic colon cancer.  I discussed at length with the patient regarding his diagnosis, prognosis and treatment options.  I explained the patient's cancer is not curable.  Goal is to prolong his life with a good quality of life.  In reviewing the notes, the patient has received FOLFOX and FOLFIRI, but does not report being on any form of targeted therapy.  I cannot find the KRAS status.  The patient clearly will require some sort of systemic therapy, but I do feel we need more information regarding molecular status to decide next steps.   2.  Near colon obstruction.  The patient is having liquid stools.  He is symptomatic.  The abdomen is distended.  Before starting systemic therapy, I do feel this should be addressed.  I am concerned that if we wait and start systemic therapy his  counts will drop and this will be more problematic. The patient's case was discussed with Dr. Herminio Oswald, who is willing to present his case at this coming Monday's rectal conference.  Depending on consensus, hopefully can proceed with some form of palliative local care.     3.  History of hepatitis/cirrhosis:  Completed antiviral treatment with HCV viral load undetectable, per Dr. Last's notes.   4.  Thrombocytopenia and leukopenia:  Secondary to cirrhosis and hypersplenism.  Continue to follow labs.   5.  Molecular diagnostics:  We will review chart to see what testing has been performed.  If not, we will send out for Foundation One testing.      The patient is new to me and his records have been extensively reviewed.  Case was discussed with Dr. Herminio Oswald.      Our office or Dr. Oswald's office will contact the patient with recommendations from the Tumor Conference and how to proceed.  The patient has verbalized understanding and agreeable with the plan.         WILIAN NG MD    Above note accurate for diagnosis, plan and orders placed. Epic/EMR orders and data may not be accurate because of available options, information not entered correctly/updated by staff other than writer or interface issues. All data entered by writer with the intent patient care not be compromised nor patient be unnecessarily billed     D: 09/10/2019   T: 09/10/2019   MT: KIM      Name:     TRINY SIMMONS   MRN:      [id number]        Account:      [id number]   :      1960           Visit Date:   09/10/2019      Document: [id number]       cc: Moreno Harris MD

## 2019-09-10 NOTE — PROGRESS NOTES
"Patient's name and  were verified.  See Doc Flowsheet - IV assess for details.    IVAD accessed with 20G 3/4\" lares gripper plus needle  Blood return positive: Yes  Site without redness, tenderness or swelling: Yes  Flushed with 30cc NS and 5cc 100u/ml heparin  Needle: deaccessed after lab draw.     Comments: Labs drawn.  Tubes drawn: red, green, purple.  Patient tolerated procedure without incident.     Nazanin Sequeira RN BSN OCN      "

## 2019-09-10 NOTE — NURSING NOTE
"Oncology Rooming Note    September 10, 2019 12:54 PM   Rian Malik is a 59 year old male who presents for:    Chief Complaint   Patient presents with     Oncology Clinic Visit     4 month follow up     Initial Vitals: BP (!) 143/81 (BP Location: Left arm)   Pulse 68   Temp 97.9  F (36.6  C) (Oral)   Resp 16   Ht 1.727 m (5' 7.99\")   Wt 73.6 kg (162 lb 3.2 oz)   SpO2 96%   BMI 24.67 kg/m   Estimated body mass index is 24.67 kg/m  as calculated from the following:    Height as of this encounter: 1.727 m (5' 7.99\").    Weight as of this encounter: 73.6 kg (162 lb 3.2 oz). Body surface area is 1.88 meters squared.  No Pain (0) Comment: Data Unavailable   No LMP for male patient.  Allergies reviewed: Yes  Medications reviewed: Yes    Medications: MEDICATION REFILLS NEEDED TODAY. Provider was notified.  Pharmacy name entered into [employer] [facility]:    [facility] - [place], [place] - [address]  [facility] - [place], [place] - [address]            "

## 2019-09-10 NOTE — PROGRESS NOTES
[facility]:  Care Coordination Note    RN met with patient in clinic, following his appointment with Dr. Yousif this afternoon.  Discussed plan for Foundation One testing.  Provided patient with informational brochure for Foundation One testing, and also assisted patient with completing Patient Financial Assistance Application while in clinic.  Informed patient that the results can take approximately 2-3 weeks to come in.      Also reviewed with patient the plan to have his case discussed at the upcoming Colorectal Tumor Conference, and that patient will be notified with a plan.  Per patient, he is not always able to answer his cell phone during the day as he works, but is ok with receiving [employer] messages (or ok to leave detailed message on his cell phone number).      Patient will plan to follow-up with Dr. Yousif once we have received the results from his Foundation One testing, and once we have heard back from the Colorectal team regarding consensus.  Patient verbalizes understanding, and is in agreement with this plan.  Encouraged him to call us with any questions or concerns.  Provided patient with contact information for our clinic, including phone numbers for scheduling team, nurse triage line, and direct phone number for this RN.     Keenan Clarke, RN, BSN, OCN  Oncology Care Coordinator  [facility]

## 2019-09-10 NOTE — LETTER
9/10/2019         RE: Rian Malik  [address] [place] [address]        Dear Colleague,    Thank you for referring your patient, Rian Malik, to the [facility]. Please see a copy of my visit note below.    Visit Date:   09/10/2019      HISTORY OF PRESENT ILLNESS:  Rian Malik is a 59-year-old gentleman who presents to [facility] to establish care with myself after the recent departure of Dr. Padilla Last, his primary oncologist.  The patient's history dates back to 02/2017 when he developed intermittent cramping and lower abdominal pain.  A CT was performed which showed a sigmoid abscess.  He underwent resection of sigmoid abscess on 10/11/2017.  Intraoperatively he was noted to have a large firmly adherent sigmoid colon mass.  Laparoscopic surgery was converted to open sigmoidectomy.  Surgical pathology showed a PT4 N1c cancer.  The patient's case was discussed at Colorectal Tumor Board and it was recommended to proceed with systemic chemotherapy with plans for further surgery.  The patient was initiated on FOLFOX every 2 weeks for 6 months in 11/2017.  In 07/2018 imaging after completion of adjuvant chemotherapy showed ill-defined 16 mm segment 5 liver lesions suspicious for metastatic disease.  MRI showed a 1.8 cm liver lesion.  In 09/2018 patient underwent laparoscopic liver ablation with intraoperative ultrasound, colostomy takedown, appendectomy, descending colectomy and anterior dissection.  Surgical pathology showed periappendiceal tissue, ascending colon margin/mesenteric pericolonic fibroadipose tissue involved by signet ring adenocarcinoma.  In 10/2018 the patient was started on FOLFIRI with omission of bolus 5-FU.  From the notes, it does not state the patient received Avastin.  In 01/2019 PET scan was negative and was planned for surveillance.  Most recently patient was under the care of Dr. Herminio Oswald where  "he was found to have recurrence of disease.  A flexible sigmoidoscopy from 07/2019 showed dilated colon proximal with approximately 8 cm region of narrowing with normal lining mucosa.  No discrete anastomotic stricture identified.  May indicate extrinsic compression.  A colonoscopy was performed 08/15.  Unfortunately, I cannot see the results, but appears from the notes the patient had a mass-like effect in the ileocecum.  He eventually had a PET scan 08/26/2019 which showed ileocecal valve mass-like enhancement, moderate FDG uptake with upstream marked colon dilatation corresponding to recent colonoscopy from 08/15/2019.  Findings are suspicious for malignancy, though there is no malignancy identified on pathology report.  No pneumatosis or portal venous gas.  No evidence of remote metastatic lesion in the neck, chest, abdomen, or pelvis.  No hepatic lesion.      On today's visit, the patient reports that he is passing gas, but his stools are liquid.  He is using Imodium if needed.  He has also been noticing increasing abdominal distention.  He does not have any pain, no nausea, vomiting, fevers, chills, or chest pain.  No new palpable masses.  Remaining comprehensive review of systems is negative.      PAST MEDICAL HISTORY:   1.  Colon cancer, see above.   2.  Hepatitis C.   3.  Cirrhosis.   4.  Maldonado's esophagus.      ALLERGIES:  ACETAMINOPHEN AND NAPROXEN ARE LISTED, BUT PER PATIENT, THIS WAS PUT ON BY HIS DAUGHTER BECAUSE HE WAS NOT SUPPOSED TO TAKE IT BECAUSE OF HIS LIVER DISEASE.      SOCIAL HISTORY:  , has a son and a daughter.  Works as a printer.  Denies tobacco use.      PHYSICAL EXAMINATION  BP (!) 143/81 (BP Location: Left arm)   Pulse 68   Temp 97.9  F (36.6  C) (Oral)   Resp 16   Ht 1.727 m (5' 7.99\")   Wt 73.6 kg (162 lb 3.2 oz)   SpO2 96%   BMI 24.67 kg/m      GENERAL:  Thin, frail appearing.   HEENT:  Atraumatic, normocephalic.  Pupils equal, round, reactive.  Sclerae anicteric.  " Oropharynx:  Moist mucous membranes.  No lesions, ulcers or exudates.   NECK:  Supple, full range of motion.  Trachea midline.   HEART:  Regular rate and rhythm, normal S1, S2.  No murmurs or gallop.  No edema.   LUNGS:  Clear to auscultation bilaterally.  No crackles or wheezes.  Normal respiratory effort.   GASTROINTESTINAL:  Abdomen is distended.  Tenderness to palpation. Positive BS   EXTREMITIES:  No cyanosis.  Warm.   MUSCULOSKELETAL:  No point tenderness.   LYMPH NODES:  No cervical, supraclavicular or axillary nodes palpable.   SKIN:  No petechiae or rashes. Nonicteric.  NEUROLOGIC:  Alert and oriented.      LABORATORY DATA:  Sodium 140, potassium 3.6, creatinine 0.8, alkaline phosphatase 76, ALT 26, AST 22.  WBC 4.9, hemoglobin 13.4, hematocrit 37.8, platelet 87, ANC 3.4.      IMAGING:  As stated in the HPI.      Pathology from 08/15/2019 from colonoscopy did not show evidence of malignancy.  Outside pathology review from 11/2017 showed poorly differentiated adenocarcinoma with signet ring cell features.  Tumor size was 5 cm.  It was present on serosal surface and microscopically perforates the serosal surface.  Surgical margins showed involvement.  Tumor cells positive for CK20 and CDX2, while negative for CK7.  NGS sequencing was negative for BRAF and NRAF.  KRAS status unknown.      ASSESSMENT AND PLAN:     1.  Rian Malik is a 59-year-old gentleman with known metastatic colon cancer.  I discussed at length with the patient regarding his diagnosis, prognosis and treatment options.  I explained the patient's cancer is not curable.  Goal is to prolong his life with a good quality of life.  In reviewing the notes, the patient has received FOLFOX and FOLFIRI, but does not report being on any form of targeted therapy.  I cannot find the KRAS status.  The patient clearly will require some sort of systemic therapy, but I do feel we need more information regarding molecular status to decide next steps.   2.   Near colon obstruction.  The patient is having liquid stools.  He is symptomatic.  The abdomen is distended.  Before starting systemic therapy, I do feel this should be addressed.  I am concerned that if we wait and start systemic therapy his counts will drop and this will be more problematic. The patient's case was discussed with Dr. Herminio Oswald, who is willing to present his case at this coming Monday's rectal conference.  Depending on consensus, hopefully can proceed with some form of palliative local care.     3.  History of hepatitis/cirrhosis:  Completed antiviral treatment with HCV viral load undetectable, per Dr. Last's notes.   4.  Thrombocytopenia and leukopenia:  Secondary to cirrhosis and hypersplenism.  Continue to follow labs.   5.  Molecular diagnostics:  We will review chart to see what testing has been performed.  If not, we will send out for Foundation One testing.      The patient is new to me and his records have been extensively reviewed.  Case was discussed with Dr. Herminio Oswald.      Our office or Dr. Oswald's office will contact the patient with recommendations from the Tumor Conference and how to proceed.  The patient has verbalized understanding and agreeable with the plan.         WILIAN NG MD    Above note accurate for diagnosis, plan and orders placed. Epic/EMR orders and data may not be accurate because of available options, information not entered correctly/updated by staff other than writer or interface issues. All data entered by writer with the intent patient care not be compromised nor patient be unnecessarily billed     D: 09/10/2019   T: 09/10/2019   MT: KIM      Name:     TRINY SIMMONS   MRN:      [id number]        Account:      [id number]   :      1960           Visit Date:   09/10/2019      Document: [id number]       cc: Moreno Harris MD       Again, thank you for allowing me to participate in the care of your patient.         Sincerely,        Bernadette Yousif MD

## 2019-09-16 PROBLEM — K56.609 LARGE BOWEL OBSTRUCTION (H): Status: ACTIVE | Noted: 2019-01-01

## 2019-09-16 NOTE — PROGRESS NOTES
[facility]:  Care Coordination Note    Received voicemail message from patient today at 1:45 pm, stating that he has been experiencing severe abdominal pain, and that he is headed to the ER.  Reviewed [facility] records, which show that patient has checked into the [facility] ER for evaluation (around 2:10 pm today).  Per ER nursing notes, patient has had severe pain since this morning, and is noted to be pale/fatigued with a firm and distended abdomen.      Writer will continue to follow for any further updates on patient's status.  Routing note with update to Dr. Yousif and Colorectal team.     Keenan Clarke, RN, BSN, OCN  Oncology Care Coordinator  [facility]

## 2019-09-17 NOTE — H&P
COLORECTAL SURGERY ADMISSION HISTORY & PHYSICAL  Rian Malik MRN# [id number]   YOB: 1960 Age: 59 year old     Date of Admission: 09/16/19    CC: Large Bowel Obstruction    HPI: Rian Malik is a 59 year old year old male known to the colorectal surgery group due to his history of HepC cirrhosis (viral load from OSH undetectable), thrombocytopenia 2/2 cirrhosis & hypersplenism, and poorly differentiated adenocarcinoma colon  [id number]( liver lesion) s/p open sigmoidectomy with end colostomy (10/2017) and later underwent laparoscopic liver ablation with intraoperative ultrasound, colostomy takedown, appendectomy, descending colectomy and anterior dissection (9/2018) s/p flexible sigmoidoscopy and dilation of colonic stricture (7/10/2019) d/t colonic obstruction who presented to OSH with increasing abdominal pain.     Patient states that he woke up around 4am the day of admission to urinate and noticed some abdominal pain. The pain initially began as intermittent in the lower right side, described as crampy and non radiating. He took some Zofran which did not alleviate at pain.The pain increased and became constant 9-10/10 pain at which point he decided to go to the ED. He states that he has been consuming mostly liquid diet and had about 15 loose, watery bowel movements 2 days PTA which have since become clear. He does not believe he has had a bowel movement on the day of admission. He states that his belly has felt distended since his take-down surgery and he is not able to consume a lot PO as he quickly feels full. He denies nausea, vomiting, fever, chills, or changes in urination. He does endorse a sore throat since he feels he has not been drinking much water.     REVIEW OF SYSTEMS: The remainder of the complete ROS was negative unless noted in the HPI.     PAST MEDICAL HISTORY:   Past Medical History:   Diagnosis Date     Maldonado's esophagus      Cirrhosis (H)      Colon cancer (H) 10/11/2017     Poorly differentiated adenocarcinoma     Hepatitis C        PAST SURGICAL HISTORY:   Past Surgical History:   Procedure Laterality Date     COLONOSCOPY N/A 8/4/2017    Procedure: COMBINED COLONOSCOPY, SINGLE OR MULTIPLE BIOPSY/POLYPECTOMY BY BIOPSY;  Colon & EGD;  Surgeon: Cristobal Blanco MD;  Location: [facility]     COLOSTOMY Left 10/11/2017     COMBINED CYSTOSCOPY, INSERT STENT URETER(S) Bilateral 9/12/2018    Procedure: COMBINED CYSTOSCOPY, INSERT STENT URETER(S);  Bilateral Cystoscopy With Temporary Stent Placement, Laparoscopic Hand Assisted Takedown Colostomy, Appendectomy, Mobilization of Splenic Flexure, Flexible Sigmoidoscopy, Endoscopic Clipping, Laparoscopic  Assisted Microablation of Liver Tumor, Intra-Op Ultrasound;  Surgeon: Hunter Quintero MD;  Location: [facility]     CYSTOSCOPY AND LEFT STENT PLACEMENT  10/11/2017    Dr. Reyna     ESOPHAGOSCOPY, GASTROSCOPY, DUODENOSCOPY (EGD), COMBINED N/A 8/4/2017    Procedure: COMBINED ESOPHAGOSCOPY, GASTROSCOPY, DUODENOSCOPY (EGD), BIOPSY SINGLE OR MULTIPLE;;  Surgeon: Cristobal Blanco MD;  Location: [facility]     HERNIA REPAIR Left 1988    Left inguinal     LAPAROSCOPIC ASSISTED COLOSTOMY TAKEDOWN N/A 9/12/2018    Procedure: LAPAROSCOPIC ASSISTED COLOSTOMY TAKEDOWN;;  Surgeon: Herminio Oswald MD;  Location: [facility]     LAPAROSCOPIC CHOLECYSTECTOMY N/A 9/12/2018    Procedure: LAPAROSCOPIC CHOLECYSTECTOMY;;  Surgeon: Saqib Hale MD;  Location: [facility]     LAPAROSCOPIC CONVERTED TO OPEN SIGMOIDECTOMY WITH END COLOSTOMY  10/11/2017    Dr. Schneider     LAPAROSCOPIC HAND ASSISTED HEPATECTOMY PARTIAL N/A 9/12/2018    Procedure: LAPAROSCOPIC HAND ASSISTED HEPATECTOMY PARTIAL;;  Surgeon: Saqib Hale MD;  Location: [facility]     SIGMOIDOSCOPY FLEXIBLE N/A 9/12/2018    Procedure: SIGMOIDOSCOPY FLEXIBLE;;  Surgeon: Herminio Oswald MD;  Location: [facility]     SIGMOIDOSCOPY FLEXIBLE N/A 7/12/2019    Procedure: Flexible Sigmoidoscopy With Balloon Dilation;  Surgeon:  Hunter Carmona MD;  Location:  OR       ALLERGIES:    Allergies   Allergen Reactions     Acetaminophen      Naproxen        HOME MEDICATIONS:   Current Facility-Administered Medications on File Prior to Encounter:  heparin 100 UNIT/ML injection 500 Units     Current Outpatient Medications on File Prior to Encounter:  LORazepam (ATIVAN) 0.5 MG tablet Take 1 tablet (0.5 mg) by mouth every 4 hours as needed (Anxiety, Nausea/Vomiting or Sleep)   ondansetron (ZOFRAN) 8 MG tablet Take 1 tablet (8 mg) by mouth every 8 hours as needed (Nausea/Vomiting)   polyethylene glycol (MIRALAX/GLYCOLAX) packet Take 17 g by mouth daily   prochlorperazine (COMPAZINE) 10 MG tablet Take 1 tablet (10 mg) by mouth every 6 hours as needed (Nausea/Vomiting)   traMADol (ULTRAM) 50 MG tablet Take 1 tablet (50 mg) by mouth every 6 hours as needed for moderate pain       SOCIAL HISTORY:   Social History     Tobacco Use     Smoking status: Former Smoker     Packs/day: 0.10     Years: 3.00     Pack years: 0.30     Types: Cigarettes     Start date: 2014     Last attempt to quit: 10/6/2017     Years since quittin.9     Smokeless tobacco: Never Used     Tobacco comment:     Substance Use Topics     Alcohol use: No     Comment: Sober since .  Drank 6 beers/day during the weekdays and 12 beers/day on weekends.       Drug use: No       FAMILY HISTORY:   Family History   Problem Relation Age of Onset     Skin Cancer Sister        PHYSICAL EXAMINATION:  BP (!) 144/79   Pulse 83   Temp 98.3  F (36.8  C) (Oral)   Resp 14   SpO2 95%      General: NAD, awake and alert   CV: Non-cyanotic   Pulm: No increased work of breathing on room air   Abd: soft, distended, NTTP  : voiding appropriately  Extremities: WWP without edema  Neuro: No focal deficits noted, patient moves all extremities spontaneously    LABS:  Recent Labs   Lab 09/10/19  1227   WBC 4.9   RBC 4.24*   HGB 13.4   HCT 37.8*   MCV 89   MCH 31.6   MCHC 35.4   RDW 14.0   PLT  87*       Recent Labs   Lab 09/10/19  1227      POTASSIUM 3.6   CHLORIDE 105   CO2 29   BUN 14   CR 0.80   *   MAICOL 8.6       Recent Labs   Lab 09/10/19  1227   AST 22   ALT 26   ALKPHOS 76   BILITOTAL 1.0   ALBUMIN 4.0       IMAGING:    CT Abdomen and Pelvis with IV contrast 9/16/19  1.  Distal colonic obstruction at the rectosigmoid junction just above the anastomosis at the same site of prior obstruction 7/9/2019. Marked distention and edema associated with the colon. Localized thickening and mural enhancement at the ileocecal valve.    2.  There is ascites present. No free air or abscess appreciated.    3.  Splenomegaly.    4.  Cholelithiasis and distended gallbladder.          A/P: Rian Malik is a 59 year old male with a PMH of  poorly differentiated adenocarcinoma of the colon found to have distal colonic obstruction at the rectosigmoid junction    - Admit to CRS under Dr. Oswald  - NPO with NGT to LIS  - AM CBC, BMP  - MIVF LR at 100mL/hr  - IV Dilaudid 0.3-0.5 PRN    Discussed with CRS fellow Dr. Marcus Fontaine MD   General Surgery PGY1  [phone]

## 2019-09-17 NOTE — PLAN OF CARE
/67 (BP Location: Right arm)   Pulse 77   Temp 98.5  F (36.9  C) (Oral)   Resp 16   SpO2 96%     Pain managed with IV dilaudid and throat lozenges. Denies nausea. NG to LIS. NG has been having issues today. Intern sat at bedside for about an hour and a half irrigating the NG. Output from NG is very think and mucous-like. Tube is getting clogged easily. No BM. Passing gas. No voids. Bladder scan was 86. Talked to Zulay PEREZ about situation. Waiting to hear back from attending if they want a bolus. Pt has abd ascites, so dont want to bolus him too much. Abd distended. NPO. Up with SBA.     Addendum: voided and had a large BM

## 2019-09-17 NOTE — PLAN OF CARE
A x O, VSS on Ra. NPO. NG to LIS. PRN Dilaudid and throat lozenges for pain. Denies n/v. Voiding spontaneously. +Flatus, Loose BM x 2. Resting between cares. Continue POC.

## 2019-09-17 NOTE — PROGRESS NOTES
Admit from [place] w/ [facility] of colon cancer and previous bowel obstruction. Alert, sleepy upon arrival from transport. BP (!) 144/79   Pulse 83   Temp 98.3  F (36.8  C) (Oral)   Resp 14   SpO2 95% , room air. Reports pain in bilateral abdomen. Declines interventions. NPO. NG tube in R nare is on intermittent suction. Will give report to oncoming RN.

## 2019-09-17 NOTE — PROGRESS NOTES
Colorectal Surgery Progress Note    Subjective:  Feeling a little bit better since NGT place. Passed a little gas and little stool this AM.  Need to continue discussing options.     Vitals:  Vitals:    09/16/19 2230 09/17/19 0737   BP: (!) 144/79 111/67   BP Location:  Right arm   Pulse: 83 77   Resp: 14 16   Temp: 98.3  F (36.8  C) 98.5  F (36.9  C)   TempSrc: Oral Oral   SpO2: 95% 96%     I/O:  I/O last 3 completed shifts:  In: 630 [I.V.:630]  Out: 200 [Urine:100; Stool:100]    Physical Exam:  Gen: AAOx3, NAD  Pulm: Non-labored breathing  Abd: Soft, moderately distended, mildly tender, no guarding/rebound   Mass felt in LLQ  Ext:  Warm and well-perfused    BMP  Recent Labs   Lab 09/17/19  0641 09/10/19  1227    140   POTASSIUM 3.8 3.6   CHLORIDE 110* 105   CO2 24 29   BUN 18 14   CR 0.80 0.80   * 135*     CBC  Recent Labs   Lab 09/17/19  0641 09/10/19  1227   WBC 3.2* 4.9   HGB 13.9 13.4   HCT 40.8 37.8*   * 87*         ASSESSMENT: This is a 59 year old male with Hep C cirrhosis, and poorly differentiated colonic adenocarcinoma with liver mets s/p open sigmoidectomy with end colostomy (10/2017) followed by lap liver ablation with intraop US, colostomy take down, appendectomy,descending colectomy and anterior dissection (9/2018) s/p flex sig and dilatation of colonic stricture (7/2019).  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        Neuro/Pain: dilaudid prn  CV: CORNEL  PULM: encourage IS.  GI/FEN:   - NPO  - NGT  - IVF @ 100  - options: no intervention vs re-dilate vs ileostomy w/ mucus fistula.   - should pt choose surgical intervention will need Internal Medicine pre-op assessment, T&S, EKG, WOCN to magi.   : CORNEL  Heme: CORNEL  ID: CORNEL  Endocrine: CORNEL    Activity: as tolerated.  Ppx: protonixx  Dispo: [facility]  and Rectal Surgery  [id number]     Patient was seen and discussed with Dr. Oswald.  Please refer to Consult Note attested by Dr. Oswald earlier today.

## 2019-09-18 NOTE — OR NURSING
[id number] spoke with dr lorenzana and leonel to send pt to floor. Sent page to dr kothari for transfer order.

## 2019-09-18 NOTE — PLAN OF CARE
Writer assumed care of patient from [id number].    AVSS on RA. A&O x4, ambulating independently. Abdomen distended/rounded. +BS/+passing flatus. No BM this shift. Nausea this AM relieved by PRN IV Zofran given 1x. NG tube to LIS w/ no output overnight-MD aware. Marginal UOP. PIV infusing IVMF. Abd pain adequately managed w/ PRN IV Dilaudid 0.5mg given 1x.     Ann Youssef RN on 9/18/2019 at 6:55 AM

## 2019-09-18 NOTE — OR NURSING
Capnography not available. Dispatch notified and hour ago, will transfer to floor without and nurse aware on floor to obtain capnography machine. melecio feng aware.

## 2019-09-18 NOTE — PLAN OF CARE
AVSS on RA. Pt taking prn IV dilaudid for abd pain management. Abd distended/rounded. +gas, +BS. Pt having BMs. NGT to LIS w/ thick, mucous/yellow output. NGT was getting clogged during day shift. Lozenge given for throat discomfort. Tolerated clamping for shower. Denies nausea. Strict NPO. Voiding spont. Up SBA to unplug and unhook from suction. Showered this PM. Port infusing MIVF. Plan is for surgery on the 19th. Cont to monitor pain managements and NGT output. Cont w/ POC.

## 2019-09-18 NOTE — PROGRESS NOTES
Colorectal Surgery Progress Note  09/18/2019       Subjective:  Had a loose stool last night, continues to feel slightly bloated. NGT clogged overnight. Decided to attempt stent/dilation of stricture yesterday.     Objective:  Temp:  [96.6  F (35.9  C)-98.8  F (37.1  C)] 98.1  F (36.7  C)  Pulse:  [68-82] 68  Resp:  [15-16] 15  BP: (126-139)/(71-76) 130/71  SpO2:  [92 %-97 %] 95 %    I/O last 3 completed shifts:  In: 2450 [I.V.:2420; NG/GT:30]  Out: 1025 [Urine:625; Emesis/NG output:400]      Gen: NAD, A&Ox3  Resp: NLB on RA  Abd: soft, moderately distended, minimally tender  Ext: Warm and well perfused     Labs:  Recent Labs   Lab 09/17/19  0641   WBC 3.2*   HGB 13.9   *       Recent Labs   Lab 09/17/19  0641      POTASSIUM 3.8   CHLORIDE 110*   CO2 24   BUN 18   CR 0.80   *   MAICOL 8.4*        Assessment/Plan:   59 year old male with history of poorly differentiated colon adenocarcinoma s/p open sigmoidectomy, end colostomy (10/2017) and then lap liver ablation for liver mets, colostomy take down, descending colectomy and anterior dissection (9/2018) s/p flex sig, dilation of colonic stricture (7/2019) now admitted for abdominal pain with recurrent partial distal colonic obstruction at anastomosis site and 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    -plan for interventional GI for stent/dilation of stricture today  -NPO, upsize NGT during procedure, IVF 100cc/hr  -IV dilaudid for pain  -lovenox, IV protonix for ppx     Seen, examined, and discussed with chief resident, who will discuss with staff.  - - - - - - - - - - - - - - - - - -  Rui Mcgowan MD  Colorectal Surgery PGY-1  [phone]

## 2019-09-18 NOTE — CONSULTS
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 We have been consulted for consideration of stent vs. Dilation of stricture (non-malignant), given that pt would like to avoid surgical intervention.        RECOMMENDATIONS    --NPO  --Pt is on OR schedule for this afternoon for stent vs. Dilation     Thank you for involving us in this patient's care. Please do not hesitate to contact the GI service with any questions or concerns.     Patient care plan discussed with Dr. Blanco, GI staff physician.    Velia Strong MD  Gastroenterology   PGY4   Pager [phone]            Chief Complaint:   We were asked by Dr. Oswald of Colorectal Surgery to evaluate this patient with large bowel obstruction for consideration of stent vs. Dilation.     History is obtained from the patient and the medical record.          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  Per CRS, options include: no intervention vs re-dilate vs ileostomy w/ mucus fistula.     2/2017: Developed intermittent cramping and lower abdominal pain.  A CT was performed which showed a sigmoid abscess.  He underwent resection of sigmoid abscess on 10/11/2017. Intraoperatively he was noted to have a large firmly adherent sigmoid colon mass.  Laparoscopic surgery was converted to open sigmoidectomy.  Surgical pathology showed a PT4 N1c cancer, s/p treatment w/ FOLFOX.     7/2018: imaging after completion of adjuvant chemotherapy showed ill-defined 16 mm segment 5 liver lesions suspicious for metastatic disease.  MRI showed a 1.8 cm liver lesion.      9/2018: laparoscopic liver ablation with intraoperative ultrasound, colostomy takedown, appendectomy, descending colectomy and anterior dissection.  Surgical pathology showed periappendiceal tissue, ascending colon margin/mesenteric pericolonic fibroadipose tissue involved by signet ring adenocarcinoma.    10/2018: started on FOLFIRI with omission of bolus 5-Follow-up    1/2019: PET scan was negative and was planned for surveillance.    7/2019: Flex Sig showed dilated colon proximal with approximately 8 cm region of narrowing with normal lining mucosa.  No discrete anastomotic stricture identified.  May indicate extrinsic compression.    8/15/19: Colonoscopy with  mass-like effect in the ileocecum     08/26/2019: PET scan showed ileocecal valve mass-like enhancement, moderate FDG uptake with upstream marked colon dilatation corresponding to recent colonoscopy from 08/15/2019    9/10/19: HemeOn visit, liquid stools using Immodium. Increasing abdominal distension.     9/16/19: Woke up at 4am and felt thirsty. Drank water, then later developed abdominal pain, located on lower right side, crampy. Due to progressive pain, he presented  "to [facility] ED. CT showed with contrast showed \"Distal colonic obstruction at the rectosigmoid junction just above the anastomosis at the same site of prior obstruction 7/9/2019. Marked distention and edema associated with the colon. Localized thickening and mural enhancement at the ileocecal valve.\"      This morning, pt feels better after NGT has been placed to LIS and also with pain meds. He denies fevers, chills, melena, hematochezia. He vomited at the ED, but has not had any further emesis since admission. He has passed some stool and gas since admission.           Past Medical History:   Reviewed and edited as appropriate  Past Medical History:   Diagnosis Date     Maldonado's esophagus      Cirrhosis (H)      Colon cancer (H) 10/11/2017    Poorly differentiated adenocarcinoma     Hepatitis C             Past Surgical History:   Reviewed and edited as appropriate   Past Surgical History:   Procedure Laterality Date     COLONOSCOPY N/A 8/4/2017    Procedure: COMBINED COLONOSCOPY, SINGLE OR MULTIPLE BIOPSY/POLYPECTOMY BY BIOPSY;  Colon & EGD;  Surgeon: Cristobal Blanco MD;  Location: [facility]     COLOSTOMY Left 10/11/2017     COMBINED CYSTOSCOPY, INSERT STENT URETER(S) Bilateral 9/12/2018    Procedure: COMBINED CYSTOSCOPY, INSERT STENT URETER(S);  Bilateral Cystoscopy With Temporary Stent Placement, Laparoscopic Hand Assisted Takedown Colostomy, Appendectomy, Mobilization of Splenic Flexure, Flexible Sigmoidoscopy, Endoscopic Clipping, Laparoscopic  Assisted Microablation of Liver Tumor, Intra-Op Ultrasound;  Surgeon: Hunter Quintero MD;  Location: [facility]     CYSTOSCOPY AND LEFT STENT PLACEMENT  10/11/2017    Dr. Reyna     ESOPHAGOSCOPY, GASTROSCOPY, DUODENOSCOPY (EGD), COMBINED N/A 8/4/2017    Procedure: COMBINED ESOPHAGOSCOPY, GASTROSCOPY, DUODENOSCOPY (EGD), BIOPSY SINGLE OR MULTIPLE;;  Surgeon: Cristobal Blanco MD;  Location: [facility]     HERNIA REPAIR Left 1988    Left inguinal     LAPAROSCOPIC ASSISTED " COLOSTOMY TAKEDOWN N/A 9/12/2018    Procedure: LAPAROSCOPIC ASSISTED COLOSTOMY TAKEDOWN;;  Surgeon: Herminio Oswald MD;  Location: [facility]     LAPAROSCOPIC CHOLECYSTECTOMY N/A 9/12/2018    Procedure: LAPAROSCOPIC CHOLECYSTECTOMY;;  Surgeon: Saqib Hale MD;  Location: [facility]     LAPAROSCOPIC CONVERTED TO OPEN SIGMOIDECTOMY WITH END COLOSTOMY  10/11/2017    Dr. Schneider     LAPAROSCOPIC HAND ASSISTED HEPATECTOMY PARTIAL N/A 9/12/2018    Procedure: LAPAROSCOPIC HAND ASSISTED HEPATECTOMY PARTIAL;;  Surgeon: Saqib Hale MD;  Location: [facility]     SIGMOIDOSCOPY FLEXIBLE N/A 9/12/2018    Procedure: SIGMOIDOSCOPY FLEXIBLE;;  Surgeon: Herminio Oswald MD;  Location: [facility]     SIGMOIDOSCOPY FLEXIBLE N/A 7/12/2019    Procedure: Flexible Sigmoidoscopy With Balloon Dilation;  Surgeon: Hunter Carmona MD;  Location: [facility]            Previous Endoscopy:     Results for orders placed or performed during the hospital encounter of 08/04/17   COLONOSCOPY   Result Value Ref Range    COLONOSCOPY       [address] [address] [phone]     Endoscopy Department  _______________________________________________________________________________  Patient Name: Rian Malik          Procedure Date: 8/4/2017 2:19 PM  MRN: [id number]                       Account Number: [id number]  YOB: 1960              Admit Type: Outpatient  Age: 56                               Room: [facility]  Gender: Male                          Note Status: Supervisor Override  Attending MD: Cristobal Blanco MD        Total Sedation Time:   _______________________________________________________________________________     Procedure:           Colonoscopy  Indications:         Abnormal CT of the GI tract, Follow-up of diverticulitis  Providers:           Cristobal Blanco MD, Paula Calhoun RN  Referring MD:        Yaakov Burroughs MD, Edgar Wray  Medicines:           See the other  procedure note for documentation of the                        administered m edications  Complications:       No immediate complications. Estimated blood loss:                        Minimal.  _______________________________________________________________________________  Procedure:           Pre-Anesthesia Assessment:                       - Prior to the procedure, a History and Physical was                        performed, and patient medications and allergies were                        reviewed. The patient is competent. The risks and                        benefits of the procedure and the sedation options and                        risks were discussed with the patient. All questions                        were answered and informed consent was obtained. Patient                        identification and proposed procedure were verified by                        the physician and the nurse in the procedure room.                        Mental Status Examination: alert and oriented. Airway                        Examination: normal oropharyngeal airway and  neck                        mobility. Respiratory Examination: clear to                        auscultation. CV Examination: normal. Prophylactic                        Antibiotics: The patient does not require prophylactic                        antibiotics. Prior Anticoagulants: The patient has taken                        no previous anticoagulant or antiplatelet agents. ASA                        Grade Assessment: III - A patient with severe systemic                        disease. After reviewing the risks and benefits, the                        patient was deemed in satisfactory condition to undergo                        the procedure. The anesthesia plan was to use moderate                        sedation / analgesia (conscious sedation). Immediately                        prior to administration of medications, the patient was                         re-assessed for adequacy to receive sedatives. The heart                        rate, respiratory rate, oxygen saturations, blo od                        pressure, adequacy of pulmonary ventilation, and                        response to care were monitored throughout the                        procedure. The physical status of the patient was                        re-assessed after the procedure.                       After obtaining informed consent, the colonoscope was                        passed under direct vision. Throughout the procedure,                        the patient's blood pressure, pulse, and oxygen                        saturations were monitored continuously. The Colonoscope                        was introduced through the anus and advanced to the                        sigmoid colon to examine a stricture. This was the                        intended extent. The colonoscopy was performed without                        difficulty. The patient tolerated the procedure well.                        The quality of the bowel preparation was good.                                                                                    Findings:       The perianal and digital rectal examinations were normal.       A severe stenosis measuring of unknown length was found at 40 cm        proximal to the anus and was not able to be traversed with a pediatric        colonoscope. The appearance appeared to be inflammatory in nature and        not adenomatous. Cannot rule out an extrinsic cause of this stricture.        Biopsies were taken with a cold forceps for histology. Verification of        patient identification for the specimen was done by the physician and        nurse using the patient's name, birth date and medical record number.        Estimated blood loss was minimal.       The retroflexed view of the distal rectum and anal verge was normal and        showed no anal or rectal abnormalities.                                                                                    Moderate Sedation:       Moderate (conscious) sedation was administered by the endoscopy nurse        and martino pervised by the endoscopist. The following parameters were        monitored: oxygen saturation, heart rate, blood pressure, and response        to care. Total physician intraservice time was 10 minutes.  Impression:          - Stricture at 40 cm proximal to the anus. Inflammatory                        appearing. Biopsied.                       - Unable to traverse stricture.                       - The distal rectum and anal verge are normal on                        retroflexion view.  Recommendation:      - Await pathology results.                       - Case and imaging reviewed with Dr. Sid Carmona.                        Appearance on CT over the last three months has not                        improved and in fact may show worsening sigmoid                        inflammation. Will discuss with Dr. Yaakov Burroughs about                        referral to Colorectal surgery for resection. If needed,                        it could be feasible to perform a lower EUS to further                         evaluate the wall of this lesion.                       - Discharge home                                                                                     Cristobal Blanco MD  ________________  Cristobal Blanco MD  8/4/2017 4:03:37 PM  I was physically present for the entire viewing portion of the exam.  __________________________  Signature of teaching physician  Jami/Carlos Eduardo Blanco MD  Number of Addenda: 0    Note Initiated On: 8/4/2017 2:19 PM  Scope In:  Scope Out:              Social History:   Reviewed and edited as appropriate  Social History     Socioeconomic History     Marital status:      Spouse name: Not on file     Number of children: 2     Years of education: Not on file     Highest education level: Not on file    Occupational History     Not on file   Social Needs     Financial resource strain: Not on file     Food insecurity:     Worry: Not on file     Inability: Not on file     Transportation needs:     Medical: Not on file     Non-medical: Not on file   Tobacco Use     Smoking status: Former Smoker     Packs/day: 0.10     Years: 3.00     Pack years: 0.30     Types: Cigarettes     Start date: 2014     Last attempt to quit: 10/6/2017     Years since quittin.9     Smokeless tobacco: Never Used     Tobacco comment:     Substance and Sexual Activity     Alcohol use: No     Comment: Sober since .  Drank 6 beers/day during the weekdays and 12 beers/day on weekends.       Drug use: No     Sexual activity: Yes     Partners: Female   Lifestyle     Physical activity:     Days per week: Not on file     Minutes per session: Not on file     Stress: Not on file   Relationships     Social connections:     Talks on phone: Not on file     Gets together: Not on file     Attends Mandaeism service: Not on file     Active member of club or organization: Not on file     Attends meetings of clubs or organizations: Not on file     Relationship status: Not on file     Intimate partner violence:     Fear of current or ex partner: Not on file     Emotionally abused: Not on file     Physically abused: Not on file     Forced sexual activity: Not on file   Other Topics Concern     Parent/sibling w/ CABG, MI or angioplasty before 65F 55M? Not Asked   Social History Narrative    Lives in own home with adult son and daughter.            Family History:   Reviewed and edited as appropriate  No known history of gastrointestinal/liver disease or  gastrointestinal malignancies       Allergies:   Reviewed and edited as appropriate     Allergies   Allergen Reactions     Acetaminophen      Naproxen             Medications:     Medications Prior to Admission   Medication Sig Dispense Refill Last Dose     LORazepam (ATIVAN) 0.5 MG tablet Take 1  tablet (0.5 mg) by mouth every 4 hours as needed (Anxiety, Nausea/Vomiting or Sleep) 30 tablet 0      ondansetron (ZOFRAN) 8 MG tablet Take 1 tablet (8 mg) by mouth every 8 hours as needed (Nausea/Vomiting) 30 tablet 1 Taking     polyethylene glycol (MIRALAX/GLYCOLAX) packet Take 17 g by mouth daily 100 packet 1 Taking     prochlorperazine (COMPAZINE) 10 MG tablet Take 1 tablet (10 mg) by mouth every 6 hours as needed (Nausea/Vomiting) 30 tablet 2 Taking     traMADol (ULTRAM) 50 MG tablet Take 1 tablet (50 mg) by mouth every 6 hours as needed for moderate pain 20 tablet 0 Taking             Review of Systems:     A complete review of systems was performed and is negative except as noted in the HPI           Physical Exam:   /71 (BP Location: Right arm)   Pulse 68   Temp 98.1  F (36.7  C) (Oral)   Resp 15   SpO2 95%   Wt:   Wt Readings from Last 2 Encounters:   09/10/19 73.6 kg (162 lb 3.2 oz)   08/20/19 72.1 kg (159 lb)      Constitutional: cooperative, pleasant, not dyspneic/diaphoretic, no acute distress  Eyes: Sclera anicteric/injected  Ears/nose/mouth/throat: +NGT to LIS. Normal oropharynx without ulcers or exudate, mucus membranes moist, hearing intact  Neck: supple, thyroid normal size  CV: No edema  Respiratory: Unlabored breathing  Lymph: No axillary, submandibular, supraclavicular or inguinal lymphadenopathy  Abdomen: +Distension, +bs, no hepatosplenomegaly, nontender, no peritoneal signs  Skin: warm, perfused, no jaundice  Neuro: AAO x 3, No asterixis  Psych: Normal affect  MSK: Normal gait         Data:   Labs and imaging below were independently reviewed and interpreted    BMP  Recent Labs   Lab 09/17/19  0641      POTASSIUM 3.8   CHLORIDE 110*   MAICOL 8.4*   CO2 24   BUN 18   CR 0.80   *     CBC  Recent Labs   Lab 09/17/19  0641   WBC 3.2*   RBC 4.51   HGB 13.9   HCT 40.8   MCV 91   MCH 30.8   MCHC 34.1   RDW 14.8   *     INRNo lab results found in last 7 days.  LFTs  Recent  Labs   Lab 09/17/19  0641   ALKPHOS 64   AST 14   ALT 22   BILITOTAL 1.0   PROTTOTAL 6.6*   ALBUMIN 3.5      PANCNo lab results found in last 7 days.    Imaging:  CT Abdomen and Pelvis with IV contrast 9/16/19  1.  Distal colonic obstruction at the rectosigmoid junction just above the anastomosis at the same site of prior obstruction 7/9/2019. Marked distention and edema associated with the colon. Localized thickening and mural enhancement at the ileocecal valve.    2.  There is ascites present. No free air or abscess appreciated.    3.  Splenomegaly.    4.  Cholelithiasis and distended gallbladder.

## 2019-09-18 NOTE — ANESTHESIA POSTPROCEDURE EVALUATION
Anesthesia POST Procedure Evaluation    Patient: Rian Malik   MRN:     [id number] Gender:   male   Age:    59 year old :      1960        Preoperative Diagnosis: Colonic Obstruction   Procedure(s):  COLONIC STENT with Fluoroscopy   Postop Comments: No value filed.       Anesthesia Type:  Not documented  General    Reportable Event: NO     PAIN: Uncomplicated   Sign Out status: Comfortable, Well controlled pain     PONV: No PONV   Sign Out status:  No Nausea or Vomiting     Neuro/Psych: Uneventful perioperative course   Sign Out Status: Preoperative baseline; Age appropriate mentation     Airway/Resp.: Uneventful perioperative course   Sign Out Status: Non labored breathing, age appropriate RR; Resp. Status within EXPECTED Parameters     CV: Uneventful perioperative course   Sign Out status: Appropriate BP and perfusion indices; Appropriate HR/Rhythm     Disposition:   Sign Out in:  PACU  Disposition:  Phase II; Home  Recovery Course: Uneventful  Follow-Up: Not required           Last Anesthesia Record Vitals:  CRNA VITALS  2019 1453 - 2019 1553      2019             Resp Rate (observed):  1  (Abnormal)           Last PACU Vitals:  Vitals Value Taken Time   /79 2019  6:05 PM   Temp 36.2  C (97.2  F) 2019  6:05 PM   Pulse 60 2019  4:10 PM   Resp 16 2019  6:05 PM   SpO2 97 % 2019  6:05 PM   Temp src     NIBP     Pulse     SpO2     Resp     Temp     Ht Rate     Temp 2     Vitals shown include unvalidated device data.      Electronically Signed By: Bernadette Snell MD, 2019, 6:36 PM

## 2019-09-18 NOTE — ANESTHESIA PREPROCEDURE EVALUATION
Anesthesia Pre-Procedure Evaluation    Patient: Rian Malik   MRN:     [id number] Gender:   male   Age:    58 year old :      1960        Preoperative Diagnosis: Colonic Stricture   Procedure(s):  Colonic Stent under fluoro     Past Medical History:   Diagnosis Date     Maldonado's esophagus      Cirrhosis (H)      Colon cancer (H) 10/11/2017    Poorly differentiated adenocarcinoma     Hepatitis C       Past Surgical History:   Procedure Laterality Date     COLONOSCOPY N/A 2017    Procedure: COMBINED COLONOSCOPY, SINGLE OR MULTIPLE BIOPSY/POLYPECTOMY BY BIOPSY;  Colon & EGD;  Surgeon: Cristobal Blanco MD;  Location: [facility]     COLOSTOMY Left 10/11/2017     COMBINED CYSTOSCOPY, INSERT STENT URETER(S) Bilateral 2018    Procedure: COMBINED CYSTOSCOPY, INSERT STENT URETER(S);  Bilateral Cystoscopy With Temporary Stent Placement, Laparoscopic Hand Assisted Takedown Colostomy, Appendectomy, Mobilization of Splenic Flexure, Flexible Sigmoidoscopy, Endoscopic Clipping, Laparoscopic  Assisted Microablation of Liver Tumor, Intra-Op Ultrasound;  Surgeon: Hunter Quintero MD;  Location: [facility]     CYSTOSCOPY AND LEFT STENT PLACEMENT  10/11/2017    Dr. Reyna     ESOPHAGOSCOPY, GASTROSCOPY, DUODENOSCOPY (EGD), COMBINED N/A 2017    Procedure: COMBINED ESOPHAGOSCOPY, GASTROSCOPY, DUODENOSCOPY (EGD), BIOPSY SINGLE OR MULTIPLE;;  Surgeon: Cristobal Blanco MD;  Location: [facility]     HERNIA REPAIR Left 1988    Left inguinal     LAPAROSCOPIC ASSISTED COLOSTOMY TAKEDOWN N/A 2018    Procedure: LAPAROSCOPIC ASSISTED COLOSTOMY TAKEDOWN;;  Surgeon: Herminio Oswald MD;  Location: [facility]     LAPAROSCOPIC CHOLECYSTECTOMY N/A 2018    Procedure: LAPAROSCOPIC CHOLECYSTECTOMY;;  Surgeon: Saqib Hale MD;  Location: [facility]     LAPAROSCOPIC CONVERTED TO OPEN SIGMOIDECTOMY WITH END COLOSTOMY  10/11/2017    Dr. Schneider     LAPAROSCOPIC HAND ASSISTED HEPATECTOMY PARTIAL N/A 2018    Procedure: LAPAROSCOPIC HAND  ASSISTED HEPATECTOMY PARTIAL;;  Surgeon: Saqib Hale MD;  Location: [facility]     SIGMOIDOSCOPY FLEXIBLE N/A 9/12/2018    Procedure: SIGMOIDOSCOPY FLEXIBLE;;  Surgeon: Herminio Oswald MD;  Location: [facility]     SIGMOIDOSCOPY FLEXIBLE N/A 7/12/2019    Procedure: Flexible Sigmoidoscopy With Balloon Dilation;  Surgeon: Hunter Carmona MD;  Location: [facility]          Anesthesia Evaluation     . Pt has had prior anesthetic. Type: General and MAC    No history of anesthetic complications          ROS/MED HX    ENT/Pulmonary:     (+)tobacco use (Smoked for ~3 years lest than a pack a day.  Quit 2017), Past use 0.1 packs/day  , . .    Neurologic:  - neg neurologic ROS     Cardiovascular:  - neg cardiovascular ROS   (+) ----. : . . . :. . No previous cardiac testing       METS/Exercise Tolerance:  >4 METS   Hematologic:     (+) History of Transfusion no previous transfusion reaction Other Hematologic Disorder-thrombocytopenia plt=56.  9/17/19 = 104      Musculoskeletal:  - neg musculoskeletal ROS       GI/Hepatic: Comment: Colonic stricture    Maldonado's esophagus.     (+) hepatitis (Treated. 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  status post Surgery and Chemo open sigmoidectomy with end colostomy at OSH by Dr. Schneider on 10/11/17        Other:    (+) No chance of pregnancy no H/O Chronic Pain,no other significant disability                        PHYSICAL EXAM:   Mental Status/Neuro: A/A/O; Age Appropriate   Airway: Facies: Feasible  Mallampati: I  Mouth/Opening: Full  TM distance: > 6 cm  Neck ROM: Full   Respiratory: Auscultation: CTAB     Resp. Rate: Normal     Resp. Effort: Normal      CV: Rhythm: Regular  Edema: None   Comments:  "     Dental: Normal Dentition                Lab Results   Component Value Date    WBC 3.2 (L) 09/17/2019    HGB 13.9 09/17/2019    HCT 40.8 09/17/2019     (L) 09/17/2019    CRP <2.9 05/09/2017     09/17/2019    POTASSIUM 3.8 09/17/2019    CHLORIDE 110 (H) 09/17/2019    CO2 24 09/17/2019    BUN 18 09/17/2019    CR 0.80 09/17/2019     (H) 09/17/2019    MAICOL 8.4 (L) 09/17/2019    PHOS 2.6 07/11/2019    MAG 1.9 07/11/2019    ALBUMIN 3.5 09/17/2019    PROTTOTAL 6.6 (L) 09/17/2019    ALT 22 09/17/2019    AST 14 09/17/2019    ALKPHOS 64 09/17/2019    BILITOTAL 1.0 09/17/2019    PTT 28 11/06/2017    INR 1.21 (H) 07/12/2019       Preop Vitals  BP Readings from Last 3 Encounters:   09/18/19 (!) 152/83   09/10/19 (!) 143/81   08/20/19 127/81    Pulse Readings from Last 3 Encounters:   09/18/19 67   09/10/19 68   08/20/19 76      Resp Readings from Last 3 Encounters:   09/18/19 15   09/10/19 16   08/20/19 12    SpO2 Readings from Last 3 Encounters:   09/18/19 95%   09/10/19 96%   08/20/19 98%      Temp Readings from Last 1 Encounters:   09/18/19 36.9  C (98.4  F) (Oral)    Ht Readings from Last 1 Encounters:   09/10/19 1.727 m (5' 7.99\")      Wt Readings from Last 1 Encounters:   09/18/19 71.5 kg (157 lb 11.2 oz)    Estimated body mass index is 23.98 kg/m  as calculated from the following:    Height as of 9/10/19: 1.727 m (5' 7.99\").    Weight as of an earlier encounter on 9/18/19: 71.5 kg (157 lb 11.2 oz).     LDA:  Port A Cath Single 11/07/17 Right Chest wall (Active)   Access Date 09/16/19 9/18/2019  9:19 AM   Site Assessment WDL 9/18/2019  9:19 AM   Line Status Infusing 9/18/2019  9:19 AM   Extravasation? No 9/18/2019  9:19 AM   Dressing Intervention Transparent 9/18/2019  9:19 AM   Dressing change due 09/23/19 9/18/2019  9:19 AM   Needle Change Due 09/23/19 9/18/2019  9:19 AM   Line Necessity Yes, meets criteria 9/18/2019  9:19 AM   Number of days: 680       Open Drain Left Abdomen 5/8 Penrose Drain, " gauze 4x4, tape (Active)   Number of days: 371            Assessment: Elective due to   ASA SCORE: 3    H&P: History and physical reviewed and following examination; no interval change.        - H&P complete; Preop Testing complete; Consents complete  Smoking Status:  NO Active use of Tobacco/UNKNOWN Tobacco use status   NPO Status: NPO Appropriate     Plan:   Anes. Type:  General   Pre-Medication: None   Induction:  IV (Standard)   Airway: ETT; Oral   Access/Monitoring: PIV   Maintenance: Balanced     Postop Plan:   Postop Pain: Opioids  Postop Sedation/Airway: SECURED AIRWAY likely  Disposition: Inpatient/Admit     PONV Management:   Adult Risk Factors:, Non-Smoker, Postop Opioids     CONSENT: Direct conversation   Plan and risks discussed with: Patient   Blood Products: Consent Deferred (Minimal Blood Loss)                             Bernadette Snell MD

## 2019-09-18 NOTE — ANESTHESIA CARE TRANSFER NOTE
Patient: Rian Malik    Procedure(s):  COLONIC STENT with Fluoroscopy    Diagnosis: Colonic Obstruction  Diagnosis Additional Information: No value filed.    Anesthesia Type:   General     Note:  Airway :Face Mask  Patient transferred to:PACU  Comments: Tolerated anesthesia wellHandoff Report: Identifed the Patient, Identified the Reponsible Provider, Reviewed the pertinent medical history, Discussed the surgical course, Reviewed Intra-OP anesthesia mangement and issues during anesthesia, Set expectations for post-procedure period and Allowed opportunity for questions and acknowledgement of understanding      Vitals: (Last set prior to Anesthesia Care Transfer)    CRNA VITALS  9/18/2019 1453 - 9/18/2019 1529      9/18/2019             Resp Rate (observed):  1  (Abnormal)                 Electronically Signed By: SAVANNA Newell CRNA  September 18, 2019  3:29 PM

## 2019-09-18 NOTE — BRIEF OP NOTE
[facility] Brief Operative Note    Pre-operative diagnosis: Colonic Obstruction   Post-operative diagnosis * No post-op diagnosis entered *   Procedure: Procedure(s):  COLONIC STENT with Fluoroscopy   Surgeon: Guru Tyson MD       Estimated blood loss: None    Specimens: None   Findings:     Short Colonic stricture at 13 cm from anal verge. Peds colonoscope could not be advanced  A 22 mm by 12 cm Evolution stent was deployed across the stricture with stent flange opening at the stricture    Another overlapping 22 mm by 12 cm Evolution stent was deployed with distal flange at proximal rectum    Recommendations    Once stent expands there is risk of migration

## 2019-09-18 NOTE — PROGRESS NOTES
POST OP CHECK  09/18/2019    Rian Malik is a 59 year old male with h/o distal colonic obstruction now POD #0 s/p colonic stent.    Pt reports lower pelvic pain. Denies nausea, vomiting. Had a loose stool since scope. No void yet. Of note, having feeling of need to defecate.    BP (!) 151/69 (BP Location: Right arm)   Pulse 52   Temp 96  F (35.6  C) (Oral)   Resp 16   Wt 71.5 kg (157 lb 11.2 oz)   SpO2 99%   BMI 23.98 kg/m    General: Alert, interactive, & in NAD  Resp: Non labored breathing on RA  Cardiac: Regular rate; extremities warm  Abdomen: Soft, non tender, mildly distended, palpable LLQ mass    A/P: No acute post-op issues. Pain control with oral and IV medication. Start full liquid diet. Continue plan of care. Please call with questions.    Rui Mcgowan MD  PGY-1, General Surgery

## 2019-09-18 NOTE — OR NURSING
AKIRA Godinez from [address] [place] [address] pt glucose recheck just prior to pt being transferred to preop was 91mgs

## 2019-09-18 NOTE — PLAN OF CARE
BP (!) 151/69 (BP Location: Right arm)   Pulse 52   Temp 96  F (35.6  C) (Oral)   Resp 16   Wt 71.5 kg (157 lb 11.2 oz)   SpO2 99%   BMI 23.98 kg/m      Pain managed with IV dilaudid will transition to orals soon.  Denies nausea. Starting on full liquids this evening. Back from procedure around 1630. MD Cheng aware. Upon arrival pt had one episode of stool. Port infusing MIVF. Abd distended (chronic). Up with SBA.

## 2019-09-19 NOTE — PROGRESS NOTES
"CLINICAL NUTRITION SERVICES  Reason for Assessment: Received consult: \"Low-Residue Diet Education\"  Diet History:  Patient has had low fiber diet education in the past about 1 year ago, and is familiar with it but has some questions and would like to go over diet ed again  Nutrition Diagnosis:  Food- and nutrition-related knowledge deficit r/t requiring review of low fiber diet as evidenced by patient report  Interventions:  Provided instruction on low fiber diet. Discussed each food group and foods to eat and avoid. Answered patient's questions regarding diet.   Provided handouts : Fiber Restricted Nutrition Therapy and Low Fiber Diet Hospital Menu  Goals:   Patient will verbalize at least 5 low fiber foods acceptable on diet and 5 high fiber foods to avoid.  Follow-up:    Patient to ask any further nutrition-related questions before discharge.  In addition, pt may request outpatient RD appointment.    Dorina Harrison RD, LD  7C RD pager: [phone]   "

## 2019-09-19 NOTE — PLAN OF CARE
[id number] Abdomen remains distended but has improved. Passing flatus, multiple loose BMs this shift. Denies nausea. Abdominal pain managed with IV Dilaudid and oxycodone. Port infusing IV fluids. Voiding spontaneously, low urine output; MD notified. Up ad haritha. VSS on room air. Pt tolerated full liquids until 0000. Pt has been NPO overnight for possible procedure today. Per primary care team, surgery will possibly be cancelled.

## 2019-09-19 NOTE — PROVIDER NOTIFICATION
Paged Marybeth Fontaine at [phone]. Pt's urine output low. Has had 150 mL out with one unmeasured occurrence since 0000.    MD did not return page. No orders received. Will continue to monitor.

## 2019-09-19 NOTE — PLAN OF CARE
VSS on RA. Up ad haritha. Having numerous loose bowel movements, becoming more clear. Voided x 2 in urinal. Pt reports that he may be voiding during his numerous stools, but unable to tell for sure. Port heparin locked.  Denies nausea.  Pain managed with 10 mg oxycodone and IV dilaudid for breakthrough pain. Abdomen remains distended. Tolerating low fiber diet OK, poor appetite. No plan for surgical intervention at this point.  Continue to manage pain.

## 2019-09-19 NOTE — PROGRESS NOTES
Colorectal Surgery Progress Note  09/19/2019       Subjective:  Interventional GI placed colonic stent yesterday. Multiple bowel movements overnight. States pain is much improved, decreased bloating and distention. Tolerated full liquids well.     Objective:  Temp:  [96  F (35.6  C)-98.4  F (36.9  C)] 96.1  F (35.6  C)  Pulse:  [52-70] 70  Heart Rate:  [55-76] 64  Resp:  [10-16] 12  BP: (122-152)/(69-83) 134/71  SpO2:  [94 %-100 %] 95 %    I/O last 3 completed shifts:  In: 2718.33 [P.O.:220; I.V.:2498.33]  Out: 500 [Urine:500]      Gen: Awake, alert, NAD  Resp: NLB on RA  Abd: soft, significantly less distended, nontender, palpable LLQ mass  Ext: WWP, no edema     Labs:  Recent Labs   Lab 09/17/19  0641   WBC 3.2*   HGB 13.9   *       Recent Labs   Lab 09/17/19  0641      POTASSIUM 3.8   CHLORIDE 110*   CO2 24   BUN 18   CR 0.80   *   MAICOL 8.4*       Imaging/Procedures:  9/18 Interventional GI procedure  Findings:      Short Colonic stricture at 13 cm from anal verge. Peds colonoscope could not be advanced  A 22 mm by 12 cm Evolution stent was deployed across the stricture with stent flange opening at the stricture     Another overlapping 22 mm by 12 cm Evolution stent was deployed with distal flange at proximal rectum       Assessment/Plan:   59 year old male with history of poorly differentiated colon adenocarcinoma s/p open sigmoidectomy, end colostomy (10/2017) and then lap liver ablation for liver mets, colostomy take down, descending colectomy and anterior dissection (9/2018) s/p flex sig, dilation of colonic stricture (7/2019) now admitted for abdominal pain with recurrent partial distal colonic obstruction at anastomosis site and 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 Colonic stents placed 9/18 by interventional GI across stricture, were unable to pass peds colonoscope but now having multiple BMs, flatus and improvement in symptoms.    Neuro/Pain:  -well controlled on  tylenol, oxycodone  -will discontinue IV dilaudid  CV: HDS  Pulm: IS and ambulation  GI/FEN:  -s/p colonic stent x2 by interventional GI  -will start low residue diet  -discontinue fluids, mirilax  -will speak to GI regarding follow-up plan  Heme/ID: [email or web address]  Activity: as tolerated  Prophylaxis: lovenox, protonix  Dispo: home pending diet toleration, GI follow-up plan     Seen, examined, and discussed with fellow, who will discuss with staff.  - - - - - - - - - - - - - - - - - -  Rui Mcgowan MD  Colorectal Surgery PGY-1  [phone]

## 2019-09-19 NOTE — PROGRESS NOTES
"GI Progress Note  Date of Service:  9/19/2019      Assessment:   Rian Malik is a 59 year old male with Hep C cirrhosis, and poorly differentiated colonic adenocarcinoma with liver mets s/p open sigmoidectomy with end colostomy (10/2017) followed by laparoscopic liver ablation with intraoperative ultrasound, colostomy take down, appendectomy, descending colectomy and anterior dissection (9/2018) s/p flex sig and dilatation of colonic stricture (7/2019).  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 We have been consulted for consideration of stent vs. Dilation of stricture (non-malignant), given that pt would like to avoid surgical intervention.     9/18: Colonic stent with fluoroscopy --> \"Short colonic stricture at 13cm from anal verge. Peds colonoscope could not be advanced. A 22 mm by 12 cm Evolution stent was deployed across the stricture with stent flange opening at the stricture. Another overlapping 22 mm by 12 cm Evolution stent was deployed with distal flange at proximal rectum.\"      Recommendations:   --Low residue diet  --Miralax BID  --Pt counseled on risk of stent migration once stent is expanded  --Please alert GI if return of symptoms     GI will sign off. Please page with questions.     Patient was discussed with Dr. Blanco.     Velia Strong MD, [facility]  GI Fellow, PGY4  Pager:[phone]      __________________________________________________________________________________  Subjective:  Nursing notes reviewed and noted.    Feeling better. Has had BM's and flatus. Improved pain. Tolerating PO (full liquids)    Physical Examination:  Vital Signs:  /71 (BP Location: Right arm)   Pulse 70   Temp 96.1  F (35.6  C) (Oral)   Resp 12   Wt 72 kg (158 lb 11.2 oz)   SpO2 95%   BMI 24.14 kg/m     General:  NAD. Resting comfortably.  HEENT:  " PERRL, EOMI, No scleral icterus of conjunctival injection. MMM.  Neck:  Supple.   Chest:  Clear bilaterally.    Cardiovascular: RRR. No m/r/g.   Abdomen:  Soft, NT, decreased distension. BS+, No organomegally.   Extremities:  No peripheral edema.   Skin:  No rashes, abrasions or concerning lesions appreciated.   Neurological:  CN II-XII grossly normal.     DATA:  Labs:    Reviewed and noted

## 2019-09-19 NOTE — PROVIDER NOTIFICATION
Paged Marybeth Fontaine at [phone]. Pt reports having 6 loose BMs since returning from his colonic stent placed this afternoon.     MD did not return page. Pt reported to writer MD came to pt's bedside to assess. Will continue to monitor.

## 2019-09-20 NOTE — DISCHARGE SUMMARY
[facility]  Discharge Summary  Colorectal Surgery     Rian Malik MRN# [id number]   YOB: 1960 Age: 59 year old     Date of Admission:  9/16/2019  Date of Discharge::  9/20/2019  1:56 PM  Admitting Physician:  Herminio Oswald MD  Discharge Physician:  Herminio Oswald MD  Primary Care Physician:        Moreno Harris          Admission Diagnoses:   bowel obstruction  colon cancer  Large bowel obstruction (H)            Discharge Diagnosis:   bowel obstruction  colon cancer  Large bowel obstruction (H)         Procedures:   Procedure(s):  COLONIC STENT with Fluoroscopy    Findings:      Short Colonic stricture at 13 cm from anal verge. 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    Patient states that he woke up around 4am the day of admission to urinate and noticed some abdominal pain. The pain initially began as intermittent in the lower right side, described as crampy and non radiating. He took  some Zofran which did not alleviate at pain.The pain increased and became constant 9-10/10 pain at which point he decided to go to the ED. He states that he has been consuming mostly liquid diet and had about 15 loose, watery bowel movements 2 days PTA which have since become clear. CT scan in the Emergency Department demonstrating distal colonic obstruction at the rectosigmoid junction near anastomosis at the same site of obstruction 7/9/2019 also with additional ileocecal mass.            Hospital Course:   The patient was admitted to the service with an nasogastric tube in place and was made NPO. We discussed the possibility of surgical intervention vs endoscopic procedure. The decision was made to consult interventional gastroenterology for an endoscopic therapy. The patient underwent colonoscopy with colonic stent placement on 9/18, which he tolerated well without immediate complications. He was transferred to the PACU and then floor for routine postoperative care. The remainder of his postoperative course was unremarkable. His nasogastric tube was removed during the procedure. Afterwards, he had frequent, small and soft bowel movements. He felt a significant relief of the bloating and pain and had a noticeable decrease in abdominal distention. On the day of discharge, he was tolerating a low residue diet, his pain was well controlled with oral pain medications, he was voiding spontaneously, and ambulating independently. He was instructed to keep stools loose using daily as needed Mirilax or Milk of Magnesia to prevent clogging of stent. He does not need follow-up with gastroenterology unless having issues with his stent. Patient with follow up with Delmi Bass on 10/10 in clinic and with Dr. Oswald on 10/21 in clinic.           Imaging Studies:     Results for orders placed or performed during the hospital encounter of 09/16/19   XR Surgery JENN G/T 5 Min Fluoro w Stills    Narrative     This exam was marked as non-reportable because it will not be read by a   radiologist or a [place] non-radiologist provider.                        Medications Prior to Admission:     Medications Prior to Admission   Medication Sig Dispense Refill Last Dose     LORazepam (ATIVAN) 0.5 MG tablet Take 1 tablet (0.5 mg) by mouth every 4 hours as needed (Anxiety, Nausea/Vomiting or Sleep) 30 tablet 0 Unknown at Unknown time     ondansetron (ZOFRAN) 8 MG tablet Take 1 tablet (8 mg) by mouth every 8 hours as needed (Nausea/Vomiting) 30 tablet 1 Unknown at Unknown time     polyethylene glycol (MIRALAX/GLYCOLAX) packet Take 17 g by mouth daily 100 packet 1 Unknown at Unknown time     prochlorperazine (COMPAZINE) 10 MG tablet Take 1 tablet (10 mg) by mouth every 6 hours as needed (Nausea/Vomiting) 30 tablet 2 Unknown at Unknown time     traMADol (ULTRAM) 50 MG tablet Take 1 tablet (50 mg) by mouth every 6 hours as needed for moderate pain 20 tablet 0 Unknown at Unknown time              Discharge Medications:     Current Discharge Medication List      CONTINUE these medications which have NOT CHANGED    Details   LORazepam (ATIVAN) 0.5 MG tablet Take 1 tablet (0.5 mg) by mouth every 4 hours as needed (Anxiety, Nausea/Vomiting or Sleep)  Qty: 30 tablet, Refills: 0    Associated Diagnoses: Adenocarcinoma of sigmoid colon (H); Nausea      ondansetron (ZOFRAN) 8 MG tablet Take 1 tablet (8 mg) by mouth every 8 hours as needed (Nausea/Vomiting)  Qty: 30 tablet, Refills: 1    Associated Diagnoses: Adenocarcinoma of sigmoid colon (H); Nausea      polyethylene glycol (MIRALAX/GLYCOLAX) packet Take 17 g by mouth daily  Qty: 100 packet, Refills: 1    Associated Diagnoses: Adenocarcinoma of sigmoid colon (H)      prochlorperazine (COMPAZINE) 10 MG tablet Take 1 tablet (10 mg) by mouth every 6 hours as needed (Nausea/Vomiting)  Qty: 30 tablet, Refills: 2    Associated Diagnoses: Adenocarcinoma of sigmoid colon (H)      traMADol (ULTRAM)  50 MG tablet Take 1 tablet (50 mg) by mouth every 6 hours as needed for moderate pain  Qty: 20 tablet, Refills: 0    Associated Diagnoses: Adenocarcinoma of sigmoid colon (H)                     Medications Discontinued or Adjusted During This Hospitalization:   N/A           Antibiotics Prescribed at Discharge:   None prescribed           Day of Discharge Physical Exam:   Temp:  [96.1  F (35.6  C)-98.5  F (36.9  C)] 96.7  F (35.9  C)  Pulse:  [65] 65  Heart Rate:  [64-70] 64  Resp:  [12-16] 16  BP: (131-144)/(71-76) 133/72  SpO2:  [93 %-97 %] 93 %    General: awake, alert, no acute distress, laying comfortably in bed   CV: warm, well perfused   Pulm: breathing comfortably on room air   Abdomen: soft, minimally distended, minimally tender   Extremities: no edema, moving all extremities spontaneously and without apparent deficit            Final Pathology Result:   N/A           Discharge Instructions and Follow-Up:     Discharge Procedure Orders   Activity   Order Comments: Your activity upon discharge:   Activity as tolerated.   No driving while taking opiates (oxycodone).     Order Specific Question Answer Comments   Is discharge order? Yes      [facility]ult [facility] Follow-up and recommended labs and tests   Order Comments: NOTIFY  Please contact Nova Forte RN, Maria Ines Cuenca RN or Kiki Cuadra LPN at [phone] for problems after discharge such as:  -Temperature > 101F, chills, rigors, dizziness  -Inability to tolerate diet, nausea or vomiting  -You stop passing gas, develop significant bloating, abdominal pain  -Have blood in stools/vomit  -Have severe diarrhea/constipation  -Any other questions or concerns.  - At nights (after 4:30pm), on weekends, or if urgent, call [phone] and ask the  to speak with the on-call Colorectal Surgery resident or fellow      FOLLOW-UP  1.  You will need to follow-up with Delmi Storm NP in the Colon and Rectal Surgery clinic in 1-2 week(s).  Please contact our  clinic scheduler (phone # [phone]) if you have not heard from our clinic in 3 business days afer discharge to schedule a follow-up appointment.  2.  Follow up with Oncology as scheduled.  3.  Can follow up with Gastroenterology as needed for any stent issues/questions.     Appointments on [place] and/or [place] (with [facility] or [facility] provider or service). Call [phone] if you haven't heard regarding these appointments within 7 days of discharge.     Diet   Order Comments: Follow this diet upon discharge: Low residue diet indefinitely as tolerated.     Order Specific Question Answer Comments   Is discharge order? Yes               Home Health Care:     Not needed           Discharge Disposition:     Discharged to home      Condition at discharge: Stable    Patient was seen, examined, and discussed on day of discharge with fellow, who discussed with staff surgeon.    Rui Mcgowan MD  PGY-1, Colorectal Surgery  [phone]

## 2019-09-20 NOTE — PROGRESS NOTES
Discharge Note  Lines/drains: hep locked and removed port needle  Medications: no discharge medications to go over  Teaching: went over discharge teachings   Transportation: pt left 7c via foot

## 2019-09-20 NOTE — PLAN OF CARE
AVSS. Pt reports abdominal pain, given 10 mg of PRN oxycodone with relief. Pt reports intermittent nausea, given PO Zofran with relief. On a low fiber diet. Passing flatus and pt had 5 loose stools overnight. Pt received miralax yesterday and declined second dose on evenings due to increased stools. Surgery MD Edmond aware of frequent stooling. Voiding spontaneously. Up ab haritha. Port is hep locked. Continue to monitor pain, stooling and toleration of diet.

## 2019-09-20 NOTE — PLAN OF CARE
AVSS on RA. Pt taking prn oxycodone for abd pain management. Abd distended/rounded. + BS, +gas. Pt had many loose stools throughout the day today. Sched miralax discont due to pt feeling nauseated and flushed after dose this AM. Dr. Fontaine aware. UAL, voiding spont. Sometimes saving. Low fiber diet. Pt felt queasy earlier in shift from miralax, gave prn zofran w/ relief. Port hep locked. Cont to monitor pain management and bowel function. Cont w/ POC.

## 2019-09-23 NOTE — TELEPHONE ENCOUNTER
Patient discharged from [facility]  ( Inpatient or ER).    Discharge date: 9/20/19  Diagnosis: LARGE BOWEL OBSTRUCTION (H)  Patient has been in the ER/IP 0/2 times.  Care Coord:  NA  Please follow up as appropriate. If no follow up required, please close encounter.

## 2019-09-23 NOTE — TELEPHONE ENCOUNTER
Patient is follows by [facility] . Closing encounter as they will contact patient after hospitalization. Closing this encounter so duplicate calls are not made to patient.       Ami Arias RN, BSN, PHN

## 2019-09-25 NOTE — PROGRESS NOTES
Pt called stating that he is feeling worse, having diarrhea constantly.  Discussed with Dr. Oswald, he recommends that pt have abd. x-ray and C diff.  Results of abd x-ray sent to Dr. Oswald.  He recommends that Dr. Mehta evaluate pt's stent.  Pt was called with results and that Dr. Basilio's care coordinator will call him to schedule evaluation of stent.

## 2019-09-26 NOTE — TELEPHONE ENCOUNTER
[facility] Call Center    Phone Message    May a detailed message be left on voicemail: yes    Reason for Call: Other: pt calling in to get the results from the testing he had done yesterday. Please call the pt back with those results.      Action Taken: Message routed to:  [facility] ([facility] Colon/Rectal Surgery

## 2019-09-26 NOTE — TELEPHONE ENCOUNTER
"Pt contacted and informed that C diff is negative and that Dr. Singh reports that stent looks ok.  Message from Dr. Singh:    \"Stent looks more or less like how it was left . No evidence of perforation\"    He states diarrhea is much better since starting imodium.  Discussed with patient that he walking a fine line between having loose stools/diarrhea and getting stent blocked with harder stools.  Instructed to keep stools as loose as possible.  Pt verbalized understanding.  "

## 2019-09-28 NOTE — PATIENT INSTRUCTIONS
Patient Education     BPH (Enlarged Prostate)  The prostate is a gland at the base of the bladder. As some men get older, the prostate may get bigger in size. This problem is called benign prostatic hyperplasia (BPH). BPH puts pressure on the urethra. This is the tube that carries urine from the bladder to the penis. It may interfere with the flow of urine. It may also keep the bladder from emptying fully.    Symptoms of BPH include trouble starting urination and feeling as though the bladder isn t emptying all the way. It also includes a weak urine stream, dribbling and leaking of urine, and frequent and urgent urination (especially at night). BPH can increase the risk of urinary infections. It can also block off urine flow completely. If this occurs, a thin tube (catheter) may be passed into the bladder to help drain urine.  If symptoms are mild, no treatment may be needed right now. If symptoms are more severe, treatment is likely needed. The goal of treatment is to improve urine flow and reduce symptoms. Treatments can include medicine and procedures. Your healthcare provider will discuss treatment options with you as needed.  Home care  The following guidelines will help you care for yourself at home:    Urinate as soon as you feel the urge. Don't try to hold your urine.    Don't limit your fluid intake during the day. Drink 6 to 8 glasses of water or liquids a day. This prevents bacteria from building up in the bladder.    Avoid drinking fluids after dinner to help reduce urination during the night.    Avoid medicines that can worsen your symptoms. These include certain cold and allergy medicines and antidepressants. Diuretics used for high blood pressure can also worsen symptoms. Talk to your doctor about the medicines you take. Other choices may work better for you.  Prostate cancer screening  BPH does not increase the risk of prostate cancer. But because prostate cancer is a common cancer in men,  screening is sometimes recommended. This may help detect the cancer in its early stages when treatment is most effective. Factors that can increase the risk of prostate cancer include being -American or having a father or brother who had prostate cancer. A high-fat diet may also increase the risk of prostate cancer. Talk to your healthcare provider to see whether you should be screened for prostate cancer.  Follow-up care  Follow up with your healthcare provider, or as advised  To learn more, go to:    National Kidney & Urologic Diseases Information Clearinghouse  kidney.niddk.nih.gov, 627.756.1201  When to seek medical advice  Call your healthcare provider right away if any of these occur:    Fever of 100.4 F (38.0 C) or higher, or as advised    Unable to pass urine for 8 hours    Increasing pressure or pain in your bladder (lower abdomen)    Blood in the urine    Increasing low back pain, not related to injury    Symptoms of urinary infection (increased urge to urinate, burning when passing urine, foul-smelling urine)  Date Last Reviewed: 7/1/2016 2000-2018 The [employer]. [address] [place] [address]. All rights reserved. This information is not intended as a substitute for professional medical care. Always follow your healthcare professional's instructions.           Patient Education     Bladder Infection, Male (Adult)    You have a bladder infection.  Urine is normally free of bacteria. But bacteria can get into the urinary tract from the skin around the rectum or it may travel in the blood from elsewhere in the body.  This is called a urinary tract infection (UTI). An infection can occur anywhere in the urinary tract. It could be in a kidney (pyelonephritis)or in the bladder (cystitis) and urethra (urethritis). The urethra is the tube that drains the urine from the bladder through the tip of the penis.  The most common place for a UTI is in the bladder. This is called a  bladder infection. Most bladder infections are easily treated. They are not serious unless the infection spreads up to the kidney.  The terms bladder infection, UTI, and cystitis are often used to describe the same thing, but they aren t always the same. Cystitis is an inflammation of the bladder. The most common cause of cystitis is an infection.   Keep in mind:    Infections in the urine are called UTIs.    Cystitis is usually caused by a UTI.    Not all UTIs and cases of cystitis are bladder infections.    Bladder infections are the most common type of cystitis.  Symptoms of a bladder infection  The infection causes inflammation in the urethra and bladder. This inflammation causes many of the symptoms. The most common symptoms of a bladder infection are:    Pain or burning when urinating    Having to go more often than usual    Feeling like you need to go right away    Only a small amount comes out    Blood in urine    Discomfort in your belly (abdomen), usually in the lower abdomen, above the pubic bone    Cloudy, strong, or bad smelling urine    Unable to urinate (retention)    Urinary incontinence    Fever    Loss of appetite  Older adults may also feel confused.  Causes of a bladder infection  Bladder infections are not contagious. You can't get one from someone else, from a toilet seat, or from sharing a bath.  The most common cause of bladder infections is bacteria from the bowels. The bacteria get onto the skin around the opening of the urethra. From there they can get into the urine and travel up to the bladder. This causes inflammation and an infection. This usually happens because of:    An enlarged prostate    Poor cleaning of the genitals    Procedures that put a tube in your bladder, like a Sherman catheter    Bowel incontinence    Older age    Not emptying your bladder (The urine stays there, giving the bacteria a chance to grow.)    Dehydration (This allows urine to stay in the bladder  longer.)    Constipation (This can cause the bowels to push on the bladder or urethra and keep the bladder from emptying.)  Treatment  Bladder infections are treated with antibiotics. They usually clear up quickly without complications. Treatment helps prevent a more serious kidney infection.  Medicines  Medicines can help in the treatment of a bladder infection:    You may have been given phenazopyridine to ease burning when you urinate. It will cause your urine to be bright orange. It can stain clothing.    You may have been prescribed antibiotics. Take this medicine until you have finished it, even if you feel better. Taking all of the medicine will make sure the infection has cleared.  You can use acetaminophen or ibuprofen for pain, fever, or discomfort, unless another medicine was prescribed. You can also alternate them, or use both together. They work differently and are a different class of medicines, so taking them together is not an overdose. If you have chronic liver or kidney disease, talk with your healthcare provider before using these medicines. Also talk with your provider if you ve had a stomach ulcer or GI bleeding or are taking blood thinner medicines.  Home care  Here are some guidelines to help you care for yourself at home:    Drink plenty of fluids, unless your healthcare provider told you not to. Fluids will prevent dehydration and flush out your bladder.    Use good personal hygiene. Wipe from front to back after using the toilet, and clean your penis regularly. If you aren t circumcised, retract the foreskin when cleaning.    Urinate more frequently, and don t try to hold it in for long periods of time, if possible.    Wear loose-fitting clothes and cotton underwear. Avoid tight-fitting pants. This helps keep you clean and dry.    Change your diet to prevent constipation. This means eating more fresh foods and more fiber, and less junk and fatty foods.    Avoid sex until your symptoms are  gone.    Avoid caffeine, alcohol, and spicy foods. These can irritate the bladder.  Follow-up care  Follow up with your healthcare provider, or as advised if all symptoms have not cleared up within 5 days. It is important to keep your follow-up appointment. You can talk with your provider to see if you need more tests of the urinary tract. This is especially important if you have infections that keep coming back.  If a culture was done, you will be told if your treatment needs to be changed. If directed, you can call to find out the results.  If X-rays were taken, you will be told of any findings that may affect your care.  Call 911  Call 911 if any of these occur:    Trouble breathing    Difficulty waking up    Feeling confused    Fainting or loss of consciousness    Rapid heart rate  When to seek medical advice  Call your healthcare provider right away if any of these occur:    Fever of 100.4 F (38 C) or higher, or as directed by your healthcare provider    Your symptoms don t improve after 2 days of treatment    Back or abdominal pain that gets worse    Repeated vomiting, or you aren t able to keep medicine down    Weakness or dizziness  Date Last Reviewed: 10/1/2016    0629-4986 [employer]. [address] [place] [address], [place] [address]. All rights reserved. This information is not intended as a substitute for professional medical care. Always follow your healthcare professional's instructions.

## 2019-09-28 NOTE — TELEPHONE ENCOUNTER
TELEPHONE ENCOUNTER:    Colon cancer status post colonic stent placement on 9/20/19.      Patient called today with complaints of increasing urinary frequency, burning with urination, and low grade fever for the past 24 hours. He has continued to have loose stools and is passing flatus. He is able to tolerate a diet (small portions) without nausea or vomiting. Denies blood per rectum or blood in his urine. I have suggested he be seen at an urgent care facility for possible urinary tract infection. All questions were answered.     Rehana Reed   Surgery Resident

## 2019-09-28 NOTE — PROGRESS NOTES
SUBJECTIVE:   Rian Malik is a 59 year old male presenting with a chief complaint of   Chief Complaint   Patient presents with     UTI     Frequency, pain, orange colored urine, trouble urinating x1 week- pt. was recently hospitalized & had a catheter       He is an established patient of [facility].    UTI    Onset of symptoms was 1 week ago  Course of illness is worsening  Current and associated symptoms dysuria, frequency, urine color change and recent hospitalization  Treatment and measures tried None  Predisposing factors include history of frequent UTI's  Patient denies rigors, flank pain, temperature > 101 degrees F. and vomiting        Review of Systems   Constitutional: Positive for chills. Negative for activity change, appetite change, fatigue and fever.   Gastrointestinal: Negative for abdominal pain and diarrhea.   Genitourinary: Positive for dysuria, frequency and urgency. Negative for flank pain, hematuria, penile pain and testicular pain.   Musculoskeletal: Negative for myalgias.   All other systems reviewed and are negative.      Past Medical History:   Diagnosis Date     Maldondao's esophagus      Cirrhosis (H)      Colon cancer (H) 10/11/2017    Poorly differentiated adenocarcinoma     Hepatitis C      Family History   Problem Relation Age of Onset     Skin Cancer Sister      Current Outpatient Medications   Medication Sig Dispense Refill     LORazepam (ATIVAN) 0.5 MG tablet Take 1 tablet (0.5 mg) by mouth every 4 hours as needed (Anxiety, Nausea/Vomiting or Sleep) 30 tablet 0     magnesium hydroxide (MILK OF MAGNESIA) 400 MG/5ML suspension Take by mouth daily as needed for constipation or heartburn       ondansetron (ZOFRAN) 8 MG tablet Take 1 tablet (8 mg) by mouth every 8 hours as needed (Nausea/Vomiting) 30 tablet 1     prochlorperazine (COMPAZINE) 10 MG tablet Take 1 tablet (10 mg) by mouth every 6 hours as needed (Nausea/Vomiting) 30 tablet 2     sulfamethoxazole-trimethoprim (BACTRIM  DS/SEPTRA DS) 800-160 MG tablet Take 1 tablet by mouth 2 times daily for 5 days 10 tablet 0     tamsulosin (FLOMAX) 0.4 MG capsule Take 1 capsule (0.4 mg) by mouth daily 30 capsule 0     polyethylene glycol (MIRALAX/GLYCOLAX) packet Take 17 g by mouth daily (Patient not taking: Reported on 2019) 100 packet 1     Social History     Tobacco Use     Smoking status: Former Smoker     Packs/day: 0.10     Years: 3.00     Pack years: 0.30     Types: Cigarettes     Start date: 2014     Last attempt to quit: 10/6/2017     Years since quittin.9     Smokeless tobacco: Never Used     Tobacco comment:     Substance Use Topics     Alcohol use: No     Comment: Sober since .  Drank 6 beers/day during the weekdays and 12 beers/day on weekends.         OBJECTIVE  BP (!) 154/82   Pulse 71   Temp 98.3  F (36.8  C) (Oral)   Resp 16   Wt 70.4 kg (155 lb 4.8 oz)   SpO2 100%   BMI 23.62 kg/m      Physical Exam  Cardiovascular:      Rate and Rhythm: Normal rate and regular rhythm.      Pulses: Normal pulses.      Heart sounds: Normal heart sounds. No murmur. No friction rub. No gallop.    Pulmonary:      Effort: Pulmonary effort is normal. No respiratory distress.      Breath sounds: Normal breath sounds. No stridor. No wheezing or rales.   Abdominal:      General: There is no distension.      Palpations: Abdomen is soft.      Tenderness: There is no tenderness. There is no right CVA tenderness, left CVA tenderness, guarding or rebound.      Comments: Obese, soft    Skin:     Capillary Refill: Capillary refill takes less than 2 seconds.   Neurological:      General: No focal deficit present.      Mental Status: He is alert and oriented to person, place, and time. Mental status is at baseline.   Psychiatric:         Mood and Affect: Mood normal.         Behavior: Behavior normal.         Labs:  Results for orders placed or performed in visit on 19 (from the past 24 hour(s))   UA reflex to Microscopic and Culture    Result Value Ref Range    Color Urine Yellow     Appearance Urine Clear     Glucose Urine Negative NEG^Negative mg/dL    Bilirubin Urine Negative NEG^Negative    Ketones Urine Negative NEG^Negative mg/dL    Specific Gravity Urine <=1.005 1.003 - 1.035    Blood Urine Negative NEG^Negative    pH Urine 6.0 5.0 - 7.0 pH    Protein Albumin Urine Negative NEG^Negative mg/dL    Urobilinogen Urine 0.2 0.2 - 1.0 EU/dL    Nitrite Urine Negative NEG^Negative    Leukocyte Esterase Urine Negative NEG^Negative    Source Midstream Urine        ASSESSMENT:    ICD-10-CM    1. Benign prostatic hyperplasia with post-void dribbling N40.1 tamsulosin (FLOMAX) 0.4 MG capsule    N39.43 sulfamethoxazole-trimethoprim (BACTRIM DS/SEPTRA DS) 800-160 MG tablet   2. Dysuria R30.0 UA reflex to Microscopic and Culture     sulfamethoxazole-trimethoprim (BACTRIM DS/SEPTRA DS) 800-160 MG tablet        Medical Decision Making:    Differential Diagnosis:  UTI: Dysuria, Pyelonephritis, BPH and Prostatitis    Serious Comorbid Conditions:  Adult:  None    PLAN: Discussed with patient causes and treatment options with post-hospitalization will treat with short course antibiotics. Also discussed the prostate and urinary symptoms, will prescribed a course Flomax.   Advised importance of increased water intake and avoid taking citrus or cranberry juice. Patient and daughter agree to the plan of care and will follow up with PCP in 1-2 weeks.     SAVANNA Mariscal, CNP      Patient Instructions     Patient Education     BPH (Enlarged Prostate)  The prostate is a gland at the base of the bladder. As some men get older, the prostate may get bigger in size. This problem is called benign prostatic hyperplasia (BPH). BPH puts pressure on the urethra. This is the tube that carries urine from the bladder to the penis. It may interfere with the flow of urine. It may also keep the bladder from emptying fully.    Symptoms of BPH include trouble starting urination  and feeling as though the bladder isn t emptying all the way. It also includes a weak urine stream, dribbling and leaking of urine, and frequent and urgent urination (especially at night). BPH can increase the risk of urinary infections. It can also block off urine flow completely. If this occurs, a thin tube (catheter) may be passed into the bladder to help drain urine.  If symptoms are mild, no treatment may be needed right now. If symptoms are more severe, treatment is likely needed. The goal of treatment is to improve urine flow and reduce symptoms. Treatments can include medicine and procedures. Your healthcare provider will discuss treatment options with you as needed.  Home care  The following guidelines will help you care for yourself at home:    Urinate as soon as you feel the urge. Don't try to hold your urine.    Don't limit your fluid intake during the day. Drink 6 to 8 glasses of water or liquids a day. This prevents bacteria from building up in the bladder.    Avoid drinking fluids after dinner to help reduce urination during the night.    Avoid medicines that can worsen your symptoms. These include certain cold and allergy medicines and antidepressants. Diuretics used for high blood pressure can also worsen symptoms. Talk to your doctor about the medicines you take. Other choices may work better for you.  Prostate cancer screening  BPH does not increase the risk of prostate cancer. But because prostate cancer is a common cancer in men, screening is sometimes recommended. This may help detect the cancer in its early stages when treatment is most effective. Factors that can increase the risk of prostate cancer include being -American or having a father or brother who had prostate cancer. A high-fat diet may also increase the risk of prostate cancer. Talk to your healthcare provider to see whether you should be screened for prostate cancer.  Follow-up care  Follow up with your healthcare provider,  or as advised  To learn more, go to:    National Kidney & Urologic Diseases Information Clearinghouse  kidney.niddk.nih.gov, 429.466.7786  When to seek medical advice  Call your healthcare provider right away if any of these occur:    Fever of 100.4 F (38.0 C) or higher, or as advised    Unable to pass urine for 8 hours    Increasing pressure or pain in your bladder (lower abdomen)    Blood in the urine    Increasing low back pain, not related to injury    Symptoms of urinary infection (increased urge to urinate, burning when passing urine, foul-smelling urine)  Date Last Reviewed: 7/1/2016 2000-2018 [employer]. [address] [place] [address], [place] [address]. All rights reserved. This information is not intended as a substitute for professional medical care. Always follow your healthcare professional's instructions.           Patient Education     Bladder Infection, Male (Adult)    You have a bladder infection.  Urine is normally free of bacteria. But bacteria can get into the urinary tract from the skin around the rectum or it may travel in the blood from elsewhere in the body.  This is called a urinary tract infection (UTI). An infection can occur anywhere in the urinary tract. It could be in a kidney (pyelonephritis)or in the bladder (cystitis) and urethra (urethritis). The urethra is the tube that drains the urine from the bladder through the tip of the penis.  The most common place for a UTI is in the bladder. This is called a bladder infection. Most bladder infections are easily treated. They are not serious unless the infection spreads up to the kidney.  The terms bladder infection, UTI, and cystitis are often used to describe the same thing, but they aren t always the same. Cystitis is an inflammation of the bladder. The most common cause of cystitis is an infection.   Keep in mind:    Infections in the urine are called UTIs.    Cystitis is usually caused by a UTI.    Not all UTIs and cases of  cystitis are bladder infections.    Bladder infections are the most common type of cystitis.  Symptoms of a bladder infection  The infection causes inflammation in the urethra and bladder. This inflammation causes many of the symptoms. The most common symptoms of a bladder infection are:    Pain or burning when urinating    Having to go more often than usual    Feeling like you need to go right away    Only a small amount comes out    Blood in urine    Discomfort in your belly (abdomen), usually in the lower abdomen, above the pubic bone    Cloudy, strong, or bad smelling urine    Unable to urinate (retention)    Urinary incontinence    Fever    Loss of appetite  Older adults may also feel confused.  Causes of a bladder infection  Bladder infections are not contagious. You can't get one from someone else, from a toilet seat, or from sharing a bath.  The most common cause of bladder infections is bacteria from the bowels. The bacteria get onto the skin around the opening of the urethra. From there they can get into the urine and travel up to the bladder. This causes inflammation and an infection. This usually happens because of:    An enlarged prostate    Poor cleaning of the genitals    Procedures that put a tube in your bladder, like a Sherman catheter    Bowel incontinence    Older age    Not emptying your bladder (The urine stays there, giving the bacteria a chance to grow.)    Dehydration (This allows urine to stay in the bladder longer.)    Constipation (This can cause the bowels to push on the bladder or urethra and keep the bladder from emptying.)  Treatment  Bladder infections are treated with antibiotics. They usually clear up quickly without complications. Treatment helps prevent a more serious kidney infection.  Medicines  Medicines can help in the treatment of a bladder infection:    You may have been given phenazopyridine to ease burning when you urinate. It will cause your urine to be bright orange. It  can stain clothing.    You may have been prescribed antibiotics. Take this medicine until you have finished it, even if you feel better. Taking all of the medicine will make sure the infection has cleared.  You can use acetaminophen or ibuprofen for pain, fever, or discomfort, unless another medicine was prescribed. You can also alternate them, or use both together. They work differently and are a different class of medicines, so taking them together is not an overdose. If you have chronic liver or kidney disease, talk with your healthcare provider before using these medicines. Also talk with your provider if you ve had a stomach ulcer or GI bleeding or are taking blood thinner medicines.  Home care  Here are some guidelines to help you care for yourself at home:    Drink plenty of fluids, unless your healthcare provider told you not to. Fluids will prevent dehydration and flush out your bladder.    Use good personal hygiene. Wipe from front to back after using the toilet, and clean your penis regularly. If you aren t circumcised, retract the foreskin when cleaning.    Urinate more frequently, and don t try to hold it in for long periods of time, if possible.    Wear loose-fitting clothes and cotton underwear. Avoid tight-fitting pants. This helps keep you clean and dry.    Change your diet to prevent constipation. This means eating more fresh foods and more fiber, and less junk and fatty foods.    Avoid sex until your symptoms are gone.    Avoid caffeine, alcohol, and spicy foods. These can irritate the bladder.  Follow-up care  Follow up with your healthcare provider, or as advised if all symptoms have not cleared up within 5 days. It is important to keep your follow-up appointment. You can talk with your provider to see if you need more tests of the urinary tract. This is especially important if you have infections that keep coming back.  If a culture was done, you will be told if your treatment needs to be  changed. If directed, you can call to find out the results.  If X-rays were taken, you will be told of any findings that may affect your care.  Call 911  Call 911 if any of these occur:    Trouble breathing    Difficulty waking up    Feeling confused    Fainting or loss of consciousness    Rapid heart rate  When to seek medical advice  Call your healthcare provider right away if any of these occur:    Fever of 100.4 F (38 C) or higher, or as directed by your healthcare provider    Your symptoms don t improve after 2 days of treatment    Back or abdominal pain that gets worse    Repeated vomiting, or you aren t able to keep medicine down    Weakness or dizziness  Date Last Reviewed: 10/1/2016    9680-2117 [employer]. [address] [place] [address], [place] [address]. All rights reserved. This information is not intended as a substitute for professional medical care. Always follow your healthcare professional's instructions.

## 2019-09-30 NOTE — NURSING NOTE
"Oncology Rooming Note    September 30, 2019 12:06 PM   Rian Malik is a 59 year old male who presents for:    Chief Complaint   Patient presents with     Oncology Clinic Visit     Follow up     Initial Vitals: /79 (BP Location: Left arm)   Pulse 87   Temp 97.8  F (36.6  C) (Oral)   Resp 16   Ht 1.727 m (5' 7.99\")   Wt 70.6 kg (155 lb 11.2 oz)   SpO2 94%   BMI 23.68 kg/m   Estimated body mass index is 23.68 kg/m  as calculated from the following:    Height as of this encounter: 1.727 m (5' 7.99\").    Weight as of this encounter: 70.6 kg (155 lb 11.2 oz). Body surface area is 1.84 meters squared.  No Pain (0) Comment: Data Unavailable   No LMP for male patient.  Allergies reviewed: Yes  Medications reviewed: Yes    Medications: MEDICATION REFILLS NEEDED TODAY. Provider was notified.  Pharmacy name entered into [facility]:    [facility] - [place], [place] - [address]  [facility] - [place], [place] - [address]  [id number] [address] [place] AT [facility]  [address]    Clinical concerns: Continued diarrhea following colon stent placement surgery on 9/19/19. May defer chemotherapy (if prescribed) until diarrhea is resolved. Also, patient was seen at urgent care on 9/28/19 for UTI and was prescribed Bactrim and Flomax which he is currently taking.     Maribel Newsome LPN              "

## 2019-09-30 NOTE — PROGRESS NOTES
Chemotherapy Education Notes  Met with patient in clinic for chemotherapy education on Irinotecan & Panitumumab regimen. Scottie written and verbal instruction given to patient.       Treatment Goal/Regimen/Duration: rationale for strict adherence, specific medication names including pre-treatment medications, and at home scheduled or as-needed medications, medication delivery methods; chemotherapy side effects and management of side effects, including: skin changes/nail changes, rash & treatment for, anemia, neutropenia, thrombocytopenia, diarrhea/constipation, nausea/vomiting, hair loss, memory changes, mouth sores, taste changes, appetite changes, peripheral neuropathy, fatigue, cold sensitivity, and myelosuppression.  Infection prevention, and monitoring of lab values, what lab tests and what changes of these values meant, along with the possibility of IV hydration or blood product transfusion, or the need to defer or hold treatment.      No barriers to learning identified. Patient verbalized understanding of all written and verbal information. All questions answered patient s satisfaction.     Patient brought to scheduling to arrange future appointments, including new chemo regimen  Jyoti Forte RN

## 2019-09-30 NOTE — LETTER
9/30/2019         RE: Rian Malik  [address] [place] [address]        Dear Colleague,    Thank you for referring your patient, Rian Malik, to the [facility]. Please see a copy of my visit note below.    Chemotherapy Education Notes  Met with patient in clinic for chemotherapy education on  Irinotecan & Panitumumab regimen. Scottie written and verbal instruction given to patient.       Treatment Goal/Regimen/Duration: rationale for strict adherence, specific medication names including pre-treatment medications, and at home scheduled or as-needed medications, medication delivery methods; chemotherapy side effects and management of side effects, including: skin changes/nail changes, rash & treatment for, anemia, neutropenia, thrombocytopenia, diarrhea/constipation, nausea/vomiting, hair loss, memory changes, mouth sores, taste changes, appetite changes, peripheral neuropathy, fatigue, cold sensitivity, and myelosuppression.  Infection prevention, and monitoring of lab values, what lab tests and what changes of these values meant, along with the possibility of IV hydration or blood product transfusion, or the need to defer or hold treatment.      No barriers to learning identified. Patient verbalized understanding of all written and verbal information. All questions answered patient s satisfaction.     Patient brought to scheduling to arrange future appointments, including new chemo regimen  Jyoti Forte RN      Visit Date:   09/30/2019      HISTORY OF PRESENT ILLNESS:  Rian Malik is a 59-year-old gentleman with metastatic adenocarcinoma of the colon diagnosed 10/2017 when he presented with crampy lower abdominal pain.  The patient presents to clinic to finalize treatment plan.  The patient has received FOLFOX and FOLFIRI in the past.  Most recently, he was having difficulty with diarrhea obstruction and abdominal discomfort.  Unfortunately, he required hospital  "admissions for further evaluation and care.  He did have a colonic stent placed.  His abdominal pain has improved, though he still has bits of diarrhea.  The patient is eager to start chemotherapy.  This past weekend, he was also in an urgent care with a urinary tract infection and is currently on Bactrim and Flomax.  No longer has any pain, no fevers, chills, nausea, vomiting, chest pain, shortness of breath.  Does have some diarrhea.  Denies any weakness.  Remainder of comprehensive review of systems is negative.      PAST MEDICAL HISTORY:   1.  Colon cancer, see above.   2.  Hepatitis C, treated.   3.  Cirrhosis.   4.  Maldonado's esophagus.      SOCIAL HISTORY:  .  Has a son and a daughter.  Works as a printer.  Comes in alone.  Denies tobacco use.      PHYSICAL EXAMINATION  /79 (BP Location: Left arm)   Pulse 87   Temp 97.8  F (36.6  C) (Oral)   Resp 16   Ht 1.727 m (5' 7.99\")   Wt 70.6 kg (155 lb 11.2 oz)   SpO2 94%   BMI 23.68 kg/m      GENERAL:  Comfortable, in no acute distress, pleasant.   HEENT:  Atraumatic, normocephalic.  Pupils equal, round, reactive.  Sclerae anicteric.  Oropharynx:  Moist mucous membranes.  No lesions, ulcers or exudates.   NECK:  Supple, full range of motion.  Trachea midline.   HEART:  Regular rate and rhythm, normal S1, S2.  No murmurs or gallop.  No edema.   LUNGS:  Clear to auscultation bilaterally.  No crackles or wheezes.  Normal respiratory effort.   ABDOMEN:  Positive bowel sounds.  Soft, distended, nontender.    EXTREMITIES:  No cyanosis.  Warm.   MUSCULOSKELETAL:  No point tenderness.   LYMPH NODES:  No cervical, supraclavicular or axillary nodes palpable.   SKIN:  No petechiae or rashes.   NEUROLOGIC:  Alert and oriented.     LABORATORIES:  WBC 11.5, hemoglobin 14.8, hematocrit 39.5, platelets 138.  ANC is 8.8.    PET scan from 08/26/2019 shows an ileocecal valve mass-like enhancement, moderate FDG uptake with upstream marked colonic dilatation " corresponding with recent colonoscopy in 2019.  No evidence of remote metastatic lesion in the neck, chest, abdomen or pelvis.  No hepatic lesion.        PATHOLOGY:  As stated from note of 09/10/2019.   MOLECULAR DIAGNOSTICS: [facility] One testing performed which showed KRAS wild type, codon 12 and 13, NRAS wild type, tumor mutational burden low, microsatellite stable.      ASSESSMENT AND PLAN:   1.  Metastatic colon cancer:  The patient has been on FOLFOX and FOLFIRI in the past.  He did have Foundation One testing performed which showed KRAS wild type, codon 12 and 13, microsatellite status stable, tumor mutational burden low, microsatellite stable.  I reviewed the patient's next steps of irinotecan and panitumumab.  Reviewed potential risks, side effects and toxicities including but not limited to bone marrow suppression, increase for infection, bleeding, allergic reactions, organ dysfunction, diarrhea, rash and death.  The patient has verbalized understanding and would like to proceed with therapy.  Orders placed and signed.  The patient will return to clinic to see NP prior to cycle 2. PET as new baseline.  2.  History of hepatitis and cirrhosis:  Completed antiviral treatment with HCV viral load undetectable per Dr. Last's notes.  Not addressed at today's visit.   3.  History of cytopenia:  We will check labs prior to initiating therapy.   4.  Molecular diagnostics:  As stated above.      WILIAN NG MD    Above note accurate for diagnosis, plan and orders placed. Epic/EMR orders and data may not be accurate because of available options, information not entered correctly/updated by staff other than writer or interface issues. All data entered by writer with the intent patient care not be compromised nor patient be unnecessarily billed       D: 2019   T: 10/01/2019   MT: STELLA      Name:     TRINY SIMMONS   MRN:      [id number]        Account:      [id number]   :      1960            Visit Date:   09/30/2019      Document: [id number]       cc: Moreno Harris MD       Again, thank you for allowing me to participate in the care of your patient.        Sincerely,        Bernadette Yousif MD

## 2019-10-01 NOTE — TELEPHONE ENCOUNTER
[facility]    Phone Message    May a detailed message be left on voicemail: yes    Reason for Call: Symptoms or Concerns     If patient has red-flag symptoms, warm transfer to triage line    Current symptom or concern: Pt calling to report he has been having lots of urgency to go to the bathroom without a successful bowel movement.  He also states his rectum feels like it's inflamed.  He has taken imodium, which kind of helped, but he is still having difficulties and cannot get any sleep because of this.  He is requesting a call back from [place] to discuss options    Symptoms have been present for:  1 week(s)    Has patient previously been seen for this? No    By :     Date:     Are there any new or worsening symptoms? No      Action Taken: Message routed to:  [facility]

## 2019-10-01 NOTE — PROGRESS NOTES
Palliative Care Outpatient Clinic Consultation Note    Patient:  Rian Malik    This note was written using voice recognition. I did proof read, but might have missed some things. Please contact me for major errors.    Chief Complaint:   Rian Malik 59 year old male who is presenting to the palliative medicine clinic today at the request of Dr Yousif for a palliative care consultation secondary to colon cancer.       The patient's primary care provider is:  Moreno Harris.     The patient is here by himself    History of Present Illness:  Medical History:  - metastatic colon cancer, diagnosed Oct 2017 during workup of abdominal pain    - s/p FOLFOX, FOLFIRI, plan to start irinotecan, panitumumab   - s/p colonic stent Sept 2019  - HepC, treated   - cirrhosis  - Maldonado's esophagus    Introduced the scope of our practice to Rian. Discussed our potential roles for symptom management, support/coping, and decisional support (aka goals of care).    He has pelvic pain. Has a sense of having to have a bowel movement throughout the day. He describes these episodes as painful, crampy, sudden, lasting 3-5 minutes, with about 5 minute breaks. This pain doesn't radiate, often goes away when he lays down, but sometimes it keeps him up at night as well.    Tylenol provides some short-lived relief.   No relief with sitzbaths. He uses preparation H, doesn't seem to do much.  All of this started when the colonic stent was placed 2 weeks ago.  He has 5-8 BMs daily, loose, small amounts. The pain is unchanged whether or not he has a BM. He feels that a period of diarrhea after stent placement caused local irritation.   He got some oxycodone on discharge after the stent placement. 5mg took the pain away for 2-3 hours.    He takes MoM every 2-3 days when he has increased bloating. The bloating hasn't changed with the stent placement. He occasionally takes GasX with some relief. The bloating interferes with his appetite.  Because of this he decreases his po intake about once a week. Otherwise his appetite is ok.   He gained a bit of weight recently.     Sleep has been an issue. He slept well after taking lorazepam last night. The pain has made falling asleep and staying asleep difficult.     Patient's Disease Understanding: appears adequate, we didn't discuss this in much detail today    Coping:    States that he is doing ok. He feels having gone through chemotherapy in the past makes it less stressful this time around.   He is grateful for his children's support, but acknowledges that all of them have been through a lot. His wife (their mother)  in  and he was diagnosed with cancer 2 years after that.   His works is his greatest source of strength and comfort.   He states he is Restorationist, believes in God. This is helpful in his current situation.    Social History  Living Situation: with his son and daughter in a house    Children: one son, one daughter    Actual/Potential Caregiver(s): his children. His wife  in     Occupation: [employer]    Substance Use/History of misuse: denies    Spiritual Background: Restorationist    Spiritual Concerns/Needs: goes to [employer] occasionally, but his personal relationship with God seems the more important aspect of his spirituality    Social History     Tobacco Use     Smoking status: Former Smoker     Packs/day: 0.10     Years: 3.00     Pack years: 0.30     Types: Cigarettes     Start date: 2014     Last attempt to quit: 10/6/2017     Years since quittin.9     Smokeless tobacco: Never Used     Tobacco comment:     Substance Use Topics     Alcohol use: No     Comment: Sober since .  Drank 6 beers/day during the weekdays and 12 beers/day on weekends.       Drug use: No       Advance Care Planning:  We did not discuss this in detail today. I introduced the concept.      Recommendations & Counseliny/o man with metastatic colon cancer s/p colonic stent 2 weeks ago,  "plan to start chemotherapy next week    Pain: describes as rectal. Timing of onset suggests some relation to stent placement and/or episode of diarrhea following the stent placement  - acetaminophen 1000mg tid  - dexamethasone 2mg daily (discussed with oncology)  - oxycodone 5mg q3h prn, max 3/day  - await input from colorectal surgery at appointment next week  - await effect of chemotherapy  - will consider pelvic PT if no marked improvement in near future    Bloating:   - simethicone qid prn    Advance Care planning:  - Rian agreed to discuss his goals and work on a health care directive at his next visit    Coping:  He is open to meeting with AJ KearnsSW       Return to clinic in 2 months.    Data and Chart Review:    REVIEW OF SYSTEMS:   ROS: 10 point ROS neg other than the symptoms noted above in the HPI     Impressions:  Palliative Performance Score:  100      Physical Exam:  BP (!) 143/86 (BP Location: Right arm)   Pulse 90   Temp 97.5  F (36.4  C) (Oral)   Resp 16   Ht 1.727 m (5' 7.99\")   Wt 72 kg (158 lb 12.8 oz)   SpO2 97%   BMI 24.15 kg/m       CONSTIT: awake, appears uncomfortable  EENT: MMM, EOMI, no icterus  RESP: reg, nl effort  MSK:moves x4, nl gait  SKIN:  warm, no rash, no obvious lesions  NEURO: alert, oriented x3  PSYCH: appropriate affect, memory and thought process intact    Medications, current, pertinent:  Oxycodone 5mg q6h prn   Lorazepam prn     : ok    Allergies   Allergen Reactions     Acetaminophen      Naproxen      Past Medical History:   Diagnosis Date     Maldonado's esophagus      Cirrhosis (H)      Colon cancer (H) 10/11/2017    Poorly differentiated adenocarcinoma     Hepatitis C      Past Surgical History:   Procedure Laterality Date     COLONOSCOPY N/A 8/4/2017    Procedure: COMBINED COLONOSCOPY, SINGLE OR MULTIPLE BIOPSY/POLYPECTOMY BY BIOPSY;  Colon & EGD;  Surgeon: Cristobal Blanco MD;  [facility]     COLONOSCOPY N/A 9/18/2019    Procedure: COLONIC " STENT with Fluoroscopy;  Surgeon: Guru Amelia Mehta MD;  [facility]     COLOSTOMY Left 10/11/2017     COMBINED CYSTOSCOPY, INSERT STENT URETER(S) Bilateral 9/12/2018    Procedure: COMBINED CYSTOSCOPY, INSERT STENT URETER(S);  Bilateral Cystoscopy With Temporary Stent Placement, Laparoscopic Hand Assisted Takedown Colostomy, Appendectomy, Mobilization of Splenic Flexure, Flexible Sigmoidoscopy, Endoscopic Clipping, Laparoscopic  Assisted Microablation of Liver Tumor, Intra-Op Ultrasound;  Surgeon: Hunter Quintero MD;  [facility]     CYSTOSCOPY AND LEFT STENT PLACEMENT  10/11/2017    Dr. Reyna     ESOPHAGOSCOPY, GASTROSCOPY, DUODENOSCOPY (EGD), COMBINED N/A 8/4/2017    Procedure: COMBINED ESOPHAGOSCOPY, GASTROSCOPY, DUODENOSCOPY (EGD), BIOPSY SINGLE OR MULTIPLE;;  Surgeon: Cristobal Blanco MD;  [facility]     HERNIA REPAIR Left 1988    Left inguinal     LAPAROSCOPIC ASSISTED COLOSTOMY TAKEDOWN N/A 9/12/2018    Procedure: LAPAROSCOPIC ASSISTED COLOSTOMY TAKEDOWN;;  Surgeon: Herminio Oswald MD;  [facility]     LAPAROSCOPIC CHOLECYSTECTOMY N/A 9/12/2018    Procedure: LAPAROSCOPIC CHOLECYSTECTOMY;;  Surgeon: Saqib Hale MD;  [facility]     LAPAROSCOPIC CONVERTED TO OPEN SIGMOIDECTOMY WITH END COLOSTOMY  10/11/2017    Dr. Schneider     LAPAROSCOPIC HAND ASSISTED HEPATECTOMY PARTIAL N/A 9/12/2018    Procedure: LAPAROSCOPIC HAND ASSISTED HEPATECTOMY PARTIAL;;  Surgeon: Saqib Hale MD;  [facility]     SIGMOIDOSCOPY FLEXIBLE N/A 9/12/2018    Procedure: SIGMOIDOSCOPY FLEXIBLE;;  Surgeon: Herminio Oswald MD;  [facility]     SIGMOIDOSCOPY FLEXIBLE N/A 7/12/2019    Procedure: Flexible Sigmoidoscopy With Balloon Dilation;  Surgeon: Hunter Carmona MD;  [facility]       Family History  Family History   Problem Relation Age of Onset     Skin Cancer Sister      Patient's Involvement with Prior History of Serious Illness in Family: his wife   in . He did not talk more about this experience today.    Lab and imaging data Reviewed:  Comments:         Opioid Risk Tool:   Opioid Risk Tool (ORT):    Family History of Substance Abuse:        Alcohol = 0 pt (no)       Illegal Drugs = 0 pt (no)       Prescription Drugs = 0 pt (no)      Personal History of Substance Abuse:       Alcohol = 0 pt (no)       Illegal Drugs = 0 pt (no)       Prescription Drugs = 0 pt (no)        Age: 0 pt (age < 16; age > 45)      History of Pre-adolescent Sexual Abuse: 0 pt (no)      Psychological Disease: 0 pt (none)      ORT Score = 0        0-3: Low risk for opioid abuse       4-7: Moderate risk for opioid abuse       >/= 8: High risk for opioid abuse    [facility] Opioid Prescribing Safety Plan    Opioid Safety Education: Reviewed by Ze Patterson RN on 10/2/2019   Opioid Risk Assessment: Performed by Nadine Bowers MD  on 10/2/2019 .  ORT score of 0.   Mood Disorder Assessment: Performed by Nadine Bowers MD  on 10/2/2019 .     reviewed with every prescription, last reviewed by Nadine Bowers MD on 10/01/2019     Additional recommendations based on patient's prognosis and indication for opioids include the following:    Expected prognosis >1 year  Risk: Low (ORT<4)  Indication for Opioid Use: Weak  Baseline and Annual UDT: Date of last urine drug screen was 10/2/2019 .   Counseling Support Plan: offered meeting with SAHRA Kearns .   Naloxone Script provided if patient also on benzodiazepine: not today  Tapering plan: discussed today        Nadine Bowers MD  Palliative Medicine  Pager [phone]

## 2019-10-01 NOTE — PROGRESS NOTES
"Visit Date:   09/30/2019      HISTORY OF PRESENT ILLNESS:  Rian Malik is a 59-year-old gentleman with metastatic adenocarcinoma of the colon diagnosed 10/2017 when he presented with crampy lower abdominal pain.  The patient presents to clinic to finalize treatment plan.  The patient has received FOLFOX and FOLFIRI in the past.  Most recently, he was having difficulty with diarrhea obstruction and abdominal discomfort.  Unfortunately, he required hospital admissions for further evaluation and care.  He did have a colonic stent placed.  His abdominal pain has improved, though he still has bits of diarrhea.  The patient is eager to start chemotherapy.  This past weekend, he was also in an urgent care with a urinary tract infection and is currently on Bactrim and Flomax.  No longer has any pain, no fevers, chills, nausea, vomiting, chest pain, shortness of breath.  Does have some diarrhea.  Denies any weakness.  Remainder of comprehensive review of systems is negative.      PAST MEDICAL HISTORY:   1.  Colon cancer, see above.   2.  Hepatitis C, treated.   3.  Cirrhosis.   4.  Maldonado's esophagus.      SOCIAL HISTORY:  .  Has a son and a daughter.  Works as a printer.  Comes in alone.  Denies tobacco use.      PHYSICAL EXAMINATION  /79 (BP Location: Left arm)   Pulse 87   Temp 97.8  F (36.6  C) (Oral)   Resp 16   Ht 1.727 m (5' 7.99\")   Wt 70.6 kg (155 lb 11.2 oz)   SpO2 94%   BMI 23.68 kg/m     GENERAL:  Comfortable, in no acute distress, pleasant.   HEENT:  Atraumatic, normocephalic.  Pupils equal, round, reactive.  Sclerae anicteric.  Oropharynx:  Moist mucous membranes.  No lesions, ulcers or exudates.   NECK:  Supple, full range of motion.  Trachea midline.   HEART:  Regular rate and rhythm, normal S1, S2.  No murmurs or gallop.  No edema.   LUNGS:  Clear to auscultation bilaterally.  No crackles or wheezes.  Normal respiratory effort.   ABDOMEN:  Positive bowel sounds.  Soft, distended, " nontender.    EXTREMITIES:  No cyanosis.  Warm.   MUSCULOSKELETAL:  No point tenderness.   LYMPH NODES:  No cervical, supraclavicular or axillary nodes palpable.   SKIN:  No petechiae or rashes.   NEUROLOGIC:  Alert and oriented.     LABORATORIES:  WBC 11.5, hemoglobin 14.8, hematocrit 39.5, platelets 138.  ANC is 8.8.    PET scan from 08/26/2019 shows an ileocecal valve mass-like enhancement, moderate FDG uptake with upstream marked colonic dilatation corresponding with recent colonoscopy in 08/2019.  No evidence of remote metastatic lesion in the neck, chest, abdomen or pelvis.  No hepatic lesion.        PATHOLOGY:  As stated from note of 09/10/2019.   MOLECULAR DIAGNOSTICS: [employer] One testing performed which showed KRAS wild type, codon 12 and 13, NRAS wild type, tumor mutational burden low, microsatellite stable.      ASSESSMENT AND PLAN:   1.  Metastatic colon cancer:  The patient has been on FOLFOX and FOLFIRI in the past.  He did have Foundation One testing performed which showed KRAS wild type, codon 12 and 13, microsatellite status stable, tumor mutational burden low, microsatellite stable.  I reviewed the patient's next steps of irinotecan and panitumumab.  Reviewed potential risks, side effects and toxicities including but not limited to bone marrow suppression, increase for infection, bleeding, allergic reactions, organ dysfunction, diarrhea, rash and death.  The patient has verbalized understanding and would like to proceed with therapy.  Orders placed and signed.  The patient will return to clinic to see NP prior to cycle 2. PET as new baseline.  2.  History of hepatitis and cirrhosis:  Completed antiviral treatment with HCV viral load undetectable per Dr. Last's notes.  Not addressed at today's visit.   3.  History of cytopenia:  We will check labs prior to initiating therapy.   4.  Molecular diagnostics:  As stated above.      WILIAN NG MD    Above note accurate for diagnosis, plan and  orders placed. Epic/EMR orders and data may not be accurate because of available options, information not entered correctly/updated by staff other than writer or interface issues. All data entered by writer with the intent patient care not be compromised nor patient be unnecessarily billed       D: 2019   T: 10/01/2019   MT: STELLA      Name:     TRINY SIMMONS   MRN:      [id number]        Account:      [id number]   :      1960           Visit Date:   2019      Document: [id number]       cc: Moreno Harris MD

## 2019-10-02 NOTE — PATIENT INSTRUCTIONS
Patient Instructions      Expand All Collapse All    Thank you for coming into the Palliative Care Clinic today.     1. Pain:  - take acetaminophen 1000mg (2 tylenol Extra-strength) three times a day  - take dexamethasone 2mg daily (take in the morning)  - take oxycodone 5mg up to every 3 hours as needed. Don't take more than 3 tablets in a day     2. Bloating:  - take simethicone up to four times a day as needed    3. Bowel Regimen  - oxycodone will slow down bowel movements. Please don't combine it with imodium    4. Support: consider meeting with our palliative  SAHRA Kearns     Call if your symptoms change and the medications we have prescribed are not working. Do not take your medications at any other dose or frequently than how they are prescribed.     Call us at least a week in advance if you need a medication refill    Please bring all your pill bottles to your next appointment.     Failure to follow these instructions may result in the palliative care team not being able to prescribe your medications.    Return to clinic in 2 months for a follow up.     You can reach the Palliative Care Team during business hours at the following number:    - [phone]    To reach the Palliative Care Provider on-call After-hours or on holidays and weekends, call: [phone].  Please note that we are not able to provide pain medication refills on evenings or weekends.

## 2019-10-02 NOTE — NURSING NOTE
"Oncology Rooming Note    October 2, 2019 9:23 AM   Rian Malik is a 59 year old male who presents for:    Chief Complaint   Patient presents with     Palliative     New patient     Initial Vitals: BP (!) 143/86 (BP Location: Right arm)   Pulse 90   Temp 97.5  F (36.4  C) (Oral)   Resp 16   Ht 1.727 m (5' 7.99\")   Wt 72 kg (158 lb 12.8 oz)   SpO2 97%   BMI 24.15 kg/m   Estimated body mass index is 24.15 kg/m  as calculated from the following:    Height as of this encounter: 1.727 m (5' 7.99\").    Weight as of this encounter: 72 kg (158 lb 12.8 oz). Body surface area is 1.86 meters squared.  Severe Pain (6) Comment: Data Unavailable   No LMP for male patient.  Allergies reviewed: Yes  Medications reviewed: Yes    Medications: MEDICATION REFILLS NEEDED TODAY. Provider was notified.  Pharmacy name entered into [employer]:    [facility] - [place], [place] - [address]  [facility] - [place], [place] - [address]  [id number] - [facility] [address] [place] [address]    Donita Alaniz LPN              "

## 2019-10-02 NOTE — LETTER
10/2/2019         RE: Rian Malik  [address] [place] [address]        Dear Colleague,    Thank you for referring your patient, Rian Malik, to the [facility]. Please see a copy of my visit note below.    Palliative Care Outpatient Clinic Consultation Note    Patient:  Rian Malik    This note was written using voice recognition. I did proof read, but might have missed some things. Please contact me for major errors.    Chief Complaint:   Rian Malik 59 year old male who is presenting to the palliative medicine clinic today at the request of Dr Yousif for a palliative care consultation secondary to colon cancer.       The patient's primary care provider is:  Moreno Harris.     The patient is here by himself    History of Present Illness:  Medical History:  - metastatic colon cancer, diagnosed Oct 2017 during workup of abdominal pain    - s/p FOLFOX, FOLFIRI, plan to start irinotecan, panitumumab   - s/p colonic stent Sept 2019  - HepC, treated   - cirrhosis  - Maldonado's esophagus    Introduced the scope of our practice to Rian. Discussed our potential roles for symptom management, support/coping, and decisional support (aka goals of care).    He has pelvic pain. Has a sense of having to have a bowel movement throughout the day. He describes these episodes as painful, crampy, sudden, lasting 3-5 minutes, with about 5 minute breaks. This pain doesn't radiate, often goes away when he lays down, but sometimes it keeps him up at night as well.    Tylenol provides some short-lived relief.   No relief with sitzbaths. He uses preparation H, doesn't seem to do much.  All of this started when the colonic stent was placed 2 weeks ago.  He has 5-8 BMs daily, loose, small amounts. The pain is unchanged whether or not he has a BM. He feels that a period of diarrhea after stent placement caused local irritation.   He got some oxycodone on discharge after the stent  placement. 5mg took the pain away for 2-3 hours.    He takes MoM every 2-3 days when he has increased bloating. The bloating hasn't changed with the stent placement. He occasionally takes GasX with some relief. The bloating interferes with his appetite. Because of this he decreases his po intake about once a week. Otherwise his appetite is ok.   He gained a bit of weight recently.     Sleep has been an issue. He slept well after taking lorazepam last night. The pain has made falling asleep and staying asleep difficult.     Patient's Disease Understanding: appears adequate, we didn't discuss this in much detail today    Coping:    States that he is doing ok. He feels having gone through chemotherapy in the past makes it less stressful this time around.   He is grateful for his children's support, but acknowledges that all of them have been through a lot. His wife (their mother)  in  and he was diagnosed with cancer 2 years after that.   His works is his greatest source of strength and comfort.   He states he is Spiritism, believes in God. This is helpful in his current situation.    Social History  Living Situation: with his son and daughter in a house    Children: one son, one daughter    Actual/Potential Caregiver(s): his children. His wife  in     Occupation: [employer]    Substance Use/History of misuse: denies    Spiritual Background: Spiritism    Spiritual Concerns/Needs: goes to Spiritism occasionally, but his personal relationship with God seems the more important aspect of his spirituality    Social History     Tobacco Use     Smoking status: Former Smoker     Packs/day: 0.10     Years: 3.00     Pack years: 0.30     Types: Cigarettes     Start date: 2014     Last attempt to quit: 10/6/2017     Years since quittin.9     Smokeless tobacco: Never Used     Tobacco comment:     Substance Use Topics     Alcohol use: No     Comment: Sober since .  Drank 6 beers/day during the  "weekdays and 12 beers/day on weekends.       Drug use: No       Advance Care Planning:  We did not discuss this in detail today. I introduced the concept.      Recommendations & Counseliny/o man with metastatic colon cancer s/p colonic stent 2 weeks ago, plan to start chemotherapy next week    Pain: describes as rectal. Timing of onset suggests some relation to stent placement and/or episode of diarrhea following the stent placement  - acetaminophen 1000mg tid  - dexamethasone 2mg daily (discussed with oncology)  - oxycodone 5mg q3h prn, max 3/day  - await input from colorectal surgery at appointment next week  - await effect of chemotherapy  - will consider pelvic PT if no marked improvement in near future    Bloating:   - simethicone qid prn    Advance Care planning:  - Rian agreed to discuss his goals and work on a health care directive at his next visit    Coping:  He is open to meeting with SAHRA Kearns       Return to clinic in 2 months.    Data and Chart Review:    REVIEW OF SYSTEMS:   ROS: 10 point ROS neg other than the symptoms noted above in the HPI     Impressions:  Palliative Performance Score:  100      Physical Exam:  BP (!) 143/86 (BP Location: Right arm)   Pulse 90   Temp 97.5  F (36.4  C) (Oral)   Resp 16   Ht 1.727 m (5' 7.99\")   Wt 72 kg (158 lb 12.8 oz)   SpO2 97%   BMI 24.15 kg/m        CONSTIT: awake, appears uncomfortable  EENT: MMM, EOMI, no icterus  RESP: reg, nl effort  MSK:moves x4, nl gait  SKIN:  warm, no rash, no obvious lesions  NEURO: alert, oriented x3  PSYCH: appropriate affect, memory and thought process intact    Medications, current, pertinent:  Oxycodone 5mg q6h prn   Lorazepam prn     : ok    Allergies   Allergen Reactions     Acetaminophen      Naproxen      Past Medical History:   Diagnosis Date     Maldonado's esophagus      Cirrhosis (H)      Colon cancer (H) 10/11/2017    Poorly differentiated adenocarcinoma     Hepatitis C      Past Surgical " History:   Procedure Laterality Date     COLONOSCOPY N/A 8/4/2017    Procedure: COMBINED COLONOSCOPY, SINGLE OR MULTIPLE BIOPSY/POLYPECTOMY BY BIOPSY;  Colon & EGD;  Surgeon: Cristobal Blanco MD;  Location: [facility]     COLONOSCOPY N/A 9/18/2019    Procedure: COLONIC STENT with Fluoroscopy;  Surgeon: Guru Amelia Mehta MD;  Location: [facility]     COLOSTOMY Left 10/11/2017     COMBINED CYSTOSCOPY, INSERT STENT URETER(S) Bilateral 9/12/2018    Procedure: COMBINED CYSTOSCOPY, INSERT STENT URETER(S);  Bilateral Cystoscopy With Temporary Stent Placement, Laparoscopic Hand Assisted Takedown Colostomy, Appendectomy, Mobilization of Splenic Flexure, Flexible Sigmoidoscopy, Endoscopic Clipping, Laparoscopic  Assisted Microablation of Liver Tumor, Intra-Op Ultrasound;  Surgeon: Hunter Quintero MD;  Location: [facility]     CYSTOSCOPY AND LEFT STENT PLACEMENT  10/11/2017    Dr. Reyna     ESOPHAGOSCOPY, GASTROSCOPY, DUODENOSCOPY (EGD), COMBINED N/A 8/4/2017    Procedure: COMBINED ESOPHAGOSCOPY, GASTROSCOPY, DUODENOSCOPY (EGD), BIOPSY SINGLE OR MULTIPLE;;  Surgeon: Cristobal Blanco MD;  Location: [facility]     HERNIA REPAIR Left 1988    Left inguinal     LAPAROSCOPIC ASSISTED COLOSTOMY TAKEDOWN N/A 9/12/2018    Procedure: LAPAROSCOPIC ASSISTED COLOSTOMY TAKEDOWN;;  Surgeon: Herminio Oswald MD;  Location: [facility]     LAPAROSCOPIC CHOLECYSTECTOMY N/A 9/12/2018    Procedure: LAPAROSCOPIC CHOLECYSTECTOMY;;  Surgeon: Saqib Hale MD;  Location: [facility]     LAPAROSCOPIC CONVERTED TO OPEN SIGMOIDECTOMY WITH END COLOSTOMY  10/11/2017    Dr. Schneider     LAPAROSCOPIC HAND ASSISTED HEPATECTOMY PARTIAL N/A 9/12/2018    Procedure: LAPAROSCOPIC HAND ASSISTED HEPATECTOMY PARTIAL;;  Surgeon: Saqib Hale MD;  Location: [facility]     SIGMOIDOSCOPY FLEXIBLE N/A 9/12/2018    Procedure: SIGMOIDOSCOPY FLEXIBLE;;  Surgeon: Herminio Oswald MD;  [facility]     SIGMOIDOSCOPY FLEXIBLE N/A 7/12/2019    Procedure: Flexible  Sigmoidoscopy With Balloon Dilation;  Surgeon: Hunter Carmona MD;  Location: [facility]       Family History  Family History   Problem Relation Age of Onset     Skin Cancer Sister      Patient's Involvement with Prior History of Serious Illness in Family: his wife  in . He did not talk more about this experience today.    Lab and imaging data Reviewed:  Comments:         Opioid Risk Tool:   Opioid Risk Tool (ORT):    Family History of Substance Abuse:        Alcohol = 0 pt (no)       Illegal Drugs = 0 pt (no)       Prescription Drugs = 0 pt (no)      Personal History of Substance Abuse:       Alcohol = 0 pt (no)       Illegal Drugs = 0 pt (no)       Prescription Drugs = 0 pt (no)        Age: 0 pt (age < 16; age > 45)      History of Pre-adolescent Sexual Abuse: 0 pt (no)      Psychological Disease: 0 pt (none)      ORT Score = 0        0-3: Low risk for opioid abuse       4-7: Moderate risk for opioid abuse       >/= 8: High risk for opioid abuse    [facility] Pall[facility] Opioid Prescribing Safety Plan    Opioid Safety Education: Reviewed by Ze Patterson RN on 10/2/2019   Opioid Risk Assessment: Performed by Nadine Bowers MD  on 10/2/2019 .  ORT score of 0.   Mood Disorder Assessment: Performed by Nadine Bowers MD  on 10/2/2019 .     reviewed with every prescription, last reviewed by Nadine Bowers MD on 10/01/2019     Additional recommendations based on patient's prognosis and indication for opioids include the following:    Expected prognosis >1 year  Risk: Low (ORT<4)  Indication for Opioid Use: Weak  Baseline and Annual UDT: Date of last urine drug screen was 10/2/2019 .   Counseling Support Plan: offered meeting with SAHRA Kearns .   Naloxone Script provided if patient also on benzodiazepine: not today  Tapering plan: discussed today        Nadine Bowers MD  Palliative Medicine  Pager [phone]      Again, thank you for allowing me to participate in  the care of your patient.        Sincerely,        Nadine Bowers MD

## 2019-10-04 NOTE — TELEPHONE ENCOUNTER
Dr Xiao had spoken to radiologist Dr Facundo Mayers and received results of PET scan done today. Dr Xiao asked writer to call patient to see if he was having symptoms of bowel obstruction. If symptomatic he should go to ED otherwise stable PET and patient can wait for his scheduled appointment with colorectal surgery 10/10/19.  Writer attempted to contact patient. left vm for patient to return call.   Joanna Mclaughlin  RN, BSN, OCN

## 2019-10-08 NOTE — LETTER
"    10/8/2019         RE: Rian Malik  [address] [place] [address]        Dear Colleague,    Thank you for referring your patient, Rian Malik, to the [facility]. Please see a copy of my visit note below.    CLINICAL NUTRITION SERVICES - ASSESSMENT NOTE    Rian Malik 59 year old referred for MNT related to colon cancer    Time Spent: 45 minutes  Visit Type:  initial   Referring Physician:  Benja  Pt accompanied by:  self - pt seen during infusion    NUTRITION HISTORY  Factors affecting nutrition intake include: gas/bloating  Current diet: low fiber/low residue  Current appetite/intake: good  Chemotherapy: Irinotecan/Panitumum       Rian reports that his appetite and intake have improved significantly over the past 2 weeks since hospitalized for bowel obstruction.   He received low fiber diet education on 9/19 and reports good adherence to this diet.    He has been noticing increased gas/bloating and wonders if it could be from the Boost Plus he is drinking or from the dairy he is eating.   He is eating 3 meals/day and does not tend to snack much.  He might snack on chips or crackers.     Diet Recall  Breakfast Boost Plus, Cruz Derek breakfast sandwich   Lunch Cream or broth based soup with crackers   Dinner Chicken with rice and green beans   Snacks Chips, banana   Beverages Water  ONS - 1-2 Boost Plus/day    Alcohol No   Dining out seldom     ANTHROPOMETRICS  Height: 67\"  Weight: 165 lbs/75kg  BMI: 25.8  Weight History: up 10 lb x past 3 weeks (? Accuracy - no reported fluid retention)  Wt Readings from Last 10 Encounters:   10/08/19 75 kg (165 lb 6.4 oz)   10/02/19 72 kg (158 lb 12.8 oz)   09/30/19 70.6 kg (155 lb 11.2 oz)   09/28/19 70.4 kg (155 lb 4.8 oz)   09/20/19 70.5 kg (155 lb 8 oz)   09/10/19 73.6 kg (162 lb 3.2 oz)   08/20/19 72.1 kg (159 lb)   08/06/19 71.8 kg (158 lb 6.4 oz)   07/13/19 72.4 kg (159 lb 11.2 oz)   05/06/19 75.4 kg (166 lb 2 oz)     Dosing " Weight: 75kg    Medications/vitamins/minerals/herbals:   Reviewed    Labs:   Labs reviewed    ASSESSED NUTRITION NEEDS:  Estimated Energy Needs: 2200 kcals (30 Kcal/Kg)  Justification: maintenance  Estimated Protein Needs: 85 grams protein (1.2 g pro/Kg)  Justification: maintenance  Estimated Fluid Needs: 2200  mL   Justification: 1ml/kcal    MALNUTRITION:  % Weight Loss:  None noted  % Intake:  No decreased intake noted  Subcutaneous Fat Loss:  None observed  Muscle Loss:  None observed  Fluid Retention:  None noted    Malnutrition Diagnosis: Patient does not meet two of the above criteria necessary for diagnosing malnutrition    NUTRITION DIAGNOSIS:  Food and nutrition-related knowledge deficit related to food choices with cancer and h/o SBO as evidenced by pt with questions regarding optimal food choices.     INTERVENTIONS  Provided written & verbal education:   - Discussed ways to maximize calories and protein intake via low fiber diet.  Advised pt to aim for at least 2200kcal and 85g protein via 5-6 small frequent meals. Reviewed low fiber food/snack options.   - Reviewed common barriers to eating.  As treatment progresses, eating may become more difficult.  Discussed ways to cope with this.   - Reviewed ONS shakes without FOS, inulin or chicory root.  Suggested he try Ensure Plus and Ensure Max (berry or cafe mocha flavor only) as these products do not contain FOS.    Reviewed ONS that contain FOS that may be causing gas/bloating (Rollins brand, Boost and Ensure Max vanilla),   Suggested he eat dairy products separately from ONS to help associate what may be causing gas/bloating. If it's the dairy and he wants to continue to consume dairy, suggested Lactaid/lactase enzymes.     Provided pt with corresponding education materials/handouts on:  Sources of Protein    Goals  1.  Aim for 5-6 small frequent meals  2.  Aim for 2200kcal and 85g protein/day  3. Weight maintenance through cancer treatment      Follow-Up  Plans: Pt has RD contact information for questions.    Pt to follow up with RD in 1-2 weeks at the time of treatment.     MONITORING AND EVALUATION:  -Food intake  -Fluid/beverage intake  -Liquid meal replacement or supplement  -Weight trends    Sabine Serrano RDN, LD        Again, thank you for allowing me to participate in the care of your patient.        Sincerely,        Sabine Serrano RD

## 2019-10-08 NOTE — PROGRESS NOTES
Port accessed with no incidient. Blood return noted. Labs drawn, tubes labeled and double signed. Port flushed with saline and heparin. Patient tolerated port draw well.   Mervat Bhatia RN

## 2019-10-08 NOTE — PROGRESS NOTES
Infusion Nursing Note:  Rian Malik presents today for C1D1 Vectibix/Irinotecan and potassium replacement.    Patient seen by provider today: No   present during visit today: Not Applicable.    Note:   Educated patient with what to expect with today's infusion and possible side effects. Patient verbalized understanding.    Patient states he has received Irinotecan in the past, and uses Atropine prior to Irinotecan for prophylaxis.  Prophylactic Atropine given.    Patient concerned about his glucose level.  States it has been elevated lately.  Wants to know if he has diabetes. Patient is on oral Decadron.  Informed patient that diabetes can increase glucose level, but encouraged patient to follow up with his provider.    Patient also wants to know how fast chemo will shrink the tumor, as he hopes shrinkage will decrease abdominal pain.  Talked with pharmacist.  It could take weeks.  Patient informed.    Patient states he has abdominal pain 2-3.  Feels bloated/distended.  Gets some relief passing gas after taking gas pills. States he is either constipated, or has diarrhea.  Has not had large formed stool recently.  States he has an appetite.  States he had an obstruction in September and states this does not feel like an obstruction.  Informed patient of Dr Xiao's recommendations per the telephone encounter on 10/4/19.  Patient states if pain increases he will change his diet to soft foods/liquids only and plan on attending colorectal surgeon consult on 10/10/19.  States if pain drastically worsens he will go the ER.  Updated patient's care coordinator.      Intravenous Access:  Implanted Port.    Treatment Conditions:  Lab Results   Component Value Date    HGB 12.5 10/08/2019     Lab Results   Component Value Date    WBC 6.6 10/08/2019      Lab Results   Component Value Date    ANEU 5.3 10/08/2019     Lab Results   Component Value Date     10/08/2019      Lab Results   Component Value Date    NA  138 10/08/2019                   Lab Results   Component Value Date    POTASSIUM 3.2 10/08/2019           Lab Results   Component Value Date    MAG 1.8 10/08/2019            Lab Results   Component Value Date    CR 0.67 10/08/2019                   Lab Results   Component Value Date    MAICOL 8.2 10/08/2019                Lab Results   Component Value Date    BILITOTAL 1.0 10/08/2019           Lab Results   Component Value Date    ALBUMIN 3.2 10/08/2019                    Lab Results   Component Value Date    ALT 38 10/08/2019           Lab Results   Component Value Date    AST 32 10/08/2019       Results reviewed, labs MET treatment parameters, ok to proceed with treatment.      Post Infusion Assessment:  Patient tolerated infusion without incident.  Blood return noted pre and post infusion.  Site patent and intact, free from redness, edema or discomfort.  No evidence of extravasations.  Access discontinued per protocol.       Discharge Plan:   Patient will return 10/23/19 for next appointment.   Patient discharged in stable condition accompanied by: self.  Departure Mode: Ambulatory.    Yaritza Astudillo, RN, RN

## 2019-10-08 NOTE — PROGRESS NOTES
"CLINICAL NUTRITION SERVICES - ASSESSMENT NOTE    Rian Malik 59 year old referred for MNT related to colon cancer    Time Spent: 45 minutes  Visit Type: initial   Referring Physician: Benja  Pt accompanied by: self - pt seen during infusion    NUTRITION HISTORY  Factors affecting nutrition intake include: gas/bloating  Current diet: low fiber/low residue  Current appetite/intake: good  Chemotherapy: Irinotecan/Panitumum       Rian reports that his appetite and intake have improved significantly over the past 2 weeks since hospitalized for bowel obstruction.   He received low fiber diet education on 9/19 and reports good adherence to this diet.    He has been noticing increased gas/bloating and wonders if it could be from the Boost Plus he is drinking or from the dairy he is eating.   He is eating 3 meals/day and does not tend to snack much.  He might snack on chips or crackers.     Diet Recall  Breakfast Boost Plus, Cruz Derek breakfast sandwich   Lunch Cream or broth based soup with crackers   Dinner Chicken with rice and green beans   Snacks Chips, banana   Beverages Water  ONS - 1-2 Boost Plus/day    Alcohol No   Dining out seldom     ANTHROPOMETRICS  Height: 67\"  Weight: 165 lbs/75kg  BMI: 25.8  Weight History: up 10 lb x past 3 weeks (? Accuracy - no reported fluid retention)  Wt Readings from Last 10 Encounters:   10/08/19 75 kg (165 lb 6.4 oz)   10/02/19 72 kg (158 lb 12.8 oz)   09/30/19 70.6 kg (155 lb 11.2 oz)   09/28/19 70.4 kg (155 lb 4.8 oz)   09/20/19 70.5 kg (155 lb 8 oz)   09/10/19 73.6 kg (162 lb 3.2 oz)   08/20/19 72.1 kg (159 lb)   08/06/19 71.8 kg (158 lb 6.4 oz)   07/13/19 72.4 kg (159 lb 11.2 oz)   05/06/19 75.4 kg (166 lb 2 oz)     Dosing Weight: 75kg    Medications/vitamins/minerals/herbals:   Reviewed    Labs:   Labs reviewed    ASSESSED NUTRITION NEEDS:  Estimated Energy Needs: 2200 kcals (30 Kcal/Kg)  Justification: maintenance  Estimated Protein Needs: 85 grams protein (1.2 g " pro/Kg)  Justification: maintenance  Estimated Fluid Needs: 2200  mL   Justification: 1ml/kcal    MALNUTRITION:  % Weight Loss:  None noted  % Intake:  No decreased intake noted  Subcutaneous Fat Loss:  None observed  Muscle Loss:  None observed  Fluid Retention:  None noted    Malnutrition Diagnosis: Patient does not meet two of the above criteria necessary for diagnosing malnutrition    NUTRITION DIAGNOSIS:  Food and nutrition-related knowledge deficit related to food choices with cancer and h/o SBO as evidenced by pt with questions regarding optimal food choices.     INTERVENTIONS  Provided written & verbal education:   - Discussed ways to maximize calories and protein intake via low fiber diet.  Advised pt to aim for at least 2200kcal and 85g protein via 5-6 small frequent meals. Reviewed low fiber food/snack options.   - Reviewed common barriers to eating.  As treatment progresses, eating may become more difficult.  Discussed ways to cope with this.   - Reviewed ONS shakes without FOS, inulin or chicory root.  Suggested he try Ensure Plus and Ensure Max (berry or cafe mocha flavor only) as these products do not contain FOS.    Reviewed ONS that contain FOS that may be causing gas/bloating (Rollins brand, Boost and Ensure Max vanilla),   Suggested he eat dairy products separately from ONS to help associate what may be causing gas/bloating. If it's the dairy and he wants to continue to consume dairy, suggested Lactaid/lactase enzymes.     Provided pt with corresponding education materials/handouts on:  Sources of Protein    Goals  1.  Aim for 5-6 small frequent meals  2.  Aim for 2200kcal and 85g protein/day  3. Weight maintenance through cancer treatment      Follow-Up Plans: Pt has RD contact information for questions.    Pt to follow up with RD in 1-2 weeks at the time of treatment.     MONITORING AND EVALUATION:  -Food intake  -Fluid/beverage intake  -Liquid meal replacement or supplement  -Weight  trends    Sabine Serrano RDN, LD

## 2019-10-10 NOTE — PROGRESS NOTES
"Colon and Rectal Surgery Postoperative Clinic Note    RE: Rian Malik  : 1960  AP: 10/10/2019    Rian Malik is a very pleasant 59 year old male with history of HepC cirrhosis (viral load from OSH undetectable), thrombocytopenia 2/2 cirrhosis & hypersplenism, and poorly differentiated adenocarcinoma colon [id number]( liver lesion) s/p open sigmoidectomy with end colostomy (10/2017) and later underwent laparoscopic liver ablation with intraoperative ultrasound, colostomy takedown, appendectomy, descending colectomy and anterior dissection (2018) s/p flexible sigmoidoscopy and dilation of colonic stricture (7/10/2019) d/t colonic obstruction. He was hospitalized - with distal colonic obstruction at the rectosigmoid junction with additional ileoceal mass. He underwent colonic stent placement on 19.    Interval history: Rian has been doing well since returning home.  He continues to have loose stools 10-15 times a day and is taking occasional Imodium for this.  He denies any pain and reports that his urgency is improved.  He is eating and drinking without difficulty and his weight has actually gone up.  He started chemotherapy on Monday and feels that this made the diarrhea slightly worse.  He follows with Dr. Bernadette Yousif of oncology.    Physical Examination:   /83 (BP Location: Left arm, Patient Position: Sitting, Cuff Size: Adult Regular)   Pulse 83   Ht 5' 7.99\"   Wt 165 lb 3.2 oz   SpO2 94%   BMI 25.13 kg/m    General: Alert, oriented, in no acute distress, sitting comfortably  The remainder of the exam was deferred today.    Assessment/Plan:  59 year old male with metastatic colon cancer status post colonic stent placement on 2019.  He has been doing well and urgency and pain have improved.  He continues to pass loose stools multiple times a day.  He started chemotherapy on Monday.  He can follow-up with us as needed but asked him to return to contact us if he develops " any worsening pain, bleeding, or difficulty with bowel movements again. Patient's questions were answered to his stated satisfaction and he is in agreement with this plan.    Medical history:  Past Medical History:   Diagnosis Date     Maldonado's esophagus      Cirrhosis (H)      Colon cancer (H) 10/11/2017    Poorly differentiated adenocarcinoma     Hepatitis C        Surgical history:  Past Surgical History:   Procedure Laterality Date     COLONOSCOPY N/A 8/4/2017    Procedure: COMBINED COLONOSCOPY, SINGLE OR MULTIPLE BIOPSY/POLYPECTOMY BY BIOPSY;  Colon & EGD;  Surgeon: Cristobal Blanco MD;  Location: [facility]     COLONOSCOPY N/A 9/18/2019    Procedure: COLONIC STENT with Fluoroscopy;  Surgeon: Guru Amelia Mehta MD;  [facility]     COLOSTOMY Left 10/11/2017     COMBINED CYSTOSCOPY, INSERT STENT URETER(S) Bilateral 9/12/2018    Procedure: COMBINED CYSTOSCOPY, INSERT STENT URETER(S);  Bilateral Cystoscopy With Temporary Stent Placement, Laparoscopic Hand Assisted Takedown Colostomy, Appendectomy, Mobilization of Splenic Flexure, Flexible Sigmoidoscopy, Endoscopic Clipping, Laparoscopic  Assisted Microablation of Liver Tumor, Intra-Op Ultrasound;  Surgeon: Hunter Quintero MD;  [facility]     CYSTOSCOPY AND LEFT STENT PLACEMENT  10/11/2017    Dr. Reyna     ESOPHAGOSCOPY, GASTROSCOPY, DUODENOSCOPY (EGD), COMBINED N/A 8/4/2017    Procedure: COMBINED ESOPHAGOSCOPY, GASTROSCOPY, DUODENOSCOPY (EGD), BIOPSY SINGLE OR MULTIPLE;;  Surgeon: Cristobal Blanco MD;  Location: [facility]     HERNIA REPAIR Left 1988    Left inguinal     LAPAROSCOPIC ASSISTED COLOSTOMY TAKEDOWN N/A 9/12/2018    Procedure: LAPAROSCOPIC ASSISTED COLOSTOMY TAKEDOWN;;  Surgeon: Herminio Oswald MD;  [facility]     LAPAROSCOPIC CHOLECYSTECTOMY N/A 9/12/2018    Procedure: LAPAROSCOPIC CHOLECYSTECTOMY;;  Surgeon: Saqib Hale MD;  [facility]     LAPAROSCOPIC CONVERTED TO OPEN SIGMOIDECTOMY WITH END COLOSTOMY   10/11/2017    Dr. Schneider     LAPAROSCOPIC HAND ASSISTED HEPATECTOMY PARTIAL N/A 9/12/2018    Procedure: LAPAROSCOPIC HAND ASSISTED HEPATECTOMY PARTIAL;;  Surgeon: Saqib Hale MD;  [facility]     SIGMOIDOSCOPY FLEXIBLE N/A 9/12/2018    Procedure: SIGMOIDOSCOPY FLEXIBLE;;  Surgeon: Herminio Oswald MD;  [facility]     SIGMOIDOSCOPY FLEXIBLE N/A 7/12/2019    Procedure: Flexible Sigmoidoscopy With Balloon Dilation;  Surgeon: Hunter Carmona MD;  [facility]       Problem list:  Patient Active Problem List    Diagnosis Date Noted     Large bowel obstruction (H) 09/16/2019     Priority: Medium     Bowel obstruction (H) 07/10/2019     Priority: Medium     Colon cancer (H) 09/12/2018     Priority: Medium     Adenocarcinoma of sigmoid colon (H) 10/25/2017     Priority: Medium     Maldonado's esophagus determined by endoscopy 08/04/2017     Priority: Medium     Abscess of sigmoid colon 05/05/2017     Priority: Medium     Fat necrosis secondary to colon cancer.       Chronic hepatitis C without hepatic coma (H) 05/03/2017     Priority: Medium     Left inguinal pain 05/03/2017     Priority: Medium     CARDIOVASCULAR SCREENING; LDL GOAL LESS THAN 160 05/01/2017     Priority: Medium     Hepatitis C virus infection 12/19/2014     Priority: Medium       Medications:  Current Outpatient Medications   Medication Sig Dispense Refill     acetaminophen (TYLENOL) 500 MG tablet Take 2 tablets (1,000 mg) by mouth 3 times daily       dexamethasone (DECADRON) 2 MG tablet Take 1 tablet (2 mg) by mouth 2 times daily (with meals) 30 tablet 3     loperamide (IMODIUM) 2 MG capsule 2 caps at 1st sign of diarrhea & 1 cap every 2hrs until 12hrs diarrhea free. During night, 2 caps at bedtime & 2 caps every 4hrs until AM 30 capsule 0     loperamide (IMODIUM) 2 MG capsule Take 2 mg by mouth 4 times daily as needed for diarrhea       LORazepam (ATIVAN) 0.5 MG tablet Take 1 tablet (0.5 mg) by mouth every 4 hours as needed  "(Anxiety, Nausea/Vomiting or Sleep) 30 tablet 0     magnesium hydroxide (MILK OF MAGNESIA) 400 MG/5ML suspension Take by mouth daily as needed for constipation or heartburn       ondansetron (ZOFRAN) 8 MG tablet Take 1 tablet (8 mg) by mouth every 8 hours as needed (Nausea/Vomiting) 10 tablet 2     ondansetron (ZOFRAN) 8 MG tablet Take 1 tablet (8 mg) by mouth every 8 hours as needed (Nausea/Vomiting) 30 tablet 1     oxyCODONE (ROXICODONE) 5 MG tablet Take 1 tablet (5 mg) by mouth every 3 hours as needed for severe pain Max: 3 tablets a day 90 tablet 0     polyethylene glycol (MIRALAX/GLYCOLAX) packet Take 17 g by mouth daily (Patient not taking: Reported on 10/8/2019) 100 packet 1     simethicone (MYLICON) 80 MG chewable tablet Take 1 tablet (80 mg) by mouth every 6 hours as needed for flatulence or cramping 120 tablet 3     tamsulosin (FLOMAX) 0.4 MG capsule Take 1 capsule (0.4 mg) by mouth daily 30 capsule 0       Allergies:  Allergies   Allergen Reactions     Naproxen        Family history:  Family History   Problem Relation Age of Onset     Skin Cancer Sister        Social history:  Social History     Tobacco Use     Smoking status: Former Smoker     Packs/day: 0.10     Years: 3.00     Pack years: 0.30     Types: Cigarettes     Start date: 2014     Last attempt to quit: 10/6/2017     Years since quittin.0     Smokeless tobacco: Never Used     Tobacco comment:     Substance Use Topics     Alcohol use: No     Comment: Sober since .  Drank 6 beers/day during the weekdays and 12 beers/day on weekends.       Marital status: .    Nursing Notes:   Yandel Bhatti, EMT  10/10/2019 10:12 AM  Signed  Chief Complaint   Patient presents with     Surgical Followup     Date of surgery 2019.       Vitals:    10/10/19 1011   BP: 136/83   BP Location: Left arm   Patient Position: Sitting   Cuff Size: Adult Regular   Pulse: 83   SpO2: 94%   Weight: 165 lb 3.2 oz   Height: 5' 7.99\"       Body mass index " is 25.13 kg/m .      JOHN Ventura                         Total face to face time was 15 minutes, >50% counseling.    SAVANNA He, NP-C  Colon and Rectal Surgery  [facility]    This note was created using speech recognition software and may contain unintended word substitutions.

## 2019-10-10 NOTE — TELEPHONE ENCOUNTER
Received vm from patient stating he was instructed to call with any changes. States he is having ankle swelling and diarrhea. Writer attempted to call patient. Left vm with callback number.  Joanna Mclaughlin  RN, BSN, OCN

## 2019-10-10 NOTE — LETTER
"10/10/2019       RE: Rian Malik  [address] [place] [address]     Dear Colleague,    Thank you for referring your patient, Rian Malik, to the [facility] COLON AND RECTAL SURGERY at [facility]. Please see a copy of my visit note below.    Colon and Rectal Surgery Postoperative Clinic Note    RE: Rian Malik  : 1960  AP: 10/10/2019    Rian Malik is a very pleasant 59 year old male with history of HepC cirrhosis (viral load from OSH undetectable), thrombocytopenia 2/2 cirrhosis & hypersplenism, and poorly differentiated adenocarcinoma colon [id number]( liver lesion) s/p open sigmoidectomy with end colostomy (10/2017) and later underwent laparoscopic liver ablation with intraoperative ultrasound, colostomy takedown, appendectomy, descending colectomy and anterior dissection (2018) s/p flexible sigmoidoscopy and dilation of colonic stricture (7/10/2019) d/t colonic obstruction. He was hospitalized - with distal colonic obstruction at the rectosigmoid junction with additional ileoceal mass. He underwent colonic stent placement on 19.    Interval history: Rian has been doing well since returning home.  He continues to have loose stools 10-15 times a day and is taking occasional Imodium for this.  He denies any pain and reports that his urgency is improved.  He is eating and drinking without difficulty and his weight has actually gone up.  He started chemotherapy on Monday and feels that this made the diarrhea slightly worse.  He follows with Dr. Bernadette Yousif of oncology.    Physical Examination:   /83 (BP Location: Left arm, Patient Position: Sitting, Cuff Size: Adult Regular)   Pulse 83   Ht 5' 7.99\"   Wt 165 lb 3.2 oz   SpO2 94%   BMI 25.13 kg/m     General: Alert, oriented, in no acute distress, sitting comfortably  The remainder of the exam was deferred today.    Assessment/Plan:  59 year old male with metastatic colon " cancer status post colonic stent placement on 9/18/2019.  He has been doing well and urgency and pain have improved.  He continues to pass loose stools multiple times a day.  He started chemotherapy on Monday.  He can follow-up with us as needed but asked him to return to contact us if he develops any worsening pain, bleeding, or difficulty with bowel movements again. Patient's questions were answered to his stated satisfaction and he is in agreement with this plan.    Medical history:  Past Medical History:   Diagnosis Date     Maldonado's esophagus      Cirrhosis (H)      Colon cancer (H) 10/11/2017    Poorly differentiated adenocarcinoma     Hepatitis C        Surgical history:  Past Surgical History:   Procedure Laterality Date     COLONOSCOPY N/A 8/4/2017    Procedure: COMBINED COLONOSCOPY, SINGLE OR MULTIPLE BIOPSY/POLYPECTOMY BY BIOPSY;  Colon & EGD;  Surgeon: Cristobal Blanco MD;  [facility]     COLONOSCOPY N/A 9/18/2019    Procedure: COLONIC STENT with Fluoroscopy;  Surgeon: Guru Amelia Mehta MD;  [facility]     COLOSTOMY Left 10/11/2017     COMBINED CYSTOSCOPY, INSERT STENT URETER(S) Bilateral 9/12/2018    Procedure: COMBINED CYSTOSCOPY, INSERT STENT URETER(S);  Bilateral Cystoscopy With Temporary Stent Placement, Laparoscopic Hand Assisted Takedown Colostomy, Appendectomy, Mobilization of Splenic Flexure, Flexible Sigmoidoscopy, Endoscopic Clipping, Laparoscopic  Assisted Microablation of Liver Tumor, Intra-Op Ultrasound;  Surgeon: Hunter Quintero MD;  [facility]     CYSTOSCOPY AND LEFT STENT PLACEMENT  10/11/2017    Dr. Reyna     ESOPHAGOSCOPY, GASTROSCOPY, DUODENOSCOPY (EGD), COMBINED N/A 8/4/2017    Procedure: COMBINED ESOPHAGOSCOPY, GASTROSCOPY, DUODENOSCOPY (EGD), BIOPSY SINGLE OR MULTIPLE;;  Surgeon: Cristobal Blanco MD;  Location: [facility]     HERNIA REPAIR Left 1988    Left inguinal     LAPAROSCOPIC ASSISTED COLOSTOMY TAKEDOWN N/A 9/12/2018    Procedure: LAPAROSCOPIC  ASSISTED COLOSTOMY TAKEDOWN;;  Surgeon: Herminio Oswald MD;  [facility]     LAPAROSCOPIC CHOLECYSTECTOMY N/A 9/12/2018    Procedure: LAPAROSCOPIC CHOLECYSTECTOMY;;  Surgeon: Saqib Hale MD;  [facility]     LAPAROSCOPIC CONVERTED TO OPEN SIGMOIDECTOMY WITH END COLOSTOMY  10/11/2017    Dr. Schneider     LAPAROSCOPIC HAND ASSISTED HEPATECTOMY PARTIAL N/A 9/12/2018    Procedure: LAPAROSCOPIC HAND ASSISTED HEPATECTOMY PARTIAL;;  Surgeon: Saqib Hale MD;  [facility]     SIGMOIDOSCOPY FLEXIBLE N/A 9/12/2018    Procedure: SIGMOIDOSCOPY FLEXIBLE;;  Surgeon: Herminio Oswald MD;  [facility]     SIGMOIDOSCOPY FLEXIBLE N/A 7/12/2019    Procedure: Flexible Sigmoidoscopy With Balloon Dilation;  Surgeon: Hunter Camrona MD;  [facility]       Problem list:  Patient Active Problem List    Diagnosis Date Noted     Large bowel obstruction (H) 09/16/2019     Priority: Medium     Bowel obstruction (H) 07/10/2019     Priority: Medium     Colon cancer (H) 09/12/2018     Priority: Medium     Adenocarcinoma of sigmoid colon (H) 10/25/2017     Priority: Medium     Maldonado's esophagus determined by endoscopy 08/04/2017     Priority: Medium     Abscess of sigmoid colon 05/05/2017     Priority: Medium     Fat necrosis secondary to colon cancer.       Chronic hepatitis C without hepatic coma (H) 05/03/2017     Priority: Medium     Left inguinal pain 05/03/2017     Priority: Medium     CARDIOVASCULAR SCREENING; LDL GOAL LESS THAN 160 05/01/2017     Priority: Medium     Hepatitis C virus infection 12/19/2014     Priority: Medium       Medications:  Current Outpatient Medications   Medication Sig Dispense Refill     acetaminophen (TYLENOL) 500 MG tablet Take 2 tablets (1,000 mg) by mouth 3 times daily       dexamethasone (DECADRON) 2 MG tablet Take 1 tablet (2 mg) by mouth 2 times daily (with meals) 30 tablet 3     loperamide (IMODIUM) 2 MG capsule 2 caps at 1st sign of diarrhea & 1 cap  every 2hrs until 12hrs diarrhea free. During night, 2 caps at bedtime & 2 caps every 4hrs until AM 30 capsule 0     loperamide (IMODIUM) 2 MG capsule Take 2 mg by mouth 4 times daily as needed for diarrhea       LORazepam (ATIVAN) 0.5 MG tablet Take 1 tablet (0.5 mg) by mouth every 4 hours as needed (Anxiety, Nausea/Vomiting or Sleep) 30 tablet 0     magnesium hydroxide (MILK OF MAGNESIA) 400 MG/5ML suspension Take by mouth daily as needed for constipation or heartburn       ondansetron (ZOFRAN) 8 MG tablet Take 1 tablet (8 mg) by mouth every 8 hours as needed (Nausea/Vomiting) 10 tablet 2     ondansetron (ZOFRAN) 8 MG tablet Take 1 tablet (8 mg) by mouth every 8 hours as needed (Nausea/Vomiting) 30 tablet 1     oxyCODONE (ROXICODONE) 5 MG tablet Take 1 tablet (5 mg) by mouth every 3 hours as needed for severe pain Max: 3 tablets a day 90 tablet 0     polyethylene glycol (MIRALAX/GLYCOLAX) packet Take 17 g by mouth daily (Patient not taking: Reported on 10/8/2019) 100 packet 1     simethicone (MYLICON) 80 MG chewable tablet Take 1 tablet (80 mg) by mouth every 6 hours as needed for flatulence or cramping 120 tablet 3     tamsulosin (FLOMAX) 0.4 MG capsule Take 1 capsule (0.4 mg) by mouth daily 30 capsule 0       Allergies:  Allergies   Allergen Reactions     Naproxen        Family history:  Family History   Problem Relation Age of Onset     Skin Cancer Sister        Social history:  Social History     Tobacco Use     Smoking status: Former Smoker     Packs/day: 0.10     Years: 3.00     Pack years: 0.30     Types: Cigarettes     Start date: 2014     Last attempt to quit: 10/6/2017     Years since quittin.0     Smokeless tobacco: Never Used     Tobacco comment:     Substance Use Topics     Alcohol use: No     Comment: Sober since .  Drank 6 beers/day during the weekdays and 12 beers/day on weekends.       Marital status: .    Nursing Notes:   Yandel Bhatti, EMT  10/10/2019 10:12 AM   "Signed  Chief Complaint   Patient presents with     Surgical Followup     Date of surgery 9/19/2019.       Vitals:    10/10/19 1011   BP: 136/83   BP Location: Left arm   Patient Position: Sitting   Cuff Size: Adult Regular   Pulse: 83   SpO2: 94%   Weight: 165 lb 3.2 oz   Height: 5' 7.99\"       Body mass index is 25.13 kg/m .      JOHN Ventura       Total face to face time was 15 minutes, >50% counseling.    SAVANNA He, NP-C  Colon and Rectal Surgery  [facility]    This note was created using speech recognition software and may contain unintended word substitutions.        "

## 2019-10-10 NOTE — NURSING NOTE
"Chief Complaint   Patient presents with     Surgical Followup     Date of surgery 9/19/2019.       Vitals:    10/10/19 1011   BP: 136/83   BP Location: Left arm   Patient Position: Sitting   Cuff Size: Adult Regular   Pulse: 83   SpO2: 94%   Weight: 165 lb 3.2 oz   Height: 5' 7.99\"       Body mass index is 25.13 kg/m .      Yandel Bhatti, EMT                      "

## 2019-10-15 NOTE — TELEPHONE ENCOUNTER
Patient returned call. States he is concerned he received too much chemo because he had such severe side effects for almost a week after his first treatment on the new regimen. Reports nausea without vomiting for about 4-5 days. Taking zofran q 8 hrs. Nausea improving. Also c/o diarrhea up to 20 times per day for first 4-5 days. Currently only 1-2/ day. He is taking imodium 2 q am and 1 every 4 hrs. Advised to continue taking this dose until he has no diarrhea x 12 hrs. Also instructed, after next cycle, to increase imodium to 2 q am and 1 every 2 hrs or 2 every 4 hrs until he has no diarrhea for 12 hrs. Patient also c/o gas,cramping after he left infusion. Encouraged patient to ask for additional atropine prior to leaving his next infusion. Patient states he has been feeling better the past couple days.  Patient is drinking about 60 oz of fluid/day. Encouraged him to try to increase this to maybe 80 oz due to his diarrhea. Patient also states he is able to eat ok but feels he has lost weight. Encouraged patient, as he starts to feel better, to try increase caloric intake.   Patient also c/o ankle swelling for several days after infusion. This has resolved.     Encouraged patient to call with any further questions or concerned.  Joanna Mclaughlin  RN, BSN, OCN

## 2019-10-17 NOTE — TELEPHONE ENCOUNTER
E-visit instructions given to Rian to further discuss referral for dermatology.     Ami Arias RN, BSN, PHN

## 2019-10-21 NOTE — TELEPHONE ENCOUNTER
Pt left vm. States he has developed an acne-like rash with white and black heads on his face. He recalls a lotion he can apply.  Joanna Mclaughlin  RN, BSN, OCN    Returned patient's call and informed him prescription for a face gel would be sent to [facility]. Provided callback number if he has any questions. Patient has appointment with NP in 2 days.  Joanna Mclaughlin RN, BSN, OCN

## 2019-10-23 NOTE — PROGRESS NOTES
Infusion Nursing Note:  Rian Malik presents today for C2D1 Vectibix/Irinotecan.    Patient seen by provider today: Yes: SILVIA Lopez   present during visit today: Not Applicable.    Note: Patient reported slight change in eye sight over the past month. He can see clearly far away but up close it is harder to see, more blurry. Pharmacy was consulted. Compazine may cause temporary blurry vision - pt is taking at home compazine for nausea. Patient was educated and will switch to Zofran if eye sight gets worse.     Intravenous Access:  Implanted Port.    Treatment Conditions:  Lab Results   Component Value Date    HGB 13.1 10/23/2019     Lab Results   Component Value Date    WBC 6.8 10/23/2019      Lab Results   Component Value Date    ANEU 4.0 10/23/2019     Lab Results   Component Value Date     10/23/2019      Lab Results   Component Value Date     10/23/2019                   Lab Results   Component Value Date    POTASSIUM 3.5 10/23/2019           Lab Results   Component Value Date    MAG 1.8 10/23/2019            Lab Results   Component Value Date    CR 0.68 10/23/2019                   Lab Results   Component Value Date    MAICOL 8.8 10/23/2019                Lab Results   Component Value Date    BILITOTAL 0.5 10/23/2019           Lab Results   Component Value Date    ALBUMIN 3.4 10/23/2019                    Lab Results   Component Value Date    ALT 91 10/23/2019           Lab Results   Component Value Date    AST 35 10/23/2019       Results reviewed, labs MET treatment parameters, ok to proceed with treatment.      Post Infusion Assessment:  Patient tolerated infusion without incident.  Blood return noted pre and post infusion.  Site patent and intact, free from redness, edema or discomfort.  No evidence of extravasations.  Access discontinued per protocol.       Discharge Plan:   Patient directed to scheduling.   Patient discharged in stable condition accompanied by: self.  Departure Mode:  Ambulatory.    Mervat Bhatia RN

## 2019-10-23 NOTE — NURSING NOTE
"Oncology Rooming Note    October 23, 2019 8:42 AM   Rian Malik is a 59 year old male who presents for:    Chief Complaint   Patient presents with     Oncology Clinic Visit     Follow up/prior to treatment     Initial Vitals: /80 (BP Location: Right arm)   Pulse 90   Temp 97.7  F (36.5  C) (Oral)   Resp 16   Ht 1.727 m (5' 7.99\")   Wt 67.8 kg (149 lb 8 oz)   SpO2 97%   BMI 22.74 kg/m   Estimated body mass index is 22.74 kg/m  as calculated from the following:    Height as of this encounter: 1.727 m (5' 7.99\").    Weight as of this encounter: 67.8 kg (149 lb 8 oz). Body surface area is 1.8 meters squared.  No Pain (0) Comment: Data Unavailable   No LMP for male patient.  Allergies reviewed: Yes  Medications reviewed: Yes    Medications: MEDICATION REFILLS NEEDED TODAY. Provider was notified.  Pharmacy name entered into [email or web address]: [facility] [place] - [id number] TACHO Alaniz LPN              "

## 2019-10-23 NOTE — LETTER
10/23/2019         RE: Rian Malik  [id number] [place] [address]        Dear Colleague,    Thank you for referring your patient, Rian Malik, to the [facility]. Please see a copy of my visit note below.    Oncology Follow Up Visit: October 23, 2019     Oncologist: Dr Padilla Last  PCP: Moreno Harris    Diagnosis: Stage III Colon Cancer  Rian Malik is a 58 yo  male that was c/o lower abdominal cramping. CT showed sigmoid abscess but at resection was found to have large 5 cm firmly adherent sigmoid colon mass.Lymphovascular invasion present but no perineural invasion.  0/3 Reactive Lymph Nodes were positive. ([id number])  He has intact mismatch repair.  Foundation One testing performed which showed KRAS wild type, codon 12 and 13, microsatellite status stable, tumor mutational burden low, microsatellite stable  Treatment:   10/11/2017 sigmoidectomy with end colostomy  11/21/2017 -6/2018 modified FOLFOX-6 x 12 cycles  9/12/2018 laparoscopic liver oblation with intraoperative US, colostomy takedown, appendectomy, descending colectomy and anterior dissection. Surgical pathology showed periappendiceal tissue, ascending colon margin/mesentery and pericolonic fibroadipose adipose tissue involved by signet ring adenocarcinoma.  10/24/2018 -1/2019 FOLFIRI x 6 cycles  9/18/2019 received colonic stent  10/8/2019 Panitumumab/irinotecan    Interval History: Mr. Malik comes to clinic alone for symptom review prior to cycle 2 of Panitumumab/irinotecan.  Patient has 2 concerns today that he is getting significant loose stools and that the rash to his face has become more pustular and is spread to the scalp neck and chest.  Patient had called into clinic about the rash turning red and bumpy but did not have the pustules at the time and was started on clindamycin gel.  He states this has not stopped it from progressing-not pruritic.   He is also stated he has developed  "frequent stools that are mostly loose and is now using Imodium much more freely than previous with better control using 2 tablets in the morning and usually 1 to 2 tablets throughout the day and another 2 tablets before bed to control the stools.  States he still has an appetite and is eating well.  He has no pain nausea mouth sores shortness of breath fevers or other illnesses but does feel more weak since starting the treatment and getting the diarrhea and has lost a significant amount of weight.  Rest of comprehensive and complete ROS is reviewed and is negative.   Past Medical History:   Diagnosis Date     Maldonaod's esophagus      Cirrhosis (H)      Colon cancer (H) 10/11/2017    Poorly differentiated adenocarcinoma     Hepatitis C    - previously completed treatment for Hepatitis C.   Current Outpatient Medications   Medication     acetaminophen (TYLENOL) 500 MG tablet     clindamycin (CLINDAMAX) 1 % external gel     dexamethasone (DECADRON) 2 MG tablet     doxycycline hyclate (VIBRA-TABS) 100 MG tablet     loperamide (IMODIUM) 2 MG capsule     loperamide (IMODIUM) 2 MG capsule     LORazepam (ATIVAN) 0.5 MG tablet     magnesium hydroxide (MILK OF MAGNESIA) 400 MG/5ML suspension     ondansetron (ZOFRAN) 8 MG tablet     ondansetron (ZOFRAN) 8 MG tablet     oxyCODONE (ROXICODONE) 5 MG tablet     polyethylene glycol (MIRALAX/GLYCOLAX) packet     simethicone (MYLICON) 80 MG chewable tablet     tamsulosin (FLOMAX) 0.4 MG capsule     No current facility-administered medications for this visit.      Facility-Administered Medications Ordered in Other Visits   Medication     heparin 100 UNIT/ML injection 5 mL     sodium chloride (PF) 0.9% PF flush 10-20 mL     Allergies   Allergen Reactions     Naproxen        Physical Exam:/80 (BP Location: Right arm)   Pulse 90   Temp 97.7  F (36.5  C) (Oral)   Resp 16   Ht 1.727 m (5' 7.99\")   Wt 67.8 kg (149 lb 8 oz)   SpO2 97%   BMI 22.74 kg/m      ECOG PS- " 0  Constitutional: Alert cooperative and in no distress  ENT: Eyes bright, No mouth sores.   Neck: Supple, No adenopathy.Thyroid symmetric  Cardiac: Heart rate and rhythm is regular and strong without murmur  Respiratory: Breathing easy. Lung sounds clear to auscultation  GI: Abdomen is soft, non-tender, BS normal. No masses or organomegaly..   MS: Muscle tone normal- moving well on own, extremities normal with no edema.   Skin: Obvious papulopustules rash noted to face worse in the T-zone is now also in beard scalp anterior and posterior chest.  Good skin turgor  Neuro: Sensory grossly WNL, gait normal.   Lymph: Normal ant/post cervical, axillary, supraclavicular nodes  Psych: Mentation appears normal and affect normal with good conversation.    Laboratory Results:   Results for orders placed or performed in visit on 10/23/19   CBC with platelets differential   Result Value Ref Range    WBC 6.8 4.0 - 11.0 10e9/L    RBC Count 4.06 (L) 4.4 - 5.9 10e12/L    Hemoglobin 13.1 (L) 13.3 - 17.7 g/dL    Hematocrit 37.6 (L) 40.0 - 53.0 %    MCV 93 78 - 100 fl    MCH 32.3 26.5 - 33.0 pg    MCHC 34.8 31.5 - 36.5 g/dL    RDW 15.2 (H) 10.0 - 15.0 %    Platelet Count 121 (L) 150 - 450 10e9/L    Diff Method Automated Method     % Neutrophils 58.7 %    % Lymphocytes 29.2 %    % Monocytes 7.5 %    % Eosinophils 3.5 %    % Basophils 0.7 %    % Immature Granulocytes 0.4 %    Absolute Neutrophil 4.0 1.6 - 8.3 10e9/L    Absolute Lymphocytes 2.0 0.8 - 5.3 10e9/L    Absolute Monocytes 0.5 0.0 - 1.3 10e9/L    Absolute Eosinophils 0.2 0.0 - 0.7 10e9/L    Absolute Basophils 0.1 0.0 - 0.2 10e9/L    Abs Immature Granulocytes 0.0 0 - 0.4 10e9/L   Comprehensive metabolic panel   Result Value Ref Range    Sodium 139 133 - 144 mmol/L    Potassium 3.5 3.4 - 5.3 mmol/L    Chloride 103 94 - 109 mmol/L    Carbon Dioxide 31 20 - 32 mmol/L    Anion Gap 5 3 - 14 mmol/L    Glucose 119 (H) 70 - 99 mg/dL    Urea Nitrogen 22 7 - 30 mg/dL    Creatinine 0.68  0.66 - 1.25 mg/dL    GFR Estimate >90 >60 mL/min/[1.73_m2]    GFR Estimate If Black >90 >60 mL/min/[1.73_m2]    Calcium 8.8 8.5 - 10.1 mg/dL    Bilirubin Total 0.5 0.2 - 1.3 mg/dL    Albumin 3.4 3.4 - 5.0 g/dL    Protein Total 6.4 (L) 6.8 - 8.8 g/dL    Alkaline Phosphatase 97 40 - 150 U/L    ALT 91 (H) 0 - 70 U/L    AST 35 0 - 45 U/L   Magnesium   Result Value Ref Range    Magnesium 1.8 1.6 - 2.3 mg/dL     Assessment and Plan:  Stage III Colon Cancer-Pt started new plan with Panitumumab/Irinotecan and pt is noting side effects of new papulopustular rash from the panitumumab and diarrhea from the irinotecan that are very bothersome. Controlling symptoms is our goal.   He is otherwise showing that he is eating and taking foods well so is meeting goals to continue with planned cycle 2 of treatment.   Pt will return in 2 weeks for review prior to next cycle with treatment labs.   Rash-due to use of EGFR inhibitor- panitumumab - common side effect of the drug and is affecting face, scalp neck and anterior/posterior upper chest and back - pt was started on clindamycin gel for the acneiform rash but did not work well and today will start doxycycline 100 mg bid- expecting to use bid x 1 month and see if we can decreased dosing to daily in future. May add steroid cream if needed in future   Loose stools- common side effect of the irinotecan- pt is using the imodium - 2 tabs in morning, 1-2 tabs in day and 2 tabs in evening to control loose stools to 3-5 daily. Reminded of need for added fluids with loss through loose stools.   Thrombocytopenia continues and is acceptable level today but will continue to monitor.    This was a 25 min visit with > 50% in counseling and coordinating care including education and management of concerns.    Jessica Marsh CNP      Again, thank you for allowing me to participate in the care of your patient.        Sincerely,        Jessica Marsh, NP, APRN CNP

## 2019-10-23 NOTE — PROGRESS NOTES
Oncology Follow Up Visit: October 23, 2019     Oncologist: Dr Padilla Last  PCP: Moreno Harris    Diagnosis: Stage III Colon Cancer  Rian Malik is a 58 yo  male that was c/o lower abdominal cramping. CT showed sigmoid abscess but at resection was found to have large 5 cm firmly adherent sigmoid colon mass.Lymphovascular invasion present but no perineural invasion.  0/3 Reactive Lymph Nodes were positive. ([id number])  He has intact mismatch repair.  Foundation One testing performed which showed KRAS wild type, codon 12 and 13, microsatellite status stable, tumor mutational burden low, microsatellite stable  Treatment:   10/11/2017 sigmoidectomy with end colostomy  11/21/2017 -6/2018 modified FOLFOX-6 x 12 cycles  9/12/2018 laparoscopic liver oblation with intraoperative US, colostomy takedown, appendectomy, descending colectomy and anterior dissection. Surgical pathology showed periappendiceal tissue, ascending colon margin/mesentery and pericolonic fibroadipose adipose tissue involved by signet ring adenocarcinoma.  10/24/2018 -1/2019 FOLFIRI x 6 cycles  9/18/2019 received colonic stent  10/8/2019 Panitumumab/irinotecan    Interval History: Mr. Malik comes to clinic alone for symptom review prior to cycle 2 of Panitumumab/irinotecan.  Patient has 2 concerns today that he is getting significant loose stools and that the rash to his face has become more pustular and is spread to the scalp neck and chest.  Patient had called into clinic about the rash turning red and bumpy but did not have the pustules at the time and was started on clindamycin gel.  He states this has not stopped it from progressing-not pruritic.   He is also stated he has developed frequent stools that are mostly loose and is now using Imodium much more freely than previous with better control using 2 tablets in the morning and usually 1 to 2 tablets throughout the day and another 2 tablets before bed to control the stools.   "States he still has an appetite and is eating well.  He has no pain nausea mouth sores shortness of breath fevers or other illnesses but does feel more weak since starting the treatment and getting the diarrhea and has lost a significant amount of weight.  Rest of comprehensive and complete ROS is reviewed and is negative.   Past Medical History:   Diagnosis Date     Maldonado's esophagus      Cirrhosis (H)      Colon cancer (H) 10/11/2017    Poorly differentiated adenocarcinoma     Hepatitis C    - previously completed treatment for Hepatitis C.   Current Outpatient Medications   Medication     acetaminophen (TYLENOL) 500 MG tablet     clindamycin (CLINDAMAX) 1 % external gel     dexamethasone (DECADRON) 2 MG tablet     doxycycline hyclate (VIBRA-TABS) 100 MG tablet     loperamide (IMODIUM) 2 MG capsule     loperamide (IMODIUM) 2 MG capsule     LORazepam (ATIVAN) 0.5 MG tablet     magnesium hydroxide (MILK OF MAGNESIA) 400 MG/5ML suspension     ondansetron (ZOFRAN) 8 MG tablet     ondansetron (ZOFRAN) 8 MG tablet     oxyCODONE (ROXICODONE) 5 MG tablet     polyethylene glycol (MIRALAX/GLYCOLAX) packet     simethicone (MYLICON) 80 MG chewable tablet     tamsulosin (FLOMAX) 0.4 MG capsule     No current facility-administered medications for this visit.      Facility-Administered Medications Ordered in Other Visits   Medication     heparin 100 UNIT/ML injection 5 mL     sodium chloride (PF) 0.9% PF flush 10-20 mL     Allergies   Allergen Reactions     Naproxen        Physical Exam:/80 (BP Location: Right arm)   Pulse 90   Temp 97.7  F (36.5  C) (Oral)   Resp 16   Ht 1.727 m (5' 7.99\")   Wt 67.8 kg (149 lb 8 oz)   SpO2 97%   BMI 22.74 kg/m     ECOG PS- 0  Constitutional: Alert cooperative and in no distress  ENT: Eyes bright, No mouth sores.   Neck: Supple, No adenopathy.Thyroid symmetric  Cardiac: Heart rate and rhythm is regular and strong without murmur  Respiratory: Breathing easy. Lung sounds clear to " auscultation  GI: Abdomen is soft, non-tender, BS normal. No masses or organomegaly..   MS: Muscle tone normal- moving well on own, extremities normal with no edema.   Skin: Obvious papulopustules rash noted to face worse in the T-zone is now also in beard scalp anterior and posterior chest.  Good skin turgor  Neuro: Sensory grossly WNL, gait normal.   Lymph: Normal ant/post cervical, axillary, supraclavicular nodes  Psych: Mentation appears normal and affect normal with good conversation.    Laboratory Results:   Results for orders placed or performed in visit on 10/23/19   CBC with platelets differential   Result Value Ref Range    WBC 6.8 4.0 - 11.0 10e9/L    RBC Count 4.06 (L) 4.4 - 5.9 10e12/L    Hemoglobin 13.1 (L) 13.3 - 17.7 g/dL    Hematocrit 37.6 (L) 40.0 - 53.0 %    MCV 93 78 - 100 fl    MCH 32.3 26.5 - 33.0 pg    MCHC 34.8 31.5 - 36.5 g/dL    RDW 15.2 (H) 10.0 - 15.0 %    Platelet Count 121 (L) 150 - 450 10e9/L    Diff Method Automated Method     % Neutrophils 58.7 %    % Lymphocytes 29.2 %    % Monocytes 7.5 %    % Eosinophils 3.5 %    % Basophils 0.7 %    % Immature Granulocytes 0.4 %    Absolute Neutrophil 4.0 1.6 - 8.3 10e9/L    Absolute Lymphocytes 2.0 0.8 - 5.3 10e9/L    Absolute Monocytes 0.5 0.0 - 1.3 10e9/L    Absolute Eosinophils 0.2 0.0 - 0.7 10e9/L    Absolute Basophils 0.1 0.0 - 0.2 10e9/L    Abs Immature Granulocytes 0.0 0 - 0.4 10e9/L   Comprehensive metabolic panel   Result Value Ref Range    Sodium 139 133 - 144 mmol/L    Potassium 3.5 3.4 - 5.3 mmol/L    Chloride 103 94 - 109 mmol/L    Carbon Dioxide 31 20 - 32 mmol/L    Anion Gap 5 3 - 14 mmol/L    Glucose 119 (H) 70 - 99 mg/dL    Urea Nitrogen 22 7 - 30 mg/dL    Creatinine 0.68 0.66 - 1.25 mg/dL    GFR Estimate >90 >60 mL/min/[1.73_m2]    GFR Estimate If Black >90 >60 mL/min/[1.73_m2]    Calcium 8.8 8.5 - 10.1 mg/dL    Bilirubin Total 0.5 0.2 - 1.3 mg/dL    Albumin 3.4 3.4 - 5.0 g/dL    Protein Total 6.4 (L) 6.8 - 8.8 g/dL    Alkaline  Phosphatase 97 40 - 150 U/L    ALT 91 (H) 0 - 70 U/L    AST 35 0 - 45 U/L   Magnesium   Result Value Ref Range    Magnesium 1.8 1.6 - 2.3 mg/dL     Assessment and Plan:  Stage III Colon Cancer-Pt started new plan with Panitumumab/Irinotecan and pt is noting side effects of new papulopustular rash from the panitumumab and diarrhea from the irinotecan that are very bothersome. Controlling symptoms is our goal.   He is otherwise showing that he is eating and taking foods well so is meeting goals to continue with planned cycle 2 of treatment.   Pt will return in 2 weeks for review prior to next cycle with treatment labs.   Rash-due to use of EGFR inhibitor- panitumumab - common side effect of the drug and is affecting face, scalp neck and anterior/posterior upper chest and back - pt was started on clindamycin gel for the acneiform rash but did not work well and today will start doxycycline 100 mg bid- expecting to use bid x 1 month and see if we can decreased dosing to daily in future. May add steroid cream if needed in future   Loose stools- common side effect of the irinotecan- pt is using the imodium - 2 tabs in morning, 1-2 tabs in day and 2 tabs in evening to control loose stools to 3-5 daily. Reminded of need for added fluids with loss through loose stools.   Thrombocytopenia continues and is acceptable level today but will continue to monitor.    This was a 25 min visit with > 50% in counseling and coordinating care including education and management of concerns.    Jessica Marsh,CNP

## 2019-10-23 NOTE — PROGRESS NOTES
Port needle left accessed for treatment. Tolerated port access and draw without complaint. Port site scrubbed with Chloraprep for 30 seconds. Accessed using sterile technique. [place] drawn-Red/Green/Purple tubes. Double signed by patient and RN. See documentation flowsheet. Nuha De Los Santos, RN, BSN, OCN

## 2019-11-07 NOTE — PROGRESS NOTES
Right port accessed with a gripper needle, labs drawn and sent, flushed with saline and heparin, transparent dressing applied, see flow sheet.  Stephanie Conner, RN

## 2019-11-07 NOTE — PROGRESS NOTES
Oncology Follow Up Visit: November 7, 2019     Oncologist: Dr Padilla Last  PCP: Moreno Harris    Diagnosis: Stage III Colon Cancer  Rian Malik is a 58 yo  male that was c/o lower abdominal cramping. CT showed sigmoid abscess but at resection was found to have large 5 cm firmly adherent sigmoid colon mass.Lymphovascular invasion present but no perineural invasion.  0/3 Reactive Lymph Nodes were positive. ([id number])  He has intact mismatch repair.  Foundation One testing performed which showed KRAS wild type, codon 12 and 13, microsatellite status stable, tumor mutational burden low, microsatellite stable  Treatment:   10/11/2017 sigmoidectomy with end colostomy  11/21/2017 -6/2018 modified FOLFOX-6 x 12 cycles  9/12/2018 laparoscopic liver oblation with intraoperative US, colostomy takedown, appendectomy, descending colectomy and anterior dissection. Surgical pathology showed periappendiceal tissue, ascending colon margin/mesentery and pericolonic fibroadipose adipose tissue involved by signet ring adenocarcinoma.  10/24/2018 -1/2019 FOLFIRI x 6 cycles  9/18/2019 received colonic stent  10/8/2019 Panitumumab/irinotecan    Interval History: Mr. Malik comes to clinic alone for symptom review prior to cycle 3 of Panitumumab/irinotecan. He is reporting continuation of loose stools from 2-10 daily- using imodium and aware to use up to 8/day.  He is now stating he is having more rectal pain but does not feel it hemorrhoids-he is using oxycodone 5 mg to help with this pain which is not as effective as he would like it to be-has tried an over-the-counter preparation but not effective either.  He continues to eat well but has noted more fatigue which is interfering with his evening meal though he has not lost weight due to decreased evening eating-continues to use Zofran and Compazine for help with nausea on a daily basis.  Facial and anterior chest rash significantly improved with use of minocycline and  "clindamycin but some still noted to the T-zone.  Otherwise denying any mouth sores shortness of breath fevers or chills anxiety or depression but does relate he has occasional trouble sleeping and needs to use the Ativan.  Rest of comprehensive and complete ROS is reviewed and is negative.   Past Medical History:   Diagnosis Date     Maldonado's esophagus      Cirrhosis (H)      Colon cancer (H) 10/11/2017    Poorly differentiated adenocarcinoma     Hepatitis C    - previously completed treatment for Hepatitis C.   Current Outpatient Medications   Medication     acetaminophen (TYLENOL) 500 MG tablet     clindamycin (CLINDAMAX) 1 % external gel     dexamethasone (DECADRON) 2 MG tablet     doxycycline hyclate (VIBRA-TABS) 100 MG tablet     loperamide (IMODIUM) 2 MG capsule     loperamide (IMODIUM) 2 MG capsule     LORazepam (ATIVAN) 0.5 MG tablet     magnesium hydroxide (MILK OF MAGNESIA) 400 MG/5ML suspension     ondansetron (ZOFRAN) 8 MG tablet     ondansetron (ZOFRAN) 8 MG tablet     oxyCODONE (ROXICODONE) 5 MG tablet     polyethylene glycol (MIRALAX/GLYCOLAX) packet     simethicone (MYLICON) 80 MG chewable tablet     tamsulosin (FLOMAX) 0.4 MG capsule     No current facility-administered medications for this visit.      Allergies   Allergen Reactions     Naproxen        Physical Exam:/75 (BP Location: Right arm)   Pulse 84   Temp 97.6  F (36.4  C) (Oral)   Resp 16   Ht 1.727 m (5' 7.99\")   Wt 68.4 kg (150 lb 11.2 oz)   SpO2 97%   BMI 22.92 kg/m    ECOG PS- 0-1  Constitutional: Appears tired but moving well on own, cooperative.  ENT: Eyes bright, No mouth sores.   Neck: Supple, No adenopathy.Thyroid symmetric  Cardiac: Heart rate and rhythm is regular and strong without murmur  Respiratory: Breathing easy. Lung sounds clear to auscultation  GI: Abdomen is soft, non-tender, BS normal. No masses or organomegaly.  Rectal pain is obvious when moving positions.  MS: Muscle tone normal- moving well on own, " extremities normal with no edema.   Skin: Obvious papulopustules rash to face chest and back now noted only across the T-zone on his face and appears to be healing well with no new outbreaks.  Neuro: Sensory grossly WNL, gait normal.   Lymph: Normal ant/post cervical, axillary, supraclavicular nodes  Psych: Mentation appears normal and affect normal with good conversation.    Laboratory Results:   Results for orders placed or performed in visit on 11/07/19   CBC with platelets differential     Status: Abnormal   Result Value Ref Range    WBC 5.5 4.0 - 11.0 10e9/L    RBC Count 3.45 (L) 4.4 - 5.9 10e12/L    Hemoglobin 11.2 (L) 13.3 - 17.7 g/dL    Hematocrit 31.9 (L) 40.0 - 53.0 %    MCV 93 78 - 100 fl    MCH 32.5 26.5 - 33.0 pg    MCHC 35.1 31.5 - 36.5 g/dL    RDW 15.5 (H) 10.0 - 15.0 %    Platelet Count 129 (L) 150 - 450 10e9/L    Diff Method Automated Method     % Neutrophils 81.5 %    % Lymphocytes 11.1 %    % Monocytes 5.3 %    % Eosinophils 0.2 %    % Basophils 0.4 %    % Immature Granulocytes 1.5 %    Absolute Neutrophil 4.5 1.6 - 8.3 10e9/L    Absolute Lymphocytes 0.6 (L) 0.8 - 5.3 10e9/L    Absolute Monocytes 0.3 0.0 - 1.3 10e9/L    Absolute Eosinophils 0.0 0.0 - 0.7 10e9/L    Absolute Basophils 0.0 0.0 - 0.2 10e9/L    Abs Immature Granulocytes 0.1 0 - 0.4 10e9/L   Comprehensive metabolic panel     Status: Abnormal   Result Value Ref Range    Sodium 140 133 - 144 mmol/L    Potassium 3.7 3.4 - 5.3 mmol/L    Chloride 108 94 - 109 mmol/L    Carbon Dioxide 29 20 - 32 mmol/L    Anion Gap 3 3 - 14 mmol/L    Glucose 132 (H) 70 - 99 mg/dL    Urea Nitrogen 17 7 - 30 mg/dL    Creatinine 0.70 0.66 - 1.25 mg/dL    GFR Estimate >90 >60 mL/min/[1.73_m2]    GFR Estimate If Black >90 >60 mL/min/[1.73_m2]    Calcium 8.4 (L) 8.5 - 10.1 mg/dL    Bilirubin Total 0.3 0.2 - 1.3 mg/dL    Albumin 2.9 (L) 3.4 - 5.0 g/dL    Protein Total 5.8 (L) 6.8 - 8.8 g/dL    Alkaline Phosphatase 84 40 - 150 U/L    ALT 37 0 - 70 U/L    AST 18 0 -  45 U/L   Magnesium     Status: None   Result Value Ref Range    Magnesium 1.8 1.6 - 2.3 mg/dL     Assessment and Plan:  Stage III Colon Cancer-Pt continues with Panitumumab/Irinotecan-due for cycle 3.  He has experienced the rash loose stools and cytopenias expected with this plan.  With review lab and exam it was noted the cytopenias are still acceptable and stable and overall does meet goals to continue with plan today.  We discussed that because of the chronic diarrhea patient is seeing even though he is using his Imodium not to the extent necessary that he is becoming more fatigued and having more difficulty with the plan.  He does not want to hold treatment today but after 3 would like to take an extra week over Thanksgiving to recuperate and so we will set next treatment for day after Thanksgiving.  Rash-due to use of EGFR inhibitor- panitumumab -patient is using doxycycline 100 mg twice daily and is seen in the past improvement in the rash noted to his face scalp anterior and posterior chest he also has clindamycin gel and feels this is controlled at this time but will give him 1 more month on the twice daily dosing before dropping to daily dosing.  Loose stools- common side effect of the irinotecan-we reviewed maximum dosing of the Imodium to be 2 tablets 4 times daily until no stools x12 hours.  He cannot use fiber since he has a stent in place.  Did not offer low moving at this point because he is not maximizing use of the Imodium yet at this time though he does start the day with 2 Imodium.  Reminded that he needs to increase fluids to make up for loss.  Rectal pain-because of the loose stools he is seeing more rectal pain and has tried an over-the-counter product which is not been acceptable.  He has oxycodone 5 mg but this is not acceptable way to use the pain medications and so at this time we will give him Pramosone cream to use rectally for the pain and reserve the oxycodone only as needed if above  is not effective  Nausea-patient has Zofran and Compazine he is using but again reviewed that he can stack the 2 if needed to control the nausea.  He has not lost weight so is able to make up for nutrition loss at certain times throughout the cycle.  Thrombocytopenia continues and is acceptable level today but will continue to monitor.    This was a 30 min visit with > 50% in counseling and coordinating care including education and management of concerns.    Jessica Marsh,CNP

## 2019-11-07 NOTE — LETTER
11/7/2019         RE: Rian Malik  [address] [place] [address]        Dear Colleague,    Thank you for referring your patient, Rian Malik, to the [facility]. Please see a copy of my visit note below.    Oncology Follow Up Visit: November 7, 2019     Oncologist: Dr Padilla Last  PCP: Moreno Harris    Diagnosis: Stage III Colon Cancer  Rian Malik is a 60 yo  male that was c/o lower abdominal cramping. CT showed sigmoid abscess but at resection was found to have large 5 cm firmly adherent sigmoid colon mass.Lymphovascular invasion present but no perineural invasion.  0/3 Reactive Lymph Nodes were positive. ([id number])  He has intact mismatch repair.  Foundation One testing performed which showed KRAS wild type, codon 12 and 13, microsatellite status stable, tumor mutational burden low, microsatellite stable  Treatment:   10/11/2017 sigmoidectomy with end colostomy  11/21/2017 -6/2018 modified FOLFOX-6 x 12 cycles  9/12/2018 laparoscopic liver oblation with intraoperative US, colostomy takedown, appendectomy, descending colectomy and anterior dissection. Surgical pathology showed periappendiceal tissue, ascending colon margin/mesentery and pericolonic fibroadipose adipose tissue involved by signet ring adenocarcinoma.  10/24/2018 -1/2019 FOLFIRI x 6 cycles  9/18/2019 received colonic stent  10/8/2019 Panitumumab/irinotecan    Interval History: Mr. Malik comes to clinic alone for symptom review prior to cycle 3 of Panitumumab/irinotecan. He is reporting continuation of loose stools from 2-10 daily- using imodium and aware to use up to 8/day.  He is now stating he is having more rectal pain but does not feel it hemorrhoids-he is using oxycodone 5 mg to help with this pain which is not as effective as he would like it to be-has tried an over-the-counter preparation but not effective either.  He continues to eat well but has noted more fatigue which is  "interfering with his evening meal though he has not lost weight due to decreased evening eating-continues to use Zofran and Compazine for help with nausea on a daily basis.  Facial and anterior chest rash significantly improved with use of minocycline and clindamycin but some still noted to the T-zone.  Otherwise denying any mouth sores shortness of breath fevers or chills anxiety or depression but does relate he has occasional trouble sleeping and needs to use the Ativan.  Rest of comprehensive and complete ROS is reviewed and is negative.   Past Medical History:   Diagnosis Date     Maldonado's esophagus      Cirrhosis (H)      Colon cancer (H) 10/11/2017    Poorly differentiated adenocarcinoma     Hepatitis C    - previously completed treatment for Hepatitis C.   Current Outpatient Medications   Medication     acetaminophen (TYLENOL) 500 MG tablet     clindamycin (CLINDAMAX) 1 % external gel     dexamethasone (DECADRON) 2 MG tablet     doxycycline hyclate (VIBRA-TABS) 100 MG tablet     loperamide (IMODIUM) 2 MG capsule     loperamide (IMODIUM) 2 MG capsule     LORazepam (ATIVAN) 0.5 MG tablet     magnesium hydroxide (MILK OF MAGNESIA) 400 MG/5ML suspension     ondansetron (ZOFRAN) 8 MG tablet     ondansetron (ZOFRAN) 8 MG tablet     oxyCODONE (ROXICODONE) 5 MG tablet     polyethylene glycol (MIRALAX/GLYCOLAX) packet     simethicone (MYLICON) 80 MG chewable tablet     tamsulosin (FLOMAX) 0.4 MG capsule     No current facility-administered medications for this visit.      Allergies   Allergen Reactions     Naproxen        Physical Exam:/75 (BP Location: Right arm)   Pulse 84   Temp 97.6  F (36.4  C) (Oral)   Resp 16   Ht 1.727 m (5' 7.99\")   Wt 68.4 kg (150 lb 11.2 oz)   SpO2 97%   BMI 22.92 kg/m     ECOG PS- 0-1  Constitutional: Appears tired but moving well on own, cooperative.  ENT: Eyes bright, No mouth sores.   Neck: Supple, No adenopathy.Thyroid symmetric  Cardiac: Heart rate and rhythm is regular " and strong without murmur  Respiratory: Breathing easy. Lung sounds clear to auscultation  GI: Abdomen is soft, non-tender, BS normal. No masses or organomegaly.  Rectal pain is obvious when moving positions.  MS: Muscle tone normal- moving well on own, extremities normal with no edema.   Skin: Obvious papulopustules rash to face chest and back now noted only across the T-zone on his face and appears to be healing well with no new outbreaks.  Neuro: Sensory grossly WNL, gait normal.   Lymph: Normal ant/post cervical, axillary, supraclavicular nodes  Psych: Mentation appears normal and affect normal with good conversation.    Laboratory Results:   Results for orders placed or performed in visit on 11/07/19   CBC with platelets differential     Status: Abnormal   Result Value Ref Range    WBC 5.5 4.0 - 11.0 10e9/L    RBC Count 3.45 (L) 4.4 - 5.9 10e12/L    Hemoglobin 11.2 (L) 13.3 - 17.7 g/dL    Hematocrit 31.9 (L) 40.0 - 53.0 %    MCV 93 78 - 100 fl    MCH 32.5 26.5 - 33.0 pg    MCHC 35.1 31.5 - 36.5 g/dL    RDW 15.5 (H) 10.0 - 15.0 %    Platelet Count 129 (L) 150 - 450 10e9/L    Diff Method Automated Method     % Neutrophils 81.5 %    % Lymphocytes 11.1 %    % Monocytes 5.3 %    % Eosinophils 0.2 %    % Basophils 0.4 %    % Immature Granulocytes 1.5 %    Absolute Neutrophil 4.5 1.6 - 8.3 10e9/L    Absolute Lymphocytes 0.6 (L) 0.8 - 5.3 10e9/L    Absolute Monocytes 0.3 0.0 - 1.3 10e9/L    Absolute Eosinophils 0.0 0.0 - 0.7 10e9/L    Absolute Basophils 0.0 0.0 - 0.2 10e9/L    Abs Immature Granulocytes 0.1 0 - 0.4 10e9/L   Comprehensive metabolic panel     Status: Abnormal   Result Value Ref Range    Sodium 140 133 - 144 mmol/L    Potassium 3.7 3.4 - 5.3 mmol/L    Chloride 108 94 - 109 mmol/L    Carbon Dioxide 29 20 - 32 mmol/L    Anion Gap 3 3 - 14 mmol/L    Glucose 132 (H) 70 - 99 mg/dL    Urea Nitrogen 17 7 - 30 mg/dL    Creatinine 0.70 0.66 - 1.25 mg/dL    GFR Estimate >90 >60 mL/min/[1.73_m2]    GFR Estimate If  Black >90 >60 mL/min/[1.73_m2]    Calcium 8.4 (L) 8.5 - 10.1 mg/dL    Bilirubin Total 0.3 0.2 - 1.3 mg/dL    Albumin 2.9 (L) 3.4 - 5.0 g/dL    Protein Total 5.8 (L) 6.8 - 8.8 g/dL    Alkaline Phosphatase 84 40 - 150 U/L    ALT 37 0 - 70 U/L    AST 18 0 - 45 U/L   Magnesium     Status: None   Result Value Ref Range    Magnesium 1.8 1.6 - 2.3 mg/dL     Assessment and Plan:  Stage III Colon Cancer-Pt continues with Panitumumab/Irinotecan-due for cycle 3.  He has experienced the rash loose stools and cytopenias expected with this plan.  With review lab and exam it was noted the cytopenias are still acceptable and stable and overall does meet goals to continue with plan today.  We discussed that because of the chronic diarrhea patient is seeing even though he is using his Imodium not to the extent necessary that he is becoming more fatigued and having more difficulty with the plan.  He does not want to hold treatment today but after 3 would like to take an extra week over Thanksgiving to recuperate and so we will set next treatment for day after Thanksgiving.  Rash-due to use of EGFR inhibitor- panitumumab -patient is using doxycycline 100 mg twice daily and is seen in the past improvement in the rash noted to his face scalp anterior and posterior chest he also has clindamycin gel and feels this is controlled at this time but will give him 1 more month on the twice daily dosing before dropping to daily dosing.  Loose stools- common side effect of the irinotecan-we reviewed maximum dosing of the Imodium to be 2 tablets 4 times daily until no stools x12 hours.  He cannot use fiber since he has a stent in place.  Did not offer low moving at this point because he is not maximizing use of the Imodium yet at this time though he does start the day with 2 Imodium.  Reminded that he needs to increase fluids to make up for loss.  Rectal pain-because of the loose stools he is seeing more rectal pain and has tried an  over-the-counter product which is not been acceptable.  He has oxycodone 5 mg but this is not acceptable way to use the pain medications and so at this time we will give him Pramosone cream to use rectally for the pain and reserve the oxycodone only as needed if above is not effective  Nausea-patient has Zofran and Compazine he is using but again reviewed that he can stack the 2 if needed to control the nausea.  He has not lost weight so is able to make up for nutrition loss at certain times throughout the cycle.  Thrombocytopenia continues and is acceptable level today but will continue to monitor.    This was a 30 min visit with > 50% in counseling and coordinating care including education and management of concerns.    Jessica Marsh,CNP      Again, thank you for allowing me to participate in the care of your patient.        Sincerely,        Jessica Marsh, NP, APRN CNP

## 2019-11-07 NOTE — PROGRESS NOTES
Infusion Nursing Note:  Rian Malik presents today for Irinotecan + vecitix.    Patient seen by provider today: Yes: Jessica Marsh NP   present during visit today: Not Applicable.    Note:. Atropine given prior to Irinotecan today.     Intravenous Access:  Implanted Port.    Treatment Conditions:  Lab Results   Component Value Date    HGB 11.2 11/07/2019     Lab Results   Component Value Date    WBC 5.5 11/07/2019      Lab Results   Component Value Date    ANEU 4.5 11/07/2019     Lab Results   Component Value Date     11/07/2019      Lab Results   Component Value Date     11/07/2019                   Lab Results   Component Value Date    POTASSIUM 3.7 11/07/2019           Lab Results   Component Value Date    MAG 1.8 11/07/2019            Lab Results   Component Value Date    CR 0.70 11/07/2019                   Lab Results   Component Value Date    MAICOL 8.4 11/07/2019                Lab Results   Component Value Date    BILITOTAL 0.3 11/07/2019           Lab Results   Component Value Date    ALBUMIN 2.9 11/07/2019                    Lab Results   Component Value Date    ALT 37 11/07/2019           Lab Results   Component Value Date    AST 18 11/07/2019       Results reviewed, labs MET treatment parameters, ok to proceed with treatment.      Post Infusion Assessment:  Patient tolerated infusion without incident.  Site patent and intact, free from redness, edema or discomfort.  No evidence of extravasations.  Access discontinued per protocol.       Discharge Plan:   Discharge instructions reviewed with: Patient.  Patient and/or family verbalized understanding of discharge instructions and all questions answered.  Patient discharged in stable condition accompanied by: self.  Departure Mode: Ambulatory.    Blanka Tate RN

## 2019-11-07 NOTE — NURSING NOTE
"Oncology Rooming Note    November 7, 2019 11:08 AM   Rian Malik is a 59 year old male who presents for:    Chief Complaint   Patient presents with     Oncology Clinic Visit     Follow up/prior to treatment     Initial Vitals: /75 (BP Location: Right arm)   Pulse 84   Temp 97.6  F (36.4  C) (Oral)   Resp 16   Ht 1.727 m (5' 7.99\")   Wt 68.4 kg (150 lb 11.2 oz)   SpO2 97%   BMI 22.92 kg/m   Estimated body mass index is 22.92 kg/m  as calculated from the following:    Height as of this encounter: 1.727 m (5' 7.99\").    Weight as of this encounter: 68.4 kg (150 lb 11.2 oz). Body surface area is 1.81 meters squared.  Moderate Pain (4) Comment: Data Unavailable   No LMP for male patient.  Allergies reviewed: Yes  Medications reviewed: Yes    Medications: MEDICATION REFILLS NEEDED TODAY. Provider was notified.  Pharmacy name entered into [facility]: [facility] [place] - [address] [address] LPN              "

## 2019-11-11 NOTE — TELEPHONE ENCOUNTER
Prior Authorization Retail Medication Request    Medication/Dose: hydrocortisone ace-pramoxi 1-1 cream  ICD code (if different than what is on RX):  N/A  Previously Tried and Failed:  N/A  Rationale:  Product not on formulary    Insurance Name:  [employer]  Insurance ID:  [id number]      Pharmacy Information (if different than what is on RX)  Name:  N/A   Phone:  N/A    Thank you,   Delmi Handley [facility] [place]

## 2019-11-20 NOTE — PROGRESS NOTES
Call made to Rian regarding his request for Oxycodone refill     Patient last refill was 10/30 and the prescription and patient instruction reads        Don't take more than 3 tablets in a day     Patient instructed to call me back to inform me of how many Oxycodone he is using and when the refill is needed.

## 2019-11-20 NOTE — PROGRESS NOTES
Note from Dr Bowers that vincent contacted her with increasing rectal pain not covered by oxycodone. Initial pain started after loose stools, initiated rectal cream, then started with 5 mg oxycodone and now up to 4 tabs. Will order CT scan and see pt in clinic- Aren to help with arrangements. SGermschart,CNp

## 2019-11-20 NOTE — PROGRESS NOTES
[facility]:  Care Coordination Note    Received request from Jessica Marsh CNP, to assist with setting up CT scan for patient due to complaints of increasing rectal pain (see chart notes today from Jessica and Palliative Care team for further details).  Telephone call was placed to patient to share this recommendation, and to assist with scheduling.  Patient did not answer, voicemail message was left on phone asking patient to return call to schedule - advised patient that we would like to get the scan scheduled for him in near future, prior to his clinic visit next week 11/27 if possible, to further evaluate his symptoms.    Provided direct phone number for this RN in message, for patient to call back.     Keenan Clarke, RN, BSN, OCN  Oncology Care Coordinator  [facility]

## 2019-11-20 NOTE — TELEPHONE ENCOUNTER
Patient states that he has been needing more than 3 tabs of Oxycodone per day for his rectal pain.    Has 4 tabs remaining

## 2019-11-27 PROBLEM — R19.5 LOOSE STOOLS: Status: ACTIVE | Noted: 2019-01-01

## 2019-11-27 PROBLEM — E87.6 HYPOKALEMIA: Status: ACTIVE | Noted: 2019-01-01

## 2019-11-27 PROBLEM — E83.42 HYPOMAGNESEMIA: Status: ACTIVE | Noted: 2019-01-01

## 2019-11-27 NOTE — LETTER
11/27/2019         RE: Rian Malik  [address] [place] [address]        Dear Colleague,    Thank you for referring your patient, Rian Malik, to the [facility]. Please see a copy of my visit note below.    Oncology Follow Up Visit: November 27, 2019     Oncologist: Dr Padilla Last  PCP: Moreno Harris    Diagnosis: Stage III Colon Cancer  Rian Malik is a 58 yo  male that was c/o lower abdominal cramping. CT showed sigmoid abscess but at resection was found to have large 5 cm firmly adherent sigmoid colon mass.Lymphovascular invasion present but no perineural invasion.  0/3 Reactive Lymph Nodes were positive. ([id number])  He has intact mismatch repair.  Foundation One testing performed which showed KRAS wild type, codon 12 and 13, microsatellite status stable, tumor mutational burden low, microsatellite stable  Treatment:   10/11/2017 sigmoidectomy with end colostomy  11/21/2017 -6/2018 modified FOLFOX-6 x 12 cycles  9/12/2018 laparoscopic liver oblation with intraoperative US, colostomy takedown, appendectomy, descending colectomy and anterior dissection. Surgical pathology showed periappendiceal tissue, ascending colon margin/mesentery and pericolonic fibroadipose adipose tissue involved by signet ring adenocarcinoma.  10/24/2018 -1/2019 FOLFIRI x 6 cycles  9/18/2019 received colonic stent  10/8/2019 Panitumumab/irinotecan    Interval History: Mr. Malik comes to clinic alone for symptom review prior to cycle 4 of Panitumumab/irinotecan.Pt shares he has felt bloated off and on over last week. He has had significant diarrhea that he did treat with imodium 2 days ago and had no BM for 2 days but again had loose stool this morning. He passes gas at night but still feels bloated with generalized abdominal pain he does treat intermittently with oxycodone or ibuprofen. He has been able to get to work. Has been using more gatorade and increasing fluids to make  "up for loss with the diarrhea. Pain ranked up to 5/10 at worst. May have some weakness after days of diarrhea. Denies fevers/chills, SOB, chest pain, neuropathy or bladder difficulties.   Rest of comprehensive and complete ROS is reviewed and is negative.   Past Medical History:   Diagnosis Date     Maldonado's esophagus      Cirrhosis (H)      Colon cancer (H) 10/11/2017    Poorly differentiated adenocarcinoma     Hepatitis C    - previously completed treatment for Hepatitis C.   Current Outpatient Medications   Medication     acetaminophen (TYLENOL) 500 MG tablet     clindamycin (CLINDAMAX) 1 % external gel     dexamethasone (DECADRON) 2 MG tablet     hydrocortisone-pramoxine (PRAMOSONE) 1-1 % cream     loperamide (IMODIUM) 2 MG capsule     loperamide (IMODIUM) 2 MG capsule     LORazepam (ATIVAN) 0.5 MG tablet     magnesium hydroxide (MILK OF MAGNESIA) 400 MG/5ML suspension     ondansetron (ZOFRAN) 8 MG tablet     ondansetron (ZOFRAN) 8 MG tablet     oxyCODONE (ROXICODONE) 5 MG tablet     polyethylene glycol (MIRALAX/GLYCOLAX) packet     simethicone (MYLICON) 80 MG chewable tablet     tamsulosin (FLOMAX) 0.4 MG capsule     No current facility-administered medications for this visit.      Allergies   Allergen Reactions     Naproxen      Physical Exam:/82 (BP Location: Right arm)   Pulse 90   Resp 16   Ht 1.727 m (5' 7.99\")   Wt 64.6 kg (142 lb 8 oz)   SpO2 96%   BMI 21.67 kg/m     ECOG PS- 0-1  Constitutional: Appears tired but moving well on own, cooperative.  ENT: Eyes bright, No mouth sores.   Neck: Supple, No adenopathy.Thyroid symmetric  Cardiac: Heart rate and rhythm is regular and strong without murmur  Respiratory: Breathing easy. Lung sounds clear to auscultation  GI: Abdomen is soft, non-tender, BS normal. No masses or organomegaly. No obvious rectal pain  MS: Muscle tone normal- moving well on own, extremities normal with no edema.   Skin:  Only faint papular carlos alberto noted to face and neck with no " new breakout to chest or back.   Neuro: Sensory grossly WNL, gait normal.   Lymph: Normal ant/post cervical, axillary, supraclavicular nodes  Psych: Mentation appears normal and affect normal with good conversation.    Laboratory Results:   Results for orders placed or performed in visit on 11/27/19   CBC with platelets differential     Status: Abnormal   Result Value Ref Range    WBC 9.3 4.0 - 11.0 10e9/L    RBC Count 3.97 (L) 4.4 - 5.9 10e12/L    Hemoglobin 12.9 (L) 13.3 - 17.7 g/dL    Hematocrit 37.4 (L) 40.0 - 53.0 %    MCV 94 78 - 100 fl    MCH 32.5 26.5 - 33.0 pg    MCHC 34.5 31.5 - 36.5 g/dL    RDW 15.8 (H) 10.0 - 15.0 %    Platelet Count 147 (L) 150 - 450 10e9/L    Diff Method Automated Method     % Neutrophils 67.6 %    % Lymphocytes 20.3 %    % Monocytes 8.0 %    % Eosinophils 2.0 %    % Basophils 1.0 %    % Immature Granulocytes 1.1 %    Absolute Neutrophil 6.3 1.6 - 8.3 10e9/L    Absolute Lymphocytes 1.9 0.8 - 5.3 10e9/L    Absolute Monocytes 0.7 0.0 - 1.3 10e9/L    Absolute Eosinophils 0.2 0.0 - 0.7 10e9/L    Absolute Basophils 0.1 0.0 - 0.2 10e9/L    Abs Immature Granulocytes 0.1 0 - 0.4 10e9/L   Comprehensive metabolic panel     Status: Abnormal   Result Value Ref Range    Sodium 137 133 - 144 mmol/L    Potassium 3.3 (L) 3.4 - 5.3 mmol/L    Chloride 108 94 - 109 mmol/L    Carbon Dioxide 26 20 - 32 mmol/L    Anion Gap 3 3 - 14 mmol/L    Glucose 102 (H) 70 - 99 mg/dL    Urea Nitrogen 14 7 - 30 mg/dL    Creatinine 0.87 0.66 - 1.25 mg/dL    GFR Estimate >90 >60 mL/min/[1.73_m2]    GFR Estimate If Black >90 >60 mL/min/[1.73_m2]    Calcium 8.5 8.5 - 10.1 mg/dL    Bilirubin Total 0.9 0.2 - 1.3 mg/dL    Albumin 3.1 (L) 3.4 - 5.0 g/dL    Protein Total 5.7 (L) 6.8 - 8.8 g/dL    Alkaline Phosphatase 82 40 - 150 U/L    ALT 32 0 - 70 U/L    AST 18 0 - 45 U/L   Magnesium     Status: Abnormal   Result Value Ref Range    Magnesium 1.5 (L) 1.6 - 2.3 mg/dL     Assessment and Plan:  Stage III Colon Cancer-Pt has  completed 3 cycles of Panitumumab/Irinotecan and had CT CAP on 11/22 with suspicion of progression and possible small bowel obstruction. Pt has been having symptoms x 1 week but then had diarrhea x several days and took imodium to slow diarrhea and no bm x 1 day again until today with loose stool. He does have bloating and some pain that is consistent with at least partial bowel obstruction. XRay of abdomen taken today again consistent with this even though has had some diarrhea over this time.   Discussed with Dr Oswald who placed stent in 9/2019 and who has suspicion for progression of disease due to symptoms- recommendations are to push a liquid diet and not use the imodium though may cause dehydration.   Pt is to continue with planned cycle 4 of Panitumumab/Irinotecan on Friday. We will have him continue though appears as progression and will give additional fluids with treatment and repeat electrolytes and replace as needed.Pt will call if having symptoms of dehydration and will make plan for fluids only available if needed.     Pt will return in early December for review with Dr Yousif for further instructions.   Rash-due to use of EGFR inhibitor- panitumumab -patient is using doxycycline 100 mg twice daily . Good control noted today.   Nausea-patient has Zofran and Compazine he is using but again reviewed that he can stack the 2 if needed to control the nausea.No current weight loss.   Thrombocytopenia continues and is acceptable level today but will continue to monitor.    This was a 35 min visit with > 50% in counseling and coordinating care including education and management of concerns.    Jessica Marsh CNP      Again, thank you for allowing me to participate in the care of your patient.        Sincerely,        Jessica Marsh, NP, APRN CNP

## 2019-11-27 NOTE — NURSING NOTE
"Oncology Rooming Note    November 27, 2019 8:36 AM   Rian Malik is a 59 year old male who presents for:    Chief Complaint   Patient presents with     Oncology Clinic Visit     Follow up/prior to treatment     Initial Vitals: /82 (BP Location: Right arm)   Pulse 90   Resp 16   Ht 1.727 m (5' 7.99\")   Wt 64.6 kg (142 lb 8 oz)   SpO2 96%   BMI 21.67 kg/m   Estimated body mass index is 21.67 kg/m  as calculated from the following:    Height as of this encounter: 1.727 m (5' 7.99\").    Weight as of this encounter: 64.6 kg (142 lb 8 oz). Body surface area is 1.76 meters squared.  Moderate Pain (5) Comment: Data Unavailable   No LMP for male patient.  Allergies reviewed: Yes  Medications reviewed: Yes    Medications: MEDICATION REFILLS NEEDED TODAY. Provider was notified.  Pharmacy name entered into [facility]: [facility] - [facility], [place] - [address] [address] LPN              "

## 2019-11-27 NOTE — PROGRESS NOTES
Oncology Follow Up Visit: November 27, 2019     Oncologist: Dr Padilla Last  PCP: Moreno Harris    Diagnosis: Stage III Colon Cancer  Rian Malik is a 58 yo  male that was c/o lower abdominal cramping. CT showed sigmoid abscess but at resection was found to have large 5 cm firmly adherent sigmoid colon mass.Lymphovascular invasion present but no perineural invasion.  0/3 Reactive Lymph Nodes were positive. ([id number])  He has intact mismatch repair.  [employer] testing performed which showed KRAS wild type, codon 12 and 13, microsatellite status stable, tumor mutational burden low, microsatellite stable  Treatment:   10/11/2017 sigmoidectomy with end colostomy  11/21/2017 -6/2018 modified FOLFOX-6 x 12 cycles  9/12/2018 laparoscopic liver oblation with intraoperative US, colostomy takedown, appendectomy, descending colectomy and anterior dissection. Surgical pathology showed periappendiceal tissue, ascending colon margin/mesentery and pericolonic fibroadipose adipose tissue involved by signet ring adenocarcinoma.  10/24/2018 -1/2019 FOLFIRI x 6 cycles  9/18/2019 received colonic stent  10/8/2019 Panitumumab/irinotecan    Interval History: Mr. Malik comes to clinic alone for symptom review prior to cycle 4 of Panitumumab/irinotecan.Pt shares he has felt bloated off and on over last week. He has had significant diarrhea that he did treat with imodium 2 days ago and had no BM for 2 days but again had loose stool this morning. He passes gas at night but still feels bloated with generalized abdominal pain he does treat intermittently with oxycodone or ibuprofen. He has been able to get to work. Has been using more gatorade and increasing fluids to make up for loss with the diarrhea. Pain ranked up to 5/10 at worst. May have some weakness after days of diarrhea. Denies fevers/chills, SOB, chest pain, neuropathy or bladder difficulties.   Rest of comprehensive and complete ROS is reviewed and is  "negative.   Past Medical History:   Diagnosis Date     Maldonado's esophagus      Cirrhosis (H)      Colon cancer (H) 10/11/2017    Poorly differentiated adenocarcinoma     Hepatitis C    - previously completed treatment for Hepatitis C.   Current Outpatient Medications   Medication     acetaminophen (TYLENOL) 500 MG tablet     clindamycin (CLINDAMAX) 1 % external gel     dexamethasone (DECADRON) 2 MG tablet     hydrocortisone-pramoxine (PRAMOSONE) 1-1 % cream     loperamide (IMODIUM) 2 MG capsule     loperamide (IMODIUM) 2 MG capsule     LORazepam (ATIVAN) 0.5 MG tablet     magnesium hydroxide (MILK OF MAGNESIA) 400 MG/5ML suspension     ondansetron (ZOFRAN) 8 MG tablet     ondansetron (ZOFRAN) 8 MG tablet     oxyCODONE (ROXICODONE) 5 MG tablet     polyethylene glycol (MIRALAX/GLYCOLAX) packet     simethicone (MYLICON) 80 MG chewable tablet     tamsulosin (FLOMAX) 0.4 MG capsule     No current facility-administered medications for this visit.      Allergies   Allergen Reactions     Naproxen      Physical Exam:/82 (BP Location: Right arm)   Pulse 90   Resp 16   Ht 1.727 m (5' 7.99\")   Wt 64.6 kg (142 lb 8 oz)   SpO2 96%   BMI 21.67 kg/m    ECOG PS- 0-1  Constitutional: Appears tired but moving well on own, cooperative.  ENT: Eyes bright, No mouth sores.   Neck: Supple, No adenopathy.Thyroid symmetric  Cardiac: Heart rate and rhythm is regular and strong without murmur  Respiratory: Breathing easy. Lung sounds clear to auscultation  GI: Abdomen is soft, non-tender, BS normal. No masses or organomegaly. No obvious rectal pain  MS: Muscle tone normal- moving well on own, extremities normal with no edema.   Skin:  Only faint papular carlos alberto noted to face and neck with no new breakout to chest or back.   Neuro: Sensory grossly WNL, gait normal.   Lymph: Normal ant/post cervical, axillary, supraclavicular nodes  Psych: Mentation appears normal and affect normal with good conversation.    Laboratory Results: "   Results for orders placed or performed in visit on 11/27/19   CBC with platelets differential     Status: Abnormal   Result Value Ref Range    WBC 9.3 4.0 - 11.0 10e9/L    RBC Count 3.97 (L) 4.4 - 5.9 10e12/L    Hemoglobin 12.9 (L) 13.3 - 17.7 g/dL    Hematocrit 37.4 (L) 40.0 - 53.0 %    MCV 94 78 - 100 fl    MCH 32.5 26.5 - 33.0 pg    MCHC 34.5 31.5 - 36.5 g/dL    RDW 15.8 (H) 10.0 - 15.0 %    Platelet Count 147 (L) 150 - 450 10e9/L    Diff Method Automated Method     % Neutrophils 67.6 %    % Lymphocytes 20.3 %    % Monocytes 8.0 %    % Eosinophils 2.0 %    % Basophils 1.0 %    % Immature Granulocytes 1.1 %    Absolute Neutrophil 6.3 1.6 - 8.3 10e9/L    Absolute Lymphocytes 1.9 0.8 - 5.3 10e9/L    Absolute Monocytes 0.7 0.0 - 1.3 10e9/L    Absolute Eosinophils 0.2 0.0 - 0.7 10e9/L    Absolute Basophils 0.1 0.0 - 0.2 10e9/L    Abs Immature Granulocytes 0.1 0 - 0.4 10e9/L   Comprehensive metabolic panel     Status: Abnormal   Result Value Ref Range    Sodium 137 133 - 144 mmol/L    Potassium 3.3 (L) 3.4 - 5.3 mmol/L    Chloride 108 94 - 109 mmol/L    Carbon Dioxide 26 20 - 32 mmol/L    Anion Gap 3 3 - 14 mmol/L    Glucose 102 (H) 70 - 99 mg/dL    Urea Nitrogen 14 7 - 30 mg/dL    Creatinine 0.87 0.66 - 1.25 mg/dL    GFR Estimate >90 >60 mL/min/[1.73_m2]    GFR Estimate If Black >90 >60 mL/min/[1.73_m2]    Calcium 8.5 8.5 - 10.1 mg/dL    Bilirubin Total 0.9 0.2 - 1.3 mg/dL    Albumin 3.1 (L) 3.4 - 5.0 g/dL    Protein Total 5.7 (L) 6.8 - 8.8 g/dL    Alkaline Phosphatase 82 40 - 150 U/L    ALT 32 0 - 70 U/L    AST 18 0 - 45 U/L   Magnesium     Status: Abnormal   Result Value Ref Range    Magnesium 1.5 (L) 1.6 - 2.3 mg/dL     Assessment and Plan:  Stage III Colon Cancer-Pt has completed 3 cycles of Panitumumab/Irinotecan and had CT CAP on 11/22 with suspicion of progression and possible small bowel obstruction. Pt has been having symptoms x 1 week but then had diarrhea x several days and took imodium to slow diarrhea  and no bm x 1 day again until today with loose stool. He does have bloating and some pain that is consistent with at least partial bowel obstruction. XRay of abdomen taken today again consistent with this even though has had some diarrhea over this time.   Discussed with Dr Oswald who placed stent in 9/2019 and who has suspicion for progression of disease due to symptoms- recommendations are to push a liquid diet and not use the imodium though may cause dehydration.   Pt is to continue with planned cycle 4 of Panitumumab/Irinotecan on Friday. We will have him continue though appears as progression and will give additional fluids with treatment and repeat electrolytes and replace as needed.Pt will call if having symptoms of dehydration and will make plan for fluids only available if needed.     Pt will return in early December for review with Dr Yousif for further instructions.   Rash-due to use of EGFR inhibitor- panitumumab -patient is using doxycycline 100 mg twice daily . Good control noted today.   Nausea-patient has Zofran and Compazine he is using but again reviewed that he can stack the 2 if needed to control the nausea.No current weight loss.   Thrombocytopenia continues and is acceptable level today but will continue to monitor.    This was a 35 min visit with > 50% in counseling and coordinating care including education and management of concerns.    Jessica Marsh,CNP

## 2019-11-29 NOTE — PROGRESS NOTES
Infusion Nursing Note:  Rian Malik presents today for vectibix/irino/fluids/potassium/mag.    Patient seen by provider today: No   present during visit today: Not Applicable.    Note: Pt states not feeling well very tired. Given extra fluids and electrolytes as ordered.     Intravenous Access:  Implanted Port.    Treatment Conditions:  Lab Results   Component Value Date    HGB 12.9 11/27/2019     Lab Results   Component Value Date    WBC 9.3 11/27/2019      Lab Results   Component Value Date    ANEU 6.3 11/27/2019     Lab Results   Component Value Date     11/27/2019      Lab Results   Component Value Date     11/27/2019                   Lab Results   Component Value Date    POTASSIUM 3.3 11/29/2019           Lab Results   Component Value Date    MAG 1.5 11/29/2019            Lab Results   Component Value Date    CR 0.87 11/27/2019                   Lab Results   Component Value Date    MAICOL 8.5 11/27/2019                Lab Results   Component Value Date    BILITOTAL 0.9 11/27/2019           Lab Results   Component Value Date    ALBUMIN 3.1 11/27/2019                    Lab Results   Component Value Date    ALT 32 11/27/2019           Lab Results   Component Value Date    AST 18 11/27/2019       Results reviewed, labs MET treatment parameters, ok to proceed with treatment.      Post Infusion Assessment:  Patient tolerated infusion without incident.  Site patent and intact, free from redness, edema or discomfort.  No evidence of extravasations.  Access discontinued per protocol.       Discharge Plan:   Patient and/or family verbalized understanding of discharge instructions and all questions answered.    Brittany Braun RN

## 2019-12-06 NOTE — PROGRESS NOTES
Social Work Note: Telephone Call  Oncology Clinic    Data/Intervention:  Patient Name:  Rian Malik  /Age:  1960 (59 year old)    Call From:  Helen (Rian's sister)  Reason for Call:  Social security questions    Assessment:  _3:21PM.  Helen left message for MIRIAM requesting call back to discuss social security stating Rian is not able to keep working and they are looking for guidance on the process.    _2:15PM. MIRIAM called and left message for Helen returning call and requesting call back to discuss resources.    _3:30PM. Helen returned call.  She explained that Rian has been working up to this point, but is quite unwell and doesn't feel he can continue to do so much longer.  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  MIRIAM suggested that Helen could contact the social security administration to schedule an appointment to start the process, Helen states she had tried calling, but hadn't gotten far into the process yet.  MIRIAM did explain that the general rule to get approval is that Rian would have to be expected to be out of work for a period of a year or more, miriam explained that she would not be able to speak to whether or not that applied to Rian.  Helen asked about the fact that he had been treated a year ago and then it came back, however, miriam explained that if he has been working this whole time that wouldn't be considered eligible.  Helen had a couple other questions, including questions regarding Rian's eligibility for his wife's social security as she passed away five years ago.  MIRIAM unable to answer that question.  MIRIAM provided information on the [employer] and suggested that they could speak to  through that agency for more specific guidance about the best options for Rian given his exact circumstances.  Helen agreed that she  would follow up on that.    In the meantime, MIRIAM explained that if Helen or Rian could call back on Monday with his income and asset information, miriam could apply for funds from the [employer].  Also encouraged Helen to call [employer] and ask about payment plans and any programs for customer's with serious illness and if paperwork is needed for those programs sw could assist with completion.  Helen agreed and thanked miriam, agreeing to be in touch next week.      Plan:  MIRIAM will await call back next week with more information in order to apply for [employer].      JELANI Berg, [facility]  Clinical , Adult Oncology  Pager: [phone]  Phone: [phone]

## 2019-12-10 NOTE — PROGRESS NOTES
Visit Date:   12/10/2019      HISTORY OF PRESENT ILLNESS:  Rian Malik is a 59-year-old gentleman with metastatic colon cancer diagnosed in 10/2017 after presenting with abdominal discomfort.  The patient presents to clinic to review most recent imaging results and toxicity evaluation.  The patient was last seen by our nurse practitioner on 11/27 for toxicity evaluation prior to continuing with panatumimab and irinotecan.  At that time, it was noted that his imaging showed progression with a possible small-bowel obstruction.  It was decided to continue with the current therapy and follow up with me.  It is unclear what happened, but patient did not have scheduled followup and he comes in today.        On today's visit, the patient states that he is losing weight and has lost about 30 pounds in the last 2 months.  He is tired, weak, has no stamina.  He is trying to eat, is passing gas and has diarrhea which he describes as being oily.  Overall, he feels he is still recovering from the chemotherapy.  He is trying to stay on as much of a liquid diet as possible.  He has pain in the rectal area which he rates 5/10.  This was followed by Palliative Care.  The remainder of comprehensive review of systems is negative.      PAST MEDICAL HISTORY:   1.  Colon cancer, see above.   2.  Hepatitis C.   3.  Cirrhosis.   4.  Maldonado's esophagus.      SOCIAL HISTORY:  .  Lives with his son and daughter who help take care of him.  Continues to work as a printer, states that he cannot stop working because he has to pay the health bills.  Denies tobacco use.      PHYSICAL EXAMINATION  There were no vitals taken for this visit.   GENERAL:  Cachectic, thin, tired-appearing.  HEENT:  Atraumatic, normocephalic.  Pupils equal, round, reactive.  Sclerae anicteric.  Oropharynx:  Moist mucous membranes.  No lesions, ulcers or exudates.   NECK:  Supple, full range of motion.  Trachea midline.   HEART:  Regular rate and rhythm, normal  S1, S2.  No murmurs or gallop.  No edema.   LUNGS:  Clear to auscultation bilaterally.  No crackles or wheezes.  Normal respiratory effort.   ABDOMEN:  Positive bowel sounds.  Soft, nontender, nondistended.  No hepatomegaly.   EXTREMITIES:  No cyanosis.  Warm.   MUSCULOSKELETAL:  No point tenderness.   LYMPH NODES:  No cervical, supraclavicular or axillary nodes palpable.   SKIN:  No petechiae or rashes.   NEUROLOGIC:  Alert and oriented.      LABORATORY DATA:  Alkaline phosphatase 83, ALT 35, AST 16.  Potassium 2.8.  WBC 6.0, hemoglobin 11.4, hematocrit 31.5, platelets 227, ANC 2.6.      IMAGING:  CT chest, abdomen and pelvis from 11/22/2019:  Multiple dilated loops of mid and distal small bowel suspicious for distal small bowel obstruction.  Irregular soft tissue thickening in the ileocecal region extending into the left lower quadrant has increased since 10/2019 and is suspicious for progression of metastatic disease.  Soft tissue nodularity in the perirectal region is also increased.  Large amount of stool throughout the colon.  Mild splenomegaly.  Nodularity of the liver surface again raises possibility of cirrhosis.  Indeterminate 1.6 cm low dense lesion in the right hepatic lobe, a similar in appearance to the previous scan.      ASSESSMENT AND PLAN:   1.  Metastatic colon cancer.  Unfortunately, most recent imaging does show progression of disease.  Discussed options with patient.  One is to switch him over to regorafenib.  Reviewed potential side effects and toxicities.  The patient will be given more detailed toxicity profile through our Pharmacy team.  The second option is to proceed with hospice.  I explained to the patient that his disease is not curable and the goal of therapy is to prolong his life with a good quality of life. I've only had the pleasure of meeting the patient 1 time before; therefore, I defered to him how he feels overall he has done with his treatment.  If he feels that his quality  of life is declining, I would not recommend any further therapy; however, if he feels that he is still able to function well, especially since he is working, we could try regorafenib.  The patient would like to try treatment, but he would like to hold off until after the holidays to give him some more time to improve.  I think this is reasonable since if he has rapid progression of disease or declining performance status, it is best that we do not start the regorafenib since it likely would not have helped in any case; however, if he is improving, I do think it is worthwhile giving this a shot.  Regorafenib teaching by our Pharmacy team. Return to clinic after the holidays to see MD with imaging as new baseline.  Based on patient's assessment at that time, we will decide whether to start regorafenib or not.     2.  Possible bowel obstruction.  The patient's case has been discussed with Dr. Oswald by our nurse practitioner.  We would like the patient to be seen by Dr. Oswald or 1 of his team members to see if there is anything else we can do to help the patient with possible obstruction.   3.  Weight loss.  Nutrition referral to be done ASAP.   4.  History of hepatitis/cirrhosis.  Completed antiviral treatment with HCV viral load undetectable per Dr. Last's notes.   5.  Social:  The patient continues to work despite having debilitating disease.  We will ask  to see the patient ASAP to see what funds would be available to help him through this process.   6.  Terminal illness.  The patient is followed by our Palliative Care team.  He understands that if over the next few weeks his performance status declines, we are recommending hospice.  We will place a hospice referral at that time to have assessment of his home situation and whether he can be in-home or will require nursing home.   7.  Hypokalemia.  Fluids and electrolytes today.  Check BMP tomorrow with replacement today and tomorrow if needed.      The patient did not have any followup scheduled after his visit with NP.  When I was reviewing notes last week, I had noted that there were no future appointments.  The last I heard from the NP was that patient probably had an obstructed stent, unclear why patient appointments were not scheduled.  We will have this addressed further.      Addendum: Discussed case with Dr. Oswald who did not feel they had anything else to offer. If nutrition becomes a problem can consider g-tube.     WILIAN NG MD    Above note accurate for diagnosis, plan and orders placed. Epic/EMR orders and data may not be accurate because of available options, information not entered correctly/updated by staff other than writer or interface issues. All data entered by writer with the intent patient care not be compromised nor patient be unnecessarily billed        D: 12/10/2019   T: 12/10/2019   MT: RANJIT      Name:     TRINY SIMMONS   MRN:      [id number]        Account:      [id number]   :      1960           Visit Date:   12/10/2019      Document: [id number]       cc: Moreno Harris MD

## 2019-12-10 NOTE — LETTER
12/10/2019         RE: Rian Malik  [address] [place] [address]        Dear Colleague,    Thank you for referring your patient, Rian Malik, to the [facility]. Please see a copy of my visit note below.    CLINICAL NUTRITION SERVICES - REASSESSMENT NOTE   EVALUATION OF PREVIOUS PLAN OF CARE:   Referring Physician:  Benja  Current diet: general  Current appetite/intake: good  PEG Tube: No    Monitoring from previous assessment:   -Food intake - Rian reports that he has been trying to increase his intake to 'gain weight', however, he continues to lose weight.    He eats 3 meals/day and takes 1-2 snacks/day to work.    B - Ensure (250kcal, 15g pro), english muffin with butter  L - 16 oz gatorade, 1 cup chicken noodle soup, sandwich with chicken salad  D - 3-4 cups casserle (rice, chicken, vegetables, cream soups)  Snacks - banana, applesauce, +/- Ensure shake  Per diet recall, Rian is consuming at least 2000kcal, 75g protein/day (>75% of est needs)  He reports that he has up to 2-12 loose/oily BM /day.  He does notice that they are worse upon eating greasy/higher fat foods.    He continues to have some pain and bloating associated with eating as well.  He thinks he has more pain with higher fat foods.      -Fluid/beverage intake - >6-8 cups water/gatorade    -Weight trends - down 31 lb x past 2 months (14%)  Wt Readings from Last 10 Encounters:   12/10/19 60.8 kg (134 lb)   11/27/19 64.6 kg (142 lb 8 oz)   11/07/19 68.4 kg (150 lb 11.2 oz)   10/23/19 67.8 kg (149 lb 8 oz)   10/10/19 74.9 kg (165 lb 3.2 oz)   10/08/19 75 kg (165 lb 6.4 oz)   10/02/19 72 kg (158 lb 12.8 oz)   09/30/19 70.6 kg (155 lb 11.2 oz)   09/28/19 70.4 kg (155 lb 4.8 oz)   09/20/19 70.5 kg (155 lb 8 oz)     Previous Goals:   1.  Aim for 5-6 small frequent meals  2.  Aim for 2200kcal and 85g protein/day  3. Weight maintenance through cancer treatment  Evaluation: Not met   Previous Nutrition Diagnosis:    Food and nutrition-related knowledge deficit related to food choices with cancer and h/o SBO as evidenced by pt with questions regarding optimal food choices.   Evaluation: Declining   NEW FINDINGS:   14% wt loss x past 2 months  Hypokaliemia    MALNUTRITION:  % Weight Loss:  > 7.5% in 3 months (severe malnutrition)  % Intake:  <75% for >/= 1 month (non-severe malnutrition)  Subcutaneous Fat Loss:  Orbital region moderate depletion, Upper arm region moderate depletion and Lumbar region moderate depletion  Muscle Loss:  Temporal region moderate depletion, Clavicle bone region moderate depletion and Anterior thigh region moderate depletion  Fluid Retention:  None noted    Malnutrition Diagnosis: Severe malnutrition  In Context of:  Chronic illness or disease    CURRENT NUTRITION DIAGNOSIS   Inadequate oral intake related to altered GI function (loose stools, pain with eating) as evidenced by 14% wt loss x past 2 months.    INTERVENTIONS   Recommendations / Nutrition Prescription   1. Small frequent meals - 5-6/day  2. Increase daily calorie intake to 2400/day and protein intake to at least 100g/day   Implementation  General/healthful diet and Medical Food Supplement - reviewed calorie and protein needs and ways to maximize calories/protein in current food choices.  Encouraged pt to focus on smaller, more frequent meals.  Advised pt to aim for at least 2400kcal/day and 100g protein/day.  Suggested he try smoothies made with ONS, fruit, peanut butter etc.  He is receptive to this idea.    Reviewed high potassium containing foods (banana, avocado, tomatoes, V8, orange juice, potatoes etc)  Discussed potential need for Creon as I question whether he is malabsorbing his nutrition with his floating/oily stools.   Collaborated with MD who feels as though Creon would not be indicated for patient at this time as there is no pancreatic involvement.   Recommend checking vitamin D, E and A labs for potential deficiencies.  Message sent to MD and RNCC regarding lab draws.   Goals  1.  Aim for 5-6 small frequent meals  2.  Aim for 2400kcal and  100g protein/day  3. Weight maintenance/weight repletion         Follow-Up Plans: Pt has RD contact information for questions.       MONITORING AND EVALUATION:  -Food intake/tolerance   -Liquid meal replacement or supplement  -Weight trends     Sabine Serrano, WILLEM, LD       Visit Date:   12/10/2019      HISTORY OF PRESENT ILLNESS:  Rian Malik is a 59-year-old gentleman with metastatic colon cancer diagnosed in 10/2017 after presenting with abdominal discomfort.  The patient presents to clinic to review most recent imaging results and toxicity evaluation.  The patient was last seen by our nurse practitioner on 11/27 for toxicity evaluation prior to continuing with pentamidine and irinotecan.  At that time, it was noted that his imaging showed progression with a possible small-bowel obstruction.  It was decided to continue with the current therapy and follow up with me.  It is unclear what happened, but patient did not have scheduled followup and he comes in today.        On today's visit, the patient states that he is losing weight and has lost about 30 pounds in the last 2 months.  He is tired, weak, has no stamina.  He is trying to eat, is passing gas and has diarrhea which he describes as being overly.  Overall, he feels he is still recovering from the chemotherapy.  He is trying to stay on as much of a liquid diet as possible.  He has pain in the rectal area which he rates 5/10.  This was followed by Palliative Care.  The remainder of comprehensive review of systems is negative.      PAST MEDICAL HISTORY:   1.  Colon cancer, see above.   2.  Hepatitis C.   3.  Cirrhosis.   4.  Maldonado's esophagus.      SOCIAL HISTORY:  .  Lives with his son and daughter who help take care of him.  Continues to work as a printer, states that he cannot stop working because he has to pay the  health bills.  Denies tobacco use.      PHYSICAL EXAMINATION:  Thin, cachectic, otherwise negative.      LABORATORY DATA:  Alkaline phosphatase 83, ALT 35, AST 16.  Potassium 2.8.  WBC 6.0, hemoglobin 11.4, hematocrit 31.5, platelets 227, ANC 2.6.      IMAGING:  CT chest, abdomen and pelvis from 11/22/2019:  Multiple dilated loops of mid and distal small bowel suspicious for distal small bowel obstruction.  Irregular soft tissue thickening in the ileocecal region extending into the left lower quadrant has increased since 10/2019 and is suspicious for progression of metastatic disease.  Soft tissue nodularity in the perirectal region is also increased.  Large amount of stool throughout the colon.  Mild splenomegaly.  Nodularity of the liver surface again raises possibility of cirrhosis.  Indeterminate 1.6 cm low dense lesion in the right hepatic lobe, a similar in appearance to the previous scan.      ASSESSMENT AND PLAN:   1.  Metastatic colon cancer.  Unfortunately, most recent imaging does show progression of disease.  Discussed options with patient.  One is to switch him over to regorafenib.  Reviewed potential side effects and toxicities.  The patient will be given more detailed toxicity profile through our Pharmacy team.  The second option is to proceed with hospice.  I explained to the patient that his disease is not curable and the goal of therapy is to prolong his life with a good quality of life.  At this point, I have had only the pleasure of meeting the patient 1 time before; therefore, I do defer to him how he feels overall he has done with his treatment.  If he feels that his quality of life is declining, I would not recommend any further therapy; however, if he feels that he is still able to function well, especially since he is working, we could try regorafenib.  The patient would like to try treatment, but he would like to hold off until after the holidays to give him some more time to improve.  I  think this is reasonable since if he has rapid progression of disease or declining performance status, it is best that we do not start the regorafenib since it likely would not have helped in any case; however, if he is improving, I do think it is worthwhile giving this a shot.  Regorafenib teaching by our Pharmacy team. Return to clinic after the holidays to see MD with imaging as new baseline.  Based on patient's assessment at that time, we will decide whether to start regorafenib or not.     2.  Possible bowel obstruction.  The patient's case has been discussed with Dr. sOwald by our nurse practitioner.  We would like the patient to be seen by Dr. Oswald or 1 of his team members to see if there is anything else we can do to help the patient with possible obstruction.   3.  Weight loss.  Nutrition referral to be done ASAP.   4.  History of hepatitis/cirrhosis.  Completed antiviral treatment with HCV viral load undetectable per Dr. Last's notes.   5.  Social:  The patient continues to work despite having debilitating disease.  We will ask the social work to see the patient ASAP to see what his funds would be available to help him through this process.   6.  Terminal illness.  The patient is followed by our Palliative Care team.  He understands that if over the next few weeks his performance status declines, we are recommending hospice.  We will place a hospice referral at that time to have assessment of his home situation and whether he can be in-home or will require nursing home.   7.  Hypokalemia.  Fluids and electrolytes today.  Check BMP tomorrow with replacement today and tomorrow if needed.   8.  The patient did not have any followup scheduled with him.  When I was reviewing notes last week, I had noted that there were no future appointments.  The last I heard from the NP was that patient probably had an obstructed stent, unclear why patient appointments were not scheduled.  We will have this addressed  further.         WILIAN NG MD             D: 12/10/2019   T: 12/10/2019   MT: PK      Name:     TRINY SIMMONS   MRN:      [id number]        Account:      [id number]   :      1960           Visit Date:   12/10/2019      Document: [id number]       cc: Moreno Harris MD      Again, thank you for allowing me to participate in the care of your patient.        Sincerely,        Sabine Serrano RD

## 2019-12-10 NOTE — PROGRESS NOTES
CLINICAL NUTRITION SERVICES - REASSESSMENT NOTE   EVALUATION OF PREVIOUS PLAN OF CARE:   Referring Physician: Benja  Current diet: general  Current appetite/intake: good  PEG Tube: No    Monitoring from previous assessment:   -Food intake - Rian reports that he has been trying to increase his intake to 'gain weight', however, he continues to lose weight.    He eats 3 meals/day and takes 1-2 snacks/day to work.    B - Ensure (250kcal, 15g pro), english muffin with butter  L - 16 oz gatorade, 1 cup chicken noodle soup, sandwich with chicken salad  D - 3-4 cups casserle (rice, chicken, vegetables, cream soups)  Snacks - banana, applesauce, +/- Ensure shake  Per diet recall, Rian is consuming at least 2000kcal, 75g protein/day (>75% of est needs)  He reports that he has up to 2-12 loose/oily BM /day.  He does notice that they are worse upon eating greasy/higher fat foods.    He continues to have some pain and bloating associated with eating as well.  He thinks he has more pain with higher fat foods.      -Fluid/beverage intake - >6-8 cups water/gatorade    -Weight trends - down 31 lb x past 2 months (14%)  Wt Readings from Last 10 Encounters:   12/10/19 60.8 kg (134 lb)   11/27/19 64.6 kg (142 lb 8 oz)   11/07/19 68.4 kg (150 lb 11.2 oz)   10/23/19 67.8 kg (149 lb 8 oz)   10/10/19 74.9 kg (165 lb 3.2 oz)   10/08/19 75 kg (165 lb 6.4 oz)   10/02/19 72 kg (158 lb 12.8 oz)   09/30/19 70.6 kg (155 lb 11.2 oz)   09/28/19 70.4 kg (155 lb 4.8 oz)   09/20/19 70.5 kg (155 lb 8 oz)     Previous Goals:   1.  Aim for 5-6 small frequent meals  2.  Aim for 2200kcal and 85g protein/day  3. Weight maintenance through cancer treatment  Evaluation: Not met   Previous Nutrition Diagnosis:   Food and nutrition-related knowledge deficit related to food choices with cancer and h/o SBO as evidenced by pt with questions regarding optimal food choices.   Evaluation: Declining   NEW FINDINGS:   14% wt loss x past 2  months  Hypokaliemia    MALNUTRITION:  % Weight Loss:  > 7.5% in 3 months (severe malnutrition)  % Intake:  <75% for >/= 1 month (non-severe malnutrition)  Subcutaneous Fat Loss:  Orbital region moderate depletion, Upper arm region moderate depletion and Lumbar region moderate depletion  Muscle Loss:  Temporal region moderate depletion, Clavicle bone region moderate depletion and Anterior thigh region moderate depletion  Fluid Retention:  None noted    Malnutrition Diagnosis: Severe malnutrition  In Context of:  Chronic illness or disease    CURRENT NUTRITION DIAGNOSIS   Inadequate oral intake related to altered GI function (loose stools, pain with eating) as evidenced by 14% wt loss x past 2 months.    INTERVENTIONS   Recommendations / Nutrition Prescription   1. Small frequent meals - 5-6/day  2. Increase daily calorie intake to 2400/day and protein intake to at least 100g/day   Implementation  General/healthful diet and Medical Food Supplement - reviewed calorie and protein needs and ways to maximize calories/protein in current food choices.  Encouraged pt to focus on smaller, more frequent meals.  Advised pt to aim for at least 2400kcal/day and 100g protein/day.  Suggested he try smoothies made with ONS, fruit, peanut butter etc.  He is receptive to this idea.    Reviewed high potassium containing foods (banana, avocado, tomatoes, V8, orange juice, potatoes etc)  Discussed potential need for Creon as I question whether he is malabsorbing his nutrition with his floating/oily stools.   Collaborated with MD who feels as though Creon would not be indicated for patient at this time as there is no pancreatic involvement.   Recommend checking vitamin D, E and A labs for potential deficiencies. Message sent to MD and RNCC regarding lab draws.   Goals  1.  Aim for 5-6 small frequent meals  2.  Aim for 2400kcal and 100g protein/day  3. Weight maintenance/weight repletion         Follow-Up Plans: Pt has RD contact  information for questions.       MONITORING AND EVALUATION:  -Food intake/tolerance   -Liquid meal replacement or supplement  -Weight trends     Sabine Serrano RDN, LD

## 2019-12-10 NOTE — LETTER
12/10/2019         RE: Rian Malik  [address] [place] [address]        Dear Colleague,    Thank you for referring your patient, Rian Malik, to the [facility]. Please see a copy of my visit note below.    Note Dictated    Again, thank you for allowing me to participate in the care of your patient.        Sincerely,        Bernadette Yousif MD

## 2019-12-10 NOTE — PATIENT INSTRUCTIONS
1. IV fluids and electrolyte replacement today.  2. Repeat lab draw this week (Weds or Thurs), and possible electrolyte replacement depending on lab results.  3. Follow up with Colorectal Surgery team (Dr. Oswald or Delmi Peres CNP) - we will let you know of appointment details.  4. Nutrition referral (appointment scheduled for this morning).  5.  will follow up with you this week regarding potential financial assistance.  6. Return to clinic to follow up with Dr. Yousif after the first of the year (January 2020), with repeat CT scan and labs prior.  7. Pharmacy team will meet with you to provide information on regorafenib.

## 2019-12-10 NOTE — NURSING NOTE
[facility]:  Care Coordination Note    RN met with patient in infusion area this morning to discuss plan of care, as per Dr. Yousif's recommendations.  Patient was able to meet with our Nutrition team in clinic this morning to address his recent weight loss.  Patient aware that he will return to clinic this week (either Weds or Thurs, depending on scheduling availability) for repeat lab draw and possible electrolyte replacement depending on lab results.  Patient is also aware of plan for follow up appointment with Colorectal Surgery team in near future to address his stent and recent CT findings (we are currently waiting to hear back from their clinic regarding appointment availability), and follow-up visit with Dr. Yousif in early January with repeat CT imaging and labs prior.      Pharmacy team is planning to provide patient with education on regorafenib (Stivarga) today, and will also look into insurance coverage of this medication.  Patient will discuss the possibility of starting the regorafenib at the time of his follow up visit with Dr. Yousif in January - per Dr. Yousif, depending on CT results and patient's functional status at that time.    Patient shares that he is still currently working full-time, as his employer is a very small company and does not offer FMLA, disability benefits.  He is planning to look into Social Security Disability, to see if he would possibly qualify for this.  Writer provided patient with brochure for the Cancer Legal Line, and shared that patient can call them for advise on possible options.  Writer also provided patient with packets/applications for the [employer] Foundation and the 30-Day Fund.  Advised that writer will send message to our  to follow up with patient/sister later this week.  Patient shares that his sister may be easier to get in touch with, as it is difficult for him to answer his phone while he is at work during the day.      Writer was also  "informed, per infusion RN, that patient mentioned he is not planning to share the news of his cancer progression or current status with his family members, until after the holidays - as he \"wants them to have a good holiday.\"  Writer did pass this information on to our cancer center , who offered to meet with patient today to see if he was open to discussion.  Will also pass this information along to Social Work team.       Keenan Clarke, RN, BSN, OCN  Oncology Care Coordinator  [facility]      "

## 2019-12-10 NOTE — PROGRESS NOTES
Infusion Nursing Note:  Rian Malik presents today for IVF/ Potassium/Magnesium.  CHEMO NOT GIVEN  TODAY  Patient seen by provider today: Yes: Dr. Yousif   present during visit today: Not Applicable.    Note: Patient very somber today. Stated that his diarrhea is constant. He's feeling weak and dehydrated and is welcoming the fluids. Requesting IV potassium to avoid further nausea.     Intravenous Access:  Implanted Port.    Treatment Conditions:  Lab Results   Component Value Date    HGB 11.4 12/10/2019     Lab Results   Component Value Date    WBC 6.0 12/10/2019      Lab Results   Component Value Date    ANEU 2.6 12/10/2019     Lab Results   Component Value Date     12/10/2019      Lab Results   Component Value Date     12/10/2019                   Lab Results   Component Value Date    POTASSIUM 2.8 12/10/2019           Lab Results   Component Value Date    MAG 1.6 12/10/2019            Lab Results   Component Value Date    CR 0.78 12/10/2019                   Lab Results   Component Value Date    MAICOL 8.7 12/10/2019                Lab Results   Component Value Date    BILITOTAL PENDING 12/10/2019           Lab Results   Component Value Date    ALBUMIN PENDING 12/10/2019                    Lab Results   Component Value Date    ALT PENDING 12/10/2019           Lab Results   Component Value Date    AST PENDING 12/10/2019       Results reviewed, labs MET treatment parameters, ok to proceed with treatment.      Post Infusion Assessment:  Patient tolerated infusion without incident.  Site patent and intact, free from redness, edema or discomfort.  No evidence of extravasations.  Access discontinued per protocol.       Discharge Plan:   Discharge instructions reviewed with: Patient.  Patient and/or family verbalized understanding of discharge instructions and all questions answered.  Patient discharged in stable condition accompanied by: self.  Departure Mode: Ambulatory.    Blanka Tate,  RN

## 2019-12-10 NOTE — TELEPHONE ENCOUNTER
Health Call Center    Phone Message    May a detailed message be left on voicemail: yes    Reason for Call: Symptoms or Concerns     If patient has red-flag symptoms, warm transfer to triage line    Current symptom or concern: The pt's MD wants the pt to be seen asap to discuss findings on recent CT and a stent that was placed. See Epic recs. The first avail is 1.29.20 with FILEMON Peres. Please call to discuss-schedule. Thanks.    Symptoms have been present for:  ? day(s)    Has patient previously been seen for this? Yes    By  : Bentley Oswald    Date: 10.10.19    Are there any new or worsening symptoms? Yes: see above      Action Taken: Message routed to:  [facility] ([facility] colo rectal

## 2019-12-10 NOTE — PROGRESS NOTES
Port site scrubbed with Chloraprep for 30 seconds. Accessed port using sterile technique. [place] drawn-Red/Green/Purple tubes. Double signed by patient and RN. See documentation flowsheet. Port needle left accessed for treatment. Tolerated port access and draw without complaint. Yaritza Astudillo RN on 12/10/2019 at 7:28 AM

## 2019-12-10 NOTE — TELEPHONE ENCOUNTER
Per order this patient needs to see a Colorectal Surgeon ASA for a follow up visit. I called Colorectal Surgery and they do not have any appointments until the end of January, the  is sending a message to the team to see what they can do to get the patient in earlier.

## 2019-12-10 NOTE — NURSING NOTE
"Oncology Rooming Note    December 10, 2019 7:51 AM   Rian Malik is a 59 year old male who presents for:    Chief Complaint   Patient presents with     Oncology Clinic Visit     Follow Up/Prior to Tx     Initial Vitals: /80 (BP Location: Left arm)   Pulse 84   Resp 16   Ht 1.727 m (5' 7.99\")   Wt 60.8 kg (134 lb)   SpO2 99%   BMI 20.38 kg/m   Estimated body mass index is 20.38 kg/m  as calculated from the following:    Height as of this encounter: 1.727 m (5' 7.99\").    Weight as of this encounter: 60.8 kg (134 lb). Body surface area is 1.71 meters squared.  Moderate Pain (5) Comment: Data Unavailable   No LMP for male patient.  Allergies reviewed: Yes  Medications reviewed: Yes    Medications: MEDICATION REFILLS NEEDED TODAY. Provider was notified.  Pharmacy name entered into [employer]: [facility] [place], [place] - [address] [address] LPN          "

## 2019-12-11 NOTE — PROGRESS NOTES
Social Work Note: Telephone Call  Oncology Clinic    Data/Intervention:  Patient Name:  Rian Malik  /Age:  1960 (59 year old)    Call From:  MIRIAM  Reason for Call:  Psychosocial outreach    Assessment:  MIRIAM received referral that Rian has had recent progression of his cancer and is having a difficult time.  He is still working as his employer is small and he wouldn't be eligible for any FMLA or disability through them. MIRIAM asked to follow up with Rian about resources.  MIRIAM did give information about social security and Cancer Legal Line to his sister last week.  Attempted to reach Rian to review this information today.  MIRIAM left message explaining purpose of call and advising he could call back to discuss these resources or as he is scheduled to be in clinic on Friday, miriam could plan to meet with him at that time.        Plan:  MIRIAM will await call back and plan to meet with Rian in clinic on Friday.     JELANI Berg, [facility]  Clinical , Adult Oncology  Pager: [phone]  Phone: [phone]

## 2019-12-12 NOTE — TELEPHONE ENCOUNTER
Prior Authorization Approval    Authorization Effective Date: 12/10/2019  Authorization Expiration Date: 12/9/2022  Medication: Stivarga-APPROVED  Approved Dose/Quantity: 40mg  Reference #:     Insurance Company: [employer] - Phone [phone] Fax [phone]  Expected CoPay: 0.00     CoPay Card Available:      Foundation Assistance Needed:    Which Pharmacy is filling the prescription (Not needed for infusion/clinic administered): [facility] - [place], [place] - [address]  Pharmacy Notified:    Patient Notified:

## 2019-12-12 NOTE — PROGRESS NOTES
Infusion Nursing Note:  Rian Malik presents today for IVF/electrolyte replacement.    Patient seen by provider today: No   present during visit today: Not Applicable.    Note: Continues to have diarrhea 3x/day. Drinking 6 glasses of water and Gatorade/day.     Intravenous Access:  Implanted Port.     Treatment Conditions:  Lab Results   Component Value Date     12/12/2019                   Lab Results   Component Value Date    POTASSIUM 3.0 12/12/2019           Lab Results   Component Value Date    MAG 1.6 12/10/2019            Lab Results   Component Value Date    CR 0.64 12/12/2019                   Lab Results   Component Value Date    MAICOL 8.5 12/12/2019                Lab Results   Component Value Date    BILITOTAL 0.6 12/10/2019           Lab Results   Component Value Date    ALBUMIN 2.7 12/10/2019                    Lab Results   Component Value Date    ALT 35 12/10/2019           Lab Results   Component Value Date    AST 16 12/10/2019       Results reviewed, labs MET treatment parameters, ok to proceed with treatment.      Post Infusion Assessment:  Patient tolerated infusion without incident.  Blood return noted pre and post infusion.  Site patent and intact, free from redness, edema or discomfort.  No evidence of extravasations.  Access discontinued per protocol.       Discharge Plan:   Patient will return 12/13/19 at 0730 for next appointment.   Patient discharged in stable condition accompanied by: self.  Departure Mode: Ambulatory.    Nuha De Los Santos RN

## 2019-12-17 NOTE — PROGRESS NOTES
[facility]:  Care Coordination Note    Received update from Dr. Yousif regarding patient's lab results from last week.  Patient's Vitamin D level is very low at 13.  Dr. Yousif is recommending having patient start on Vitamin D supplementation at a dose of 50,000 international unit(s) once a week for 8 weeks.  The prescription for this has been sent to patient's preferred pharmacy on file ([facility]).  Dr. Yousif would also recommend having patient come into clinic this week if possible, for repeat lab check (and possible further electrolyte replacement, depending on results), since his potassium level from 12/12/19 was still low at 3.0.    Telephone call was placed to patient to review recommendations from Dr. Yousif as per above.  Let patient know that a message has been sent to our scheduling team to work on appointments for patient for lab check and possible electrolyte replacement, and that someone would be calling him to schedule.  Asked patient to return writer's call to verify he received my message and that he verbalizes understanding of instructions and plan.  Direct phone number for this RN was provided in message to patient.     Keenan Clarke, RN, BSN, OCN  Oncology Care Coordinator  [facility]

## 2019-12-19 NOTE — PROGRESS NOTES
Infusion Nursing Note:  Rian Malik presents today for IV Fluids.    Patient seen by provider today: No   present during visit today: Not Applicable.    Note: Patient did not meet parameters for electrolyte replacement today.    Intravenous Access:  Implanted Port.    Treatment Conditions:  Lab Results   Component Value Date     12/19/2019                   Lab Results   Component Value Date    POTASSIUM 3.7 12/19/2019           Lab Results   Component Value Date    MAG 1.6 12/10/2019            Lab Results   Component Value Date    CR 0.78 12/19/2019                   Lab Results   Component Value Date    MAICOL 8.8 12/19/2019                Lab Results   Component Value Date    BILITOTAL 0.6 12/10/2019           Lab Results   Component Value Date    ALBUMIN 2.7 12/10/2019                    Lab Results   Component Value Date    ALT 35 12/10/2019           Lab Results   Component Value Date    AST 16 12/10/2019       Results reviewed, labs did NOT meet treatment parameters: Potassium.      Post Infusion Assessment:  Patient tolerated infusion without incident.  Blood return noted pre and post infusion.  Site patent and intact, free from redness, edema or discomfort.  No evidence of extravasations.  Access discontinued per protocol.       Discharge Plan:   Patient discharged in stable condition accompanied by: self.  Departure Mode: Ambulatory.    Yaritza Astudillo RN

## 2019-12-19 NOTE — PROGRESS NOTES
Port needle in place from 12/12/19. Scrubbed hub with alcohol swab for 30 seconds prior to drawing blood. Verdon tubes drawn-Red gel/Green/Purple tubes. Double signed by patient and RN. See documentation flowsheet. Nuha De Los Santos, RN, BSN, OCN

## 2019-12-23 NOTE — PROGRESS NOTES
Social Work Follow-Up Encounter Visit  Oncology Clinic    Data/Intervention:  Patient Name:  Rian Malik  /Age:  1960 (59 year old)    Reason for Follow-Up:  Psychosocial outreach and support    Collaborated With:    Rian    Resources Provided:  JUNIOR contact information and availability  [employer] application submitted    Assessment:  JUNIOR met with Rian in infusion center. He is here today for infusion. JUNIOR introduced self and reviewed previous discussions with his sister and followed up on messages left for him previously. Rian was friendly and cooperative, though clearly not feeling great. He was resting and waiting for the start of his infusion. SW reviewed Rian's progress with social security. He states he has started working on the application. SW encouraged him to call [facility] to get an appointment set up sooner rather then later. JUNIOR answered Rian's questions about the process and how to move forward. He does have some significant financial concerns as he waits for social security approval. He is no longer able to work, but his company was small so he financial support at this point. JUNIOR discussed [employer] and completed application for the general fund javan with Rian's authorization. Discussed possibility of also applying for a javan from the [employer] ([place]). Rian plans to bring in a copy of his mortgage statement to his next appointment.      Plan:  JUNIOR will continue to follow and check in on Rian periodically when he is in for appointments. JUNIOR encouraged Rian to call with any further questions or concerns. JUNIOR will remain available as needed and appropriate.     JELANI Berg, [facility]  Clinical , Adult Oncology  Pager: [phone]  Phone: [phone]

## 2019-12-23 NOTE — PROGRESS NOTES
Infusion Nursing Note:  Rian King presents today for IVF & potassium replacement   Patient seen by provider today: No   present during visit today: Not Applicable.    Note: Discussed potassium and future appointment needs with Jessica Marsh. To start daily potassium replacement. Schedule weekly IVF & lab appointments going foreward.This information was written down and verbally reviewed with Rian.    Intravenous Access:  Implanted Port.    Treatment Conditions:  Lab Results   Component Value Date     12/23/2019                   Lab Results   Component Value Date    POTASSIUM 3.1 12/23/2019           Lab Results   Component Value Date    MAG 1.6 12/10/2019            Lab Results   Component Value Date    CR 0.80 12/23/2019                   Lab Results   Component Value Date    MAICOL 8.4 12/23/2019                Lab Results   Component Value Date    BILITOTAL 0.6 12/10/2019           Lab Results   Component Value Date    ALBUMIN 2.7 12/10/2019                    Lab Results   Component Value Date    ALT 35 12/10/2019           Lab Results   Component Value Date    AST 16 12/10/2019       Results reviewed, labs MET treatment parameters, ok to proceed with treatment.      Post Infusion Assessment:  Patient tolerated infusion without incident.  Blood return noted pre and post infusion.  Site patent and intact, free from redness, edema or discomfort.  No evidence of extravasations.  Access discontinued per protocol.       Discharge Plan:   Copy of AVS reviewed with patient and/or family.  Patient will return 12/30 for next appointment.  Patient discharged in stable condition accompanied by: self.  Departure Mode: Ambulatory.    Beena Arauz RN

## 2019-12-24 NOTE — TELEPHONE ENCOUNTER
Prior Authorization Retail Medication Request    Medication/Dose: Pramosone 1-1% cream   ICD code (if different than what is on RX):    Previously Tried and Failed:    Rationale:      Insurance Name:    Insurance ID:       Pharmacy Information (if different than what is on RX)  Name:    Phone:             Thank You,  Luli Shah [facility]

## 2019-12-26 NOTE — TELEPHONE ENCOUNTER
Central Prior Authorization Team   [phone]    PA Initiation    Medication: Pramosone 1-1% cream   Insurance Company: [employer] - Phone [phone] Fax [phone]  Pharmacy Filling the Rx: [facility] [place] - [address]  Filling Pharmacy Phone: [phone]  Filling Pharmacy Fax: [phone]  Start Date: 12/30/2019

## 2019-12-26 NOTE — TELEPHONE ENCOUNTER
Prior Authorization Retail Medication Request    Medication/Dose: Klor-Con/ef 25 MEQ   ICD code (if different than what is on RX):    Previously Tried and Failed:    Rationale:      Insurance Name:    Insurance ID:        Pharmacy Information (if different than what is on RX)  Name:    Phone:          Thank You,  Luli Shah, [facility]

## 2019-12-30 NOTE — PROGRESS NOTES
Social Work Follow-Up Encounter Visit  Oncology Clinic    Data/Intervention:  Patient Name:  Rian Malik  /Age:  1960 (59 year old)    Reason for Follow-Up:  Psychosocial support and resources    Collaborated With:    Rian    Resources Provided:   [facility] SNAP/Cash benefits application information  Discussed MA vs. Current MNSure Plan  Submitted application for [employer] for possible mortgage payment    Assessment:  MIRIAM met with Rian in [facility] center. He was kind and open with MIRIAM, but very tired. He asked about [employer] application, SW reminded him that the application was already completed. MIRIAM advised Rian that she got an email from [employer] last Friday and he was approved for a $500 javan, so he should be expecting confirmation of that in the mail shortly. As discussed, Rian also brought in copy of his mortgage and sw completed application for assistance from the [facility]. Rian has no money coming in at this point except the little his children are able to contribute, so any support he can get while awaiting other income will be helpful. MIRIAM also provided education and information about applying for SNAP and cash assistance through [facility], as well as the possibility of transitioning to MA versus his current plan which he does have to pay for each month. Goal is to help decrease costs and increase support while pending disability approval for Rian.     No other needs indicated today. Rian thanked miriam for her help.     Plan:  MIRIAM will continue to follow and provide support and resources as needed and appropriate.     JELANI Berg, [facility]  Clinical , Adult Oncology  Pager: [phone]  Phone: [phone]

## 2019-12-30 NOTE — PROGRESS NOTES
Infusion Nursing Note:  Rian Malik presents today for IVF/K+ replacement.    Patient seen by provider today: No   present during visit today: Not Applicable.    Note: N/A.    Intravenous Access:  Implanted Port.    Treatment Conditions:  Lab Results   Component Value Date     12/30/2019                   Lab Results   Component Value Date    POTASSIUM 3.0 12/30/2019           Lab Results   Component Value Date    MAG 1.6 12/30/2019            Lab Results   Component Value Date    CR 0.75 12/30/2019                   Lab Results   Component Value Date    MAICOL 8.2 12/30/2019                Lab Results   Component Value Date    BILITOTAL 0.6 12/10/2019           Lab Results   Component Value Date    ALBUMIN 2.7 12/10/2019                    Lab Results   Component Value Date    ALT 35 12/10/2019           Lab Results   Component Value Date    AST 16 12/10/2019       Results reviewed, labs MET treatment parameters, ok to proceed with treatment.      Post Infusion Assessment:  Patient tolerated infusion without incident.  Blood return noted pre and post infusion.  Site patent and intact, free from redness, edema or discomfort.  No evidence of extravasations.  Access discontinued per protocol.       Discharge Plan:   Patient will return 1/8/2019 for next appointment.   Patient discharged in stable condition accompanied by: self.  Departure Mode: Ambulatory.    Jael Ansari, RN-BSN, PHN, OCN  [facility]

## 2019-12-30 NOTE — TELEPHONE ENCOUNTER
Prior Authorization Approval    Authorization Effective Date: 11/30/2019  Authorization Expiration Date: 12/29/2020  Medication: Pramosone 1-1% cream-PA APPROVED    Approved Dose/Quantity:   Reference #: CASE ID # [id number]   Insurance Company: [employer] - Phone [phone] Fax [phone]  Expected CoPay:       CoPay Card Available:      Foundation Assistance Needed:    Which Pharmacy is filling the prescription (Not needed for infusion/clinic administered): [facility] [place] - [address]  Pharmacy Notified: Yes  Patient Notified: Yes

## 2019-12-30 NOTE — TELEPHONE ENCOUNTER
Central Prior Authorization Team   Phone: [phone]      PA Initiation    Medication: Klor-Con/ef 25 MEQ - INITIATED  Insurance Company: [employer] - Phone [phone] Fax [phone]  Pharmacy Filling the Rx: [facility] [place] [facility] - [place], [place] - [address]  Filling Pharmacy Phone: [phone]  Filling Pharmacy Fax: [phone]  Start Date: 12/30/2019

## 2020-01-01 ENCOUNTER — MEDICAL CORRESPONDENCE (OUTPATIENT)
Dept: HEALTH INFORMATION MANAGEMENT | Facility: CLINIC | Age: 60
End: 2020-01-01

## 2020-01-01 ENCOUNTER — DOCUMENTATION ONLY (OUTPATIENT)
Dept: ONCOLOGY | Facility: CLINIC | Age: 60
End: 2020-01-01

## 2020-01-01 ENCOUNTER — INFUSION THERAPY VISIT (OUTPATIENT)
Dept: INFUSION THERAPY | Facility: CLINIC | Age: 60
End: 2020-01-01
Payer: COMMERCIAL

## 2020-01-01 ENCOUNTER — PATIENT OUTREACH (OUTPATIENT)
Dept: CARE COORDINATION | Facility: CLINIC | Age: 60
End: 2020-01-01

## 2020-01-01 ENCOUNTER — TELEPHONE (OUTPATIENT)
Dept: ONCOLOGY | Facility: CLINIC | Age: 60
End: 2020-01-01

## 2020-01-01 ENCOUNTER — DOCUMENTATION ONLY (OUTPATIENT)
Dept: FAMILY MEDICINE | Facility: CLINIC | Age: 60
End: 2020-01-01

## 2020-01-01 ENCOUNTER — ANCILLARY PROCEDURE (OUTPATIENT)
Dept: GENERAL RADIOLOGY | Facility: CLINIC | Age: 60
End: 2020-01-01
Attending: INTERNAL MEDICINE
Payer: COMMERCIAL

## 2020-01-01 ENCOUNTER — PATIENT OUTREACH (OUTPATIENT)
Dept: ONCOLOGY | Facility: CLINIC | Age: 60
End: 2020-01-01

## 2020-01-01 ENCOUNTER — ONCOLOGY VISIT (OUTPATIENT)
Dept: ONCOLOGY | Facility: CLINIC | Age: 60
End: 2020-01-01
Payer: COMMERCIAL

## 2020-01-01 ENCOUNTER — DOCUMENTATION ONLY (OUTPATIENT)
Dept: PHARMACY | Facility: CLINIC | Age: 60
End: 2020-01-01

## 2020-01-01 ENCOUNTER — ANCILLARY PROCEDURE (OUTPATIENT)
Dept: CT IMAGING | Facility: CLINIC | Age: 60
End: 2020-01-01
Attending: INTERNAL MEDICINE
Payer: COMMERCIAL

## 2020-01-01 ENCOUNTER — DOCUMENTATION ONLY (OUTPATIENT)
Dept: OTHER | Facility: CLINIC | Age: 60
End: 2020-01-01

## 2020-01-01 VITALS
RESPIRATION RATE: 18 BRPM | DIASTOLIC BLOOD PRESSURE: 89 MMHG | HEART RATE: 80 BPM | TEMPERATURE: 97.6 F | SYSTOLIC BLOOD PRESSURE: 134 MMHG | OXYGEN SATURATION: 99 %

## 2020-01-01 VITALS
SYSTOLIC BLOOD PRESSURE: 121 MMHG | WEIGHT: 132.8 LBS | OXYGEN SATURATION: 99 % | HEART RATE: 88 BPM | RESPIRATION RATE: 18 BRPM | BODY MASS INDEX: 20.2 KG/M2 | DIASTOLIC BLOOD PRESSURE: 85 MMHG

## 2020-01-01 VITALS
OXYGEN SATURATION: 100 % | RESPIRATION RATE: 18 BRPM | HEART RATE: 108 BPM | BODY MASS INDEX: 20.52 KG/M2 | DIASTOLIC BLOOD PRESSURE: 105 MMHG | SYSTOLIC BLOOD PRESSURE: 145 MMHG | HEIGHT: 68 IN | WEIGHT: 135.4 LBS

## 2020-01-01 VITALS
HEART RATE: 106 BPM | OXYGEN SATURATION: 100 % | RESPIRATION RATE: 16 BRPM | BODY MASS INDEX: 20.38 KG/M2 | HEIGHT: 68 IN | SYSTOLIC BLOOD PRESSURE: 115 MMHG | DIASTOLIC BLOOD PRESSURE: 88 MMHG | WEIGHT: 134.5 LBS

## 2020-01-01 DIAGNOSIS — C18.7 ADENOCARCINOMA OF SIGMOID COLON (H): ICD-10-CM

## 2020-01-01 DIAGNOSIS — K56.49 OTHER IMPACTION OF INTESTINE (H): ICD-10-CM

## 2020-01-01 DIAGNOSIS — R18.8 OTHER ASCITES: ICD-10-CM

## 2020-01-01 DIAGNOSIS — E83.42 HYPOMAGNESEMIA: ICD-10-CM

## 2020-01-01 DIAGNOSIS — E87.6 HYPOKALEMIA: ICD-10-CM

## 2020-01-01 DIAGNOSIS — C18.7 ADENOCARCINOMA OF SIGMOID COLON (H): Primary | ICD-10-CM

## 2020-01-01 DIAGNOSIS — C18.9 MALIGNANT NEOPLASM OF COLON, UNSPECIFIED PART OF COLON (H): Primary | ICD-10-CM

## 2020-01-01 DIAGNOSIS — R19.5 LOOSE STOOLS: ICD-10-CM

## 2020-01-01 DIAGNOSIS — K62.89 RECTAL PAIN: ICD-10-CM

## 2020-01-01 DIAGNOSIS — E87.6 HYPOKALEMIA: Primary | ICD-10-CM

## 2020-01-01 DIAGNOSIS — R19.5 LOOSE STOOLS: Primary | ICD-10-CM

## 2020-01-01 LAB
ALBUMIN SERPL-MCNC: 2.6 G/DL (ref 3.4–5)
ALP SERPL-CCNC: 86 U/L (ref 40–150)
ALP SERPL-CCNC: 86 U/L (ref 40–150)
ALP SERPL-CCNC: 93 U/L (ref 40–150)
ALT SERPL W P-5'-P-CCNC: 19 U/L (ref 0–70)
ALT SERPL W P-5'-P-CCNC: 22 U/L (ref 0–70)
ALT SERPL W P-5'-P-CCNC: 25 U/L (ref 0–70)
ANION GAP SERPL CALCULATED.3IONS-SCNC: 4 MMOL/L (ref 3–14)
ANION GAP SERPL CALCULATED.3IONS-SCNC: 7 MMOL/L (ref 3–14)
ANION GAP SERPL CALCULATED.3IONS-SCNC: 9 MMOL/L (ref 3–14)
AST SERPL W P-5'-P-CCNC: 18 U/L (ref 0–45)
AST SERPL W P-5'-P-CCNC: 20 U/L (ref 0–45)
AST SERPL W P-5'-P-CCNC: 24 U/L (ref 0–45)
BASOPHILS # BLD AUTO: 0.1 10E9/L (ref 0–0.2)
BASOPHILS # BLD AUTO: 0.1 10E9/L (ref 0–0.2)
BASOPHILS NFR BLD AUTO: 0.8 %
BASOPHILS NFR BLD AUTO: 1.3 %
BILIRUB SERPL-MCNC: 0.7 MG/DL (ref 0.2–1.3)
BILIRUB SERPL-MCNC: 0.8 MG/DL (ref 0.2–1.3)
BILIRUB SERPL-MCNC: 1 MG/DL (ref 0.2–1.3)
BUN SERPL-MCNC: 13 MG/DL (ref 7–30)
BUN SERPL-MCNC: 13 MG/DL (ref 7–30)
BUN SERPL-MCNC: 16 MG/DL (ref 7–30)
CALCIUM SERPL-MCNC: 8.1 MG/DL (ref 8.5–10.1)
CALCIUM SERPL-MCNC: 8.2 MG/DL (ref 8.5–10.1)
CALCIUM SERPL-MCNC: 8.5 MG/DL (ref 8.5–10.1)
CHLORIDE SERPL-SCNC: 107 MMOL/L (ref 94–109)
CHLORIDE SERPL-SCNC: 109 MMOL/L (ref 94–109)
CHLORIDE SERPL-SCNC: 110 MMOL/L (ref 94–109)
CO2 SERPL-SCNC: 24 MMOL/L (ref 20–32)
CO2 SERPL-SCNC: 24 MMOL/L (ref 20–32)
CO2 SERPL-SCNC: 28 MMOL/L (ref 20–32)
CREAT SERPL-MCNC: 0.71 MG/DL (ref 0.66–1.25)
CREAT SERPL-MCNC: 0.76 MG/DL (ref 0.66–1.25)
CREAT SERPL-MCNC: 0.76 MG/DL (ref 0.66–1.25)
DIFFERENTIAL METHOD BLD: ABNORMAL
DIFFERENTIAL METHOD BLD: ABNORMAL
EOSINOPHIL # BLD AUTO: 0.1 10E9/L (ref 0–0.7)
EOSINOPHIL # BLD AUTO: 0.2 10E9/L (ref 0–0.7)
EOSINOPHIL NFR BLD AUTO: 1.1 %
EOSINOPHIL NFR BLD AUTO: 2.2 %
ERYTHROCYTE [DISTWIDTH] IN BLOOD BY AUTOMATED COUNT: 15.6 % (ref 10–15)
ERYTHROCYTE [DISTWIDTH] IN BLOOD BY AUTOMATED COUNT: 16.1 % (ref 10–15)
GFR SERPL CREATININE-BSD FRML MDRD: >90 ML/MIN/{1.73_M2}
GLUCOSE SERPL-MCNC: 112 MG/DL (ref 70–99)
GLUCOSE SERPL-MCNC: 117 MG/DL (ref 70–99)
GLUCOSE SERPL-MCNC: 88 MG/DL (ref 70–99)
HCT VFR BLD AUTO: 36.1 % (ref 40–53)
HCT VFR BLD AUTO: 37.3 % (ref 40–53)
HGB BLD-MCNC: 12.3 G/DL (ref 13.3–17.7)
HGB BLD-MCNC: 12.6 G/DL (ref 13.3–17.7)
IMM GRANULOCYTES # BLD: 0 10E9/L (ref 0–0.4)
IMM GRANULOCYTES # BLD: 0.1 10E9/L (ref 0–0.4)
IMM GRANULOCYTES NFR BLD: 0.4 %
IMM GRANULOCYTES NFR BLD: 0.7 %
LYMPHOCYTES # BLD AUTO: 1.9 10E9/L (ref 0.8–5.3)
LYMPHOCYTES # BLD AUTO: 2.6 10E9/L (ref 0.8–5.3)
LYMPHOCYTES NFR BLD AUTO: 25.9 %
LYMPHOCYTES NFR BLD AUTO: 30.9 %
MCH RBC QN AUTO: 32.1 PG (ref 26.5–33)
MCH RBC QN AUTO: 32.3 PG (ref 26.5–33)
MCHC RBC AUTO-ENTMCNC: 33.8 G/DL (ref 31.5–36.5)
MCHC RBC AUTO-ENTMCNC: 34.1 G/DL (ref 31.5–36.5)
MCV RBC AUTO: 94 FL (ref 78–100)
MCV RBC AUTO: 96 FL (ref 78–100)
MONOCYTES # BLD AUTO: 0.4 10E9/L (ref 0–1.3)
MONOCYTES # BLD AUTO: 0.6 10E9/L (ref 0–1.3)
MONOCYTES NFR BLD AUTO: 6.1 %
MONOCYTES NFR BLD AUTO: 7 %
NEUTROPHILS # BLD AUTO: 4.7 10E9/L (ref 1.6–8.3)
NEUTROPHILS # BLD AUTO: 4.8 10E9/L (ref 1.6–8.3)
NEUTROPHILS NFR BLD AUTO: 58.2 %
NEUTROPHILS NFR BLD AUTO: 65.4 %
PLATELET # BLD AUTO: 158 10E9/L (ref 150–450)
PLATELET # BLD AUTO: 216 10E9/L (ref 150–450)
POTASSIUM SERPL-SCNC: 3.3 MMOL/L (ref 3.4–5.3)
POTASSIUM SERPL-SCNC: 3.4 MMOL/L (ref 3.4–5.3)
POTASSIUM SERPL-SCNC: 3.7 MMOL/L (ref 3.4–5.3)
PROT SERPL-MCNC: 5.4 G/DL (ref 6.8–8.8)
PROT SERPL-MCNC: 5.8 G/DL (ref 6.8–8.8)
PROT SERPL-MCNC: 5.9 G/DL (ref 6.8–8.8)
RBC # BLD AUTO: 3.83 10E12/L (ref 4.4–5.9)
RBC # BLD AUTO: 3.9 10E12/L (ref 4.4–5.9)
SODIUM SERPL-SCNC: 138 MMOL/L (ref 133–144)
SODIUM SERPL-SCNC: 142 MMOL/L (ref 133–144)
SODIUM SERPL-SCNC: 142 MMOL/L (ref 133–144)
WBC # BLD AUTO: 7.2 10E9/L (ref 4–11)
WBC # BLD AUTO: 8.3 10E9/L (ref 4–11)

## 2020-01-01 PROCEDURE — 85025 COMPLETE CBC W/AUTO DIFF WBC: CPT | Performed by: INTERNAL MEDICINE

## 2020-01-01 PROCEDURE — 96366 THER/PROPH/DIAG IV INF ADDON: CPT | Performed by: NURSE PRACTITIONER

## 2020-01-01 PROCEDURE — 85025 COMPLETE CBC W/AUTO DIFF WBC: CPT | Performed by: NURSE PRACTITIONER

## 2020-01-01 PROCEDURE — 99207 ZZC NO CHARGE NURSE ONLY: CPT

## 2020-01-01 PROCEDURE — 99207 ZZC NO CHARGE LOS: CPT

## 2020-01-01 PROCEDURE — 99213 OFFICE O/P EST LOW 20 MIN: CPT | Performed by: INTERNAL MEDICINE

## 2020-01-01 PROCEDURE — 96365 THER/PROPH/DIAG IV INF INIT: CPT | Performed by: NURSE PRACTITIONER

## 2020-01-01 PROCEDURE — 99214 OFFICE O/P EST MOD 30 MIN: CPT | Performed by: NURSE PRACTITIONER

## 2020-01-01 PROCEDURE — 80053 COMPREHEN METABOLIC PANEL: CPT | Performed by: NURSE PRACTITIONER

## 2020-01-01 PROCEDURE — 74177 CT ABD & PELVIS W/CONTRAST: CPT | Performed by: RADIOLOGY

## 2020-01-01 PROCEDURE — 80053 COMPREHEN METABOLIC PANEL: CPT | Performed by: INTERNAL MEDICINE

## 2020-01-01 PROCEDURE — 96360 HYDRATION IV INFUSION INIT: CPT | Performed by: INTERNAL MEDICINE

## 2020-01-01 PROCEDURE — 99214 OFFICE O/P EST MOD 30 MIN: CPT | Performed by: INTERNAL MEDICINE

## 2020-01-01 RX ORDER — IOPAMIDOL 755 MG/ML
80 INJECTION, SOLUTION INTRAVASCULAR ONCE
Status: COMPLETED | OUTPATIENT
Start: 2020-01-01 | End: 2020-01-01

## 2020-01-01 RX ORDER — HEPARIN SODIUM (PORCINE) LOCK FLUSH IV SOLN 100 UNIT/ML 100 UNIT/ML
5 SOLUTION INTRAVENOUS
Status: DISCONTINUED | OUTPATIENT
Start: 2020-01-01 | End: 2020-01-01 | Stop reason: HOSPADM

## 2020-01-01 RX ORDER — HEPARIN SODIUM (PORCINE) LOCK FLUSH IV SOLN 100 UNIT/ML 100 UNIT/ML
5 SOLUTION INTRAVENOUS
Status: CANCELLED | OUTPATIENT
Start: 2020-01-01

## 2020-01-01 RX ORDER — HEPARIN SODIUM,PORCINE 10 UNIT/ML
5 VIAL (ML) INTRAVENOUS
Status: CANCELLED | OUTPATIENT
Start: 2020-01-01

## 2020-01-01 RX ADMIN — HEPARIN SODIUM (PORCINE) LOCK FLUSH IV SOLN 100 UNIT/ML 5 ML: 100 SOLUTION at 17:22

## 2020-01-01 RX ADMIN — HEPARIN SODIUM (PORCINE) LOCK FLUSH IV SOLN 100 UNIT/ML 5 ML: 100 SOLUTION at 08:47

## 2020-01-01 RX ADMIN — HEPARIN SODIUM (PORCINE) LOCK FLUSH IV SOLN 100 UNIT/ML 5 ML: 100 SOLUTION at 12:06

## 2020-01-01 RX ADMIN — HEPARIN SODIUM (PORCINE) LOCK FLUSH IV SOLN 100 UNIT/ML 5 ML: 100 SOLUTION at 14:15

## 2020-01-01 RX ADMIN — Medication 1000 ML: at 13:03

## 2020-01-01 RX ADMIN — IOPAMIDOL 80 ML: 755 INJECTION, SOLUTION INTRAVASCULAR at 09:04

## 2020-01-01 RX ADMIN — HEPARIN SODIUM (PORCINE) LOCK FLUSH IV SOLN 100 UNIT/ML 5 ML: 100 SOLUTION at 10:08

## 2020-01-01 ASSESSMENT — MIFFLIN-ST. JEOR
SCORE: 1403.54
SCORE: 1399.46

## 2020-01-01 ASSESSMENT — PAIN SCALES - GENERAL
PAINLEVEL: NO PAIN (1)
PAINLEVEL: NO PAIN (0)
PAINLEVEL: MODERATE PAIN (5)
PAINLEVEL: MODERATE PAIN (4)

## 2020-01-02 NOTE — TELEPHONE ENCOUNTER
Central Prior Authorization Team   Phone: [phone]      PA approved through the end of the year as pt's insurance has now termed.  Contacted pharmacy to notify - they will call pt to request new ins info.  If a pa is needed for new ins, please create new encounter and route to [facility] MED.      Prior Authorization Approval    Authorization Effective Date: 11/30/2019  Authorization Expiration Date: 12/29/2020  Medication: Klor-Con/ef 25 MEQ - APPROVED  Approved Dose/Quantity: 30 FOR 30  Reference #:     Insurance Company: [employer]s - Phone [phone] Fax [phone]  Expected CoPay:       CoPay Card Available:      Foundation Assistance Needed:    Which Pharmacy is filling the prescription (Not needed for infusion/clinic administered): [facility] [place] - [address]  Pharmacy Notified: Yes  Patient Notified: Yes

## 2020-01-08 NOTE — NURSING NOTE
"Oncology Rooming Note    January 8, 2020 9:30 AM   Rian Malik is a 59 year old male who presents for:    Chief Complaint   Patient presents with     Oncology Clinic Visit     Follow up/prior to infusion     Initial Vitals: /88 (BP Location: Left arm)   Pulse 106   Resp 16   Ht 1.727 m (5' 7.99\")   Wt 61 kg (134 lb 8 oz)   SpO2 100%   BMI 20.46 kg/m   Estimated body mass index is 20.46 kg/m  as calculated from the following:    Height as of this encounter: 1.727 m (5' 7.99\").    Weight as of this encounter: 61 kg (134 lb 8 oz). Body surface area is 1.71 meters squared.  Moderate Pain (5) Comment: Data Unavailable   No LMP for male patient.  Allergies reviewed: Yes  Medications reviewed: Yes    Medications: Medication refills not needed today.  Pharmacy name entered into [email or web address]: [facility] [place] - [place], [place] - [address]              "

## 2020-01-08 NOTE — PROGRESS NOTES
"Oral Chemotherapy Monitoring Program    Primary Oncologist: Dr. Bernadette Yousif  Primary Oncology Clinic: [facility]  Cancer Diagnosis: Stage III Colon Cancer    Drug: regorafenib (Stivarga)   Start Date:TBD  Expected duration of therapy: Until disease progression or unacceptable toxicity    Drug Interaction Assessment: No interactions identified    Lab Monitoring Plan  CMP every 2 weeks, CBC with diff q 28 days    Subjective/Objective:  Rian Malik is a 59 year old male seen in clinic for an initial visit for oral chemotherapy education.      Vitals:  BP:   BP Readings from Last 1 Encounters:   01/08/20 115/88     Wt Readings from Last 1 Encounters:   01/08/20 61 kg (134 lb 8 oz)     Estimated body surface area is 1.71 meters squared as calculated from the following:    Height as of this encounter: 1.727 m (5' 7.99\").    Weight as of this encounter: 61 kg (134 lb 8 oz).    Labs:  _  Result Component Current Result Ref Range   Sodium 142 (1/8/2020) 133 - 144 mmol/L     _  Result Component Current Result Ref Range   Potassium 3.4 (1/8/2020) 3.4 - 5.3 mmol/L     _  Result Component Current Result Ref Range   Calcium 8.2 (L) (1/8/2020) 8.5 - 10.1 mg/dL     _  Result Component Current Result Ref Range   Magnesium 1.6 (12/30/2019) 1.6 - 2.3 mg/dL     _  Result Component Current Result Ref Range   Albumin 2.6 (L) (1/8/2020) 3.4 - 5.0 g/dL     _  Result Component Current Result Ref Range   Urea Nitrogen 16 (1/8/2020) 7 - 30 mg/dL     _  Result Component Current Result Ref Range   Creatinine 0.71 (1/8/2020) 0.66 - 1.25 mg/dL       _  Result Component Current Result Ref Range   AST 24 (1/8/2020) 0 - 45 U/L     _  Result Component Current Result Ref Range   ALT 25 (1/8/2020) 0 - 70 U/L     _  Result Component Current Result Ref Range   Bilirubin Total 0.8 (1/8/2020) 0.2 - 1.3 mg/dL       _  Result Component Current Result Ref Range   WBC 8.3 (1/8/2020) 4.0 - 11.0 10e9/L     _  Result " Component Current Result Ref Range   Hemoglobin 12.6 (L) (1/8/2020) 13.3 - 17.7 g/dL     _  Result Component Current Result Ref Range   Platelet Count 216 (1/8/2020) 150 - 450 10e9/L     _  Result Component Current Result Ref Range   Absolute Neutrophil 4.8 (1/8/2020) 1.6 - 8.3 10e9/L     Assessment:  Patient is appropriate to start therapy.    Plan:  Basic chemotherapy teaching was reviewed with the patient including indication, start date of therapy, dose, administration, adverse effects, missed doses, food and drug interactions, monitoring, side effect management, office contact information, and safe handling. Written materials were provided and all questions answered.    Follow-Up:  1/14 L visit at 1515  1/17 (~1 week) after starting therapy    Yecenia Galicia, PharmD  PGY-2 Oncology Pharmacy Resident  January 8, 2020

## 2020-01-08 NOTE — PROGRESS NOTES
"Visit Date:   01/08/2020      HISTORY OF PRESENT ILLNESS:  Rian Malik is a 59-year-old gentleman with metastatic colon cancer diagnosed 10/2017 after presenting with abdominal discomfort.  The patient was most recently on panitumumab and irinotecan and unfortunately had shown progression.  He presents to clinic today to discuss next steps.  On today's visit, the patient tells me over the last 1-2 days, he has been noticing abdominal bloating.  He does have discomfort, but not significant pain.  He has never had a paracentesis in the past.  He did have dry heaves this morning, but no emesis. He has not been eating much, but his weight is up and he feels this is more related to fluids.  He is requesting a refill of his oxycodone.  He did meet with nutritionist team in the past and was given helpful suggestions of how to improve his weight.  Denies any fevers, chills, chest pain, shortness of breath, cough.  No new palpable masses.  No memory loss, anxiety or depression.  Since I last saw him, he has stopped working and has been working with our  of what possible assistance there is for him.  Remainder of comprehensive review of systems is negative.      PAST MEDICAL HISTORY:   1.  Colon cancer, see above.   2.  Hepatitis C.   3.  Cirrhosis.   4.  Maldonado's esophagus.      SOCIAL HISTORY:  .  Lives with his son and daughter who help take care of him.  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  Has informed his family that his disease is terminal.  No current tobacco use.      PHYSICAL EXAMINATION:   VITAL SIGNS:/88 (BP Location: Left arm)   Pulse 106   Resp 16   Ht 1.727 m (5' 7.99\")   Wt 61 kg (134 lb 8 oz)   SpO2 100%   BMI 20.46 kg/m    GENERAL:  Cachectic, thin, tired appearing.   HEENT:  Atraumatic, normocephalic.  Pupils equal.  Sclerae anicteric.  Oropharynx:  No lesions or ulcers, moist.   NECK:  Supple, full range of motion.  Trachea midline.   HEART:  " Regular rate and rhythm, normal S1, S2, no murmurs.   LUNGS:  Clear to auscultation.  No crackles or wheezes.  Normal respiratory effort.   GASTROINTESTINAL:  Positive bowel sounds.  Soft, distended, palpable masses.  EXTREMITIES:  Warm, slight ankle edema bilaterally.   MUSCULOSKELETAL:  No point tenderness.   LYMPHATICS:  No cervical, supraclavicular or axillary nodes.   SKIN:  No petechiae or rashes.  Dry.   NEUROLOGIC:  Alert and oriented.      LABORATORY DATA:  Alkaline phosphatase 93, ALT 25, AST 24.  WBC 8.3, hemoglobin 12.6, hematocrit 37.3, platelets 216.  ANC is 4.8.      IMAGING STUDIES:  CT abdomen and pelvis from 01/03/2020 shows marked colonic narrowing at the level of the colorectal anastomosis, traversed by colonic stent, with diffusely dilated large and small bowel proximally, compatible with chronic distal colonic obstruction.  Distended bowel has slightly increased in caliber compared to 11/2019 without evidence of ischemia or perforation.  No significant change in appearance of wall thickening and mucosal hyper-enhancement of the distal ileum which remains suspicious for disease involvement.  Stable size of likely metastatic pericolonic nodules.  No new or enlarging abdominal or pelvic lymph nodes.  Cirrhotic liver with stable post-ablation changes in the right hepatic lobe.  Sequelae of portal hypertension including moderate volume ascites and paraesophageal varices.  Indeterminate soft tissue thickening in the left anterior abdominal wall, not significantly changed compared to 11/2019; however, increased compared to 10/2013.      ASSESSMENT AND PLAN:   1.  Metastatic colon cancer:  Most recent imaging as the new baseline does not show a significant change, though he does have ascites.  Discussed with patient starting regorafenib.  Reviewed potential risks, side effects and toxicities.  The patient is interested in trying but does have concerns regarding the side effects.  Per Redos schedule,  will start him on a lower dose of 80 mg once a day x 1 week and increase to 120 mg once a day x 1 week and increase to 160 mg once a day.  We do feel we need to keep a close eye on the patient and would like him to come back next week to see nurse practitioner for symptom assessment.  Pharmacy team will meet with the patient to review educational material.   2.  Possible bowel obstruction:  The patient's case has been discussed with Dr. Oswald who does not feel any further surgical intervention is an option.  Unfortunately, the patient is now limited in what we can do and will need to continue to just follow conservatively.   3.  Weight loss:  Has met with Nutrition team and has been given helpful aid.   4.  History of hepatitis/cirrhosis:  Completed antiviral treatment for HCV, viral load undetectable per Dr. Last's note.   5.  Abdominal distention:  Schedule paracentesis.  Return to clinic next week to see NP for symptom assessment.   6.  History of hypokalemia:  Potassium is up to 3.4 today.  We will hold IV fluids and electrolyte replacement.  We will check again next week.   7.  Social:   The patient has been meeting with our  once a week to discuss what possible financial assistance is available.  The patient has stopped working.  Unfortunately, he is not eligible for disability since he worked in a small company.   8.  Terminal illness:  The patient started to talk to his family about the terminal nature of his disease.  He has met with our Palliative Care Team.  Will ask to have a followup appointment.   9.  Pain:  The patient was given a refill for oxycodone by Dr. Bowers.  Ze Moody will be following up with the patient.         WILIAN NG MD    Above note accurate for diagnosis, plan and orders placed. Epic/EMR orders and data may not be accurate because of available options, information not entered correctly/updated by staff other than writer or interface issues. All data entered  by writer with the intent patient care not be compromised nor patient be unnecessarily billed      D: 2020   T: 2020   MT:       Name:     TRINY SIMMONS   MRN:      [id number]        Account:      [id number]   :      1960           Visit Date:   2020      Document: [id number]       cc: Moreno Harris MD

## 2020-01-08 NOTE — PROGRESS NOTES
Social Work Follow-Up Encounter Visit  Oncology Clinic    Data/Intervention:  Patient Name:  Rian Malik  /Age:  1960 (59 year old)    Reason for Follow-Up:  Psychosocial check in     Collaborated With:    Rian    Resources Provided:  Education on Social Security compassionate allowance list    Assessment:  MIRIAM met with Rian in clinic this morning. Rian not feeling well and quite uncomfortable. He met with Dr. Yousif today to discuss plans of care and transition to oral chemotherapy. MIRIAM met with Rian while he was waiting for the pharmacist. He continues to struggle with the logistics of this advanced illness and his inability to work. MIRIAM advised that [employer] had paid his mortgage for the month and Rian confirmed he received the information from [employer] and has submitted bills to them. MIRIAM followed up on status of social security and county services. Rian reports that he was initially denied social security, but is now working with an  to appeal that. MIRIAM explained and wrote down the information on compassionate allowances and encouraged Rian to talk to his  about this approach to expediting the approval. He states he will call his  today. He reports he has not followed up on his SNAP or emergency assistance yet, miriam acknowledged how difficult it is right now, but encouraged him to keep moving forward with some of these options as a means of buying some time while pending disability approval.     No other sw needs today.       Plan:  MIRIAM will continue to follow and provide support as needed and appropriate.     JELANI Berg, [facility]  Clinical , Adult Oncology  Pager: [phone]  Phone: [phone]

## 2020-01-08 NOTE — PROGRESS NOTES
Port needle left accessed for treatment. Tolerated port access and draw without complaint. Port site scrubbed with Chloraprep for 30 seconds and allowed to dry completely prior to dressing application. Accessed using sterile technique. [place] tubes drawn-Red gel/Green/Purple tubes. Double signed by patient and RN. See documentation flowsheet. Nuha De Los Santos, RN, BSN, OCN

## 2020-01-08 NOTE — NURSING NOTE
Order set for diagnostic and therapeutic paracentesis (with cytology testing) signed by Dr. Yousif, and faxed to [facility] at fax number [phone].  Patient will stop by scheduling desk before leaving clinic today to get this set up ASAP.      Keenan Clarke, RN, BSN, OCN  Oncology Care Coordinator  [facility]

## 2020-01-08 NOTE — LETTER
"    1/8/2020         RE: Rian Malik  [address] [place] [address]        Dear Colleague,    Thank you for referring your patient, Rian Malik, to the [facility]. Please see a copy of my visit note below.    Oral Chemotherapy Monitoring Program    Primary Oncologist: Dr. Bernadette Yousif  Primary Oncology Clinic: [facility]  Cancer Diagnosis: Stage III Colon Cancer    Drug: regorafenib (Stivarga)   Start Date:TBD  Expected duration of therapy: Until disease progression or unacceptable toxicity    Drug Interaction Assessment: No interactions identified    Lab Monitoring Plan  CMP every 2 weeks, CBC with diff q 28 days    Subjective/Objective:  Rian Malik is a 59 year old male seen in clinic for an initial visit for oral chemotherapy education.      Vitals:  BP:   BP Readings from Last 1 Encounters:   01/08/20 115/88     Wt Readings from Last 1 Encounters:   01/08/20 61 kg (134 lb 8 oz)     Estimated body surface area is 1.71 meters squared as calculated from the following:    Height as of this encounter: 1.727 m (5' 7.99\").    Weight as of this encounter: 61 kg (134 lb 8 oz).    Labs:  _  Result Component Current Result Ref Range   Sodium 142 (1/8/2020) 133 - 144 mmol/L     _  Result Component Current Result Ref Range   Potassium 3.4 (1/8/2020) 3.4 - 5.3 mmol/L     _  Result Component Current Result Ref Range   Calcium 8.2 (L) (1/8/2020) 8.5 - 10.1 mg/dL     _  Result Component Current Result Ref Range   Magnesium 1.6 (12/30/2019) 1.6 - 2.3 mg/dL     _  Result Component Current Result Ref Range   Albumin 2.6 (L) (1/8/2020) 3.4 - 5.0 g/dL     _  Result Component Current Result Ref Range   Urea Nitrogen 16 (1/8/2020) 7 - 30 mg/dL     _  Result Component Current Result Ref Range   Creatinine 0.71 (1/8/2020) 0.66 - 1.25 mg/dL       _  Result Component Current Result Ref Range   AST 24 (1/8/2020) 0 - 45 U/L     _  Result Component Current Result Ref " Range   ALT 25 (1/8/2020) 0 - 70 U/L     _  Result Component Current Result Ref Range   Bilirubin Total 0.8 (1/8/2020) 0.2 - 1.3 mg/dL       _  Result Component Current Result Ref Range   WBC 8.3 (1/8/2020) 4.0 - 11.0 10e9/L     _  Result Component Current Result Ref Range   Hemoglobin 12.6 (L) (1/8/2020) 13.3 - 17.7 g/dL     _  Result Component Current Result Ref Range   Platelet Count 216 (1/8/2020) 150 - 450 10e9/L     _  Result Component Current Result Ref Range   Absolute Neutrophil 4.8 (1/8/2020) 1.6 - 8.3 10e9/L     Assessment:  Patient is appropriate to start therapy.    Plan:  Basic chemotherapy teaching was reviewed with the patient including indication, start date of therapy, dose, administration, adverse effects, missed doses, food and drug interactions, monitoring, side effect management, office contact information, and safe handling. Written materials were provided and all questions answered.    Follow-Up:  1/14  visit at 1515  1/17 (~1 week) after starting therapy    Yecenia Galicia, PharmD  PGY-2 Oncology Pharmacy Resident  January 8, 2020        Visit Date:   01/08/2020      HISTORY OF PRESENT ILLNESS:  Rian Malik is a 59-year-old gentleman with metastatic colon cancer diagnosed 10/2017 after presenting with abdominal discomfort.  The patient was most recently on panitumumab and irinotecan and unfortunately had shown progression.  He presents to clinic today to discuss next steps.  On today's visit, the patient tells me over the last 1-2 days, he has been noticing abdominal bloating.  He does have discomfort, but not significant pain.  He has never had a paracentesis in the past.  He did have dry heaves this morning, but no emesis. He has not been eating much, but his weight is up and he feels this is more related to fluids.  He is requesting a refill of his oxycodone.  He did meet with nutritionist team in the past and was given helpful suggestions of how to improve his weight.  Denies any  "fevers, chills, chest pain, shortness of breath, cough.  No new palpable masses.  No memory loss, anxiety or depression.  Since I last saw him, he has stopped working and has been working with our  of what possible assistance there is for him.  Remainder of comprehensive review of systems is negative.      PAST MEDICAL HISTORY:   1.  Colon cancer, see above.   2.  Hepatitis C.   3.  Cirrhosis.   4.  Maldonado's esophagus.      SOCIAL HISTORY:  .  Lives with his son and daughter who help take care of him.  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  Has informed his family that his disease is terminal.  No current tobacco use.      PHYSICAL EXAMINATION:   VITAL SIGNS:/88 (BP Location: Left arm)   Pulse 106   Resp 16   Ht 1.727 m (5' 7.99\")   Wt 61 kg (134 lb 8 oz)   SpO2 100%   BMI 20.46 kg/m     GENERAL:  Cachectic, thin, tired appearing.   HEENT:  Atraumatic, normocephalic.  Pupils equal.  Sclerae anicteric.  Oropharynx:  No lesions or ulcers, moist.   NECK:  Supple, full range of motion.  Trachea midline.   HEART:  Regular rate and rhythm, normal S1, S2, no murmurs.   LUNGS:  Clear to auscultation.  No crackles or wheezes.  Normal respiratory effort.   GASTROINTESTINAL:  Positive bowel sounds.  Soft, distended, palpable masses.  EXTREMITIES:  Warm, slight ankle edema bilaterally.   MUSCULOSKELETAL:  No point tenderness.   LYMPHATICS:  No cervical, supraclavicular or axillary nodes.   SKIN:  No petechiae or rashes.  Dry.   NEUROLOGIC:  Alert and oriented.      LABORATORY DATA:  Alkaline phosphatase 93, ALT 25, AST 24.  WBC 8.3, hemoglobin 12.6, hematocrit 37.3, platelets 216.  ANC is 4.8.      IMAGING STUDIES:  CT abdomen and pelvis from 01/03/2020 shows marked colonic narrowing at the level of the colorectal anastomosis, traversed by colonic stent, with diffusely dilated large and small bowel proximally, compatible with chronic distal colonic obstruction.  Distended " bowel has slightly increased in caliber compared to 11/2019 without evidence of ischemia or perforation.  No significant change in appearance of wall thickening and mucosal hyper-enhancement of the distal ileum which remains suspicious for disease involvement.  Stable size of likely metastatic pericolonic nodules.  No new or enlarging abdominal or pelvic lymph nodes.  Cirrhotic liver with stable post-ablation changes in the right hepatic lobe.  Sequelae of portal hypertension including moderate volume ascites and paraesophageal varices.  Indeterminate soft tissue thickening in the left anterior abdominal wall, not significantly changed compared to 11/2019; however, increased compared to 10/2013.      ASSESSMENT AND PLAN:   1.  Metastatic colon cancer:  Most recent imaging as the new baseline does not show a significant change, though he does have ascites.  Discussed with patient starting regorafenib.  Reviewed potential risks, side effects and toxicities.  The patient is interested in trying but does have concerns regarding the side effects.  Per Redos schedule, will start him on a lower dose of 80 mg once a day x 1 week and increase to 120 mg once a day x 1 week and increase to 160 mg once a day.  We do feel we need to keep a close eye on the patient and would like him to come back next week to see nurse practitioner for symptom assessment.  Pharmacy team will meet with the patient to review educational material.   2.  Possible bowel obstruction:  The patient's case has been discussed with Dr. Oswald who does not feel any further surgical intervention is an option.  Unfortunately, the patient is now limited in what we can do and will need to continue to just follow conservatively.   3.  Weight loss:  Has met with Nutrition team and has been given helpful aid.   4.  History of hepatitis/cirrhosis:  Completed antiviral treatment for HCV, viral load undetectable per Dr. Last's note.   5.  Abdominal distention:   Schedule paracentesis.  Return to clinic next week to see NP for symptom assessment.   6.  History of hypokalemia:  Potassium is up to 3.4 today.  We will hold IV fluids and electrolyte replacement.  We will check again next week.   7.  Social:   The patient has been meeting with our  once a week to discuss what possible financial assistance is available.  The patient has stopped working.  Unfortunately, he is not eligible for disability since he worked in a small company.   8.  Terminal illness:  The patient started to talk to his family about the terminal nature of his disease.  He has met with our Palliative Care Team.  Will ask to have a followup appointment.   9.  Pain:  The patient was given a refill for oxycodone by Dr. Bowers.  Ze Moody will be following up with the patient.         WILIAN YOUSIF MD    Above note accurate for diagnosis, plan and orders placed. Epic/EMR orders and data may not be accurate because of available options, information not entered correctly/updated by staff other than writer or interface issues. All data entered by writer with the intent patient care not be compromised nor patient be unnecessarily billed      D: 2020   T: 2020   MT:       Name:     TRINY SIMMONS   MRN:      [id number]        Account:      [id number]   :      1960           Visit Date:   2020      Document: [id number]       cc: Moreno Harris MD       Again, thank you for allowing me to participate in the care of your patient.        Sincerely,        Wilian Yousif MD

## 2020-01-09 NOTE — TELEPHONE ENCOUNTER
Received vm from Beena Diggs CNP at [facility]. She reports she was unable to do paracentesis today because patient had huge dilated bowel and she could not find a safe pocket to get at ascites fluid. Patient sent home.  Joanna Mclaughlin  RN, BSN, OCN

## 2020-01-09 NOTE — TELEPHONE ENCOUNTER
Discussed with  the call from CNP at [facility] regarding unsuccessful paracentesis.  Dr. Henson is concerned about patient having a bowel obstruction. Call placed to patient to assess symptoms. Patient reports nausea/vomiting, abdominal pain. He did have a bowel movement today. He is not passing gas. Feels he would have relief if he could pass gas. Suggested that patient go to [facility] ER if symptoms do not improve. Patient verbalized understanding.

## 2020-01-10 NOTE — PROGRESS NOTES
"[facility]:  Care Coordination Note    Telephone call placed to patient today, to follow up on how he is doing and feeling today, as compared to yesterday.  Noted patient did not to into the ER last night for his abdominal pain, nausea/vomiting symptoms.  Patient shares that he ended up \"passing a lot of gas\" last night, and is feeling a lot better today.  He states that his abdominal pain is much improved, he is not vomiting today, he is passing gas, and he has been able to eat and drink today.  Currently he does not feel the need to go to the ER, since he is feeling better.  Writer did advise patient that Dr. Yousif is worried about him, and reinforced to patient to go to the ER if his symptoms worsen over the weekend.  Patient verbalizes understanding of this.  He is scheduled to see Jessica Marsh, Oncology NP, in clinic on Tuesday next week 1/14/20 for follow up and symptom check.       Keenan Calrke, RN, BSN, OCN  Oncology Care Coordinator  [facility]      "

## 2020-01-13 NOTE — PROGRESS NOTES
Infusion Nursing Note:  Rian Malik presents today for IVF/Potassium replacement.    Patient seen by provider today: No   present during visit today: Not Applicable.    Note: N/A.    Intravenous Access:  Implanted Port.    Treatment Conditions:  Lab Results   Component Value Date     01/13/2020                   Lab Results   Component Value Date    POTASSIUM 3.3 01/13/2020           Lab Results   Component Value Date    MAG 1.6 12/30/2019            Lab Results   Component Value Date    CR 0.76 01/13/2020                   Lab Results   Component Value Date    MAICOL 8.1 01/13/2020                Lab Results   Component Value Date    BILITOTAL 0.7 01/13/2020           Lab Results   Component Value Date    ALBUMIN 2.6 01/13/2020                    Lab Results   Component Value Date    ALT 22 01/13/2020           Lab Results   Component Value Date    AST 20 01/13/2020       Results reviewed, labs MET treatment parameters, ok to proceed with treatment.      Post Infusion Assessment:  Patient tolerated infusion without incident.  Blood return noted pre and post infusion.  Site patent and intact, free from redness, edema or discomfort.  No evidence of extravasations.  Access discontinued per protocol.       Discharge Plan:   Patient will return 1/14/2020 for next appointment.   Patient discharged in stable condition accompanied by: self.  Departure Mode: Ambulatory.    Jael Ansari, RN-BSN, PHN, OCN  [facility]

## 2020-01-14 NOTE — NURSING NOTE
"Oncology Rooming Note    January 14, 2020 3:47 PM   Rian Malik is a 59 year old male who presents for:    Chief Complaint   Patient presents with     Oncology Clinic Visit     Follow up     Initial Vitals: BP (!) 145/105 (BP Location: Right arm)   Pulse 108   Resp 18   Ht 1.727 m (5' 7.99\")   Wt 61.4 kg (135 lb 6.4 oz)   SpO2 100%   BMI 20.59 kg/m   Estimated body mass index is 20.59 kg/m  as calculated from the following:    Height as of this encounter: 1.727 m (5' 7.99\").    Weight as of this encounter: 61.4 kg (135 lb 6.4 oz). Body surface area is 1.72 meters squared.  No Pain (0) Comment: Data Unavailable   No LMP for male patient.  Allergies reviewed: Yes  Medications reviewed: Yes    Medications: Medication refills not needed today.  Pharmacy name entered into [email or web address]:    [facility] - [place], [place] - [address]  [facility] [place], [place] [facility] [address]              "

## 2020-01-14 NOTE — LETTER
1/14/2020         RE: Rian Malik  [address] [place] [address]        Dear Colleague,    Thank you for referring your patient, Rian Malik, to the [facility]. Please see a copy of my visit note below.    Oncology Follow Up Visit: January 14, 2020     Oncologist: Dr Padilla Last  PCP: Moreno Harris    Diagnosis: Stage III Colon Cancer  Rian Malik is a 60 yo  male that was c/o lower abdominal cramping. CT showed sigmoid abscess but at resection was found to have large 5 cm firmly adherent sigmoid colon mass.Lymphovascular invasion present but no perineural invasion.  0/3 Reactive Lymph Nodes were positive. ([id number])  He has intact mismatch repair.  Foundation One testing performed which showed KRAS wild type, codon 12 and 13, microsatellite status stable, tumor mutational burden low, microsatellite stable  Treatment:   10/11/2017 sigmoidectomy with end colostomy  11/21/2017 -6/2018 modified FOLFOX-6 x 12 cycles  9/12/2018 laparoscopic liver oblation with intraoperative US, colostomy takedown, appendectomy, descending colectomy and anterior dissection. Surgical pathology showed periappendiceal tissue, ascending colon margin/mesentery and pericolonic fibroadipose adipose tissue involved by signet ring adenocarcinoma.  10/24/2018 -1/2019 FOLFIRI x 6 cycles  9/18/2019 received colonic stent  10/8/2019-12/15/2019 Panitumumab/irinotecan x 5 cycles  1/2020 to start Regorafenib oral chemotherapy.     Interval History: Mr. Malik comes to clinic alone for symptom review prior to starting treatment with Regorafinib. He has been receiving weekly fluid infusion with electrolyte check and has not had treatment since mid December and took holidays off. He admits he is having much fatigue  And continues with loose stools and soreness of the rectum - has rectal cream and oxycodone as needed. He is eating but admits his appetite is decreased but weight is stable and his  "stated goal is to keep weight stable. He is no longer working and naps as needed to treat the fatigue. He is not exercising   Rest of comprehensive and complete ROS is reviewed and is negative.   Past Medical History:   Diagnosis Date     Maldonado's esophagus      Cirrhosis (H)      Colon cancer (H) 10/11/2017    Poorly differentiated adenocarcinoma     Hepatitis C    - previously completed treatment for Hepatitis C.   Current Outpatient Medications   Medication     acetaminophen (TYLENOL) 500 MG tablet     clindamycin (CLINDAMAX) 1 % external gel     dexamethasone (DECADRON) 2 MG tablet     hydrocortisone-pramoxine (PRAMOSONE) 1-1 % cream     LORazepam (ATIVAN) 0.5 MG tablet     magnesium hydroxide (MILK OF MAGNESIA) 400 MG/5ML suspension     ondansetron (ZOFRAN) 8 MG tablet     oxyCODONE (ROXICODONE) 5 MG tablet     polyethylene glycol (MIRALAX/GLYCOLAX) packet     potassium bicarbonate (K-LYTE) 25 MEQ effervescent tablet     regorafenib (STIVARGA) 40 MG tablet     simethicone (MYLICON) 80 MG chewable tablet     tamsulosin (FLOMAX) 0.4 MG capsule     vitamin D2 (ERGOCALCIFEROL) 94397 units (1250 mcg) capsule     No current facility-administered medications for this visit.      Allergies   Allergen Reactions     Naproxen      Physical Exam:BP (!) 145/105 (BP Location: Right arm)   Pulse 108   Resp 18   Ht 1.727 m (5' 7.99\")   Wt 61.4 kg (135 lb 6.4 oz)   SpO2 100%   BMI 20.59 kg/m      ECOG PS- 0-1  Constitutional: Appears tired but moving well on own, cooperative- does not appear in pa  ENT: Eyes bright, No mouth sores.   Neck: Supple, No adenopathy.Thyroid symmetric  Cardiac: Heart rate and rhythm is regular and strong without murmur  Respiratory: Breathing easy. Lung sounds clear to auscultation  GI: Abdomen is rounded with tympany yet not tense, BS active.  MS: Muscle tone normal- moving well on own, extremities normal with no edema.   Skin:  Only faint papular carlos alberto noted to face and neck with no new " breakout to chest or back.   Neuro: Sensory grossly WNL, gait normal.   Lymph: Normal ant/post cervical, axillary, supraclavicular nodes  Psych: Mentation appears normal and affect normal with good conversation.    Laboratory Results:   Results for TRIYN SIMMONS (MRN [id number]) as of 1/15/2020 13:52   Ref. Range 1/13/2020 14:45   Sodium Latest Ref Range: 133 - 144 mmol/L 142   Potassium Latest Ref Range: 3.4 - 5.3 mmol/L 3.3 (L)   Chloride Latest Ref Range: 94 - 109 mmol/L 109   Carbon Dioxide Latest Ref Range: 20 - 32 mmol/L 24   Urea Nitrogen Latest Ref Range: 7 - 30 mg/dL 13   Creatinine Latest Ref Range: 0.66 - 1.25 mg/dL 0.76   GFR Estimate Latest Ref Range: >60 mL/min/1.73_m2 >90   GFR Estimate If Black Latest Ref Range: >60 mL/min/1.73_m2 >90   Calcium Latest Ref Range: 8.5 - 10.1 mg/dL 8.1 (L)   Anion Gap Latest Ref Range: 3 - 14 mmol/L 9   Albumin Latest Ref Range: 3.4 - 5.0 g/dL 2.6 (L)   Protein Total Latest Ref Range: 6.8 - 8.8 g/dL 5.4 (L)   Bilirubin Total Latest Ref Range: 0.2 - 1.3 mg/dL 0.7   Alkaline Phosphatase Latest Ref Range: 40 - 150 U/L 86   ALT Latest Ref Range: 0 - 70 U/L 22   AST Latest Ref Range: 0 - 45 U/L 20   Glucose Latest Ref Range: 70 - 99 mg/dL 88   WBC Latest Ref Range: 4.0 - 11.0 10e9/L 7.2   Hemoglobin Latest Ref Range: 13.3 - 17.7 g/dL 12.3 (L)   Hematocrit Latest Ref Range: 40.0 - 53.0 % 36.1 (L)   Platelet Count Latest Ref Range: 150 - 450 10e9/L 158   RBC Count Latest Ref Range: 4.4 - 5.9 10e12/L 3.83 (L)   MCV Latest Ref Range: 78 - 100 fl 94   MCH Latest Ref Range: 26.5 - 33.0 pg 32.1   MCHC Latest Ref Range: 31.5 - 36.5 g/dL 34.1   RDW Latest Ref Range: 10.0 - 15.0 % 15.6 (H)   Diff Method Unknown Automated Method   % Neutrophils Latest Units: % 65.4   % Lymphocytes Latest Units: % 25.9   % Monocytes Latest Units: % 6.1   % Eosinophils Latest Units: % 1.1   % Basophils Latest Units: % 0.8   % Immature Granulocytes Latest Units: % 0.7   Absolute Neutrophil Latest Ref  Range: 1.6 - 8.3 10e9/L 4.7   Absolute Lymphocytes Latest Ref Range: 0.8 - 5.3 10e9/L 1.9   Absolute Monocytes Latest Ref Range: 0.0 - 1.3 10e9/L 0.4   Absolute Eosinophils Latest Ref Range: 0.0 - 0.7 10e9/L 0.1   Absolute Basophils Latest Ref Range: 0.0 - 0.2 10e9/L 0.1   Abs Immature Granulocytes Latest Ref Range: 0 - 0.4 10e9/L 0.1     Assessment and Plan:  Stage III Colon Cancer-Pt has completed 3 cycles of Panitumumab/Irinotecan and had CT CAP with progression and will now be switching to Regorafenib though has not started new plan. He was educated on administration and side effects of the new medication.   We will have him continue weekly infusion of fluids and electrolyte replacement with weekly BMP since loose stools expected to continue to maintain bowel passage through stent and common side effect of Regorafenib. Will see him 1 week after start of treatment for symptom evaluation.  Ascites- noted from cirrhosis/ disease. Discussed with pt that though he is noting increased girth of the abdomen, is still not tense and reviewed that we would like to limit any drainage of the area until necessary. Will continue to examine with each visit.   Nausea-patient has Zofran and Compazine he is using but again reviewed that he can stack the 2 if needed to control the nausea.No current weight loss which is good.   Social-  pt shared that he did talk with children about disease and terminal prognosis over holiday. Children had lost mother in her 40's due to gentic disorder so this can be hard on them. Pt continues to meet with palliative care who is helping with social and pain needs.     This was a 30 min visit with > 50% in counseling and coordinating care including education and management of concerns.    Jessica Marsh CNP      Again, thank you for allowing me to participate in the care of your patient.        Sincerely,        Jessica Marsh, NP, APRN CNP

## 2020-01-14 NOTE — PROGRESS NOTES
Oncology Follow Up Visit: January 14, 2020     Oncologist: Dr Padilla Last  PCP: Moreno Harris    Diagnosis: Stage III Colon Cancer  Rian Malik is a 60 yo  male that was c/o lower abdominal cramping. CT showed sigmoid abscess but at resection was found to have large 5 cm firmly adherent sigmoid colon mass.Lymphovascular invasion present but no perineural invasion.  0/3 Reactive Lymph Nodes were positive. ([id number])  He has intact mismatch repair.  Foundation One testing performed which showed KRAS wild type, codon 12 and 13, microsatellite status stable, tumor mutational burden low, microsatellite stable  Treatment:   10/11/2017 sigmoidectomy with end colostomy  11/21/2017 -6/2018 modified FOLFOX-6 x 12 cycles  9/12/2018 laparoscopic liver oblation with intraoperative US, colostomy takedown, appendectomy, descending colectomy and anterior dissection. Surgical pathology showed periappendiceal tissue, ascending colon margin/mesentery and pericolonic fibroadipose adipose tissue involved by signet ring adenocarcinoma.  10/24/2018 -1/2019 FOLFIRI x 6 cycles  9/18/2019 received colonic stent  10/8/2019-12/15/2019 Panitumumab/irinotecan x 5 cycles  1/2020 to start Regorafenib oral chemotherapy.     Interval History: Mr. Malik comes to clinic alone for symptom review prior to starting treatment with Regorafinib. He has been receiving weekly fluid infusion with electrolyte check and has not had treatment since mid December and took holidays off. He admits he is having much fatigue  And continues with loose stools and soreness of the rectum - has rectal cream and oxycodone as needed. He is eating but admits his appetite is decreased but weight is stable and his stated goal is to keep weight stable. He is no longer working and naps as needed to treat the fatigue. He is not exercising   Rest of comprehensive and complete ROS is reviewed and is negative.   Past Medical History:   Diagnosis Date     Maldonado's  "esophagus      Cirrhosis (H)      Colon cancer (H) 10/11/2017    Poorly differentiated adenocarcinoma     Hepatitis C    - previously completed treatment for Hepatitis C.   Current Outpatient Medications   Medication     acetaminophen (TYLENOL) 500 MG tablet     clindamycin (CLINDAMAX) 1 % external gel     dexamethasone (DECADRON) 2 MG tablet     hydrocortisone-pramoxine (PRAMOSONE) 1-1 % cream     LORazepam (ATIVAN) 0.5 MG tablet     magnesium hydroxide (MILK OF MAGNESIA) 400 MG/5ML suspension     ondansetron (ZOFRAN) 8 MG tablet     oxyCODONE (ROXICODONE) 5 MG tablet     polyethylene glycol (MIRALAX/GLYCOLAX) packet     potassium bicarbonate (K-LYTE) 25 MEQ effervescent tablet     regorafenib (STIVARGA) 40 MG tablet     simethicone (MYLICON) 80 MG chewable tablet     tamsulosin (FLOMAX) 0.4 MG capsule     vitamin D2 (ERGOCALCIFEROL) 07056 units (1250 mcg) capsule     No current facility-administered medications for this visit.      Allergies   Allergen Reactions     Naproxen      Physical Exam:BP (!) 145/105 (BP Location: Right arm)   Pulse 108   Resp 18   Ht 1.727 m (5' 7.99\")   Wt 61.4 kg (135 lb 6.4 oz)   SpO2 100%   BMI 20.59 kg/m     ECOG PS- 0-1  Constitutional: Appears tired but moving well on own, cooperative- does not appear in pa  ENT: Eyes bright, No mouth sores.   Neck: Supple, No adenopathy.Thyroid symmetric  Cardiac: Heart rate and rhythm is regular and strong without murmur  Respiratory: Breathing easy. Lung sounds clear to auscultation  GI: Abdomen is rounded with tympany yet not tense, BS active.  MS: Muscle tone normal- moving well on own, extremities normal with no edema.   Skin:  Only faint papular carlos alberto noted to face and neck with no new breakout to chest or back.   Neuro: Sensory grossly WNL, gait normal.   Lymph: Normal ant/post cervical, axillary, supraclavicular nodes  Psych: Mentation appears normal and affect normal with good conversation.    Laboratory Results:   Results for " TRINY SIMMONS (MRN [id number]) as of 1/15/2020 13:52   Ref. Range 1/13/2020 14:45   Sodium Latest Ref Range: 133 - 144 mmol/L 142   Potassium Latest Ref Range: 3.4 - 5.3 mmol/L 3.3 (L)   Chloride Latest Ref Range: 94 - 109 mmol/L 109   Carbon Dioxide Latest Ref Range: 20 - 32 mmol/L 24   Urea Nitrogen Latest Ref Range: 7 - 30 mg/dL 13   Creatinine Latest Ref Range: 0.66 - 1.25 mg/dL 0.76   GFR Estimate Latest Ref Range: >60 mL/min/1.73_m2 >90   GFR Estimate If Black Latest Ref Range: >60 mL/min/1.73_m2 >90   Calcium Latest Ref Range: 8.5 - 10.1 mg/dL 8.1 (L)   Anion Gap Latest Ref Range: 3 - 14 mmol/L 9   Albumin Latest Ref Range: 3.4 - 5.0 g/dL 2.6 (L)   Protein Total Latest Ref Range: 6.8 - 8.8 g/dL 5.4 (L)   Bilirubin Total Latest Ref Range: 0.2 - 1.3 mg/dL 0.7   Alkaline Phosphatase Latest Ref Range: 40 - 150 U/L 86   ALT Latest Ref Range: 0 - 70 U/L 22   AST Latest Ref Range: 0 - 45 U/L 20   Glucose Latest Ref Range: 70 - 99 mg/dL 88   WBC Latest Ref Range: 4.0 - 11.0 10e9/L 7.2   Hemoglobin Latest Ref Range: 13.3 - 17.7 g/dL 12.3 (L)   Hematocrit Latest Ref Range: 40.0 - 53.0 % 36.1 (L)   Platelet Count Latest Ref Range: 150 - 450 10e9/L 158   RBC Count Latest Ref Range: 4.4 - 5.9 10e12/L 3.83 (L)   MCV Latest Ref Range: 78 - 100 fl 94   MCH Latest Ref Range: 26.5 - 33.0 pg 32.1   MCHC Latest Ref Range: 31.5 - 36.5 g/dL 34.1   RDW Latest Ref Range: 10.0 - 15.0 % 15.6 (H)   Diff Method Unknown Automated Method   % Neutrophils Latest Units: % 65.4   % Lymphocytes Latest Units: % 25.9   % Monocytes Latest Units: % 6.1   % Eosinophils Latest Units: % 1.1   % Basophils Latest Units: % 0.8   % Immature Granulocytes Latest Units: % 0.7   Absolute Neutrophil Latest Ref Range: 1.6 - 8.3 10e9/L 4.7   Absolute Lymphocytes Latest Ref Range: 0.8 - 5.3 10e9/L 1.9   Absolute Monocytes Latest Ref Range: 0.0 - 1.3 10e9/L 0.4   Absolute Eosinophils Latest Ref Range: 0.0 - 0.7 10e9/L 0.1   Absolute Basophils Latest Ref Range:  0.0 - 0.2 10e9/L 0.1   Abs Immature Granulocytes Latest Ref Range: 0 - 0.4 10e9/L 0.1     Assessment and Plan:  Stage III Colon Cancer-Pt has completed 3 cycles of Panitumumab/Irinotecan and had CT CAP with progression and will now be switching to Regorafenib though has not started new plan. He was educated on administration and side effects of the new medication.   We will have him continue weekly infusion of fluids and electrolyte replacement with weekly BMP since loose stools expected to continue to maintain bowel passage through stent and common side effect of Regorafenib. Will see him 1 week after start of treatment for symptom evaluation.  Ascites- noted from cirrhosis/ disease. Discussed with pt that though he is noting increased girth of the abdomen, is still not tense and reviewed that we would like to limit any drainage of the area until necessary. Will continue to examine with each visit.   Nausea-patient has Zofran and Compazine he is using but again reviewed that he can stack the 2 if needed to control the nausea.No current weight loss which is good.   Social-  pt shared that he did talk with children about disease and terminal prognosis over holiday. Children had lost mother in her 40's due to gentic disorder so this can be hard on them. Pt continues to meet with palliative care who is helping with social and pain needs.     This was a 30 min visit with > 50% in counseling and coordinating care including education and management of concerns.    Jessica Marsh,CNP

## 2020-01-22 NOTE — PROGRESS NOTES
IVAD accessed per protocol. Labs sent : cmp. IVAD left accessed for possible fluids & electrolyte replacement.    Beena Arauz RN

## 2020-01-22 NOTE — PROGRESS NOTES
Visit Date:   01/22/2020      HISTORY OF PRESENT ILLNESS:  Rian is a 59-year-old gentleman with metastatic colon cancer diagnosed in 10/2017 after presenting with abdominal discomfort.  The patient most recently progressed and was started on regorafenib.  He comes in for a 1-week symptom assessment.  The patient is still on the lowest dose of regorafenib.  He states that he has not been doing well.  Reports weakness, dehydration and diarrhea.  Typically he was going through 3 pullups a day; now is going through 10 pullups a day.  He says he is in bed 80% of the time.  Does not feel he has much of a quality of life.  States he clearly cannot go up on dose and questions whether he should even continue with his current dose.      On today's visit, we discussed possible next steps.  The patient was very clear that he does not feel he can go up on the dosing of regorafenib and questions whether he can stay on this current dose.  At this point, it would be too early to tell whether he was responding.  We usually require at least 3 months, maybe 2 months to know whether he has any response to treatment.  The patient does not feel he would be able to get through 2 months of this current therapy.  We also discussed that these symptoms he has been experiencing may not necessarily be from the medication but more so from his cancer.  We know he has had a near obstruction, and he started having loose stools as a result of this, and this is more from the cancer than the drug.  We talked about his options.  One is to continue with the regorafenib versus switching to [place]f.  Again, I mentioned I do not know whether his symptoms are related to medication and whether this will necessarily help.  Second option is clinical trial, which I do not feel the patient would be eligible for.  The third option is hospice.  We reviewed that hospice is a service to keep the patient comfortable through this process.  The patient asked whether  this would be at home.  I explained that the hospice team would come to his home, assess him and see whether this would be safe and would be best for patient.  If not, they would tell him, and the family would need to make decisions.  Since the patient's performance status is at a 3,  I do not feel the drugs are necessarily the cause of the patient's symptoms; it is more his cancer and therefore question whether changing medications will really be of any help.  I think hospice is very reasonable.  The patient states that he would need some time to think this over and talk with his family before making a decision.  I expressed to the patient not to make any decisions right now and emphasized the importance of having a family discussion before making a final decision.      We did talk about advanced directives.  The patient states that he does not want to be resuscitated or intubated if his heart were to stop or he stopped breathing.  We will have our  meet with the patient today to make sure all of his records and paperwork are in place.      I did also speak to our Palliative Care team, Ze Moody to talk about the patient's current situation.  Ze will reach out to the patient within the next few days to see if he has made any decisions and attend to any concerns the patient may have.        At the end of discussion, the plan was for the patient to review the hospice information, to meet with the hospice team with his family members to see if this is the road he wants to go down.  The patient will contact our clinic with his final decision.  The patient will go ahead with IV fluids today.  At the end of the discussion, all questions addressed to the best of my ability.  The patient has verbalized understanding and agrees with plan.      TOTAL TIME SPENT:  Twenty-three minutes with greater than 50% on counseling and coordinating care.     Addendum: Informed patient has elected to pursue  hospice.    Above note accurate for diagnosis, plan and orders placed. Epic/EMR orders and data may not be accurate because of available options, information not entered correctly/updated by staff other than writer or interface issues. All data entered by writer with the intent patient care not be compromised nor patient be unnecessarily billed     WILIAN NG MD             D: 2020   T: 2020   MT:       Name:     TRINY SIMMONS   MRN:      [id number]        Account:      [id number]   :      1960           Visit Date:   2020      Document: [id number]       cc: Moreno Harris MD

## 2020-01-22 NOTE — LETTER
"    1/22/2020         RE: Rian Malik  [address] [place] [address]        Dear Colleague,    Thank you for referring your patient, Rian Malik, to the [facility]. Please see a copy of my visit note below.    Oncology Rooming Note    January 22, 2020 12:20 PM   Rian Malik is a 59 year old male who presents for:    Chief Complaint   Patient presents with     Oncology Clinic Visit     follow up     Initial Vitals: /85   Pulse 88   Resp 18   SpO2 99%  Estimated body mass index is 20.59 kg/m  as calculated from the following:    Height as of 1/14/20: 1.727 m (5' 7.99\").    Weight as of 1/14/20: 61.4 kg (135 lb 6.4 oz). There is no height or weight on file to calculate BSA.  Moderate Pain (4) Comment: abdomen   No LMP for male patient.  Allergies reviewed: Yes  Medications reviewed: Yes    Medications: Medication refills not needed today.  Pharmacy name entered into [employer]:    [facility] - [place], [place] - [address]  [facility] - [place], [place] - [address]    Clinical concerns: Abdomen pain worse with start of regorafenib. Some swelling in ankles that is stable. Dr Yousif was notified.      Beena Arauz RN                Visit Date:   01/22/2020      HISTORY OF PRESENT ILLNESS:  Rian is a 59-year-old gentleman with metastatic colon cancer diagnosed in 10/2017 after presenting with abdominal discomfort.  The patient most recently progressed and was started on regorafenib.  He comes in for a 1-week symptom assessment.  The patient is still on the lowest dose of regorafenib.  He states that he has not been doing well.  Reports weakness, dehydration and diarrhea.  Typically he was going through 3 pullups a day; now is going through 10 pullups a day.  He says he is in bed 80% of the time.  Does not feel he has much of a quality of life.  States he clearly cannot go up on dose and questions whether he should " even continue with his current dose.      On today's visit, we discussed possible next steps.  The patient was very clear that he does not feel he can go up on the dosing of regorafenib and questions whether he can stay on this current dose.  At this point, it would be too early to tell whether he was responding.  We usually require at least 3 months, maybe 2 months to know whether he has any response to treatment.  The patient does not feel he would be able to get through 2 months of this current therapy.  We also discussed that these symptoms he has been experiencing may not necessarily be from the medication but more so from his cancer.  We know he has had a near obstruction, and he started having loose stools as a result of this, and this is more from the cancer than the drug.  We talked about his options.  One is to continue with the regorafenib versus switching to Lonsurf.  Again, I mentioned I do not know whether his symptoms are related to medication and whether this will necessarily help.  Second option is clinical trial, which I do not feel the patient would be eligible for.  The third option is hospice.  We reviewed that hospice is a service to keep the patient comfortable through this process.  The patient asked whether this would be at home.  I explained that the hospice team would come to his home, assess him and see whether this would be safe and would be best for patient.  If not, they would tell him, and the family would need to make decisions.  Since the patient's performance status is at a 3,  I do not feel the drugs are necessarily the cause of the patient's symptoms; it is more his cancer and therefore question whether changing medications will really be of any help.  I think hospice is very reasonable.  The patient states that he would need some time to think this over and talk with his family before making a decision.  I expressed to the patient not to make any decisions right now and  emphasized the importance of having a family discussion before making a final decision.      We did talk about advanced directives.  The patient states that he does not want to be resuscitated or intubated if his heart were to stop or he stopped breathing.  We will have our  meet with the patient today to make sure all of his records and paperwork are in place.      I did also speak to our Palliative Care team, Ze Moody to talk about the patient's current situation.  Ze will reach out to the patient within the next few days to see if he has made any decisions and attend to any concerns the patient may have.        At the end of discussion, the plan was for the patient to review the hospice information, to meet with the hospice team with his family members to see if this is the road he wants to go down.  The patient will contact our clinic with his final decision.  The patient will go ahead with IV fluids today.  At the end of the discussion, all questions addressed to the best of my ability.  The patient has verbalized understanding and agrees with plan.      TOTAL TIME SPENT:  Twenty-three minutes with greater than 50% on counseling and coordinating care.     Addendum: Informed patient has elected to pursue hospice.    Above note accurate for diagnosis, plan and orders placed. Epic/EMR orders and data may not be accurate because of available options, information not entered correctly/updated by staff other than writer or interface issues. All data entered by writer with the intent patient care not be compromised nor patient be unnecessarily billed     WILIAN NG MD             D: 2020   T: 2020   MT:       Name:     TRINY SIMMONS   MRN:      [id number]        Account:      [id number]   :      1960           Visit Date:   2020      Document: [id number]       cc: Moreno Harris MD       Again, thank you for allowing me to participate in the care of your patient.         Sincerely,        Bernadette Yousif MD

## 2020-01-22 NOTE — PROGRESS NOTES
Infusion Nursing Note:  Rian Malik presents today for IV Fluids.    Patient seen by provider today: Yes: Dr Yousif   present during visit today: Not Applicable.    Note: N/A.    Intravenous Access:  Implanted Port.    Treatment Conditions:  Lab Results   Component Value Date     01/22/2020                   Lab Results   Component Value Date    POTASSIUM 3.7 01/22/2020           Lab Results   Component Value Date    MAG 1.6 12/30/2019            Lab Results   Component Value Date    CR 0.76 01/22/2020                   Lab Results   Component Value Date    MAICOL 8.5 01/22/2020                Lab Results   Component Value Date    BILITOTAL 1.0 01/22/2020           Lab Results   Component Value Date    ALBUMIN 2.6 01/22/2020                    Lab Results   Component Value Date    ALT 19 01/22/2020           Lab Results   Component Value Date    AST 18 01/22/2020       Not Applicable.      Post Infusion Assessment:  Patient tolerated infusion without incident.  Site patent and intact, free from redness, edema or discomfort.  Access discontinued per protocol.       Discharge Plan:    Patient will return possibly 1/29 depending on decisions made today.   Patient discharged in stable condition accompanied by: self.  Departure Mode: Ambulatory.    Blanche Villanueva RN

## 2020-01-22 NOTE — PROGRESS NOTES
"Oncology Rooming Note    January 22, 2020 12:20 PM   Rian Malik is a 59 year old male who presents for:    Chief Complaint   Patient presents with     Oncology Clinic Visit     follow up     Initial Vitals: /85   Pulse 88   Resp 18   SpO2 99%  Estimated body mass index is 20.59 kg/m  as calculated from the following:    Height as of 1/14/20: 1.727 m (5' 7.99\").    Weight as of 1/14/20: 61.4 kg (135 lb 6.4 oz). There is no height or weight on file to calculate BSA.  Moderate Pain (4) Comment: abdomen   No LMP for male patient.  Allergies reviewed: Yes  Medications reviewed: Yes    Medications: Medication refills not needed today.  Pharmacy name entered into [employer]:    [facility] - [place], [place] - [address]  [facility] - [place], [place] - [address]    Clinical concerns: Abdomen pain worse with start of regorafenib. Some swelling in ankles that is stable. Dr Yousif was notified.      Beena Arauz RN              "

## 2020-01-23 NOTE — PROGRESS NOTES
Oral Chemotherapy Monitoring Program    I briefly touched base with Rian this morning to follow up on his Stivarga. He saw Dr. Yousif yesterday reg his therapy and treatment course. Per my discussion with him, Rian stated he will be electing to go into hospice. I have informed Dr. Yousif.     Abisai Canada, PharmD, BCPS, BCOP   Oncology Pharmacy Manager  [facility]   of Pharmacy  [facility]   [email or web address]

## 2020-01-28 NOTE — PROGRESS NOTES
ORAL CHEMOTHERAPY DISCONTINUATION       Primary Oncologist:  Dr. Bernadette Yousif  Primary Oncology Clinic: [facility]  Cancer Diagnosis:  Colon Cancer  Therapy History:  Drug: regorafenib (Stivarga)  Start Date: 1/18/2020  Therapy Ended On:  1/23/2020  Reason For Discontinuation: patient enrolled into hospice    Additional Notes:  Thank you for the opportunity to be a part in the care of this patient's oral chemotherapy. The oncology pharmacy will no longer be following this patient for oral chemotherapy. If there are any questions or the plan changes, feel free to contact us.    Donita Dove, Pharm. D., [facility]  [phone]

## 2020-02-16 NOTE — PROGRESS NOTES
Dear  [facility],    We are notifying you of a Missed Visit or Hospice Death.  Rian Malik; MRN [id number]   peacefully On date 2020  Time 113  Sincerely [facility]  Ashlie Meyers  [phone]

## 2020-02-21 ENCOUNTER — TELEPHONE (OUTPATIENT)
Dept: FAMILY MEDICINE | Facility: CLINIC | Age: 60
End: 2020-02-21

## 2020-02-21 NOTE — TELEPHONE ENCOUNTER
Reason for Call:  Other Death Certifiate    Detailed comments: Elkin from the  is calling to make sure that provider did receive the pt's death certificate from the state and is hoping to get it signed off by provider as soon as possible. Thank you.    Phone Number Patient can be reached at: Other phone number:  [phone]    Best Time: Any    Can we leave a detailed message on this number? Not Applicable    Call taken on 2020 at 9:29 AM by Brenda Anne

## 2020-02-21 NOTE — TELEPHONE ENCOUNTER
Reason for Call:  Other     Detailed comments: Elkin is calling back checking the status of signing off on death certificate.    Phone Number Patient can be reached at: Other phone number:[phone]      Best Time: any    Can we leave a detailed message on this number? YES    Call taken on 2/21/2020 at 3:51 PM by Opal Walker

## 2021-05-03 NOTE — LETTER
11/30/2018         RE: Rian Malik  [address] [place] [address]        Dear Colleague,    Thank you for referring your patient, Rian Malik, to the [facility]. Please see a copy of my visit note below.    Nutrition Services:     Met with patient ~ 1 year ago for nutrition assessment and education with cancer treatment.    Unable to follow-up with patient today as he will not be receiving treatment this week 2/2 to low platelets. RD will attempt to see patient at next infusion.     aSbine Serrano RD, LD  [facility]      Again, thank you for allowing me to participate in the care of your patient.        Sincerely,        Sabine Serrano RD     Pt slightly confused. Per staff mentatation waxes and wanes. Hospice consult noted; left VM message with dionisio Tang Dus; choice; await call back. Aubrey Allen CM. Addendum; dionisio ham called back; prefers [facility]; referral called to Tara at [facility]. Brian العراقي.

## 2021-06-04 NOTE — PATIENT INSTRUCTIONS
Please contact [facility] RN with questions or concerns following today's appointment: [phone].    Thank you!     No

## 2021-06-23 NOTE — PROGRESS NOTES
"Patient's name and  were verified.  See Doc Flowsheet - IV assess for details.  IVAD accessed with 20G  3/4\" lares gripper plus needle  blood return positive: YES  Site without redness, tenderness or swelling: YES  flushed with 30cc NS and 5cc 100u/ml heparin  Needle: De-accessed    Comments: Labs drawn.  Patient tolerated procedure without incident.    Joanna Mclaughlin  RN, BSN, OCN        " CHEST PAIN

## 2022-04-26 NOTE — PATIENT INSTRUCTIONS
================================================================================  Normal Values   Blood pressure  <140/90 for most adults    <130/80 for some chronic diseases (ask your care team about yours)    BMI (body mass index)  18.5-25 kg/m2 (based on height and weight)     Thank you for visiting [facility]    Normal or non-critical lab and imaging results will be communicated to you by MyChart, letter or phone within 7 days.  If you do not hear from us within 10 days, please call the clinic. If you have a critical or abnormal lab result, we will notify you by phone as soon as possible.     If you have any questions regarding your visit please contact:     Team Comfort:   Clinic Hours Telephone Number   Dr. Darrell Harris 7am-5pm  Monday - Friday [phone]  Fabiana Matias RN   Pharmacy 8:00am-8pm Monday-Friday    9am-5pm Saturday-Sunday [phone]   Urgent Care 11am-9pm Monday-Friday        9am-5pm Saturday-Sunday [phone]     After hours, weekend or if you need to make an appointment with your primary provider please call [phone].   After Hours nurse advise: call [facility]: [phone]    Medication Refills:  Call your pharmacy and they will forward the refill to us. Please allow 3 business days for your refills to be completed.          Ulcerative Colitis  You have been diagnosed with ulcerative colitis. Ulcerative colitis is a chronic condition that causes inflammation and ulcers in the rectum and colon. It is a form of inflammatory bowel disease (IBD). The disease is usually diagnosed by a special procedure called a colonoscopy. The symptoms usually develop over time. There is no medicine that can cure ulcerative colitis. The goal of treatment is to reduce the symptoms, and cause a remission.  Symptoms of ulcerative colitis include:    Abdominal cramps and pain    Diarrhea,  "usually bloody    Rectal bleeding    Rectal pain    Fever    Decreased appetite and weight loss    Low energy    Inflammation outside of the colon can occur and can cause pain or swelling in places like the eyes, skin, and joints  Home care  No one knows what exactly causes IBD. The goal is to control and relieve the symptoms, and prevent complications, so you can lead a full and active life. No medicine can cure the disease, but in some cases, surgery to remove the whole colon can be curative. However, surgery causes other side effects and so medicines are often preferred. Discuss your options with your healthcare provider.  Diet  Your diet did not cause your condition, but it can affect it. Unfortunately, no one diet that works for everyone, so you have to experiment. Below are some recommendations, but what works for you may be different. Keep a food log to figure out what you are sensitive to.    Eat more slowly. Eat smaller amounts at a time, but more often. Remember, you can always eat more, but can't eat less once you've eaten too much.    High-fiber foods are complicated. While they may help constipation, they can make bloating, cramping, gas, and diarrhea worse.    Eat less sugar.    Try avoiding dairy products if you feel you are sensitive to lactose.    Try cutting out foods that are high in fat and fatty meats.    You can control bloating and passing excess gas. Be careful with \"gassy\" vegetables and fruits like beans, cabbage, broccoli, and cauliflower.    Be careful of carbonated beverages and fruit juices. They can make bloating and diarrhea worse.    Caffeine, alcohol, and stimulants may make symptoms worse.  Lifestyle  Although stress doesn't cause IBD, it is a factor in flare-ups, and how you feel and react to your condition.    Look for things that seem to make your symptoms worse, such as stress and emotions.    Counseling can help you deal with stress. So can self-help measure like exercise, " yoga, and meditation.    Depression can be a part of this illness and antidepressant medicine may be prescribed. This may actually help with diarrhea, constipation, and cramping, as well as symptoms of depression.    Smoking can make symptoms worse.    Lack of sleep can make symptoms seem worse.    Alcohol use can make symptoms worse.  Medicines  Your healthcare provider may prescribe medicines. Take them as directed. In most situations, lifelong medicine is necessary. For acute flares, additional prescription medicines can be prescribed. Call your provider if you need these.    Ask your healthcare provider before taking any medicines for diarrhea.    Avoid anti-inflammatory medicines like ibuprofen or naproxen.    Consider nutritional supplements. This is especially true if the diarrhea is prolonged, or you aren't eating or are losing weight.  Follow-up care  Follow up with your healthcare provider, or as advised. Tell your provider if you lose more than 5 pounds over 3 to 6 months, and you aren't trying to lose weight.  If a stool sample was taken, or cultures were done, you will be told if they are positive, or if your treatment needs to be changed. You can call as directed for results.  If X-rays were done, a radiologist will look at them. You will be told if you need a change in treatment  It is very important to tell your doctor if you intend to get pregnant, or find out you are pregnant. You will need to discuss your disease, medicines, and plan as early as possible and preferably before you conceive.  Call 911  Call 911 if any of these occur:    Trouble breathing    Confusion    Very drowsy or trouble awakening    Fainting or loss of consciousness    Rapid heart rate    Chest pain  When to seek medical advice  Call your healthcare provider right away if any of these occur:    Bleeding from your rectum    Frequent diarrhea or abdominal pain that's not controlled by your medicine    Bloody diarrhea    Fever of  100.4 F (38 C) or higher, or as directed by your health care provider    Persistent nausea or repeated vomiting     5945-4239 [employer]. [address] [place] [address], [place] [address]. All rights reserved. This information is not intended as a substitute for professional medical care. Always follow your healthcare professional's instructions.        Crohn s Disease    Crohn s disease is inflammation of the intestinal tract that comes and goes in  flare-ups . This is a chronic (long-term) illness. During a flare-up, intense abdominal pain and fever may be felt. Mucus, blood, or pus may appear in the stool. Between flare ups, inflammation lessens and there usually are no symptoms. Crohn s disease is a form of inflammatory bowel disease (IBD).   Symptoms of Crohn's disease may include:    Abdominal cramps and pain    Diarrhea, usually bloody, alternating with constipation    Mucus in stools    Rectal bleeding    Rectal pain    Fever    Low energy    Decreased appetite and weight loss  No one knows what exactly causes Crohn's disease, and there is no cure. The goal of treatment is to control and relieve symptoms and prevent complications, so you can lead a full and active life. No single treatment works for everyone, but many things can be done to help.  Diet  Foods did not cause your Crohn's, but they can affect it. Unfortunately, there is no one diet that works for everyone. Below are some things to try. Keep a food log to figure out what you are sensitive to.    Eat more slowly and smaller amounts at a time, but more often. Remember, you can always eat more, but cannot eat less once you've eaten too much.    High fiber foods are complicated. While they may help constipation they can make the bloating, cramping, gas, and diarrhea worse.    Try avoiding dairy products, sometimes this helps    Try cutting out foods that are high in fat and fatty meats    Eat less sugar    Bloating or passing excess gas may be  "controlled. Be careful with \"gassy\" vegetables and fruits like beans, cabbage, broccoli, and cauliflower.    Be careful of carbonated beverages and fruit juices. They can make the bloating and diarrhea worse.    Caffeine, alcohol, and stimulants may worsen symptoms. These include coffee, tea, sodas, energy drinks, and chocolate.  Lifestyle  Although stress does not cause Crohn's, it is often a factor in flare-ups. It can also affect how you feel about and cope with your condition.    Look for factors that seem to worsen your symptoms such as stress and emotions.    Counseling can often help relieve stress. So can self-help measures such as exercise, yoga, and meditation.    Depression can be a part of this illness and antidepressants may be prescribed. This may actually help with diarrhea, constipation, and cramping, as well as depression.    Smoking doesn't cause Crohn's, but can make the symptoms worse.  Medicines  Your health care provider may prescribe medicines. If so, take them as directed. For acute flare-ups, prescription medicines can be prescribed. Contact your provider if you need this.    Ask your health care provider before taking any antidiarrheal medicines.    Avoid anti-inflammatory medicines like ibuprofen or naproxen.    Consider nutritional supplements. This is especially true if the diarrhea is prolonged, or you aren't eating or are losing weight.  Follow-up care  Follow up with your healthcare provider, or as advised. If a stool sample was taken or cultures were done, you will be told if your treatment needs to change. Call as directed for the results.  Support  Support groups for persons Crohn s disease can be a source of useful information on how others are coping with this illness. They are available in person, on the phone, or via the Internet. Contact the following resources for more information.    Crohn s and Colitis [employer] 924.187.5161 www.ccfa.org    National " Digestive Diseases Information Clearinghouse (NDDIC) 272.595.5749 www.digestive.niddk.nih.gov  When to seek medical advice  Call your health care provider right away if any of these occur:    Fever of 100.4 F (38 C) or higher, or as directed by your health care provider    Abdominal pain that doesn't get better when you take the usual measures    Mucus, pus, or blood in the stool (dark or bright red)    Repeated vomiting    Abdominal swelling and pain that doesn't go away after a few hours  Call 911  Call 911 if any of these occur:    Trouble breathing    Confusion    Extreme sleepiness or trouble waking up    Fainting or loss of consciousness    Rapid heart rate    9812-4254 [employer]. [address] [place] [address], [place] [address]. All rights reserved. This information is not intended as a substitute for professional medical care. Always follow your healthcare professional's instructions.        Colonoscopy  Colonoscopy is used to view the inside of your lower digestive tract (colon and rectum). It can help screen for colon cancer and can help find the source of abdominal pain, bleeding, and changes in bowel habits. The test is usually done in the hospital on an outpatient basis. During the exam, the doctor can remove a small tissue sample ( a biopsy) for testing. Small growths, such as polyps, may also be removed during colonoscopy.     A camera attached to a flexible tube with a viewing lens is used to take video pictures.   Getting ready     Be sure to tell your doctor about any medications you take. Also tell your doctor about any health conditions you may have.    Discuss the risks of the test with your doctor. These include bleeding and bowel puncture.    Your rectum and colon must be empty for the test. So be sure to follow the diet and bowel prep instructions exactly. If you don t, the test may need to be rescheduled.    Ask your doctor whether you need to have a friend or family member prepared  to drive you home after the test.     Colonoscopy provides an inside view of the entire colon.   During the test     You are given sedating (relaxing) medication through an IV line. You may be drowsy or completely asleep.    The procedure takes 30 minutes or longer.    The doctor performs a digital rectal exam to check for anal and rectal problems. The rectum is lubricated and the scope inserted.    If you are awake, you may have a feeling similar to needing to have a bowel movement. You may also feel pressure as air is pumped into the colon. It s OK to pass gas during the procedure.  After the test     You may discuss the results with your doctor right away or at a future visit.    Try to pass all the gas right after the test to help prevent bloating and cramping.    After the test, you can go back to your normal eating and other activities.  Risks and possible complications include:    Bleeding   A puncture or tear in the colon   Risks of anesthesia         1013-5783 [employer]. [address] [place] [address]. All rights reserved. This information is not intended as a substitute for professional medical care. Always follow your healthcare professional's instructions.        Understanding Hepatitis C (HCV)  Hepatitis means inflammation of the liver. There are many kinds of hepatitis. Some can be spread. Others are not. Hepatitis C virus (HCV) does spread. It can lead to lifelong liver disease. This includes chronic hepatitis, cirrhosis, liver failure, and liver cancer.  Symptoms of Hepatitis C  Most people notice no problems until they develop liver disease years later. Symptoms include the following:    Flulike problems (fatigue, nausea, vomiting, diarrhea, and sore muscles and joints)    Tenderness in the upper right abdomen    Jaundice (yellowing skin)    Swelling in the abdomen    Itching    Dark urine    How HCV Spreads  HCV spreads through exposure to an infected person s blood. This  is most likely to occur if:    You used an infected needle (IV drug needles, tattoos, acupuncture needles, and body piercing)    You had a needlestick injury in the hospital    You shared personal care items such as razors    You had sex without a condom with an infected person (a less common cause)    You had a blood transfusion several years ago (blood is now screened for HCV)  Many people do not know how they were exposed to HCV.  Prevent the Spread  No vaccine can prevent the spread of HCV and hepatitis C. It s up to you to keep others safe.  Do    Cover all skin breaks and sores yourself. If you need help, the person treating you should wear latex gloves.    Use condoms during sex, especially with a new partner.  Don t    Don t donate blood, plasma, body organs, other body tissue, or sperm.    Don t share needles.    Don t share razors, toothbrushes, manicure tools, or other personal items.     2635-4604 [employer]. [address] [place] [address]. All rights reserved. This information is not intended as a substitute for professional medical care. Always follow your healthcare professional's instructions.        What to Know About Hepatitis C Treatment  Most people with hepatitis C virus (HCV) carry the virus for the rest of their lives. But treatment helps some people get over HCV infection. Ask your health care provider about your options and how likely it is that treatment will work for you.     Your loved ones can help you decide whether now is a good time for you to try HCV treatment.   Things to consider  Deciding whether or not to get treatment for HCV infection is not easy. Treatment doesn t work for everyone. Your age, gender, and HCV genotype may affect how well treatment works. And because hepatitis C progresses slowly, not everyone needs treatment. Here are some things to think about:    How healthy are you? People who are in good health now may never develop health problems  from hepatitis C. Ask your health care provider if treatment is likely to benefit your health.    Are the benefits worth the risks? Treatment for HCV infection can take up to a year and may cause side effects that make you feel sick. But after treatment, your body may be free of HCV. Discussing the pros and cons of treatment with your doctor and loved ones can help you reach a decision.    Is now the right time? Think about how treatment will fit into your daily routine. During treatment, you may be too tired to keep up an active lifestyle. Talk to family members and close friends about how treatment would fit into your life and schedule.    Do you plan to have a baby soon? HCV sometimes passes from mother to baby during birth. You may want to try to prevent this by getting treatment. But medications used to treat HCV infection can cause problems during pregnancy. If you and your partner are planning to have a baby soon, this will likely affect your hepatitis C treatment plans. Talk to your health care provider.    3950-9857 The [employer]. [address] [place] [address], [place] [address]. All rights reserved. This information is not intended as a substitute for professional medical care. Always follow your healthcare professional's instructions.        Treating Hepatitis C: Medications  Hepatitis C is treated using two medications that help your body fight the hepatitis C virus (HCV). Treatment takes time and commitment. In some people, treatment completely rids the body of HCV.     During treatment, follow your doctor s instructions exactly.   Medications    Interferon is a protein that helps your immune system target HCV. It s injected one or more times a week for up to 12 months. Most people are taught how to do these shots at home. Interferon will not be used by most people by the end of 2014. After that, most people will be treated with sofosbuvir, with or without ribavirin and/or  simeprevir.    Ribavirin makes it harder for HCV to reproduce inside the body. These pills are often taken along with interferon to make treatment more effective. The pills are taken twice a day.  Side effects  Hepatitis C treatment with interferon can cause side effects. They may include:    Feeling like you have the flu. Symptoms include fever, upset stomach, headache, feeling tired, and not being hungry.    Depression. This is more likely if you have a history of mental illness or depression.    Anemia (having fewer red blood cells than normal). You may feel weak and get bruises more often. Anemia can be life-threatening for patients with heart or blood vessel disease. This can be caused or worsened with ribavirin.    Birth defects. Hepatitis C treatment should not be given during or right before pregnancy.    Other side effects. These include mood swings, rash, thyroid problems, coughing, and shortness of breath.     Follow up  For best results, follow up with your doctor during and after treatment. This may include:    Having blood tests during treatment to see how your body is responding.    Following your doctor s instructions. This may mean changing your dosage based on how well treatment is working.    Having a blood test about 6 months after treatment ends. This checks for HCV in your body. If HCV is found, try not to be discouraged. Treatment may have slowed the progress of liver damage.    Discussing whether you should have liver cancer surveillance.        0347-3894 The [employer]. [address] [place] [address], [place] [address]. All rights reserved. This information is not intended as a substitute for professional medical care. Always follow your healthcare professional's instructions.        Hepatitis C: Know the Facts  Many people don t know the facts about hepatitis C. You may be concerned about things you ve heard. Read on to learn what s true about hepatitis C virus (HCV) infection and what s  not.    Facts about HCV infection:    You can still have sex. Hepatitis C can be spread through sex, but this is uncommon. Your partner is safest if you use a latex condom correctly every time you have sex. If you re in a committed relationship, you may not need to change your habits. Talk it over with your partner and do what feels right for both of you.    Your family members are safe. Hepatitis C can only be spread through contact with infected blood. Touching, kissing, and sharing food are all safe, as long as there is no blood exposure. But sharing anything that may have blood on it, like a toothbrush, needles, sharps, or razors, is not. Protect yourself by avoiding other people s blood.    Most people with hepatitis C don t die of it. Avoiding alcohol, losing weight, and taking other steps to protect your liver greatly reduces your chances of having life-threatening liver problems.    If you are a woman, you can still breastfeed. If you are being treated for hepatitis C, or if your nipples are cracked or bleeding, you should not breastfeed. Otherwise, breastfeeding with hepatitis C is safe.    You can have hepatitis C and not feel ill. Most people who have hepatitis C don t have obvious symptoms. Severe symptoms are most common in later stages of the disease when cirrhosis develops.    HCV is curable. Discuss treatment options with your health care provider.    There is no vaccine for HCV. People who have been cured can get the disease again.    HCV can affect your whole body. Discuss any signs of HCV with your provider.    Diabetes and vascular disease are caused or worsened by HCV. Discuss these risks with a specialist.    2741-9676 [employer]. [address] [place] [address], [place] [address]. All rights reserved. This information is not intended as a substitute for professional medical care. Always follow your healthcare professional's instructions.         No

## 2023-03-16 NOTE — PROGRESS NOTES
Social Work Intervention  [facility]    Data/Intervention:    Patient Name:  Rian Malik  /Age:  1960 (59 year old)    Visit Type: in person  Referral Source: Dr. Yousif  Reason for Referral:  Psychosocial support, hospice information    Collaborated With:    Rian    Patient Concerns/Issues:   Rian continues to progress with his illness and have significant side effects from his current treatment.     Intervention/Education/Resources Provided:  JUNIOR met with Rian in the infusion center today. He is here for IVF following his appointment with Dr. Yousif. Dr. Yousif spoke with Rian today about hospice and SW wanted to continue that conversation with him. SW engaged Rian in a discussion about his goals at this point in his journey. Rian noted he doesn't have any say in the matter. SW acknowledged that this is a hard place to find oneself, and no we can't change the final outcome, however, what we can do is have a conversation about how he wants the time he has left to look. SW engaged Rian in a conversation about hospice and the choice between continuing to pursue life prolonging treatments versus refocusing his goals to maximizing the quality of the time left. SW spoke about the choice of where he wants to focus his energy. It is often a question of at what point the cost of those treatments starts to outweigh their benefits and the goal transitions to spending time with family and using limited energy on activities they enjoy instead of getting treatments and going to appointments. SW emphasized that the point at which this is the case is different for everyone and very personal. Rian had some questions about the logistics related to starting hospice which sw answered. As Rian did seem interested in this service, SW encouraged him to at least meet with them for an informational meeting and he could make a decision at that point. It is important to Rian to be able to stay  home and he reports his children are staying with him and open to providing that care. MIRIAM assured Rian that hospice can be done at home and their goal is to assist patients in achieving there own goals for EOL, whatever they may look like.    Rian lost his wife five years ago and he has a lot of sadness about his children losing both parents. He states that they seem to be handling things well at this point. MIRIAM advised that she could offer resources for support for them if they would like that at some point or hospice can also assist with this need. MIRIAM talked with Rian a bit about legacy work and asked about things he felt he wanted to say or do for his children/loved ones before he passes. Though he doesn't have a clear feeling about this yet, he appeared open to this conversation and appears to be considering what those things might be. MIRIAM spoke about the typical process of becoming much more internally focused as one nears EOL. He asked about the stages of grief and while miriam educated Rian a bit on how that can look, she emphasized that the idea of these stages as sequential isn't entirely accurate, they are much more nebulous and it is perfectly normal to transition from sadness to anger to acceptance, etc. and back again throughout the process. Rian opened up about his hope that when all is said and done he will be smiling. He spoke about his belief that there has to be something better then this waiting for us after all this suffering. MIRIAM validated these feelings and agreed that she wishes the same for him.      Discussed health care directives. Rian states he believes he completed on at some point. MIRIAM advised that we didn't have any record of that documentation. Rian was uncertain where that paperwork is, he requested another copy of the directive which miriam provided. MIRIAM advised that he can review it and bring it back for help completing if he wants some assistance or hospice SW would be able to help with  that as well.     Assessment/Plan:  Rian will meet with hospice team for informational meeting and possible SOC. He does have palliative appointment scheduled for Monday and if he doesn't initiate hospice and maintains that appointment SW will attempt to check in with him that day. SW also encouraged Rian to reach out if he has any additional questions in the coming weeks.         JELANI Berg, [facility]  Clinical , Adult Oncology  Pager: [phone]  Phone: [phone]     [id number]: || ||00\01||False;

## 2023-08-10 NOTE — PLAN OF CARE
Pt alert and oriented. VVS. On room air. Came from OSH for evaluation of abdominal discomfort cornering for malignancy. On observation status. Has central right port. Awaiting to reaccess port with resource nurse. No skin issues noted. Voiding and passing flatus without issue. Bowel sounds present and normoactive. Plan is for further work up by the colorectal surgery team in the AM. NPO. Will continue to monitor as noted and follow plan of care.      Titus Reese RN [id number]   No

## 2024-01-04 NOTE — MR AVS SNAPSHOT
After Visit Summary   1/2/2018    Rian Malik    MRN: [id number]           Patient Information     Date Of Birth          1960        Visit Information        Provider Department      1/2/2018 9:15 AM Jessica Marsh APRN CNP [facility]         Follow-ups after your visit        Your next 10 appointments already scheduled     Jan 08, 2018 11:30 AM CST   [facility]   [facility]    [id number] [place] [id number] [phone]            Jan 08, 2018 12:00 PM CST   [place] with [facility] INFUSION   [facility]    [id number] [place] [id number] [phone]            Jan 22, 2018  8:00 AM CST   Return Visit with [facility]   [facility]    [id number] [place] [address]   [phone]            Jan 22, 2018  8:45 AM CST   Return Visit with Padilla Last MD   [facility]    [address] [place] [id number] [phone]            Jan 22, 2018  9:30 AM CST   [place] with [facility]AY [facility] INFUSION   [facility]    [id number] [place] [address]   [phone]              Who to contact     If you have questions or need follow up information about today's clinic visit or your schedule please contact [facility] directly at [phone].  Normal or non-critical lab and imaging results will be communicated to you by MyChart, letter or phone within 4 business days after the clinic has received the results. If you do not hear from us within 7 days, please contact the clinic through MyChart or phone. If you have a critical or abnormal lab result, we will notify you by phone as soon as possible.  Submit refill requests  "through [employer] or call your pharmacy and they will forward the refill request to us. Please allow 3 business days for your refill to be completed.          Additional Information About Your Visit        [employer] Information     [employer] gives you secure access to your electronic health record. If you see a primary care provider, you can also send messages to your care team and make appointments. If you have questions, please call your primary care clinic.  If you do not have a primary care provider, please call [phone] and they will assist you.      [employer] is an electronic gateway that provides easy, online access to your medical records. With [employer], you can request a clinic appointment, read your test results, renew a prescription or communicate with your care team.     To access your existing account, please contact your [facility] or call [phone] for assistance.        Care EveryWhere ID     This is your Care EveryWhere ID. This could be used by other organizations to access your [facility] medical records  [id number]        Your Vitals Were     Pulse Temperature Respirations Height Pulse Oximetry BMI (Body Mass Index)    78 96.8  F (36  C) (Oral) 20 1.727 m (5' 7.99\") 98% 24.03 kg/m2       Blood Pressure from Last 3 Encounters:   01/02/18 122/82   12/18/17 127/71   12/04/17 120/70    Weight from Last 3 Encounters:   01/02/18 71.7 kg (158 lb)   12/18/17 72.6 kg (160 lb)   12/04/17 70.8 kg (156 lb)              Today, you had the following     No orders found for display       Primary Care Provider Office Phone # Fax #    Moreno Harris [phone]       [address]  [facility] [address]        Equal Access to Services     [facility] : Rajesh Meraz, suad peck, iliana garcia, alisa mg. So [facility] [phone].    ATENCIÓN: Si habla español, tiene a martino disposición servicios " stephon de asistencia lingüística. Alfredo rocha [phone].    We comply with applicable federal civil rights laws and [place] laws. We do not discriminate on the basis of race, color, national origin, age, disability, sex, sexual orientation, or gender identity.            Thank you!     Thank you for choosing [facility]  for your care. Our goal is always to provide you with excellent care. Hearing back from our patients is one way we can continue to improve our services. Please take a few minutes to complete the written survey that you may receive in the mail after your visit with us. Thank you!             Your Updated Medication List - Protect others around you: Learn how to safely use, store and throw away your medicines at www.disposemymeds.org.          This list is accurate as of: 1/2/18 10:07 AM.  Always use your most recent med list.                   Brand Name Dispense Instructions for use Diagnosis    docusate sodium 100 MG tablet    COLACE    60 tablet    Take 100 mg by mouth daily    Special screening for malignant neoplasms, colon       elbasvir-grazoprevir  MG Tabs per tablet    ZEPATIER    28 tablet    Take 1 tablet by mouth daily    Chronic hepatitis C without hepatic coma (H)       LORazepam 0.5 MG tablet    ATIVAN    30 tablet    Take 1 tablet (0.5 mg) by mouth every 4 hours as needed (Anxiety, Nausea/Vomiting or Sleep)    Adenocarcinoma of sigmoid colon (H)       ondansetron 8 MG tablet    ZOFRAN    10 tablet    Take 1 tablet (8 mg) by mouth every 8 hours as needed (Nausea/Vomiting)    Adenocarcinoma of sigmoid colon (H)       oxyCODONE IR 5 MG tablet    ROXICODONE    20 tablet    Take 1 tablet (5 mg) by mouth every 4 hours as needed for pain maximum 8 tablet(s) per day    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

## 2024-01-26 NOTE — PROGRESS NOTES
Dear  [facility],    We are notifying you of a Missed Visit or Hospice Death.  Rian Malik; MRN [id number]   peacefully On date 20  Time 113  Sincerely [facility]  Ashlie Meyers  [phone]       20

## 2024-02-07 NOTE — PROGRESS NOTES
Oncology Follow Up Visit: November 6, 2018     Oncologist: Dr Padilla Last  PCP: Moreno Harris    Diagnosis: Stage III Colon Cancer  Rian Malik is a 57 yo  male that was c/o lower abdominal cramping. CT showed sigmoid abscess but at resection was found to have large 5 cm firmly adherent sigmoid colon mass.Lymphovascular invasion present but no perineural invasion.  0/3 Reactive Lymph Nodes were positive. ([id number])  He has intact mismatch repair.  Treatment:   10/11/2017 sigmoidectomy with end colostomy  11/21/2017 -6/2018 modified FOLFOX-6 x 12 cycles  9/12/2018 laparoscopic liver oblation with intraoperative US, colostomy takedown, appendectomy, descending colectomy and anterior dissection. Surgical pathology showed periappendiceal tissue, ascending colon margin/mesentery and pericolonic fibroadipose adipose tissue involved by signet ring adenocarcinoma.  10/24/2018 began FOLFIRI    Interval History: Mr. Malik comes to clinic alone for symptom review prior to delayed Cycle 2 of FOLFIRI.Pt was delayed form treatment last week due to neutropenia and thrombocytopenia. He has been feeling well this week. He is eating well but does watch intake due to random loose stools- has needed to use imodium intermittently but is afraid of constipation. Denies pain, nausea,SOB, chest pain, weakness or neuropathy.   Rest of comprehensive and complete ROS is reviewed and is negative.   Past Medical History:   Diagnosis Date     Maldonado's esophagus      Cirrhosis (H)      Colon cancer (H) 10/11/2017    Poorly differentiated adenocarcinoma     Hepatitis C    - previously completed treatment for Hepatitis C.   Current Outpatient Prescriptions   Medication     loperamide (IMODIUM) 2 MG capsule     LORazepam (ATIVAN) 0.5 MG tablet     ondansetron (ZOFRAN) 8 MG tablet     prochlorperazine (COMPAZINE) 10 MG tablet     No current facility-administered medications for this visit.      Facility-Administered Medications  Suggest follow blood pressure. At risk for hypertension given weight.    "Ordered in Other Visits   Medication     heparin 100 UNIT/ML injection 5 mL     sodium chloride (PF) 0.9% PF flush 10-20 mL     Allergies   Allergen Reactions     Acetaminophen      Naproxen        Physical Exam:/75  Pulse 68  Temp 97.8  F (36.6  C)  Resp 18  Ht 1.727 m (5' 8\")  Wt 73.5 kg (162 lb)  SpO2 98%  BMI 24.63 kg/m2   ECOG PS- 0  Constitutional: Alert and in no distress. Moving well and is cooperative.   ENT: Eyes bright, No mouth sores. Right TM with dark wax- pt sharing that this has been bothering him and possibly affecting hearing.   Neck: Supple, No adenopathy.Thyroid symmetric  Cardiac: Heart rate and rhythm is regular and strong without murmur  Respiratory: Breathing easy. Lung sounds clear to auscultation  GI: Abdomen is soft, non-tender, BS normal. No masses or organomegaly. Colostomy take down area healed well and is not tender.   MS: Muscle tone normal- moving well on own, extremities normal with no edema.   Skin: No suspicious lesions or rashes  Neuro: Sensory grossly WNL, gait normal.   Lymph: Normal ant/post cervical, axillary, supraclavicular nodes  Psych: Mentation appears normal and affect normal with good conversation.    Laboratory Results:   Results for orders placed or performed in visit on 11/15/18   CBC with platelets differential   Result Value Ref Range    WBC 3.5 (L) 4.0 - 11.0 10e9/L    RBC Count 4.16 (L) 4.4 - 5.9 10e12/L    Hemoglobin 13.0 (L) 13.3 - 17.7 g/dL    Hematocrit 36.9 (L) 40.0 - 53.0 %    MCV 89 78 - 100 fl    MCH 31.3 26.5 - 33.0 pg    MCHC 35.2 31.5 - 36.5 g/dL    RDW 14.1 10.0 - 15.0 %    Platelet Count 72 (L) 150 - 450 10e9/L    Diff Method Automated Method     % Neutrophils 63.4 %    % Lymphocytes 27.5 %    % Monocytes 7.4 %    % Eosinophils 0.8 %    % Basophils 0.6 %    % Immature Granulocytes 0.3 %    Absolute Neutrophil 2.2 1.6 - 8.3 10e9/L    Absolute Lymphocytes 1.0 0.8 - 5.3 10e9/L    Absolute Monocytes 0.3 0.0 - 1.3 10e9/L    Absolute " Eosinophils 0.0 0.0 - 0.7 10e9/L    Absolute Basophils 0.0 0.0 - 0.2 10e9/L    Abs Immature Granulocytes 0.0 0 - 0.4 10e9/L   Bilirubin  total   Result Value Ref Range    Bilirubin Total 0.9 0.2 - 1.3 mg/dL     Assessment and Plan:   Stage III Colon Cancer-Pt is due to restart FOLFIRI cycle 2 after 1 week delay due to thrombocytopenia and neutropenia. ANC is now adequate but He is still seeing platelets= 72,000 but will continue  We  may not see significant improvement of platelets due to previous treatment to the liver.  He will return in 2 weeks for cycle 3 with review prior with treatment labs.   Plan to repeat  MRI of the liver and PET scan in 3-4 months after starting FOLFIRI and if no evidence of recurrent disease consideration for HIPEC  Loose stools- Known side effect of irinotecan. Using imodium as needed - appears to be working well. Reminded of need for fiber and adequate fluids.   Nausea- pt using antiemetics early in cycle with good results.  Right ear with cerumen impaction. Cleaned with loop of dark ball of wax with pt sharing relief.   This was a 20 min visit with > 50% in counseling and coordinating care including education and management of concerns.    Jessica Marsh,CNP

## (undated) DEVICE — KIT ENDO FIRST STEP DISINFECTANT 200ML W/POUCH EP-4

## (undated) DEVICE — LIGHT HANDLE X1 31140133

## (undated) DEVICE — ENDO TROCAR FIRST ENTRY KII FIOS ADV FIX 05X100MM CFF03

## (undated) DEVICE — STPL POWERED ECHELON 60MM PSEE60A

## (undated) DEVICE — SU VICRYL 3-0 SH CR 8X18" J774

## (undated) DEVICE — PAD CHUX UNDERPAD 23X24" 7136

## (undated) DEVICE — TUBING SUCTION 10'X3/16" N510

## (undated) DEVICE — SU MONOCRYL 4-0 PS-2 27" UND Y426H

## (undated) DEVICE — ENDO TROCAR BLUNT TIP KII BALLOON 12X100MM C0R47

## (undated) DEVICE — CLIP APPLIER ENDO 5MM M/L LIGAMAX EL5ML

## (undated) DEVICE — LINEN TOWEL PACK X30 5481

## (undated) DEVICE — SYR 30ML SLIP TIP W/O NDL 302833

## (undated) DEVICE — BLADE CLIPPER SGL USE 9680

## (undated) DEVICE — KIT PATIENT POSITIONING PIGAZZI LATEX FREE 40580

## (undated) DEVICE — SOL NACL 0.9% IRRIG 1000ML BOTTLE 2F7124

## (undated) DEVICE — ESU HARMONIC ACE LAP SHEARS ETHICON ACE+ 7 5MMX45CM HARH45

## (undated) DEVICE — CLIP ENDO RESOLUTION 360 2.8,,X235CM M00521230

## (undated) DEVICE — SUCTION TIP YANKAUER STR K87

## (undated) DEVICE — SU VICRYL 3-0 FS-1 27" J442H

## (undated) DEVICE — NDL BLUNT 18GA 1" W/O FILTER 305181

## (undated) DEVICE — SU VICRYL 2-0 SH 27" UND J417H

## (undated) DEVICE — DRAIN PENROSE 5/8X18" LATEX 0918040

## (undated) DEVICE — SU SILK 2-0 TIE 12X30" A305H

## (undated) DEVICE — STPL CIRCULAR ENDOPATH 29MM CVD ECS29A

## (undated) DEVICE — SU VICRYL 0 TIE 54" J608H

## (undated) DEVICE — ENDO GELPORT 100/120MM C8XX2

## (undated) DEVICE — DRAPE MAYO STAND 23X54 8337

## (undated) DEVICE — SPECIMEN CONTAINER 5OZ STERILE 2600SA

## (undated) DEVICE — Device

## (undated) DEVICE — SOL WATER IRRIG 1000ML BOTTLE 2F7114

## (undated) DEVICE — PROBE PRESSURE NEUWAVE CERTUS 140 15GAX20CM PR20XT

## (undated) DEVICE — TUBING INSUFFLATION W/FILTER CPC TO LUER 620-030-301

## (undated) DEVICE — WIPE PREMOIST CLEANSING WASHCLOTHS 7988

## (undated) DEVICE — NDL 30GA 0.5" 305106

## (undated) DEVICE — ESU PENCIL W/COATED BLADE E2450H

## (undated) DEVICE — SU PROLENE 2-0 SHDA 36" 8523H

## (undated) DEVICE — DRSG GAUZE 4X4" TRAY 6939

## (undated) DEVICE — DRSG PRIMAPORE 02X3" 7133

## (undated) DEVICE — ESU GROUND PAD ADULT W/CORD E7507

## (undated) DEVICE — DRAIN JACKSON PRATT RESERVOIR 100ML SU130-1305

## (undated) DEVICE — CATH TRAY FOLEY SURESTEP 16FR W/TMP PRB STLK LATEX A319416AM

## (undated) DEVICE — JELLY LUBRICATING SURGILUBE 2OZ TUBE

## (undated) DEVICE — SYSTEM CLEARIFY VISUALIZATION 21-345

## (undated) DEVICE — BNDG ABDOMINAL BINDER 9X45-62" 79-89071

## (undated) DEVICE — DRAPE SHEET MED 44X70" 9355

## (undated) DEVICE — SPONGE LAP 18X18" X8435

## (undated) DEVICE — SUCTION MANIFOLD DORNOCH ULTRA CART UL-CL500

## (undated) DEVICE — ENDO TROCAR SLEEVE KII ADV FIXATION 05X100MM CFS02

## (undated) DEVICE — BARRIER SEPRAFILM 3X5" X6 SHEETS 5086-02

## (undated) DEVICE — SU SILK 4-0 RB-1 30" K871H

## (undated) DEVICE — SU VICRYL 0 UR-6 27" J603H

## (undated) DEVICE — WIRE GUIDE 0.025"X450CM STR VISIGLIDE G-240-2545S

## (undated) DEVICE — KIT CONNECTOR FOR OLYMPUS ENDOSCOPES DEFENDO 100310

## (undated) DEVICE — SU PROLENE 1 CTX 30" 8455H

## (undated) DEVICE — SUCTION IRR STRYKERFLOW II W/TIP 250-070-520

## (undated) DEVICE — DRSG TEGADERM 4X4 3/4" 1626W

## (undated) DEVICE — SU SILK 4-0 RB-1 CR 8X18" C054D

## (undated) DEVICE — ENDO SYSTEM WATER BOTTLE & TUBING W/CO2 FILTER 00711549

## (undated) DEVICE — INFLATION DEVICE BIG 60 ENDO-AN6012

## (undated) DEVICE — SU SILK 3-0 TIE 12X30" A304H

## (undated) DEVICE — SU MONOCRYL 4-0 PS-2 18" UND Y496G

## (undated) DEVICE — CATH BALLOON ELATION ESOPH/PYLORIC 12-13.5-15MMX180CM EPB12

## (undated) DEVICE — LINEN TOWEL PACK X5 5464

## (undated) DEVICE — DRAPE SHEET REV FOLD 3/4 9349

## (undated) DEVICE — PREP CHLORAPREP 26ML TINTED ORANGE  260815

## (undated) DEVICE — WIPES FOLEY CARE SURESTEP PROVON DFC100

## (undated) DEVICE — CATH URETERAL OPEN END 05FR 70CM 020015

## (undated) DEVICE — SU SILK 0 TIE 6X30" A306H

## (undated) DEVICE — DRAPE IOBAN INCISE 23X17" 6650EZ

## (undated) DEVICE — SOL WATER IRRIG 3000ML BAG 2B7117

## (undated) DEVICE — DRAIN JACKSON PRATT ROUND SIL 19FR W/TROCAR LF JP-2232

## (undated) DEVICE — STPL RELOAD REG TISSUE ECHELON 60 X 3.6MM BLUE GST60B

## (undated) DEVICE — SU ETHILON 2-0 FS 18" 664H

## (undated) DEVICE — SU DERMABOND PRINEO 22CM CLR222US

## (undated) DEVICE — SOL NACL 0.9% INJ 1000ML BAG 2B1324X

## (undated) DEVICE — ESU ELEC BLADE 2.75" COATED/INSULATED E1455

## (undated) DEVICE — LINEN GOWN XLG 5407

## (undated) DEVICE — NDL COUNTER 20CT 31142493

## (undated) DEVICE — LINEN TOWEL PACK X6 WHITE 5487

## (undated) DEVICE — ENDO CAP AND TUBING STERILE FOR ENDOGATOR  100130

## (undated) DEVICE — STPL CONTOUR CUT CVD GREEN CS40G

## (undated) DEVICE — GOWN IMPERVIOUS BREATHABLE SMART XLG 89045

## (undated) DEVICE — PREP POVIDONE IODINE SOLUTION 10% 4OZ

## (undated) DEVICE — SPECIMEN CONTAINER URINE 90ML STERILE 75.1435.002

## (undated) DEVICE — PACK CYSTO UMMC CUSTOM

## (undated) DEVICE — SU SILK 4-0 TIE 12X30" A303H

## (undated) DEVICE — GUIDEWIRE SENSOR DUAL FLEX STR 0.035"X150CM M0066703080

## (undated) DEVICE — CATH RETRIEVAL BALLOON EXTRACTOR PRO RX-S INJ ABOVE 9-12MM

## (undated) DEVICE — DRAPE LEGGINGS CLEAR 8430

## (undated) RX ORDER — PROPOFOL 10 MG/ML
INJECTION, EMULSION INTRAVENOUS
Status: DISPENSED
Start: 2019-01-01

## (undated) RX ORDER — FENTANYL CITRATE 50 UG/ML
INJECTION, SOLUTION INTRAMUSCULAR; INTRAVENOUS
Status: DISPENSED
Start: 2017-08-04

## (undated) RX ORDER — ONDANSETRON 2 MG/ML
INJECTION INTRAMUSCULAR; INTRAVENOUS
Status: DISPENSED
Start: 2018-09-12

## (undated) RX ORDER — GLYCOPYRROLATE 0.2 MG/ML
INJECTION, SOLUTION INTRAMUSCULAR; INTRAVENOUS
Status: DISPENSED
Start: 2018-09-12

## (undated) RX ORDER — CIPROFLOXACIN 2 MG/ML
INJECTION, SOLUTION INTRAVENOUS
Status: DISPENSED
Start: 2018-09-12

## (undated) RX ORDER — FENTANYL CITRATE 50 UG/ML
INJECTION, SOLUTION INTRAMUSCULAR; INTRAVENOUS
Status: DISPENSED
Start: 2019-01-01

## (undated) RX ORDER — FENTANYL CITRATE 50 UG/ML
INJECTION, SOLUTION INTRAMUSCULAR; INTRAVENOUS
Status: DISPENSED
Start: 2018-09-12

## (undated) RX ORDER — LIDOCAINE HYDROCHLORIDE 20 MG/ML
INJECTION, SOLUTION EPIDURAL; INFILTRATION; INTRACAUDAL; PERINEURAL
Status: DISPENSED
Start: 2019-01-01

## (undated) RX ORDER — LIDOCAINE HYDROCHLORIDE 20 MG/ML
INJECTION, SOLUTION EPIDURAL; INFILTRATION; INTRACAUDAL; PERINEURAL
Status: DISPENSED
Start: 2018-09-12

## (undated) RX ORDER — GRANISETRON HYDROCHLORIDE 1 MG/ML
INJECTION INTRAVENOUS
Status: DISPENSED
Start: 2018-09-12

## (undated) RX ORDER — EPHEDRINE SULFATE 50 MG/ML
INJECTION, SOLUTION INTRAMUSCULAR; INTRAVENOUS; SUBCUTANEOUS
Status: DISPENSED
Start: 2018-09-12

## (undated) RX ORDER — PROPOFOL 10 MG/ML
INJECTION, EMULSION INTRAVENOUS
Status: DISPENSED
Start: 2018-09-12

## (undated) RX ORDER — WATER 10 ML/10ML
INJECTION INTRAMUSCULAR; INTRAVENOUS; SUBCUTANEOUS
Status: DISPENSED
Start: 2019-01-01

## (undated) RX ORDER — DEXAMETHASONE SODIUM PHOSPHATE 4 MG/ML
INJECTION, SOLUTION INTRA-ARTICULAR; INTRALESIONAL; INTRAMUSCULAR; INTRAVENOUS; SOFT TISSUE
Status: DISPENSED
Start: 2018-09-12